# Patient Record
Sex: MALE | Race: WHITE | Employment: OTHER | URBAN - METROPOLITAN AREA
[De-identification: names, ages, dates, MRNs, and addresses within clinical notes are randomized per-mention and may not be internally consistent; named-entity substitution may affect disease eponyms.]

---

## 2017-01-10 ENCOUNTER — OFFICE VISIT (OUTPATIENT)
Dept: FAMILY MEDICINE CLINIC | Age: 52
End: 2017-01-10

## 2017-01-10 VITALS
DIASTOLIC BLOOD PRESSURE: 60 MMHG | WEIGHT: 203 LBS | HEIGHT: 66 IN | SYSTOLIC BLOOD PRESSURE: 104 MMHG | TEMPERATURE: 97.7 F | HEART RATE: 64 BPM | RESPIRATION RATE: 16 BRPM | BODY MASS INDEX: 32.62 KG/M2

## 2017-01-10 DIAGNOSIS — F41.9 ANXIETY: ICD-10-CM

## 2017-01-10 DIAGNOSIS — M15.9 PRIMARY OSTEOARTHRITIS INVOLVING MULTIPLE JOINTS: ICD-10-CM

## 2017-01-10 DIAGNOSIS — M54.2 CHRONIC NECK PAIN: ICD-10-CM

## 2017-01-10 DIAGNOSIS — I10 ESSENTIAL HYPERTENSION: Chronic | ICD-10-CM

## 2017-01-10 DIAGNOSIS — K21.9 GASTROESOPHAGEAL REFLUX DISEASE, ESOPHAGITIS PRESENCE NOT SPECIFIED: ICD-10-CM

## 2017-01-10 DIAGNOSIS — F10.11 HISTORY OF ALCOHOL ABUSE: ICD-10-CM

## 2017-01-10 DIAGNOSIS — G89.29 CHRONIC NECK PAIN: ICD-10-CM

## 2017-01-10 DIAGNOSIS — K70.31 ALCOHOLIC CIRRHOSIS OF LIVER WITH ASCITES (HCC): Primary | ICD-10-CM

## 2017-01-10 DIAGNOSIS — R79.89 INCREASED AMMONIA LEVEL: ICD-10-CM

## 2017-01-10 PROCEDURE — 99214 OFFICE O/P EST MOD 30 MIN: CPT | Performed by: NURSE PRACTITIONER

## 2017-01-10 RX ORDER — DIAZEPAM 5 MG/1
5 TABLET ORAL EVERY 8 HOURS PRN
Qty: 90 TABLET | Refills: 0 | Status: SHIPPED | OUTPATIENT
Start: 2017-01-10 | End: 2017-02-08 | Stop reason: SDUPTHER

## 2017-01-10 RX ORDER — HYDROCODONE BITARTRATE AND ACETAMINOPHEN 5; 325 MG/1; MG/1
1 TABLET ORAL EVERY 6 HOURS PRN
Qty: 30 TABLET | Refills: 0 | Status: SHIPPED | OUTPATIENT
Start: 2017-01-10 | End: 2017-02-09

## 2017-01-10 RX ORDER — TRAMADOL HYDROCHLORIDE 50 MG/1
50 TABLET ORAL EVERY 8 HOURS PRN
Qty: 90 TABLET | Refills: 0 | Status: SHIPPED | OUTPATIENT
Start: 2017-01-10 | End: 2017-02-08 | Stop reason: SDUPTHER

## 2017-01-10 RX ORDER — DIAZEPAM 5 MG/1
5 TABLET ORAL EVERY 8 HOURS PRN
COMMUNITY
Start: 2016-11-08 | End: 2017-02-01 | Stop reason: SDUPTHER

## 2017-01-10 ASSESSMENT — PATIENT HEALTH QUESTIONNAIRE - PHQ9
1. LITTLE INTEREST OR PLEASURE IN DOING THINGS: 0
SUM OF ALL RESPONSES TO PHQ QUESTIONS 1-9: 0
2. FEELING DOWN, DEPRESSED OR HOPELESS: 0
SUM OF ALL RESPONSES TO PHQ9 QUESTIONS 1 & 2: 0

## 2017-01-11 ASSESSMENT — ENCOUNTER SYMPTOMS
EYES NEGATIVE: 1
RESPIRATORY NEGATIVE: 1
ALLERGIC/IMMUNOLOGIC NEGATIVE: 1
ABDOMINAL DISTENTION: 1

## 2017-02-08 RX ORDER — TRAMADOL HYDROCHLORIDE 50 MG/1
50 TABLET ORAL EVERY 8 HOURS PRN
Qty: 90 TABLET | Refills: 0 | Status: SHIPPED | OUTPATIENT
Start: 2017-02-08 | End: 2017-04-10 | Stop reason: SDUPTHER

## 2017-02-08 RX ORDER — DIAZEPAM 5 MG/1
5 TABLET ORAL EVERY 8 HOURS PRN
Qty: 90 TABLET | Refills: 0 | Status: SHIPPED | OUTPATIENT
Start: 2017-02-08 | End: 2017-04-10 | Stop reason: SDUPTHER

## 2017-02-08 RX ORDER — HYDROCODONE BITARTRATE AND ACETAMINOPHEN 5; 325 MG/1; MG/1
1 TABLET ORAL EVERY 6 HOURS PRN
Qty: 30 TABLET | Refills: 0 | OUTPATIENT
Start: 2017-02-08 | End: 2017-03-10

## 2017-02-08 RX ORDER — TRAMADOL HYDROCHLORIDE 50 MG/1
50 TABLET ORAL EVERY 8 HOURS PRN
Qty: 90 TABLET | Refills: 0 | OUTPATIENT
Start: 2017-02-08

## 2017-02-08 RX ORDER — DIAZEPAM 5 MG/1
5 TABLET ORAL EVERY 8 HOURS PRN
Qty: 90 TABLET | Refills: 0 | OUTPATIENT
Start: 2017-02-08

## 2017-02-20 ENCOUNTER — TELEPHONE (OUTPATIENT)
Dept: FAMILY MEDICINE CLINIC | Age: 52
End: 2017-02-20

## 2017-04-10 ENCOUNTER — OFFICE VISIT (OUTPATIENT)
Dept: BEHAVIORAL/MENTAL HEALTH | Age: 52
End: 2017-04-10

## 2017-04-10 DIAGNOSIS — F43.21 GRIEF: Primary | ICD-10-CM

## 2017-04-10 PROCEDURE — 90832 PSYTX W PT 30 MINUTES: CPT | Performed by: PSYCHOLOGIST

## 2017-04-10 ASSESSMENT — PATIENT HEALTH QUESTIONNAIRE - PHQ9
SUM OF ALL RESPONSES TO PHQ QUESTIONS 1-9: 7
9. THOUGHTS THAT YOU WOULD BE BETTER OFF DEAD, OR OF HURTING YOURSELF: 0
1. LITTLE INTEREST OR PLEASURE IN DOING THINGS: 1
2. FEELING DOWN, DEPRESSED OR HOPELESS: 1
8. MOVING OR SPEAKING SO SLOWLY THAT OTHER PEOPLE COULD HAVE NOTICED. OR THE OPPOSITE, BEING SO FIGETY OR RESTLESS THAT YOU HAVE BEEN MOVING AROUND A LOT MORE THAN USUAL: 0
7. TROUBLE CONCENTRATING ON THINGS, SUCH AS READING THE NEWSPAPER OR WATCHING TELEVISION: 0
5. POOR APPETITE OR OVEREATING: 0
SUM OF ALL RESPONSES TO PHQ9 QUESTIONS 1 & 2: 2
6. FEELING BAD ABOUT YOURSELF - OR THAT YOU ARE A FAILURE OR HAVE LET YOURSELF OR YOUR FAMILY DOWN: 0
4. FEELING TIRED OR HAVING LITTLE ENERGY: 3
10. IF YOU CHECKED OFF ANY PROBLEMS, HOW DIFFICULT HAVE THESE PROBLEMS MADE IT FOR YOU TO DO YOUR WORK, TAKE CARE OF THINGS AT HOME, OR GET ALONG WITH OTHER PEOPLE: 1
3. TROUBLE FALLING OR STAYING ASLEEP: 2

## 2017-04-11 RX ORDER — TRAMADOL HYDROCHLORIDE 50 MG/1
50 TABLET ORAL EVERY 8 HOURS PRN
Qty: 90 TABLET | Refills: 0 | Status: SHIPPED | OUTPATIENT
Start: 2017-04-11 | End: 2017-04-13 | Stop reason: SDUPTHER

## 2017-04-11 RX ORDER — DIAZEPAM 5 MG/1
5 TABLET ORAL EVERY 8 HOURS PRN
Qty: 90 TABLET | Refills: 0 | Status: SHIPPED | OUTPATIENT
Start: 2017-04-11 | End: 2017-04-13 | Stop reason: SDUPTHER

## 2017-04-13 ENCOUNTER — OFFICE VISIT (OUTPATIENT)
Dept: FAMILY MEDICINE CLINIC | Age: 52
End: 2017-04-13

## 2017-04-13 DIAGNOSIS — G89.29 OTHER CHRONIC PAIN: ICD-10-CM

## 2017-04-13 DIAGNOSIS — R18.8 CIRRHOSIS OF LIVER WITH ASCITES, UNSPECIFIED HEPATIC CIRRHOSIS TYPE (HCC): Primary | ICD-10-CM

## 2017-04-13 DIAGNOSIS — K74.60 CIRRHOSIS OF LIVER WITH ASCITES, UNSPECIFIED HEPATIC CIRRHOSIS TYPE (HCC): Primary | ICD-10-CM

## 2017-04-13 DIAGNOSIS — F41.9 ANXIETY: ICD-10-CM

## 2017-04-13 PROCEDURE — 3017F COLORECTAL CA SCREEN DOC REV: CPT | Performed by: NURSE PRACTITIONER

## 2017-04-13 PROCEDURE — 99213 OFFICE O/P EST LOW 20 MIN: CPT | Performed by: NURSE PRACTITIONER

## 2017-04-13 PROCEDURE — 4004F PT TOBACCO SCREEN RCVD TLK: CPT | Performed by: NURSE PRACTITIONER

## 2017-04-13 PROCEDURE — G8427 DOCREV CUR MEDS BY ELIG CLIN: HCPCS | Performed by: NURSE PRACTITIONER

## 2017-04-13 PROCEDURE — G8417 CALC BMI ABV UP PARAM F/U: HCPCS | Performed by: NURSE PRACTITIONER

## 2017-04-13 RX ORDER — TRAMADOL HYDROCHLORIDE 50 MG/1
50 TABLET ORAL EVERY 8 HOURS PRN
Qty: 90 TABLET | Refills: 0 | Status: SHIPPED | OUTPATIENT
Start: 2017-04-13 | End: 2017-05-11 | Stop reason: SDUPTHER

## 2017-04-13 RX ORDER — DIAZEPAM 5 MG/1
5 TABLET ORAL EVERY 8 HOURS PRN
Qty: 90 TABLET | Refills: 0 | Status: SHIPPED | OUTPATIENT
Start: 2017-04-13 | End: 2017-05-11 | Stop reason: SDUPTHER

## 2017-04-13 RX ORDER — HYDROCODONE BITARTRATE AND ACETAMINOPHEN 5; 325 MG/1; MG/1
1 TABLET ORAL EVERY 4 HOURS PRN
Refills: 0 | COMMUNITY
Start: 2017-04-11 | End: 2017-04-13 | Stop reason: SDUPTHER

## 2017-04-13 RX ORDER — AMOXICILLIN 500 MG/1
1 CAPSULE ORAL EVERY 6 HOURS
Refills: 0 | Status: ON HOLD | COMMUNITY
Start: 2017-04-11 | End: 2017-06-07 | Stop reason: HOSPADM

## 2017-04-15 RX ORDER — HYDROCODONE BITARTRATE AND ACETAMINOPHEN 5; 325 MG/1; MG/1
1 TABLET ORAL EVERY 4 HOURS PRN
Qty: 30 TABLET | Refills: 0 | Status: SHIPPED | OUTPATIENT
Start: 2017-04-15 | End: 2017-05-23 | Stop reason: SDUPTHER

## 2017-04-20 VITALS
TEMPERATURE: 97.7 F | SYSTOLIC BLOOD PRESSURE: 120 MMHG | BODY MASS INDEX: 31.06 KG/M2 | RESPIRATION RATE: 20 BRPM | HEART RATE: 72 BPM | DIASTOLIC BLOOD PRESSURE: 70 MMHG | WEIGHT: 191 LBS

## 2017-04-20 ASSESSMENT — ENCOUNTER SYMPTOMS
RESPIRATORY NEGATIVE: 1
EYES NEGATIVE: 1
BACK PAIN: 1
GASTROINTESTINAL NEGATIVE: 1

## 2017-05-11 RX ORDER — DIAZEPAM 5 MG/1
5 TABLET ORAL EVERY 8 HOURS PRN
Qty: 90 TABLET | Refills: 0 | Status: ON HOLD | OUTPATIENT
Start: 2017-05-11 | End: 2017-06-07 | Stop reason: HOSPADM

## 2017-05-11 RX ORDER — TRAMADOL HYDROCHLORIDE 50 MG/1
50 TABLET ORAL EVERY 8 HOURS PRN
Qty: 90 TABLET | Refills: 0 | Status: ON HOLD | OUTPATIENT
Start: 2017-05-11 | End: 2017-06-07 | Stop reason: HOSPADM

## 2017-05-23 RX ORDER — HYDROCODONE BITARTRATE AND ACETAMINOPHEN 5; 325 MG/1; MG/1
1 TABLET ORAL EVERY 4 HOURS PRN
Qty: 30 TABLET | Refills: 0 | Status: ON HOLD | OUTPATIENT
Start: 2017-05-23 | End: 2017-06-07 | Stop reason: HOSPADM

## 2017-05-27 ENCOUNTER — NURSE TRIAGE (OUTPATIENT)
Dept: ADMINISTRATIVE | Age: 52
End: 2017-05-27

## 2017-05-30 RX ORDER — FUROSEMIDE 20 MG/1
20 TABLET ORAL DAILY
Qty: 30 TABLET | Refills: 11 | Status: SHIPPED | OUTPATIENT
Start: 2017-05-30 | End: 2017-06-16 | Stop reason: ALTCHOICE

## 2017-05-30 RX ORDER — CITALOPRAM 20 MG/1
20 TABLET ORAL DAILY
Qty: 30 TABLET | Refills: 10 | Status: SHIPPED | OUTPATIENT
Start: 2017-05-30 | End: 2018-06-05 | Stop reason: SDUPTHER

## 2017-06-06 PROBLEM — E87.20 LACTIC ACIDOSIS: Status: ACTIVE | Noted: 2017-06-06

## 2017-06-06 PROBLEM — Z72.0 TOBACCO ABUSE: Chronic | Status: ACTIVE | Noted: 2017-06-06

## 2017-06-06 PROBLEM — R10.84 GENERALIZED ABDOMINAL PAIN: Status: ACTIVE | Noted: 2017-06-06

## 2017-06-08 ENCOUNTER — TELEPHONE (OUTPATIENT)
Dept: FAMILY MEDICINE CLINIC | Age: 52
End: 2017-06-08

## 2017-06-08 RX ORDER — HYDROCODONE BITARTRATE AND ACETAMINOPHEN 5; 325 MG/1; MG/1
1 TABLET ORAL EVERY 4 HOURS PRN
Qty: 30 TABLET | Refills: 0 | Status: SHIPPED | OUTPATIENT
Start: 2017-06-08 | End: 2017-06-08

## 2017-06-14 ENCOUNTER — TELEPHONE (OUTPATIENT)
Dept: FAMILY MEDICINE CLINIC | Age: 52
End: 2017-06-14

## 2017-06-16 ENCOUNTER — OFFICE VISIT (OUTPATIENT)
Dept: FAMILY MEDICINE CLINIC | Age: 52
End: 2017-06-16

## 2017-06-16 VITALS
WEIGHT: 190.4 LBS | HEART RATE: 64 BPM | TEMPERATURE: 97.7 F | BODY MASS INDEX: 31.68 KG/M2 | RESPIRATION RATE: 20 BRPM | SYSTOLIC BLOOD PRESSURE: 92 MMHG | DIASTOLIC BLOOD PRESSURE: 58 MMHG

## 2017-06-16 DIAGNOSIS — R10.84 GENERALIZED ABDOMINAL PAIN: ICD-10-CM

## 2017-06-16 DIAGNOSIS — K76.82 HEPATIC ENCEPHALOPATHY: ICD-10-CM

## 2017-06-16 DIAGNOSIS — K70.31 ALCOHOLIC CIRRHOSIS OF LIVER WITH ASCITES (HCC): Primary | ICD-10-CM

## 2017-06-16 PROCEDURE — 99495 TRANSJ CARE MGMT MOD F2F 14D: CPT | Performed by: FAMILY MEDICINE

## 2017-06-16 RX ORDER — OMEPRAZOLE 20 MG/1
1 CAPSULE, DELAYED RELEASE ORAL DAILY
Refills: 1 | COMMUNITY
Start: 2017-05-31 | End: 2017-07-18

## 2017-06-16 RX ORDER — MELOXICAM 15 MG/1
15 TABLET ORAL DAILY
Qty: 30 TABLET | Refills: 3 | Status: SHIPPED | OUTPATIENT
Start: 2017-06-16 | End: 2017-07-18

## 2017-06-16 RX ORDER — FUROSEMIDE 20 MG/1
20 TABLET ORAL DAILY
COMMUNITY
End: 2017-07-18

## 2017-07-18 ENCOUNTER — OFFICE VISIT (OUTPATIENT)
Dept: FAMILY MEDICINE CLINIC | Age: 52
End: 2017-07-18
Payer: COMMERCIAL

## 2017-07-18 VITALS
HEART RATE: 76 BPM | TEMPERATURE: 98.2 F | RESPIRATION RATE: 12 BRPM | DIASTOLIC BLOOD PRESSURE: 68 MMHG | BODY MASS INDEX: 30.76 KG/M2 | WEIGHT: 196 LBS | SYSTOLIC BLOOD PRESSURE: 98 MMHG | HEIGHT: 67 IN

## 2017-07-18 DIAGNOSIS — Z98.1 HX OF FUSION OF CERVICAL SPINE: ICD-10-CM

## 2017-07-18 DIAGNOSIS — K70.31 ALCOHOLIC CIRRHOSIS OF LIVER WITH ASCITES (HCC): ICD-10-CM

## 2017-07-18 DIAGNOSIS — K59.00 CONSTIPATION, UNSPECIFIED CONSTIPATION TYPE: ICD-10-CM

## 2017-07-18 DIAGNOSIS — M54.2 CHRONIC NECK PAIN: Primary | ICD-10-CM

## 2017-07-18 DIAGNOSIS — R10.84 GENERALIZED ABDOMINAL PAIN: ICD-10-CM

## 2017-07-18 DIAGNOSIS — G89.29 CHRONIC NECK PAIN: Primary | ICD-10-CM

## 2017-07-18 PROCEDURE — 99213 OFFICE O/P EST LOW 20 MIN: CPT | Performed by: NURSE PRACTITIONER

## 2017-07-18 PROCEDURE — 3017F COLORECTAL CA SCREEN DOC REV: CPT | Performed by: NURSE PRACTITIONER

## 2017-07-18 PROCEDURE — 4004F PT TOBACCO SCREEN RCVD TLK: CPT | Performed by: NURSE PRACTITIONER

## 2017-07-18 PROCEDURE — G8417 CALC BMI ABV UP PARAM F/U: HCPCS | Performed by: NURSE PRACTITIONER

## 2017-07-18 PROCEDURE — G8427 DOCREV CUR MEDS BY ELIG CLIN: HCPCS | Performed by: NURSE PRACTITIONER

## 2017-07-18 RX ORDER — NAPROXEN 500 MG/1
500 TABLET ORAL 2 TIMES DAILY WITH MEALS
Qty: 60 TABLET | Refills: 3 | Status: SHIPPED | OUTPATIENT
Start: 2017-07-18 | End: 2017-09-18 | Stop reason: ALTCHOICE

## 2017-07-18 RX ORDER — CYCLOBENZAPRINE HCL 10 MG
5-10 TABLET ORAL 3 TIMES DAILY PRN
Qty: 60 TABLET | Refills: 1 | Status: SHIPPED | OUTPATIENT
Start: 2017-07-18 | End: 2017-09-07 | Stop reason: SDUPTHER

## 2017-07-28 ASSESSMENT — ENCOUNTER SYMPTOMS
RESPIRATORY NEGATIVE: 1
NAUSEA: 0
ABDOMINAL PAIN: 1
ABDOMINAL DISTENTION: 1
EYES NEGATIVE: 1
VOMITING: 0

## 2017-08-23 ENCOUNTER — OFFICE VISIT (OUTPATIENT)
Dept: PHYSICAL MEDICINE AND REHAB | Age: 52
End: 2017-08-23
Payer: COMMERCIAL

## 2017-08-23 ENCOUNTER — HOSPITAL ENCOUNTER (OUTPATIENT)
Age: 52
Discharge: HOME | End: 2017-08-23
Payer: COMMERCIAL

## 2017-08-23 ENCOUNTER — HOSPITAL ENCOUNTER (OUTPATIENT)
Dept: GENERAL RADIOLOGY | Age: 52
Discharge: HOME | End: 2017-08-23
Payer: COMMERCIAL

## 2017-08-23 VITALS
HEIGHT: 67 IN | WEIGHT: 203 LBS | SYSTOLIC BLOOD PRESSURE: 126 MMHG | BODY MASS INDEX: 31.86 KG/M2 | DIASTOLIC BLOOD PRESSURE: 88 MMHG | HEART RATE: 89 BPM

## 2017-08-23 DIAGNOSIS — M54.2 CERVICALGIA: Primary | ICD-10-CM

## 2017-08-23 DIAGNOSIS — M54.2 CERVICALGIA: ICD-10-CM

## 2017-08-23 DIAGNOSIS — M54.50 CHRONIC BILATERAL LOW BACK PAIN WITHOUT SCIATICA: ICD-10-CM

## 2017-08-23 DIAGNOSIS — G89.29 CHRONIC BILATERAL LOW BACK PAIN WITHOUT SCIATICA: ICD-10-CM

## 2017-08-23 DIAGNOSIS — G89.4 CHRONIC PAIN SYNDROME: ICD-10-CM

## 2017-08-23 PROCEDURE — G8417 CALC BMI ABV UP PARAM F/U: HCPCS | Performed by: PAIN MEDICINE

## 2017-08-23 PROCEDURE — 3017F COLORECTAL CA SCREEN DOC REV: CPT | Performed by: PAIN MEDICINE

## 2017-08-23 PROCEDURE — G8427 DOCREV CUR MEDS BY ELIG CLIN: HCPCS | Performed by: PAIN MEDICINE

## 2017-08-23 PROCEDURE — 4004F PT TOBACCO SCREEN RCVD TLK: CPT | Performed by: PAIN MEDICINE

## 2017-08-23 PROCEDURE — 72100 X-RAY EXAM L-S SPINE 2/3 VWS: CPT

## 2017-08-23 PROCEDURE — 72040 X-RAY EXAM NECK SPINE 2-3 VW: CPT

## 2017-08-23 PROCEDURE — 99244 OFF/OP CNSLTJ NEW/EST MOD 40: CPT | Performed by: PAIN MEDICINE

## 2017-08-23 RX ORDER — OMEPRAZOLE 20 MG/1
20 CAPSULE, DELAYED RELEASE ORAL DAILY
COMMUNITY
End: 2018-09-19 | Stop reason: SDUPTHER

## 2017-08-23 ASSESSMENT — ENCOUNTER SYMPTOMS
CONSTIPATION: 0
SORE THROAT: 0
VOMITING: 0
COLOR CHANGE: 0
CHEST TIGHTNESS: 0
STRIDOR: 0
EYE DISCHARGE: 0
RECTAL PAIN: 0
COUGH: 0
BACK PAIN: 1
NAUSEA: 0
RHINORRHEA: 0
SINUS PRESSURE: 0
DIARRHEA: 0
CHOKING: 0
EYE PAIN: 0
WHEEZING: 0
EYE ITCHING: 0
BLOOD IN STOOL: 0
PHOTOPHOBIA: 0
EYE REDNESS: 0
APNEA: 0
SHORTNESS OF BREATH: 0
BOWEL INCONTINENCE: 0

## 2017-08-24 ENCOUNTER — TELEPHONE (OUTPATIENT)
Dept: PHYSICAL MEDICINE AND REHAB | Age: 52
End: 2017-08-24

## 2017-09-01 DIAGNOSIS — R10.9 ABDOMINAL CRAMPING: ICD-10-CM

## 2017-09-05 RX ORDER — DICYCLOMINE HYDROCHLORIDE 10 MG/1
CAPSULE ORAL
Qty: 120 CAPSULE | Refills: 0 | Status: SHIPPED | OUTPATIENT
Start: 2017-09-05 | End: 2017-09-06 | Stop reason: DRUGHIGH

## 2017-09-06 ENCOUNTER — OFFICE VISIT (OUTPATIENT)
Dept: PHYSICAL MEDICINE AND REHAB | Age: 52
End: 2017-09-06
Payer: COMMERCIAL

## 2017-09-06 VITALS
SYSTOLIC BLOOD PRESSURE: 120 MMHG | HEIGHT: 66 IN | DIASTOLIC BLOOD PRESSURE: 80 MMHG | WEIGHT: 203 LBS | HEART RATE: 88 BPM | BODY MASS INDEX: 32.62 KG/M2

## 2017-09-06 DIAGNOSIS — M47.816 SPONDYLOSIS OF LUMBAR REGION WITHOUT MYELOPATHY OR RADICULOPATHY: ICD-10-CM

## 2017-09-06 DIAGNOSIS — M46.1 SI (SACROILIAC) JOINT INFLAMMATION (HCC): ICD-10-CM

## 2017-09-06 DIAGNOSIS — M47.812 SPONDYLOSIS OF CERVICAL REGION WITHOUT MYELOPATHY OR RADICULOPATHY: Primary | ICD-10-CM

## 2017-09-06 PROCEDURE — 99214 OFFICE O/P EST MOD 30 MIN: CPT | Performed by: NURSE PRACTITIONER

## 2017-09-06 PROCEDURE — G8427 DOCREV CUR MEDS BY ELIG CLIN: HCPCS | Performed by: NURSE PRACTITIONER

## 2017-09-06 PROCEDURE — G8417 CALC BMI ABV UP PARAM F/U: HCPCS | Performed by: NURSE PRACTITIONER

## 2017-09-06 PROCEDURE — 4004F PT TOBACCO SCREEN RCVD TLK: CPT | Performed by: NURSE PRACTITIONER

## 2017-09-06 PROCEDURE — 3017F COLORECTAL CA SCREEN DOC REV: CPT | Performed by: NURSE PRACTITIONER

## 2017-09-06 ASSESSMENT — ENCOUNTER SYMPTOMS
EYES NEGATIVE: 1
GASTROINTESTINAL NEGATIVE: 1
NAUSEA: 0
SINUS PRESSURE: 0
WHEEZING: 0
EYE PAIN: 0
COLOR CHANGE: 0
PHOTOPHOBIA: 0
CHEST TIGHTNESS: 0
SORE THROAT: 0
DIARRHEA: 0
RESPIRATORY NEGATIVE: 1
COUGH: 0
ABDOMINAL PAIN: 0
ALLERGIC/IMMUNOLOGIC NEGATIVE: 1
RHINORRHEA: 0
CONSTIPATION: 0
BACK PAIN: 1
SHORTNESS OF BREATH: 0
VOMITING: 0

## 2017-09-08 RX ORDER — CYCLOBENZAPRINE HCL 10 MG
5-10 TABLET ORAL 3 TIMES DAILY PRN
Qty: 60 TABLET | Refills: 1 | Status: SHIPPED | OUTPATIENT
Start: 2017-09-08 | End: 2017-10-23 | Stop reason: SDUPTHER

## 2017-09-18 ENCOUNTER — HOSPITAL ENCOUNTER (EMERGENCY)
Age: 52
Discharge: HOME | End: 2017-09-19
Attending: EMERGENCY MEDICINE
Payer: COMMERCIAL

## 2017-09-18 ENCOUNTER — NURSE TRIAGE (OUTPATIENT)
Dept: ADMINISTRATIVE | Age: 52
End: 2017-09-18

## 2017-09-18 ENCOUNTER — APPOINTMENT (OUTPATIENT)
Dept: CT IMAGING | Age: 52
End: 2017-09-18
Payer: COMMERCIAL

## 2017-09-18 DIAGNOSIS — K76.9 LIVER LESION: ICD-10-CM

## 2017-09-18 DIAGNOSIS — K70.31 ALCOHOLIC CIRRHOSIS OF LIVER WITH ASCITES (HCC): Primary | ICD-10-CM

## 2017-09-18 LAB
ALBUMIN SERPL-MCNC: 3.4 G/DL (ref 3.5–5.1)
ALP BLD-CCNC: 86 U/L (ref 38–126)
ALT SERPL-CCNC: 21 U/L (ref 11–66)
AMMONIA: 89 UMOL/L (ref 11–60)
ANION GAP SERPL CALCULATED.3IONS-SCNC: 12 MEQ/L (ref 8–16)
ANISOCYTOSIS: ABNORMAL
APTT: 33.9 SECONDS (ref 22–38)
AST SERPL-CCNC: 27 U/L (ref 5–40)
BASOPHILS # BLD: 0.6 %
BASOPHILS ABSOLUTE: 0.1 THOU/MM3 (ref 0–0.1)
BILIRUB SERPL-MCNC: 0.9 MG/DL (ref 0.3–1.2)
BILIRUBIN DIRECT: < 0.2 MG/DL (ref 0–0.3)
BUN BLDV-MCNC: 17 MG/DL (ref 7–22)
CALCIUM SERPL-MCNC: 9.3 MG/DL (ref 8.5–10.5)
CHLORIDE BLD-SCNC: 103 MEQ/L (ref 98–111)
CO2: 22 MEQ/L (ref 23–33)
CREAT SERPL-MCNC: 0.7 MG/DL (ref 0.4–1.2)
EOSINOPHIL # BLD: 4.5 %
EOSINOPHILS ABSOLUTE: 0.4 THOU/MM3 (ref 0–0.4)
ETHYL ALCOHOL, SERUM: < 0.01 %
GFR SERPL CREATININE-BSD FRML MDRD: > 90 ML/MIN/1.73M2
GLUCOSE BLD-MCNC: 96 MG/DL (ref 70–108)
HCT VFR BLD CALC: 36.7 % (ref 42–52)
HEMOGLOBIN: 12.8 GM/DL (ref 14–18)
INR BLD: 1.15 (ref 0.85–1.13)
LIPASE: 23.5 U/L (ref 5.6–51.3)
LYMPHOCYTES # BLD: 32.6 %
LYMPHOCYTES ABSOLUTE: 2.8 THOU/MM3 (ref 1–4.8)
MAGNESIUM: 2 MG/DL (ref 1.6–2.4)
MCH RBC QN AUTO: 33.5 PG (ref 27–31)
MCHC RBC AUTO-ENTMCNC: 34.8 GM/DL (ref 33–37)
MCV RBC AUTO: 96.4 FL (ref 80–94)
MONOCYTES # BLD: 11.2 %
MONOCYTES ABSOLUTE: 1 THOU/MM3 (ref 0.4–1.3)
NUCLEATED RED BLOOD CELLS: 0 /100 WBC
OSMOLALITY CALCULATION: 275.2 MOSMOL/KG (ref 275–300)
PDW BLD-RTO: 15.1 % (ref 11.5–14.5)
PLATELET # BLD: 163 THOU/MM3 (ref 130–400)
PMV BLD AUTO: 8.4 MCM (ref 7.4–10.4)
POTASSIUM SERPL-SCNC: 4 MEQ/L (ref 3.5–5.2)
PRO-BNP: < 5 PG/ML (ref 0–900)
RBC # BLD: 3.81 MILL/MM3 (ref 4.7–6.1)
RBC # BLD: NORMAL 10*6/UL
SEG NEUTROPHILS: 51.1 %
SEGMENTED NEUTROPHILS ABSOLUTE COUNT: 4.3 THOU/MM3 (ref 1.8–7.7)
SODIUM BLD-SCNC: 137 MEQ/L (ref 135–145)
TOTAL PROTEIN: 7.6 G/DL (ref 6.1–8)
TROPONIN T: < 0.01 NG/ML
TSH SERPL DL<=0.05 MIU/L-ACNC: 2.12 UIU/ML (ref 0.4–4.2)
WBC # BLD: 8.5 THOU/MM3 (ref 4.8–10.8)

## 2017-09-18 PROCEDURE — 85730 THROMBOPLASTIN TIME PARTIAL: CPT

## 2017-09-18 PROCEDURE — 83735 ASSAY OF MAGNESIUM: CPT

## 2017-09-18 PROCEDURE — 6370000000 HC RX 637 (ALT 250 FOR IP): Performed by: EMERGENCY MEDICINE

## 2017-09-18 PROCEDURE — 80053 COMPREHEN METABOLIC PANEL: CPT

## 2017-09-18 PROCEDURE — 74177 CT ABD & PELVIS W/CONTRAST: CPT

## 2017-09-18 PROCEDURE — 83690 ASSAY OF LIPASE: CPT

## 2017-09-18 PROCEDURE — 85610 PROTHROMBIN TIME: CPT

## 2017-09-18 PROCEDURE — 2580000003 HC RX 258: Performed by: EMERGENCY MEDICINE

## 2017-09-18 PROCEDURE — 96361 HYDRATE IV INFUSION ADD-ON: CPT

## 2017-09-18 PROCEDURE — 93005 ELECTROCARDIOGRAM TRACING: CPT

## 2017-09-18 PROCEDURE — 83880 ASSAY OF NATRIURETIC PEPTIDE: CPT

## 2017-09-18 PROCEDURE — 85025 COMPLETE CBC W/AUTO DIFF WBC: CPT

## 2017-09-18 PROCEDURE — G0480 DRUG TEST DEF 1-7 CLASSES: HCPCS

## 2017-09-18 PROCEDURE — 99284 EMERGENCY DEPT VISIT MOD MDM: CPT

## 2017-09-18 PROCEDURE — 82140 ASSAY OF AMMONIA: CPT

## 2017-09-18 PROCEDURE — 82248 BILIRUBIN DIRECT: CPT

## 2017-09-18 PROCEDURE — 84484 ASSAY OF TROPONIN QUANT: CPT

## 2017-09-18 PROCEDURE — 84443 ASSAY THYROID STIM HORMONE: CPT

## 2017-09-18 PROCEDURE — 36415 COLL VENOUS BLD VENIPUNCTURE: CPT

## 2017-09-18 RX ORDER — 0.9 % SODIUM CHLORIDE 0.9 %
1000 INTRAVENOUS SOLUTION INTRAVENOUS ONCE
Status: COMPLETED | OUTPATIENT
Start: 2017-09-18 | End: 2017-09-19

## 2017-09-18 RX ORDER — OXYCODONE HYDROCHLORIDE AND ACETAMINOPHEN 5; 325 MG/1; MG/1
1 TABLET ORAL ONCE
Status: COMPLETED | OUTPATIENT
Start: 2017-09-19 | End: 2017-09-18

## 2017-09-18 RX ADMIN — OXYCODONE HYDROCHLORIDE AND ACETAMINOPHEN 1 TABLET: 5; 325 TABLET ORAL at 23:51

## 2017-09-18 RX ADMIN — SODIUM CHLORIDE 1000 ML: 9 INJECTION, SOLUTION INTRAVENOUS at 23:00

## 2017-09-18 ASSESSMENT — PAIN DESCRIPTION - DESCRIPTORS: DESCRIPTORS: CRAMPING;ACHING;PRESSURE

## 2017-09-18 ASSESSMENT — PAIN DESCRIPTION - PAIN TYPE: TYPE: ACUTE PAIN

## 2017-09-18 ASSESSMENT — PAIN DESCRIPTION - LOCATION: LOCATION: ABDOMEN

## 2017-09-18 ASSESSMENT — PAIN DESCRIPTION - ORIENTATION: ORIENTATION: RIGHT;LEFT;ANTERIOR

## 2017-09-18 ASSESSMENT — PAIN SCALES - GENERAL: PAINLEVEL_OUTOF10: 10

## 2017-09-19 VITALS
TEMPERATURE: 98.4 F | WEIGHT: 217 LBS | HEART RATE: 94 BPM | HEIGHT: 65 IN | OXYGEN SATURATION: 99 % | BODY MASS INDEX: 36.15 KG/M2 | RESPIRATION RATE: 14 BRPM | DIASTOLIC BLOOD PRESSURE: 76 MMHG | SYSTOLIC BLOOD PRESSURE: 115 MMHG

## 2017-09-19 LAB
AMPHETAMINE+METHAMPHETAMINE URINE SCREEN: NEGATIVE
BARBITURATE QUANTITATIVE URINE: NEGATIVE
BENZODIAZEPINE QUANTITATIVE URINE: NEGATIVE
BILIRUBIN URINE: NEGATIVE
BLOOD, URINE: NEGATIVE
CANNABINOID QUANTITATIVE URINE: NEGATIVE
CHARACTER, URINE: CLEAR
COCAINE METABOLITE QUANTITATIVE URINE: NEGATIVE
COLOR: YELLOW
EKG ATRIAL RATE: 107 BPM
EKG P AXIS: 25 DEGREES
EKG P-R INTERVAL: 128 MS
EKG Q-T INTERVAL: 342 MS
EKG QRS DURATION: 94 MS
EKG QTC CALCULATION (BAZETT): 456 MS
EKG R AXIS: 48 DEGREES
EKG VENTRICULAR RATE: 107 BPM
GLUCOSE URINE: NEGATIVE MG/DL
KETONES, URINE: NEGATIVE
LEUKOCYTE ESTERASE, URINE: NEGATIVE
NITRITE, URINE: NEGATIVE
OPIATES, URINE: NEGATIVE
OXYCODONE: NEGATIVE
PH UA: 5.5
PHENCYCLIDINE QUANTITATIVE URINE: NEGATIVE
PROTEIN UA: NEGATIVE
SPECIFIC GRAVITY, URINE: 1.02 (ref 1–1.03)
UROBILINOGEN, URINE: 1 EU/DL

## 2017-09-19 PROCEDURE — 96361 HYDRATE IV INFUSION ADD-ON: CPT

## 2017-09-19 PROCEDURE — 6360000002 HC RX W HCPCS: Performed by: EMERGENCY MEDICINE

## 2017-09-19 PROCEDURE — 96365 THER/PROPH/DIAG IV INF INIT: CPT

## 2017-09-19 PROCEDURE — 80307 DRUG TEST PRSMV CHEM ANLYZR: CPT

## 2017-09-19 PROCEDURE — 2580000003 HC RX 258: Performed by: EMERGENCY MEDICINE

## 2017-09-19 PROCEDURE — 6370000000 HC RX 637 (ALT 250 FOR IP): Performed by: EMERGENCY MEDICINE

## 2017-09-19 PROCEDURE — 6360000004 HC RX CONTRAST MEDICATION: Performed by: EMERGENCY MEDICINE

## 2017-09-19 PROCEDURE — 81003 URINALYSIS AUTO W/O SCOPE: CPT

## 2017-09-19 RX ORDER — OXYCODONE HYDROCHLORIDE AND ACETAMINOPHEN 5; 325 MG/1; MG/1
1 TABLET ORAL EVERY 4 HOURS PRN
Qty: 15 TABLET | Refills: 0 | Status: SHIPPED | OUTPATIENT
Start: 2017-09-19 | End: 2017-09-25 | Stop reason: ALTCHOICE

## 2017-09-19 RX ORDER — HYDROMORPHONE HYDROCHLORIDE 2 MG/1
1 TABLET ORAL ONCE
Status: COMPLETED | OUTPATIENT
Start: 2017-09-19 | End: 2017-09-19

## 2017-09-19 RX ORDER — METRONIDAZOLE 500 MG/1
500 TABLET ORAL 3 TIMES DAILY
Qty: 30 TABLET | Refills: 0 | Status: SHIPPED | OUTPATIENT
Start: 2017-09-19 | End: 2017-09-25

## 2017-09-19 RX ORDER — ONDANSETRON 4 MG/1
4 TABLET, FILM COATED ORAL EVERY 8 HOURS PRN
Qty: 20 TABLET | Refills: 0 | Status: SHIPPED | OUTPATIENT
Start: 2017-09-19 | End: 2019-01-14 | Stop reason: ALTCHOICE

## 2017-09-19 RX ORDER — CIPROFLOXACIN 500 MG/1
500 TABLET, FILM COATED ORAL 2 TIMES DAILY
Qty: 20 TABLET | Refills: 0 | Status: SHIPPED | OUTPATIENT
Start: 2017-09-19 | End: 2017-09-25

## 2017-09-19 RX ORDER — LACTULOSE 10 G/15ML
30 SOLUTION ORAL ONCE
Status: COMPLETED | OUTPATIENT
Start: 2017-09-19 | End: 2017-09-19

## 2017-09-19 RX ORDER — LACTULOSE 10 G/15ML
30 SOLUTION ORAL 3 TIMES DAILY
Qty: 675 ML | Refills: 0 | Status: SHIPPED | OUTPATIENT
Start: 2017-09-19 | End: 2017-09-24

## 2017-09-19 RX ADMIN — PIPERACILLIN SODIUM,TAZOBACTAM SODIUM 3.38 G: 3; .375 INJECTION, POWDER, FOR SOLUTION INTRAVENOUS at 02:23

## 2017-09-19 RX ADMIN — LACTULOSE 30 G: 20 SOLUTION ORAL at 03:02

## 2017-09-19 RX ADMIN — HYDROMORPHONE HYDROCHLORIDE 1 MG: 2 TABLET ORAL at 03:02

## 2017-09-19 RX ADMIN — IOPAMIDOL 80 ML: 755 INJECTION, SOLUTION INTRAVENOUS at 00:21

## 2017-09-19 ASSESSMENT — ENCOUNTER SYMPTOMS
RHINORRHEA: 0
ABDOMINAL PAIN: 1
DIARRHEA: 0
EYE REDNESS: 0
CONSTIPATION: 0
WHEEZING: 0
SORE THROAT: 0
ABDOMINAL DISTENTION: 0
NAUSEA: 0
COUGH: 0
EYE DISCHARGE: 0
VOMITING: 0
VOICE CHANGE: 0
SHORTNESS OF BREATH: 0
TROUBLE SWALLOWING: 0
EYE ITCHING: 0
BLOOD IN STOOL: 0
CHEST TIGHTNESS: 0
PHOTOPHOBIA: 0
EYE PAIN: 0
BACK PAIN: 0
CHOKING: 0
SINUS PRESSURE: 0

## 2017-09-19 ASSESSMENT — PAIN SCALES - GENERAL: PAINLEVEL_OUTOF10: 5

## 2017-09-21 ENCOUNTER — APPOINTMENT (OUTPATIENT)
Dept: CT IMAGING | Age: 52
End: 2017-09-21
Payer: COMMERCIAL

## 2017-09-21 ENCOUNTER — HOSPITAL ENCOUNTER (EMERGENCY)
Age: 52
Discharge: HOME | End: 2017-09-21
Payer: COMMERCIAL

## 2017-09-21 VITALS
RESPIRATION RATE: 17 BRPM | HEIGHT: 65 IN | HEART RATE: 87 BPM | OXYGEN SATURATION: 99 % | BODY MASS INDEX: 37.49 KG/M2 | SYSTOLIC BLOOD PRESSURE: 117 MMHG | DIASTOLIC BLOOD PRESSURE: 81 MMHG | WEIGHT: 225 LBS | TEMPERATURE: 98.4 F

## 2017-09-21 DIAGNOSIS — K70.31 ALCOHOLIC CIRRHOSIS OF LIVER WITH ASCITES (HCC): Primary | ICD-10-CM

## 2017-09-21 LAB
ALBUMIN SERPL-MCNC: 3.1 G/DL (ref 3.5–5.1)
ALP BLD-CCNC: 78 U/L (ref 38–126)
ALT SERPL-CCNC: 22 U/L (ref 11–66)
AMMONIA: 36 UMOL/L (ref 11–60)
ANION GAP SERPL CALCULATED.3IONS-SCNC: 12 MEQ/L (ref 8–16)
ANISOCYTOSIS: ABNORMAL
AST SERPL-CCNC: 26 U/L (ref 5–40)
BACTERIA: ABNORMAL
BASOPHILS # BLD: 0.6 %
BASOPHILS ABSOLUTE: 0.1 THOU/MM3 (ref 0–0.1)
BILIRUB SERPL-MCNC: 0.8 MG/DL (ref 0.3–1.2)
BILIRUBIN DIRECT: 0.3 MG/DL (ref 0–0.3)
BILIRUBIN URINE: NEGATIVE
BLOOD, URINE: NEGATIVE
BUN BLDV-MCNC: 10 MG/DL (ref 7–22)
CALCIUM SERPL-MCNC: 8.9 MG/DL (ref 8.5–10.5)
CASTS: ABNORMAL /LPF
CASTS: ABNORMAL /LPF
CHARACTER, URINE: ABNORMAL
CHLORIDE BLD-SCNC: 105 MEQ/L (ref 98–111)
CO2: 20 MEQ/L (ref 23–33)
COLOR: YELLOW
CREAT SERPL-MCNC: 0.7 MG/DL (ref 0.4–1.2)
CRYSTALS: ABNORMAL
EOSINOPHIL # BLD: 2.5 %
EOSINOPHILS ABSOLUTE: 0.3 THOU/MM3 (ref 0–0.4)
EPITHELIAL CELLS, UA: ABNORMAL /HPF
ETHYL ALCOHOL, SERUM: < 0.01 %
GFR SERPL CREATININE-BSD FRML MDRD: > 90 ML/MIN/1.73M2
GLUCOSE BLD-MCNC: 87 MG/DL (ref 70–108)
GLUCOSE, URINE: NEGATIVE MG/DL
HCT VFR BLD CALC: 32.1 % (ref 42–52)
HEMOGLOBIN: 11.3 GM/DL (ref 14–18)
INR BLD: 1.25 (ref 0.85–1.13)
KETONES, URINE: NEGATIVE
LACTIC ACID: 1.1 MMOL/L (ref 0.5–2.2)
LEUKOCYTE ESTERASE, URINE: NEGATIVE
LIPASE: 11.9 U/L (ref 5.6–51.3)
LYMPHOCYTES # BLD: 21.9 %
LYMPHOCYTES ABSOLUTE: 2.2 THOU/MM3 (ref 1–4.8)
MCH RBC QN AUTO: 33.4 PG (ref 27–31)
MCHC RBC AUTO-ENTMCNC: 35.2 GM/DL (ref 33–37)
MCV RBC AUTO: 95 FL (ref 80–94)
MISCELLANEOUS LAB TEST RESULT: ABNORMAL
MONOCYTES # BLD: 11.4 %
MONOCYTES ABSOLUTE: 1.2 THOU/MM3 (ref 0.4–1.3)
NITRITE, URINE: NEGATIVE
NUCLEATED RED BLOOD CELLS: 0 /100 WBC
OSMOLALITY CALCULATION: 272.2 MOSMOL/KG (ref 275–300)
PDW BLD-RTO: 15.4 % (ref 11.5–14.5)
PH UA: 7
PLATELET # BLD: 161 THOU/MM3 (ref 130–400)
PMV BLD AUTO: 7.7 MCM (ref 7.4–10.4)
POTASSIUM SERPL-SCNC: 4.1 MEQ/L (ref 3.5–5.2)
PROTEIN UA: NEGATIVE MG/DL
RBC # BLD: 3.38 MILL/MM3 (ref 4.7–6.1)
RBC # BLD: NORMAL 10*6/UL
RBC URINE: ABNORMAL /HPF
RENAL EPITHELIAL, UA: ABNORMAL
SEG NEUTROPHILS: 63.6 %
SEGMENTED NEUTROPHILS ABSOLUTE COUNT: 6.4 THOU/MM3 (ref 1.8–7.7)
SODIUM BLD-SCNC: 137 MEQ/L (ref 135–145)
SPECIFIC GRAVITY UA: < 1.005 (ref 1–1.03)
TOTAL PROTEIN: 7.2 G/DL (ref 6.1–8)
UROBILINOGEN, URINE: 1 EU/DL
WBC # BLD: 10.1 THOU/MM3 (ref 4.8–10.8)
WBC UA: ABNORMAL /HPF
YEAST: ABNORMAL

## 2017-09-21 PROCEDURE — 85610 PROTHROMBIN TIME: CPT

## 2017-09-21 PROCEDURE — 80053 COMPREHEN METABOLIC PANEL: CPT

## 2017-09-21 PROCEDURE — 2580000003 HC RX 258: Performed by: PHYSICIAN ASSISTANT

## 2017-09-21 PROCEDURE — 96374 THER/PROPH/DIAG INJ IV PUSH: CPT

## 2017-09-21 PROCEDURE — 85025 COMPLETE CBC W/AUTO DIFF WBC: CPT

## 2017-09-21 PROCEDURE — 6360000002 HC RX W HCPCS: Performed by: PHYSICIAN ASSISTANT

## 2017-09-21 PROCEDURE — 6360000004 HC RX CONTRAST MEDICATION: Performed by: PHYSICIAN ASSISTANT

## 2017-09-21 PROCEDURE — 74177 CT ABD & PELVIS W/CONTRAST: CPT

## 2017-09-21 PROCEDURE — 36415 COLL VENOUS BLD VENIPUNCTURE: CPT

## 2017-09-21 PROCEDURE — G0480 DRUG TEST DEF 1-7 CLASSES: HCPCS

## 2017-09-21 PROCEDURE — 82248 BILIRUBIN DIRECT: CPT

## 2017-09-21 PROCEDURE — 81001 URINALYSIS AUTO W/SCOPE: CPT

## 2017-09-21 PROCEDURE — 83690 ASSAY OF LIPASE: CPT

## 2017-09-21 PROCEDURE — 82140 ASSAY OF AMMONIA: CPT

## 2017-09-21 PROCEDURE — 83605 ASSAY OF LACTIC ACID: CPT

## 2017-09-21 PROCEDURE — 99284 EMERGENCY DEPT VISIT MOD MDM: CPT

## 2017-09-21 RX ORDER — SODIUM CHLORIDE 9 MG/ML
INJECTION, SOLUTION INTRAVENOUS CONTINUOUS
Status: DISCONTINUED | OUTPATIENT
Start: 2017-09-21 | End: 2017-09-21 | Stop reason: HOSPADM

## 2017-09-21 RX ORDER — FENTANYL CITRATE 50 UG/ML
50 INJECTION, SOLUTION INTRAMUSCULAR; INTRAVENOUS
Status: DISCONTINUED | OUTPATIENT
Start: 2017-09-21 | End: 2017-09-21 | Stop reason: HOSPADM

## 2017-09-21 RX ADMIN — FENTANYL CITRATE 50 MCG: 50 INJECTION INTRAMUSCULAR; INTRAVENOUS at 18:37

## 2017-09-21 RX ADMIN — SODIUM CHLORIDE: 9 INJECTION, SOLUTION INTRAVENOUS at 18:37

## 2017-09-21 RX ADMIN — IOPAMIDOL 80 ML: 755 INJECTION, SOLUTION INTRAVENOUS at 20:20

## 2017-09-21 ASSESSMENT — PAIN DESCRIPTION - DESCRIPTORS: DESCRIPTORS: ACHING

## 2017-09-21 ASSESSMENT — PAIN SCALES - GENERAL
PAINLEVEL_OUTOF10: 9
PAINLEVEL_OUTOF10: 8
PAINLEVEL_OUTOF10: 10

## 2017-09-21 ASSESSMENT — PAIN DESCRIPTION - LOCATION
LOCATION: ABDOMEN

## 2017-09-21 ASSESSMENT — ENCOUNTER SYMPTOMS
BACK PAIN: 0
EYE REDNESS: 0
RHINORRHEA: 0
VOMITING: 0
EYE DISCHARGE: 0
NAUSEA: 0
ABDOMINAL DISTENTION: 1
SORE THROAT: 0
SHORTNESS OF BREATH: 0
ABDOMINAL PAIN: 1
DIARRHEA: 0
COUGH: 1
WHEEZING: 0

## 2017-09-21 ASSESSMENT — PAIN DESCRIPTION - ORIENTATION
ORIENTATION: LEFT;RIGHT;LOWER;UPPER
ORIENTATION: OTHER (COMMENT)

## 2017-09-21 ASSESSMENT — PAIN DESCRIPTION - FREQUENCY
FREQUENCY: CONTINUOUS

## 2017-09-21 ASSESSMENT — PAIN DESCRIPTION - PAIN TYPE
TYPE: ACUTE PAIN

## 2017-09-25 ENCOUNTER — OFFICE VISIT (OUTPATIENT)
Dept: FAMILY MEDICINE CLINIC | Age: 52
End: 2017-09-25
Payer: COMMERCIAL

## 2017-09-25 VITALS
SYSTOLIC BLOOD PRESSURE: 112 MMHG | HEART RATE: 88 BPM | HEIGHT: 66 IN | TEMPERATURE: 97.5 F | WEIGHT: 219 LBS | BODY MASS INDEX: 35.2 KG/M2 | RESPIRATION RATE: 16 BRPM | DIASTOLIC BLOOD PRESSURE: 64 MMHG

## 2017-09-25 DIAGNOSIS — R63.5 WEIGHT GAIN: ICD-10-CM

## 2017-09-25 DIAGNOSIS — G47.00 INSOMNIA, UNSPECIFIED TYPE: ICD-10-CM

## 2017-09-25 DIAGNOSIS — R14.0 ABDOMINAL DISTENTION: Primary | ICD-10-CM

## 2017-09-25 DIAGNOSIS — M54.2 CHRONIC NECK PAIN: ICD-10-CM

## 2017-09-25 DIAGNOSIS — G89.29 CHRONIC NECK PAIN: ICD-10-CM

## 2017-09-25 DIAGNOSIS — K70.31 ALCOHOLIC CIRRHOSIS OF LIVER WITH ASCITES (HCC): ICD-10-CM

## 2017-09-25 DIAGNOSIS — F10.11 HISTORY OF ALCOHOL ABUSE: ICD-10-CM

## 2017-09-25 DIAGNOSIS — R41.0 CONFUSION: ICD-10-CM

## 2017-09-25 PROCEDURE — 4004F PT TOBACCO SCREEN RCVD TLK: CPT | Performed by: NURSE PRACTITIONER

## 2017-09-25 PROCEDURE — 3017F COLORECTAL CA SCREEN DOC REV: CPT | Performed by: NURSE PRACTITIONER

## 2017-09-25 PROCEDURE — 99213 OFFICE O/P EST LOW 20 MIN: CPT | Performed by: NURSE PRACTITIONER

## 2017-09-25 PROCEDURE — G8427 DOCREV CUR MEDS BY ELIG CLIN: HCPCS | Performed by: NURSE PRACTITIONER

## 2017-09-25 PROCEDURE — G8417 CALC BMI ABV UP PARAM F/U: HCPCS | Performed by: NURSE PRACTITIONER

## 2017-09-25 RX ORDER — ALPRAZOLAM 0.25 MG/1
0.25 TABLET ORAL NIGHTLY PRN
Qty: 60 TABLET | Refills: 0 | Status: SHIPPED | OUTPATIENT
Start: 2017-09-25 | End: 2017-11-15 | Stop reason: SDUPTHER

## 2017-09-25 RX ORDER — LACTULOSE 10 G/15ML
30 SOLUTION ORAL 2 TIMES DAILY
COMMUNITY
End: 2018-09-19 | Stop reason: SDUPTHER

## 2017-09-25 NOTE — PROGRESS NOTES
NPO after midnight  [employer] and drivers license  Wear comfortable clean clothing  Do not bring jewelry or valuables  Shower night before and morning of surgery with a liquid antibacterial soap  Bring list of medications with dosage and how often taken  Follow all instructions given by your physician   needed at discharge    [facility] office notified of recent trip to ED and atb treaments; also notified of pt's upcoming plan for paracentesis procedure(9/27/17)

## 2017-09-25 NOTE — MR AVS SNAPSHOT
ondansetron (ZOFRAN) 4 MG tablet Take 1 tablet by mouth every 8 hours as needed for Nausea or Vomiting    cyclobenzaprine (FLEXERIL) 10 MG tablet Take 0.5-1 tablets by mouth 3 times daily as needed for Muscle spasms    omeprazole (PRILOSEC) 20 MG delayed release capsule Take 20 mg by mouth daily    dicyclomine (BENTYL) 10 MG capsule Take 1 capsule by mouth 3 times daily (before meals)    citalopram (CELEXA) 20 MG tablet Take 1 tablet by mouth daily    spironolactone (ALDACTONE) 100 MG tablet Take 100 mg by mouth daily      Allergies           No Known Allergies         Additional Information        Basic Information     Date Of Birth Sex Race Ethnicity Preferred Language Preferred Written Language    1965 Male White Non-/Non  English English      Problem List as of 9/25/2017  Date Reviewed: 9/6/2017                Lactic acidosis    Generalized abdominal pain    Tobacco abuse (Chronic)    Alcoholic cirrhosis of liver with ascites (HCC)    Generalized abdominal pain    Noncompliance with medications    Hypotension (arterial)    Metabolic encephalopathy    Hyperkalemia    Increased ammonia level    Confusion    Change in mental status    Ascites    Liver cirrhosis (HCC)    ARF (acute renal failure) (HCC)    Cystic kidney disease    Leukocytosis    Hypotension    Alcohol abuse    Hyponatremia    Hypokalemia    Perianal ulcer ([address]    Diverticulosis    HTN (hypertension) (Chronic)      Your Goals as of 9/25/2017 at 2:24 PM              Today    9/21/17 9/21/17       Blood Pressure    Blood Pressure < 140/90   112/64  117/81  112/71      Preventive Care        Date Due    HIV screening is recommended for all people regardless of risk factors  aged 15-65 years at least once (lifetime) who have never been HIV tested.  6/3/1979    Tetanus Combination Vaccine (1 - Tdap) 6/3/1983    Pneumococcal Vaccine - Pneumovax for adults aged 19-64 years with: chronic heart disease, chronic lung disease, diabetes mellitus, alcoholism, chronic liver disease, or cigarette smoking. (1 of 1 - PPSV23) 6/3/1983    Yearly Flu Vaccine (1) 9/1/2017    Cholesterol Screening 10/27/2020    Colonoscopy 3/25/2025            MyChart Signup           [employer] allows you to send messages to your doctor, view your test results, renew your prescriptions, schedule appointments, view visit notes, and more. How Do I Sign Up? 1. In your Internet browser, go to [email or web address]  2. Click on the Sign Up Now link in the Sign In box. You will see the New Member Sign Up page. 3. Enter your [employer] Access Code exactly as it appears below. You will not need to use this code after youve completed the sign-up process. If you do not sign up before the expiration date, you must request a new code. [employer] Access Code: [email or web address]  Expires: 10/22/2017 11:08 AM    4. Enter your Social Security Number ([id number]) and Date of Birth (mm/dd/yyyy) as indicated and click Submit. You will be taken to the next sign-up page. 5. Create a [employer] ID. This will be your [employer] login ID and cannot be changed, so think of one that is secure and easy to remember. 6. Create a [employer] password. You can change your password at any time. 7. Enter your Password Reset Question and Answer. This can be used at a later time if you forget your password. 8. Enter your e-mail address. You will receive e-mail notification when new information is available in [address] 9. Click Sign Up. You can now view your medical record. Additional Information  If you have questions, please contact the physician practice where you receive care. Remember, [employer] is NOT to be used for urgent needs. For medical emergencies, dial 911. For questions regarding your [employer] account call [phone]. If you have a clinical question, please call your doctor's office.

## 2017-09-27 ENCOUNTER — HOSPITAL ENCOUNTER (OUTPATIENT)
Dept: ULTRASOUND IMAGING | Age: 52
Discharge: HOME | End: 2017-09-27
Payer: COMMERCIAL

## 2017-09-27 DIAGNOSIS — K74.60 CIRRHOSIS OF LIVER WITH ASCITES, UNSPECIFIED HEPATIC CIRRHOSIS TYPE (HCC): ICD-10-CM

## 2017-09-27 DIAGNOSIS — R18.8 CIRRHOSIS OF LIVER WITH ASCITES, UNSPECIFIED HEPATIC CIRRHOSIS TYPE (HCC): ICD-10-CM

## 2017-09-27 LAB
ALBUMIN FLUID: 1.1 GM/DL
CHARACTER, BODY FLUID: ABNORMAL
COLOR: YELLOW
LYMPHOCYTES, BODY FLUID: 73 % (ref 25–100)
MONOCYTE, FLUID: 23 % (ref 25–100)
PROTEIN FLUID: 2.2 GM/DL
RBC FLUID: 1197 /CUMM (ref 0–100)
SEGMENTED NEUTROPHILS, BODY FLUID: 4 % (ref 0–25)
SPECIMEN: ABNORMAL
WBC FLUID: 304 /MM3 (ref 0–500)

## 2017-09-27 PROCEDURE — 84157 ASSAY OF PROTEIN OTHER: CPT

## 2017-09-27 PROCEDURE — 89051 BODY FLUID CELL COUNT: CPT

## 2017-09-27 PROCEDURE — 88112 CYTOPATH CELL ENHANCE TECH: CPT

## 2017-09-27 PROCEDURE — 87075 CULTR BACTERIA EXCEPT BLOOD: CPT

## 2017-09-27 PROCEDURE — 88305 TISSUE EXAM BY PATHOLOGIST: CPT

## 2017-09-27 PROCEDURE — 87070 CULTURE OTHR SPECIMN AEROBIC: CPT

## 2017-09-27 PROCEDURE — 49083 ABD PARACENTESIS W/IMAGING: CPT

## 2017-09-27 PROCEDURE — 82042 OTHER SOURCE ALBUMIN QUAN EA: CPT

## 2017-09-27 PROCEDURE — 87205 SMEAR GRAM STAIN: CPT

## 2017-10-02 ENCOUNTER — ANESTHESIA (OUTPATIENT)
Dept: OPERATING ROOM | Age: 52
End: 2017-10-02
Payer: COMMERCIAL

## 2017-10-02 ENCOUNTER — ANESTHESIA EVENT (OUTPATIENT)
Dept: OPERATING ROOM | Age: 52
End: 2017-10-02
Payer: COMMERCIAL

## 2017-10-02 ENCOUNTER — HOSPITAL ENCOUNTER (OUTPATIENT)
Age: 52
Setting detail: OUTPATIENT SURGERY
Discharge: HOME | End: 2017-10-02
Attending: PAIN MEDICINE | Admitting: PAIN MEDICINE
Payer: COMMERCIAL

## 2017-10-02 ENCOUNTER — APPOINTMENT (OUTPATIENT)
Dept: GENERAL RADIOLOGY | Age: 52
End: 2017-10-02
Attending: PAIN MEDICINE
Payer: COMMERCIAL

## 2017-10-02 VITALS
RESPIRATION RATE: 14 BRPM | OXYGEN SATURATION: 100 % | SYSTOLIC BLOOD PRESSURE: 129 MMHG | DIASTOLIC BLOOD PRESSURE: 66 MMHG

## 2017-10-02 VITALS
HEART RATE: 88 BPM | SYSTOLIC BLOOD PRESSURE: 119 MMHG | OXYGEN SATURATION: 97 % | RESPIRATION RATE: 16 BRPM | TEMPERATURE: 100 F | DIASTOLIC BLOOD PRESSURE: 79 MMHG

## 2017-10-02 LAB
ANAEROBIC CULTURE: NORMAL
BODY FLUID CULTURE, STERILE: NORMAL
GRAM STAIN RESULT: NORMAL

## 2017-10-02 PROCEDURE — 64492 INJ PARAVERT F JNT C/T 3 LEV: CPT | Performed by: PAIN MEDICINE

## 2017-10-02 PROCEDURE — 3600000059 HC PAIN LEVEL 5 ADDL 15 MIN: Performed by: PAIN MEDICINE

## 2017-10-02 PROCEDURE — 3600000058 HC PAIN LEVEL 5 BASE: Performed by: PAIN MEDICINE

## 2017-10-02 PROCEDURE — 64491 INJ PARAVERT F JNT C/T 2 LEV: CPT | Performed by: PAIN MEDICINE

## 2017-10-02 PROCEDURE — 3209999900 FLUORO FOR SURGICAL PROCEDURES

## 2017-10-02 PROCEDURE — 7100000011 HC PHASE II RECOVERY - ADDTL 15 MIN: Performed by: PAIN MEDICINE

## 2017-10-02 PROCEDURE — 3700000001 HC ADD 15 MINUTES (ANESTHESIA): Performed by: PAIN MEDICINE

## 2017-10-02 PROCEDURE — 2580000003 HC RX 258: Performed by: NURSE ANESTHETIST, CERTIFIED REGISTERED

## 2017-10-02 PROCEDURE — 64490 INJ PARAVERT F JNT C/T 1 LEV: CPT | Performed by: PAIN MEDICINE

## 2017-10-02 PROCEDURE — 3700000000 HC ANESTHESIA ATTENDED CARE: Performed by: PAIN MEDICINE

## 2017-10-02 PROCEDURE — 6360000002 HC RX W HCPCS: Performed by: PAIN MEDICINE

## 2017-10-02 PROCEDURE — 7100000010 HC PHASE II RECOVERY - FIRST 15 MIN: Performed by: PAIN MEDICINE

## 2017-10-02 PROCEDURE — 6360000002 HC RX W HCPCS: Performed by: NURSE ANESTHETIST, CERTIFIED REGISTERED

## 2017-10-02 PROCEDURE — 2500000003 HC RX 250 WO HCPCS: Performed by: PAIN MEDICINE

## 2017-10-02 RX ORDER — SODIUM CHLORIDE 9 MG/ML
INJECTION, SOLUTION INTRAVENOUS CONTINUOUS PRN
Status: DISCONTINUED | OUTPATIENT
Start: 2017-10-02 | End: 2017-10-02 | Stop reason: SDUPTHER

## 2017-10-02 RX ORDER — DEXAMETHASONE SODIUM PHOSPHATE 4 MG/ML
INJECTION, SOLUTION INTRA-ARTICULAR; INTRALESIONAL; INTRAMUSCULAR; INTRAVENOUS; SOFT TISSUE PRN
Status: DISCONTINUED | OUTPATIENT
Start: 2017-10-02 | End: 2017-10-02 | Stop reason: HOSPADM

## 2017-10-02 RX ORDER — BUPIVACAINE HYDROCHLORIDE 2.5 MG/ML
INJECTION, SOLUTION EPIDURAL; INFILTRATION; INTRACAUDAL PRN
Status: DISCONTINUED | OUTPATIENT
Start: 2017-10-02 | End: 2017-10-02 | Stop reason: HOSPADM

## 2017-10-02 RX ORDER — PROPOFOL 10 MG/ML
INJECTION, EMULSION INTRAVENOUS PRN
Status: DISCONTINUED | OUTPATIENT
Start: 2017-10-02 | End: 2017-10-02 | Stop reason: SDUPTHER

## 2017-10-02 RX ORDER — LIDOCAINE HYDROCHLORIDE 10 MG/ML
INJECTION, SOLUTION INFILTRATION; PERINEURAL PRN
Status: DISCONTINUED | OUTPATIENT
Start: 2017-10-02 | End: 2017-10-02 | Stop reason: HOSPADM

## 2017-10-02 RX ADMIN — PROPOFOL 50 MG: 10 INJECTION, EMULSION INTRAVENOUS at 15:01

## 2017-10-02 RX ADMIN — PROPOFOL 30 MG: 10 INJECTION, EMULSION INTRAVENOUS at 15:08

## 2017-10-02 RX ADMIN — PROPOFOL 20 MG: 10 INJECTION, EMULSION INTRAVENOUS at 15:05

## 2017-10-02 RX ADMIN — SODIUM CHLORIDE: 9 INJECTION, SOLUTION INTRAVENOUS at 14:59

## 2017-10-02 RX ADMIN — PROPOFOL 50 MG: 10 INJECTION, EMULSION INTRAVENOUS at 15:03

## 2017-10-02 RX ADMIN — PROPOFOL 30 MG: 10 INJECTION, EMULSION INTRAVENOUS at 15:10

## 2017-10-02 RX ADMIN — PROPOFOL 30 MG: 10 INJECTION, EMULSION INTRAVENOUS at 15:06

## 2017-10-02 ASSESSMENT — PULMONARY FUNCTION TESTS
PIF_VALUE: 0

## 2017-10-02 NOTE — IP AVS SNAPSHOT
Patient Information     Patient Name ZAK Estrella 1965         This is your updated medication list to keep with you all times      TAKE these medications     ALPRAZolam 0.25 MG tablet   Commonly known as:  XANAX   Take 1 tablet by mouth nightly as needed for Sleep or Anxiety       citalopram 20 MG tablet   Commonly known as:  CELEXA   Take 1 tablet by mouth daily       cyclobenzaprine 10 MG tablet   Commonly known as:  FLEXERIL   Take 0.5-1 tablets by mouth 3 times daily as needed for Muscle spasms       dicyclomine 10 MG capsule   Commonly known as:  BENTYL   Take 1 capsule by mouth 3 times daily (before meals)       lactulose 10 GM/15ML solution   Commonly known as:  CHRONULAC       omeprazole 20 MG delayed release capsule   Commonly known as:  PRILOSEC       ondansetron 4 MG tablet   Commonly known as:  ZOFRAN   Take 1 tablet by mouth every 8 hours as needed for Nausea or Vomiting       spironolactone 100 MG tablet   Commonly known as:  ALDACTONE

## 2017-10-02 NOTE — IP AVS SNAPSHOT
Patient Information     Patient Name ZAK Layne 1965      SEDATION/ANALGESIA INFORMATION/HOME GOING ADVICE     SEDATION / ANALGESIA INFORMATION / [address] have received the sedation/analgesia medication during your visit    Sedation/analgesia is used during short medical procedures under controlled supervision. The medication will produce a strong relaxation. You will be able to hear, speak and follow instructions, but your memory and alertness will be decreased. You will be able to swallow and breathe on your own. During sedation/analgesia your blood pressure, heart and breathing will be watched closely. After the procedure, you may not remember what was said or done. You may have the following effects from the medication. \" Drowsiness, dizziness, sleepiness or confusion. \" Difficulty remembering or delayed reaction times. \" Loss of fine muscle control or difficulty with your balance especially while walking. \" Difficulty focusing or blurred vision. You may not be aware of slight changes in your behavior and/or your reaction time because of the medication used during the procedure. Therefore you should follow these instructions. \" Have someone responsible help you with your care. \" Do not drive for 24 hours. \" Do not operate equipment for 24 hours (lawnmowers, power tools, kitchen accessories, stove). \" Do not drink any alcoholic beverages for a minimum of 24 hours. \" Do not make important personal, legal or business decisions for 24 hours. \" You may experience dizziness or lightheadedness. Move slowly and carefully, do not make sudden position changes. \" Drink extra amounts of fluids today. \" Increase your diet as tolerated (unless you have received specific instructions from your doctor). \" If you feel nauseated, continue with liquids until the nausea is gone. \" Notify your physician if you have not urinated within 8 hours after the procedure.

## 2017-10-02 NOTE — ANESTHESIA PRE PROCEDURE
Department of Anesthesiology  Preprocedure Note       Name:  Arash Peña   Age:  47 y.o.  :  1965                                          MRN:  [id number]         Date:  10/2/2017      Surgeon: Pooja Melchor):  Jose Alejandro Huddleston MD    Procedure: Procedure(s):  C-FACET MBB C4-5, C5-6, C6-7 BILATERAL    Medications prior to admission:   Prior to Admission medications    Medication Sig Start Date End Date Taking? Authorizing Provider   lactulose (CHRONULAC) 10 GM/15ML solution Take 30 mLs by mouth 2 times daily   Yes Historical Provider, MD   ALPRAZolam Lossie Favian) 0.25 MG tablet Take 1 tablet by mouth nightly as needed for Sleep or Anxiety 17 Yes Christine Marrero NP   cyclobenzaprine (FLEXERIL) 10 MG tablet Take 0.5-1 tablets by mouth 3 times daily as needed for Muscle spasms 17 Yes Christine Marrero NP   dicyclomine (BENTYL) 10 MG capsule Take 1 capsule by mouth 3 times daily (before meals) 17  Yes Brooke Rodney, CNP   citalopram (CELEXA) 20 MG tablet Take 1 tablet by mouth daily 17  Yes Christine Marrero NP   spironolactone (ALDACTONE) 100 MG tablet Take 100 mg by mouth daily   Yes Historical Provider, MD   ondansetron (ZOFRAN) 4 MG tablet Take 1 tablet by mouth every 8 hours as needed for Nausea or Vomiting 17   Dannie Schaumann,    omeprazole (PRILOSEC) 20 MG delayed release capsule Take 20 mg by mouth daily    Historical Provider, MD       Current medications:    No current facility-administered medications for this encounter.         Allergies:  No Known Allergies    Problem List:    Patient Active Problem List   Diagnosis Code    Diverticulosis K57.90    HTN (hypertension) I10    Ascites R18.8    Liver cirrhosis (HCC) K74.60    ARF (acute renal failure) (HCC) N17.9    Cystic kidney disease Q61.9    Leukocytosis D72.829    Hypotension I95.9    Alcohol abuse F10.10    Hyponatremia E87.1    Hypokalemia E87.6    Perianal ulcer ([address] L98.499    Hyperkalemia E87.5    Increased ammonia level R79.89    Confusion R41.0    Change in mental status R41.82    Acute renal failure (HCC) D28.0    Metabolic encephalopathy Z77.97    Altered mental status R41.82    Hepatic encephalopathy (HCC) K72.90    Generalized abdominal pain R10.84    Noncompliance with medications Z91.14    Ascites due to alcoholic cirrhosis (HCC) N03.21    Hypotension (arterial) H79.7    Alcoholic cirrhosis of liver with ascites (HCC) K70.31    Lactic acidosis E87.2    Generalized abdominal pain R10.84    Tobacco abuse Z72.0       Past Medical History:        Diagnosis Date    Anxiety     Arthritis     Chronic kidney disease     [facility]     Diverticulitis     Diverticulosis     GERD (gastroesophageal reflux disease)     Hypertension     Other disorders of kidney and ureter in diseases classified elsewhere     Psychiatric problem        Past Surgical History:        Procedure Laterality Date    ABDOMEN SURGERY      CARDIAC CATHETERIZATION  2007?  CERVICAL DISC SURGERY      x 2 ---broken neck 7-2003    COLON SURGERY  2004    partial, due to diverticulitis    COLONOSCOPY      DILATATION, ESOPHAGUS      ENDOSCOPY, COLON, DIAGNOSTIC      FRACTURE SURGERY      Broke neck in 2003    TONSILLECTOMY      TYMPANOSTOMY TUBE PLACEMENT         Social History:    Social History   Substance Use Topics    Smoking status: Current Every Day Smoker     Packs/day: 0.50     Years: 30.00     Types: Cigarettes    Smokeless tobacco: Never Used      Comment: printed to avs    Alcohol use No      Comment: Quit 2 years ago                                Ready to quit: Not Answered  Counseling given: Not Answered      Vital Signs (Current): There were no vitals filed for this visit.                                            BP Readings from Last 3 Encounters:   09/25/17 112/64   09/21/17 117/81   09/19/17 115/76       NPO Status: Time of last liquid consumption: 0600 (sip fo water with medication) Time of last solid consumption: 2100                        Date of last liquid consumption: 10/02/17                        Date of last solid food consumption: 10/01/17    BMI:   Wt Readings from Last 3 Encounters:   09/25/17 219 lb (99.3 kg)   09/21/17 225 lb (102.1 kg)   09/18/17 217 lb (98.4 kg)     There is no height or weight on file to calculate BMI.    CBC:   Lab Results   Component Value Date    WBC 10.1 09/21/2017    RBC 3.38 09/21/2017    RBC 3.02 11/10/2015    HGB 11.3 09/21/2017    HCT 32.1 09/21/2017    MCV 95.0 09/21/2017    RDW 15.4 09/21/2017     09/21/2017       CMP:   Lab Results   Component Value Date     09/21/2017    K 4.1 09/21/2017     09/21/2017    CO2 20 09/21/2017    BUN 10 09/21/2017    CREATININE 0.7 09/21/2017    LABGLOM >90 09/21/2017    GLUCOSE 87 09/21/2017    GLUCOSE 106 10/27/2015    PROT 7.2 09/21/2017    CALCIUM 8.9 09/21/2017    BILITOT 0.8 09/21/2017    ALKPHOS 78 09/21/2017    AST 26 09/21/2017    ALT 22 09/21/2017       POC Tests: No results for input(s): POCGLU, POCNA, POCK, POCCL, POCBUN, POCHEMO, POCHCT in the last 72 hours.     Coags:   Lab Results   Component Value Date    INR 1.25 09/21/2017    APTT 33.9 09/18/2017       HCG (If Applicable): No results found for: PREGTESTUR, PREGSERUM, HCG, HCGQUANT     ABGs: No results found for: PHART, PO2ART, FHP3OFC, KMA5EKN, BEART, Z3XWOAAR     Type & Screen (If Applicable):  Lab Results   Component Value Date    [address] 11/29/2014       Anesthesia Evaluation  Patient summary reviewed and Nursing notes reviewed no history of anesthetic complications:   Airway: Mallampati: II  TM distance: >3 FB   Neck ROM: full  Mouth opening: > = 3 FB Dental:          Pulmonary:normal exam  breath sounds clear to auscultation      ROS comment: Tobacco abuse   Cardiovascular:  Exercise tolerance: good (>4 METS),   (+) hypertension:,                Neuro/Psych:   (+) psychiatric history:   GI/Hepatic/Renal:

## 2017-10-02 NOTE — OP NOTE
Pre-Procedure Note    Patient Name: Josh Reyna   YOB: 1965  Medical Record Number: [id number]  Date: 9/6/2017    Indication:  Neck pain  Consent: On file. Vital Signs: There were no vitals filed for this visit. Past Medical History:   has a past medical history of Anxiety; Arthritis; Chronic kidney disease; Cirrhosis ([address] Diverticulitis; Diverticulosis; GERD (gastroesophageal reflux disease); Hypertension; Other disorders of kidney and ureter in diseases classified elsewhere; and Psychiatric problem. Past Surgical History:   has a past surgical history that includes Cervical disc surgery; Colon surgery (2004); Tympanostomy tube placement; Tonsillectomy; Abdomen surgery; Colonoscopy; Endoscopy, colon, diagnostic; fracture surgery; Dilatation, esophagus; and Cardiac catheterization (2007?). Pre-Sedation Documentation and Exam:   Vital signs have been reviewed (see flow sheet for vitals). Sedation/ Anesthesia Plan:   MAC      Patient is an appropriate candidate for plan of sedation: yes      Preoperative Diagnosis: C-spondylosis    Post-Op Dx: as above     Procedure Performed : Diagnostic / Confirmatory Median Branch Blocks at the levels of C4-5,C5-6 and C6-7 bilateral under fluoroscopic guidance #1. Indication for the Procedure:  Patient had history of chronic neck pain that is not responding well to the conservative treatment. Patient's pain is mostly axial in nature. Pain is interfering with the activities of daily living. Examination revealed facet tenderness and facet loading is positive. Hence we decided to do confirmatory median branch blocks for possible radiofrequency abalation of median branches for long term pain releif. The procedure and risks  were discussed with the patient and an informed consent was obtained. Procedure:  Bilateral  Patient is placed in prone position and skin over the back was prepped and draped in sterile manner.  Then using fluoroscopy the waist of the facet joint complex of the vertebra was observed and the view  was optimized. The skin and deep tissues over the area were infiltrated with 6 ml of 1% lidocaine. Then a #22-gauge 3-1/2   inch spinal needle was introduced through the skin wheal under fluoroscopy guidance such that the tip of the needle lies at the junction of the transverse process with the superior processes of the facet joint. Then after negative aspiration a total of 2 ml of 0.25% Marcaine and Dexamethasone 6 mg was injected through the needle. This was done at the levels of C4-5, C5-6 and C6-7 bilateral      Patient's vital signs and neurological status remained stable through  the procedure and the post procedural  period. The patient was instructed to keep track of pain for next 24 hours every hour and bring it to the next visit. Patient tolerated the procedure well and was discharged home in stable condition.     Electronically signed by Jillian Vásquez MD on 10/2/2017 at 3:17 PM

## 2017-10-02 NOTE — IP AVS SNAPSHOT
After Visit Summary  (Discharge Instructions)    Medication List for Home    Based on the information you provided to us as well as any changes during this visit, the following is your updated medication list.  Compare this with your prescription bottles at home. If you have any questions or concerns, contact your primary care physician's office.              Daily Medication List (This medication list can be shared with any healthcare provider who is helping you manage your medications)      These are medications you told us you were taking at home, CONTINUE taking them after you leave the hospital        Last Dose    Next Dose Due AM NOON PM NIGHT    ALPRAZolam 0.25 MG tablet   Commonly known as:  XANAX   Take 1 tablet by mouth nightly as needed for Sleep or Anxiety                                         citalopram 20 MG tablet   Commonly known as:  CELEXA   Take 1 tablet by mouth daily                                         cyclobenzaprine 10 MG tablet   Commonly known as:  FLEXERIL   Take 0.5-1 tablets by mouth 3 times daily as needed for Muscle spasms                                         dicyclomine 10 MG capsule   Commonly known as:  BENTYL   Take 1 capsule by mouth 3 times daily (before meals)                                         lactulose 10 GM/15ML solution   Commonly known as:  CHRONULAC   Take 30 mLs by mouth 2 times daily                                         omeprazole 20 MG delayed release capsule   Commonly known as:  PRILOSEC   Take 20 mg by mouth daily                                         ondansetron 4 MG tablet   Commonly known as:  ZOFRAN   Take 1 tablet by mouth every 8 hours as needed for Nausea or Vomiting                                         spironolactone 100 MG tablet   Commonly known as:  ALDACTONE   Take 100 mg by mouth daily                                                 Allergies as of 10/2/2017     No Known Allergies      Immunizations as of 10/2/2017 Below is a list of your follow-up and future appointments. This may not be a complete list as you may have made appointments directly with providers that we are not aware of or your providers may have made some for you. Please call your providers to confirm appointments. It is important to keep your appointments. Please bring your current insurance card, photo ID, co-pay, and all medication bottles to your appointment. If self-pay, payment is expected at the time of service. Follow-up Information     Schedule an appointment as soon as possible for a visit with Petra Rivera MD.    Specialty:  Pain Medicine    Why:  F/u 3-4 weeks with Michaela Sarabia    Contact information:    [address]  [phone]        Future Appointments     12/21/2017 1:45 PM     Appointment with Dwaine Sprague NP at [place] [address] ([phone])   Please arrive 15 minutes prior to appointment, bring photo ID and insurance card. Please arrive 15 minutes prior to appointment, bring photo ID and insurance card. [facility]         Preventive Care        Date Due    HIV screening is recommended for all people regardless of risk factors  aged 15-65 years at least once (lifetime) who have never been HIV tested. 6/3/1979    Tetanus Combination Vaccine (1 - Tdap) 6/3/1983    Pneumococcal Vaccine - Pneumovax for adults aged 19-64 years with: chronic heart disease, chronic lung disease, diabetes mellitus, alcoholism, chronic liver disease, or cigarette smoking. (1 of 1 - PPSV23) 6/3/1983    Yearly Flu Vaccine (1) 9/1/2017    Cholesterol Screening 10/27/2020    Colonoscopy 3/25/2025                 Care Plan Once You Return Home    This section includes instructions you will need to follow once you leave the hospital.  Your care team will discuss these with you, so you and those caring for you know how to best care for your health needs at home. information. I understand I should dispose of my armband safely at home to protect my health information. A complete copy of the After Visit Summary has been given to me, the patient and/or responsible adult.            Patient Signature/Responsible Adult:____________________    Clinician Signature:_____________________    Date:_____________________    Time:_____________________

## 2017-10-05 ASSESSMENT — ENCOUNTER SYMPTOMS
EYES NEGATIVE: 1
ABDOMINAL PAIN: 1
ABDOMINAL DISTENTION: 1
RESPIRATORY NEGATIVE: 1

## 2017-10-05 NOTE — PROGRESS NOTES
Broke neck in 2003    NERVE BLOCK Bilateral 10/02/2017    Cervical Facet MBB at C4-5, C5-6, C6-7     [place] INJ,PARAVERTEBRAL L/S,1 LEVEL Bilateral 10/2/2017    C-FACET MBB C4-5, C5-6, C6-7 BILATERAL performed by Anuj Aragon MD at [address]       Family History   Problem Relation Age of Onset    Hypertension Mother     Heart Disease Mother     Alzheimer's Disease Mother     Heart Failure Mother     Hypertension Father     Heart Disease Father     High Blood Pressure Paternal Uncle     Heart Disease Paternal Uncle      Social History   Substance Use Topics    Smoking status: Current Every Day Smoker     Packs/day: 0.50     Years: 30.00     Types: Cigarettes    Smokeless tobacco: Never Used      Comment: printed to avs    Alcohol use No      Comment: Quit 2 years ago      Current Outpatient Prescriptions   Medication Sig Dispense Refill    lactulose (CHRONULAC) 10 GM/15ML solution Take 30 mLs by mouth 2 times daily      ALPRAZolam (XANAX) 0.25 MG tablet Take 1 tablet by mouth nightly as needed for Sleep or Anxiety 60 tablet 0    ondansetron (ZOFRAN) 4 MG tablet Take 1 tablet by mouth every 8 hours as needed for Nausea or Vomiting 20 tablet 0    cyclobenzaprine (FLEXERIL) 10 MG tablet Take 0.5-1 tablets by mouth 3 times daily as needed for Muscle spasms 60 tablet 1    omeprazole (PRILOSEC) 20 MG delayed release capsule Take 20 mg by mouth daily      dicyclomine (BENTYL) 10 MG capsule Take 1 capsule by mouth 3 times daily (before meals) 90 capsule 0    citalopram (CELEXA) 20 MG tablet Take 1 tablet by mouth daily 30 tablet 10    spironolactone (ALDACTONE) 100 MG tablet Take 100 mg by mouth daily       No current facility-administered medications for this visit.       No Known Allergies  Health Maintenance   Topic Date Due    HIV screen  06/03/1979    DTaP/Tdap/Td vaccine (1 - Tdap) 06/03/1983    Pneumococcal med risk (1 of 1 - PPSV23) 06/03/1983    Flu vaccine (1) 09/01/2017    Lipid screen  10/27/2020    Colon cancer screen colonoscopy  03/25/2025    Hepatitis C screen  Completed         Objective:     Physical Exam   Constitutional: He is oriented to person, place, and time. He appears well-developed and well-nourished. HENT:   Head: Normocephalic. Mouth/Throat: Oropharynx is clear and moist.   Eyes: Conjunctivae are normal.   Neck: Neck supple. Muscular tenderness present. Decreased range of motion present. Cardiovascular: Normal rate, regular rhythm, normal heart sounds and intact distal pulses. Pulmonary/Chest: Effort normal and breath sounds normal.   Abdominal: Soft. Bowel sounds are normal. He exhibits distension, fluid wave, ascites and pulsatile midline mass. He exhibits no pulsatile liver and no abdominal bruit. There is tenderness. Neurological: He is alert and oriented to person, place, and time. Skin: Skin is warm and dry. Psychiatric: He has a normal mood and affect. His speech is normal and behavior is normal. Judgment and thought content normal. Cognition and memory are normal.   Nursing note and vitals reviewed. /64  Pulse 88  Temp 97.5 °F (36.4 °C) (Oral)   Resp 16  Ht 5' 6.25\" (1.683 m)  Wt 219 lb (99.3 kg)  BMI 35.08 kg/m2      Impression/Plan:  1. Abdominal distention    2. Alcoholic cirrhosis of liver with ascites ([facility] [address]    3. History of alcohol abuse    4. Confusion    5. Weight gain    6. Chronic neck pain    7. Insomnia, unspecified type      Requested Prescriptions     Signed Prescriptions Disp Refills    ALPRAZolam (XANAX) 0.25 MG tablet 60 tablet 0     Sig: Take 1 tablet by mouth nightly as needed for Sleep or Anxiety     No orders of the defined types were placed in this encounter.     Component      Latest Ref Rng & Units 9/27/2017             WBC      4.8 - 10.8 thou/mm3    RBC      4.70 - 6.10 mill/mm3    Hemoglobin Quant      14.0 - 18.0 gm/dl    Hematocrit      42.0 - Urine      NONE SEEN  NONE SEEN    Yeast, Urine      NONE SEEN  NONE SEEN    Miscellaneous Lab Test Result        NONE SEEN    Sodium      135 - 145 meq/L      Potassium      3.5 - 5.2 meq/L      Chloride      98 - 111 meq/L      CO2      23 - 33 meq/L      Glucose      70 - 108 mg/dL      BUN      7 - 22 mg/dL      Creatinine      0.4 - 1.2 mg/dL      Calcium      8.5 - 10.5 mg/dL      Albumin      3.5 - 5.1 g/dL      Bilirubin      0.3 - 1.2 mg/dL      Bilirubin, Direct      0.0 - 0.3 mg/dL      Alk Phos      38 - 126 U/L      AST      5 - 40 U/L      ALT      11 - 66 U/L      Total Protein      6.1 - 8.0 g/dL      Gram Stain Result            Body Fluid Culture, Sterile            Anaerobic Culture            Lipase      5.6 - 51.3 U/L      INR      0.85 - 1.13   1.25 (H)   Lactic Acid      0.5 - 2.2 mmol/L   1.1   Ammonia      11 - 60 umol/L   36   ETHYL ALCOHOL, SERUM      0.00 %      Anion Gap      8.0 - 16.0 meq/L      Osmolality Calc      275.0 - 300 mOsmol/kg      Est, Glom Filt Rate      ml/min/1.73m2        Component      Latest Ref Rng & Units 9/21/2017           6:36 PM   WBC      4.8 - 10.8 thou/mm3 10.1   RBC      4.70 - 6.10 mill/mm3 3.38 (L)   Hemoglobin Quant      14.0 - 18.0 gm/dl 11.3 (L)   Hematocrit      42.0 - 52.0 % 32.1 (L)   MCV      80.0 - 94.0 fL 95.0 (H)   MCH      27.0 - 31.0 pg 33.4 (H)   MCHC      33.0 - 37.0 gm/dl 35.2   RDW      11.5 - 14.5 % 15.4 (H)   Platelet Count      219 - 400 thou/mm3 161   MPV      7.4 - 10.4 mcm 7.7   RBC Morphology       NORMAL   Seg Neutrophils      % 63.6   Lymphocytes      % 21.9   Monocytes      % 11.4   Eosinophils      % 2.5   Basophils      % 0.6   Nucleated Red Blood Cells      /100 wbc 0   Anisocytosis       1+   Segs Absolute      1.8 - 7.7 thou/mm3 6.4   Lymphocytes #      1.0 - 4.8 thou/mm3 2.2   Monocytes #      0.4 - 1.3 thou/mm3 1.2   Eosinophils #      0.0 - 0.4 thou/mm3 0.3   Basophils #      0.0 - 0.1 thou/mm3 0.1   Glucose, Urine

## 2017-10-13 ENCOUNTER — HOSPITAL ENCOUNTER (OUTPATIENT)
Dept: ULTRASOUND IMAGING | Age: 52
Discharge: HOME | End: 2017-10-13
Payer: COMMERCIAL

## 2017-10-13 DIAGNOSIS — K74.60 CIRRHOSIS OF LIVER WITH ASCITES, UNSPECIFIED HEPATIC CIRRHOSIS TYPE (HCC): ICD-10-CM

## 2017-10-13 DIAGNOSIS — R18.8 CIRRHOSIS OF LIVER WITH ASCITES, UNSPECIFIED HEPATIC CIRRHOSIS TYPE (HCC): ICD-10-CM

## 2017-10-13 PROCEDURE — 76705 ECHO EXAM OF ABDOMEN: CPT

## 2017-10-23 RX ORDER — CYCLOBENZAPRINE HCL 10 MG
TABLET ORAL
Qty: 60 TABLET | Refills: 1 | Status: SHIPPED | OUTPATIENT
Start: 2017-10-23 | End: 2018-01-19 | Stop reason: SDUPTHER

## 2017-11-16 RX ORDER — ALPRAZOLAM 0.25 MG/1
TABLET ORAL
Qty: 60 TABLET | Refills: 0 | Status: SHIPPED | OUTPATIENT
Start: 2017-11-16 | End: 2018-01-19 | Stop reason: SDUPTHER

## 2018-01-15 ENCOUNTER — TELEPHONE (OUTPATIENT)
Dept: FAMILY MEDICINE CLINIC | Age: 53
End: 2018-01-15

## 2018-01-19 ENCOUNTER — OFFICE VISIT (OUTPATIENT)
Dept: FAMILY MEDICINE CLINIC | Age: 53
End: 2018-01-19
Payer: COMMERCIAL

## 2018-01-19 VITALS
DIASTOLIC BLOOD PRESSURE: 88 MMHG | WEIGHT: 211 LBS | HEIGHT: 66 IN | HEART RATE: 72 BPM | SYSTOLIC BLOOD PRESSURE: 122 MMHG | BODY MASS INDEX: 33.91 KG/M2 | TEMPERATURE: 97.7 F | RESPIRATION RATE: 16 BRPM

## 2018-01-19 DIAGNOSIS — F41.9 ANXIETY: ICD-10-CM

## 2018-01-19 DIAGNOSIS — Z72.0 TOBACCO ABUSE: Chronic | ICD-10-CM

## 2018-01-19 DIAGNOSIS — M47.812 SPONDYLOSIS OF CERVICAL REGION WITHOUT MYELOPATHY OR RADICULOPATHY: ICD-10-CM

## 2018-01-19 DIAGNOSIS — R10.84 GENERALIZED ABDOMINAL PAIN: ICD-10-CM

## 2018-01-19 DIAGNOSIS — I10 ESSENTIAL HYPERTENSION: Primary | Chronic | ICD-10-CM

## 2018-01-19 DIAGNOSIS — G47.9 SLEEP DISTURBANCE: ICD-10-CM

## 2018-01-19 DIAGNOSIS — K70.31 ALCOHOLIC CIRRHOSIS OF LIVER WITH ASCITES (HCC): ICD-10-CM

## 2018-01-19 PROCEDURE — G8427 DOCREV CUR MEDS BY ELIG CLIN: HCPCS | Performed by: NURSE PRACTITIONER

## 2018-01-19 PROCEDURE — 3017F COLORECTAL CA SCREEN DOC REV: CPT | Performed by: NURSE PRACTITIONER

## 2018-01-19 PROCEDURE — G8484 FLU IMMUNIZE NO ADMIN: HCPCS | Performed by: NURSE PRACTITIONER

## 2018-01-19 PROCEDURE — G8417 CALC BMI ABV UP PARAM F/U: HCPCS | Performed by: NURSE PRACTITIONER

## 2018-01-19 PROCEDURE — 4004F PT TOBACCO SCREEN RCVD TLK: CPT | Performed by: NURSE PRACTITIONER

## 2018-01-19 PROCEDURE — 99213 OFFICE O/P EST LOW 20 MIN: CPT | Performed by: NURSE PRACTITIONER

## 2018-01-19 RX ORDER — CYCLOBENZAPRINE HCL 10 MG
TABLET ORAL
Qty: 60 TABLET | Refills: 1 | Status: SHIPPED | OUTPATIENT
Start: 2018-01-19 | End: 2018-03-22 | Stop reason: SDUPTHER

## 2018-01-19 RX ORDER — ALPRAZOLAM 0.25 MG/1
TABLET ORAL
Qty: 60 TABLET | Refills: 0 | Status: SHIPPED | OUTPATIENT
Start: 2018-01-19 | End: 2018-03-09 | Stop reason: SDUPTHER

## 2018-01-30 ASSESSMENT — ENCOUNTER SYMPTOMS
ALLERGIC/IMMUNOLOGIC NEGATIVE: 1
CONSTIPATION: 0
NAUSEA: 0
DIARRHEA: 0
ABDOMINAL PAIN: 1
RESPIRATORY NEGATIVE: 1
ABDOMINAL DISTENTION: 1
BLOOD IN STOOL: 0
EYES NEGATIVE: 1
VOMITING: 0

## 2018-01-30 NOTE — PROGRESS NOTES
at C4-5, C5-6, C6-7     [place] INJ,PARAVERTEBRAL L/S,1 LEVEL Bilateral 10/2/2017    C-FACET MBB C4-5, C5-6, C6-7 BILATERAL performed by Littie Dance, MD at [place] [address].       Family History   Problem Relation Age of Onset    Hypertension Mother     Heart Disease Mother     Alzheimer's Disease Mother     Heart Failure Mother     Hypertension Father     Heart Disease Father     High Blood Pressure Paternal Uncle     Heart Disease Paternal Uncle      Social History   Substance Use Topics    Smoking status: Current Every Day Smoker     Packs/day: 0.50     Years: 30.00     Types: Cigarettes    Smokeless tobacco: Never Used      Comment: printed to avs    Alcohol use No      Comment: Quit 2 years ago      Current Outpatient Prescriptions   Medication Sig Dispense Refill    cyclobenzaprine (FLEXERIL) 10 MG tablet take 1/2 to 1 tablet three times a day if needed for muscle spasm 60 tablet 1    ALPRAZolam (XANAX) 0.25 MG tablet take 1 tablet by mouth every evening if needed for anxiety and sleep. 60 tablet 0    lactulose (CHRONULAC) 10 GM/15ML solution Take 30 mLs by mouth 2 times daily      ondansetron (ZOFRAN) 4 MG tablet Take 1 tablet by mouth every 8 hours as needed for Nausea or Vomiting 20 tablet 0    omeprazole (PRILOSEC) 20 MG delayed release capsule Take 20 mg by mouth daily      dicyclomine (BENTYL) 10 MG capsule Take 1 capsule by mouth 3 times daily (before meals) (Patient taking differently: Take 10 mg by mouth 3 times daily as needed ) 90 capsule 0    citalopram (CELEXA) 20 MG tablet Take 1 tablet by mouth daily 30 tablet 10     No current facility-administered medications for this visit.       No Known Allergies  Health Maintenance   Topic Date Due    HIV screen  06/03/1979    DTaP/Tdap/Td vaccine (1 - Tdap) 06/03/1983    Pneumococcal med risk (1 of 1 - PPSV23) 06/03/1983    Flu vaccine (1) 09/01/2017    Lipid screen 10/27/2020    Colon cancer screen colonoscopy  03/25/2025    Hepatitis C screen  Completed         Objective:     Physical Exam   Constitutional: He is oriented to person, place, and time. He appears well-developed and well-nourished. HENT:   Head: Normocephalic. Mouth/Throat: Oropharynx is clear and moist.   Eyes: Conjunctivae are normal. No scleral icterus. Neck: Neck supple. Muscular tenderness present. Decreased range of motion present. Cardiovascular: Normal rate, regular rhythm, normal heart sounds and intact distal pulses. Pulmonary/Chest: Effort normal and breath sounds normal.   Abdominal: Soft. Bowel sounds are normal. He exhibits distension. He exhibits no mass. There is generalized tenderness. There is no rebound and no guarding. Neurological: He is alert and oriented to person, place, and time. Skin: Skin is warm and dry. Psychiatric: His behavior is normal. Judgment normal. His mood appears anxious. Thought content is not paranoid. Cognition and memory are normal. He expresses no homicidal and no suicidal ideation. Nursing note and vitals reviewed.     /88 (Site: Right Arm, Position: Sitting)   Pulse 72   Temp 97.7 °F (36.5 °C) (Oral)   Resp 16   Ht 5' 6.25\" (1.683 m)   Wt 211 lb (95.7 kg)   BMI 33.80 kg/m²     Component      Latest Ref Rng & Units 9/21/2017   WBC      4.8 - 10.8 thou/mm3 10.1   RBC      4.70 - 6.10 mill/mm3 3.38 (L)   Hemoglobin Quant      14.0 - 18.0 gm/dl 11.3 (L)   Hematocrit      42.0 - 52.0 % 32.1 (L)   MCV      80.0 - 94.0 fL 95.0 (H)   MCH      27.0 - 31.0 pg 33.4 (H)   MCHC      33.0 - 37.0 gm/dl 35.2   RDW      11.5 - 14.5 % 15.4 (H)   Platelet Count      433 - 400 thou/mm3 161   MPV      7.4 - 10.4 mcm 7.7   RBC Morphology       NORMAL   Seg Neutrophils      % 63.6   Lymphocytes      % 21.9   Monocytes      % 11.4   Eosinophils      % 2.5   Basophils      % 0.6   Nucleated Red Blood Cells      /100 wbc 0   Anisocytosis       1+   Segs

## 2018-03-19 RX ORDER — ALPRAZOLAM 0.25 MG/1
TABLET ORAL
Qty: 60 TABLET | Refills: 0 | Status: SHIPPED | OUTPATIENT
Start: 2018-03-19 | End: 2018-11-15 | Stop reason: SDUPTHER

## 2018-03-22 RX ORDER — CYCLOBENZAPRINE HCL 10 MG
TABLET ORAL
Qty: 60 TABLET | Refills: 1 | Status: SHIPPED | OUTPATIENT
Start: 2018-03-22 | End: 2018-06-12 | Stop reason: SDUPTHER

## 2018-04-30 RX ORDER — SPIRONOLACTONE 100 MG/1
TABLET, FILM COATED ORAL
Qty: 90 TABLET | Refills: 2 | Status: SHIPPED | OUTPATIENT
Start: 2018-04-30 | End: 2019-01-25 | Stop reason: SDUPTHER

## 2018-05-16 RX ORDER — ALPRAZOLAM 0.25 MG/1
TABLET ORAL
Qty: 60 TABLET | Refills: 0 | OUTPATIENT
Start: 2018-05-16 | End: 2019-05-16

## 2018-06-04 ENCOUNTER — HOSPITAL ENCOUNTER (EMERGENCY)
Age: 53
Discharge: HOME | End: 2018-06-05
Attending: FAMILY MEDICINE
Payer: COMMERCIAL

## 2018-06-04 ENCOUNTER — APPOINTMENT (OUTPATIENT)
Dept: CT IMAGING | Age: 53
End: 2018-06-04
Payer: COMMERCIAL

## 2018-06-04 DIAGNOSIS — R82.5 POSITIVE URINE DRUG SCREEN: ICD-10-CM

## 2018-06-04 DIAGNOSIS — R20.0 NUMBNESS AND TINGLING: Primary | ICD-10-CM

## 2018-06-04 DIAGNOSIS — K74.69 OTHER CIRRHOSIS OF LIVER (HCC): ICD-10-CM

## 2018-06-04 DIAGNOSIS — R20.2 NUMBNESS AND TINGLING: Primary | ICD-10-CM

## 2018-06-04 LAB
ACETAMINOPHEN LEVEL: < 5 UG/ML (ref 0–20)
ALBUMIN SERPL-MCNC: 4 G/DL (ref 3.5–5.1)
ALP BLD-CCNC: 90 U/L (ref 38–126)
ALT SERPL-CCNC: 21 U/L (ref 11–66)
AMMONIA: 50 UMOL/L (ref 11–60)
AMPHETAMINE+METHAMPHETAMINE URINE SCREEN: NEGATIVE
ANION GAP SERPL CALCULATED.3IONS-SCNC: 12 MEQ/L (ref 8–16)
ANISOCYTOSIS: ABNORMAL
AST SERPL-CCNC: 21 U/L (ref 5–40)
BARBITURATE QUANTITATIVE URINE: NEGATIVE
BASOPHILS # BLD: 0.4 %
BASOPHILS ABSOLUTE: 0 THOU/MM3 (ref 0–0.1)
BENZODIAZEPINE QUANTITATIVE URINE: NEGATIVE
BILIRUB SERPL-MCNC: 0.6 MG/DL (ref 0.3–1.2)
BILIRUBIN DIRECT: < 0.2 MG/DL (ref 0–0.3)
BILIRUBIN URINE: NEGATIVE
BLOOD, URINE: NEGATIVE
BUN BLDV-MCNC: 16 MG/DL (ref 7–22)
CALCIUM SERPL-MCNC: 9.5 MG/DL (ref 8.5–10.5)
CANNABINOID QUANTITATIVE URINE: POSITIVE
CHARACTER, URINE: CLEAR
CHLORIDE BLD-SCNC: 103 MEQ/L (ref 98–111)
CO2: 21 MEQ/L (ref 23–33)
COCAINE METABOLITE QUANTITATIVE URINE: POSITIVE
COLOR: YELLOW
CREAT SERPL-MCNC: 0.8 MG/DL (ref 0.4–1.2)
EKG ATRIAL RATE: 71 BPM
EKG P AXIS: 47 DEGREES
EKG P-R INTERVAL: 118 MS
EKG Q-T INTERVAL: 388 MS
EKG QRS DURATION: 86 MS
EKG QTC CALCULATION (BAZETT): 421 MS
EKG R AXIS: 54 DEGREES
EKG T AXIS: 49 DEGREES
EKG VENTRICULAR RATE: 71 BPM
EOSINOPHIL # BLD: 4 %
EOSINOPHILS ABSOLUTE: 0.4 THOU/MM3 (ref 0–0.4)
ETHYL ALCOHOL, SERUM: < 0.01 %
GFR SERPL CREATININE-BSD FRML MDRD: > 90 ML/MIN/1.73M2
GLUCOSE BLD-MCNC: 92 MG/DL (ref 70–108)
GLUCOSE URINE: NEGATIVE MG/DL
HCT VFR BLD CALC: 38.3 % (ref 42–52)
HEMOGLOBIN: 13 GM/DL (ref 14–18)
KETONES, URINE: NEGATIVE
LEUKOCYTE ESTERASE, URINE: NEGATIVE
LIPASE: 49.5 U/L (ref 5.6–51.3)
LYMPHOCYTES # BLD: 29.8 %
LYMPHOCYTES ABSOLUTE: 2.7 THOU/MM3 (ref 1–4.8)
MAGNESIUM: 2.1 MG/DL (ref 1.6–2.4)
MCH RBC QN AUTO: 31.5 PG (ref 27–31)
MCHC RBC AUTO-ENTMCNC: 34 GM/DL (ref 33–37)
MCV RBC AUTO: 92.7 FL (ref 80–94)
MONOCYTES # BLD: 12.5 %
MONOCYTES ABSOLUTE: 1.1 THOU/MM3 (ref 0.4–1.3)
NITRITE, URINE: NEGATIVE
NUCLEATED RED BLOOD CELLS: 0 /100 WBC
OPIATES, URINE: NEGATIVE
OSMOLALITY CALCULATION: 272.8 MOSMOL/KG (ref 275–300)
OXYCODONE: NEGATIVE
PDW BLD-RTO: 15.1 % (ref 11.5–14.5)
PH UA: 5.5
PHENCYCLIDINE QUANTITATIVE URINE: NEGATIVE
PLATELET # BLD: 170 THOU/MM3 (ref 130–400)
PMV BLD AUTO: 8.4 FL (ref 7.4–10.4)
POTASSIUM SERPL-SCNC: 4.6 MEQ/L (ref 3.5–5.2)
PROTEIN UA: NEGATIVE
RBC # BLD: 4.13 MILL/MM3 (ref 4.7–6.1)
SALICYLATE, SERUM: < 0.3 MG/DL (ref 2–10)
SEG NEUTROPHILS: 53.3 %
SEGMENTED NEUTROPHILS ABSOLUTE COUNT: 4.9 THOU/MM3 (ref 1.8–7.7)
SODIUM BLD-SCNC: 136 MEQ/L (ref 135–145)
SPECIFIC GRAVITY, URINE: 1.02 (ref 1–1.03)
TOTAL PROTEIN: 7.8 G/DL (ref 6.1–8)
TROPONIN T: < 0.01 NG/ML
UROBILINOGEN, URINE: 1 EU/DL
WBC # BLD: 9.1 THOU/MM3 (ref 4.8–10.8)

## 2018-06-04 PROCEDURE — 93005 ELECTROCARDIOGRAM TRACING: CPT | Performed by: FAMILY MEDICINE

## 2018-06-04 PROCEDURE — 80053 COMPREHEN METABOLIC PANEL: CPT

## 2018-06-04 PROCEDURE — G0480 DRUG TEST DEF 1-7 CLASSES: HCPCS

## 2018-06-04 PROCEDURE — 83690 ASSAY OF LIPASE: CPT

## 2018-06-04 PROCEDURE — 70450 CT HEAD/BRAIN W/O DYE: CPT

## 2018-06-04 PROCEDURE — 99284 EMERGENCY DEPT VISIT MOD MDM: CPT

## 2018-06-04 PROCEDURE — 84484 ASSAY OF TROPONIN QUANT: CPT

## 2018-06-04 PROCEDURE — 80307 DRUG TEST PRSMV CHEM ANLYZR: CPT

## 2018-06-04 PROCEDURE — 82248 BILIRUBIN DIRECT: CPT

## 2018-06-04 PROCEDURE — 85025 COMPLETE CBC W/AUTO DIFF WBC: CPT

## 2018-06-04 PROCEDURE — 81003 URINALYSIS AUTO W/O SCOPE: CPT

## 2018-06-04 PROCEDURE — 74177 CT ABD & PELVIS W/CONTRAST: CPT

## 2018-06-04 PROCEDURE — 36415 COLL VENOUS BLD VENIPUNCTURE: CPT

## 2018-06-04 PROCEDURE — 83735 ASSAY OF MAGNESIUM: CPT

## 2018-06-04 PROCEDURE — 72125 CT NECK SPINE W/O DYE: CPT

## 2018-06-04 PROCEDURE — 82140 ASSAY OF AMMONIA: CPT

## 2018-06-04 PROCEDURE — 6360000004 HC RX CONTRAST MEDICATION: Performed by: FAMILY MEDICINE

## 2018-06-04 RX ADMIN — IOPAMIDOL 80 ML: 755 INJECTION, SOLUTION INTRAVENOUS at 22:57

## 2018-06-04 ASSESSMENT — ENCOUNTER SYMPTOMS
SHORTNESS OF BREATH: 0
ABDOMINAL DISTENTION: 1
COUGH: 0
ABDOMINAL PAIN: 1
SORE THROAT: 0
EYE REDNESS: 0
VOMITING: 0
DIARRHEA: 0
EYE DISCHARGE: 0
RHINORRHEA: 0
WHEEZING: 0
BACK PAIN: 0
NAUSEA: 0

## 2018-06-04 ASSESSMENT — PAIN DESCRIPTION - LOCATION: LOCATION: ABDOMEN

## 2018-06-04 ASSESSMENT — PAIN DESCRIPTION - PAIN TYPE: TYPE: ACUTE PAIN

## 2018-06-04 ASSESSMENT — PAIN DESCRIPTION - FREQUENCY: FREQUENCY: CONTINUOUS

## 2018-06-04 ASSESSMENT — PAIN SCALES - GENERAL: PAINLEVEL_OUTOF10: 8

## 2018-06-04 ASSESSMENT — PAIN DESCRIPTION - ORIENTATION: ORIENTATION: RIGHT;UPPER

## 2018-06-05 VITALS
HEART RATE: 80 BPM | SYSTOLIC BLOOD PRESSURE: 120 MMHG | DIASTOLIC BLOOD PRESSURE: 79 MMHG | OXYGEN SATURATION: 98 % | TEMPERATURE: 98.6 F | RESPIRATION RATE: 18 BRPM

## 2018-06-05 LAB — TROPONIN T: < 0.01 NG/ML

## 2018-06-05 PROCEDURE — 84484 ASSAY OF TROPONIN QUANT: CPT

## 2018-06-05 PROCEDURE — 36415 COLL VENOUS BLD VENIPUNCTURE: CPT

## 2018-06-05 PROCEDURE — 93010 ELECTROCARDIOGRAM REPORT: CPT | Performed by: INTERNAL MEDICINE

## 2018-06-05 RX ORDER — GABAPENTIN 100 MG/1
300 CAPSULE ORAL 2 TIMES DAILY
Qty: 20 CAPSULE | Refills: 0 | Status: SHIPPED | OUTPATIENT
Start: 2018-06-05 | End: 2019-01-14 | Stop reason: SDUPTHER

## 2018-06-06 RX ORDER — CITALOPRAM 20 MG/1
TABLET ORAL
Qty: 90 TABLET | Refills: 2 | Status: SHIPPED | OUTPATIENT
Start: 2018-06-06 | End: 2019-01-14 | Stop reason: SDUPTHER

## 2018-06-12 RX ORDER — ALPRAZOLAM 0.25 MG/1
TABLET ORAL
Qty: 60 TABLET | Refills: 0 | Status: CANCELLED | OUTPATIENT
Start: 2018-06-12 | End: 2019-06-12

## 2018-06-12 RX ORDER — CYCLOBENZAPRINE HCL 10 MG
TABLET ORAL
Qty: 60 TABLET | Refills: 1 | Status: SHIPPED | OUTPATIENT
Start: 2018-06-12 | End: 2018-08-12 | Stop reason: SDUPTHER

## 2018-08-13 RX ORDER — CYCLOBENZAPRINE HCL 10 MG
TABLET ORAL
Qty: 60 TABLET | Refills: 1 | Status: SHIPPED | OUTPATIENT
Start: 2018-08-13 | End: 2018-09-15 | Stop reason: SDUPTHER

## 2018-09-17 RX ORDER — CYCLOBENZAPRINE HCL 10 MG
TABLET ORAL
Qty: 60 TABLET | Refills: 1 | Status: SHIPPED | OUTPATIENT
Start: 2018-09-17 | End: 2018-10-22 | Stop reason: SDUPTHER

## 2018-09-19 ENCOUNTER — HOSPITAL ENCOUNTER (EMERGENCY)
Age: 53
Discharge: HOME | End: 2018-09-19
Payer: COMMERCIAL

## 2018-09-19 ENCOUNTER — APPOINTMENT (OUTPATIENT)
Dept: CT IMAGING | Age: 53
End: 2018-09-19
Payer: COMMERCIAL

## 2018-09-19 VITALS
RESPIRATION RATE: 16 BRPM | WEIGHT: 220 LBS | HEIGHT: 65 IN | SYSTOLIC BLOOD PRESSURE: 119 MMHG | BODY MASS INDEX: 36.65 KG/M2 | TEMPERATURE: 98.3 F | OXYGEN SATURATION: 98 % | HEART RATE: 73 BPM | DIASTOLIC BLOOD PRESSURE: 67 MMHG

## 2018-09-19 DIAGNOSIS — K70.31 ALCOHOLIC CIRRHOSIS OF LIVER WITH ASCITES (HCC): ICD-10-CM

## 2018-09-19 DIAGNOSIS — R10.11 ABDOMINAL PAIN, RIGHT UPPER QUADRANT: Primary | ICD-10-CM

## 2018-09-19 DIAGNOSIS — D18.03 LIVER HEMANGIOMA: ICD-10-CM

## 2018-09-19 DIAGNOSIS — N28.1 KIDNEY CYST, ACQUIRED: ICD-10-CM

## 2018-09-19 LAB
ALBUMIN SERPL-MCNC: 3.7 G/DL (ref 3.5–5.1)
ALP BLD-CCNC: 78 U/L (ref 38–126)
ALT SERPL-CCNC: 20 U/L (ref 11–66)
AMMONIA: 62 UMOL/L (ref 11–60)
AMPHETAMINE+METHAMPHETAMINE URINE SCREEN: NEGATIVE
ANION GAP SERPL CALCULATED.3IONS-SCNC: 12 MEQ/L (ref 8–16)
APTT: 35.1 SECONDS (ref 22–38)
AST SERPL-CCNC: 25 U/L (ref 5–40)
BACTERIA: ABNORMAL /HPF
BARBITURATE QUANTITATIVE URINE: NEGATIVE
BASOPHILS # BLD: 0.5 %
BASOPHILS ABSOLUTE: 0 THOU/MM3 (ref 0–0.1)
BENZODIAZEPINE QUANTITATIVE URINE: NEGATIVE
BILIRUB SERPL-MCNC: 1 MG/DL (ref 0.3–1.2)
BILIRUBIN DIRECT: 0.3 MG/DL (ref 0–0.3)
BILIRUBIN URINE: ABNORMAL
BLOOD, URINE: NEGATIVE
BUN BLDV-MCNC: 14 MG/DL (ref 7–22)
CALCIUM SERPL-MCNC: 9.5 MG/DL (ref 8.5–10.5)
CANNABINOID QUANTITATIVE URINE: NEGATIVE
CASTS 2: ABNORMAL /LPF
CASTS UA: ABNORMAL /LPF
CHARACTER, URINE: ABNORMAL
CHLORIDE BLD-SCNC: 104 MEQ/L (ref 98–111)
CO2: 21 MEQ/L (ref 23–33)
COCAINE METABOLITE QUANTITATIVE URINE: NEGATIVE
COLOR: ABNORMAL
CREAT SERPL-MCNC: 0.8 MG/DL (ref 0.4–1.2)
CRYSTALS, UA: ABNORMAL
EKG ATRIAL RATE: 70 BPM
EKG P AXIS: 25 DEGREES
EKG P-R INTERVAL: 120 MS
EKG Q-T INTERVAL: 384 MS
EKG QRS DURATION: 82 MS
EKG QTC CALCULATION (BAZETT): 414 MS
EKG R AXIS: 41 DEGREES
EKG T AXIS: 15 DEGREES
EKG VENTRICULAR RATE: 70 BPM
EOSINOPHIL # BLD: 1.6 %
EOSINOPHILS ABSOLUTE: 0.1 THOU/MM3 (ref 0–0.4)
EPITHELIAL CELLS, UA: ABNORMAL /HPF
ERYTHROCYTE [DISTWIDTH] IN BLOOD BY AUTOMATED COUNT: 13.7 % (ref 11.5–14.5)
ERYTHROCYTE [DISTWIDTH] IN BLOOD BY AUTOMATED COUNT: 47.4 FL (ref 35–45)
ETHYL ALCOHOL, SERUM: < 0.01 %
GFR SERPL CREATININE-BSD FRML MDRD: > 90 ML/MIN/1.73M2
GLUCOSE BLD-MCNC: 94 MG/DL (ref 70–108)
GLUCOSE URINE: NEGATIVE MG/DL
HCT VFR BLD CALC: 39.5 % (ref 42–52)
HEMOGLOBIN: 13.7 GM/DL (ref 14–18)
ICTOTEST: NEGATIVE
IMMATURE GRANS (ABS): 0.04 THOU/MM3 (ref 0–0.07)
IMMATURE GRANULOCYTES: 0.5 %
INR BLD: 1.17 (ref 0.85–1.13)
KETONES, URINE: ABNORMAL
LACTIC ACID: 2 MMOL/L (ref 0.5–2.2)
LEUKOCYTE ESTERASE, URINE: NEGATIVE
LIPASE: 23.8 U/L (ref 5.6–51.3)
LYMPHOCYTES # BLD: 23.6 %
LYMPHOCYTES ABSOLUTE: 2 THOU/MM3 (ref 1–4.8)
MCH RBC QN AUTO: 32.8 PG (ref 26–33)
MCHC RBC AUTO-ENTMCNC: 34.7 GM/DL (ref 32.2–35.5)
MCV RBC AUTO: 94.5 FL (ref 80–94)
MISCELLANEOUS 2: ABNORMAL
MONOCYTES # BLD: 11.2 %
MONOCYTES ABSOLUTE: 1 THOU/MM3 (ref 0.4–1.3)
NITRITE, URINE: NEGATIVE
NUCLEATED RED BLOOD CELLS: 0 /100 WBC
OPIATES, URINE: NEGATIVE
OSMOLALITY CALCULATION: 274 MOSMOL/KG (ref 275–300)
OXYCODONE: NEGATIVE
PH UA: 5
PHENCYCLIDINE QUANTITATIVE URINE: NEGATIVE
PLATELET # BLD: 188 THOU/MM3 (ref 130–400)
PMV BLD AUTO: 10 FL (ref 9.4–12.4)
POTASSIUM SERPL-SCNC: 4.7 MEQ/L (ref 3.5–5.2)
PROTEIN UA: NEGATIVE
RBC # BLD: 4.18 MILL/MM3 (ref 4.7–6.1)
RBC URINE: ABNORMAL /HPF
RENAL EPITHELIAL, UA: ABNORMAL
SEG NEUTROPHILS: 62.6 %
SEGMENTED NEUTROPHILS ABSOLUTE COUNT: 5.4 THOU/MM3 (ref 1.8–7.7)
SODIUM BLD-SCNC: 137 MEQ/L (ref 135–145)
SPECIFIC GRAVITY, URINE: 1.02 (ref 1–1.03)
TOTAL PROTEIN: 8.2 G/DL (ref 6.1–8)
TROPONIN T: < 0.01 NG/ML
UROBILINOGEN, URINE: 1 EU/DL
WBC # BLD: 8.6 THOU/MM3 (ref 4.8–10.8)
WBC UA: ABNORMAL /HPF
YEAST: ABNORMAL

## 2018-09-19 PROCEDURE — 82140 ASSAY OF AMMONIA: CPT

## 2018-09-19 PROCEDURE — 80053 COMPREHEN METABOLIC PANEL: CPT

## 2018-09-19 PROCEDURE — 74177 CT ABD & PELVIS W/CONTRAST: CPT

## 2018-09-19 PROCEDURE — 2580000003 HC RX 258: Performed by: STUDENT IN AN ORGANIZED HEALTH CARE EDUCATION/TRAINING PROGRAM

## 2018-09-19 PROCEDURE — 80307 DRUG TEST PRSMV CHEM ANLYZR: CPT

## 2018-09-19 PROCEDURE — 81001 URINALYSIS AUTO W/SCOPE: CPT

## 2018-09-19 PROCEDURE — 82248 BILIRUBIN DIRECT: CPT

## 2018-09-19 PROCEDURE — 85025 COMPLETE CBC W/AUTO DIFF WBC: CPT

## 2018-09-19 PROCEDURE — 83605 ASSAY OF LACTIC ACID: CPT

## 2018-09-19 PROCEDURE — 6360000004 HC RX CONTRAST MEDICATION: Performed by: STUDENT IN AN ORGANIZED HEALTH CARE EDUCATION/TRAINING PROGRAM

## 2018-09-19 PROCEDURE — 93005 ELECTROCARDIOGRAM TRACING: CPT | Performed by: EMERGENCY MEDICINE

## 2018-09-19 PROCEDURE — 85730 THROMBOPLASTIN TIME PARTIAL: CPT

## 2018-09-19 PROCEDURE — 99284 EMERGENCY DEPT VISIT MOD MDM: CPT

## 2018-09-19 PROCEDURE — 84484 ASSAY OF TROPONIN QUANT: CPT

## 2018-09-19 PROCEDURE — 85610 PROTHROMBIN TIME: CPT

## 2018-09-19 PROCEDURE — 6370000000 HC RX 637 (ALT 250 FOR IP): Performed by: STUDENT IN AN ORGANIZED HEALTH CARE EDUCATION/TRAINING PROGRAM

## 2018-09-19 PROCEDURE — 83690 ASSAY OF LIPASE: CPT

## 2018-09-19 PROCEDURE — 36415 COLL VENOUS BLD VENIPUNCTURE: CPT

## 2018-09-19 PROCEDURE — G0480 DRUG TEST DEF 1-7 CLASSES: HCPCS

## 2018-09-19 RX ORDER — PANTOPRAZOLE SODIUM 40 MG/1
40 TABLET, DELAYED RELEASE ORAL ONCE
Status: COMPLETED | OUTPATIENT
Start: 2018-09-19 | End: 2018-09-19

## 2018-09-19 RX ORDER — 0.9 % SODIUM CHLORIDE 0.9 %
1000 INTRAVENOUS SOLUTION INTRAVENOUS ONCE
Status: COMPLETED | OUTPATIENT
Start: 2018-09-19 | End: 2018-09-19

## 2018-09-19 RX ORDER — TRAMADOL HYDROCHLORIDE 50 MG/1
50 TABLET ORAL ONCE
Status: COMPLETED | OUTPATIENT
Start: 2018-09-19 | End: 2018-09-19

## 2018-09-19 RX ORDER — LACTULOSE 10 G/15ML
30 SOLUTION ORAL 2 TIMES DAILY
Qty: 1 BOTTLE | Refills: 11 | Status: SHIPPED | OUTPATIENT
Start: 2018-09-19 | End: 2019-06-25

## 2018-09-19 RX ORDER — SUCRALFATE 1 G/1
1 TABLET ORAL ONCE
Status: COMPLETED | OUTPATIENT
Start: 2018-09-19 | End: 2018-09-19

## 2018-09-19 RX ORDER — SUCRALFATE 1 G/1
1 TABLET ORAL 4 TIMES DAILY
Qty: 120 TABLET | Refills: 3 | Status: SHIPPED | OUTPATIENT
Start: 2018-09-19 | End: 2019-01-14 | Stop reason: ALTCHOICE

## 2018-09-19 RX ORDER — OMEPRAZOLE 20 MG/1
20 CAPSULE, DELAYED RELEASE ORAL DAILY
Qty: 30 CAPSULE | Refills: 11 | Status: SHIPPED | OUTPATIENT
Start: 2018-09-19 | End: 2019-01-14

## 2018-09-19 RX ADMIN — SUCRALFATE 1 G: 1 TABLET ORAL at 18:00

## 2018-09-19 RX ADMIN — LIDOCAINE HYDROCHLORIDE: 20 SOLUTION ORAL; TOPICAL at 17:59

## 2018-09-19 RX ADMIN — IOPAMIDOL 85 ML: 755 INJECTION, SOLUTION INTRAVENOUS at 14:49

## 2018-09-19 RX ADMIN — PANTOPRAZOLE SODIUM 40 MG: 40 TABLET, DELAYED RELEASE ORAL at 18:00

## 2018-09-19 RX ADMIN — SODIUM CHLORIDE 1000 ML: 9 INJECTION, SOLUTION INTRAVENOUS at 13:20

## 2018-09-19 RX ADMIN — TRAMADOL HYDROCHLORIDE 50 MG: 50 TABLET, FILM COATED ORAL at 15:29

## 2018-09-19 ASSESSMENT — ENCOUNTER SYMPTOMS
RHINORRHEA: 0
EYE REDNESS: 0
COUGH: 0
VOMITING: 0
EYE DISCHARGE: 0
SORE THROAT: 0
WHEEZING: 0
NAUSEA: 0
ABDOMINAL PAIN: 1
DIARRHEA: 0
SHORTNESS OF BREATH: 0
BACK PAIN: 0

## 2018-09-19 ASSESSMENT — PAIN DESCRIPTION - ORIENTATION: ORIENTATION: RIGHT;LOWER

## 2018-09-19 ASSESSMENT — PAIN DESCRIPTION - PAIN TYPE: TYPE: ACUTE PAIN

## 2018-09-19 ASSESSMENT — PAIN DESCRIPTION - DESCRIPTORS: DESCRIPTORS: BURNING

## 2018-09-19 ASSESSMENT — PAIN DESCRIPTION - LOCATION: LOCATION: ABDOMEN

## 2018-09-19 ASSESSMENT — PAIN SCALES - GENERAL: PAINLEVEL_OUTOF10: 7

## 2018-09-19 NOTE — ED NOTES
Pt resting in bed with side rails up 2x2 and call light in reach. Vital signs stable. IV site checked. Orthostatic vitals are negative. See vitals note.          Erica Russo RN  09/19/18 [id number]

## 2018-09-19 NOTE — ED PROVIDER NOTES
[facility]  eMERGENCY dEPARTMENT eNCOUnter          CHIEF COMPLAINT       Chief Complaint   Patient presents with    Abdominal Pain       Nurses Notes reviewed and I agree except as noted in the HPI. HISTORY OF PRESENT ILLNESS    Tiffany Campoverde is a 46 y.o. male who presents to the Emergency Department for the evaluation of abdominal pain that began 3 days ago. The patient describes his pain as \"acid reflux burning\" type pain. He has a medical history of cirrhosis of the liver. He was a previous alcoholic and quit 5 years ago and used cocaine which he has been clean from for 4 months. The patient reports he has been off balance and dizzy when he stands up initially. On 9/16/18 the patient states he had dysuria. The patient reports a weight gain the last 2 weeks. The patient denies fever or chills, nausea or emesis, numbness or tingling, bowel movement problems, urinary problems, shortness of breath, or chest pain. Patient denies further complaints at time of initial encounter. Location/Symptom: Abdominal pain  Timing/Onset: 3 days ago  Context/Setting: Has a history of liver cirrhosis. Quality: Burning  Duration: Constant  Modifying Factors: n/a  Severity: 7/10    REVIEW OF SYSTEMS     Review of Systems   Constitutional: Positive for unexpected weight change (decrease). Negative for appetite change, chills, fatigue and fever. HENT: Negative for congestion, ear pain, rhinorrhea and sore throat. Eyes: Negative for discharge, redness and visual disturbance. Respiratory: Negative for cough, shortness of breath and wheezing. Cardiovascular: Negative for chest pain, palpitations and leg swelling. Gastrointestinal: Positive for abdominal pain. Negative for diarrhea, nausea and vomiting. Genitourinary: Negative for decreased urine volume, difficulty urinating, dysuria and hematuria. Musculoskeletal: Negative for arthralgias, back pain, joint swelling and neck pain.    Skin: Negative for pallor and rash. Allergic/Immunologic: Negative for environmental allergies. Neurological: Positive for dizziness. Negative for syncope, weakness, light-headedness, numbness and headaches. Hematological: Negative for adenopathy. Psychiatric/Behavioral: Negative for confusion and suicidal ideas. The patient is not nervous/anxious. PAST MEDICAL HISTORY    has a past medical history of Anxiety; Arthritis; Chronic kidney disease; Cirrhosis ([address] Diverticulitis; Diverticulosis; GERD (gastroesophageal reflux disease); Hypertension; Other disorders of kidney and ureter in diseases classified elsewhere; and Psychiatric problem. SURGICAL HISTORY      has a past surgical history that includes Cervical disc surgery; Colon surgery (2004); Tympanostomy tube placement; Tonsillectomy; Abdomen surgery; Colonoscopy; Endoscopy, colon, diagnostic; fracture surgery; Dilatation, esophagus; Cardiac catheterization (2007?); Nerve Block (Bilateral, 10/02/2017); and pr inj,paravertebral l/s,1 level (Bilateral, 10/2/2017). CURRENT MEDICATIONS       Discharge Medication List as of 9/19/2018  5:36 PM      CONTINUE these medications which have NOT CHANGED    Details   lactulose (CHRONULAC) 10 GM/15ML solution Take 30 mLs by mouth 2 times daily, Disp-1 Bottle, R-11Normal      omeprazole (PRILOSEC) 20 MG delayed release capsule Take 1 capsule by mouth daily, Disp-30 capsule, R-11Normal      cyclobenzaprine (FLEXERIL) 10 MG tablet take 1/2 to 1 tablet by mouth three times a day if needed for muscle spasm, Disp-60 tablet, R-1Normal      citalopram (CELEXA) 20 MG tablet take 1 tablet by mouth once daily, Disp-90 tablet, R-2Normal      gabapentin (NEURONTIN) 100 MG capsule Take 3 capsules by mouth 2 times daily for 10 days. ., Disp-20 capsule, R-0Print      spironolactone (ALDACTONE) 100 MG tablet take 1 tablet by mouth once daily every morning, Disp-90 tablet, R-2Normal      ALPRAZolam (XANAX) 0.25 MG tablet take 1 tablet by mouth every evening if needed for anxiety or sleep, Disp-60 tablet, R-0Normal      ondansetron (ZOFRAN) 4 MG tablet Take 1 tablet by mouth every 8 hours as needed for Nausea or Vomiting, Disp-20 tablet, R-0Print      dicyclomine (BENTYL) 10 MG capsule Take 1 capsule by mouth 3 times daily (before meals), Disp-90 capsule, R-0Print             ALLERGIES     has No Known Allergies. FAMILY HISTORY     indicated that his mother is . He indicated that his father is . He indicated that his paternal uncle is . family history includes Alzheimer's Disease in his mother; Heart Disease in his father, mother, and paternal uncle; Heart Failure in his mother; High Blood Pressure in his paternal uncle; Hypertension in his father and mother. SOCIAL HISTORY      reports that he has been smoking Cigarettes. He has a 15.00 pack-year smoking history. He has never used smokeless tobacco. He reports that he does not drink alcohol or use drugs. PHYSICAL EXAM     INITIAL VITALS:  height is 5' 5\" (1.651 m) and weight is 220 lb (99.8 kg). His oral temperature is 98.3 °F (36.8 °C). His blood pressure is 119/67 and his pulse is 73. His respiration is 16 and oxygen saturation is 98%. Physical Exam   Constitutional: He is oriented to person, place, and time. He appears well-developed and well-nourished. Non-toxic appearance. HENT:   Head: Normocephalic and atraumatic. Right Ear: Tympanic membrane and external ear normal.   Left Ear: Tympanic membrane and external ear normal.   Nose: Nose normal.   Mouth/Throat: Oropharynx is clear and moist and mucous membranes are normal. No oropharyngeal exudate, posterior oropharyngeal edema or posterior oropharyngeal erythema. Eyes: Conjunctivae and EOM are normal.   Neck: Normal range of motion. Neck supple. No JVD present. Cardiovascular: Normal rate, regular rhythm, normal heart sounds, intact distal pulses and normal pulses.   Exam reveals no PM           LABS:   Labs Reviewed   CBC WITH AUTO DIFFERENTIAL - Abnormal; Notable for the following:        Result Value    RBC 4.18 (*)     Hemoglobin 13.7 (*)     Hematocrit 39.5 (*)     MCV 94.5 (*)     RDW-SD 47.4 (*)     All other components within normal limits   PROTIME-INR - Abnormal; Notable for the following:     INR 1.17 (*)     All other components within normal limits   AMMONIA - Abnormal; Notable for the following:     Ammonia 62 (*)     All other components within normal limits   HEPATIC FUNCTION PANEL - Abnormal; Notable for the following: Total Protein 8.2 (*)     All other components within normal limits   BASIC METABOLIC PANEL - Abnormal; Notable for the following:     CO2 21 (*)     All other components within normal limits   URINE WITH REFLEXED MICRO - Abnormal; Notable for the following:     Bilirubin Urine SMALL (*)     Ketones, Urine TRACE (*)     Color, UA DK YELLOW (*)     Character, Urine CLOUDY (*)     All other components within normal limits   OSMOLALITY - Abnormal; Notable for the following:     Osmolality Calc 274.0 (*)     All other components within normal limits   LIPASE   TROPONIN   LACTIC ACID, PLASMA   ETHANOL   URINE DRUG SCREEN   BILE ACIDS, TOTAL   ANION GAP   GLOMERULAR FILTRATION RATE, ESTIMATED   APTT       EMERGENCY DEPARTMENT COURSE:   Vitals:    Vitals:    09/19/18 1246 09/19/18 1250 09/19/18 1323 09/19/18 1529   BP:  121/78  119/67   Pulse:  75 91 73   Resp:  16     Temp:  98.3 °F (36.8 °C)     TempSrc:  Oral     SpO2:  100% 100% 98%   Weight: 220 lb (99.8 kg)      Height: 5' 5\" (1.651 m)        Patient was seen history physical exam was performed. See disposition below    MDM:  Labs and imaging performed. Imaging delayed due to infiltration of IV. Discussed hemangioma of liver and renal cysts as well as slightly elevated ammonia with patient. No sign of ITP or TTP- platelets are within normal limits, hepatic function is normal, no new anemia.  Patient's CBC

## 2018-09-20 PROCEDURE — 93010 ELECTROCARDIOGRAM REPORT: CPT | Performed by: INTERNAL MEDICINE

## 2018-10-22 RX ORDER — CYCLOBENZAPRINE HCL 10 MG
TABLET ORAL
Qty: 60 TABLET | Refills: 1 | Status: SHIPPED | OUTPATIENT
Start: 2018-10-22 | End: 2018-12-10 | Stop reason: SDUPTHER

## 2018-11-15 ENCOUNTER — OFFICE VISIT (OUTPATIENT)
Dept: FAMILY MEDICINE CLINIC | Age: 53
End: 2018-11-15
Payer: COMMERCIAL

## 2018-11-15 VITALS
HEIGHT: 66 IN | RESPIRATION RATE: 16 BRPM | DIASTOLIC BLOOD PRESSURE: 64 MMHG | WEIGHT: 240.8 LBS | BODY MASS INDEX: 38.7 KG/M2 | HEART RATE: 88 BPM | SYSTOLIC BLOOD PRESSURE: 128 MMHG | TEMPERATURE: 98 F

## 2018-11-15 DIAGNOSIS — R49.0 HOARSENESS OF VOICE: Primary | ICD-10-CM

## 2018-11-15 DIAGNOSIS — R13.10 DYSPHAGIA, UNSPECIFIED TYPE: ICD-10-CM

## 2018-11-15 DIAGNOSIS — F41.9 ANXIETY: ICD-10-CM

## 2018-11-15 DIAGNOSIS — Z87.19 HISTORY OF ESOPHAGEAL STRICTURE: ICD-10-CM

## 2018-11-15 DIAGNOSIS — I85.10 ESOPHAGEAL VARICES IN ALCOHOLIC CIRRHOSIS (HCC): ICD-10-CM

## 2018-11-15 DIAGNOSIS — Z72.0 TOBACCO ABUSE: Chronic | ICD-10-CM

## 2018-11-15 DIAGNOSIS — K70.30 ESOPHAGEAL VARICES IN ALCOHOLIC CIRRHOSIS (HCC): ICD-10-CM

## 2018-11-15 DIAGNOSIS — K70.30 ALCOHOLIC CIRRHOSIS, UNSPECIFIED WHETHER ASCITES PRESENT (HCC): ICD-10-CM

## 2018-11-15 PROCEDURE — 4004F PT TOBACCO SCREEN RCVD TLK: CPT | Performed by: NURSE PRACTITIONER

## 2018-11-15 PROCEDURE — G8484 FLU IMMUNIZE NO ADMIN: HCPCS | Performed by: NURSE PRACTITIONER

## 2018-11-15 PROCEDURE — 3017F COLORECTAL CA SCREEN DOC REV: CPT | Performed by: NURSE PRACTITIONER

## 2018-11-15 PROCEDURE — G8427 DOCREV CUR MEDS BY ELIG CLIN: HCPCS | Performed by: NURSE PRACTITIONER

## 2018-11-15 PROCEDURE — G8417 CALC BMI ABV UP PARAM F/U: HCPCS | Performed by: NURSE PRACTITIONER

## 2018-11-15 PROCEDURE — 99213 OFFICE O/P EST LOW 20 MIN: CPT | Performed by: NURSE PRACTITIONER

## 2018-11-15 RX ORDER — GUAIFENESIN 600 MG/1
1200 TABLET, EXTENDED RELEASE ORAL 2 TIMES DAILY
Qty: 20 TABLET | Refills: 0 | Status: SHIPPED | OUTPATIENT
Start: 2018-11-15 | End: 2018-11-25

## 2018-11-15 RX ORDER — AMOXICILLIN 500 MG/1
500 CAPSULE ORAL 3 TIMES DAILY
Qty: 30 CAPSULE | Refills: 0 | Status: SHIPPED | OUTPATIENT
Start: 2018-11-15 | End: 2019-01-14 | Stop reason: SDUPTHER

## 2018-11-15 RX ORDER — ALPRAZOLAM 0.25 MG/1
TABLET ORAL
Qty: 60 TABLET | Refills: 0 | Status: SHIPPED | OUTPATIENT
Start: 2018-11-15 | End: 2019-01-02 | Stop reason: SDUPTHER

## 2018-11-15 RX ORDER — PREDNISONE 10 MG/1
10 TABLET ORAL 2 TIMES DAILY
Qty: 10 TABLET | Refills: 0 | Status: SHIPPED | OUTPATIENT
Start: 2018-11-15 | End: 2019-01-14 | Stop reason: SDUPTHER

## 2018-11-15 ASSESSMENT — PATIENT HEALTH QUESTIONNAIRE - PHQ9
SUM OF ALL RESPONSES TO PHQ9 QUESTIONS 1 & 2: 0
SUM OF ALL RESPONSES TO PHQ QUESTIONS 1-9: 0
1. LITTLE INTEREST OR PLEASURE IN DOING THINGS: 0
2. FEELING DOWN, DEPRESSED OR HOPELESS: 0
SUM OF ALL RESPONSES TO PHQ QUESTIONS 1-9: 0

## 2018-11-15 NOTE — PROGRESS NOTES
[address] [address]  Dept: [phone]  Dept Fax: [phone]  Loc: [phone]  PROGRESS NOTE      VisitDate: 11/15/2018    Allison Cummings is a 46 y.o. male who presents today for:  Chief Complaint   Patient presents with    Pharyngitis     here today for sore throat, hoarse, hard to swallow, cough- has had esophagus stretched x 2         Subjective:  Patient presents complaining of sore throat and persistent hoarseness for the past week. Complains  of excessive phlegm causing gagging and vomiting. She also complains of some intermittent dysphagia. History of anxiety, alcoholic cirrhosis, GERD, esophageal varices, esophageal stricture with dilatation ×2. Last dilatation in December 2017. Review of Systems  Past Medical History:   Diagnosis Date    Anxiety     Arthritis     Chronic kidney disease     Cirrhosis ([facility] [address]     Diverticulitis     Diverticulosis     GERD (gastroesophageal reflux disease)     Hypertension     Other disorders of kidney and ureter in diseases classified elsewhere     Psychiatric problem       Past Surgical History:   Procedure Laterality Date    ABDOMEN SURGERY      CARDIAC CATHETERIZATION  2007?     CERVICAL DISC SURGERY      x 2 ---broken neck 7-2003    COLON SURGERY  2004    partial, due to diverticulitis    COLONOSCOPY      DILATATION, ESOPHAGUS      ENDOSCOPY, COLON, DIAGNOSTIC      FRACTURE SURGERY      Broke neck in 2003    NERVE BLOCK Bilateral 10/02/2017    Cervical Facet MBB at C4-5, C5-6, C6-7     [place] INJ,PARAVERTEBRAL L/S,1 LEVEL Bilateral 10/2/2017    C-FACET MBB C4-5, C5-6, C6-7 BILATERAL performed by Philip L[facility] 1998      TYMPANOSTOMY TUBE PLACEMENT       Family History   Problem Relation Age of Onset    Hypertension Mother     Heart Disease Mother     Alzheimer's Disease Mother     Heart Failure med risk (1 of 1 - PPSV23) 06/03/1983    Shingles Vaccine (1 of 2 - 2 Dose Series) 06/03/2014    Flu vaccine (1) 09/01/2018    Potassium monitoring  09/19/2019    Creatinine monitoring  09/19/2019    Lipid screen  10/27/2020    Colon cancer screen colonoscopy  03/25/2025    Hepatitis C screen  Completed         Objective:     Physical Exam  /64 (Site: Right Upper Arm, Position: Sitting)   Pulse 88   Temp 98 °F (36.7 °C) (Oral)   Resp 16   Ht 5' 6.25\" (1.683 m)   Wt 240 lb 12.8 oz (109.2 kg)   BMI 38.57 kg/m²       Impression/Plan:  1. Hoarseness of voice    2. [facility]olic cirrhosis, unspecified whether asc[facility]    3. Tobacco abuse    4. Dysphagia, unspecified type    5. History of esophageal stricture    6. Esophageal varices in alcoholic cirrhosis ([facility]    7. Anxiety      Requested Prescriptions     Signed Prescriptions Disp Refills    ALPRAZolam (XANAX) 0.25 MG tablet 60 tablet 0     Sig: take 1 tablet by mouth every evening if needed for anxiety or sleep.  amoxicillin (AMOXIL) 500 MG capsule 30 capsule 0     Sig: Take 1 capsule by mouth 3 times daily for 10 days    predniSONE (DELTASONE) 10 MG tablet 10 tablet 0     Sig: Take 1 tablet by mouth 2 times daily for 5 days    guaiFENesin (MUCINEX) 600 MG extended release tablet 20 tablet 0     Sig: Take 2 tablets by mouth 2 times daily for 10 days     No orders of the defined types were placed in this encounter. Patient giveneducational materials - see patient instructions. Discussed use, benefit, and side effects of prescribed medications. All patient questions answered. Pt voiced understanding. Reviewed health maintenance. Patient agreedwith treatment plan. Follow up as directed. Continue medications as prescribed.  Educational information on above diagnosis  provided per AVS.  oarrs report reviewed    Electronically signed by CHRISTIAN Alicia CNP on 11/15/2018 at 2:35 PM

## 2018-12-10 RX ORDER — CYCLOBENZAPRINE HCL 10 MG
TABLET ORAL
Qty: 60 TABLET | Refills: 1 | Status: SHIPPED | OUTPATIENT
Start: 2018-12-10 | End: 2019-01-14 | Stop reason: SDUPTHER

## 2019-01-02 DIAGNOSIS — K70.30 ALCOHOLIC CIRRHOSIS, UNSPECIFIED WHETHER ASCITES PRESENT (HCC): ICD-10-CM

## 2019-01-02 DIAGNOSIS — F41.9 ANXIETY: ICD-10-CM

## 2019-01-03 RX ORDER — ALPRAZOLAM 0.25 MG/1
TABLET ORAL
Qty: 30 TABLET | Refills: 0 | Status: SHIPPED | OUTPATIENT
Start: 2019-01-03 | End: 2019-01-14 | Stop reason: SDUPTHER

## 2019-01-14 ENCOUNTER — OFFICE VISIT (OUTPATIENT)
Dept: FAMILY MEDICINE CLINIC | Age: 54
End: 2019-01-14
Payer: COMMERCIAL

## 2019-01-14 VITALS
HEIGHT: 66 IN | BODY MASS INDEX: 39.37 KG/M2 | WEIGHT: 245 LBS | TEMPERATURE: 97.8 F | HEART RATE: 112 BPM | RESPIRATION RATE: 24 BRPM | SYSTOLIC BLOOD PRESSURE: 106 MMHG | DIASTOLIC BLOOD PRESSURE: 76 MMHG

## 2019-01-14 DIAGNOSIS — K76.82 HEPATIC ENCEPHALOPATHY: Primary | ICD-10-CM

## 2019-01-14 DIAGNOSIS — J02.9 ACUTE PHARYNGITIS, UNSPECIFIED ETIOLOGY: ICD-10-CM

## 2019-01-14 DIAGNOSIS — K70.30 ALCOHOLIC CIRRHOSIS, UNSPECIFIED WHETHER ASCITES PRESENT (HCC): ICD-10-CM

## 2019-01-14 DIAGNOSIS — K21.00 GASTROESOPHAGEAL REFLUX DISEASE WITH ESOPHAGITIS: ICD-10-CM

## 2019-01-14 DIAGNOSIS — K70.31 ASCITES DUE TO ALCOHOLIC CIRRHOSIS (HCC): ICD-10-CM

## 2019-01-14 DIAGNOSIS — F41.9 ANXIETY: ICD-10-CM

## 2019-01-14 DIAGNOSIS — R49.0 HOARSENESS: ICD-10-CM

## 2019-01-14 PROCEDURE — 99214 OFFICE O/P EST MOD 30 MIN: CPT | Performed by: NURSE PRACTITIONER

## 2019-01-14 PROCEDURE — G8417 CALC BMI ABV UP PARAM F/U: HCPCS | Performed by: NURSE PRACTITIONER

## 2019-01-14 PROCEDURE — G8427 DOCREV CUR MEDS BY ELIG CLIN: HCPCS | Performed by: NURSE PRACTITIONER

## 2019-01-14 PROCEDURE — G8484 FLU IMMUNIZE NO ADMIN: HCPCS | Performed by: NURSE PRACTITIONER

## 2019-01-14 PROCEDURE — 3017F COLORECTAL CA SCREEN DOC REV: CPT | Performed by: NURSE PRACTITIONER

## 2019-01-14 PROCEDURE — 4004F PT TOBACCO SCREEN RCVD TLK: CPT | Performed by: NURSE PRACTITIONER

## 2019-01-14 RX ORDER — ALPRAZOLAM 0.5 MG/1
TABLET ORAL
Qty: 60 TABLET | Refills: 0 | Status: SHIPPED | OUTPATIENT
Start: 2019-01-14 | End: 2019-02-15 | Stop reason: SDUPTHER

## 2019-01-14 RX ORDER — PREDNISONE 10 MG/1
10 TABLET ORAL 2 TIMES DAILY
Qty: 10 TABLET | Refills: 0 | Status: SHIPPED | OUTPATIENT
Start: 2019-01-14 | End: 2019-01-19

## 2019-01-14 RX ORDER — DEXLANSOPRAZOLE 60 MG/1
60 CAPSULE, DELAYED RELEASE ORAL DAILY
Qty: 30 CAPSULE | Refills: 5 | Status: SHIPPED | OUTPATIENT
Start: 2019-01-14 | End: 2019-02-05

## 2019-01-14 RX ORDER — CYCLOBENZAPRINE HCL 10 MG
TABLET ORAL
Qty: 60 TABLET | Refills: 1 | Status: SHIPPED | OUTPATIENT
Start: 2019-01-14 | End: 2019-02-16 | Stop reason: SDUPTHER

## 2019-01-14 RX ORDER — ALPRAZOLAM 0.25 MG/1
TABLET ORAL
Qty: 30 TABLET | Refills: 0 | Status: SHIPPED | OUTPATIENT
Start: 2019-01-14 | End: 2019-01-14 | Stop reason: SDUPTHER

## 2019-01-14 RX ORDER — AMOXICILLIN 500 MG/1
500 CAPSULE ORAL 3 TIMES DAILY
Qty: 30 CAPSULE | Refills: 0 | Status: SHIPPED | OUTPATIENT
Start: 2019-01-14 | End: 2019-01-24

## 2019-01-14 RX ORDER — GABAPENTIN 100 MG/1
300 CAPSULE ORAL 2 TIMES DAILY
Qty: 150 CAPSULE | Refills: 5 | Status: SHIPPED | OUTPATIENT
Start: 2019-01-14 | End: 2019-06-12 | Stop reason: SDUPTHER

## 2019-01-14 RX ORDER — CITALOPRAM 20 MG/1
TABLET ORAL
Qty: 90 TABLET | Refills: 2 | Status: SHIPPED | OUTPATIENT
Start: 2019-01-14 | End: 2019-06-19

## 2019-01-15 ENCOUNTER — TELEPHONE (OUTPATIENT)
Dept: FAMILY MEDICINE CLINIC | Age: 54
End: 2019-01-15

## 2019-01-21 ASSESSMENT — ENCOUNTER SYMPTOMS
VOICE CHANGE: 1
EYES NEGATIVE: 1
RESPIRATORY NEGATIVE: 1
VOMITING: 0
NAUSEA: 0
ABDOMINAL DISTENTION: 1
ABDOMINAL PAIN: 1
ALLERGIC/IMMUNOLOGIC NEGATIVE: 1
SORE THROAT: 1

## 2019-01-22 ENCOUNTER — TELEPHONE (OUTPATIENT)
Dept: FAMILY MEDICINE CLINIC | Age: 54
End: 2019-01-22

## 2019-01-22 DIAGNOSIS — I85.10 ESOPHAGEAL VARICES IN ALCOHOLIC CIRRHOSIS (HCC): ICD-10-CM

## 2019-01-22 DIAGNOSIS — K70.30 ESOPHAGEAL VARICES IN ALCOHOLIC CIRRHOSIS (HCC): ICD-10-CM

## 2019-01-22 DIAGNOSIS — K70.31 ALCOHOLIC CIRRHOSIS OF LIVER WITH ASCITES (HCC): Primary | ICD-10-CM

## 2019-01-23 RX ORDER — OMEPRAZOLE 40 MG/1
40 CAPSULE, DELAYED RELEASE ORAL
Qty: 30 CAPSULE | Refills: 5 | Status: SHIPPED | OUTPATIENT
Start: 2019-01-23 | End: 2019-04-04

## 2019-01-23 RX ORDER — FAMOTIDINE 40 MG/1
40 TABLET, FILM COATED ORAL EVERY EVENING
Qty: 30 TABLET | Refills: 3 | Status: SHIPPED | OUTPATIENT
Start: 2019-01-23 | End: 2019-06-14 | Stop reason: SDUPTHER

## 2019-01-25 DIAGNOSIS — I85.10 ESOPHAGEAL VARICES IN ALCOHOLIC CIRRHOSIS (HCC): Primary | ICD-10-CM

## 2019-01-25 DIAGNOSIS — K70.30 ESOPHAGEAL VARICES IN ALCOHOLIC CIRRHOSIS (HCC): Primary | ICD-10-CM

## 2019-01-25 DIAGNOSIS — K70.31 ALCOHOLIC CIRRHOSIS OF LIVER WITH ASCITES (HCC): ICD-10-CM

## 2019-01-25 RX ORDER — SPIRONOLACTONE 100 MG/1
TABLET, FILM COATED ORAL
Qty: 90 TABLET | Refills: 2 | Status: SHIPPED | OUTPATIENT
Start: 2019-01-25 | End: 2019-12-26

## 2019-02-14 ENCOUNTER — HOSPITAL ENCOUNTER (OUTPATIENT)
Dept: ULTRASOUND IMAGING | Age: 54
Discharge: HOME | End: 2019-02-14
Payer: COMMERCIAL

## 2019-02-14 DIAGNOSIS — K70.30 ALCOHOLIC CIRRHOSIS OF LIVER WITHOUT ASCITES (HCC): ICD-10-CM

## 2019-02-14 PROCEDURE — 76705 ECHO EXAM OF ABDOMEN: CPT

## 2019-02-15 DIAGNOSIS — F41.9 ANXIETY: ICD-10-CM

## 2019-02-15 DIAGNOSIS — K70.30 ALCOHOLIC CIRRHOSIS, UNSPECIFIED WHETHER ASCITES PRESENT (HCC): ICD-10-CM

## 2019-02-18 RX ORDER — CYCLOBENZAPRINE HCL 10 MG
TABLET ORAL
Qty: 60 TABLET | Refills: 1 | Status: SHIPPED | OUTPATIENT
Start: 2019-02-18 | End: 2019-03-27 | Stop reason: SDUPTHER

## 2019-02-18 RX ORDER — ALPRAZOLAM 0.5 MG/1
TABLET ORAL
Qty: 60 TABLET | Refills: 0 | Status: SHIPPED | OUTPATIENT
Start: 2019-02-18 | End: 2019-03-13 | Stop reason: SDUPTHER

## 2019-02-22 ENCOUNTER — HOSPITAL ENCOUNTER (OUTPATIENT)
Dept: MRI IMAGING | Age: 54
Discharge: HOME | End: 2019-02-22
Payer: COMMERCIAL

## 2019-02-22 DIAGNOSIS — R93.2 ABNORMAL LIVER SCAN: ICD-10-CM

## 2019-02-22 PROCEDURE — A9581 GADOXETATE DISODIUM INJ: HCPCS | Performed by: NURSE PRACTITIONER

## 2019-02-22 PROCEDURE — 74183 MRI ABD W/O CNTR FLWD CNTR: CPT

## 2019-02-22 PROCEDURE — 6360000004 HC RX CONTRAST MEDICATION: Performed by: NURSE PRACTITIONER

## 2019-02-22 RX ADMIN — GADOXETATE DISODIUM 10 ML: 181.43 INJECTION, SOLUTION INTRAVENOUS at 13:04

## 2019-03-13 DIAGNOSIS — F41.9 ANXIETY: ICD-10-CM

## 2019-03-13 DIAGNOSIS — K70.30 ALCOHOLIC CIRRHOSIS, UNSPECIFIED WHETHER ASCITES PRESENT (HCC): ICD-10-CM

## 2019-03-14 RX ORDER — ALPRAZOLAM 0.5 MG/1
TABLET ORAL
Qty: 60 TABLET | Refills: 0 | OUTPATIENT
Start: 2019-03-14 | End: 2019-04-13

## 2019-03-15 RX ORDER — ALPRAZOLAM 0.5 MG/1
TABLET ORAL
Qty: 60 TABLET | Refills: 0 | Status: SHIPPED | OUTPATIENT
Start: 2019-03-15 | End: 2019-04-15 | Stop reason: SDUPTHER

## 2019-03-26 ENCOUNTER — HOSPITAL ENCOUNTER (OUTPATIENT)
Dept: ULTRASOUND IMAGING | Age: 54
Discharge: HOME | End: 2019-03-26
Payer: COMMERCIAL

## 2019-03-26 DIAGNOSIS — K70.31 ALCOHOLIC CIRRHOSIS OF LIVER WITH ASCITES (HCC): ICD-10-CM

## 2019-03-26 PROCEDURE — 76705 ECHO EXAM OF ABDOMEN: CPT

## 2019-03-27 ENCOUNTER — TELEPHONE (OUTPATIENT)
Dept: UROLOGY | Age: 54
End: 2019-03-27

## 2019-03-27 RX ORDER — CYCLOBENZAPRINE HCL 10 MG
TABLET ORAL
Qty: 60 TABLET | Refills: 1 | Status: SHIPPED | OUTPATIENT
Start: 2019-03-27 | End: 2019-04-15 | Stop reason: SDUPTHER

## 2019-04-04 ENCOUNTER — OFFICE VISIT (OUTPATIENT)
Dept: UROLOGY | Age: 54
End: 2019-04-04
Payer: COMMERCIAL

## 2019-04-04 VITALS
SYSTOLIC BLOOD PRESSURE: 118 MMHG | BODY MASS INDEX: 40.5 KG/M2 | HEIGHT: 66 IN | WEIGHT: 252 LBS | DIASTOLIC BLOOD PRESSURE: 76 MMHG

## 2019-04-04 DIAGNOSIS — Z12.5 SCREENING FOR PROSTATE CANCER: ICD-10-CM

## 2019-04-04 DIAGNOSIS — N28.1 RENAL CYST: Primary | ICD-10-CM

## 2019-04-04 LAB
BILIRUBIN URINE: NEGATIVE
BLOOD URINE, POC: NEGATIVE
CHARACTER, URINE: CLEAR
COLOR, URINE: YELLOW
GLUCOSE URINE: NEGATIVE MG/DL
KETONES, URINE: NEGATIVE
LEUKOCYTE CLUMPS, URINE: NEGATIVE
NITRITE, URINE: NEGATIVE
PH, URINE: 5.5 (ref 5–9)
PROTEIN, URINE: NEGATIVE MG/DL
SPECIFIC GRAVITY, URINE: >= 1.03 (ref 1–1.03)
UROBILINOGEN, URINE: 1 EU/DL (ref 0–1)

## 2019-04-04 PROCEDURE — 81003 URINALYSIS AUTO W/O SCOPE: CPT | Performed by: UROLOGY

## 2019-04-04 PROCEDURE — G8428 CUR MEDS NOT DOCUMENT: HCPCS | Performed by: UROLOGY

## 2019-04-04 PROCEDURE — 4004F PT TOBACCO SCREEN RCVD TLK: CPT | Performed by: UROLOGY

## 2019-04-04 PROCEDURE — G8417 CALC BMI ABV UP PARAM F/U: HCPCS | Performed by: UROLOGY

## 2019-04-04 PROCEDURE — 3017F COLORECTAL CA SCREEN DOC REV: CPT | Performed by: UROLOGY

## 2019-04-04 PROCEDURE — 99202 OFFICE O/P NEW SF 15 MIN: CPT | Performed by: UROLOGY

## 2019-04-04 ASSESSMENT — ENCOUNTER SYMPTOMS
SHORTNESS OF BREATH: 1
EYE PAIN: 0
COLOR CHANGE: 0
CHEST TIGHTNESS: 0
BACK PAIN: 1
NAUSEA: 0
DIARRHEA: 0
EYE ITCHING: 0

## 2019-04-04 NOTE — PROGRESS NOTES
[address] Melchor Alvarez  Dept: [phone]  Loc: [phone]  Visit Date: 4/4/2019    Subjective:      Patient ID: Chacha Carballo 46 y.o. male 1965    Chief Complaint   Patient presents with    Advice Only     New patient referred by CHRISTIAN Pollard CNP for kidney cysts. HPI 58-year-old white male referred for a hemorrhagic cyst in the left kidney. This was found on an MRI of the abdomen in February, 2019 comparisons with an MRI from 2017 revealed no change. There are also 2 simple cyst in the left kidney which are also unchanged from 2 years ago. The patient denies any recent falls. Is not on any anticoagulation therapy. Patient denies any urologic problems. He has never had a screening serum PSA that he knows of.     Past Medical History:   Diagnosis Date    Anxiety     Arthritis     Chronic kidney disease     Cirrhosis ([facility]     Diverticulitis     Diverticulosis     GERD (gastroesophageal reflux disease)     Hypertension     Other disorders of kidney and ureter in diseases classified elsewhere     Psychiatric problem        Social History     Socioeconomic History    Marital status:      Spouse name: Not on file    Number of children: Not on file    Years of education: Not on file    Highest education level: Not on file   Occupational History    Not on file   Social Needs    Financial resource strain: Not on file    Food insecurity:     Worry: Not on file     Inability: Not on file    Transportation needs:     Medical: Not on file     Non-medical: Not on file   Tobacco Use    Smoking status: Current Every Day Smoker     Packs/day: 0.50     Years: 25.00     Pack years: 12.50     Types: Cigarettes    Smokeless tobacco: Never Used    Tobacco comment: printed to avs   Substance and Sexual Activity    Alcohol use: No     Comment: Quit 2 years ago    Drug use: No    Sexual activity: Not Currently   Lifestyle    Physical activity:     Days per week: Not on file     Minutes per session: Not on file    Stress: Not on file   Relationships    Social connections:     Talks on phone: Not on file     Gets together: Not on file     Attends [employer] service: Not on file     Active member of club or organization: Not on file     Attends meetings of clubs or organizations: Not on file     Relationship status: Not on file    Intimate partner violence:     Fear of current or ex partner: Not on file     Emotionally abused: Not on file     Physically abused: Not on file     Forced sexual activity: Not on file   Other Topics Concern    Not on file   Social History Narrative    Not on file       Family History   Problem Relation Age of Onset    Hypertension Mother     Heart Disease Mother     Alzheimer's Disease Mother     Heart Failure Mother     Hypertension Father     Heart Disease Father     Cancer Father     High Blood Pressure Paternal Uncle     Heart Disease Paternal Uncle     Colon Cancer Neg Hx     Colon Polyps Neg Hx        Past Surgical History:   Procedure Laterality Date    [address]  2007?     CERVICAL DISC SURGERY      x 2 ---broken neck 7-2003    COLON SURGERY  2004    partial, due to diverticulitis    COLONOSCOPY      DILATATION, ESOPHAGUS      ENDOSCOPY, COLON, DIAGNOSTIC      FRACTURE SURGERY      Broke neck in 2003    NERVE BLOCK Bilateral 10/02/2017    Cervical Facet MBB at C4-5, C5-6, C6-7     [place] INJ,PARAVERTEBRAL L/S,1 LEVEL Bilateral 10/2/2017    C-FACET MBB C4-5, C5-6, C6-7 BILATERAL performed by Hernando Smith MD at [address] [place] [address] ENDOSCOPY  2019       No Known Allergies      Current Outpatient Medications:     cyclobenzaprine (FLEXERIL) 10 MG tablet, take 0.5 to 1 tablet by mouth three times a day if needed for muscle spasm, Disp: 60 tablet, Rfl: 1    spironolactone (ALDACTONE) 100 MG tablet, take 1 tablet by mouth every morning, Disp: 90 tablet, Rfl: 2    famotidine (PEPCID) 40 MG tablet, Take 1 tablet by mouth every evening, Disp: 30 tablet, Rfl: 3    gabapentin (NEURONTIN) 100 MG capsule, Take 3 capsules by mouth 2 times daily. ., Disp: 150 capsule, Rfl: 5    citalopram (CELEXA) 20 MG tablet, take 1 tablet by mouth once daily, Disp: 90 tablet, Rfl: 2    lactulose (CHRONULAC) 10 GM/15ML solution, Take 30 mLs by mouth 2 times daily, Disp: 1 Bottle, Rfl: 11    ALPRAZolam (XANAX) 0.5 MG tablet, take 1 tablet by mouth twice a day if needed for anxiety or sleep, Disp: 60 tablet, Rfl: 0    Review of Systems   Constitutional: Negative for chills and fever. Eyes: Negative for pain and itching. Respiratory: Positive for shortness of breath. Negative for chest tightness. Cardiovascular: Positive for leg swelling. Negative for chest pain. Gastrointestinal: Negative for diarrhea and nausea. Endocrine: Positive for cold intolerance. Negative for heat intolerance. Genitourinary: Negative for difficulty urinating and hematuria. Musculoskeletal: Positive for back pain (low back pain) and joint swelling. Skin: Negative for color change and rash. Allergic/Immunologic: Negative for environmental allergies and food allergies. Neurological: Negative for dizziness and headaches. Hematological: Bruises/bleeds easily. /76   Ht 5' 6\" (1.676 m)   Wt 252 lb (114.3 kg)   BMI 40.67 kg/m²     Objective:   Physical Exam   Constitutional: He is oriented to person, place, and time. He appears well-developed and well-nourished. Obese white male   HENT:   Head: Normocephalic and atraumatic. Eyes: Pupils are equal, round, and reactive to light. EOM are normal.   Abdominal: Soft. Bowel sounds are normal.   Midline surgical scar well-healed.    Genitourinary: Rectum normal, prostate normal, testes normal and penis normal. Uncircumcised. No phimosis. Neurological: He is alert and oriented to person, place, and time. Skin: Skin is warm and dry. Psychiatric: He has a normal mood and affect. Vitals reviewed. Assessment:       Diagnosis Orders   1. Renal cyst  POCT Urinalysis No Micro (Auto)       Mr. Collins Leon presents today in consultation for Renal cyst [N28.1]. I told the patient that the hemorrhagic cyst in the left kidney is unchanged and of no concern at this time. Plan:        Serum will be obtained in June, 2019 with other blood work for a screening PSA. If this is normal he will be seen as needed.

## 2019-04-15 ENCOUNTER — OFFICE VISIT (OUTPATIENT)
Dept: FAMILY MEDICINE CLINIC | Age: 54
End: 2019-04-15
Payer: COMMERCIAL

## 2019-04-15 VITALS
BODY MASS INDEX: 38.49 KG/M2 | HEART RATE: 72 BPM | RESPIRATION RATE: 20 BRPM | HEIGHT: 68 IN | WEIGHT: 254 LBS | SYSTOLIC BLOOD PRESSURE: 96 MMHG | TEMPERATURE: 97.8 F | DIASTOLIC BLOOD PRESSURE: 74 MMHG

## 2019-04-15 DIAGNOSIS — G89.29 CHRONIC NECK PAIN: ICD-10-CM

## 2019-04-15 DIAGNOSIS — R49.0 HOARSENESS OF VOICE: Primary | ICD-10-CM

## 2019-04-15 DIAGNOSIS — R13.10 DYSPHAGIA, UNSPECIFIED TYPE: ICD-10-CM

## 2019-04-15 DIAGNOSIS — K70.30 ESOPHAGEAL VARICES IN ALCOHOLIC CIRRHOSIS (HCC): ICD-10-CM

## 2019-04-15 DIAGNOSIS — F41.9 ANXIETY: ICD-10-CM

## 2019-04-15 DIAGNOSIS — K21.00 GASTROESOPHAGEAL REFLUX DISEASE WITH ESOPHAGITIS: ICD-10-CM

## 2019-04-15 DIAGNOSIS — G47.00 INSOMNIA, UNSPECIFIED TYPE: ICD-10-CM

## 2019-04-15 DIAGNOSIS — K70.30 ALCOHOLIC CIRRHOSIS, UNSPECIFIED WHETHER ASCITES PRESENT (HCC): ICD-10-CM

## 2019-04-15 DIAGNOSIS — I85.10 ESOPHAGEAL VARICES IN ALCOHOLIC CIRRHOSIS (HCC): ICD-10-CM

## 2019-04-15 DIAGNOSIS — Z72.0 TOBACCO ABUSE: ICD-10-CM

## 2019-04-15 DIAGNOSIS — K70.31 ALCOHOLIC CIRRHOSIS OF LIVER WITH ASCITES (HCC): ICD-10-CM

## 2019-04-15 DIAGNOSIS — M54.2 CHRONIC NECK PAIN: ICD-10-CM

## 2019-04-15 PROCEDURE — 3017F COLORECTAL CA SCREEN DOC REV: CPT | Performed by: NURSE PRACTITIONER

## 2019-04-15 PROCEDURE — 4004F PT TOBACCO SCREEN RCVD TLK: CPT | Performed by: NURSE PRACTITIONER

## 2019-04-15 PROCEDURE — 90670 PCV13 VACCINE IM: CPT | Performed by: NURSE PRACTITIONER

## 2019-04-15 PROCEDURE — G8427 DOCREV CUR MEDS BY ELIG CLIN: HCPCS | Performed by: NURSE PRACTITIONER

## 2019-04-15 PROCEDURE — G8417 CALC BMI ABV UP PARAM F/U: HCPCS | Performed by: NURSE PRACTITIONER

## 2019-04-15 PROCEDURE — 90471 IMMUNIZATION ADMIN: CPT | Performed by: NURSE PRACTITIONER

## 2019-04-15 PROCEDURE — 99213 OFFICE O/P EST LOW 20 MIN: CPT | Performed by: NURSE PRACTITIONER

## 2019-04-15 RX ORDER — ALPRAZOLAM 0.5 MG/1
TABLET ORAL
Qty: 120 TABLET | Refills: 0 | Status: SHIPPED | OUTPATIENT
Start: 2019-04-15 | End: 2019-05-08 | Stop reason: SDUPTHER

## 2019-04-15 RX ORDER — ALPRAZOLAM 0.5 MG/1
TABLET ORAL
Qty: 60 TABLET | Refills: 0 | OUTPATIENT
Start: 2019-04-15

## 2019-04-15 RX ORDER — CYCLOBENZAPRINE HCL 10 MG
TABLET ORAL
Qty: 60 TABLET | Refills: 1 | Status: SHIPPED | OUTPATIENT
Start: 2019-04-15 | End: 2019-05-26 | Stop reason: SDUPTHER

## 2019-04-15 ASSESSMENT — PATIENT HEALTH QUESTIONNAIRE - PHQ9
SUM OF ALL RESPONSES TO PHQ QUESTIONS 1-9: 0
2. FEELING DOWN, DEPRESSED OR HOPELESS: 0
SUM OF ALL RESPONSES TO PHQ9 QUESTIONS 1 & 2: 0
1. LITTLE INTEREST OR PLEASURE IN DOING THINGS: 0
SUM OF ALL RESPONSES TO PHQ QUESTIONS 1-9: 0

## 2019-05-08 DIAGNOSIS — K70.30 ALCOHOLIC CIRRHOSIS, UNSPECIFIED WHETHER ASCITES PRESENT (HCC): ICD-10-CM

## 2019-05-08 DIAGNOSIS — F41.9 ANXIETY: ICD-10-CM

## 2019-05-14 RX ORDER — ALPRAZOLAM 0.5 MG/1
TABLET ORAL
Qty: 120 TABLET | Refills: 0 | Status: SHIPPED | OUTPATIENT
Start: 2019-05-14 | End: 2019-06-10 | Stop reason: SDUPTHER

## 2019-05-28 RX ORDER — CYCLOBENZAPRINE HCL 10 MG
TABLET ORAL
Qty: 60 TABLET | Refills: 1 | Status: SHIPPED | OUTPATIENT
Start: 2019-05-28 | End: 2019-06-19 | Stop reason: SDUPTHER

## 2019-05-30 ASSESSMENT — ENCOUNTER SYMPTOMS
RESPIRATORY NEGATIVE: 1
TROUBLE SWALLOWING: 1
ABDOMINAL DISTENTION: 1
EYES NEGATIVE: 1

## 2019-05-30 NOTE — PROGRESS NOTES
[address] [address]  Dept: [phone]  Dept Fax: [phone]  Loc: [phone]  PROGRESS NOTE      VisitDate: 4/15/2019    Angie Stern is a 46 y.o. male who presents today for:     Chief Complaint   Patient presents with    Medication Refill     Xanax         Subjective:  Patient presents for routine 3 month follow-up/refills Xanax. History anxiety, chronic kidney disease, arthritis, cirrhosis, GERD, hypertension chronic insomnia chronic neck pain. History of esophageal varices, hoarseness and dysphagia. Reports mood stable. States doing well. Denies any headaches, confusion, dizziness, syncope, chest pain, shortness of breath, abdominal pain or edema. .        Review of Systems   Constitutional: Negative. HENT: Positive for trouble swallowing. Eyes: Negative. Respiratory: Negative. Cardiovascular: Negative. Gastrointestinal: Positive for abdominal distention. Endocrine: Negative. Genitourinary: Negative. Musculoskeletal: Positive for neck stiffness. Skin: Negative. Neurological: Negative. Hematological: Negative. Psychiatric/Behavioral: The patient is nervous/anxious. Past Medical History:   Diagnosis Date    Anxiety     Arthritis     Chronic kidney disease     Cirrhosis ([address]     Diverticulitis     Diverticulosis     GERD (gastroesophageal reflux disease)     Hypertension     Other disorders of kidney and ureter in diseases classified elsewhere     Psychiatric problem       Past Surgical History:   Procedure Laterality Date    ABDOMEN SURGERY      CARDIAC CATHETERIZATION  2007?     CERVICAL DISC SURGERY      x 2 ---broken neck 7-2003    COLON SURGERY  2004    partial, due to diverticulitis    COLONOSCOPY      DILATATION, ESOPHAGUS      ENDOSCOPY, COLON, DIAGNOSTIC      FRACTURE SURGERY      Broke neck in 2003    NERVE BLOCK Bilateral 10/02/2017    Cervical Facet MBB at C4-5, C5-6, C6-7     [place] INJ,PARAVERTEBRAL L/S,1 LEVEL Bilateral 10/2/2017    C-FACET MBB C4-5, C5-6, C6-7 BILATERAL performed by Mitch Ray MD at [facility] ENDOSCOPY  2019     Family History   Problem Relation Age of Onset    Hypertension Mother     Heart Disease Mother     Alzheimer's Disease Mother     Heart Failure Mother     Hypertension Father     Heart Disease Father     Cancer Father     High Blood Pressure Paternal Uncle     Heart Disease Paternal Uncle     Colon Cancer Neg Hx     Colon Polyps Neg Hx      Social History     Tobacco Use    Smoking status: Current Every Day Smoker     Packs/day: 0.50     Years: 25.00     Pack years: 12.50     Types: Cigarettes    Smokeless tobacco: Never Used    Tobacco comment: printed to avs   Substance Use Topics    Alcohol use: No     Comment: Quit 2 years ago      Current Outpatient Medications   Medication Sig Dispense Refill    spironolactone (ALDACTONE) 100 MG tablet take 1 tablet by mouth every morning 90 tablet 2    famotidine (PEPCID) 40 MG tablet Take 1 tablet by mouth every evening 30 tablet 3    gabapentin (NEURONTIN) 100 MG capsule Take 3 capsules by mouth 2 times daily. . 150 capsule 5    citalopram (CELEXA) 20 MG tablet take 1 tablet by mouth once daily 90 tablet 2    lactulose (CHRONULAC) 10 GM/15ML solution Take 30 mLs by mouth 2 times daily 1 Bottle 11    cyclobenzaprine (FLEXERIL) 10 MG tablet take 1/2 to 1 tablet by mouth three times a day if needed for muscle spasm 60 tablet 1    ALPRAZolam (XANAX) 0.5 MG tablet take 1 to 2 tablets by mouth twice a day if needed for anxiety or sleep 120 tablet 0     No current facility-administered medications for this visit.       No Known Allergies  Health Maintenance   Topic Date Due    Hepatitis A vaccine (1 of 2 - Risk 2-dose series) 06/03/1965    HIV screen  06/03/1979    Hepatitis B Vaccine (1 of 3 - Risk 3-dose series) 06/03/1983    DTaP/Tdap/Td vaccine (1 - Tdap) 06/03/1983    Shingles Vaccine (1 of 2) 06/03/2014    Pneumococcal 0-64 years Vaccine (1 of 1 - PPSV23) 06/10/2019    Flu vaccine (Season Ended) 09/01/2019    Potassium monitoring  09/19/2019    Creatinine monitoring  09/19/2019    Lipid screen  10/27/2020    Colon cancer screen colonoscopy  03/01/2029    Hepatitis C screen  Completed         Objective:     Physical Exam   Constitutional: He is oriented to person, place, and time. He appears well-developed and well-nourished. HENT:   Head: Normocephalic. Right Ear: External ear normal.   Left Ear: External ear normal.   Nose: Nose normal.   Mouth/Throat: Oropharynx is clear and moist.   Eyes: Pupils are equal, round, and reactive to light. Conjunctivae and EOM are normal.   Neck: Neck supple. No JVD present. Spinous process tenderness and muscular tenderness present. Decreased range of motion present. No thyromegaly present. Cardiovascular: Normal rate, regular rhythm, normal heart sounds and intact distal pulses. Pulmonary/Chest: Effort normal and breath sounds normal.   Abdominal: Soft. There is generalized tenderness. Neurological: He is alert and oriented to person, place, and time. Skin: Skin is warm. Psychiatric: He has a normal mood and affect. His behavior is normal. Judgment and thought content normal.   Nursing note and vitals reviewed. BP 96/74   Pulse 72   Temp 97.8 °F (36.6 °C) (Oral)   Resp 20   Ht 5' 7.5\" (1.715 m)   Wt 254 lb (115.2 kg)   BMI 39.19 kg/m²       Impression/Plan:  1. Hoarseness of voice    2. Alcoholic cirrhosis, unspecified whether ascites present ([address]    3. Anxiety    4. Alcoholic cirrhosis of liver with ascites ([address]    5. Gastroesophageal reflux disease with esophagitis    6. Esophageal varices in alcoholic cirrhosis ([address]    7. Tobacco abuse    8. Dysphagia, unspecified type    9.  Insomnia, unspecified type    10. Chronic neck pain      Requested Prescriptions      No prescriptions requested or ordered in this encounter     Orders Placed This Encounter   Procedures    Pneumococcal conjugate vaccine 13-valent       Patient giveneducational materials - see patient instructions. Discussed use, benefit, and side effects of prescribed medications. All patient questions answered. Pt voiced understanding. Reviewed health maintenance. Patient agreedwith treatment plan. Follow up as directed. Continue medications as prescribed.  Educational information on above diagnosis  provided per AVS.      Electronically signed by CHRISTIAN Martinez CNP on 5/30/2019 at 12:55 PM

## 2019-06-10 DIAGNOSIS — F41.9 ANXIETY: ICD-10-CM

## 2019-06-10 DIAGNOSIS — K70.30 ALCOHOLIC CIRRHOSIS, UNSPECIFIED WHETHER ASCITES PRESENT (HCC): ICD-10-CM

## 2019-06-10 RX ORDER — ALPRAZOLAM 0.5 MG/1
TABLET ORAL
Qty: 120 TABLET | Refills: 0 | Status: SHIPPED | OUTPATIENT
Start: 2019-06-10 | End: 2019-07-08 | Stop reason: SDUPTHER

## 2019-06-10 NOTE — TELEPHONE ENCOUNTER
Rx request from [facility] for Xanax 0.5mg 1-2 tablets BID PRN  Last written:05/14/2019 120 tablets with 0 refills  Last seen:04/15/2019, No future appointments  Rx verified,ordered,and set to escribe

## 2019-06-12 RX ORDER — GABAPENTIN 100 MG/1
CAPSULE ORAL
Qty: 150 CAPSULE | Refills: 5 | Status: SHIPPED | OUTPATIENT
Start: 2019-06-12 | End: 2019-11-01 | Stop reason: SDUPTHER

## 2019-06-12 NOTE — TELEPHONE ENCOUNTER
Rx request from [facility] for Gabapentin 100mg 3 capsules BID  Last written:11/14/2019 150 capsules with 5 refills  Last seen:04/15/2019, No future appointments  Rx verified,ordered,and set to victor mbe

## 2019-06-14 RX ORDER — FAMOTIDINE 40 MG/1
TABLET, FILM COATED ORAL
Qty: 30 TABLET | Refills: 5 | Status: SHIPPED | OUTPATIENT
Start: 2019-06-14 | End: 2019-12-26 | Stop reason: SDUPTHER

## 2019-06-18 ENCOUNTER — TELEPHONE (OUTPATIENT)
Dept: UROLOGY | Age: 54
End: 2019-06-18

## 2019-06-18 LAB — PSA, ULTRASENSITIVE: 0.12 NG/ML (ref 0–4)

## 2019-06-18 NOTE — TELEPHONE ENCOUNTER
Patient notified of PSA results per Dr. Diane Obrien. He voiced understanding and was appreciative of the call.

## 2019-06-19 ENCOUNTER — OFFICE VISIT (OUTPATIENT)
Dept: FAMILY MEDICINE CLINIC | Age: 54
End: 2019-06-19
Payer: COMMERCIAL

## 2019-06-19 VITALS
HEIGHT: 68 IN | WEIGHT: 251.8 LBS | BODY MASS INDEX: 38.16 KG/M2 | SYSTOLIC BLOOD PRESSURE: 108 MMHG | RESPIRATION RATE: 18 BRPM | HEART RATE: 86 BPM | TEMPERATURE: 98.2 F | DIASTOLIC BLOOD PRESSURE: 56 MMHG

## 2019-06-19 DIAGNOSIS — R51.9 INCREASED FREQUENCY OF HEADACHES: ICD-10-CM

## 2019-06-19 DIAGNOSIS — K76.82 HEPATIC ENCEPHALOPATHY: Primary | ICD-10-CM

## 2019-06-19 DIAGNOSIS — R42 DIZZINESS: ICD-10-CM

## 2019-06-19 DIAGNOSIS — R41.0 CONFUSION: ICD-10-CM

## 2019-06-19 DIAGNOSIS — K70.30 ALCOHOLIC CIRRHOSIS, UNSPECIFIED WHETHER ASCITES PRESENT (HCC): ICD-10-CM

## 2019-06-19 DIAGNOSIS — R41.3 MEMORY CHANGES: ICD-10-CM

## 2019-06-19 DIAGNOSIS — Z72.0 TOBACCO ABUSE: ICD-10-CM

## 2019-06-19 DIAGNOSIS — G47.19 DAYTIME HYPERSOMNOLENCE: ICD-10-CM

## 2019-06-19 PROCEDURE — 3017F COLORECTAL CA SCREEN DOC REV: CPT | Performed by: NURSE PRACTITIONER

## 2019-06-19 PROCEDURE — G8427 DOCREV CUR MEDS BY ELIG CLIN: HCPCS | Performed by: NURSE PRACTITIONER

## 2019-06-19 PROCEDURE — 99214 OFFICE O/P EST MOD 30 MIN: CPT | Performed by: NURSE PRACTITIONER

## 2019-06-19 PROCEDURE — 4004F PT TOBACCO SCREEN RCVD TLK: CPT | Performed by: NURSE PRACTITIONER

## 2019-06-19 PROCEDURE — G8417 CALC BMI ABV UP PARAM F/U: HCPCS | Performed by: NURSE PRACTITIONER

## 2019-06-19 RX ORDER — DULOXETIN HYDROCHLORIDE 60 MG/1
60 CAPSULE, DELAYED RELEASE ORAL DAILY
Qty: 30 CAPSULE | Refills: 3 | Status: SHIPPED | OUTPATIENT
Start: 2019-06-19 | End: 2019-12-20

## 2019-06-19 RX ORDER — CYCLOBENZAPRINE HCL 10 MG
TABLET ORAL
Qty: 60 TABLET | Refills: 1 | Status: SHIPPED | OUTPATIENT
Start: 2019-06-19 | End: 2019-08-21 | Stop reason: SDUPTHER

## 2019-06-28 DIAGNOSIS — K70.30 ALCOHOLIC CIRRHOSIS, UNSPECIFIED WHETHER ASCITES PRESENT (HCC): ICD-10-CM

## 2019-06-28 DIAGNOSIS — F41.9 ANXIETY: ICD-10-CM

## 2019-06-28 RX ORDER — ALPRAZOLAM 0.5 MG/1
TABLET ORAL
Qty: 120 TABLET | Refills: 0 | OUTPATIENT
Start: 2019-06-28

## 2019-06-28 ASSESSMENT — ENCOUNTER SYMPTOMS
NAUSEA: 0
ABDOMINAL PAIN: 1
CHEST TIGHTNESS: 0
APNEA: 0
SHORTNESS OF BREATH: 0
ABDOMINAL DISTENTION: 1
COUGH: 0
BACK PAIN: 0
PHOTOPHOBIA: 0
WHEEZING: 0
EYE DISCHARGE: 0
EYE PAIN: 0
RHINORRHEA: 0

## 2019-06-28 NOTE — TELEPHONE ENCOUNTER
Rx request from [facility] for Xanax 0.5mg 1-2 tablets BID PRN  Last written:06/10/2019 120 tablets with 0 refills  Last seen:06/19/2019, No future appointments  Rx refused- request too soon.

## 2019-06-28 NOTE — PROGRESS NOTES
[address] [address]  Dept: [phone]  Dept Fax: [phone]  Loc: [phone]  PROGRESS NOTE      VisitDate: 6/19/2019    Stevan Dunlap is a 53 y.o. male who presents today for:     Chief Complaint   Patient presents with    Memory Loss     getting worse over last couple weeks. not able to remember things to write down. Subjective:  Patient presents with complaint of cognitive decline, memory loss, episodes of confusion. Reports symptoms have gradually worsened over the past 2 to 3 months. History of anxiety, arthritis, chronic kidney disease, alcoholic cirrhosis, GERD, hypertension, chronic neck pain. Continues to abstain from alcohol. Reports compliance with current medications. Patient's also complains of excessive fatigue, dizziness and daytime hypersomnolence. Reports that he is awake for approximately 30 minutes to an hour and then takes a nap then cycle continues. Patient also has complained of increased severity and frequency of headaches. Reports she has a headache approximately 3 to 4 days a week. Denies chest pain, shortness of breath, syncope, fever, illness or edema. Review of Systems   Constitutional: Positive for fatigue. Negative for activity change, appetite change, chills and fever. HENT: Negative for ear discharge, ear pain, hearing loss, rhinorrhea and tinnitus. Eyes: Negative for photophobia, pain, discharge and visual disturbance. Respiratory: Negative for apnea, cough, chest tightness, shortness of breath and wheezing. Cardiovascular: Negative for chest pain, palpitations and leg swelling. Gastrointestinal: Positive for abdominal distention and abdominal pain. Negative for nausea. Genitourinary: Negative for decreased urine volume, difficulty urinating, discharge, dysuria, flank pain, frequency, hematuria, scrotal swelling, testicular pain and urgency. Musculoskeletal: Positive for neck pain and neck stiffness. Negative for arthralgias, back pain, joint swelling and myalgias. Skin: Negative. Neurological: Positive for dizziness and headaches. Negative for tremors, seizures, syncope, light-headedness and numbness. Hematological: Negative for adenopathy. Psychiatric/Behavioral: Positive for confusion and decreased concentration. Negative for hallucinations. The patient is nervous/anxious. All other systems reviewed and are negative. Past Medical History:   Diagnosis Date    Anxiety     Arthritis     Chronic kidney disease     Cirrhosis ([facility]     Diverticulitis     Diverticulosis     GERD (gastroesophageal reflux disease)     Hypertension     Other disorders of kidney and ureter in diseases classified elsewhere     Psychiatric problem       Past Surgical History:   Procedure Laterality Date    ABDOMEN SURGERY      CARDIAC CATHETERIZATION  2007?     CERVICAL DISC SURGERY      x 2 ---broken neck 7-2003    COLON SURGERY  2004    partial, due to diverticulitis    COLONOSCOPY      DILATATION, ESOPHAGUS      ENDOSCOPY, COLON, DIAGNOSTIC      FRACTURE SURGERY      Broke neck in 2003    NERVE BLOCK Bilateral 10/02/2017    Cervical Facet MBB at C4-5, C5-6, C6-7     [place] INJ,PARAVERTEBRAL L/S,1 LEVEL Bilateral 10/2/2017    C-FACET MBB C4-5, C5-6, C6-7 BILATERAL performed by Edgardo Abdullahi MD at [address] [place] [address] ENDOSCOPY  2019     Family History   Problem Relation Age of Onset    Hypertension Mother     Heart Disease Mother     Alzheimer's Disease Mother     Heart Failure Mother     Hypertension Father     Heart Disease Father     Cancer Father     High Blood Pressure Paternal Uncle     Heart Disease Paternal Uncle     Colon Cancer Neg Hx     Colon Polyps Neg Hx      Social History     Tobacco Use    Smoking status: Current Every Day Oropharynx is clear and moist.   Eyes: Pupils are equal, round, and reactive to light. Conjunctivae and EOM are normal. No scleral icterus. Neck: Normal range of motion. Neck supple. No JVD present. Carotid bruit is not present. No thyromegaly present. Cardiovascular: Normal rate, regular rhythm, normal heart sounds and intact distal pulses. Pulmonary/Chest: Effort normal and breath sounds normal.   Abdominal: Bowel sounds are normal. He exhibits distension and ascites. He exhibits no pulsatile liver and no mass. There is tenderness in the right upper quadrant and epigastric area. There is no rebound and no guarding. Musculoskeletal: Normal range of motion. Lymphadenopathy:     He has no cervical adenopathy. Neurological: He is alert and oriented to person, place, and time. No cranial nerve deficit. Skin: Skin is warm. Psychiatric: Judgment normal. His mood appears anxious. His speech is rapid and/or pressured. He is hyperactive. Thought content is not paranoid. Cognition and memory are normal. He expresses no homicidal and no suicidal ideation. Tearful   Nursing note and vitals reviewed. BP (!) 108/56 (Site: Right Upper Arm, Position: Sitting)   Pulse 86   Temp 98.2 °F (36.8 °C) (Oral)   Resp 18   Ht 5' 7.5\" (1.715 m)   Wt 251 lb 12.8 oz (114.2 kg)   BMI 38.86 kg/m²                       Impression/Plan:  1. Hepatic encephalopathy ([address]    2. Alcoholic cirrhosis, unspecified whether ascites present ([address]    3. Tobacco abuse    4. Confusion    5. Memory changes    6. Daytime hypersomnolence    7. Increased frequency of headaches    8.  Dizziness      Requested Prescriptions     Signed Prescriptions Disp Refills    cyclobenzaprine (FLEXERIL) 10 MG tablet 60 tablet 1     Sig: take 1/2 to 1 tablet by mouth three times a day if needed for muscle spasm    DULoxetine (CYMBALTA) 60 MG extended release capsule 30 capsule 3     Sig: Take 1 capsule by mouth daily     Orders Placed This Encounter

## 2019-07-05 ENCOUNTER — HOSPITAL ENCOUNTER (OUTPATIENT)
Dept: CT IMAGING | Age: 54
Discharge: HOME | End: 2019-07-05
Payer: COMMERCIAL

## 2019-07-05 ENCOUNTER — HOSPITAL ENCOUNTER (OUTPATIENT)
Dept: INTERVENTIONAL RADIOLOGY/VASCULAR | Age: 54
Discharge: HOME | End: 2019-07-05
Payer: COMMERCIAL

## 2019-07-05 DIAGNOSIS — K76.82 HEPATIC ENCEPHALOPATHY: ICD-10-CM

## 2019-07-05 DIAGNOSIS — R42 DIZZINESS: ICD-10-CM

## 2019-07-05 DIAGNOSIS — R41.3 MEMORY CHANGES: ICD-10-CM

## 2019-07-05 DIAGNOSIS — R41.0 CONFUSION: ICD-10-CM

## 2019-07-05 DIAGNOSIS — G47.19 DAYTIME HYPERSOMNOLENCE: ICD-10-CM

## 2019-07-05 DIAGNOSIS — K70.30 ALCOHOLIC CIRRHOSIS, UNSPECIFIED WHETHER ASCITES PRESENT (HCC): ICD-10-CM

## 2019-07-05 DIAGNOSIS — R51.9 INCREASED FREQUENCY OF HEADACHES: ICD-10-CM

## 2019-07-05 PROCEDURE — 93880 EXTRACRANIAL BILAT STUDY: CPT

## 2019-07-05 PROCEDURE — 70470 CT HEAD/BRAIN W/O & W/DYE: CPT

## 2019-07-05 PROCEDURE — 6360000004 HC RX CONTRAST MEDICATION: Performed by: NURSE PRACTITIONER

## 2019-07-05 RX ADMIN — IOPAMIDOL 65 ML: 755 INJECTION, SOLUTION INTRAVENOUS at 13:52

## 2019-07-08 ENCOUNTER — TELEPHONE (OUTPATIENT)
Dept: FAMILY MEDICINE CLINIC | Age: 54
End: 2019-07-08

## 2019-07-08 DIAGNOSIS — F41.9 ANXIETY: ICD-10-CM

## 2019-07-08 DIAGNOSIS — K70.30 ALCOHOLIC CIRRHOSIS, UNSPECIFIED WHETHER ASCITES PRESENT (HCC): ICD-10-CM

## 2019-07-08 RX ORDER — ALPRAZOLAM 0.5 MG/1
TABLET ORAL
Qty: 120 TABLET | Refills: 0 | Status: SHIPPED | OUTPATIENT
Start: 2019-07-08 | End: 2019-08-07 | Stop reason: SDUPTHER

## 2019-07-24 DIAGNOSIS — F41.9 ANXIETY: ICD-10-CM

## 2019-07-24 DIAGNOSIS — K70.30 ALCOHOLIC CIRRHOSIS, UNSPECIFIED WHETHER ASCITES PRESENT (HCC): ICD-10-CM

## 2019-07-26 RX ORDER — ALPRAZOLAM 0.5 MG/1
TABLET ORAL
Qty: 120 TABLET | Refills: 0 | OUTPATIENT
Start: 2019-07-26

## 2019-08-07 DIAGNOSIS — K70.30 ALCOHOLIC CIRRHOSIS, UNSPECIFIED WHETHER ASCITES PRESENT (HCC): ICD-10-CM

## 2019-08-07 DIAGNOSIS — F41.9 ANXIETY: ICD-10-CM

## 2019-08-08 DIAGNOSIS — K70.30 ALCOHOLIC CIRRHOSIS, UNSPECIFIED WHETHER ASCITES PRESENT (HCC): ICD-10-CM

## 2019-08-08 DIAGNOSIS — F41.9 ANXIETY: ICD-10-CM

## 2019-08-08 RX ORDER — ALPRAZOLAM 0.5 MG/1
TABLET ORAL
Qty: 120 TABLET | Refills: 0 | Status: SHIPPED | OUTPATIENT
Start: 2019-08-08 | End: 2019-09-08

## 2019-08-08 RX ORDER — ALPRAZOLAM 0.5 MG/1
TABLET ORAL
Qty: 120 TABLET | Refills: 0 | Status: SHIPPED | OUTPATIENT
Start: 2019-08-08 | End: 2019-08-08 | Stop reason: SDUPTHER

## 2019-08-08 NOTE — TELEPHONE ENCOUNTER
Camille Merino called requesting a refill on the following medications:  Requested Prescriptions     Pending Prescriptions Disp Refills    ALPRAZolam (XANAX) 0.5 MG tablet 120 tablet 0     [facility]   . pv      Date of last visit: 06/19/19  Date of next visit (if applicable): Visit date not found

## 2019-08-22 RX ORDER — CYCLOBENZAPRINE HCL 10 MG
TABLET ORAL
Qty: 60 TABLET | Refills: 1 | Status: SHIPPED | OUTPATIENT
Start: 2019-08-22 | End: 2019-10-07 | Stop reason: SDUPTHER

## 2019-08-30 DIAGNOSIS — F41.9 ANXIETY: ICD-10-CM

## 2019-08-30 DIAGNOSIS — K70.30 ALCOHOLIC CIRRHOSIS, UNSPECIFIED WHETHER ASCITES PRESENT (HCC): ICD-10-CM

## 2019-08-30 RX ORDER — ALPRAZOLAM 0.5 MG/1
TABLET ORAL
Qty: 120 TABLET | Refills: 0 | OUTPATIENT
Start: 2019-08-30

## 2019-08-30 NOTE — TELEPHONE ENCOUNTER
Last xanax refill was for #120 on 8/8/19 with the directions of 1-2 bid prn which should last through 9/7/19. This request was refused. Jackie at [address] is aware.

## 2019-09-06 DIAGNOSIS — F41.9 ANXIETY: ICD-10-CM

## 2019-09-06 DIAGNOSIS — K70.30 ALCOHOLIC CIRRHOSIS, UNSPECIFIED WHETHER ASCITES PRESENT (HCC): ICD-10-CM

## 2019-09-09 RX ORDER — ALPRAZOLAM 0.5 MG/1
TABLET ORAL
Qty: 120 TABLET | Refills: 0 | Status: SHIPPED | OUTPATIENT
Start: 2019-09-09 | End: 2019-10-08 | Stop reason: SDUPTHER

## 2019-10-08 ENCOUNTER — OFFICE VISIT (OUTPATIENT)
Dept: FAMILY MEDICINE CLINIC | Age: 54
End: 2019-10-08
Payer: COMMERCIAL

## 2019-10-08 VITALS
DIASTOLIC BLOOD PRESSURE: 72 MMHG | RESPIRATION RATE: 16 BRPM | TEMPERATURE: 98 F | SYSTOLIC BLOOD PRESSURE: 102 MMHG | WEIGHT: 240.6 LBS | BODY MASS INDEX: 38.83 KG/M2 | HEART RATE: 97 BPM

## 2019-10-08 DIAGNOSIS — K70.30 ALCOHOLIC CIRRHOSIS, UNSPECIFIED WHETHER ASCITES PRESENT (HCC): ICD-10-CM

## 2019-10-08 DIAGNOSIS — K70.31 ASCITES DUE TO ALCOHOLIC CIRRHOSIS (HCC): ICD-10-CM

## 2019-10-08 DIAGNOSIS — K21.00 GASTROESOPHAGEAL REFLUX DISEASE WITH ESOPHAGITIS: ICD-10-CM

## 2019-10-08 DIAGNOSIS — Z72.0 TOBACCO ABUSE: Primary | ICD-10-CM

## 2019-10-08 DIAGNOSIS — F41.9 ANXIETY: ICD-10-CM

## 2019-10-08 DIAGNOSIS — G47.00 INSOMNIA, UNSPECIFIED TYPE: ICD-10-CM

## 2019-10-08 PROCEDURE — 3017F COLORECTAL CA SCREEN DOC REV: CPT | Performed by: NURSE PRACTITIONER

## 2019-10-08 PROCEDURE — G8417 CALC BMI ABV UP PARAM F/U: HCPCS | Performed by: NURSE PRACTITIONER

## 2019-10-08 PROCEDURE — 4004F PT TOBACCO SCREEN RCVD TLK: CPT | Performed by: NURSE PRACTITIONER

## 2019-10-08 PROCEDURE — G8482 FLU IMMUNIZE ORDER/ADMIN: HCPCS | Performed by: NURSE PRACTITIONER

## 2019-10-08 PROCEDURE — G8598 ASA/ANTIPLAT THER USED: HCPCS | Performed by: NURSE PRACTITIONER

## 2019-10-08 PROCEDURE — 99214 OFFICE O/P EST MOD 30 MIN: CPT | Performed by: NURSE PRACTITIONER

## 2019-10-08 PROCEDURE — G8427 DOCREV CUR MEDS BY ELIG CLIN: HCPCS | Performed by: NURSE PRACTITIONER

## 2019-10-08 RX ORDER — CYCLOBENZAPRINE HCL 10 MG
TABLET ORAL
Qty: 60 TABLET | Refills: 1 | Status: SHIPPED | OUTPATIENT
Start: 2019-10-08 | End: 2019-11-24 | Stop reason: SDUPTHER

## 2019-10-08 RX ORDER — ALPRAZOLAM 0.5 MG/1
TABLET ORAL
Qty: 120 TABLET | Refills: 0 | Status: SHIPPED | OUTPATIENT
Start: 2019-10-08 | End: 2019-10-21

## 2019-10-10 ASSESSMENT — PAIN - FUNCTIONAL ASSESSMENT: PAIN_FUNCTIONAL_ASSESSMENT: ACTIVITIES ARE NOT PREVENTED

## 2019-10-10 ASSESSMENT — PAIN DESCRIPTION - FREQUENCY: FREQUENCY: INTERMITTENT

## 2019-10-10 ASSESSMENT — PAIN DESCRIPTION - DIRECTION: RADIATING_TOWARDS: MID CHEST

## 2019-10-10 ASSESSMENT — PAIN DESCRIPTION - ONSET: ONSET: SUDDEN

## 2019-10-10 ASSESSMENT — PAIN DESCRIPTION - PAIN TYPE: TYPE: ACUTE PAIN

## 2019-10-10 ASSESSMENT — PAIN SCALES - GENERAL: PAINLEVEL_OUTOF10: 5

## 2019-10-10 ASSESSMENT — PAIN DESCRIPTION - ORIENTATION: ORIENTATION: MID

## 2019-10-10 ASSESSMENT — PAIN DESCRIPTION - LOCATION: LOCATION: CHEST

## 2019-10-10 ASSESSMENT — PAIN DESCRIPTION - PROGRESSION: CLINICAL_PROGRESSION: NOT CHANGED

## 2019-10-10 ASSESSMENT — PAIN DESCRIPTION - DESCRIPTORS: DESCRIPTORS: ACHING;SHARP

## 2019-10-11 ENCOUNTER — APPOINTMENT (OUTPATIENT)
Dept: GENERAL RADIOLOGY | Age: 54
DRG: 287 | End: 2019-10-11
Payer: COMMERCIAL

## 2019-10-11 ENCOUNTER — HOSPITAL ENCOUNTER (INPATIENT)
Age: 54
LOS: 1 days | Discharge: HOME | DRG: 287 | End: 2019-10-14
Attending: INTERNAL MEDICINE | Admitting: INTERNAL MEDICINE
Payer: COMMERCIAL

## 2019-10-11 ENCOUNTER — APPOINTMENT (OUTPATIENT)
Dept: CT IMAGING | Age: 54
DRG: 287 | End: 2019-10-11
Payer: COMMERCIAL

## 2019-10-11 DIAGNOSIS — R20.2 ARM PARESTHESIA, LEFT: ICD-10-CM

## 2019-10-11 DIAGNOSIS — R42 DIZZINESS: Primary | ICD-10-CM

## 2019-10-11 DIAGNOSIS — R07.9 CHEST PAIN, UNSPECIFIED TYPE: ICD-10-CM

## 2019-10-11 PROBLEM — F11.10 OPIATE ABUSE, EPISODIC (HCC): Status: ACTIVE | Noted: 2019-10-11

## 2019-10-11 PROBLEM — R79.1 ELEVATED INR: Status: ACTIVE | Noted: 2019-10-11

## 2019-10-11 PROBLEM — F12.10 MARIJUANA ABUSE: Status: ACTIVE | Noted: 2019-10-11

## 2019-10-11 LAB
ALBUMIN SERPL-MCNC: 3.3 G/DL (ref 3.5–5.1)
ALP BLD-CCNC: 69 U/L (ref 38–126)
ALT SERPL-CCNC: 14 U/L (ref 11–66)
AMMONIA: 52 UMOL/L (ref 11–60)
AMPHETAMINE+METHAMPHETAMINE URINE SCREEN: NEGATIVE
ANION GAP SERPL CALCULATED.3IONS-SCNC: 13 MEQ/L (ref 8–16)
AST SERPL-CCNC: 19 U/L (ref 5–40)
BARBITURATE QUANTITATIVE URINE: NEGATIVE
BASOPHILS # BLD: 0.8 %
BASOPHILS ABSOLUTE: 0.1 THOU/MM3 (ref 0–0.1)
BENZODIAZEPINE QUANTITATIVE URINE: POSITIVE
BILIRUB SERPL-MCNC: 1 MG/DL (ref 0.3–1.2)
BUN BLDV-MCNC: 11 MG/DL (ref 7–22)
CALCIUM SERPL-MCNC: 9.3 MG/DL (ref 8.5–10.5)
CANNABINOID QUANTITATIVE URINE: POSITIVE
CHLORIDE BLD-SCNC: 105 MEQ/L (ref 98–111)
CO2: 19 MEQ/L (ref 23–33)
COCAINE METABOLITE QUANTITATIVE URINE: NEGATIVE
CREAT SERPL-MCNC: 0.7 MG/DL (ref 0.4–1.2)
EKG ATRIAL RATE: 92 BPM
EKG P AXIS: 49 DEGREES
EKG P-R INTERVAL: 140 MS
EKG Q-T INTERVAL: 362 MS
EKG QRS DURATION: 88 MS
EKG QTC CALCULATION (BAZETT): 447 MS
EKG R AXIS: 51 DEGREES
EKG T AXIS: 33 DEGREES
EKG VENTRICULAR RATE: 92 BPM
EOSINOPHIL # BLD: 5.6 %
EOSINOPHILS ABSOLUTE: 0.4 THOU/MM3 (ref 0–0.4)
ERYTHROCYTE [DISTWIDTH] IN BLOOD BY AUTOMATED COUNT: 13.9 % (ref 11.5–14.5)
ERYTHROCYTE [DISTWIDTH] IN BLOOD BY AUTOMATED COUNT: 49.6 FL (ref 35–45)
ETHYL ALCOHOL, SERUM: < 0.01 %
GFR SERPL CREATININE-BSD FRML MDRD: > 90 ML/MIN/1.73M2
GLUCOSE BLD-MCNC: 81 MG/DL (ref 70–108)
HCT VFR BLD CALC: 39.8 % (ref 42–52)
HEMOGLOBIN: 13 GM/DL (ref 14–18)
IMMATURE GRANS (ABS): 0.02 THOU/MM3 (ref 0–0.07)
IMMATURE GRANULOCYTES: 0.3 %
INR BLD: 1.24 (ref 0.85–1.13)
LYMPHOCYTES # BLD: 29.5 %
LYMPHOCYTES ABSOLUTE: 1.9 THOU/MM3 (ref 1–4.8)
MCH RBC QN AUTO: 31.7 PG (ref 26–33)
MCHC RBC AUTO-ENTMCNC: 32.7 GM/DL (ref 32.2–35.5)
MCV RBC AUTO: 97.1 FL (ref 80–94)
MONOCYTES # BLD: 7.8 %
MONOCYTES ABSOLUTE: 0.5 THOU/MM3 (ref 0.4–1.3)
NUCLEATED RED BLOOD CELLS: 0 /100 WBC
OPIATES, URINE: POSITIVE
OSMOLALITY CALCULATION: 272.2 MOSMOL/KG (ref 275–300)
OXYCODONE: POSITIVE
PHENCYCLIDINE QUANTITATIVE URINE: NEGATIVE
PLATELET # BLD: 169 THOU/MM3 (ref 130–400)
PMV BLD AUTO: 10.4 FL (ref 9.4–12.4)
POTASSIUM SERPL-SCNC: 4.3 MEQ/L (ref 3.5–5.2)
RBC # BLD: 4.1 MILL/MM3 (ref 4.7–6.1)
SEG NEUTROPHILS: 56 %
SEGMENTED NEUTROPHILS ABSOLUTE COUNT: 3.6 THOU/MM3 (ref 1.8–7.7)
SODIUM BLD-SCNC: 137 MEQ/L (ref 135–145)
TOTAL PROTEIN: 7.7 G/DL (ref 6.1–8)
TROPONIN T: < 0.01 NG/ML
WBC # BLD: 6.5 THOU/MM3 (ref 4.8–10.8)

## 2019-10-11 PROCEDURE — 99243 OFF/OP CNSLTJ NEW/EST LOW 30: CPT | Performed by: INTERNAL MEDICINE

## 2019-10-11 PROCEDURE — 6370000000 HC RX 637 (ALT 250 FOR IP): Performed by: INTERNAL MEDICINE

## 2019-10-11 PROCEDURE — G0480 DRUG TEST DEF 1-7 CLASSES: HCPCS

## 2019-10-11 PROCEDURE — 82140 ASSAY OF AMMONIA: CPT

## 2019-10-11 PROCEDURE — 80053 COMPREHEN METABOLIC PANEL: CPT

## 2019-10-11 PROCEDURE — 99223 1ST HOSP IP/OBS HIGH 75: CPT | Performed by: INTERNAL MEDICINE

## 2019-10-11 PROCEDURE — 85025 COMPLETE CBC W/AUTO DIFF WBC: CPT

## 2019-10-11 PROCEDURE — 99285 EMERGENCY DEPT VISIT HI MDM: CPT

## 2019-10-11 PROCEDURE — 6370000000 HC RX 637 (ALT 250 FOR IP): Performed by: PHYSICIAN ASSISTANT

## 2019-10-11 PROCEDURE — 71046 X-RAY EXAM CHEST 2 VIEWS: CPT

## 2019-10-11 PROCEDURE — 84484 ASSAY OF TROPONIN QUANT: CPT

## 2019-10-11 PROCEDURE — G0378 HOSPITAL OBSERVATION PER HR: HCPCS

## 2019-10-11 PROCEDURE — 2580000003 HC RX 258: Performed by: INTERNAL MEDICINE

## 2019-10-11 PROCEDURE — 36415 COLL VENOUS BLD VENIPUNCTURE: CPT

## 2019-10-11 PROCEDURE — 70450 CT HEAD/BRAIN W/O DYE: CPT

## 2019-10-11 PROCEDURE — 80307 DRUG TEST PRSMV CHEM ANLYZR: CPT

## 2019-10-11 PROCEDURE — 85610 PROTHROMBIN TIME: CPT

## 2019-10-11 PROCEDURE — 2709999900 HC NON-CHARGEABLE SUPPLY

## 2019-10-11 PROCEDURE — 93005 ELECTROCARDIOGRAM TRACING: CPT | Performed by: PHYSICIAN ASSISTANT

## 2019-10-11 RX ORDER — ALPRAZOLAM 0.5 MG/1
0.5 TABLET ORAL 2 TIMES DAILY PRN
Status: DISCONTINUED | OUTPATIENT
Start: 2019-10-11 | End: 2019-10-13

## 2019-10-11 RX ORDER — SODIUM CHLORIDE 0.9 % (FLUSH) 0.9 %
10 SYRINGE (ML) INJECTION PRN
Status: DISCONTINUED | OUTPATIENT
Start: 2019-10-11 | End: 2019-10-14

## 2019-10-11 RX ORDER — POLYETHYLENE GLYCOL 3350 17 G/17G
17 POWDER, FOR SOLUTION ORAL DAILY
Status: DISCONTINUED | OUTPATIENT
Start: 2019-10-11 | End: 2019-10-14 | Stop reason: HOSPADM

## 2019-10-11 RX ORDER — GABAPENTIN 300 MG/1
300 CAPSULE ORAL 2 TIMES DAILY
Status: DISCONTINUED | OUTPATIENT
Start: 2019-10-11 | End: 2019-10-14 | Stop reason: HOSPADM

## 2019-10-11 RX ORDER — SODIUM CHLORIDE 0.9 % (FLUSH) 0.9 %
10 SYRINGE (ML) INJECTION EVERY 12 HOURS SCHEDULED
Status: DISCONTINUED | OUTPATIENT
Start: 2019-10-11 | End: 2019-10-14

## 2019-10-11 RX ORDER — ONDANSETRON 2 MG/ML
4 INJECTION INTRAMUSCULAR; INTRAVENOUS EVERY 6 HOURS PRN
Status: DISCONTINUED | OUTPATIENT
Start: 2019-10-11 | End: 2019-10-14 | Stop reason: HOSPADM

## 2019-10-11 RX ORDER — ASPIRIN 81 MG/1
81 TABLET, CHEWABLE ORAL DAILY
Status: DISCONTINUED | OUTPATIENT
Start: 2019-10-12 | End: 2019-10-14 | Stop reason: HOSPADM

## 2019-10-11 RX ORDER — FAMOTIDINE 20 MG/1
40 TABLET, FILM COATED ORAL NIGHTLY
Status: DISCONTINUED | OUTPATIENT
Start: 2019-10-11 | End: 2019-10-14 | Stop reason: HOSPADM

## 2019-10-11 RX ORDER — PHYTONADIONE 5 MG/1
5 TABLET ORAL ONCE
Status: COMPLETED | OUTPATIENT
Start: 2019-10-11 | End: 2019-10-12

## 2019-10-11 RX ORDER — ACETAMINOPHEN 325 MG/1
650 TABLET ORAL EVERY 4 HOURS PRN
Status: DISCONTINUED | OUTPATIENT
Start: 2019-10-11 | End: 2019-10-12

## 2019-10-11 RX ORDER — TRAMADOL HYDROCHLORIDE 50 MG/1
50 TABLET ORAL ONCE
Status: COMPLETED | OUTPATIENT
Start: 2019-10-11 | End: 2019-10-11

## 2019-10-11 RX ORDER — SPIRONOLACTONE 25 MG/1
100 TABLET ORAL DAILY
Status: DISCONTINUED | OUTPATIENT
Start: 2019-10-11 | End: 2019-10-14 | Stop reason: HOSPADM

## 2019-10-11 RX ORDER — POTASSIUM CHLORIDE 7.45 MG/ML
10 INJECTION INTRAVENOUS PRN
Status: DISCONTINUED | OUTPATIENT
Start: 2019-10-11 | End: 2019-10-14 | Stop reason: HOSPADM

## 2019-10-11 RX ORDER — POTASSIUM CHLORIDE 20 MEQ/1
40 TABLET, EXTENDED RELEASE ORAL PRN
Status: DISCONTINUED | OUTPATIENT
Start: 2019-10-11 | End: 2019-10-14 | Stop reason: HOSPADM

## 2019-10-11 RX ADMIN — GABAPENTIN 300 MG: 300 CAPSULE ORAL at 22:27

## 2019-10-11 RX ADMIN — Medication 10 ML: at 22:28

## 2019-10-11 RX ADMIN — ALPRAZOLAM 0.5 MG: 0.5 TABLET ORAL at 22:39

## 2019-10-11 RX ADMIN — TRAMADOL HYDROCHLORIDE 50 MG: 50 TABLET, FILM COATED ORAL at 14:13

## 2019-10-11 RX ADMIN — FAMOTIDINE 40 MG: 20 TABLET ORAL at 22:54

## 2019-10-11 RX ADMIN — SPIRONOLACTONE 100 MG: 25 TABLET ORAL at 22:28

## 2019-10-11 ASSESSMENT — PAIN DESCRIPTION - ORIENTATION
ORIENTATION: MID
ORIENTATION: LEFT;UPPER
ORIENTATION: MID

## 2019-10-11 ASSESSMENT — ENCOUNTER SYMPTOMS
ABDOMINAL PAIN: 1
VOMITING: 0
COLOR CHANGE: 1
EYE PAIN: 0
SHORTNESS OF BREATH: 1
RHINORRHEA: 0
CONSTIPATION: 1
PHOTOPHOBIA: 0
COUGH: 0
SINUS PRESSURE: 0
DIARRHEA: 0
SORE THROAT: 0
NAUSEA: 0

## 2019-10-11 ASSESSMENT — PAIN DESCRIPTION - DESCRIPTORS
DESCRIPTORS: SHARP
DESCRIPTORS: NUMBNESS;TINGLING
DESCRIPTORS: SHARP

## 2019-10-11 ASSESSMENT — PAIN DESCRIPTION - DIRECTION
RADIATING_TOWARDS: MID CHEST
RADIATING_TOWARDS: MID CHEST

## 2019-10-11 ASSESSMENT — PAIN DESCRIPTION - PAIN TYPE
TYPE: ACUTE PAIN

## 2019-10-11 ASSESSMENT — PAIN SCALES - GENERAL
PAINLEVEL_OUTOF10: 8
PAINLEVEL_OUTOF10: 0
PAINLEVEL_OUTOF10: 5
PAINLEVEL_OUTOF10: 8
PAINLEVEL_OUTOF10: 5

## 2019-10-11 ASSESSMENT — PAIN DESCRIPTION - ONSET
ONSET: SUDDEN
ONSET: SUDDEN

## 2019-10-11 ASSESSMENT — PAIN DESCRIPTION - LOCATION
LOCATION: CHEST
LOCATION: ARM
LOCATION: CHEST

## 2019-10-11 ASSESSMENT — PAIN DESCRIPTION - FREQUENCY
FREQUENCY: CONTINUOUS
FREQUENCY: CONTINUOUS
FREQUENCY: INTERMITTENT

## 2019-10-11 ASSESSMENT — PAIN - FUNCTIONAL ASSESSMENT
PAIN_FUNCTIONAL_ASSESSMENT: ACTIVITIES ARE NOT PREVENTED
PAIN_FUNCTIONAL_ASSESSMENT: ACTIVITIES ARE NOT PREVENTED

## 2019-10-11 ASSESSMENT — PAIN DESCRIPTION - PROGRESSION
CLINICAL_PROGRESSION: GRADUALLY WORSENING
CLINICAL_PROGRESSION: GRADUALLY WORSENING

## 2019-10-12 LAB
ALBUMIN SERPL-MCNC: 3.1 G/DL (ref 3.5–5.1)
ALP BLD-CCNC: 63 U/L (ref 38–126)
ALT SERPL-CCNC: 12 U/L (ref 11–66)
ANION GAP SERPL CALCULATED.3IONS-SCNC: 10 MEQ/L (ref 8–16)
AST SERPL-CCNC: 17 U/L (ref 5–40)
AVERAGE GLUCOSE: 90 MG/DL (ref 70–126)
BILIRUB SERPL-MCNC: 0.8 MG/DL (ref 0.3–1.2)
BILIRUBIN DIRECT: 0.3 MG/DL (ref 0–0.3)
BUN BLDV-MCNC: 12 MG/DL (ref 7–22)
CALCIUM SERPL-MCNC: 9 MG/DL (ref 8.5–10.5)
CHLORIDE BLD-SCNC: 109 MEQ/L (ref 98–111)
CHOLESTEROL, TOTAL: 101 MG/DL (ref 100–199)
CO2: 20 MEQ/L (ref 23–33)
CREAT SERPL-MCNC: 0.7 MG/DL (ref 0.4–1.2)
EKG ATRIAL RATE: 65 BPM
EKG P AXIS: 33 DEGREES
EKG P-R INTERVAL: 126 MS
EKG Q-T INTERVAL: 404 MS
EKG QRS DURATION: 92 MS
EKG QTC CALCULATION (BAZETT): 420 MS
EKG R AXIS: 49 DEGREES
EKG T AXIS: 45 DEGREES
EKG VENTRICULAR RATE: 65 BPM
ERYTHROCYTE [DISTWIDTH] IN BLOOD BY AUTOMATED COUNT: 14 % (ref 11.5–14.5)
ERYTHROCYTE [DISTWIDTH] IN BLOOD BY AUTOMATED COUNT: 50.5 FL (ref 35–45)
GFR SERPL CREATININE-BSD FRML MDRD: > 90 ML/MIN/1.73M2
GLUCOSE BLD-MCNC: 93 MG/DL (ref 70–108)
HBA1C MFR BLD: 5 % (ref 4.4–6.4)
HCT VFR BLD CALC: 37.5 % (ref 42–52)
HDLC SERPL-MCNC: 31 MG/DL
HEMOGLOBIN: 12.3 GM/DL (ref 14–18)
LDL CHOLESTEROL CALCULATED: 53 MG/DL
LV EF: 53 %
LVEF MODALITY: NORMAL
MAGNESIUM: 2.1 MG/DL (ref 1.6–2.4)
MCH RBC QN AUTO: 32 PG (ref 26–33)
MCHC RBC AUTO-ENTMCNC: 32.8 GM/DL (ref 32.2–35.5)
MCV RBC AUTO: 97.7 FL (ref 80–94)
PLATELET # BLD: 159 THOU/MM3 (ref 130–400)
PMV BLD AUTO: 10 FL (ref 9.4–12.4)
POTASSIUM REFLEX MAGNESIUM: 4.3 MEQ/L (ref 3.5–5.2)
RBC # BLD: 3.84 MILL/MM3 (ref 4.7–6.1)
SODIUM BLD-SCNC: 139 MEQ/L (ref 135–145)
TOTAL PROTEIN: 7.2 G/DL (ref 6.1–8)
TRIGL SERPL-MCNC: 86 MG/DL (ref 0–199)
WBC # BLD: 5.9 THOU/MM3 (ref 4.8–10.8)

## 2019-10-12 PROCEDURE — 83036 HEMOGLOBIN GLYCOSYLATED A1C: CPT

## 2019-10-12 PROCEDURE — 6360000002 HC RX W HCPCS

## 2019-10-12 PROCEDURE — 99232 SBSQ HOSP IP/OBS MODERATE 35: CPT | Performed by: PHYSICIAN ASSISTANT

## 2019-10-12 PROCEDURE — 80061 LIPID PANEL: CPT

## 2019-10-12 PROCEDURE — 6370000000 HC RX 637 (ALT 250 FOR IP): Performed by: INTERNAL MEDICINE

## 2019-10-12 PROCEDURE — 93306 TTE W/DOPPLER COMPLETE: CPT

## 2019-10-12 PROCEDURE — 80076 HEPATIC FUNCTION PANEL: CPT

## 2019-10-12 PROCEDURE — 80048 BASIC METABOLIC PNL TOTAL CA: CPT

## 2019-10-12 PROCEDURE — A9500 TC99M SESTAMIBI: HCPCS | Performed by: INTERNAL MEDICINE

## 2019-10-12 PROCEDURE — 2709999900 HC NON-CHARGEABLE SUPPLY

## 2019-10-12 PROCEDURE — 78452 HT MUSCLE IMAGE SPECT MULT: CPT

## 2019-10-12 PROCEDURE — 93017 CV STRESS TEST TRACING ONLY: CPT | Performed by: INTERNAL MEDICINE

## 2019-10-12 PROCEDURE — 94760 N-INVAS EAR/PLS OXIMETRY 1: CPT

## 2019-10-12 PROCEDURE — 83735 ASSAY OF MAGNESIUM: CPT

## 2019-10-12 PROCEDURE — 6370000000 HC RX 637 (ALT 250 FOR IP): Performed by: PHYSICIAN ASSISTANT

## 2019-10-12 PROCEDURE — 2580000003 HC RX 258: Performed by: INTERNAL MEDICINE

## 2019-10-12 PROCEDURE — 3430000000 HC RX DIAGNOSTIC RADIOPHARMACEUTICAL: Performed by: INTERNAL MEDICINE

## 2019-10-12 PROCEDURE — G0378 HOSPITAL OBSERVATION PER HR: HCPCS

## 2019-10-12 PROCEDURE — 99225 PR SBSQ OBSERVATION CARE/DAY 25 MINUTES: CPT | Performed by: INTERNAL MEDICINE

## 2019-10-12 PROCEDURE — 90686 IIV4 VACC NO PRSV 0.5 ML IM: CPT

## 2019-10-12 PROCEDURE — 85027 COMPLETE CBC AUTOMATED: CPT

## 2019-10-12 PROCEDURE — 93005 ELECTROCARDIOGRAM TRACING: CPT | Performed by: INTERNAL MEDICINE

## 2019-10-12 PROCEDURE — 36415 COLL VENOUS BLD VENIPUNCTURE: CPT

## 2019-10-12 PROCEDURE — G0008 ADMIN INFLUENZA VIRUS VAC: HCPCS

## 2019-10-12 RX ORDER — FUROSEMIDE 20 MG/1
20 TABLET ORAL DAILY
Status: DISCONTINUED | OUTPATIENT
Start: 2019-10-12 | End: 2019-10-14 | Stop reason: HOSPADM

## 2019-10-12 RX ORDER — ACETAMINOPHEN 325 MG/1
650 TABLET ORAL EVERY 4 HOURS PRN
Status: DISCONTINUED | OUTPATIENT
Start: 2019-10-12 | End: 2019-10-14 | Stop reason: HOSPADM

## 2019-10-12 RX ORDER — LANOLIN ALCOHOL/MO/W.PET/CERES
6 CREAM (GRAM) TOPICAL NIGHTLY PRN
Status: DISCONTINUED | OUTPATIENT
Start: 2019-10-12 | End: 2019-10-14 | Stop reason: HOSPADM

## 2019-10-12 RX ADMIN — SPIRONOLACTONE 100 MG: 25 TABLET ORAL at 08:53

## 2019-10-12 RX ADMIN — Medication 9 MILLICURIE: at 09:23

## 2019-10-12 RX ADMIN — FAMOTIDINE 40 MG: 20 TABLET ORAL at 19:59

## 2019-10-12 RX ADMIN — POLYETHYLENE GLYCOL 3350 17 G: 17 POWDER, FOR SOLUTION ORAL at 08:53

## 2019-10-12 RX ADMIN — Medication 29 MILLICURIE: at 10:20

## 2019-10-12 RX ADMIN — ALPRAZOLAM 0.5 MG: 0.5 TABLET ORAL at 15:49

## 2019-10-12 RX ADMIN — Medication 6 MG: at 19:53

## 2019-10-12 RX ADMIN — Medication 10 ML: at 08:54

## 2019-10-12 RX ADMIN — FUROSEMIDE 20 MG: 20 TABLET ORAL at 08:53

## 2019-10-12 RX ADMIN — ASPIRIN 81 MG 81 MG: 81 TABLET ORAL at 08:53

## 2019-10-12 RX ADMIN — Medication 10 ML: at 19:53

## 2019-10-12 RX ADMIN — GABAPENTIN 300 MG: 300 CAPSULE ORAL at 19:53

## 2019-10-12 RX ADMIN — ACETAMINOPHEN 650 MG: 325 TABLET ORAL at 05:40

## 2019-10-12 RX ADMIN — ALPRAZOLAM 0.5 MG: 0.5 TABLET ORAL at 08:52

## 2019-10-12 RX ADMIN — PHYTONADIONE 5 MG: 5 TABLET ORAL at 03:40

## 2019-10-12 RX ADMIN — GABAPENTIN 300 MG: 300 CAPSULE ORAL at 08:53

## 2019-10-12 RX ADMIN — ACETAMINOPHEN 650 MG: 325 TABLET ORAL at 11:45

## 2019-10-12 RX ADMIN — INFLUENZA A VIRUS A/BRISBANE/02/2018 IVR-190 (H1N1) ANTIGEN (PROPIOLACTONE INACTIVATED), INFLUENZA A VIRUS A/KANSAS/14/2017 X-327 (H3N2) ANTIGEN (PROPIOLACTONE INACTIVATED), INFLUENZA B VIRUS B/MARYLAND/15/2016 ANTIGEN (PROPIOLACTONE INACTIVATED), INFLUENZA B VIRUS B/PHUKET/3073/2013 BVR-1B ANTIGEN (PROPIOLACTONE INACTIVATED) 0.5 ML: 15; 15; 15; 15 INJECTION, SUSPENSION INTRAMUSCULAR at 20:00

## 2019-10-12 ASSESSMENT — PAIN DESCRIPTION - FREQUENCY
FREQUENCY: INTERMITTENT
FREQUENCY: INTERMITTENT

## 2019-10-12 ASSESSMENT — PAIN DESCRIPTION - ORIENTATION
ORIENTATION: ANTERIOR
ORIENTATION: ANTERIOR

## 2019-10-12 ASSESSMENT — PAIN SCALES - GENERAL
PAINLEVEL_OUTOF10: 6
PAINLEVEL_OUTOF10: 2
PAINLEVEL_OUTOF10: 0

## 2019-10-12 ASSESSMENT — PAIN DESCRIPTION - ONSET
ONSET: ON-GOING
ONSET: ON-GOING

## 2019-10-12 ASSESSMENT — PAIN DESCRIPTION - LOCATION
LOCATION: HEAD
LOCATION: HEAD

## 2019-10-12 ASSESSMENT — PAIN DESCRIPTION - PAIN TYPE
TYPE: ACUTE PAIN
TYPE: ACUTE PAIN

## 2019-10-12 ASSESSMENT — PAIN - FUNCTIONAL ASSESSMENT
PAIN_FUNCTIONAL_ASSESSMENT: PREVENTS OR INTERFERES SOME ACTIVE ACTIVITIES AND ADLS
PAIN_FUNCTIONAL_ASSESSMENT: PREVENTS OR INTERFERES SOME ACTIVE ACTIVITIES AND ADLS

## 2019-10-12 ASSESSMENT — PAIN DESCRIPTION - DESCRIPTORS
DESCRIPTORS: HEADACHE
DESCRIPTORS: HEADACHE

## 2019-10-12 ASSESSMENT — PAIN DESCRIPTION - DIRECTION
RADIATING_TOWARDS: HEAD
RADIATING_TOWARDS: HEADACHE

## 2019-10-12 ASSESSMENT — PAIN DESCRIPTION - PROGRESSION
CLINICAL_PROGRESSION: NOT CHANGED
CLINICAL_PROGRESSION: NOT CHANGED

## 2019-10-13 PROCEDURE — 6370000000 HC RX 637 (ALT 250 FOR IP): Performed by: INTERNAL MEDICINE

## 2019-10-13 PROCEDURE — 2580000003 HC RX 258: Performed by: INTERNAL MEDICINE

## 2019-10-13 PROCEDURE — 94760 N-INVAS EAR/PLS OXIMETRY 1: CPT

## 2019-10-13 PROCEDURE — 1200000003 HC TELEMETRY R&B

## 2019-10-13 PROCEDURE — 6370000000 HC RX 637 (ALT 250 FOR IP): Performed by: PHYSICIAN ASSISTANT

## 2019-10-13 PROCEDURE — 99232 SBSQ HOSP IP/OBS MODERATE 35: CPT | Performed by: PHYSICIAN ASSISTANT

## 2019-10-13 PROCEDURE — 99232 SBSQ HOSP IP/OBS MODERATE 35: CPT | Performed by: INTERNAL MEDICINE

## 2019-10-13 RX ORDER — NITROGLYCERIN 0.4 MG/1
0.4 TABLET SUBLINGUAL EVERY 5 MIN PRN
Status: DISCONTINUED | OUTPATIENT
Start: 2019-10-13 | End: 2019-10-14 | Stop reason: HOSPADM

## 2019-10-13 RX ORDER — ALPRAZOLAM 0.5 MG/1
1 TABLET ORAL 2 TIMES DAILY PRN
Status: DISCONTINUED | OUTPATIENT
Start: 2019-10-13 | End: 2019-10-14 | Stop reason: HOSPADM

## 2019-10-13 RX ADMIN — ALPRAZOLAM 1 MG: 0.5 TABLET ORAL at 20:41

## 2019-10-13 RX ADMIN — ASPIRIN 81 MG 81 MG: 81 TABLET ORAL at 09:50

## 2019-10-13 RX ADMIN — ACETAMINOPHEN 650 MG: 325 TABLET ORAL at 00:39

## 2019-10-13 RX ADMIN — Medication 10 ML: at 09:48

## 2019-10-13 RX ADMIN — Medication 10 ML: at 20:42

## 2019-10-13 RX ADMIN — GABAPENTIN 300 MG: 300 CAPSULE ORAL at 09:48

## 2019-10-13 RX ADMIN — FUROSEMIDE 20 MG: 20 TABLET ORAL at 09:48

## 2019-10-13 RX ADMIN — ACETAMINOPHEN 650 MG: 325 TABLET ORAL at 20:33

## 2019-10-13 RX ADMIN — ALPRAZOLAM 1 MG: 0.5 TABLET ORAL at 09:49

## 2019-10-13 RX ADMIN — Medication 6 MG: at 20:33

## 2019-10-13 RX ADMIN — GABAPENTIN 300 MG: 300 CAPSULE ORAL at 20:33

## 2019-10-13 RX ADMIN — SPIRONOLACTONE 100 MG: 25 TABLET ORAL at 09:48

## 2019-10-13 RX ADMIN — FAMOTIDINE 40 MG: 20 TABLET ORAL at 20:41

## 2019-10-13 ASSESSMENT — PAIN DESCRIPTION - LOCATION
LOCATION: HEAD
LOCATION: CHEST
LOCATION: HEAD

## 2019-10-13 ASSESSMENT — PAIN SCALES - GENERAL
PAINLEVEL_OUTOF10: 4
PAINLEVEL_OUTOF10: 4
PAINLEVEL_OUTOF10: 0
PAINLEVEL_OUTOF10: 4
PAINLEVEL_OUTOF10: 5
PAINLEVEL_OUTOF10: 8
PAINLEVEL_OUTOF10: 4

## 2019-10-13 ASSESSMENT — PAIN DESCRIPTION - DESCRIPTORS
DESCRIPTORS: HEADACHE
DESCRIPTORS: ACHING
DESCRIPTORS: HEADACHE

## 2019-10-13 ASSESSMENT — PAIN DESCRIPTION - PROGRESSION

## 2019-10-13 ASSESSMENT — PAIN DESCRIPTION - DIRECTION
RADIATING_TOWARDS: HEAD

## 2019-10-13 ASSESSMENT — PAIN DESCRIPTION - PAIN TYPE
TYPE: ACUTE PAIN

## 2019-10-13 ASSESSMENT — PAIN DESCRIPTION - FREQUENCY
FREQUENCY: CONTINUOUS

## 2019-10-13 ASSESSMENT — PAIN DESCRIPTION - ORIENTATION
ORIENTATION: ANTERIOR
ORIENTATION: MID
ORIENTATION: MID
ORIENTATION: ANTERIOR
ORIENTATION: MID
ORIENTATION: ANTERIOR
ORIENTATION: ANTERIOR

## 2019-10-13 ASSESSMENT — PAIN DESCRIPTION - ONSET
ONSET: ON-GOING

## 2019-10-13 ASSESSMENT — PAIN - FUNCTIONAL ASSESSMENT
PAIN_FUNCTIONAL_ASSESSMENT: ACTIVITIES ARE NOT PREVENTED

## 2019-10-14 ENCOUNTER — TELEPHONE (OUTPATIENT)
Dept: FAMILY MEDICINE CLINIC | Age: 54
End: 2019-10-14

## 2019-10-14 ENCOUNTER — APPOINTMENT (OUTPATIENT)
Dept: CARDIAC CATH/INVASIVE PROCEDURES | Age: 54
DRG: 287 | End: 2019-10-14
Payer: COMMERCIAL

## 2019-10-14 VITALS
HEIGHT: 67 IN | RESPIRATION RATE: 16 BRPM | OXYGEN SATURATION: 94 % | BODY MASS INDEX: 37.98 KG/M2 | TEMPERATURE: 97.7 F | WEIGHT: 242 LBS | DIASTOLIC BLOOD PRESSURE: 78 MMHG | SYSTOLIC BLOOD PRESSURE: 112 MMHG | HEART RATE: 75 BPM

## 2019-10-14 LAB
ABO: NORMAL
ANION GAP SERPL CALCULATED.3IONS-SCNC: 14 MEQ/L (ref 8–16)
ANTIBODY SCREEN: NORMAL
APTT: 34.9 SECONDS (ref 22–38)
BUN BLDV-MCNC: 11 MG/DL (ref 7–22)
CALCIUM SERPL-MCNC: 9 MG/DL (ref 8.5–10.5)
CHLORIDE BLD-SCNC: 104 MEQ/L (ref 98–111)
CO2: 20 MEQ/L (ref 23–33)
CREAT SERPL-MCNC: 0.8 MG/DL (ref 0.4–1.2)
ERYTHROCYTE [DISTWIDTH] IN BLOOD BY AUTOMATED COUNT: 13.9 % (ref 11.5–14.5)
ERYTHROCYTE [DISTWIDTH] IN BLOOD BY AUTOMATED COUNT: 49.1 FL (ref 35–45)
GFR SERPL CREATININE-BSD FRML MDRD: > 90 ML/MIN/1.73M2
GLUCOSE BLD-MCNC: 114 MG/DL (ref 70–108)
HCT VFR BLD CALC: 38 % (ref 42–52)
HEMOGLOBIN: 12.5 GM/DL (ref 14–18)
INR BLD: 1.13 (ref 0.85–1.13)
MCH RBC QN AUTO: 31.9 PG (ref 26–33)
MCHC RBC AUTO-ENTMCNC: 32.9 GM/DL (ref 32.2–35.5)
MCV RBC AUTO: 96.9 FL (ref 80–94)
PLATELET # BLD: 164 THOU/MM3 (ref 130–400)
PMV BLD AUTO: 11 FL (ref 9.4–12.4)
POTASSIUM REFLEX MAGNESIUM: 4 MEQ/L (ref 3.5–5.2)
RBC # BLD: 3.92 MILL/MM3 (ref 4.7–6.1)
REASON FOR REJECTION: NORMAL
REJECTED TEST: NORMAL
RH FACTOR: NORMAL
SODIUM BLD-SCNC: 138 MEQ/L (ref 135–145)
WBC # BLD: 5.6 THOU/MM3 (ref 4.8–10.8)

## 2019-10-14 PROCEDURE — 93005 ELECTROCARDIOGRAM TRACING: CPT | Performed by: PHYSICIAN ASSISTANT

## 2019-10-14 PROCEDURE — 6370000000 HC RX 637 (ALT 250 FOR IP): Performed by: INTERNAL MEDICINE

## 2019-10-14 PROCEDURE — 93458 L HRT ARTERY/VENTRICLE ANGIO: CPT

## 2019-10-14 PROCEDURE — 86850 RBC ANTIBODY SCREEN: CPT

## 2019-10-14 PROCEDURE — 6360000002 HC RX W HCPCS

## 2019-10-14 PROCEDURE — B2111ZZ FLUOROSCOPY OF MULTIPLE CORONARY ARTERIES USING LOW OSMOLAR CONTRAST: ICD-10-PCS | Performed by: INTERNAL MEDICINE

## 2019-10-14 PROCEDURE — 80048 BASIC METABOLIC PNL TOTAL CA: CPT

## 2019-10-14 PROCEDURE — C1769 GUIDE WIRE: HCPCS

## 2019-10-14 PROCEDURE — 85730 THROMBOPLASTIN TIME PARTIAL: CPT

## 2019-10-14 PROCEDURE — 86901 BLOOD TYPING SEROLOGIC RH(D): CPT

## 2019-10-14 PROCEDURE — 2580000003 HC RX 258: Performed by: PHYSICIAN ASSISTANT

## 2019-10-14 PROCEDURE — 85027 COMPLETE CBC AUTOMATED: CPT

## 2019-10-14 PROCEDURE — 6360000004 HC RX CONTRAST MEDICATION: Performed by: INTERNAL MEDICINE

## 2019-10-14 PROCEDURE — 86900 BLOOD TYPING SEROLOGIC ABO: CPT

## 2019-10-14 PROCEDURE — 4A023N7 MEASUREMENT OF CARDIAC SAMPLING AND PRESSURE, LEFT HEART, PERCUTANEOUS APPROACH: ICD-10-PCS | Performed by: INTERNAL MEDICINE

## 2019-10-14 PROCEDURE — 85610 PROTHROMBIN TIME: CPT

## 2019-10-14 PROCEDURE — 2500000003 HC RX 250 WO HCPCS

## 2019-10-14 PROCEDURE — 99239 HOSP IP/OBS DSCHRG MGMT >30: CPT | Performed by: INTERNAL MEDICINE

## 2019-10-14 PROCEDURE — 93458 L HRT ARTERY/VENTRICLE ANGIO: CPT | Performed by: INTERNAL MEDICINE

## 2019-10-14 PROCEDURE — C1894 INTRO/SHEATH, NON-LASER: HCPCS

## 2019-10-14 PROCEDURE — 36415 COLL VENOUS BLD VENIPUNCTURE: CPT

## 2019-10-14 PROCEDURE — 2709999900 HC NON-CHARGEABLE SUPPLY

## 2019-10-14 PROCEDURE — B2151ZZ FLUOROSCOPY OF LEFT HEART USING LOW OSMOLAR CONTRAST: ICD-10-PCS | Performed by: INTERNAL MEDICINE

## 2019-10-14 RX ORDER — ATORVASTATIN CALCIUM 40 MG/1
40 TABLET, FILM COATED ORAL NIGHTLY
Qty: 30 TABLET | Refills: 0 | Status: SHIPPED | OUTPATIENT
Start: 2019-10-14 | End: 2019-12-26 | Stop reason: SDUPTHER

## 2019-10-14 RX ORDER — SODIUM CHLORIDE 9 MG/ML
INJECTION, SOLUTION INTRAVENOUS CONTINUOUS
Status: DISCONTINUED | OUTPATIENT
Start: 2019-10-14 | End: 2019-10-14 | Stop reason: HOSPADM

## 2019-10-14 RX ORDER — NITROGLYCERIN 0.4 MG/1
TABLET SUBLINGUAL
Qty: 25 TABLET | Refills: 0 | Status: SHIPPED | OUTPATIENT
Start: 2019-10-14 | End: 2021-06-28 | Stop reason: SDUPTHER

## 2019-10-14 RX ORDER — ATORVASTATIN CALCIUM 40 MG/1
40 TABLET, FILM COATED ORAL NIGHTLY
Status: DISCONTINUED | OUTPATIENT
Start: 2019-10-14 | End: 2019-10-14 | Stop reason: HOSPADM

## 2019-10-14 RX ORDER — SODIUM CHLORIDE 0.9 % (FLUSH) 0.9 %
10 SYRINGE (ML) INJECTION PRN
Status: DISCONTINUED | OUTPATIENT
Start: 2019-10-14 | End: 2019-10-14 | Stop reason: HOSPADM

## 2019-10-14 RX ORDER — SODIUM CHLORIDE 0.9 % (FLUSH) 0.9 %
10 SYRINGE (ML) INJECTION EVERY 12 HOURS SCHEDULED
Status: DISCONTINUED | OUTPATIENT
Start: 2019-10-14 | End: 2019-10-14 | Stop reason: HOSPADM

## 2019-10-14 RX ORDER — SODIUM CHLORIDE 0.9 % (FLUSH) 0.9 %
10 SYRINGE (ML) INJECTION PRN
Status: DISCONTINUED | OUTPATIENT
Start: 2019-10-14 | End: 2019-10-14

## 2019-10-14 RX ORDER — SODIUM CHLORIDE 0.9 % (FLUSH) 0.9 %
10 SYRINGE (ML) INJECTION EVERY 12 HOURS SCHEDULED
Status: DISCONTINUED | OUTPATIENT
Start: 2019-10-14 | End: 2019-10-14

## 2019-10-14 RX ORDER — ASPIRIN 81 MG/1
81 TABLET, CHEWABLE ORAL DAILY
Qty: 30 TABLET | Refills: 0 | Status: SHIPPED | OUTPATIENT
Start: 2019-10-15 | End: 2022-06-21

## 2019-10-14 RX ADMIN — ALPRAZOLAM 1 MG: 0.5 TABLET ORAL at 10:10

## 2019-10-14 RX ADMIN — SODIUM CHLORIDE: 9 INJECTION, SOLUTION INTRAVENOUS at 04:05

## 2019-10-14 RX ADMIN — IOPAMIDOL 50 ML: 755 INJECTION, SOLUTION INTRAVENOUS at 09:21

## 2019-10-14 RX ADMIN — FUROSEMIDE 20 MG: 20 TABLET ORAL at 10:03

## 2019-10-14 RX ADMIN — GABAPENTIN 300 MG: 300 CAPSULE ORAL at 10:03

## 2019-10-14 RX ADMIN — SPIRONOLACTONE 100 MG: 25 TABLET ORAL at 10:03

## 2019-10-14 RX ADMIN — ASPIRIN 81 MG 81 MG: 81 TABLET ORAL at 08:07

## 2019-10-14 RX ADMIN — ACETAMINOPHEN 650 MG: 325 TABLET ORAL at 04:14

## 2019-10-14 ASSESSMENT — PAIN DESCRIPTION - ORIENTATION
ORIENTATION: ANTERIOR
ORIENTATION: ANTERIOR

## 2019-10-14 ASSESSMENT — PAIN DESCRIPTION - ONSET
ONSET: ON-GOING
ONSET: ON-GOING

## 2019-10-14 ASSESSMENT — PAIN DESCRIPTION - PROGRESSION
CLINICAL_PROGRESSION: NOT CHANGED
CLINICAL_PROGRESSION: NOT CHANGED

## 2019-10-14 ASSESSMENT — PAIN SCALES - GENERAL
PAINLEVEL_OUTOF10: 0
PAINLEVEL_OUTOF10: 5
PAINLEVEL_OUTOF10: 4

## 2019-10-14 ASSESSMENT — PAIN DESCRIPTION - PAIN TYPE
TYPE: ACUTE PAIN
TYPE: ACUTE PAIN

## 2019-10-14 ASSESSMENT — PAIN DESCRIPTION - DESCRIPTORS
DESCRIPTORS: HEADACHE
DESCRIPTORS: HEADACHE

## 2019-10-14 ASSESSMENT — PAIN DESCRIPTION - LOCATION
LOCATION: HEAD
LOCATION: HEAD

## 2019-10-14 ASSESSMENT — PAIN DESCRIPTION - FREQUENCY
FREQUENCY: CONTINUOUS
FREQUENCY: CONTINUOUS

## 2019-10-15 ENCOUNTER — TELEPHONE (OUTPATIENT)
Dept: FAMILY MEDICINE CLINIC | Age: 54
End: 2019-10-15

## 2019-10-15 LAB
EKG ATRIAL RATE: 62 BPM
EKG P AXIS: 11 DEGREES
EKG P-R INTERVAL: 122 MS
EKG Q-T INTERVAL: 420 MS
EKG QRS DURATION: 94 MS
EKG QTC CALCULATION (BAZETT): 426 MS
EKG R AXIS: 47 DEGREES
EKG T AXIS: 42 DEGREES
EKG VENTRICULAR RATE: 62 BPM

## 2019-10-21 ENCOUNTER — OFFICE VISIT (OUTPATIENT)
Dept: FAMILY MEDICINE CLINIC | Age: 54
End: 2019-10-21
Payer: COMMERCIAL

## 2019-10-21 VITALS
HEIGHT: 66 IN | DIASTOLIC BLOOD PRESSURE: 78 MMHG | OXYGEN SATURATION: 98 % | SYSTOLIC BLOOD PRESSURE: 118 MMHG | BODY MASS INDEX: 39.92 KG/M2 | RESPIRATION RATE: 20 BRPM | WEIGHT: 248.4 LBS | HEART RATE: 92 BPM | TEMPERATURE: 97.7 F

## 2019-10-21 DIAGNOSIS — R13.19 ESOPHAGEAL DYSPHAGIA: ICD-10-CM

## 2019-10-21 DIAGNOSIS — R26.81 UNSTEADY GAIT: ICD-10-CM

## 2019-10-21 DIAGNOSIS — K70.30 ESOPHAGEAL VARICES IN ALCOHOLIC CIRRHOSIS (HCC): ICD-10-CM

## 2019-10-21 DIAGNOSIS — F41.9 ANXIETY: ICD-10-CM

## 2019-10-21 DIAGNOSIS — K70.30 ALCOHOLIC CIRRHOSIS, UNSPECIFIED WHETHER ASCITES PRESENT (HCC): Primary | ICD-10-CM

## 2019-10-21 DIAGNOSIS — K21.00 GASTROESOPHAGEAL REFLUX DISEASE WITH ESOPHAGITIS: ICD-10-CM

## 2019-10-21 DIAGNOSIS — R06.02 SOB (SHORTNESS OF BREATH): ICD-10-CM

## 2019-10-21 DIAGNOSIS — R53.83 FATIGUE, UNSPECIFIED TYPE: ICD-10-CM

## 2019-10-21 DIAGNOSIS — I85.10 ESOPHAGEAL VARICES IN ALCOHOLIC CIRRHOSIS (HCC): ICD-10-CM

## 2019-10-21 DIAGNOSIS — I25.119 CORONARY ARTERY DISEASE INVOLVING NATIVE HEART WITH ANGINA PECTORIS, UNSPECIFIED VESSEL OR LESION TYPE (HCC): ICD-10-CM

## 2019-10-21 DIAGNOSIS — K70.31 ASCITES DUE TO ALCOHOLIC CIRRHOSIS (HCC): ICD-10-CM

## 2019-10-21 DIAGNOSIS — R49.0 HOARSENESS: ICD-10-CM

## 2019-10-21 DIAGNOSIS — K76.82 HEPATIC ENCEPHALOPATHY: ICD-10-CM

## 2019-10-21 DIAGNOSIS — Z72.0 TOBACCO ABUSE: ICD-10-CM

## 2019-10-21 PROCEDURE — 99495 TRANSJ CARE MGMT MOD F2F 14D: CPT | Performed by: NURSE PRACTITIONER

## 2019-10-21 RX ORDER — CLONAZEPAM 1 MG/1
1 TABLET ORAL 3 TIMES DAILY PRN
Qty: 90 TABLET | Refills: 0 | Status: SHIPPED | OUTPATIENT
Start: 2019-10-21 | End: 2019-11-24 | Stop reason: SDUPTHER

## 2019-10-22 ASSESSMENT — ENCOUNTER SYMPTOMS
CONSTIPATION: 0
EYES NEGATIVE: 1
ABDOMINAL PAIN: 0
DIARRHEA: 0
RESPIRATORY NEGATIVE: 1
GASTROINTESTINAL NEGATIVE: 1
VOMITING: 0

## 2019-10-25 ASSESSMENT — ENCOUNTER SYMPTOMS
ABDOMINAL PAIN: 1
VOICE CHANGE: 1
CHEST TIGHTNESS: 0
ABDOMINAL DISTENTION: 1
SHORTNESS OF BREATH: 1
TROUBLE SWALLOWING: 1
ALLERGIC/IMMUNOLOGIC NEGATIVE: 1
EYES NEGATIVE: 1

## 2019-11-01 RX ORDER — GABAPENTIN 100 MG/1
CAPSULE ORAL
Qty: 150 CAPSULE | Refills: 5 | Status: SHIPPED | OUTPATIENT
Start: 2019-11-01 | End: 2020-04-17

## 2019-11-24 DIAGNOSIS — F41.9 ANXIETY: ICD-10-CM

## 2019-11-25 RX ORDER — CYCLOBENZAPRINE HCL 10 MG
TABLET ORAL
Qty: 60 TABLET | Refills: 0 | Status: SHIPPED | OUTPATIENT
Start: 2019-11-25 | End: 2019-12-26 | Stop reason: SDUPTHER

## 2019-11-25 RX ORDER — CLONAZEPAM 1 MG/1
TABLET ORAL
Qty: 90 TABLET | Refills: 0 | Status: SHIPPED | OUTPATIENT
Start: 2019-11-25 | End: 2019-12-26 | Stop reason: SDUPTHER

## 2019-12-11 ENCOUNTER — TELEPHONE (OUTPATIENT)
Dept: FAMILY MEDICINE CLINIC | Age: 54
End: 2019-12-11

## 2019-12-20 RX ORDER — DULOXETIN HYDROCHLORIDE 60 MG/1
CAPSULE, DELAYED RELEASE ORAL
Qty: 30 CAPSULE | Refills: 3 | Status: SHIPPED | OUTPATIENT
Start: 2019-12-20 | End: 2020-05-26

## 2019-12-26 ENCOUNTER — OFFICE VISIT (OUTPATIENT)
Dept: FAMILY MEDICINE CLINIC | Age: 54
End: 2019-12-26
Payer: COMMERCIAL

## 2019-12-26 VITALS
HEART RATE: 104 BPM | SYSTOLIC BLOOD PRESSURE: 108 MMHG | HEIGHT: 66 IN | RESPIRATION RATE: 20 BRPM | BODY MASS INDEX: 36.64 KG/M2 | WEIGHT: 228 LBS | DIASTOLIC BLOOD PRESSURE: 70 MMHG | TEMPERATURE: 97.3 F

## 2019-12-26 DIAGNOSIS — K70.30 ESOPHAGEAL VARICES IN ALCOHOLIC CIRRHOSIS (HCC): ICD-10-CM

## 2019-12-26 DIAGNOSIS — G47.19 DAYTIME HYPERSOMNOLENCE: ICD-10-CM

## 2019-12-26 DIAGNOSIS — G47.00 INSOMNIA, UNSPECIFIED TYPE: ICD-10-CM

## 2019-12-26 DIAGNOSIS — K70.31 ASCITES DUE TO ALCOHOLIC CIRRHOSIS (HCC): ICD-10-CM

## 2019-12-26 DIAGNOSIS — I85.10 ESOPHAGEAL VARICES IN ALCOHOLIC CIRRHOSIS (HCC): ICD-10-CM

## 2019-12-26 DIAGNOSIS — Z72.0 TOBACCO ABUSE: ICD-10-CM

## 2019-12-26 DIAGNOSIS — K21.00 GASTROESOPHAGEAL REFLUX DISEASE WITH ESOPHAGITIS: ICD-10-CM

## 2019-12-26 DIAGNOSIS — E78.2 MIXED HYPERLIPIDEMIA: ICD-10-CM

## 2019-12-26 DIAGNOSIS — F41.9 ANXIETY: ICD-10-CM

## 2019-12-26 DIAGNOSIS — K70.30 ALCOHOLIC CIRRHOSIS, UNSPECIFIED WHETHER ASCITES PRESENT (HCC): Primary | ICD-10-CM

## 2019-12-26 PROCEDURE — G8417 CALC BMI ABV UP PARAM F/U: HCPCS | Performed by: NURSE PRACTITIONER

## 2019-12-26 PROCEDURE — 4004F PT TOBACCO SCREEN RCVD TLK: CPT | Performed by: NURSE PRACTITIONER

## 2019-12-26 PROCEDURE — G8598 ASA/ANTIPLAT THER USED: HCPCS | Performed by: NURSE PRACTITIONER

## 2019-12-26 PROCEDURE — 3017F COLORECTAL CA SCREEN DOC REV: CPT | Performed by: NURSE PRACTITIONER

## 2019-12-26 PROCEDURE — G8427 DOCREV CUR MEDS BY ELIG CLIN: HCPCS | Performed by: NURSE PRACTITIONER

## 2019-12-26 PROCEDURE — G8482 FLU IMMUNIZE ORDER/ADMIN: HCPCS | Performed by: NURSE PRACTITIONER

## 2019-12-26 PROCEDURE — 99214 OFFICE O/P EST MOD 30 MIN: CPT | Performed by: NURSE PRACTITIONER

## 2019-12-26 RX ORDER — SPIRONOLACTONE 100 MG/1
TABLET, FILM COATED ORAL
Qty: 90 TABLET | Refills: 2 | Status: SHIPPED | OUTPATIENT
Start: 2019-12-26 | End: 2020-10-08

## 2019-12-26 RX ORDER — ATORVASTATIN CALCIUM 40 MG/1
40 TABLET, FILM COATED ORAL NIGHTLY
Qty: 30 TABLET | Refills: 5 | Status: SHIPPED | OUTPATIENT
Start: 2019-12-26 | End: 2020-09-11

## 2019-12-26 RX ORDER — CYCLOBENZAPRINE HCL 10 MG
TABLET ORAL
Qty: 60 TABLET | Refills: 3 | Status: SHIPPED | OUTPATIENT
Start: 2019-12-26 | End: 2020-04-17 | Stop reason: SDUPTHER

## 2019-12-26 RX ORDER — LACTULOSE 10 G/15ML
30 SOLUTION ORAL 3 TIMES DAILY
Refills: 0 | COMMUNITY
Start: 2019-10-15 | End: 2021-01-21 | Stop reason: SDUPTHER

## 2019-12-26 RX ORDER — FAMOTIDINE 40 MG/1
TABLET, FILM COATED ORAL
Qty: 30 TABLET | Refills: 5 | Status: ON HOLD | OUTPATIENT
Start: 2019-12-26 | End: 2021-08-12

## 2019-12-26 RX ORDER — CLONAZEPAM 1 MG/1
TABLET ORAL
Qty: 90 TABLET | Refills: 0 | Status: SHIPPED | OUTPATIENT
Start: 2019-12-26 | End: 2020-01-24

## 2019-12-30 ASSESSMENT — ENCOUNTER SYMPTOMS
BLOOD IN STOOL: 0
CONSTIPATION: 0
DIARRHEA: 0
VOMITING: 0
NAUSEA: 0
ABDOMINAL PAIN: 1

## 2020-01-15 ENCOUNTER — TELEPHONE (OUTPATIENT)
Dept: FAMILY MEDICINE CLINIC | Age: 55
End: 2020-01-15

## 2020-01-15 RX ORDER — RANITIDINE 150 MG/1
150 TABLET ORAL NIGHTLY
Qty: 90 TABLET | Refills: 1 | Status: SHIPPED | OUTPATIENT
Start: 2020-01-15 | End: 2020-05-18

## 2020-01-15 NOTE — TELEPHONE ENCOUNTER
Pt called in stating that when he went to  his med refills he was told pepcid was on back order, he would like an alternative sent to [facility] 12/26/19  YAEL

## 2020-01-24 RX ORDER — CLONAZEPAM 1 MG/1
TABLET ORAL
Qty: 90 TABLET | Refills: 0 | Status: SHIPPED | OUTPATIENT
Start: 2020-01-24 | End: 2020-01-27

## 2020-01-27 RX ORDER — ALPRAZOLAM 0.5 MG/1
0.5 TABLET ORAL 3 TIMES DAILY PRN
Qty: 90 TABLET | Refills: 0 | Status: SHIPPED | OUTPATIENT
Start: 2020-01-27 | End: 2020-02-25 | Stop reason: SDUPTHER

## 2020-02-25 RX ORDER — ALPRAZOLAM 0.5 MG/1
0.5 TABLET ORAL 3 TIMES DAILY PRN
Qty: 90 TABLET | Refills: 0 | Status: SHIPPED | OUTPATIENT
Start: 2020-02-25 | End: 2020-03-24 | Stop reason: SDUPTHER

## 2020-03-24 RX ORDER — ALPRAZOLAM 0.5 MG/1
0.5 TABLET ORAL 3 TIMES DAILY PRN
Qty: 90 TABLET | Refills: 0 | Status: CANCELLED | OUTPATIENT
Start: 2020-03-24 | End: 2020-04-23

## 2020-03-24 RX ORDER — ALPRAZOLAM 0.5 MG/1
0.5 TABLET ORAL 3 TIMES DAILY PRN
Qty: 90 TABLET | Refills: 0 | Status: SHIPPED | OUTPATIENT
Start: 2020-03-24 | End: 2020-04-07 | Stop reason: SDUPTHER

## 2020-03-24 NOTE — TELEPHONE ENCOUNTER
Send refill to Dr. Mimi Espinoza for review. I cannot send controlled substances while at home.   Or save until Friday

## 2020-03-24 NOTE — TELEPHONE ENCOUNTER
Last visit- 12/26/2019  Next visit- 4/14/2020    Requested Prescriptions     Pending Prescriptions Disp Refills    ALPRAZolam (XANAX) 0.5 MG tablet 90 tablet 0     Sig: Take 1 tablet by mouth 3 times daily as needed for Sleep or Anxiety for up to 30 days.

## 2020-04-07 ENCOUNTER — TELEPHONE (OUTPATIENT)
Dept: ADMINISTRATIVE | Age: 55
End: 2020-04-07

## 2020-04-07 RX ORDER — ALPRAZOLAM 0.5 MG/1
0.5 TABLET ORAL 3 TIMES DAILY PRN
Qty: 90 TABLET | Refills: 0 | Status: SHIPPED | OUTPATIENT
Start: 2020-04-07 | End: 2020-05-18 | Stop reason: SDUPTHER

## 2020-04-07 NOTE — TELEPHONE ENCOUNTER
Pt called and R/S his appt to 4/30. He is unable to do VV or evisit. Kemar Adorno he is doing fine. Is asking for refill on his Alprazolam 0.5, know refill is not for 2 weeks.

## 2020-04-17 RX ORDER — ALPRAZOLAM 0.5 MG/1
0.5 TABLET ORAL 3 TIMES DAILY PRN
Qty: 90 TABLET | Refills: 0 | OUTPATIENT
Start: 2020-04-17 | End: 2020-05-17

## 2020-04-17 RX ORDER — CYCLOBENZAPRINE HCL 10 MG
TABLET ORAL
Qty: 60 TABLET | Refills: 3 | Status: SHIPPED | OUTPATIENT
Start: 2020-04-17 | End: 2020-08-03

## 2020-04-17 RX ORDER — GABAPENTIN 100 MG/1
CAPSULE ORAL
Qty: 180 CAPSULE | Refills: 3 | Status: SHIPPED | OUTPATIENT
Start: 2020-04-17 | End: 2020-05-04

## 2020-05-04 RX ORDER — GABAPENTIN 100 MG/1
CAPSULE ORAL
Qty: 150 CAPSULE | Refills: 1 | Status: SHIPPED | OUTPATIENT
Start: 2020-05-04 | End: 2020-05-18 | Stop reason: SDUPTHER

## 2020-05-06 ENCOUNTER — TELEPHONE (OUTPATIENT)
Dept: FAMILY MEDICINE CLINIC | Age: 55
End: 2020-05-06

## 2020-05-14 ENCOUNTER — NURSE TRIAGE (OUTPATIENT)
Dept: OTHER | Facility: CLINIC | Age: 55
End: 2020-05-14

## 2020-05-18 ENCOUNTER — OFFICE VISIT (OUTPATIENT)
Dept: FAMILY MEDICINE CLINIC | Age: 55
End: 2020-05-18
Payer: COMMERCIAL

## 2020-05-18 ENCOUNTER — TELEPHONE (OUTPATIENT)
Dept: FAMILY MEDICINE CLINIC | Age: 55
End: 2020-05-18

## 2020-05-18 VITALS
DIASTOLIC BLOOD PRESSURE: 66 MMHG | RESPIRATION RATE: 16 BRPM | TEMPERATURE: 97.9 F | BODY MASS INDEX: 36.23 KG/M2 | SYSTOLIC BLOOD PRESSURE: 118 MMHG | HEART RATE: 84 BPM | WEIGHT: 226.2 LBS

## 2020-05-18 LAB
BILIRUBIN, POC: NORMAL
BLOOD URINE, POC: NORMAL
CLARITY, POC: CLEAR
COLOR, POC: YELLOW
GLUCOSE URINE, POC: NORMAL
KETONES, POC: NORMAL
LEUKOCYTE EST, POC: NORMAL
NITRITE, POC: NORMAL
PH, POC: 7
PROTEIN, POC: NORMAL
SPECIFIC GRAVITY, POC: 1.02
UROBILINOGEN, POC: 4

## 2020-05-18 PROCEDURE — 3017F COLORECTAL CA SCREEN DOC REV: CPT | Performed by: NURSE PRACTITIONER

## 2020-05-18 PROCEDURE — G8417 CALC BMI ABV UP PARAM F/U: HCPCS | Performed by: NURSE PRACTITIONER

## 2020-05-18 PROCEDURE — 99214 OFFICE O/P EST MOD 30 MIN: CPT | Performed by: NURSE PRACTITIONER

## 2020-05-18 PROCEDURE — G8427 DOCREV CUR MEDS BY ELIG CLIN: HCPCS | Performed by: NURSE PRACTITIONER

## 2020-05-18 PROCEDURE — 81003 URINALYSIS AUTO W/O SCOPE: CPT | Performed by: NURSE PRACTITIONER

## 2020-05-18 PROCEDURE — 4004F PT TOBACCO SCREEN RCVD TLK: CPT | Performed by: NURSE PRACTITIONER

## 2020-05-18 RX ORDER — GABAPENTIN 100 MG/1
300 CAPSULE ORAL 3 TIMES DAILY
Qty: 270 CAPSULE | Refills: 1 | Status: SHIPPED | OUTPATIENT
Start: 2020-05-18 | End: 2020-11-11

## 2020-05-18 RX ORDER — ALPRAZOLAM 1 MG/1
1 TABLET ORAL 3 TIMES DAILY PRN
Qty: 90 TABLET | Refills: 0 | Status: SHIPPED | OUTPATIENT
Start: 2020-05-18 | End: 2020-06-16

## 2020-05-18 ASSESSMENT — PATIENT HEALTH QUESTIONNAIRE - PHQ9
1. LITTLE INTEREST OR PLEASURE IN DOING THINGS: 0
SUM OF ALL RESPONSES TO PHQ9 QUESTIONS 1 & 2: 0
SUM OF ALL RESPONSES TO PHQ QUESTIONS 1-9: 0
SUM OF ALL RESPONSES TO PHQ QUESTIONS 1-9: 0
2. FEELING DOWN, DEPRESSED OR HOPELESS: 0

## 2020-05-19 ASSESSMENT — ENCOUNTER SYMPTOMS
RESPIRATORY NEGATIVE: 1
EYES NEGATIVE: 1
BACK PAIN: 1
GASTROINTESTINAL NEGATIVE: 1

## 2020-05-19 NOTE — PROGRESS NOTES
tablet Take 1 tablet by mouth daily 30 tablet 0    nitroGLYCERIN (NITROSTAT) 0.4 MG SL tablet up to max of 3 total doses. If no relief after 1 dose, call 911. 25 tablet 0    famotidine (PEPCID) 40 MG tablet take 1 tablet every evening 30 tablet 5     No current facility-administered medications for this visit. No Known Allergies  Health Maintenance   Topic Date Due    Hepatitis A vaccine (1 of 2 - Risk 2-dose series) 06/03/1965    HIV screen  06/03/1979    Hepatitis B vaccine (1 of 3 - Risk 3-dose series) 06/03/1983    DTaP/Tdap/Td vaccine (1 - Tdap) 06/03/1983    Shingles Vaccine (1 of 2) 06/03/2014    Pneumococcal 0-64 years Vaccine (1 of 1 - PPSV23) 06/10/2019    Lipid screen  10/12/2020    Potassium monitoring  10/14/2020    Creatinine monitoring  10/14/2020    Diabetes screen  10/12/2022    Colon cancer screen colonoscopy  03/01/2029    Flu vaccine  Completed    Hepatitis C screen  Completed    Hib vaccine  Aged Out    Meningococcal (ACWY) vaccine  Aged Out         Objective:     Physical Exam  Vitals signs and nursing note reviewed. Constitutional:       Appearance: Normal appearance. He is normal weight. HENT:      Nose: Nose normal.      Mouth/Throat:      Pharynx: Oropharynx is clear. Eyes:      Conjunctiva/sclera: Conjunctivae normal.   Cardiovascular:      Pulses: Normal pulses. Heart sounds: Normal heart sounds. Pulmonary:      Effort: Pulmonary effort is normal.      Breath sounds: Normal breath sounds. Abdominal:      General: Bowel sounds are normal. There is no distension. Palpations: Abdomen is soft. Tenderness: There is no abdominal tenderness. Musculoskeletal: Normal range of motion. Lumbar back: He exhibits tenderness, pain and spasm. He exhibits normal range of motion. Back:    Skin:     General: Skin is warm. Neurological:      General: No focal deficit present.       Mental Status: He is alert and oriented to person, place, and

## 2020-05-26 RX ORDER — DULOXETIN HYDROCHLORIDE 60 MG/1
CAPSULE, DELAYED RELEASE ORAL
Qty: 30 CAPSULE | Refills: 3 | Status: SHIPPED | OUTPATIENT
Start: 2020-05-26 | End: 2020-06-15

## 2020-06-15 RX ORDER — DULOXETIN HYDROCHLORIDE 60 MG/1
CAPSULE, DELAYED RELEASE ORAL
Qty: 90 CAPSULE | Refills: 3 | Status: SHIPPED | OUTPATIENT
Start: 2020-06-15 | End: 2021-08-23

## 2020-06-16 RX ORDER — ALPRAZOLAM 1 MG/1
TABLET ORAL
Qty: 90 TABLET | Refills: 0 | Status: CANCELLED | OUTPATIENT
Start: 2020-06-16 | End: 2020-07-16

## 2020-06-16 RX ORDER — ALPRAZOLAM 1 MG/1
TABLET ORAL
Qty: 90 TABLET | Refills: 0 | Status: SHIPPED | OUTPATIENT
Start: 2020-06-16 | End: 2020-07-06 | Stop reason: SDUPTHER

## 2020-07-06 RX ORDER — ALPRAZOLAM 1 MG/1
TABLET ORAL
Qty: 90 TABLET | Refills: 0 | Status: SHIPPED | OUTPATIENT
Start: 2020-07-06 | End: 2020-08-03 | Stop reason: SDUPTHER

## 2020-07-06 NOTE — TELEPHONE ENCOUNTER
Paras Never called requesting a refill on the following medications:  Requested Prescriptions     Pending Prescriptions Disp Refills    ALPRAZolam (XANAX) 1 MG tablet 90 tablet 0     Pharmacy verified: to [address] [place] [address]  .       Date of last visit: 5/18/20  Date of next visit (if applicable): Visit date not found

## 2020-07-24 LAB
ABSOLUTE BASO #: 0.1 X10E9/L (ref 0–0.9)
ABSOLUTE EOS #: 0.4 X10E9/L (ref 0–0.4)
ABSOLUTE LYMPH #: 1.6 X10E9/L (ref 1–3.5)
ABSOLUTE MONO #: 0.7 X10E9/L (ref 0–0.9)
ABSOLUTE NEUT #: 3.9 X10E9/L (ref 1.5–6.6)
ALBUMIN SERPL-MCNC: 3.7 G/DL (ref 3.2–5.3)
ALK PHOSPHATASE: 77 U/L (ref 39–130)
ALT SERPL-CCNC: 17 U/L (ref 0–40)
AMMONIA: 97 UMOL/L (ref 11–35)
ANION GAP SERPL CALCULATED.3IONS-SCNC: 9 MMOL/L (ref 5–15)
AST SERPL-CCNC: 20 U/L (ref 0–41)
BASOPHILS RELATIVE PERCENT: 1.1 %
BILIRUB SERPL-MCNC: 0.9 MG/DL (ref 0.3–1.2)
BUN BLDV-MCNC: 10 MG/DL (ref 5–23)
CALCIUM SERPL-MCNC: 9.3 MG/DL (ref 8.5–10.5)
CHLORIDE BLD-SCNC: 107 MMOL/L (ref 98–109)
CO2: 22 MMOL/L (ref 22–32)
CREAT SERPL-MCNC: 0.91 MG/DL (ref 0.6–1.3)
EGFR AFRICAN AMERICAN: >60 ML/MIN/1.73SQ.M
EGFR IF NONAFRICAN AMERICAN: >60 ML/MIN/1.73SQ.M
EOSINOPHILS RELATIVE PERCENT: 5.7 %
GLUCOSE: 105 MG/DL (ref 65–99)
HCT VFR BLD CALC: 39.7 % (ref 39–49)
HEMOGLOBIN: 13.9 G/DL (ref 13.1–17.3)
LYMPHOCYTE %: 24.6 %
MCH RBC QN AUTO: 33.5 PG (ref 27–35)
MCHC RBC AUTO-ENTMCNC: 34.9 G/DL (ref 32–36)
MCV RBC AUTO: 96 FL (ref 81–101)
MONOCYTES # BLD: 10.2 %
NEUTROPHILS RELATIVE PERCENT: 58.4 %
PDW BLD-RTO: 14.8 % (ref 11.4–14.3)
PLATELETS: 169 X10E9/L (ref 150–450)
PMV BLD AUTO: 9.4 FL (ref 7–12)
POTASSIUM SERPL-SCNC: 4.2 MMOL/L (ref 3.5–5)
RBC: 4.14 X10E12/L (ref 4.25–5.65)
SODIUM BLD-SCNC: 138 MMOL/L (ref 134–146)
TOTAL PROTEIN: 7.9 G/DL (ref 6–8)
WBC: 6.7 X10E9/L (ref 4.8–10.8)

## 2020-07-27 ENCOUNTER — OFFICE VISIT (OUTPATIENT)
Dept: FAMILY MEDICINE CLINIC | Age: 55
End: 2020-07-27
Payer: COMMERCIAL

## 2020-07-27 VITALS
RESPIRATION RATE: 20 BRPM | WEIGHT: 234 LBS | OXYGEN SATURATION: 98 % | DIASTOLIC BLOOD PRESSURE: 66 MMHG | HEART RATE: 84 BPM | SYSTOLIC BLOOD PRESSURE: 120 MMHG | BODY MASS INDEX: 37.48 KG/M2 | TEMPERATURE: 98.2 F

## 2020-07-27 PROCEDURE — 90715 TDAP VACCINE 7 YRS/> IM: CPT | Performed by: NURSE PRACTITIONER

## 2020-07-27 PROCEDURE — 99214 OFFICE O/P EST MOD 30 MIN: CPT | Performed by: NURSE PRACTITIONER

## 2020-07-27 PROCEDURE — G8427 DOCREV CUR MEDS BY ELIG CLIN: HCPCS | Performed by: NURSE PRACTITIONER

## 2020-07-27 PROCEDURE — 3017F COLORECTAL CA SCREEN DOC REV: CPT | Performed by: NURSE PRACTITIONER

## 2020-07-27 PROCEDURE — 90732 PPSV23 VACC 2 YRS+ SUBQ/IM: CPT | Performed by: NURSE PRACTITIONER

## 2020-07-27 PROCEDURE — 90471 IMMUNIZATION ADMIN: CPT | Performed by: NURSE PRACTITIONER

## 2020-07-27 PROCEDURE — G8417 CALC BMI ABV UP PARAM F/U: HCPCS | Performed by: NURSE PRACTITIONER

## 2020-07-27 PROCEDURE — 4004F PT TOBACCO SCREEN RCVD TLK: CPT | Performed by: NURSE PRACTITIONER

## 2020-07-27 PROCEDURE — 90472 IMMUNIZATION ADMIN EACH ADD: CPT | Performed by: NURSE PRACTITIONER

## 2020-07-27 ASSESSMENT — ENCOUNTER SYMPTOMS
VOMITING: 0
CONSTIPATION: 0
COUGH: 0
SHORTNESS OF BREATH: 0
EYE REDNESS: 0
ABDOMINAL PAIN: 0
TROUBLE SWALLOWING: 0
EYE PAIN: 0
SORE THROAT: 0
ALLERGIC/IMMUNOLOGIC NEGATIVE: 1
DIARRHEA: 0
RHINORRHEA: 0
BACK PAIN: 0
EYE DISCHARGE: 0
NAUSEA: 0
WHEEZING: 0

## 2020-07-27 NOTE — PROGRESS NOTES
Immunizations Administered     Name Date Dose Route    Pneumococcal Polysaccharide ([id number]) 7/27/2020 0.5 mL Intramuscular    Site: Deltoid- Left    Lot: [id number]    NDC: 7431-6672-09    Tdap (Boostrix, Adacel) 7/27/2020 0.5 mL Intramuscular    Site: Deltoid- Right    Lot: [id number]    NDC: 05893-141-90

## 2020-07-27 NOTE — PATIENT INSTRUCTIONS
Patient Education        Stopping Smoking: Care Instructions  Your Care Instructions     Cigarette smokers crave the nicotine in cigarettes. Giving it up is much harder than simply changing a habit. Your body has to stop craving the nicotine. It is hard to quit, but you can do it. There are many tools that people use to quit smoking. You may find that combining tools works best for you. There are several steps to quitting. First you get ready to quit. Then you get support to help you. After that, you learn new skills and behaviors to become a nonsmoker. For many people, a necessary step is getting and using medicine. Your doctor will help you set up the plan that best meets your needs. You may want to attend a smoking cessation program to help you quit smoking. When you choose a program, look for one that has proven success. Ask your doctor for ideas. You will greatly increase your chances of success if you take medicine as well as get counseling or join a cessation program.  Some of the changes you feel when you first quit tobacco are uncomfortable. Your body will miss the nicotine at first, and you may feel short-tempered and grumpy. You may have trouble sleeping or concentrating. Medicine can help you deal with these symptoms. You may struggle with changing your smoking habits and rituals. The last step is the tricky one: Be prepared for the smoking urge to continue for a time. This is a lot to deal with, but keep at it. You will feel better. Follow-up care is a key part of your treatment and safety. Be sure to make and go to all appointments, and call your doctor if you are having problems. It's also a good idea to know your test results and keep a list of the medicines you take. How can you care for yourself at home? · Ask your family, friends, and coworkers for support. You have a better chance of quitting if you have help and support.   · Join a support group, such as Nicotine Anonymous, for people who are trying to quit smoking. · Consider signing up for a smoking cessation program, such as the American Lung Association's Freedom from Smoking program.  · Get text messaging support. Go to the website at www.smokefree. gov to sign up for the [facility] program.  · Set a quit date. Pick your date carefully so that it is not right in the middle of a big deadline or stressful time. Once you quit, do not even take a puff. Get rid of all ashtrays and lighters after your last cigarette. Clean your house and your clothes so that they do not smell of smoke. · Learn how to be a nonsmoker. Think about ways you can avoid those things that make you reach for a cigarette. ? Avoid situations that put you at greatest risk for smoking. For some people, it is hard to have a drink with friends without smoking. For others, they might skip a coffee break with coworkers who smoke. ? Change your daily routine. Take a different route to work or eat a meal in a different place. · Cut down on stress. Calm yourself or release tension by doing an activity you enjoy, such as reading a book, taking a hot bath, or gardening. · Talk to your doctor or pharmacist about nicotine replacement therapy, which replaces the nicotine in your body. You still get nicotine but you do not use tobacco. Nicotine replacement products help you slowly reduce the amount of nicotine you need. These products come in several forms, many of them available over-the-counter:  ? Nicotine patches  ? Nicotine gum and lozenges  ? Nicotine inhaler  · Ask your doctor about bupropion (Wellbutrin) or varenicline (Chantix), which are prescription medicines. They do not contain nicotine. They help you by reducing withdrawal symptoms, such as stress and anxiety. · Some people find hypnosis, acupuncture, and massage helpful for ending the smoking habit. · Eat a healthy diet and get regular exercise.  Having healthy habits will help your body move past its craving for nicotine. · Be prepared to keep trying. Most people are not successful the first few times they try to quit. Do not get mad at yourself if you smoke again. Make a list of things you learned and think about when you want to try again, such as next week, next month, or next year. Where can you learn more? Go to https://[employer]od[employer]xingp[employer]elvaewshavonne.Chinac[employer]. org and sign in to your [employer] account. Enter E837 in the [employer] box to learn more about \"Stopping Smoking: Care Instructions. \"     If you do not have an account, please click on the \"Sign Up Now\" link. Current as of: March 12, 2020               Content Version: 12.5  © 2006-2020 Healthwise, Incorporated. Care instructions adapted under license by [facility] ([facility]). If you have questions about a medical condition or this instruction, always ask your healthcare professional. [address] any warranty or liability for your use of this information.

## 2020-07-27 NOTE — PROGRESS NOTES
[address] [address]  Dept: [phone]  Dept Fax: [phone]  Loc: [phone]       QUINTIN Kerns is a 63 y.o.male here due to falling twice at home today due to his dizziness. Patient is currently under treatment by gastrointestinal specialty for hepatic encephalopathy with lactulose. States he has not been taking the right dose and has not taken any today because he is out of the lactulose. States his mentation swings back and forth to very lucid to sometimes confused. He does has a 17-year-old son at home that assist him. Patient states he is afraid to go outdoors due to [place] and his falling right now, but he has a probation office appointment this Friday and would like a slip to miss at least to that one until he gets his ammonia level down and feels safer.       Patient Active Problem List   Diagnosis    Diverticulosis    HTN (hypertension)    Ascites    Liver cirrhosis (HCC)    Cystic kidney disease    Leukocytosis    Hypotension    Alcohol abuse    Hyponatremia    Hypokalemia    Perianal ulcer (HCC)    Hyperkalemia    Increased ammonia level    Confusion    Change in mental status    Acute renal failure (HCC)    Metabolic encephalopathy    Altered mental status    [facility]atic encephalopathy ([facility] [address]    Noncompliance with medications    Ascites due to alcoholic cirrhosis (HCC)    Hypotension (arterial)    Alcoholic cirrhosis of liver with ascites (HCC)    Lactic acidosis    Generalized abdominal pain    Tobacco abuse    Spondylosis of cervical region without myelopathy or radiculopathy    Chest pain    Elevated INR    Opiate abuse, episodic (HCC)    Marijuana abuse    Dyspnea on exertion       Current Outpatient Medications   Medication Sig Dispense Refill    ALPRAZolam (XANAX) 1 MG tablet take 1 tablet by mouth three times a day if needed for sleep and anxiety 90 tablet 0    DULoxetine (CYMBALTA) 60 MG extended release capsule take 1 capsule by mouth once daily 90 capsule 3    gabapentin (NEURONTIN) 100 MG capsule Take 3 capsules by mouth 3 times daily. 270 capsule 1    cyclobenzaprine (FLEXERIL) 10 MG tablet 1/2 to 1 tablet by mouth three times a day if needed 60 tablet 3    spironolactone (ALDACTONE) 100 MG tablet take 1 tablet by mouth every morning 90 tablet 2    CONSTULOSE 10 GM/15ML solution Take 30 mLs by mouth 3 times daily   0    atorvastatin (LIPITOR) 40 MG tablet Take 1 tablet by mouth nightly 30 tablet 5    famotidine (PEPCID) 40 MG tablet take 1 tablet every evening 30 tablet 5    nitroGLYCERIN (NITROSTAT) 0.4 MG SL tablet up to max of 3 total doses. If no relief after 1 dose, call 911. 25 tablet 0    aspirin 81 MG chewable tablet Take 1 tablet by mouth daily (Patient not taking: Reported on 7/27/2020) 30 tablet 0     No current facility-administered medications for this visit. Review of Systems   Constitutional: Negative for activity change, fatigue and fever. HENT: Negative for congestion, ear pain, rhinorrhea, sore throat and trouble swallowing. Eyes: Negative for pain, discharge and redness. Respiratory: Negative for cough, shortness of breath and wheezing. Cardiovascular: Negative. Gastrointestinal: Negative for abdominal pain, constipation, diarrhea, nausea and vomiting. Endocrine: Negative. Genitourinary: Negative for dysuria, frequency and urgency. Musculoskeletal: Negative for arthralgias, back pain and myalgias. Skin: Negative for rash. Allergic/Immunologic: Negative. Neurological: Positive for dizziness. Negative for tremors, weakness and headaches. Hematological: Negative. Psychiatric/Behavioral: Negative for dysphoric mood and sleep disturbance. The patient is not nervous/anxious.             OBJECTIVE     /66   Pulse 84   Temp 98.2 °F (36.8 °C) (Temporal)   Resp 20   Wt 234 lb (106.1 kg) SpO2 98%   BMI 37.48 kg/m²     Wt Readings from Last 3 Encounters:   07/27/20 234 lb (106.1 kg)   05/18/20 226 lb 3.2 oz (102.6 kg)   12/26/19 228 lb (103.4 kg)     Body mass index is 37.48 kg/m². Physical Exam  Constitutional:       General: He is not in acute distress. Appearance: He is well-developed. He is not diaphoretic. HENT:      Right Ear: External ear normal.      Left Ear: External ear normal.      Nose: Nose normal.   Eyes:      General:         Right eye: No discharge. Left eye: No discharge. Conjunctiva/sclera: Conjunctivae normal.      Pupils: Pupils are equal, round, and reactive to light. Neck:      Musculoskeletal: Normal range of motion. Vascular: No JVD. Cardiovascular:      Rate and Rhythm: Normal rate and regular rhythm. Pulmonary:      Effort: Pulmonary effort is normal. No respiratory distress. Abdominal:      General: Abdomen is flat. Bowel sounds are normal. There is distension. Palpations: Abdomen is soft. Tenderness: There is no abdominal tenderness. Musculoskeletal: Normal range of motion. General: No tenderness or deformity. Skin:     General: Skin is warm and dry. Capillary Refill: Capillary refill takes less than 2 seconds. Coloration: Skin is not pale. Findings: No erythema or rash. Neurological:      Mental Status: He is alert and oriented to person, place, and time. Coordination: Coordination normal.   Psychiatric:         Behavior: Behavior normal.         Thought Content: Thought content normal.         Judgment: Judgment normal.         Lab Results   Component Value Date    LABA1C 5.0 10/12/2019       Lab Results   Component Value Date    CHOL 101 10/12/2019    TRIG 86 10/12/2019    HDL 31 10/12/2019    LDLCALC 53 10/12/2019       The ASCVD Risk score (Nati Jiménez., et al., 2013) failed to calculate for the following reasons:     The valid total cholesterol range is 130 to 320 mg/dL    Lab Results Component Value Date     07/23/2020    K 4.2 07/23/2020     07/23/2020    CO2 22 07/23/2020    BUN 10 07/23/2020    CREATININE 0.91 07/23/2020    GLUCOSE 105 (H) 07/23/2020    CALCIUM 9.3 07/23/2020    PROT 7.9 07/23/2020    LABALBU 3.7 07/23/2020    BILITOT 0.9 07/23/2020    ALKPHOS 77 07/23/2020    AST 20 07/23/2020    ALT 17 07/23/2020    LABGLOM >90 10/14/2019       No results found for: LABMICR, [id number]    Lab Results   Component Value Date    TSH 2.120 09/18/2017       Lab Results   Component Value Date    WBC 6.7 07/23/2020    HGB 13.9 07/23/2020    HCT 39.7 07/23/2020    MCV 96 07/23/2020     07/23/2020       No results found for: PSA, PSADIA    Immunization History   Administered Date(s) Administered    Influenza, Quadv, IM, PF (6 mo and older Fluzone, Flulaval, Fluarix, and 3 yrs and older Afluria) 10/12/2019    Pneumococcal Conjugate 13-valent ([id number]) 04/15/2019       Health Maintenance   Topic Date Due    Hepatitis A vaccine (1 of 2 - Risk 2-dose series) 06/03/1965    HIV screen  06/03/1979    Hepatitis B vaccine (1 of 3 - Risk 3-dose series) 06/03/1983    DTaP/Tdap/Td vaccine (1 - Tdap) 06/03/1983    Shingles Vaccine (1 of 2) 06/03/2014    Pneumococcal 0-64 years Vaccine (1 of 1 - PPSV23) 06/10/2019    Flu vaccine (1) 09/01/2020    Lipid screen  10/12/2020    Potassium monitoring  07/23/2021    Creatinine monitoring  07/23/2021    Diabetes screen  10/12/2022    Colon cancer screen colonoscopy  03/01/2029    Hepatitis C screen  Completed    Hib vaccine  Aged Out    Meningococcal (ACWY) vaccine  Aged Out       Future Appointments   Date Time Provider Kaelyn Rojas   8/20/2020  1:30 PM Celestino Number, APRN - CNP [facility]       ASSESSMENT       Diagnosis Orders   1. Alcoholic cirrhosis, unspecified whether ascites present ([facility] [address]     2. Need for tetanus booster  Tdap (age 6y and older) IM ([address])   3.  Need for 23-polyvalent pneumococcal polysaccharide vaccine  PNEUMOVAX 23 subcutaneous/IM (Pneumococcal polysaccharide vaccine 23-valent >= 3yo)       PLAN      1. Confirmed present correct dosing of lactulose 30 mil 3 times a day. 2.  Tetanus and pneumonia vaccines updated. 3.  Patient given a letter to excuse him from his  appointment this Friday. He is to stay in his house.     Electronically signed by CHRISTIAN Tolentino CNP on 7/27/2020 at 1:31 PM

## 2020-07-27 NOTE — LETTER
[address]  [address]  [facility] [address]  Phone: [phone]  Fax: [phone]    CHRISTIAN Stephenson CNP        July 27, 2020     Patient: Christianne Nguyen   YOB: 1965   Date of Visit: 7/27/2020       To Whom it May Concern:    Cairssa Parsons was seen in my clinic on 7/27/2020. He should stay at home in his apartment until August 6 due to significant medical problems he is being treated for. If you have any questions or concerns, please don't hesitate to call.     Sincerely,     Electronically signed by CHRISTIAN Stephenson CNP on 7/27/2020 at 1:21 PM

## 2020-08-03 ENCOUNTER — TELEPHONE (OUTPATIENT)
Dept: FAMILY MEDICINE CLINIC | Age: 55
End: 2020-08-03

## 2020-08-03 RX ORDER — ALPRAZOLAM 1 MG/1
TABLET ORAL
Qty: 90 TABLET | Refills: 0 | Status: SHIPPED | OUTPATIENT
Start: 2020-08-03 | End: 2020-09-08 | Stop reason: SDUPTHER

## 2020-08-03 RX ORDER — CYCLOBENZAPRINE HCL 10 MG
TABLET ORAL
Qty: 60 TABLET | Refills: 3 | Status: SHIPPED | OUTPATIENT
Start: 2020-08-03 | End: 2020-11-05 | Stop reason: SDUPTHER

## 2020-08-03 NOTE — LETTER
[address]  [address]  [facility] [address]  Phone: [phone]  Fax: [phone]    CHRISTIAN Burns CNP        August 3, 2020     Patient: Susan Shetty   YOB: 1965       To Whom it May Concern: It is my medical opinion that Nori Cummins should stay at home in his apartment until August 24, 2020 due to significant medical problems he is being treated for. If you have any questions or concerns, please don't hesitate to call.     Sincerely,         CHRISTIAN Burns CNP

## 2020-08-03 NOTE — TELEPHONE ENCOUNTER
VM left for pt letting him know his new letter is at the  for , notified if he needs it faxed somewhere to call back

## 2020-08-31 ENCOUNTER — HOSPITAL ENCOUNTER (OUTPATIENT)
Dept: ULTRASOUND IMAGING | Age: 55
Discharge: HOME | End: 2020-08-31
Payer: COMMERCIAL

## 2020-08-31 PROCEDURE — 76705 ECHO EXAM OF ABDOMEN: CPT

## 2020-09-08 RX ORDER — ALPRAZOLAM 1 MG/1
TABLET ORAL
Qty: 90 TABLET | Refills: 0 | Status: SHIPPED | OUTPATIENT
Start: 2020-09-08 | End: 2020-10-06 | Stop reason: SDUPTHER

## 2020-09-11 RX ORDER — ATORVASTATIN CALCIUM 40 MG/1
40 TABLET, FILM COATED ORAL NIGHTLY
Qty: 30 TABLET | Refills: 5 | Status: SHIPPED | OUTPATIENT
Start: 2020-09-11 | End: 2021-05-17

## 2020-09-21 ENCOUNTER — HOSPITAL ENCOUNTER (OUTPATIENT)
Age: 55
Setting detail: SPECIMEN
Discharge: HOME | End: 2020-09-21
Payer: COMMERCIAL

## 2020-09-21 PROCEDURE — U0003 INFECTIOUS AGENT DETECTION BY NUCLEIC ACID (DNA OR RNA); SEVERE ACUTE RESPIRATORY SYNDROME CORONAVIRUS 2 (SARS-COV-2) (CORONAVIRUS DISEASE [COVID-19]), AMPLIFIED PROBE TECHNIQUE, MAKING USE OF HIGH THROUGHPUT TECHNOLOGIES AS DESCRIBED BY CMS-2020-01-R: HCPCS

## 2020-09-21 RX ORDER — ALPRAZOLAM 1 MG/1
TABLET ORAL
Qty: 90 TABLET | OUTPATIENT
Start: 2020-09-21

## 2020-09-22 LAB — SARS-COV-2: NOT DETECTED

## 2020-09-28 ENCOUNTER — ANESTHESIA EVENT (OUTPATIENT)
Dept: ENDOSCOPY | Age: 55
End: 2020-09-28
Payer: COMMERCIAL

## 2020-09-28 ENCOUNTER — ANESTHESIA (OUTPATIENT)
Dept: ENDOSCOPY | Age: 55
End: 2020-09-28
Payer: COMMERCIAL

## 2020-09-28 ENCOUNTER — HOSPITAL ENCOUNTER (OUTPATIENT)
Age: 55
Setting detail: OUTPATIENT SURGERY
Discharge: HOME | End: 2020-09-28
Attending: INTERNAL MEDICINE | Admitting: INTERNAL MEDICINE
Payer: COMMERCIAL

## 2020-09-28 VITALS
DIASTOLIC BLOOD PRESSURE: 54 MMHG | OXYGEN SATURATION: 100 % | RESPIRATION RATE: 12 BRPM | SYSTOLIC BLOOD PRESSURE: 94 MMHG

## 2020-09-28 VITALS
BODY MASS INDEX: 40.48 KG/M2 | RESPIRATION RATE: 16 BRPM | HEIGHT: 66 IN | DIASTOLIC BLOOD PRESSURE: 61 MMHG | SYSTOLIC BLOOD PRESSURE: 108 MMHG | TEMPERATURE: 97.2 F | OXYGEN SATURATION: 100 % | WEIGHT: 251.9 LBS | HEART RATE: 67 BPM

## 2020-09-28 PROCEDURE — 3700000000 HC ANESTHESIA ATTENDED CARE: Performed by: INTERNAL MEDICINE

## 2020-09-28 PROCEDURE — 7100000010 HC PHASE II RECOVERY - FIRST 15 MIN: Performed by: INTERNAL MEDICINE

## 2020-09-28 PROCEDURE — 3609012300 HC EGD BAND LIGATION ESOPHGEAL/GASTRIC VARICES: Performed by: INTERNAL MEDICINE

## 2020-09-28 PROCEDURE — 2720000010 HC SURG SUPPLY STERILE: Performed by: INTERNAL MEDICINE

## 2020-09-28 PROCEDURE — 3700000001 HC ADD 15 MINUTES (ANESTHESIA): Performed by: INTERNAL MEDICINE

## 2020-09-28 PROCEDURE — 2500000003 HC RX 250 WO HCPCS: Performed by: NURSE ANESTHETIST, CERTIFIED REGISTERED

## 2020-09-28 PROCEDURE — 6360000002 HC RX W HCPCS: Performed by: NURSE ANESTHETIST, CERTIFIED REGISTERED

## 2020-09-28 PROCEDURE — 2709999900 HC NON-CHARGEABLE SUPPLY: Performed by: INTERNAL MEDICINE

## 2020-09-28 PROCEDURE — 2580000003 HC RX 258: Performed by: INTERNAL MEDICINE

## 2020-09-28 PROCEDURE — 7100000011 HC PHASE II RECOVERY - ADDTL 15 MIN: Performed by: INTERNAL MEDICINE

## 2020-09-28 RX ORDER — SODIUM CHLORIDE 450 MG/100ML
INJECTION, SOLUTION INTRAVENOUS CONTINUOUS
Status: DISCONTINUED | OUTPATIENT
Start: 2020-09-28 | End: 2020-09-28 | Stop reason: HOSPADM

## 2020-09-28 RX ORDER — LIDOCAINE HYDROCHLORIDE 20 MG/ML
INJECTION, SOLUTION EPIDURAL; INFILTRATION; INTRACAUDAL; PERINEURAL PRN
Status: DISCONTINUED | OUTPATIENT
Start: 2020-09-28 | End: 2020-09-28 | Stop reason: SDUPTHER

## 2020-09-28 RX ORDER — SODIUM CHLORIDE 0.9 % (FLUSH) 0.9 %
10 SYRINGE (ML) INJECTION PRN
Status: DISCONTINUED | OUTPATIENT
Start: 2020-09-28 | End: 2020-09-28 | Stop reason: HOSPADM

## 2020-09-28 RX ORDER — PROPOFOL 10 MG/ML
INJECTION, EMULSION INTRAVENOUS PRN
Status: DISCONTINUED | OUTPATIENT
Start: 2020-09-28 | End: 2020-09-28 | Stop reason: SDUPTHER

## 2020-09-28 RX ORDER — SODIUM CHLORIDE 0.9 % (FLUSH) 0.9 %
10 SYRINGE (ML) INJECTION EVERY 12 HOURS SCHEDULED
Status: DISCONTINUED | OUTPATIENT
Start: 2020-09-28 | End: 2020-09-28 | Stop reason: HOSPADM

## 2020-09-28 RX ADMIN — SODIUM CHLORIDE: 4.5 INJECTION, SOLUTION INTRAVENOUS at 10:40

## 2020-09-28 RX ADMIN — PROPOFOL 40 MG: 10 INJECTION, EMULSION INTRAVENOUS at 12:22

## 2020-09-28 RX ADMIN — PROPOFOL 20 MG: 10 INJECTION, EMULSION INTRAVENOUS at 12:28

## 2020-09-28 RX ADMIN — PROPOFOL 40 MG: 10 INJECTION, EMULSION INTRAVENOUS at 12:20

## 2020-09-28 RX ADMIN — PROPOFOL 40 MG: 10 INJECTION, EMULSION INTRAVENOUS at 12:18

## 2020-09-28 RX ADMIN — LIDOCAINE HYDROCHLORIDE 60 MG: 20 INJECTION, SOLUTION EPIDURAL; INFILTRATION; INTRACAUDAL; PERINEURAL at 12:18

## 2020-09-28 RX ADMIN — PROPOFOL 40 MG: 10 INJECTION, EMULSION INTRAVENOUS at 12:26

## 2020-09-28 RX ADMIN — PROPOFOL 40 MG: 10 INJECTION, EMULSION INTRAVENOUS at 12:24

## 2020-09-28 RX ADMIN — SODIUM CHLORIDE: 4.5 INJECTION, SOLUTION INTRAVENOUS at 12:03

## 2020-09-28 ASSESSMENT — PAIN SCALES - GENERAL
PAINLEVEL_OUTOF10: 0
PAINLEVEL_OUTOF10: 0

## 2020-09-28 ASSESSMENT — ENCOUNTER SYMPTOMS
DYSPNEA ACTIVITY LEVEL: AFTER AMBULATING 1 FLIGHT OF STAIRS
SHORTNESS OF BREATH: 1

## 2020-09-28 ASSESSMENT — PAIN - FUNCTIONAL ASSESSMENT: PAIN_FUNCTIONAL_ASSESSMENT: 0-10

## 2020-09-28 ASSESSMENT — LIFESTYLE VARIABLES: SMOKING_STATUS: 1

## 2020-09-28 NOTE — PRE SEDATION
Sedation/Analgesia History & Physical    Patient: Harless Schaumann : 1965  Med Rec#: [id number] Acc#: [id number]   Provider Performing Procedure: Floresita Townsend  Primary Care Physician: CHRISTIAN Salazar CNP    PRE-PROCEDURE   Full CODE [x]Yes  DNR-CCA/DNR-CC []Yes   Brief History/Pre-Procedure Diagnosis:     Alcoholic cirrhosis of liver without ascites ([facility] [address]  Ammonia     Ferritin     CBC     Protime-INR     Comprehensive Metabolic Panel   2. Esophageal varices in alcoholic cirrhosis (HCC)  Ammonia     Ferritin     CBC     Protime-INR     Comprehensive Metabolic Panel   3. Coagulopathy ([facility] [address]  Protime-INR   4. Increased ammonia level  Ammonia   5. Abdominal cramping  Gastrointestinal Panel, Molecular   6. Diarrhea, unspecified type  Gastrointestinal Panel, Molecular               MEDICAL HISTORY    []Additional information:       has a past medical history of Anxiety, Arthritis, Chronic kidney disease, Cirrhosis ([facility] [address] Diverticulitis, Diverticulosis, GERD (gastroesophageal reflux disease), Hypertension, Other disorders of kidney and ureter in diseases classified elsewhere, and Psychiatric problem.     SOCIAL HISTORY  Social History     Tobacco History     Smoking Status  Current Every Day Smoker Smoking Frequency  0.5 packs/day for 25 years (12.5 pk yrs) Smoking Tobacco Type  Cigarettes    Smokeless Tobacco Use  Never Used    Tobacco Comment  printed to avs          Alcohol History     Alcohol Use Status  No Comment  Quit 2 years ago          Drug Use     Drug Use Status  No          Sexual Activity     Sexually Active  Not Currently                FAMILY HISTORY     Family History   Problem Relation Age of Onset    Hypertension Mother     Heart Disease Mother     Alzheimer's Disease Mother     Heart Failure Mother     Hypertension Father     Heart Disease Father     Cancer Father     High Blood Pressure Paternal Uncle     Heart Disease Paternal Uncle     Colon Cancer Neg Hx     Colon Polyps Neg Hx SURGICAL HISTORY   has a past surgical history that includes Cervical disc surgery; Colon surgery (2004); Tympanostomy tube placement; Tonsillectomy; Abdomen surgery; Colonoscopy; Endoscopy, colon, diagnostic; fracture surgery; Dilatation, esophagus; Cardiac catheterization (2007?); Nerve Block (Bilateral, 10/02/2017); pr inj,paravertebral l/s,1 level (Bilateral, 10/2/2017); Upper gastrointestinal endoscopy (2019); and back surgery. Additional information:       ALLERGIES   Allergies as of 08/20/2020    (No Known Allergies)     Additional information:       MEDICATIONS   Coumadin Use Last 7 Days [x]No []Yes  Antiplatelet drug therapy use last 7 days  [x]No []Yes  Other anticoagulant use last 7 days  [x]No []Yes    Current Facility-Administered Medications:     sodium chloride flush 0.9 % injection 10 mL, 10 mL, Intravenous, 2 times per day, Beni Saavedra MD    sodium chloride flush 0.9 % injection 10 mL, 10 mL, Intravenous, PRN, Beni Saavedra MD    0.45 % sodium chloride infusion, , Intravenous, Continuous, Beni Saavedra MD, Last Rate: 75 mL/hr at 09/28/20 1040  Prior to Admission medications    Medication Sig Start Date End Date Taking? Authorizing Provider   atorvastatin (LIPITOR) 40 MG tablet take 1 tablet by mouth nightly 9/11/20  Yes CHRISTIAN Sarabia CNP   ALPRAZolam Gabriel Ruy) 1 MG tablet take 1 tablet by mouth three times a day if needed for sleep and anxiety 9/8/20 10/9/20 Yes CHRISTIAN Sarabia CNP   rifaximin (XIFAXAN) 550 MG tablet Take 1 tablet by mouth 2 times daily 8/20/20  Yes CHRISTIAN Mukherjee CNP   cyclobenzaprine (FLEXERIL) 10 MG tablet take 1/2 to 1 tablet by mouth three times a day if needed 8/3/20  Yes CHRISTIAN Sarabia CNP   DULoxetine (CYMBALTA) 60 MG extended release capsule take 1 capsule by mouth once daily 6/15/20  Yes CHRISTIAN Sarabia CNP   gabapentin (NEURONTIN) 100 MG capsule Take 3 capsules by mouth 3 times daily.  5/18/20 5/18/21 Yes CHRISTIAN Sarabia CNP   CONSTULOSE 10 GM/15ML solution Take 30 mLs by mouth 3 times daily  10/15/19  Yes Historical Provider, MD   aspirin 81 MG chewable tablet Take 1 tablet by mouth daily 10/15/19  Yes [place] Osler, MD   spironolactone (ALDACTONE) 100 MG tablet take 1 tablet by mouth every morning 12/26/19   CHRISTIAN Call - CNP   famotidine (PEPCID) 40 MG tablet take 1 tablet every evening 12/26/19   CHRISTIAN Call CNP   nitroGLYCERIN (NITROSTAT) 0.4 MG SL tablet up to max of 3 total doses. If no relief after 1 dose, call 911. 10/14/19   [place] Osler, MD     Additional information:       PHYSICAL:   Heart:  [x]Regular rate and rhythm  []Other:    Lungs:  [x]Clear    []Other:    Abdomen: [x]Soft    []Other:    Mental Status: [x]Alert & Oriented  []Other:        PLANNED PROCEDURE   [x]EGD  []Colonoscopy []Flex Sigmoid     Consent: I have discussed with the patient and/or the patient representative the indication, alternatives, and the possible risks and/or complications of the planned procedure and the anesthesia methods. The patient and/or patient representative appear to understand and agree to proceed. SEDATION  Please see anesthesia note. The medication Planned :  Planned agent:[x]Midazolam []Meperidine [x]Sublimaze []Morphine  []Diazepam  [x]Propofol     Airway Assessment:   See anesthesia no please     Monitoring and Safety: The patient will be placed on a cardiac monitor and vital signs, pulse oximetry and level of consciousness will be continuously evaluated throughout the procedure. The patient will be closely monitored until recovery from the medications is complete and the patient has returned to baseline status. Respiratory therapy will be on standby during the procedure. [x]Pre-procedure diagnostic studies complete and results available. Comment:    [x]Previous sedation/anesthesia experiences assessed.    Comment:    [x]The patient is an appropriate candidate to undergo the planned procedure sedation and anesthesia. (Refer to nursing sedation/analgesia documentation record)  [x]Formulation and discussion of sedation/procedure plan, risks, and expectations with patient and/or responsible adult completed. [x]Patient examined immediately prior to the procedure.  (Refer to nursing sedation/analgesia documentation record)    Dari Barrett MD   Electronically signed 9/28/2020 at 12:05 PM

## 2020-09-28 NOTE — ANESTHESIA PRE PROCEDURE
Department of Anesthesiology  Preprocedure Note       Name:  Marti Case   Age:  63 y.o.  :  1965                                          MRN:  [id number]         Date:  2020      Surgeon: Juan Pablo Batres):  Alyse Judge MD    Procedure: Procedure(s):  EGD BAND LIGATION    Medications prior to admission:   Prior to Admission medications    Medication Sig Start Date End Date Taking? Authorizing Provider   atorvastatin (LIPITOR) 40 MG tablet take 1 tablet by mouth nightly 20  Yes CHRISTIAN oByd CNP   ALPRAZolam Jenae Heike) 1 MG tablet take 1 tablet by mouth three times a day if needed for sleep and anxiety 9/8/20 10/9/20 Yes CHRISTIAN Boyd CNP   rifaximin (XIFAXAN) 550 MG tablet Take 1 tablet by mouth 2 times daily 20  Yes CHRISTIAN Asher CNP   cyclobenzaprine (FLEXERIL) 10 MG tablet take 1/2 to 1 tablet by mouth three times a day if needed 8/3/20  Yes CHRISTIAN Boyd CNP   DULoxetine (CYMBALTA) 60 MG extended release capsule take 1 capsule by mouth once daily 6/15/20  Yes CHRISTIAN Boyd CNP   gabapentin (NEURONTIN) 100 MG capsule Take 3 capsules by mouth 3 times daily. 20 Yes CHRISTIAN Boyd CNP   CONSTULOSE 10 GM/15ML solution Take 30 mLs by mouth 3 times daily  10/15/19  Yes Historical Provider, MD   aspirin 81 MG chewable tablet Take 1 tablet by mouth daily 10/15/19  Yes Ricardo Lake MD   spironolactone (ALDACTONE) 100 MG tablet take 1 tablet by mouth every morning 19   CHRISTIAN Boyd CNP   famotidine (PEPCID) 40 MG tablet take 1 tablet every evening 19   CHRISTIAN Boyd CNP   nitroGLYCERIN (NITROSTAT) 0.4 MG SL tablet up to max of 3 total doses.  If no relief after 1 dose, call 911. 10/14/19   Ricardo Lake MD       Current medications:    Current Facility-Administered Medications   Medication Dose Route Frequency Provider Last Rate Last Dose    sodium chloride flush 0.9 % injection 10 mL  10 mL Intravenous 2 partial, due to diverticulitis    COLONOSCOPY      DILATATION, ESOPHAGUS      ENDOSCOPY, COLON, DIAGNOSTIC      FRACTURE SURGERY      Broke neck in 2003    NERVE BLOCK Bilateral 10/02/2017    Cervical Facet MBB at C4-5, C5-6, C6-7     [place] INJ,PARAVERTEBRAL L/S,1 LEVEL Bilateral 10/2/2017    C-FACET MBB C4-5, C5-6, C6-7 BILATERAL performed by Teddy Bustamante MD at [facility] ENDOSCOPY  2019       Social History:    Social History     Tobacco Use    Smoking status: Current Every Day Smoker     Packs/day: 0.50     Years: 25.00     Pack years: 12.50     Types: Cigarettes    Smokeless tobacco: Never Used    Tobacco comment: printed to avs   Substance Use Topics    Alcohol use: No     Comment: Quit 2 years ago                                Ready to quit: Not Answered  Counseling given: Not Answered  Comment: printed to avs      Vital Signs (Current):   Vitals:    09/28/20 1026   BP: 117/67   Pulse: 83   Resp: 16   Temp: 36.1 °C (97 °F)   TempSrc: Temporal   SpO2: 95%   Weight: 251 lb 14.4 oz (114.3 kg)   Height: 5' 6\" (1.676 m)                                              BP Readings from Last 3 Encounters:   09/28/20 117/67   09/17/20 124/75   08/20/20 114/73       NPO Status: Time of last liquid consumption: 1700                        Time of last solid consumption: 1700                        Date of last liquid consumption: 09/27/20                        Date of last solid food consumption: 09/27/20    BMI:   Wt Readings from Last 3 Encounters:   09/28/20 251 lb 14.4 oz (114.3 kg)   09/17/20 241 lb (109.3 kg)   08/20/20 244 lb (110.7 kg)     Body mass index is 40.66 kg/m².     CBC:   Lab Results   Component Value Date    WBC 6.7 07/23/2020    WBC 5.6 10/14/2019    RBC 4.14 07/23/2020    HGB 13.9 07/23/2020    HCT 39.7 07/23/2020    MCV 96 07/23/2020    RDW 14.8 07/23/2020     07/23/2020     and risks discussed with patient. Plan discussed with attending.                   CHRISTIAN Steiner - XUAN   9/28/2020

## 2020-09-28 NOTE — BRIEF OP NOTE
Brief Postoperative Note      Patient: Christianne Nguyen  YOB: 1965  MRN: [id number]    Date of Procedure: 9/28/2020    Pre-Op Diagnosis: ALCOHOLIC CIRRHOSIS OF LIVER WITHOUT ASCITES, ESOPHAGEAL VARICES IN ALCOHOLIC CIRRHOSS, GASTROESOPHAGEAL REFLUX DISEASE, INTERMITTENT DYSPHAGIA    Post-Op Diagnosis: Esophageal varices, banding with 4 bands, gastritis        Procedure(s):  EGD BAND LIGATION    Surgeon(s):  Beni Saavedra MD    Assistant:  * No surgical staff found *    Anesthesia: Monitor Anesthesia Care    Estimated Blood Loss (mL): none    Complications: None    Specimens:   * No specimens in log *    Implants:  * No implants in log *      Drains: * No LDAs found *    Findings:  Esophageal varices, banding with 4 bands, gastritis     Electronically signed by Beni Saavedra MD on 9/28/2020 at 12:41 PM

## 2020-09-28 NOTE — PROGRESS NOTES
Sara Both here for EGD. Scope  used. Pictures taken. Banding x4. Procedure completed and tolerated well.

## 2020-09-28 NOTE — PROGRESS NOTES
1240:  Yosvany to recovery post procedure. Rosalba Miramontes states his family needs to be called to pick him up.  1300:  Rosalba Miramontes is tolerating oral fluids. Family called. 1310:  Discharge instructions provided to Rosalba Miramontes, verbalized understanding. 1321:  Dr. Mac Job in to discuss results, findings, plan of care with Rosalba Miramontes. Discharged via wheelchair to private vehicle to be driven home by family member.

## 2020-09-28 NOTE — ANESTHESIA POSTPROCEDURE EVALUATION
Department of Anesthesiology  Postprocedure Note    Patient: Francisco Gama  MRN: [id number]  YOB: 1965  Date of evaluation: 9/28/2020  Time:  12:30 PM     Procedure Summary     Date:  09/28/20 Room / Location:  [address] [place] [address] 12 / [address] [facility]    Anesthesia Start:  4147 Anesthesia Stop:      Procedure:  EGD BAND LIGATION (Left Esophagus) Diagnosis:  (ALCOHOLIC CIRRHOSIS OF LIVER WITHOUT ASCITES, ESOPHAGEAL VARICES IN ALCOHOLIC CIRRHOSS, GASTROESOPHAGEAL REFLUX DISEASE, INTERMITTENT DYSPHAGIA)    Surgeon:  Marcela Delgadillo MD Responsible Provider:  Jay Henry MD    Anesthesia Type:  MAC ASA Status:  3          Anesthesia Type: MAC    Renato Phase I: Renato Score: 10    Renato Phase II:      Last vitals: Reviewed and per EMR flowsheets.        Anesthesia Post Evaluation    Patient location during evaluation: bedside  Patient participation: complete - patient participated  Level of consciousness: awake and alert  Pain score: 0  Airway patency: patent  Nausea & Vomiting: no vomiting and no nausea  Complications: no  Cardiovascular status: hemodynamically stable  Respiratory status: room air and spontaneous ventilation  Hydration status: stable

## 2020-09-29 NOTE — OP NOTE
[address] [place] [address]                                OPERATIVE REPORT    PATIENT NAME: Lisa Carbone                      :        1965  MED REC NO:   [id number]                           ROOM:  ACCOUNT NO:   [de-identified]                           ADMIT DATE: 2020  PROVIDER:     CHIDI Rebolledotheodore Almendarezble OF PROCEDURE:  2020    INDICATIONS:  The patient with cirrhosis, alcohol abuse. Plan today for  EGD to evaluate for esophageal varices and possibly band the esophageal  varices. ASA CLASSIFICATION:  III. SURGEON:  Ky Licona MD    Estimated blood loss is none    DESCRIPTION OF PROCEDURE:  The patient was brought to the GI lab. Consent was obtained. Risks involved with the procedure were explained  to the patient. Informed consent was obtained. The patient was  monitored during the procedure with pulse oximetry, blood pressure  monitoring, and oxygen by nasal cannula. Sedation by incremental doses  of IV propofol given by anesthesia service to achieve total IV  anesthesia. For ASA classification and medication given during the  procedure, please see Anesthesia note. PROCEDURE PERFORMED:  EGD with esophageal banding of the esophageal  varices. Standard video upper scope advanced under direct vision from the oral  cavity up to the duodenum. Esophagus featured a grade 2 to 3 esophageal  varices. Gastroesophageal junction was at 40 cm from the incisors. Scope was advanced to the stomach. Retroflex examination of the cardia  revealed mild gastritis. No significant portal hypertensive gastropathy  seen. No fundic varices seen. Antrum showed mild gastritis. Duodenum  appears normal.  Scope was drawn out.  with seven-band  location was put on the tip of the scope, advanced again. Four bands were  applied in distal esophagus to band the varices.   Scope was withdrawn  with no immediate complication. IMPRESSION:  1.  Grade 2 to 3 esophageal varices, four varices were banded. 2.  Gastritis with mild portal hypertensive gastropathy. 3.  Gastric reflux. PLAN:  1. Follow up with the biopsy results in GI for evaluation. 2.  Watch for GI bleed post esophageal varices banding.         Keli Fritz M.D.    D: 09/28/2020 12:52:53       T: 09/28/2020 13:11:24     [email or web address]  Job#: [id number]     Doc#: [id number]    CC:

## 2020-09-29 NOTE — TELEPHONE ENCOUNTER
Last office visit 7/27/20  Last refill 90/0 on 9/8/20  Too soon for refill   Hold until 10/6/20      [place]

## 2020-10-06 RX ORDER — ALPRAZOLAM 1 MG/1
TABLET ORAL
Qty: 90 TABLET | Refills: 0 | Status: SHIPPED | OUTPATIENT
Start: 2020-10-06 | End: 2020-11-05 | Stop reason: SDUPTHER

## 2020-10-08 RX ORDER — SPIRONOLACTONE 100 MG/1
TABLET, FILM COATED ORAL
Qty: 90 TABLET | Refills: 2 | Status: SHIPPED | OUTPATIENT
Start: 2020-10-08 | End: 2021-09-24

## 2020-10-14 ENCOUNTER — HOSPITAL ENCOUNTER (EMERGENCY)
Age: 55
Discharge: HOME | End: 2020-10-14
Payer: COMMERCIAL

## 2020-10-14 VITALS
OXYGEN SATURATION: 97 % | WEIGHT: 240 LBS | SYSTOLIC BLOOD PRESSURE: 140 MMHG | TEMPERATURE: 97.9 F | HEART RATE: 68 BPM | DIASTOLIC BLOOD PRESSURE: 75 MMHG | RESPIRATION RATE: 18 BRPM | HEIGHT: 66 IN | BODY MASS INDEX: 38.57 KG/M2

## 2020-10-14 LAB
ALBUMIN SERPL-MCNC: 3.6 G/DL (ref 3.5–5.1)
ALP BLD-CCNC: 70 U/L (ref 38–126)
ALT SERPL-CCNC: 28 U/L (ref 11–66)
AMMONIA: 84 UMOL/L (ref 11–60)
ANION GAP SERPL CALCULATED.3IONS-SCNC: 10 MEQ/L (ref 8–16)
AST SERPL-CCNC: 26 U/L (ref 5–40)
BASOPHILS # BLD: 0.9 %
BASOPHILS ABSOLUTE: 0.1 THOU/MM3 (ref 0–0.1)
BILIRUB SERPL-MCNC: 0.8 MG/DL (ref 0.3–1.2)
BILIRUBIN DIRECT: < 0.2 MG/DL (ref 0–0.3)
BUN BLDV-MCNC: 14 MG/DL (ref 7–22)
CALCIUM SERPL-MCNC: 9.2 MG/DL (ref 8.5–10.5)
CHLORIDE BLD-SCNC: 108 MEQ/L (ref 98–111)
CO2: 21 MEQ/L (ref 23–33)
CREAT SERPL-MCNC: 0.7 MG/DL (ref 0.4–1.2)
EKG ATRIAL RATE: 87 BPM
EKG P AXIS: 33 DEGREES
EKG P-R INTERVAL: 140 MS
EKG Q-T INTERVAL: 362 MS
EKG QRS DURATION: 84 MS
EKG QTC CALCULATION (BAZETT): 435 MS
EKG R AXIS: 50 DEGREES
EKG T AXIS: 28 DEGREES
EKG VENTRICULAR RATE: 87 BPM
EOSINOPHIL # BLD: 4.3 %
EOSINOPHILS ABSOLUTE: 0.3 THOU/MM3 (ref 0–0.4)
ERYTHROCYTE [DISTWIDTH] IN BLOOD BY AUTOMATED COUNT: 14.7 % (ref 11.5–14.5)
ERYTHROCYTE [DISTWIDTH] IN BLOOD BY AUTOMATED COUNT: 53.8 FL (ref 35–45)
GFR SERPL CREATININE-BSD FRML MDRD: > 90 ML/MIN/1.73M2
GLUCOSE BLD-MCNC: 103 MG/DL (ref 70–108)
HCT VFR BLD CALC: 41 % (ref 42–52)
HEMOGLOBIN: 13.6 GM/DL (ref 14–18)
IMMATURE GRANS (ABS): 0.02 THOU/MM3 (ref 0–0.07)
IMMATURE GRANULOCYTES: 0.3 %
LIPASE: 22.4 U/L (ref 5.6–51.3)
LYMPHOCYTES # BLD: 21.7 %
LYMPHOCYTES ABSOLUTE: 1.6 THOU/MM3 (ref 1–4.8)
MAGNESIUM: 1.9 MG/DL (ref 1.6–2.4)
MCH RBC QN AUTO: 32.9 PG (ref 26–33)
MCHC RBC AUTO-ENTMCNC: 33.2 GM/DL (ref 32.2–35.5)
MCV RBC AUTO: 99.3 FL (ref 80–94)
MONOCYTES # BLD: 9.9 %
MONOCYTES ABSOLUTE: 0.7 THOU/MM3 (ref 0.4–1.3)
NUCLEATED RED BLOOD CELLS: 0 /100 WBC
OSMOLALITY CALCULATION: 278.3 MOSMOL/KG (ref 275–300)
PHOSPHORUS: 2.9 MG/DL (ref 2.4–4.7)
PLATELET # BLD: 146 THOU/MM3 (ref 130–400)
PMV BLD AUTO: 10 FL (ref 9.4–12.4)
POTASSIUM SERPL-SCNC: 4.5 MEQ/L (ref 3.5–5.2)
RBC # BLD: 4.13 MILL/MM3 (ref 4.7–6.1)
SEG NEUTROPHILS: 62.9 %
SEGMENTED NEUTROPHILS ABSOLUTE COUNT: 4.7 THOU/MM3 (ref 1.8–7.7)
SODIUM BLD-SCNC: 139 MEQ/L (ref 135–145)
TOTAL PROTEIN: 7.4 G/DL (ref 6.1–8)
WBC # BLD: 7.5 THOU/MM3 (ref 4.8–10.8)

## 2020-10-14 PROCEDURE — 99284 EMERGENCY DEPT VISIT MOD MDM: CPT

## 2020-10-14 PROCEDURE — 82140 ASSAY OF AMMONIA: CPT

## 2020-10-14 PROCEDURE — 83690 ASSAY OF LIPASE: CPT

## 2020-10-14 PROCEDURE — 80053 COMPREHEN METABOLIC PANEL: CPT

## 2020-10-14 PROCEDURE — 85025 COMPLETE CBC W/AUTO DIFF WBC: CPT

## 2020-10-14 PROCEDURE — 93005 ELECTROCARDIOGRAM TRACING: CPT | Performed by: NURSE PRACTITIONER

## 2020-10-14 PROCEDURE — 83735 ASSAY OF MAGNESIUM: CPT

## 2020-10-14 PROCEDURE — 84100 ASSAY OF PHOSPHORUS: CPT

## 2020-10-14 PROCEDURE — 6370000000 HC RX 637 (ALT 250 FOR IP): Performed by: NURSE PRACTITIONER

## 2020-10-14 PROCEDURE — 36415 COLL VENOUS BLD VENIPUNCTURE: CPT

## 2020-10-14 PROCEDURE — 82248 BILIRUBIN DIRECT: CPT

## 2020-10-14 RX ORDER — 0.9 % SODIUM CHLORIDE 0.9 %
1000 INTRAVENOUS SOLUTION INTRAVENOUS ONCE
Status: DISCONTINUED | OUTPATIENT
Start: 2020-10-14 | End: 2020-10-14

## 2020-10-14 RX ORDER — ALPRAZOLAM 0.5 MG/1
1 TABLET ORAL ONCE
Status: COMPLETED | OUTPATIENT
Start: 2020-10-14 | End: 2020-10-14

## 2020-10-14 RX ADMIN — ALPRAZOLAM 1 MG: 0.5 TABLET ORAL at 12:17

## 2020-10-14 ASSESSMENT — ENCOUNTER SYMPTOMS
DIARRHEA: 0
ABDOMINAL PAIN: 0
NAUSEA: 0
VOMITING: 0
SHORTNESS OF BREATH: 0
CONSTIPATION: 0
BACK PAIN: 0

## 2020-10-14 NOTE — ED NOTES
Pt presents to ED following telephone call with PCP in which pt was advised of potassium level of 6.9 following lab draw yesterday and advised to come to ED. Pt states history of anxiety and states he is anxious as of now and requests something for anxiety, stating he did not take his Xanax prior to coming to ED. Pt states SOB over previous 4 months upon exertion. Pt denies pain at this time and states occasional cramping of hands. Pt vital signs stable at this time, pt does not appear to be in acute distress at this time.        [facility]  10/14/20 1128

## 2020-10-14 NOTE — ED PROVIDER NOTES
[place] [address]       Chief Complaint   Patient presents with    Abnormal Lab     6.9 Potassium        Nurses Notes reviewed and I agree except as noted in the HPI. HISTORY OF PRESENT ILLNESS    Owen Maier is a 63 y.o. male who presents to the ED for evaluation of elevated potassium level. Pt had labs drawn yesterday and was called today by PCP stating his potassium level was 6.9 and told him to come to the ER. The last time he had his labs drawn (before surgery on 9/28) his ammonia level was elevated, but no issues with potassium. Pt states he can tell when his ammonia level is high because he usually gets dizzy and lightheaded. He denies any of those sxs today. He notes he was started on Xifaxan 550mg 1 month ago, but that is the only new change in his medications. Pt denies any chest pain, SOB, nausea, vomiting, diarrhea, constipation, fever, chills, muscle cramping or weakness, numbness or tingling, vision changes. He does feel anxious and shaky because of the lab result and his PCP telling him to come here. Pt did not take any of his Xanax today. HPI was provided by the patient. REVIEW OF SYSTEMS     Review of Systems   Constitutional: Negative for activity change, chills, diaphoresis and fever. HENT: Negative. Eyes: Negative for visual disturbance. Respiratory: Negative for shortness of breath. Cardiovascular: Negative for chest pain and leg swelling. Gastrointestinal: Negative for abdominal pain, constipation, diarrhea, nausea and vomiting. Genitourinary: Negative for dysuria and hematuria. Musculoskeletal: Negative for arthralgias, back pain and myalgias. Skin: Negative. Neurological: Negative for dizziness, light-headedness and numbness. Psychiatric/Behavioral: The patient is nervous/anxious.          PAST MEDICAL HISTORY     Past Medical History:   Diagnosis Date    Anxiety     Arthritis     Chronic kidney disease     Cirrhosis ([facility] [address]     Diverticulitis     Diverticulosis     GERD (gastroesophageal reflux disease)     Hypertension     Other disorders of kidney and ureter in diseases classified elsewhere     Psychiatric problem        SURGICALHISTORY      has a past surgical history that includes Cervical disc surgery; Colon surgery (2004); Tympanostomy tube placement; Tonsillectomy; Abdomen surgery; Colonoscopy; Endoscopy, colon, diagnostic; fracture surgery; Dilatation, esophagus; Cardiac catheterization (2007?); Nerve Block (Bilateral, 10/02/2017); pr inj,paravertebral l/s,1 level (Bilateral, 10/2/2017); Upper gastrointestinal endoscopy (2019); back surgery; and Upper gastrointestinal endoscopy (Left, 9/28/2020). CURRENT MEDICATIONS       Discharge Medication List as of 10/14/2020  2:04 PM      CONTINUE these medications which have NOT CHANGED    Details   spironolactone (ALDACTONE) 100 MG tablet take 1 tablet by mouth every morning, Disp-90 tablet,R-2Normal      ALPRAZolam (XANAX) 1 MG tablet take 1 tablet by mouth three times a day if needed for sleep and anxiety, Disp-90 tablet,R-0Normal      atorvastatin (LIPITOR) 40 MG tablet take 1 tablet by mouth nightly, Disp-30 tablet,R-5Normal      rifaximin (XIFAXAN) 550 MG tablet Take 1 tablet by mouth 2 times daily, Disp-60 tablet,R-6Normal      cyclobenzaprine (FLEXERIL) 10 MG tablet take 1/2 to 1 tablet by mouth three times a day if needed, Disp-60 tablet,R-3Normal      DULoxetine (CYMBALTA) 60 MG extended release capsule take 1 capsule by mouth once daily, Disp-90 capsule, R-3Normal      gabapentin (NEURONTIN) 100 MG capsule Take 3 capsules by mouth 3 times daily. , Disp-270 capsule, R-1Normal      CONSTULOSE 10 GM/15ML solution Take 30 mLs by mouth 3 times daily , R-0,DAWHistorical Med      famotidine (PEPCID) 40 MG tablet take 1 tablet every evening, Disp-30 tablet, R-5Normal      aspirin 81 MG chewable tablet Take 1 tablet by mouth daily, Disp-30 tablet, R-0Normal nitroGLYCERIN (NITROSTAT) 0.4 MG SL tablet up to max of 3 total doses. If no relief after 1 dose, call 911., Disp-25 tablet, R-0Normal             ALLERGIES     has No Known Allergies. FAMILY HISTORY     He indicated that his mother is . He indicated that his father is . He indicated that his paternal uncle is . He indicated that the status of his neg hx is unknown.   family history includes Alzheimer's Disease in his mother; Cancer in his father; Heart Disease in his father, mother, and paternal uncle; Heart Failure in his mother; High Blood Pressure in his paternal uncle; Hypertension in his father and mother.     SOCIAL HISTORY       Social History     Socioeconomic History    Marital status:      Spouse name: Not on file    Number of children: 2    Years of education: Not on file    Highest education level: Not on file   Occupational History    Not on file   Social Needs    Financial resource strain: Not on file    Food insecurity     Worry: Not on file     Inability: Not on file   [employer] needs     Medical: Not on file     Non-medical: Not on file   Tobacco Use    Smoking status: Current Every Day Smoker     Packs/day: 0.50     Years: 25.00     Pack years: 12.50     Types: Cigarettes    Smokeless tobacco: Never Used    Tobacco comment: printed to avs   Substance and Sexual Activity    Alcohol use: No     Comment: Quit 2 years ago    Drug use: No    Sexual activity: Not Currently   Lifestyle    Physical activity     Days per week: Not on file     Minutes per session: Not on file    Stress: Not on file   Relationships    Social connections     Talks on phone: Not on file     Gets together: Not on file     Attends [employer] service: Not on file     Active member of club or organization: Not on file     Attends meetings of clubs or organizations: Not on file     Relationship status: Not on file    Intimate partner violence     Fear of current or ex partner: Not on file     Emotionally abused: Not on file     Physically abused: Not on file     Forced sexual activity: Not on file   Other Topics Concern    Not on file   Social History Narrative    Not on file       PHYSICAL EXAM     INITIAL VITALS:  height is 5' 6\" (1.676 m) and weight is 240 lb (108.9 kg). His oral temperature is 97.9 °F (36.6 °C). His blood pressure is 140/75 (abnormal) and his pulse is 68. His respiration is 18 and oxygen saturation is 97%. Physical Exam  Vitals signs and nursing note reviewed. Constitutional:       General: He is not in acute distress. Appearance: Normal appearance. He is not ill-appearing, toxic-appearing or diaphoretic. HENT:      Head: Normocephalic and atraumatic. Mouth/Throat:      Mouth: Mucous membranes are dry. Eyes:      Extraocular Movements: Extraocular movements intact. Conjunctiva/sclera: Conjunctivae normal.      Pupils: Pupils are equal, round, and reactive to light. Neck:      Musculoskeletal: Normal range of motion and neck supple. Cardiovascular:      Rate and Rhythm: Normal rate and regular rhythm. Pulses: Normal pulses. Heart sounds: Normal heart sounds. No murmur. Pulmonary:      Effort: Pulmonary effort is normal. No respiratory distress. Breath sounds: Normal breath sounds. Abdominal:      Palpations: Abdomen is soft. Musculoskeletal: Normal range of motion. General: No swelling or tenderness. Skin:     General: Skin is warm and dry. Neurological:      General: No focal deficit present. Mental Status: He is alert and oriented to person, place, and time. Psychiatric:         Mood and Affect: Mood is anxious.          DIFFERENTIAL DIAGNOSIS:   Hyperkalemia, electrolyte abnormality, dehydration, SBP  DIAGNOSTIC RESULTS     EKG: All EKG's are interpreted by the Emergency Department Physician who eithersigns or Co-signs this chart in the absence of a cardiologist.    EKG read and interpreted by myself with comparison to October 14, 2019 gives impression of normal sinus rhythm with heart rate of 87; interval 140; QRS 84;QTc 435; axis P-33, R-50, T-28. RADIOLOGY: non-plainfilm images(s) such as CT, Ultrasound and MRI are read by the radiologist.  Plain radiographic images are visualized and preliminarily interpreted by the emergency physician unless otherwise stated below. No orders to display         LABS:   Labs Reviewed   CBC WITH AUTO DIFFERENTIAL - Abnormal; Notable for the following components:       Result Value    RBC 4.13 (*)     Hemoglobin 13.6 (*)     Hematocrit 41.0 (*)     MCV 99.3 (*)     RDW-CV 14.7 (*)     RDW-SD 53.8 (*)     All other components within normal limits   BASIC METABOLIC PANEL - Abnormal; Notable for the following components:    CO2 21 (*)     All other components within normal limits   AMMONIA - Abnormal; Notable for the following components:    Ammonia 84 (*)     All other components within normal limits   HEPATIC FUNCTION PANEL   LIPASE   MAGNESIUM   PHOSPHORUS   ANION GAP   GLOMERULAR FILTRATION RATE, ESTIMATED   OSMOLALITY   URINALYSIS WITH MICROSCOPIC       EMERGENCY DEPARTMENT COURSE:   Vitals:    Vitals:    10/14/20 1110 10/14/20 1341   BP: (!) 148/76 (!) 140/75   Pulse: 97 68   Resp: 18 18   Temp: 97.9 °F (36.6 °C)    TempSrc: Oral    SpO2: 97% 97%   Weight: 240 lb (108.9 kg)    Height: 5' 6\" (1.676 m)      MDM  Patient was seen and evaluated in the emergency department, patient appeared to be in no acute distress, vital signs reviewed, no significant findings are noted. Physical exam was completed, no significant normalities noted. Labs were obtained, no hypokalemia noted. He has continued hyperammonemia, discussed the case with his gastroenterologist Dr. Trevon Norwood. Who agrees patient to be discharged. Discussed my findings and plan of care the patient is amenable with discharge. He has a follow-up plan with Dr. Trevon Norwood tomorrow.   He is advised to keep this appointment. Medications   ALPRAZolam (XANAX) tablet 1 mg (1 mg Oral Given 10/14/20 4000)       Patient was seenindependently by myself. The patient's final impression and disposition and plan was determined by myself. CRITICAL CARE:   None    CONSULTS:  None    PROCEDURES:  None    FINAL IMPRESSION     1. Abnormal laboratory test          DISPOSITION/PLAN   Patient discharged in stable condition    PATIENT REFERREDTO:  Lenore Rodriguez MD  [address] [place] [address]. [address]  [phone]    Go in 1 day  For follow up and evaluation      DISCHARGE MEDICATIONS:  Discharge Medication List as of 10/14/2020  2:04 PM          (Please note that portions of this note were completed with a voice recognition program.  Efforts were made to edit the dictations but occasionally words are mis-transcribed.)    Provider:  I personally performed the services described in the documentation,reviewed and edited the documentation which was dictated to the scribe in my presence, and it accurately records my words and actions.     Milton Carbone CNP 10/14/20 3:07 PM    Joellen Carbone, APRN - OLIVERIO      Fio, APRCANDACE - CNP  10/14/20 150

## 2020-10-15 ENCOUNTER — TELEPHONE (OUTPATIENT)
Dept: FAMILY MEDICINE CLINIC | Age: 55
End: 2020-10-15

## 2020-10-15 NOTE — LETTER
[address]  [address] [facility] , [address]  Phone: [phone]  Fax: [phone]    October 15, 2020    [address] [facility] [address]      Dear Annetta Mcdermott,    This letter is regarding your Emergency Department (ED) visit at [address] [place] [address] on 10/14/20. Renato Liz wanted to make sure that you understand your discharge instructions and that you were able to fill any prescriptions that may have been ordered for you. Please contact the office at the above phone number if the ED advised to you follow up with Renato Liz, or if you have any further questions or needs. Also did you know -   *Visiting the ED for a non-emergency could result in higher co-pays than you would normally be subject to paying? *You can call your doctor even after hours so they can direct you to the most appropriate care. [facility]) practices can often offer you an appointment on the same day that you call. *We have some [facility] offices that offer Walk-in appointments; check our website for availability in your community, [email or web address].      *Evisits are now available for patients for $36 through [employer] for certain conditions:  * Sinus, cold and or cough       * Diarrhea            * Headache  * Heartburn                                * Poison Shanna          * Back pain     * Urinary problems                         If you do not have [facility] and are interested, please contact the office and a staff member may assist you or go to [email or web address].     Sincerely,   CHRISTIAN Guerrier CNP and your [facility]

## 2020-11-02 ENCOUNTER — APPOINTMENT (OUTPATIENT)
Dept: CT IMAGING | Age: 55
End: 2020-11-02
Payer: COMMERCIAL

## 2020-11-02 ENCOUNTER — APPOINTMENT (OUTPATIENT)
Dept: GENERAL RADIOLOGY | Age: 55
End: 2020-11-02
Payer: COMMERCIAL

## 2020-11-02 ENCOUNTER — HOSPITAL ENCOUNTER (EMERGENCY)
Age: 55
Discharge: HOME | End: 2020-11-02
Attending: EMERGENCY MEDICINE
Payer: COMMERCIAL

## 2020-11-02 ENCOUNTER — APPOINTMENT (OUTPATIENT)
Dept: INTERVENTIONAL RADIOLOGY/VASCULAR | Age: 55
End: 2020-11-02
Payer: COMMERCIAL

## 2020-11-02 VITALS
HEART RATE: 67 BPM | TEMPERATURE: 98.1 F | WEIGHT: 250 LBS | BODY MASS INDEX: 40.18 KG/M2 | SYSTOLIC BLOOD PRESSURE: 118 MMHG | HEIGHT: 66 IN | RESPIRATION RATE: 14 BRPM | OXYGEN SATURATION: 100 % | DIASTOLIC BLOOD PRESSURE: 61 MMHG

## 2020-11-02 LAB
ALBUMIN SERPL-MCNC: 3.3 G/DL (ref 3.5–5.1)
ALP BLD-CCNC: 66 U/L (ref 38–126)
ALT SERPL-CCNC: 28 U/L (ref 11–66)
ANION GAP SERPL CALCULATED.3IONS-SCNC: 10 MEQ/L (ref 8–16)
AST SERPL-CCNC: 26 U/L (ref 5–40)
BASOPHILS # BLD: 1.3 %
BASOPHILS ABSOLUTE: 0.1 THOU/MM3 (ref 0–0.1)
BILIRUB SERPL-MCNC: 1.3 MG/DL (ref 0.3–1.2)
BUN BLDV-MCNC: 12 MG/DL (ref 7–22)
CALCIUM SERPL-MCNC: 8.9 MG/DL (ref 8.5–10.5)
CHLORIDE BLD-SCNC: 110 MEQ/L (ref 98–111)
CO2: 23 MEQ/L (ref 23–33)
CREAT SERPL-MCNC: 0.7 MG/DL (ref 0.4–1.2)
EOSINOPHIL # BLD: 6.6 %
EOSINOPHILS ABSOLUTE: 0.4 THOU/MM3 (ref 0–0.4)
ERYTHROCYTE [DISTWIDTH] IN BLOOD BY AUTOMATED COUNT: 14.7 % (ref 11.5–14.5)
ERYTHROCYTE [DISTWIDTH] IN BLOOD BY AUTOMATED COUNT: 54.2 FL (ref 35–45)
GFR SERPL CREATININE-BSD FRML MDRD: > 90 ML/MIN/1.73M2
GLUCOSE BLD-MCNC: 75 MG/DL (ref 70–108)
HCT VFR BLD CALC: 40.2 % (ref 42–52)
HEMOGLOBIN: 13.1 GM/DL (ref 14–18)
IMMATURE GRANS (ABS): 0.02 THOU/MM3 (ref 0–0.07)
IMMATURE GRANULOCYTES: 0.3 %
LYMPHOCYTES # BLD: 25.2 %
LYMPHOCYTES ABSOLUTE: 1.7 THOU/MM3 (ref 1–4.8)
MCH RBC QN AUTO: 32.8 PG (ref 26–33)
MCHC RBC AUTO-ENTMCNC: 32.6 GM/DL (ref 32.2–35.5)
MCV RBC AUTO: 100.5 FL (ref 80–94)
MONOCYTES # BLD: 7.2 %
MONOCYTES ABSOLUTE: 0.5 THOU/MM3 (ref 0.4–1.3)
NUCLEATED RED BLOOD CELLS: 0 /100 WBC
OSMOLALITY CALCULATION: 283.4 MOSMOL/KG (ref 275–300)
PLATELET # BLD: 142 THOU/MM3 (ref 130–400)
PMV BLD AUTO: 10.1 FL (ref 9.4–12.4)
POTASSIUM REFLEX MAGNESIUM: 4.2 MEQ/L (ref 3.5–5.2)
RBC # BLD: 4 MILL/MM3 (ref 4.7–6.1)
SEG NEUTROPHILS: 59.4 %
SEGMENTED NEUTROPHILS ABSOLUTE COUNT: 4 THOU/MM3 (ref 1.8–7.7)
SODIUM BLD-SCNC: 143 MEQ/L (ref 135–145)
TOTAL PROTEIN: 7 G/DL (ref 6.1–8)
WBC # BLD: 6.8 THOU/MM3 (ref 4.8–10.8)

## 2020-11-02 PROCEDURE — 96374 THER/PROPH/DIAG INJ IV PUSH: CPT

## 2020-11-02 PROCEDURE — 6360000002 HC RX W HCPCS: Performed by: NURSE PRACTITIONER

## 2020-11-02 PROCEDURE — 93971 EXTREMITY STUDY: CPT

## 2020-11-02 PROCEDURE — 99285 EMERGENCY DEPT VISIT HI MDM: CPT

## 2020-11-02 PROCEDURE — 96375 TX/PRO/DX INJ NEW DRUG ADDON: CPT

## 2020-11-02 PROCEDURE — 6360000004 HC RX CONTRAST MEDICATION: Performed by: NURSE PRACTITIONER

## 2020-11-02 PROCEDURE — 80053 COMPREHEN METABOLIC PANEL: CPT

## 2020-11-02 PROCEDURE — 75635 CT ANGIO ABDOMINAL ARTERIES: CPT

## 2020-11-02 PROCEDURE — 36415 COLL VENOUS BLD VENIPUNCTURE: CPT

## 2020-11-02 PROCEDURE — 85025 COMPLETE CBC W/AUTO DIFF WBC: CPT

## 2020-11-02 PROCEDURE — 73552 X-RAY EXAM OF FEMUR 2/>: CPT

## 2020-11-02 PROCEDURE — 72170 X-RAY EXAM OF PELVIS: CPT

## 2020-11-02 RX ORDER — LIDOCAINE 50 MG/G
1 PATCH TOPICAL DAILY
Qty: 10 PATCH | Refills: 0 | Status: SHIPPED | OUTPATIENT
Start: 2020-11-02 | End: 2020-11-05

## 2020-11-02 RX ORDER — KETOROLAC TROMETHAMINE 30 MG/ML
15 INJECTION, SOLUTION INTRAMUSCULAR; INTRAVENOUS ONCE
Status: DISCONTINUED | OUTPATIENT
Start: 2020-11-02 | End: 2020-11-02 | Stop reason: HOSPADM

## 2020-11-02 RX ORDER — KETOROLAC TROMETHAMINE 30 MG/ML
15 INJECTION, SOLUTION INTRAMUSCULAR; INTRAVENOUS ONCE
Status: DISCONTINUED | OUTPATIENT
Start: 2020-11-02 | End: 2020-11-02

## 2020-11-02 RX ORDER — IBUPROFEN 400 MG/1
400 TABLET ORAL EVERY 6 HOURS PRN
Qty: 120 TABLET | Refills: 0 | Status: ON HOLD | OUTPATIENT
Start: 2020-11-02 | End: 2021-08-12

## 2020-11-02 RX ORDER — MORPHINE SULFATE 4 MG/ML
4 INJECTION, SOLUTION INTRAMUSCULAR; INTRAVENOUS ONCE
Status: COMPLETED | OUTPATIENT
Start: 2020-11-02 | End: 2020-11-02

## 2020-11-02 RX ADMIN — IOPAMIDOL 125 ML: 755 INJECTION, SOLUTION INTRAVENOUS at 16:41

## 2020-11-02 RX ADMIN — KETOROLAC TROMETHAMINE 15 MG: 30 INJECTION, SOLUTION INTRAMUSCULAR; INTRAVENOUS at 11:25

## 2020-11-02 RX ADMIN — MORPHINE SULFATE 4 MG: 4 INJECTION, SOLUTION INTRAMUSCULAR; INTRAVENOUS at 13:16

## 2020-11-02 ASSESSMENT — PAIN DESCRIPTION - ORIENTATION: ORIENTATION: RIGHT;UPPER

## 2020-11-02 ASSESSMENT — ENCOUNTER SYMPTOMS
RHINORRHEA: 0
ABDOMINAL DISTENTION: 0
VOMITING: 0
SHORTNESS OF BREATH: 0
EYE DISCHARGE: 0
COUGH: 0
WHEEZING: 0
NAUSEA: 0
ABDOMINAL PAIN: 0
SORE THROAT: 0
DIARRHEA: 0
EYE PAIN: 0

## 2020-11-02 ASSESSMENT — PAIN SCALES - GENERAL
PAINLEVEL_OUTOF10: 7
PAINLEVEL_OUTOF10: 10

## 2020-11-02 ASSESSMENT — PAIN DESCRIPTION - LOCATION: LOCATION: LEG

## 2020-11-02 ASSESSMENT — PAIN DESCRIPTION - PAIN TYPE: TYPE: ACUTE PAIN

## 2020-11-02 NOTE — ED NOTES
Patient resting in bed. Respirations easy and unlabored. No distress noted. Call light within reach. Updated on POC. Will monitor.  Pain 7/10     Megan Mckeon RN  11/02/20 3693

## 2020-11-02 NOTE — ED PROVIDER NOTES
[place] ENCOUNTER          Pt Name: Shayy Haddad  MRN: [id number]  [place] 1965  Date of evaluation: 11/2/2020  Treating Resident Physician: Chasity Zamudio MD  Supervising Physician: My Galindo MD    [address] [place] [address]       Chief Complaint   Patient presents with    Leg Pain     History obtained from the patient. HISTORY OF PRESENT ILLNESS    HPI  Shayy Haddad is a 63 y.o. male who presents to the emergency department for evaluation of right leg pain. Patient states that for the last 3 days he has been having right leg pain. He describes the pain right hip and right mid femur. Patient states that the pain is worse with movement and improved with rest.  Patient states that the pain is achy on a scale of 1-10 is currently 6 out of 10. Patient denies similar pain in the past.  Patient states it is causing him difficulty ambulating but he can still ambulate. Patient denies any chest pain, abdomen pain, falls or injury. The patient has no other acute complaints at this time. REVIEW OF SYSTEMS   Review of Systems   Constitutional: Negative for fatigue and fever. HENT: Negative for congestion, ear pain, rhinorrhea and sore throat. Eyes: Negative for pain and discharge. Respiratory: Negative for cough, shortness of breath and wheezing. Cardiovascular: Negative for chest pain, palpitations and leg swelling. Gastrointestinal: Negative for abdominal distention, abdominal pain, diarrhea, nausea and vomiting. Genitourinary: Negative for difficulty urinating, flank pain and frequency. Musculoskeletal: Negative for arthralgias. Right hip and right mid femur pain   Neurological: Negative for dizziness, tremors, syncope, weakness and numbness.          PAST MEDICAL AND SURGICAL HISTORY     Past Medical History:   Diagnosis Date    Anxiety     Arthritis     Chronic kidney disease     Cirrhosis ([facility] [address]     Diverticulitis  Diverticulosis     GERD (gastroesophageal reflux disease)     Hypertension     Other disorders of kidney and ureter in diseases classified elsewhere     Psychiatric problem      Past Surgical History:   Procedure Laterality Date    ABDOMEN SURGERY      BACK SURGERY      cervical plate    CARDIAC CATHETERIZATION  2007?     CERVICAL DISC SURGERY      x 2 ---broken neck 7-2003    COLON SURGERY  2004    partial, due to diverticulitis    COLONOSCOPY      DILATATION, ESOPHAGUS      ENDOSCOPY, COLON, DIAGNOSTIC      FRACTURE SURGERY      Broke neck in 2003    NERVE BLOCK Bilateral 10/02/2017    Cervical Facet MBB at C4-5, C5-6, C6-7     [place] INJ,PARAVERTEBRAL L/S,1 LEVEL Bilateral 10/2/2017    C-FACET MBB C4-5, C5-6, C6-7 BILATERAL performed by Román Denney MD at [facility] ENDOSCOPY  2019    UPPER GASTROINTESTINAL ENDOSCOPY Left 9/28/2020    EGD BAND LIGATION performed by Pravin Gross MD at [facility] Endoscopy         MEDICATIONS     Current Facility-Administered Medications:     ketorolac (TORADOL) injection 15 mg, 15 mg, Intramuscular, Once, Eddie Osorio MD    Current Outpatient Medications:     ibuprofen (IBU) 400 MG tablet, Take 1 tablet by mouth every 6 hours as needed for Pain, Disp: 120 tablet, Rfl: 0    lidocaine (LIDODERM) 5 %, Place 1 patch onto the skin daily for 10 days 12 hours on, 12 hours off., Disp: 10 patch, Rfl: 0    spironolactone (ALDACTONE) 100 MG tablet, take 1 tablet by mouth every morning, Disp: 90 tablet, Rfl: 2    atorvastatin (LIPITOR) 40 MG tablet, take 1 tablet by mouth nightly, Disp: 30 tablet, Rfl: 5    rifaximin (XIFAXAN) 550 MG tablet, Take 1 tablet by mouth 2 times daily, Disp: 60 tablet, Rfl: 6    cyclobenzaprine (FLEXERIL) 10 MG tablet, take 1/2 to 1 tablet by mouth three times a day if needed, Disp: 60 tablet, Rfl: 3    DULoxetine (CYMBALTA) 60 MG extended release capsule, take 1 capsule by mouth once daily, Disp: 90 capsule, Rfl: 3    gabapentin (NEURONTIN) 100 MG capsule, Take 3 capsules by mouth 3 times daily. , Disp: 270 capsule, Rfl: 1    CONSTULOSE 10 GM/15ML solution, Take 30 mLs by mouth 3 times daily , Disp: , Rfl: 0    famotidine (PEPCID) 40 MG tablet, take 1 tablet every evening, Disp: 30 tablet, Rfl: 5    aspirin 81 MG chewable tablet, Take 1 tablet by mouth daily, Disp: 30 tablet, Rfl: 0    nitroGLYCERIN (NITROSTAT) 0.4 MG SL tablet, up to max of 3 total doses. If no relief after 1 dose, call 911., Disp: 25 tablet, Rfl: 0      SOCIAL HISTORY     Social History     Social History Narrative    Not on file     Social History     Tobacco Use    Smoking status: Current Every Day Smoker     Packs/day: 0.50     Years: 25.00     Pack years: 12.50     Types: Cigarettes    Smokeless tobacco: Never Used    Tobacco comment: printed to avs   Substance Use Topics    Alcohol use: No     Comment: Quit 2 years ago    Drug use: No         ALLERGIES   No Known Allergies      FAMILY HISTORY     Family History   Problem Relation Age of Onset    Hypertension Mother     Heart Disease Mother     Alzheimer's Disease Mother     Heart Failure Mother     Hypertension Father     Heart Disease Father     Cancer Father     High Blood Pressure Paternal Uncle     Heart Disease Paternal Uncle     Colon Cancer Neg Hx     Colon Polyps Neg Hx          PREVIOUS RECORDS   Previous records reviewed today        PHYSICAL EXAM     ED Triage Vitals [11/02/20 1043]   BP Temp Temp Source Pulse Resp SpO2 Height Weight   106/74 98.1 °F (36.7 °C) Oral 69 16 100 % 5' 6\" (1.676 m) 250 lb (113.4 kg)     Initial vital signs and nursing assessment reviewed and normal. Pulsoximetry is normal per my interpretation.     Additional Vital Signs:  Vitals:    11/02/20 1747   BP: 118/61   Pulse: 67   Resp: 14   Temp:    SpO2: 100%       Physical Exam  Constitutional:       Appearance: Normal appearance. HENT:      Head: Normocephalic. Right Ear: External ear normal.      Left Ear: External ear normal.      Nose: Nose normal.      Mouth/Throat:      Mouth: Mucous membranes are moist.      Pharynx: Oropharynx is clear. Eyes:      Conjunctiva/sclera: Conjunctivae normal.      Pupils: Pupils are equal, round, and reactive to light. Neck:      Musculoskeletal: Normal range of motion and neck supple. Cardiovascular:      Rate and Rhythm: Normal rate and regular rhythm. Pulses: Normal pulses. Heart sounds: Normal heart sounds. Pulmonary:      Effort: Pulmonary effort is normal.      Breath sounds: Normal breath sounds. Abdominal:      General: Bowel sounds are normal.      Palpations: Abdomen is soft. Musculoskeletal: Normal range of motion. Comments: Right hip pain with flexion. Full range of motion to right leg. Patient noted with mild edema to right lower extremity compared to the left. Skin:     General: Skin is warm and dry. Capillary Refill: Capillary refill takes less than 2 seconds. Neurological:      General: No focal deficit present. Mental Status: He is alert. MEDICAL DECISION MAKING   Initial Assessment:right hip pain. Plan: Initially presented with right hip pain and right mid femur pain. Patient also had slightly edematous right lower extremity. Patient had work-up including ultrasound of the legs to rule out DVT and aortic imaging. At this time there is no acute abnormality would suggest either a DVT or vascular injury. Patient received Toradol with some relief and later morphine. . Based on patient's history exam findings and diagnostic studies it appears that this is a musculoskeletal pain. Patient will be instructed to follow-up with PCP  or OIO. Patient states the pain has improved and is able to ambulate without difficulty. Patient will be discharged at this time with precautions.         ED RESULTS   Laboratory in voice recognition technology. **      Final report electronically signed by Dr. Jb Laurent on 11/2/2020 12:20 PM          ED Medications administered this visit:   Medications   ketorolac (TORADOL) injection 15 mg (15 mg Intramuscular Not Given 11/2/20 5435)   morphine injection 4 mg (4 mg Intravenous Given 11/2/20 1316)   iopamidol (ISOVUE-370) 76 % injection 125 mL (125 mLs Intravenous Given 11/2/20 1641)         ED COURSE      Strict return precautions and follow up instructions were discussed with the patient prior to discharge, with which the patient agrees. MEDICATION CHANGES     Discharge Medication List as of 11/2/2020  6:14 PM      START taking these medications    Details   ibuprofen (IBU) 400 MG tablet Take 1 tablet by mouth every 6 hours as needed for Pain, Disp-120 tablet,R-0Print               FINAL DISPOSITION     Final diagnoses:   Hip pain, right     Condition: stable  Dispo: Discharge to home      This transcription was electronically signed. Parts of this transcriptions may have been dictated by use of voice recognition software and electronically transcribed, and parts may have been transcribed with the assistance of an ED scribe. The transcription may contain errors not detected in proofreading. Please refer to my supervising physician's documentation if my documentation differs.     Electronically Signed: Marquis Costa, 11/02/20, 7:06 PM         Marquis Costa MD  Resident  11/02/20 4398

## 2020-11-02 NOTE — ED TRIAGE NOTES
Presents to ED with c/o right upper leg pain that radiates down his leg since Thursday. Denies injury.

## 2020-11-02 NOTE — ED NOTES
Patient resting in bed. Respirations easy and unlabored. No distress noted. Call light within reach.   Medicated per order     Cindy Rodriguez RN  11/02/20 8021

## 2020-11-02 NOTE — ED NOTES
Bed: 008A  Expected date:   Expected time:   [facility]  Comments:     Ira Damon RN  11/02/20 1030

## 2020-11-02 NOTE — ED NOTES
Patient resting in bed. Respirations easy and unlabored. No distress noted. Call light within reach. Updated on POC. Will monitor.       Jason Cesar RN  11/02/20 3614

## 2020-11-02 NOTE — ED NOTES
Patient resting in bed. Respirations easy and unlabored. No distress noted. Call light within reach. Updated on POC. Will monitor.       Donald Mary RN  11/02/20 9459

## 2020-11-02 NOTE — ED PROVIDER NOTES
[facility]  EMERGENCY MEDICINE ATTENDING ATTESTATION      Evaluation of Nati Rivas. Case discussed and care plan developed with resident physician. I agree with the resident physician documentation and plan as documented by her, except if my documentation differs. Patient seen, interviewed and examined by me. I reviewed the medical, surgical, family and social history, medications and allergies. I have reviewed the nursing documentation. I have reviewed the patient's vital signs and are normal per my interpretation. Pulsoxymetry is normal per my interpretation. Brief H&P   Patient c/o right leg pain for the last 4 days, worse today, to the point that he was unable to get up from bed without crawling. States his leg has been minimally swelling for the last few days as well. Denies shortness of breath, previous history of thrombosis, although he does have history of cirrhosis and esophageal varices that are pending banding. Physical exam is notable for well appearing, there is no skin hyperalgesia, no changes in skin color, there is a very subtle difference in size on the right leg and left leg with no pitting edema, but sock marks are little more pronounced on the right ankle than they are on the left. Medical Decision Making   MDM: Ankle pain, undetermined etiology, rule out doctors, rule out DVT. Plan: Pain control, imaging, observation    Please see the resident physician completed note for final disposition except as documented on this attestation. I have reviewed and interpreted all available lab, radiology and ekg results available at the moment. Diagnosis, treatment and disposition plans were discussed and agreed upon by patient. This transcription was electronically signed. It was dictated by use of voice recognition software and electronically transcribed. The transcription may contain errors not detected in proofreading.      I performed direct supervision and was present for the critical portion following procedures: None  Critical care time on this case: None    Electronically signed by Emir Mckenzie MD on 11/2/20 at 1:50 PM DAMIR Mckenzie MD  11/02/20 1940

## 2020-11-02 NOTE — ED NOTES
Patient resting in bed. Respirations easy and unlabored. No distress noted. Call light [facility] reach. [facility]       Johnnie Palencia RN  11/02/20 3904

## 2020-11-05 ENCOUNTER — OFFICE VISIT (OUTPATIENT)
Dept: FAMILY MEDICINE CLINIC | Age: 55
End: 2020-11-05
Payer: COMMERCIAL

## 2020-11-05 ENCOUNTER — TELEPHONE (OUTPATIENT)
Dept: FAMILY MEDICINE CLINIC | Age: 55
End: 2020-11-05

## 2020-11-05 VITALS
RESPIRATION RATE: 16 BRPM | BODY MASS INDEX: 39.87 KG/M2 | HEIGHT: 67 IN | HEART RATE: 84 BPM | DIASTOLIC BLOOD PRESSURE: 82 MMHG | SYSTOLIC BLOOD PRESSURE: 124 MMHG | WEIGHT: 254 LBS

## 2020-11-05 PROCEDURE — 99214 OFFICE O/P EST MOD 30 MIN: CPT | Performed by: NURSE PRACTITIONER

## 2020-11-05 PROCEDURE — 96372 THER/PROPH/DIAG INJ SC/IM: CPT | Performed by: NURSE PRACTITIONER

## 2020-11-05 PROCEDURE — G8417 CALC BMI ABV UP PARAM F/U: HCPCS | Performed by: NURSE PRACTITIONER

## 2020-11-05 PROCEDURE — 3017F COLORECTAL CA SCREEN DOC REV: CPT | Performed by: NURSE PRACTITIONER

## 2020-11-05 PROCEDURE — G8427 DOCREV CUR MEDS BY ELIG CLIN: HCPCS | Performed by: NURSE PRACTITIONER

## 2020-11-05 PROCEDURE — 4004F PT TOBACCO SCREEN RCVD TLK: CPT | Performed by: NURSE PRACTITIONER

## 2020-11-05 PROCEDURE — G8484 FLU IMMUNIZE NO ADMIN: HCPCS | Performed by: NURSE PRACTITIONER

## 2020-11-05 RX ORDER — LIDOCAINE 40 MG/G
CREAM TOPICAL
Qty: 1 TUBE | Refills: 2 | Status: ON HOLD | OUTPATIENT
Start: 2020-11-05 | End: 2021-08-12

## 2020-11-05 RX ORDER — METHYLPREDNISOLONE ACETATE 80 MG/ML
160 INJECTION, SUSPENSION INTRA-ARTICULAR; INTRALESIONAL; INTRAMUSCULAR; SOFT TISSUE ONCE
Status: COMPLETED | OUTPATIENT
Start: 2020-11-05 | End: 2020-11-05

## 2020-11-05 RX ORDER — CYCLOBENZAPRINE HCL 10 MG
TABLET ORAL
Qty: 60 TABLET | Refills: 3 | Status: SHIPPED | OUTPATIENT
Start: 2020-11-05 | End: 2021-03-01

## 2020-11-05 RX ORDER — PREDNISONE 10 MG/1
TABLET ORAL
Qty: 30 TABLET | Refills: 0 | Status: SHIPPED | OUTPATIENT
Start: 2020-11-05 | End: 2020-11-15

## 2020-11-05 RX ORDER — HYDROCODONE BITARTRATE AND ACETAMINOPHEN 5; 325 MG/1; MG/1
1 TABLET ORAL EVERY 4 HOURS PRN
Qty: 18 TABLET | Refills: 0 | Status: SHIPPED | OUTPATIENT
Start: 2020-11-05 | End: 2020-11-08

## 2020-11-05 RX ORDER — ALPRAZOLAM 1 MG/1
TABLET ORAL
Qty: 90 TABLET | Refills: 0 | Status: SHIPPED | OUTPATIENT
Start: 2020-11-05 | End: 2020-12-03

## 2020-11-05 RX ADMIN — METHYLPREDNISOLONE ACETATE 160 MG: 80 INJECTION, SUSPENSION INTRA-ARTICULAR; INTRALESIONAL; INTRAMUSCULAR; SOFT TISSUE at 12:25

## 2020-11-05 ASSESSMENT — ENCOUNTER SYMPTOMS: ABDOMINAL PAIN: 1

## 2020-11-05 NOTE — TELEPHONE ENCOUNTER
Saw Amandeep Al today for right leg pain (see note). Abby Abdullahi is requesting that #30 of Norco 5/325 be sent in to pharmacy for him.      Lauar  [facility]

## 2020-11-05 NOTE — PATIENT INSTRUCTIONS
Patient Education        Meralgia Paresthetica: Care Instructions  Your Care Instructions  Meralgia paresthetica (say \"muh-RAL-juh jgc-rup-XWJC-ick-uh\") is pain and numbness in the outer part of your thigh. The pain might get worse after you walk or stand for a long time. This pain and numbness occur when a nerve in your thigh is pinched (compressed). Sometimes the problem is caused by wearing tight clothing or being overweight. Most of the time the problem goes away on its own in a few months. Lowering any pressure on the thigh area may help. Wear loose clothes, and lose weight if you need to. Follow-up care is a key part of your treatment and safety. Be sure to make and go to all appointments, and call your doctor if you are having problems. It's also a good idea to know your test results and keep a list of the medicines you take. How can you care for yourself at home? · Most times the problem gets better on its own. Try wearing loose clothing to see if this helps. · Lose weight if you need to. Talk with your doctor if you need help. When should you call for help? Watch closely for changes in your health, and be sure to contact your doctor if:    · You have new symptoms, such as pain that gets worse or new numbness in your thigh.     · You do not get better as expected. Where can you learn more? Go to https://[email or web address]. org and sign in to your [employer] account. Enter V114 in the [facility] box to learn more about \"Meralgia Paresthetica: Care Instructions. \"     If you do not have an account, please click on the \"Sign Up Now\" link. Current as of: November 20, 2019               Content Version: 12.6  © 7542-9438 [employer]. Care instructions adapted under license by [address]  If you have questions about a medical condition or this instruction, always ask your healthcare professional. [address]  any warranty or liability for your use of this information.

## 2020-11-05 NOTE — PROGRESS NOTES
[address] [place] [facility] [address]  Dept: [phone]  Dept Fax: [phone]  Loc: [phone]  PROGRESS NOTE      VisitDate: 11/5/2020    Naveed Fine is a 63 y.o. male who presents today for:     Chief Complaint   Patient presents with    Extremity Weakness     Woke up Monday morning and could not walk, cannot put weight on the right leg. Went to [facility] and they did CT, xray and u/s and found nothing.  Injections     Flu shot         Subjective:  Patient presents for ED 11/2/20 follow-up/right thigh pain and weakness. Patient reports she had awakened with right thigh pain leg weakness on Monday morning. Reports this resulted in a fall when he tried to stand. Denies any back pain reports that pain radiates from thigh down into lower leg when attempting to walk. Reports CT, Doppler and x-rays of pelvis were negative. Patient requesting pain medication as well as refill of Xanax and Flexeril. History of anxiety, arthritis, psoriasis, obesity, hypertension, GERD, chronic kidney disease, alcoholism. Review of Systems   Constitutional: Negative for fatigue and fever. Gastrointestinal: Positive for abdominal pain. Musculoskeletal: Positive for arthralgias and gait problem. Psychiatric/Behavioral: The patient is nervous/anxious. All other systems reviewed and are negative. Past Medical History:   Diagnosis Date    Anxiety     Arthritis     Chronic kidney disease     Cirrhosis ([facility] [address]     Diverticulitis     Diverticulosis     GERD (gastroesophageal reflux disease)     Hypertension     Other disorders of kidney and ureter in diseases classified elsewhere     Psychiatric problem       Past Surgical History:   Procedure Laterality Date    ABDOMEN SURGERY      BACK SURGERY      cervical plate    CARDIAC CATHETERIZATION  2007?     CERVICAL DISC SURGERY      x 2 ---broken neck 7-2003    COLON SURGERY  2004 partial, due to diverticulitis    COLONOSCOPY      DILATATION, ESOPHAGUS      ENDOSCOPY, COLON, DIAGNOSTIC      FRACTURE SURGERY      Broke neck in 2003    NERVE BLOCK Bilateral 10/02/2017    Cervical Facet MBB at C4-5, C5-6, C6-7     [place] INJ,PARAVERTEBRAL L/S,1 LEVEL Bilateral 10/2/2017    C-FACET MBB C4-5, C5-6, C6-7 BILATERAL performed by Bhumika Haider MD at [address] [facility] ENDOSCOPY  2019    UPPER GASTROINTESTINAL ENDOSCOPY Left 9/28/2020    EGD BAND LIGATION performed by Ayan Ortiz MD at [facility] End[facility]     Family History   Problem Relation Age of Onset    Hypertension Mother     Heart Disease Mother     Alzheimer's Disease Mother     Heart Failure Mother     Hypertension Father     Heart Disease Father     Cancer Father     High Blood Pressure Paternal Uncle     Heart Disease Paternal Uncle     Colon Cancer Neg Hx     Colon Polyps Neg Hx      Social History     Tobacco Use    Smoking status: Current Every Day Smoker     Packs/day: 0.50     Years: 25.00     Pack years: 12.50     Types: Cigarettes    Smokeless tobacco: Never Used    Tobacco comment: printed to avs   Substance Use Topics    Alcohol use: No     Comment: Quit 2 years ago      Current Outpatient Medications   Medication Sig Dispense Refill    ibuprofen (IBU) 400 MG tablet Take 1 tablet by mouth every 6 hours as needed for Pain 120 tablet 0    spironolactone (ALDACTONE) 100 MG tablet take 1 tablet by mouth every morning 90 tablet 2    atorvastatin (LIPITOR) 40 MG tablet take 1 tablet by mouth nightly 30 tablet 5    rifaximin (XIFAXAN) 550 MG tablet Take 1 tablet by mouth 2 times daily 60 tablet 6    cyclobenzaprine (FLEXERIL) 10 MG tablet take 1/2 to 1 tablet by mouth three times a day if needed 60 tablet 3    DULoxetine (CYMBALTA) 60 MG extended release capsule take 1 capsule by mouth once daily 90 capsule 3    gabapentin (NEURONTIN) 100 MG capsule Take 3 capsules by mouth 3 times daily. 270 capsule 1    CONSTULOSE 10 GM/15ML solution Take 30 mLs by mouth 3 times daily   0    famotidine (PEPCID) 40 MG tablet take 1 tablet every evening 30 tablet 5    aspirin 81 MG chewable tablet Take 1 tablet by mouth daily 30 tablet 0    nitroGLYCERIN (NITROSTAT) 0.4 MG SL tablet up to max of 3 total doses. If no relief after 1 dose, call 911. 25 tablet 0     No current facility-administered medications for this visit. No Known Allergies  Health Maintenance   Topic Date Due    Hepatitis A vaccine (1 of 2 - Risk 2-dose series) 06[birth year]03/1965    HIV screen  06/03/1979    Hepatitis B vaccine (1 of 3 - Risk 3-dose series) 06/03/1983    Shingles Vaccine (1 of 2) 06/03/2014    Flu vaccine (1) 09/01/2020    Lipid screen  10/12/2020    Potassium monitoring  11/02/2021    Creatinine monitoring  11/02/2021    Colon cancer screen colonoscopy  03/01/2029    DTaP/Tdap/Td vaccine (2 - Td) 07/27/2030    Pneumococcal 0-64 years Vaccine  Completed    Hepatitis C screen  Completed    Hib vaccine  Aged Out    Meningococcal (ACWY) vaccine  Aged Out         Objective:     Physical Exam  Vitals signs and nursing note reviewed. Constitutional:       Appearance: Normal appearance. He is obese. HENT:      Head: Normocephalic. Nose: Nose normal.      Mouth/Throat:      Pharynx: Oropharynx is clear. Eyes:      Conjunctiva/sclera: Conjunctivae normal.   Cardiovascular:      Rate and Rhythm: Normal rate and regular rhythm. Pulses: Normal pulses. Heart sounds: Normal heart sounds. Pulmonary:      Effort: Pulmonary effort is normal.      Breath sounds: Normal breath sounds. Abdominal:      General: Bowel sounds are normal. There is no distension. Palpations: Abdomen is soft. Tenderness: There is no abdominal tenderness. Musculoskeletal: Normal range of motion.       Right upper leg: He exhibits tenderness, bony tenderness and swelling. He exhibits no deformity. Legs:    Skin:     General: Skin is warm. Neurological:      General: No focal deficit present. Mental Status: He is alert and oriented to person, place, and time. Comments: Equal strength lower extremities. Pedal pulses palpable   Psychiatric:         Mood and Affect: Mood is anxious. Speech: Speech normal.         Behavior: Behavior is cooperative. Thought Content: Thought content normal.         Cognition and Memory: Cognition normal.         Judgment: Judgment normal.       /82   Pulse 84   Resp 16   Ht 5' 7\" (1.702 m)   Wt 254 lb (115.2 kg)   BMI 39.78 kg/m²   Normal pelvis and right femur x-ray. Negative Doppler. Negative CTA of abdomen. Reviewed with patient    Impression/Plan:  1. Alcoholic cirrhosis, unspecified whether ascites present ([address]    2. Anxiety       Diagnosis Orders   1. Meralgia paresthetica of right side  [facility]   2. Alcoholic cirrhosis, unspecified whether ascites present (HCC)  ALPRAZolam (XANAX) 1 MG tablet   3. Anxiety  ALPRAZolam (XANAX) 1 MG tablet   4. Acute pain of right thigh  [facility] Physical [facility]       Requested Prescriptions     Pending Prescriptions Disp Refills    ALPRAZolam (XANAX) 1 MG tablet 90 tablet 0     Sig: take 1 tablet by mouth three times a day if needed for sleep and anxiety    cyclobenzaprine (FLEXERIL) 10 MG tablet 60 tablet 3     Sig: take 1/2 to 1 tablet by mouth three times a day if needed     No orders of the defined types were placed in this encounter.     Outpatient Encounter Medications as of 11/5/2020   Medication Sig Dispense Refill    ALPRAZolam (XANAX) 1 MG tablet take 1 tablet by mouth three times a day if needed for sleep and anxiety 90 tablet 0    cyclobenzaprine (FLEXERIL) 10 MG tablet take 1/2 to 1 tablet by mouth three times a day if needed 60 tablet 3    predniSONE (DELTASONE) 10 MG tablet 4 po qd for 3 days, then 3 answered. Pt voiced understanding. Reviewed health maintenance. Patient agreedwith treatment plan. Follow up as directed. OARRS report reviewed. Physical therapy ordered. Xanax and Flexeril refilled. Depo-Medrol 160 IM given in office. EMI Nelson. Meralgia paraesthetica information provided.   25 minutes spent with patient  Electronically signed by CHRISTIAN Slade CNP on 11/5/2020 at 12:12 PM

## 2020-11-12 RX ORDER — GABAPENTIN 100 MG/1
CAPSULE ORAL
Qty: 180 CAPSULE | Refills: 2 | Status: SHIPPED | OUTPATIENT
Start: 2020-11-12 | End: 2021-02-04 | Stop reason: SDUPTHER

## 2020-11-24 RX ORDER — HYDROCODONE BITARTRATE AND ACETAMINOPHEN 5; 325 MG/1; MG/1
1 TABLET ORAL EVERY 4 HOURS PRN
Qty: 18 TABLET | Refills: 0 | OUTPATIENT
Start: 2020-11-24 | End: 2020-11-27

## 2020-11-24 NOTE — TELEPHONE ENCOUNTER
I have not seen the patient for his ongoing pain. I see that Ingrid Reeder ordered physical therapy which does not seem to have been engaged in . He is also on Xanax and does not have a medication contract. He may need to be referred to pain management as I don't see an appropriate diagnosis to continue narcotic therapy.

## 2020-12-02 NOTE — TELEPHONE ENCOUNTER
Med pending in 11/24/2020 encounter. Will be able to send to pharmacy on 12/03/2020. Please refer to 11/24/2020 refill encounter.

## 2020-12-03 RX ORDER — ALPRAZOLAM 1 MG/1
TABLET ORAL
Qty: 90 TABLET | Refills: 0 | Status: SHIPPED | OUTPATIENT
Start: 2020-12-03 | End: 2020-12-31 | Stop reason: SDUPTHER

## 2020-12-03 NOTE — TELEPHONE ENCOUNTER
Patient called back following up on refill request for ALPRAZolam Adry Lacy 1 MG tablet.      [address]

## 2020-12-23 NOTE — TELEPHONE ENCOUNTER
Vicky Nunez called requesting a refill on the following medications:  Requested Prescriptions     Pending Prescriptions Disp Refills    ALPRAZolam (XANAX) 1 MG tablet 90 tablet 0     Sig: take 1 tablet by mouth three times a day if needed for anxiety and sleep     Pharmacy verified:  .pv      Date of last visit: 11/5/2020  Date of next visit (if applicable): Visit date not found

## 2020-12-28 RX ORDER — ALPRAZOLAM 1 MG/1
TABLET ORAL
Qty: 90 TABLET | OUTPATIENT
Start: 2020-12-28

## 2020-12-31 RX ORDER — ALPRAZOLAM 1 MG/1
TABLET ORAL
Qty: 90 TABLET | Refills: 0 | Status: SHIPPED | OUTPATIENT
Start: 2020-12-31 | End: 2021-02-08

## 2021-01-14 ENCOUNTER — HOSPITAL ENCOUNTER (EMERGENCY)
Age: 56
Discharge: HOME | End: 2021-01-14
Attending: EMERGENCY MEDICINE
Payer: COMMERCIAL

## 2021-01-14 ENCOUNTER — APPOINTMENT (OUTPATIENT)
Dept: CT IMAGING | Age: 56
End: 2021-01-14
Payer: COMMERCIAL

## 2021-01-14 ENCOUNTER — APPOINTMENT (OUTPATIENT)
Dept: GENERAL RADIOLOGY | Age: 56
End: 2021-01-14
Payer: COMMERCIAL

## 2021-01-14 VITALS
DIASTOLIC BLOOD PRESSURE: 77 MMHG | HEIGHT: 67 IN | HEART RATE: 84 BPM | SYSTOLIC BLOOD PRESSURE: 117 MMHG | WEIGHT: 252 LBS | RESPIRATION RATE: 18 BRPM | OXYGEN SATURATION: 99 % | TEMPERATURE: 97.9 F | BODY MASS INDEX: 39.55 KG/M2

## 2021-01-14 DIAGNOSIS — J40 BRONCHITIS: Primary | ICD-10-CM

## 2021-01-14 LAB
ALBUMIN SERPL-MCNC: 3.4 G/DL (ref 3.5–5.1)
ALP BLD-CCNC: 75 U/L (ref 38–126)
ALT SERPL-CCNC: 22 U/L (ref 11–66)
AMMONIA: 44 UMOL/L (ref 11–60)
AMPHETAMINE+METHAMPHETAMINE URINE SCREEN: NEGATIVE
ANION GAP SERPL CALCULATED.3IONS-SCNC: 10 MEQ/L (ref 8–16)
AST SERPL-CCNC: 25 U/L (ref 5–40)
BACTERIA: ABNORMAL /HPF
BARBITURATE QUANTITATIVE URINE: NEGATIVE
BASOPHILS # BLD: 0.6 %
BASOPHILS ABSOLUTE: 0 THOU/MM3 (ref 0–0.1)
BENZODIAZEPINE QUANTITATIVE URINE: POSITIVE
BILIRUB SERPL-MCNC: 0.7 MG/DL (ref 0.3–1.2)
BILIRUBIN DIRECT: < 0.2 MG/DL (ref 0–0.3)
BILIRUBIN URINE: NEGATIVE
BLOOD, URINE: NEGATIVE
BUN BLDV-MCNC: 9 MG/DL (ref 7–22)
CALCIUM SERPL-MCNC: 8.8 MG/DL (ref 8.5–10.5)
CANNABINOID QUANTITATIVE URINE: POSITIVE
CASTS 2: ABNORMAL /LPF
CASTS UA: ABNORMAL /LPF
CHARACTER, URINE: ABNORMAL
CHLORIDE BLD-SCNC: 109 MEQ/L (ref 98–111)
CO2: 20 MEQ/L (ref 23–33)
COCAINE METABOLITE QUANTITATIVE URINE: NEGATIVE
COLOR: YELLOW
CREAT SERPL-MCNC: 0.8 MG/DL (ref 0.4–1.2)
CRYSTALS, UA: ABNORMAL
EKG ATRIAL RATE: 91 BPM
EKG P AXIS: 60 DEGREES
EKG P-R INTERVAL: 142 MS
EKG Q-T INTERVAL: 368 MS
EKG QRS DURATION: 88 MS
EKG QTC CALCULATION (BAZETT): 452 MS
EKG R AXIS: 48 DEGREES
EKG T AXIS: 35 DEGREES
EKG VENTRICULAR RATE: 91 BPM
EOSINOPHIL # BLD: 4.9 %
EOSINOPHILS ABSOLUTE: 0.3 THOU/MM3 (ref 0–0.4)
EPITHELIAL CELLS, UA: ABNORMAL /HPF
ERYTHROCYTE [DISTWIDTH] IN BLOOD BY AUTOMATED COUNT: 14.6 % (ref 11.5–14.5)
ERYTHROCYTE [DISTWIDTH] IN BLOOD BY AUTOMATED COUNT: 53.2 FL (ref 35–45)
FLU A ANTIGEN: NEGATIVE
FLU B ANTIGEN: NEGATIVE
GFR SERPL CREATININE-BSD FRML MDRD: > 90 ML/MIN/1.73M2
GLUCOSE BLD-MCNC: 85 MG/DL (ref 70–108)
GLUCOSE URINE: NEGATIVE MG/DL
HCT VFR BLD CALC: 39.4 % (ref 42–52)
HEMOGLOBIN: 12.9 GM/DL (ref 14–18)
IMMATURE GRANS (ABS): 0.01 THOU/MM3 (ref 0–0.07)
IMMATURE GRANULOCYTES: 0.2 %
KETONES, URINE: NEGATIVE
LEUKOCYTE ESTERASE, URINE: ABNORMAL
LIPASE: 65.5 U/L (ref 5.6–51.3)
LYMPHOCYTES # BLD: 29.6 %
LYMPHOCYTES ABSOLUTE: 1.9 THOU/MM3 (ref 1–4.8)
MAGNESIUM: 1.8 MG/DL (ref 1.6–2.4)
MCH RBC QN AUTO: 32.5 PG (ref 26–33)
MCHC RBC AUTO-ENTMCNC: 32.7 GM/DL (ref 32.2–35.5)
MCV RBC AUTO: 99.2 FL (ref 80–94)
MISCELLANEOUS 2: ABNORMAL
MONOCYTES # BLD: 10.9 %
MONOCYTES ABSOLUTE: 0.7 THOU/MM3 (ref 0.4–1.3)
NITRITE, URINE: NEGATIVE
NUCLEATED RED BLOOD CELLS: 0 /100 WBC
OPIATES, URINE: NEGATIVE
OSMOLALITY CALCULATION: 275.5 MOSMOL/KG (ref 275–300)
OXYCODONE: NEGATIVE
PH UA: 5.5 (ref 5–9)
PHENCYCLIDINE QUANTITATIVE URINE: NEGATIVE
PLATELET # BLD: 136 THOU/MM3 (ref 130–400)
PMV BLD AUTO: 10.6 FL (ref 9.4–12.4)
POTASSIUM REFLEX MAGNESIUM: 3.8 MEQ/L (ref 3.5–5.2)
PRO-BNP: 51.5 PG/ML (ref 0–900)
PROTEIN UA: NEGATIVE
RBC # BLD: 3.97 MILL/MM3 (ref 4.7–6.1)
RBC URINE: ABNORMAL /HPF
RENAL EPITHELIAL, UA: ABNORMAL
SEG NEUTROPHILS: 53.8 %
SEGMENTED NEUTROPHILS ABSOLUTE COUNT: 3.5 THOU/MM3 (ref 1.8–7.7)
SODIUM BLD-SCNC: 139 MEQ/L (ref 135–145)
SPECIFIC GRAVITY, URINE: 1.02 (ref 1–1.03)
TOTAL PROTEIN: 7 G/DL (ref 6.1–8)
UROBILINOGEN, URINE: 1 EU/DL (ref 0–1)
WBC # BLD: 6.5 THOU/MM3 (ref 4.8–10.8)
WBC UA: ABNORMAL /HPF
YEAST: ABNORMAL

## 2021-01-14 PROCEDURE — 74176 CT ABD & PELVIS W/O CONTRAST: CPT

## 2021-01-14 PROCEDURE — 93005 ELECTROCARDIOGRAM TRACING: CPT | Performed by: EMERGENCY MEDICINE

## 2021-01-14 PROCEDURE — 83690 ASSAY OF LIPASE: CPT

## 2021-01-14 PROCEDURE — 80307 DRUG TEST PRSMV CHEM ANLYZR: CPT

## 2021-01-14 PROCEDURE — 82140 ASSAY OF AMMONIA: CPT

## 2021-01-14 PROCEDURE — 81001 URINALYSIS AUTO W/SCOPE: CPT

## 2021-01-14 PROCEDURE — 83880 ASSAY OF NATRIURETIC PEPTIDE: CPT

## 2021-01-14 PROCEDURE — 71045 X-RAY EXAM CHEST 1 VIEW: CPT

## 2021-01-14 PROCEDURE — 36415 COLL VENOUS BLD VENIPUNCTURE: CPT

## 2021-01-14 PROCEDURE — U0003 INFECTIOUS AGENT DETECTION BY NUCLEIC ACID (DNA OR RNA); SEVERE ACUTE RESPIRATORY SYNDROME CORONAVIRUS 2 (SARS-COV-2) (CORONAVIRUS DISEASE [COVID-19]), AMPLIFIED PROBE TECHNIQUE, MAKING USE OF HIGH THROUGHPUT TECHNOLOGIES AS DESCRIBED BY CMS-2020-01-R: HCPCS

## 2021-01-14 PROCEDURE — 87804 INFLUENZA ASSAY W/OPTIC: CPT

## 2021-01-14 PROCEDURE — 85025 COMPLETE CBC W/AUTO DIFF WBC: CPT

## 2021-01-14 PROCEDURE — 83735 ASSAY OF MAGNESIUM: CPT

## 2021-01-14 PROCEDURE — 80076 HEPATIC FUNCTION PANEL: CPT

## 2021-01-14 PROCEDURE — 99284 EMERGENCY DEPT VISIT MOD MDM: CPT

## 2021-01-14 PROCEDURE — 80048 BASIC METABOLIC PNL TOTAL CA: CPT

## 2021-01-14 PROCEDURE — 6370000000 HC RX 637 (ALT 250 FOR IP): Performed by: EMERGENCY MEDICINE

## 2021-01-14 PROCEDURE — 87086 URINE CULTURE/COLONY COUNT: CPT

## 2021-01-14 RX ORDER — LEVOFLOXACIN 500 MG/1
500 TABLET, FILM COATED ORAL ONCE
Status: COMPLETED | OUTPATIENT
Start: 2021-01-14 | End: 2021-01-14

## 2021-01-14 RX ORDER — LEVOFLOXACIN 500 MG/1
500 TABLET, FILM COATED ORAL DAILY
Qty: 10 TABLET | Refills: 0 | Status: SHIPPED | OUTPATIENT
Start: 2021-01-14 | End: 2021-01-24

## 2021-01-14 RX ORDER — ALBUTEROL SULFATE 90 UG/1
2 AEROSOL, METERED RESPIRATORY (INHALATION) 4 TIMES DAILY PRN
Qty: 1 INHALER | Refills: 0 | Status: ON HOLD | OUTPATIENT
Start: 2021-01-14 | End: 2021-08-12

## 2021-01-14 RX ADMIN — LEVOFLOXACIN 500 MG: 500 TABLET, FILM COATED ORAL at 02:55

## 2021-01-14 ASSESSMENT — PAIN DESCRIPTION - PAIN TYPE: TYPE: CHRONIC PAIN

## 2021-01-14 ASSESSMENT — ENCOUNTER SYMPTOMS
COUGH: 0
RHINORRHEA: 0
SINUS PRESSURE: 0
PHOTOPHOBIA: 0
EYE PAIN: 0
ABDOMINAL DISTENTION: 1
BLOOD IN STOOL: 0
BACK PAIN: 0
CHOKING: 0
CONSTIPATION: 0
TROUBLE SWALLOWING: 0
NAUSEA: 0
EYE ITCHING: 0
VOMITING: 0
DIARRHEA: 0
WHEEZING: 0
SHORTNESS OF BREATH: 1
SORE THROAT: 0
ABDOMINAL PAIN: 0
VOICE CHANGE: 0
EYE REDNESS: 0
EYE DISCHARGE: 0
CHEST TIGHTNESS: 0

## 2021-01-14 ASSESSMENT — PAIN DESCRIPTION - LOCATION: LOCATION: NECK

## 2021-01-14 NOTE — ED NOTES
Pt swabbed for COVID and stood at bedside with RN assistance. Pt gait unsteady. Pt sentences do not make sense.       Shoaib Guzman RN  01/14/21 4862

## 2021-01-14 NOTE — ED NOTES
Pt comes to ED via EMS from home with c/o SOB. Pt states he gets SOB upon walking and exertion. Pt states it happens right away. PT states for the las t two weeks he has notified that is he forgetful or cant get words out the way he want. PT states he has a hx of cirrhosis. Pt has reddened areas on his arms. PT right side of abdomen is larger then the left. RR reg. Upon arrival. Pt is 99& room air. Dr. Shaheen Barroso at bedside.       Mikey Lancaster RN  01/14/21 8900

## 2021-01-14 NOTE — ED PROVIDER NOTES
[facility]  eMERGENCY dEPARTMENT eNCOUnter          CHIEF COMPLAINT       Chief Complaint   Patient presents with    Shortness of Breath       Nurses Notes reviewed and I agree except as noted in the HPI. HISTORY OF PRESENT ILLNESS    Lloyd Galindo is a 63 y.o. male who presents abdominal pain shortness of breath. Apparently the patient has alcoholic liver cirrhosis. He states that he sees Dr. Shamar Harrington for this, he states that he has been getting more abdominal distention, and short of breath. Patient states that he called his gastroenterologist who instructed him to come in for evaluation and treatment. Patient denies any overt chest pain. He also states to me that he is not sleeping. However the patient states that he takes many naps during the course of the day. I did have a discussion with him that if he is going to nap throughout the course of the day then he probably will not be sleeping well at night. He is instructed to reduce the number of naps he is taking and he will find it easier to sleep through the night. Patient is not having any chest pain. He is not having any unilateral leg swelling, he has had no dizziness headaches blurred vision double vision change in taste and smell fevers chills or sweats. Currently the patient is resting comfortably on the cot no apparent distress. REVIEW OF SYSTEMS     Review of Systems   Constitutional: Negative for activity change, appetite change, diaphoresis, fatigue and unexpected weight change. HENT: Negative for congestion, ear discharge, ear pain, hearing loss, rhinorrhea, sinus pressure, sore throat, trouble swallowing and voice change. Eyes: Negative for photophobia, pain, discharge, redness and itching. Respiratory: Positive for shortness of breath. Negative for cough, choking, chest tightness and wheezing. Cardiovascular: Negative for chest pain, palpitations and leg swelling.    Gastrointestinal: Positive for abdominal distention. Negative for abdominal pain, blood in stool, constipation, diarrhea, nausea and vomiting. Endocrine: Negative for polydipsia, polyphagia and polyuria. Genitourinary: Negative for decreased urine volume, difficulty urinating, dysuria, enuresis, frequency, hematuria and urgency. Musculoskeletal: Negative for arthralgias, back pain, gait problem, myalgias, neck pain and neck stiffness. Skin: Negative for pallor and rash. Allergic/Immunologic: Negative for immunocompromised state. Neurological: Negative for dizziness, tremors, seizures, syncope, facial asymmetry, weakness, light-headedness, numbness and headaches. Hematological: Negative for adenopathy. Does not bruise/bleed easily. Psychiatric/Behavioral: Negative for agitation, confusion, decreased concentration, hallucinations, self-injury and suicidal ideas. The patient is not nervous/anxious. PAST MEDICAL HISTORY    has a past medical history of Anxiety, Arthritis, Chronic kidney disease, Cirrhosis ([facility] [address] Diverticulitis, Diverticulosis, GERD (gastroesophageal reflux disease), Hypertension, Other disorders of kidney and ureter in diseases classified elsewhere, and Psychiatric problem. SURGICAL HISTORY      has a past surgical history that includes Cervical disc surgery; Colon surgery (2004); Tympanostomy tube placement; Tonsillectomy; Abdomen surgery; Colonoscopy; Endoscopy, colon, diagnostic; fracture surgery; Dilatation, esophagus; Cardiac catheterization (2007?); Nerve Block (Bilateral, 10/02/2017); pr inj,paravertebral l/s,1 level (Bilateral, 10/2/2017); Upper gastrointestinal endoscopy (2019); back surgery; and Upper gastrointestinal endoscopy (Left, 9/28/2020).     CURRENT MEDICATIONS       Previous Medications    ALPRAZOLAM (XANAX) 1 MG TABLET    take 1 tablet by mouth three times a day if needed for anxiety and sleep    ASPIRIN 81 MG CHEWABLE TABLET    Take 1 tablet by mouth daily    ATORVASTATIN (LIPITOR) 40 MG TABLET take 1 tablet by mouth nightly    CONSTULOSE 10 GM/15ML SOLUTION    Take 30 mLs by mouth 3 times daily     CYCLOBENZAPRINE (FLEXERIL) 10 MG TABLET    take 1/2 to 1 tablet by mouth three times a day if needed    DULOXETINE (CYMBALTA) 60 MG EXTENDED RELEASE CAPSULE    take 1 capsule by mouth once daily    FAMOTIDINE (PEPCID) 40 MG TABLET    take 1 tablet every evening    GABAPENTIN (NEURONTIN) 100 MG CAPSULE    take 3 capsules by mouth twice a day    IBUPROFEN (IBU) 400 MG TABLET    Take 1 tablet by mouth every 6 hours as needed for Pain    LIDOCAINE (LMX) 4 % CREAM    Apply topically3 times daily to thigh as needed for pain. NITROGLYCERIN (NITROSTAT) 0.4 MG SL TABLET    up to max of 3 total doses. If no relief after 1 dose, call 911. RIFAXIMIN (XIFAXAN) 550 MG TABLET    Take 1 tablet by mouth 2 times daily    SPIRONOLACTONE (ALDACTONE) 100 MG TABLET    take 1 tablet by mouth every morning       ALLERGIES     has No Known Allergies. FAMILY HISTORY     He indicated that his mother is . He indicated that his father is . He indicated that his paternal uncle is . He indicated that the status of his neg hx is unknown.   family history includes Alzheimer's Disease in his mother; Cancer in his father; Heart Disease in his father, mother, and paternal uncle; Heart Failure in his mother; High Blood Pressure in his paternal uncle; Hypertension in his father and mother. SOCIAL HISTORY      reports that he has been smoking cigarettes. He has a 12.50 pack-year smoking history. He has never used smokeless tobacco. He reports that he does not drink alcohol or use drugs. PHYSICAL EXAM     INITIAL VITALS:  height is 5' 7\" (1.702 m) and weight is 252 lb (114.3 kg). His oral temperature is 97.9 °F (36.6 °C). His blood pressure is 117/77 and his pulse is 84. His respiration is 18 and oxygen saturation is 99%. Physical Exam  Vitals signs and nursing note reviewed.    Constitutional: General: He is not in acute distress. Appearance: He is well-developed. He is not diaphoretic. HENT:      Head: Normocephalic and atraumatic. Right Ear: External ear normal.      Left Ear: External ear normal.      Nose: Nose normal.   Eyes:      General: Lids are normal. No scleral icterus. Right eye: No discharge. Left eye: No discharge. Conjunctiva/sclera: Conjunctivae normal.      Right eye: No exudate. Left eye: No exudate. Pupils: Pupils are equal, round, and reactive to light. Neck:      Musculoskeletal: Normal range of motion and neck supple. Normal range of motion. Thyroid: No thyromegaly. Vascular: No JVD. Trachea: No tracheal deviation. Cardiovascular:      Rate and Rhythm: Normal rate and regular rhythm. Pulses: Normal pulses. Heart sounds: Normal heart sounds, S1 normal and S2 normal. No murmur. No friction rub. No gallop. Pulmonary:      Effort: Pulmonary effort is normal. No respiratory distress. Breath sounds: No stridor. Examination of the right-lower field reveals decreased breath sounds. Examination of the left-lower field reveals decreased breath sounds. Decreased breath sounds present. No wheezing, rhonchi or rales. Chest:      Chest wall: No tenderness. Abdominal:      General: Abdomen is protuberant. Bowel sounds are normal. There is distension. Palpations: Abdomen is soft. There is no shifting dullness, fluid wave, hepatomegaly, splenomegaly, mass or pulsatile mass. Tenderness: There is no abdominal tenderness. There is no guarding or rebound. Negative signs include Mccrary's sign, Rovsing's sign, McBurney's sign, psoas sign and obturator sign. Musculoskeletal: Normal range of motion. General: No tenderness. Right shoulder: He exhibits no tenderness, no bony tenderness, no crepitus and normal strength. Lymphadenopathy:      Cervical: No cervical adenopathy.    Skin:     General: Skin is warm and dry. Findings: No bruising, ecchymosis, lesion or rash. Neurological:      Mental Status: He is alert and oriented to person, place, and time. Cranial Nerves: No cranial nerve deficit. Sensory: No sensory deficit. Coordination: Coordination normal.      Deep Tendon Reflexes: Reflexes are normal and symmetric. Psychiatric:         Speech: Speech normal.         Behavior: Behavior normal.         Thought Content: Thought content normal.         Judgment: Judgment normal.           DIFFERENTIAL DIAGNOSIS:   Gastritis gastroenteritis, ascites, pneumonia bronchitis    DIAGNOSTIC RESULTS     EKG: All EKG's are interpreted by the Emergency Department Physician who either signs or Co-signs this chart in the absence of a cardiologist.  EKG revealed what I think is a sinus rhythm with PACs, ventricular rate of 91, appears to be normal axis, QRS duration of 88 QT interval 368 QTC of 452. RADIOLOGY: non-plain film images(s) such as CT, Ultrasound and MRI are read by the radiologist.  CT ABDOMEN PELVIS WO CONTRAST Additional Contrast? None   Final Result   Small cirrhotic ascites. Trace right pleural effusion. Nonemergent/incidental findings in the report. This document has been electronically signed by: Jean Pierre Quarles MD on    01/14/2021 02:14 AM      All CT scans at this facility use dose modulation, iterative    reconstruction, and/or weight-based   dosing when appropriate to reduce radiation dose to as low as reasonably    achievable. XR CHEST PORTABLE   Final Result   Right mid to lower lung airspace disease. Right pleural effusion.       This document has been electronically signed by: Jean Pierre Quarles MD on    01/14/2021 01:39 AM               LABS:   Labs Reviewed   BASIC METABOLIC PANEL W/ REFLEX TO MG FOR LOW K - Abnormal; Notable for the following components:       Result Value    CO2 20 (*)     All other components within normal limits   CBC WITH AUTO DIFFERENTIAL - Abnormal; Notable for the following components:    RBC 3.97 (*)     Hemoglobin 12.9 (*)     Hematocrit 39.4 (*)     MCV 99.2 (*)     RDW-CV 14.6 (*)     RDW-SD 53.2 (*)     All other components within normal limits   LIPASE - Abnormal; Notable for the following components:    Lipase 65.5 (*)     All other components within normal limits   HEPATIC FUNCTION PANEL - Abnormal; Notable for the following components:    Alb 3.4 (*)     All other components within normal limits   URINE WITH REFLEXED MICRO - Abnormal; Notable for the following components:    Leukocyte Esterase, Urine SMALL (*)     All other components within normal limits   RAPID INFLUENZA A/B ANTIGENS   CULTURE, REFLEXED, URINE   URINE DRUG SCREEN   BRAIN NATRIURETIC PEPTIDE   MAGNESIUM   AMMONIA   ANION GAP   GLOMERULAR FILTRATION RATE, ESTIMATED   OSMOLALITY   COVID-19   COVID-19       EMERGENCY DEPARTMENT COURSE:   Vitals:    Vitals:    01/14/21 0038 01/14/21 0040 01/14/21 0201   BP:  127/79 117/77   Pulse: 87  84   Resp: 17  18   Temp: 97.9 °F (36.6 °C)     TempSrc: Oral     SpO2: 99%  99%   Weight: 252 lb (114.3 kg)     Height: 5' 7\" (1.702 m)       Patient was assessed at bedside appropriate labs and imaging were ordered. Patient's lung sounds were clear, he has no dyspnea on exertion he has no hypoxemia on exertion. I reviewed all labs and imaging. The patient is afebrile. He is normotensive. His respiratory status is normal.  CT of his abdomen showed very minimal ascites. Liver function tests are good ammonia level is good. Chest x-ray shows possible beginnings of a bronchitis or pneumonia. At this point I feel the patient be safely discharged home. He will be given a first dose of Levaquin here. He will be written for a prescription for Levaquin and albuterol for at home. He has an upcoming appointment with his gastroenterologist he is instructed to keep this. He also is instructed to follow-up with his primary care physician.   He is instructed to return to the nearest emergency room immediately for any new or worsening complaints. Patient understood and agreed with the plan. Patient is subsequently discharged home in good condition. Patient has what appears to be bronchitis. Patient has been given prescription for Levaquin and albuterol inhaler he is instructed to take it as prescribed. Patient has been tested for Covid he is instructed to check his chart in 3 days, results will be pending then. Patient is instructed to keep his scheduled appointment with gastroenterology for his chronic cirrhosis. Patient is instructed to follow-up with his primary care physician and do so within the next 1 to 2 days. Patient is instructed to return to the nearest emergency room immediately for any new or worsening complaints. CRITICAL CARE:   None    CONSULTS:  None    PROCEDURES:  None    FINAL IMPRESSION      1. Bronchitis          DISPOSITION/PLAN   Discharge    PATIENT REFERRED TO:  Malorie Avila, CHRISTIAN - CNP  [address] [facility]  [phone]    Call in 1 day      CHRISTIAN Carvalho - [address] [facility].   [place] [address]  [phone]    Call in 1 day        DISCHARGE MEDICATIONS:  New Prescriptions    ALBUTEROL SULFATE HFA (VENTOLIN HFA) 108 (90 BASE) MCG/ACT INHALER    Inhale 2 puffs into the lungs 4 times daily as needed for Wheezing    LEVOFLOXACIN (LEVAQUIN) 500 MG TABLET    Take 1 tablet by mouth daily for 10 days       (Please note that portions of this note were completed with a voice recognition program.  Efforts were made to edit the dictations but occasionally words are mis-transcribed.)    Sissy Willson, [address] [facility], DO  01/14/21 0244

## 2021-01-14 NOTE — ED NOTES
Pt resting in bed with RR reg. Pt VS reassessed. Pt updated on pOC. Pt verbalized understanding.  Will continue to monitor      Lita Trejo RN  01/14/21 1635

## 2021-01-14 NOTE — ED NOTES
Bed: 019A  Expected date: 1/14/21  Expected time: 12:17 AM  Means of arrival: [facility]  Comments:     Nato Herrera RN  01/14/21 0028

## 2021-01-15 ENCOUNTER — TELEPHONE (OUTPATIENT)
Dept: FAMILY MEDICINE CLINIC | Age: 56
End: 2021-01-15

## 2021-01-15 ENCOUNTER — CARE COORDINATION (OUTPATIENT)
Dept: CARE COORDINATION | Age: 56
End: 2021-01-15

## 2021-01-15 LAB
ORGANISM: ABNORMAL
URINE CULTURE REFLEX: ABNORMAL

## 2021-01-15 NOTE — CARE COORDINATION
Attempted to reach Germaine Eller for ED f/u [facility] outreach. No answer. Message left to return call.

## 2021-01-16 ENCOUNTER — CARE COORDINATION (OUTPATIENT)
Dept: CARE COORDINATION | Age: 56
End: 2021-01-16

## 2021-01-16 LAB — SARS-COV-2: NOT DETECTED

## 2021-01-16 NOTE — CARE COORDINATION
Patient contacted regarding recent discharge and COVID-19 risk. Discussed COVID-19 related testing which was available at this time. Test results were negative. Patient informed of results, if available? yes     Care Transition Nurse/ Ambulatory Care Manager contacted the patient by telephone to perform post discharge assessment. Verified name and  with patient as identifiers. Patient has following risk factors of: cirrhosis. CTN/ACM reviewed discharge instructions, medical action plan and red flags related to discharge diagnosis. Reviewed and educated them on any new and changed medications related to discharge diagnosis. Advised obtaining a 90-day supply of all daily and as-needed medications. Education provided regarding infection prevention, and signs and symptoms of COVID-19 and when to seek medical attention with patient who verbalized understanding. Discussed exposure protocols and quarantine from [address] you at higher risk for severe illness  and given an opportunity for questions and concerns. The patient agrees to contact the COVID-19 hotline [phone] or PCP office for questions related to their healthcare. CTN/ACM provided contact information for future reference. From CDC: Are you at higher risk for severe illness?  Wash your hands often.  Avoid close contact (6 feet, which is about two arm lengths) with people who are sick.  Put distance between yourself and other people if COVID-19 is spreading in your community.  Clean and disinfect frequently touched surfaces.  Avoid all cruise travel and non-essential air travel.  Call your healthcare professional if you have concerns about COVID-19 and your underlying condition or if you are sick. For more information on steps you can take to protect yourself, see CDC's How to Protect Yourself    no further outreach per protocol. based on severity of symptoms and risk factors. Spoke with pt today.   Informed him of negative COVID test results. Reviewed COVID precautions. Encouraged to continue to follow COVID precautions. Denies questions.

## 2021-01-16 NOTE — PROGRESS NOTES
Pharmacy Note  ED Culture Follow-up    Juan Conley is a 63 y.o. male. Allergies: Patient has no known allergies. Labs:  Lab Results   Component Value Date    BUN 9 01/14/2021    CREATININE 0.8 01/14/2021    WBC 6.5 01/14/2021     Estimated Creatinine Clearance: 125 mL/min (based on SCr of 0.8 mg/dL). Current antimicrobials:   none    ASSESSMENT:  Micro results:   Urine culture: positive for cornybacterium     PLAN:  Need for intervention: No  Discussed with: Ludmila Ulrich PA-C  Chosen treatment:    No treatment indicated    Patient response:   No need to contact patient    Called/sent in prescription to: Not applicable    Please call with any questions.  Licha Hermosillo, Evelio 4:17 PM 1/16/2021

## 2021-01-17 ENCOUNTER — HOSPITAL ENCOUNTER (EMERGENCY)
Age: 56
Discharge: HOME | End: 2021-01-17
Attending: EMERGENCY MEDICINE
Payer: COMMERCIAL

## 2021-01-17 VITALS
WEIGHT: 251 LBS | OXYGEN SATURATION: 99 % | TEMPERATURE: 97.9 F | HEIGHT: 66 IN | BODY MASS INDEX: 40.34 KG/M2 | DIASTOLIC BLOOD PRESSURE: 56 MMHG | RESPIRATION RATE: 18 BRPM | SYSTOLIC BLOOD PRESSURE: 125 MMHG | HEART RATE: 98 BPM

## 2021-01-17 DIAGNOSIS — K52.9 GASTROENTERITIS: ICD-10-CM

## 2021-01-17 DIAGNOSIS — R19.7 DIARRHEA, UNSPECIFIED TYPE: Primary | ICD-10-CM

## 2021-01-17 DIAGNOSIS — K70.30 ALCOHOLIC CIRRHOSIS OF LIVER WITHOUT ASCITES (HCC): ICD-10-CM

## 2021-01-17 LAB
ADENOVIRUS F 40 41 PCR: NOT DETECTED
ALBUMIN SERPL-MCNC: 3.4 G/DL (ref 3.5–5.1)
ALP BLD-CCNC: 69 U/L (ref 38–126)
ALT SERPL-CCNC: 26 U/L (ref 11–66)
ANION GAP SERPL CALCULATED.3IONS-SCNC: 12 MEQ/L (ref 8–16)
AST SERPL-CCNC: 34 U/L (ref 5–40)
ASTROVIRUS PCR: NOT DETECTED
BASOPHILS # BLD: 0.9 %
BASOPHILS ABSOLUTE: 0.1 THOU/MM3 (ref 0–0.1)
BILIRUB SERPL-MCNC: 1.2 MG/DL (ref 0.3–1.2)
BILIRUBIN DIRECT: 0.3 MG/DL (ref 0–0.3)
BUN BLDV-MCNC: 6 MG/DL (ref 7–22)
CALCIUM SERPL-MCNC: 9.3 MG/DL (ref 8.5–10.5)
CAMPYLOBACTER PCR: NOT DETECTED
CHLORIDE BLD-SCNC: 109 MEQ/L (ref 98–111)
CLOSTRIDIUM DIFFICILE, PCR: NOT DETECTED
CO2: 19 MEQ/L (ref 23–33)
CREAT SERPL-MCNC: 0.8 MG/DL (ref 0.4–1.2)
CRYPTOSPORIDIUM PCR: NOT DETECTED
CYCLOSPORA CAYETANENSIS PCR: NOT DETECTED
E COLI 0157 PCR: NORMAL
E COLI ENTEROAGGREGATIVE PCR: NOT DETECTED
E COLI ENTEROPATHOGENIC PCR: NOT DETECTED
E COLI ENTEROTOXIGENIC PCR: NOT DETECTED
E COLI SHIGA LIKE TOXIN PCR: NOT DETECTED
E COLI SHIGELLA/ENTEROINVASIVE PCR: NOT DETECTED
E HISTOLYTICA GI FILM ARRAY: NOT DETECTED
EOSINOPHIL # BLD: 4.9 %
EOSINOPHILS ABSOLUTE: 0.4 THOU/MM3 (ref 0–0.4)
ERYTHROCYTE [DISTWIDTH] IN BLOOD BY AUTOMATED COUNT: 14.6 % (ref 11.5–14.5)
ERYTHROCYTE [DISTWIDTH] IN BLOOD BY AUTOMATED COUNT: 49.7 FL (ref 35–45)
FECAL LEUKOCYTES: NORMAL
GFR SERPL CREATININE-BSD FRML MDRD: > 90 ML/MIN/1.73M2
GIARDIA LAMBLIA PCR: NOT DETECTED
GLUCOSE BLD-MCNC: 100 MG/DL (ref 70–108)
HCT VFR BLD CALC: 40.7 % (ref 42–52)
HEMOCCULT STL QL: NEGATIVE
HEMOGLOBIN: 14.5 GM/DL (ref 14–18)
IMMATURE GRANS (ABS): 0.03 THOU/MM3 (ref 0–0.07)
IMMATURE GRANULOCYTES: 0.4 %
LIPASE: 12.7 U/L (ref 5.6–51.3)
LYMPHOCYTES # BLD: 25.9 %
LYMPHOCYTES ABSOLUTE: 1.9 THOU/MM3 (ref 1–4.8)
MCH RBC QN AUTO: 33.4 PG (ref 26–33)
MCHC RBC AUTO-ENTMCNC: 35.6 GM/DL (ref 32.2–35.5)
MCV RBC AUTO: 93.8 FL (ref 80–94)
MONOCYTES # BLD: 12.7 %
MONOCYTES ABSOLUTE: 1 THOU/MM3 (ref 0.4–1.3)
NOROVIRUS GI GII PCR: NOT DETECTED
NUCLEATED RED BLOOD CELLS: 0 /100 WBC
OSMOLALITY CALCULATION: 277.1 MOSMOL/KG (ref 275–300)
PLATELET # BLD: 136 THOU/MM3 (ref 130–400)
PLATELET ESTIMATE: ADEQUATE
PLESIOMONAS SHIGELLOIDES PCR: NOT DETECTED
PMV BLD AUTO: 10.8 FL (ref 9.4–12.4)
POTASSIUM SERPL-SCNC: 4.1 MEQ/L (ref 3.5–5.2)
RBC # BLD: 4.34 MILL/MM3 (ref 4.7–6.1)
ROTAVIRUS A PCR: NOT DETECTED
SALMONELLA PCR: NOT DETECTED
SAPOVIRUS PCR: NOT DETECTED
SCAN OF BLOOD SMEAR: NORMAL
SEG NEUTROPHILS: 55.2 %
SEGMENTED NEUTROPHILS ABSOLUTE COUNT: 4.1 THOU/MM3 (ref 1.8–7.7)
SODIUM BLD-SCNC: 140 MEQ/L (ref 135–145)
TOTAL PROTEIN: 7.4 G/DL (ref 6.1–8)
VIBRIO CHOLERAE PCR: NOT DETECTED
VIBRIO PCR: NOT DETECTED
WBC # BLD: 7.5 THOU/MM3 (ref 4.8–10.8)
YERSINIA ENTEROCOLITICA PCR: NOT DETECTED

## 2021-01-17 PROCEDURE — 36415 COLL VENOUS BLD VENIPUNCTURE: CPT

## 2021-01-17 PROCEDURE — 89055 LEUKOCYTE ASSESSMENT FECAL: CPT

## 2021-01-17 PROCEDURE — 82248 BILIRUBIN DIRECT: CPT

## 2021-01-17 PROCEDURE — 2580000003 HC RX 258: Performed by: EMERGENCY MEDICINE

## 2021-01-17 PROCEDURE — 80053 COMPREHEN METABOLIC PANEL: CPT

## 2021-01-17 PROCEDURE — 82272 OCCULT BLD FECES 1-3 TESTS: CPT

## 2021-01-17 PROCEDURE — 99284 EMERGENCY DEPT VISIT MOD MDM: CPT

## 2021-01-17 PROCEDURE — 0097U HC GI PTHGN MULT REV TRANS & AMP PRB TECH 22 TRGT: CPT

## 2021-01-17 PROCEDURE — 85025 COMPLETE CBC W/AUTO DIFF WBC: CPT

## 2021-01-17 PROCEDURE — 83690 ASSAY OF LIPASE: CPT

## 2021-01-17 RX ORDER — SODIUM CHLORIDE 9 MG/ML
INJECTION, SOLUTION INTRAVENOUS CONTINUOUS
Status: DISCONTINUED | OUTPATIENT
Start: 2021-01-17 | End: 2021-01-17 | Stop reason: HOSPADM

## 2021-01-17 RX ADMIN — SODIUM CHLORIDE: 9 INJECTION, SOLUTION INTRAVENOUS at 16:10

## 2021-01-17 ASSESSMENT — ENCOUNTER SYMPTOMS
TROUBLE SWALLOWING: 0
SORE THROAT: 0
RHINORRHEA: 0
VOMITING: 0
DIARRHEA: 1
COUGH: 0
ABDOMINAL PAIN: 1
BACK PAIN: 0
SINUS PRESSURE: 0
WHEEZING: 0
SHORTNESS OF BREATH: 0
CHEST TIGHTNESS: 0
NAUSEA: 0
CONSTIPATION: 0
VOICE CHANGE: 0

## 2021-01-17 NOTE — ED NOTES
Pt requesting to talk to someone about his emotions. Pt is tearful.      Estela Yanez, RN  01/17/21 8310

## 2021-01-17 NOTE — ED NOTES
Bed: 021A  Expected date: 1/17/21  Expected time:   Means of arrival:   Comments:  [address] [facility]  01/17/21 3778

## 2021-01-17 NOTE — ED TRIAGE NOTES
Patient to ER due to having diahhrea since this morning. Patient was able to eat and drink water at home. When asked if patient has any nausea, oatient states \"he has problems with anxiety and occasionally takes medication. \" Patient denies vomiting today and yesterday. Patient states he has cirrhosis and acid reflux. Patient is tearful and states he hates coming in for probably nothing. Patient states he would stay home but he didn't want to do any more laundry.

## 2021-01-18 NOTE — ED PROVIDER NOTES
[address] [facility]        CHIEF COMPLAINT    Chief Complaint   Patient presents with    Diarrhea       Nurses Notes reviewed and I agree except as noted in the HPI. HPI    Chico Velazquez is a 63 y.o. male who presents for evaluation of diarrhea since this morning, she has 8 times of watery green-brown stool and it is so fast that she has been putting on his clothes. The patient denies any abdominal pain, she had a history of alcoholic liver cirrhosis for the past 10 years and diverticulitis. Patient does not have any paracentesis for the past 6 years. Patient did not have any fever, no chills, no nausea or vomiting. REVIEW OF SYSTEMS    Review of Systems   Constitutional: Negative for appetite change, chills, diaphoresis, fatigue and fever. HENT: Negative for congestion, ear discharge, ear pain, postnasal drip, rhinorrhea, sinus pressure, sore throat, trouble swallowing and voice change. Respiratory: Negative for cough, chest tightness, shortness of breath and wheezing. Cardiovascular: Negative for chest pain, palpitations and leg swelling. Gastrointestinal: Positive for abdominal pain and diarrhea. Negative for constipation, nausea and vomiting. Musculoskeletal: Negative for arthralgias, back pain, joint swelling, myalgias, neck pain and neck stiffness. Skin: Negative for rash. Neurological: Negative for dizziness, syncope, weakness, light-headedness, numbness and headaches. PAST MEDICAL HISTORY     has a past medical history of Anxiety, Arthritis, Chronic kidney disease, Cirrhosis ([address] Diverticulitis, Diverticulosis, GERD (gastroesophageal reflux disease), Hypertension, Other disorders of kidney and ureter in diseases classified elsewhere, and Psychiatric problem. SURGICAL HISTORY   has a past surgical history that includes Cervical disc surgery; Colon surgery (2004);  Tympanostomy tube placement; Tonsillectomy; Abdomen surgery; Colonoscopy; Endoscopy, colon, diagnostic; fracture surgery; Dilatation, esophagus; Cardiac catheterization (2007?); Nerve Block (Bilateral, 10/02/2017); pr inj,paravertebral l/s,1 level (Bilateral, 10/2/2017); Upper gastrointestinal endoscopy (2019); back surgery; and Upper gastrointestinal endoscopy (Left, 2020). CURRENT MEDICATIONS    Previous Medications    ALBUTEROL SULFATE HFA (VENTOLIN HFA) 108 (90 BASE) MCG/ACT INHALER    Inhale 2 puffs into the lungs 4 times daily as needed for Wheezing    ALPRAZOLAM (XANAX) 1 MG TABLET    take 1 tablet by mouth three times a day if needed for anxiety and sleep    ASPIRIN 81 MG CHEWABLE TABLET    Take 1 tablet by mouth daily    ATORVASTATIN (LIPITOR) 40 MG TABLET    take 1 tablet by mouth nightly    CONSTULOSE 10 GM/15ML SOLUTION    Take 30 mLs by mouth 3 times daily     CYCLOBENZAPRINE (FLEXERIL) 10 MG TABLET    take 1/2 to 1 tablet by mouth three times a day if needed    DULOXETINE (CYMBALTA) 60 MG EXTENDED RELEASE CAPSULE    take 1 capsule by mouth once daily    FAMOTIDINE (PEPCID) 40 MG TABLET    take 1 tablet every evening    GABAPENTIN (NEURONTIN) 100 MG CAPSULE    take 3 capsules by mouth twice a day    IBUPROFEN (IBU) 400 MG TABLET    Take 1 tablet by mouth every 6 hours as needed for Pain    LEVOFLOXACIN (LEVAQUIN) 500 MG TABLET    Take 1 tablet by mouth daily for 10 days    LIDOCAINE (LMX) 4 % CREAM    Apply topically3 times daily to thigh as needed for pain. NITROGLYCERIN (NITROSTAT) 0.4 MG SL TABLET    up to max of 3 total doses. If no relief after 1 dose, call 911. RIFAXIMIN (XIFAXAN) 550 MG TABLET    Take 1 tablet by mouth 2 times daily    SPIRONOLACTONE (ALDACTONE) 100 MG TABLET    take 1 tablet by mouth every morning       ALLERGIES    has No Known Allergies. FAMILY HISTORY    He indicated that his mother is . He indicated that his father is . He indicated that his paternal uncle is . He indicated that the status of his neg hx is unknown.   family history includes Alzheimer's Disease in his mother; Cancer in his father; Heart Disease in his father, mother, and paternal uncle; Heart Failure in his mother; High Blood Pressure in his paternal uncle; Hypertension in his father and mother. SOCIAL HISTORY     reports that he has been smoking cigarettes. He has a 12.50 pack-year smoking history. He has never used smokeless tobacco. He reports that he does not drink alcohol or use drugs. PHYSICAL EXAM      INITIAL VITALS: BP (!) 125/56   Pulse 98   Temp 97.9 °F (36.6 °C) (Oral)   Resp 18   Ht 5' 6\" (1.676 m)   Wt 251 lb (113.9 kg)   SpO2 99%   BMI 40.51 kg/m² Estimated body mass index is 40.51 kg/m² as calculated from the following:    Height as of this encounter: 5' 6\" (1.676 m). Weight as of this encounter: 251 lb (113.9 kg). Physical Exam  Vitals signs reviewed. Constitutional:       Appearance: He is well-developed. HENT:      Head: Normocephalic and atraumatic. Right Ear: External ear normal.      Left Ear: External ear normal.      Nose: Nose normal.   Eyes:      General: No scleral icterus. Conjunctiva/sclera: Conjunctivae normal.      Pupils: Pupils are equal, round, and reactive to light. Neck:      Musculoskeletal: Normal range of motion and neck supple. Thyroid: No thyromegaly. Vascular: No JVD. Cardiovascular:      Rate and Rhythm: Normal rate and regular rhythm. Heart sounds: No murmur. No friction rub. Pulmonary:      Effort: Pulmonary effort is normal.      Breath sounds: Normal breath sounds. No wheezing or rales. Chest:      Chest wall: No tenderness. Abdominal:      General: Bowel sounds are increased. Palpations: Abdomen is soft. There is no mass. Tenderness:  There is generalized abdominal tenderness (Diminished depressible mild tenderness, no tenderness in the McBurney's point no tenderness on the right lower quadrant negative Mccrary sign). Lymphadenopathy:      Cervical: No cervical adenopathy. Skin:     Findings: No rash. Neurological:      Mental Status: He is alert and oriented to person, place, and time. Psychiatric:         Behavior: Behavior is cooperative. MEDICAL DECISION MAKING    DIFFERENTIAL DIAGNOSIS:  Diarrhea likely viral type, will rule out infectious diarrhea, history of cirrhosis without any ascites      DIAGNOSTIC RESULTS      RADIOLOGY:    No orders to display       LABS:   Labs Reviewed   CBC WITH AUTO DIFFERENTIAL - Abnormal; Notable for the following components:       Result Value    RBC 4.34 (*)     Hematocrit 40.7 (*)     MCH 33.4 (*)     MCHC 35.6 (*)     RDW-CV 14.6 (*)     RDW-SD 49.7 (*)     All other components within normal limits   BASIC METABOLIC PANEL - Abnormal; Notable for the following components:    CO2 19 (*)     BUN 6 (*)     All other components within normal limits   HEPATIC FUNCTION PANEL - Abnormal; Notable for the following components:    Alb 3.4 (*)     All other components within normal limits   FECAL LEUKOCYTES    Narrative:     Source: feces       Site:           Current Antibiotics: not stated   GASTROINTESTINAL PANEL, MOLECULAR   LIPASE   BLOOD OCCULT STOOL SCREEN #1   ANION GAP   GLOMERULAR FILTRATION RATE, ESTIMATED   OSMOLALITY   SCAN OF BLOOD SMEAR     All other unresulted laboratory test above are normal:    Vitals:    Vitals:    01/17/21 1532 01/17/21 1646 01/17/21 1739 01/17/21 1851   BP: 121/88 101/69 108/86 (!) 125/56   Pulse:    98   Resp:  16 18 18   Temp:       TempSrc:       SpO2: 96% 99% 99% 99%   Weight:       Height:           EMERGENCY DEPARTMENT COURSE:    Medications   0.9 % sodium chloride infusion ( Intravenous New Bag 1/17/21 1610)       The pt was seen and evaluated by me. Within the department, I observed the pt's vitalsigns to be within acceptable range. Laboratory studies were performed, results were reviewed with the patient.  Within the department, the pt was treated with aggressive IV fluid hydration. Patient had diarrhea here which is greenish-brown without any blood. I observed the pt's condition to be hemodynamically stable during the duration of their stay. I explained my proposed course of treatment to the pt, and they were amenable to my decision. They were discharged home, and they will return to the ED if their symptoms become more severein nature, or otherwise change. Controlled Substances Monitoring:        CRITICAL CARE:   None. CONSULTS:  None    PROCEDURES:  None. FINAL IMPRESSION       1. Diarrhea, unspecified type    2. Gastroenteritis    3. Hx Alcoholic cirrhosis of liver without ascites ([facility] [address]          DISPOSITION/PLAN  PATIENT REFERRED TO:  Yanni Townsend, APRN - CNP  [address]  [phone]    Schedule an appointment as soon as possible for a visit in 3 days      DISCHARGE MEDICATIONS:  New Prescriptions    No medications on file         (Please note that portions of this note were completed with a voice recognition program and electronically transcribed. Efforts were [facility] edit the dictations but occasionally words are mis-transcribed . The transcription may contain errors not detected in proofreading.   This transcription was electronically signed.)     01/17/21 7:12 PM      Bonnie Butler MD      Emergency room physician           Bonnie Butler MD  01/17/21 8698

## 2021-02-04 ENCOUNTER — OFFICE VISIT (OUTPATIENT)
Dept: FAMILY MEDICINE CLINIC | Age: 56
End: 2021-02-04
Payer: COMMERCIAL

## 2021-02-04 VITALS
SYSTOLIC BLOOD PRESSURE: 134 MMHG | OXYGEN SATURATION: 99 % | DIASTOLIC BLOOD PRESSURE: 76 MMHG | BODY MASS INDEX: 41.57 KG/M2 | RESPIRATION RATE: 16 BRPM | HEART RATE: 100 BPM | TEMPERATURE: 97.1 F | WEIGHT: 242.2 LBS

## 2021-02-04 DIAGNOSIS — R94.31 ABNORMAL EKG: ICD-10-CM

## 2021-02-04 DIAGNOSIS — R06.02 SOB (SHORTNESS OF BREATH): ICD-10-CM

## 2021-02-04 DIAGNOSIS — R41.0 CONFUSION: ICD-10-CM

## 2021-02-04 DIAGNOSIS — K70.30 ALCOHOLIC CIRRHOSIS, UNSPECIFIED WHETHER ASCITES PRESENT (HCC): Primary | ICD-10-CM

## 2021-02-04 DIAGNOSIS — R42 DIZZINESS: ICD-10-CM

## 2021-02-04 DIAGNOSIS — Z12.5 SCREENING FOR PROSTATE CANCER: ICD-10-CM

## 2021-02-04 DIAGNOSIS — I10 ESSENTIAL HYPERTENSION: ICD-10-CM

## 2021-02-04 DIAGNOSIS — M54.16 LUMBAR RADICULOPATHY: ICD-10-CM

## 2021-02-04 DIAGNOSIS — G57.11 MERALGIA PARESTHETICA OF RIGHT SIDE: ICD-10-CM

## 2021-02-04 DIAGNOSIS — M79.604 CHRONIC PAIN OF RIGHT LOWER EXTREMITY: ICD-10-CM

## 2021-02-04 DIAGNOSIS — F41.9 ANXIETY: ICD-10-CM

## 2021-02-04 DIAGNOSIS — R41.3 MEMORY CHANGES: ICD-10-CM

## 2021-02-04 DIAGNOSIS — E78.2 MIXED HYPERLIPIDEMIA: ICD-10-CM

## 2021-02-04 DIAGNOSIS — G89.29 CHRONIC PAIN OF RIGHT LOWER EXTREMITY: ICD-10-CM

## 2021-02-04 DIAGNOSIS — R41.3 MEMORY LOSS: ICD-10-CM

## 2021-02-04 PROCEDURE — 99214 OFFICE O/P EST MOD 30 MIN: CPT | Performed by: NURSE PRACTITIONER

## 2021-02-04 PROCEDURE — G8484 FLU IMMUNIZE NO ADMIN: HCPCS | Performed by: NURSE PRACTITIONER

## 2021-02-04 PROCEDURE — 3017F COLORECTAL CA SCREEN DOC REV: CPT | Performed by: NURSE PRACTITIONER

## 2021-02-04 PROCEDURE — G8417 CALC BMI ABV UP PARAM F/U: HCPCS | Performed by: NURSE PRACTITIONER

## 2021-02-04 PROCEDURE — 4004F PT TOBACCO SCREEN RCVD TLK: CPT | Performed by: NURSE PRACTITIONER

## 2021-02-04 PROCEDURE — G8427 DOCREV CUR MEDS BY ELIG CLIN: HCPCS | Performed by: NURSE PRACTITIONER

## 2021-02-04 RX ORDER — GABAPENTIN 300 MG/1
300 CAPSULE ORAL 3 TIMES DAILY
Qty: 90 CAPSULE | Refills: 2 | Status: SHIPPED | OUTPATIENT
Start: 2021-02-04 | End: 2021-05-04 | Stop reason: SDUPTHER

## 2021-02-04 ASSESSMENT — PATIENT HEALTH QUESTIONNAIRE - PHQ9
2. FEELING DOWN, DEPRESSED OR HOPELESS: 0
SUM OF ALL RESPONSES TO PHQ QUESTIONS 1-9: 0

## 2021-02-04 NOTE — PROGRESS NOTES
[address] [place] [place] [facility] [address]  Dept: [phone]  Dept Fax: [phone]  Loc: [phone]  PROGRESS NOTE      VisitDate: 2/4/2021    Hollie Farrell is a 63 y.o. male who presents today for:     Chief Complaint   Patient presents with    Referral - General     Neurology-dizzy everytime he stands up, forgetful,  family notices some stuttering, balance issues, mom had hx of parkinson's    Leg Pain     right side, no known injury, hip into thigh-using tylenol without relief         Subjective:  Patient presents for routine 3-month/medication refills. Patient requesting consult with neurology due to constant dizziness, forgetfulness, memory changes, speech difficulties and unsteady gait. Patient also complains of continued right lower extremity pain, thigh pain. Reports pain is intense at times. History anxiety, arthritis, chronic kidney disease, cirrhosis, GERD, hypertension, obesity. History of cervical surgical repair due to fracture 2003. Patient also complains of worsening shortness of breath with any activity. Denies chest pain, syncope, falls, fever. Mood stable. Review of Systems   Constitutional: Negative. HENT: Negative. Eyes: Negative. Respiratory: Negative. Cardiovascular: Negative. Gastrointestinal: Negative. Endocrine: Negative. Genitourinary: Negative. Musculoskeletal: Positive for back pain and gait problem. Skin: Negative. Allergic/Immunologic: Negative. Neurological: Positive for dizziness and speech difficulty. Negative for syncope. Hematological: Negative. Psychiatric/Behavioral: Positive for confusion, decreased concentration and sleep disturbance. Negative for hallucinations and suicidal ideas. The patient is nervous/anxious. All other systems reviewed and are negative.     Past Medical History:   Diagnosis Date    Anxiety     Arthritis  Chronic kidney disease     Cirrhosis ([facility] [address]     Diverticulitis     Diverticulosis     GERD (gastroesophageal reflux disease)     Hypertension     Other disorders of kidney and ureter in diseases classified elsewhere     Psychiatric problem       Past Surgical History:   Procedure Laterality Date    ABDOMEN SURGERY      BACK SURGERY      cervical plate    CARDIAC CATHETERIZATION  2007?  CERVICAL DISC SURGERY      x 2 ---broken neck 7-2003    COLON SURGERY  2004    partial, due to diverticulitis    COLONOSCOPY      DILATATION, ESOPHAGUS      ENDOSCOPY, COLON, DIAGNOSTIC      FRACTURE SURGERY      Broke neck in 2003    NERVE BLOCK Bilateral 10/02/2017    Cervical Facet MBB at C4-5, C5-6, C6-7     MO INJ,PARAVERTEBRAL L/S,1 LEVEL Bilateral 10/2/2017    C-FACET MBB C4-5, C5-6, C6-7 BILATERAL performed by Frantz Robin MD at [address] GASTROINTESTINAL ENDOSCOPY  2019    UPPER GASTROINTESTINAL ENDOSCOPY Left 9/28/2020    EGD BAND LIGATION performed by Kelsey Nagel MD at [facility]     Family History   Problem Relation Age of Onset    Hypertension Mother     Heart Disease Mother     Alzheimer's Disease Mother     Heart Failure Mother     Hypertension Father     Heart Disease Father     Cancer Father     High Blood Pressure Paternal Uncle     Heart Disease Paternal Uncle     Colon Cancer Neg Hx     Colon Polyps Neg Hx      Social History     Tobacco Use    Smoking status: Current Every Day Smoker     Packs/day: 0.50     Years: 25.00     Pack years: 12.50     Types: Cigarettes    Smokeless tobacco: Never Used    Tobacco comment: printed to avs   Substance Use Topics    Alcohol use: No     Comment: Quit 2 years ago      Current Outpatient Medications   Medication Sig Dispense Refill    gabapentin (NEURONTIN) 300 MG capsule Take 1 capsule by mouth 3 times daily for 30 days.  90 capsule 2  CONSTULOSE 10 GM/15ML solution Take 30 mLs by mouth 3 times daily 2700 mL 5    albuterol sulfate HFA (VENTOLIN HFA) 108 (90 Base) MCG/ACT inhaler Inhale 2 puffs into the lungs 4 times daily as needed for Wheezing 1 Inhaler 0    cyclobenzaprine (FLEXERIL) 10 MG tablet take 1/2 to 1 tablet by mouth three times a day if needed 60 tablet 3    lidocaine (LMX) 4 % cream Apply topically3 times daily to thigh as needed for pain. 1 Tube 2    ibuprofen (IBU) 400 MG tablet Take 1 tablet by mouth every 6 hours as needed for Pain 120 tablet 0    spironolactone (ALDACTONE) 100 MG tablet take 1 tablet by mouth every morning 90 tablet 2    atorvastatin (LIPITOR) 40 MG tablet take 1 tablet by mouth nightly 30 tablet 5    rifaximin (XIFAXAN) 550 MG tablet Take 1 tablet by mouth 2 times daily 60 tablet 6    DULoxetine (CYMBALTA) 60 MG extended release capsule take 1 capsule by mouth once daily 90 capsule 3    famotidine (PEPCID) 40 MG tablet take 1 tablet every evening 30 tablet 5    aspirin 81 MG chewable tablet Take 1 tablet by mouth daily 30 tablet 0    nitroGLYCERIN (NITROSTAT) 0.4 MG SL tablet up to max of 3 total doses. If no relief after 1 dose, call 911. 25 tablet 0     No current facility-administered medications for this visit.       No Known Allergies  Health Maintenance   Topic Date Due    Hepatitis A vaccine (1 of 2 - Risk 2-dose series) [birth year]/1965    HIV screen  06/03/1979    Hepatitis B vaccine (1 of 3 - Risk 3-dose series) 06/03/1983    Shingles Vaccine (1 of 2) 06/03/2014    Flu vaccine (1) 09/01/2020    Lipid screen  10/12/2020    Potassium monitoring  01/17/2022    Creatinine monitoring  01/17/2022    Colon cancer screen colonoscopy  03/01/2029    DTaP/Tdap/Td vaccine (2 - Td) 07/27/2030    Pneumococcal 0-64 years Vaccine  Completed    Hepatitis C screen  Completed    Hib vaccine  Aged Out    Meningococcal (ACWY) vaccine  Aged Out         Objective:     Physical Exam Referral Reason:   Specialty Services Required     Referred to Provider:   Darrius Moyer MD     Requested Specialty:   Neurology     Number of Visits Requested:   1    Echocardiogram complete     Standing Status:   Future     Standing Expiration Date:   4/5/2021     Order Specific Question:   Reason for exam:     Answer:   sob    Full PFT Study With Bronchodilator     Standing Status:   Future     Standing Expiration Date:   2/4/2022       Patient giveneducational materials - see patient instructions. Discussed use, benefit, and side effects of prescribed medications. All patient questions answered. Pt voiced understanding. Reviewed health maintenance. Patient agreedwith treatment plan. Follow up as directed. Increase Neurontin to 300 mg 3 times daily. X-ray and MRI ordered of lumbar spine. Consult with neurology. PFT and echo ordered. Labs ordered. Follow-up after testing. Continue medications as prescribed.  Educational information on above diagnosis  provided per AVS.    Electronically signed by CHRISTIAN Saldivar CNP on 2/4/2021 at 2:14 PM

## 2021-02-04 NOTE — PATIENT INSTRUCTIONS
Patient Education        Sciatica: Exercises  Introduction  Here are some examples of typical rehabilitation exercises for your condition. Start each exercise slowly. Ease off the exercise if you start to have pain. Your doctor or physical therapist will tell you when you can start these exercises and which ones will work best for you. When you are not being active, find a comfortable position for rest. Some people are comfortable on the floor or a medium-firm bed with a small pillow under their head and another under their knees. Some people prefer to lie on their side with a pillow between their knees. Don't stay in one position for too long. Take short walks (10 to 20 minutes) every 2 to 3 hours. Avoid slopes, hills, and stairs until you feel better. Walk only distances you can manage without pain, especially leg pain. How to do the exercises  Back stretches   1. Get down on your hands and knees on the floor. 2. Relax your head and allow it to droop. Round your back up toward the ceiling until you feel a nice stretch in your upper, middle, and lower back. Hold this stretch for as long as it feels comfortable, or about 15 to 30 seconds. 3. Return to the starting position with a flat back while you are on your hands and knees. 4. Let your back sway by pressing your stomach toward the floor. Lift your buttocks toward the ceiling. 5. Hold this position for 15 to 30 seconds. 6. Repeat 2 to 4 times. Follow-up care is a key part of your treatment and safety. Be sure to make and go to all appointments, and call your doctor if you are having problems. It's also a good idea to know your test results and keep a list of the medicines you take. Where can you learn more? Go to https://cristiano.[employer]. org and sign in to your [employer] account. Enter X385 in the [employer] box to learn more about \"Sciatica: Exercises. \" If you do not have an account, please click on the \"Sign Up Now\" link. Current as of: March 2, 2020               Content Version: 12.6  © 2006-2020 [employer]. Care instructions adapted under license by [facility] ([facility]). If you have questions about a medical condition or this instruction, always ask your healthcare professional. [address] any warranty or liability for your use of this information. Patient Education        Sciatica: Care Instructions  Your Care Instructions     Sciatica (say \"brf-PN-np-kuh\") is an irritation of one of the sciatic nerves, which come from the spinal cord in the lower back. The sciatic nerves and their branches extend down through the buttock to the foot. Sciatica can develop when an injured disc in the back irritates or presses against a spinal nerve root. Its main symptom is pain, numbness, or weakness that is often worse in the leg or foot than in the back. Sciatica often will improve and go away with time. Early treatment usually includes medicines and exercises to relieve pain. Follow-up care is a key part of your treatment and safety. Be sure to make and go to all appointments, and call your doctor if you are having problems. It's also a good idea to know your test results and keep a list of the medicines you take. How can you care for yourself at home? · Take pain medicines exactly as directed. ? If the doctor gave you a prescription medicine for pain, take it as prescribed. ? If you are not taking a prescription pain medicine, ask your doctor if you can take an over-the-counter medicine. · Use heat or ice to relieve pain. ? To apply heat, put a warm water bottle, heating pad set on low, or warm cloth on your back. Do not go to sleep with a heating pad on your skin. ? To use ice, put ice or a cold pack on the area for 10 to 20 minutes at a time. Put a thin cloth between the ice and your skin. · Avoid sitting if possible, unless it feels better than standing. · Alternate lying down with short walks. Increase your walking distance as you are able to without making your symptoms worse. · Do not do anything that makes your symptoms worse. When should you call for help? Call 911 anytime you think you may need emergency care. For example, call if:    · You are unable to move a leg at all. Call your doctor now or seek immediate medical care if:    · You have new or worse symptoms in your legs or buttocks. Symptoms may include:  ? Numbness or tingling. ? Weakness. ? Pain.     · You lose bladder or bowel control. Watch closely for changes in your health, and be sure to contact your doctor if:    · You are not getting better as expected. Where can you learn more? Go to https://[employer]eb.[employer]. org and sign in to your [employer] account. Enter [phone] in the [facility] box to learn more about \"Sciatica: Care Instructions. \"     If you do not have an account, please click on the \"Sign Up Now\" link. Current as of: March 2, 2020               Content Version: 12.6  © 8436-0374 [employer]. Care instructions adapted under license by [facility] ([facility]). If you have questions about a medical condition or this instruction, always ask your healthcare professional. [address] any warranty or liability for your use of this information.

## 2021-02-07 DIAGNOSIS — F41.9 ANXIETY: ICD-10-CM

## 2021-02-07 DIAGNOSIS — K70.30 ALCOHOLIC CIRRHOSIS, UNSPECIFIED WHETHER ASCITES PRESENT (HCC): ICD-10-CM

## 2021-02-08 RX ORDER — GABAPENTIN 100 MG/1
CAPSULE ORAL
Qty: 180 CAPSULE | Refills: 2 | OUTPATIENT
Start: 2021-02-08 | End: 2021-03-10

## 2021-02-08 ASSESSMENT — ENCOUNTER SYMPTOMS
EYES NEGATIVE: 1
BACK PAIN: 1
GASTROINTESTINAL NEGATIVE: 1
ALLERGIC/IMMUNOLOGIC NEGATIVE: 1
RESPIRATORY NEGATIVE: 1

## 2021-02-09 ENCOUNTER — HOSPITAL ENCOUNTER (OUTPATIENT)
Age: 56
Discharge: HOME | End: 2021-02-09
Payer: COMMERCIAL

## 2021-02-09 ENCOUNTER — HOSPITAL ENCOUNTER (OUTPATIENT)
Dept: GENERAL RADIOLOGY | Age: 56
Discharge: HOME | End: 2021-02-09
Payer: COMMERCIAL

## 2021-02-09 DIAGNOSIS — G89.29 CHRONIC PAIN OF RIGHT LOWER EXTREMITY: ICD-10-CM

## 2021-02-09 DIAGNOSIS — M79.604 CHRONIC PAIN OF RIGHT LOWER EXTREMITY: ICD-10-CM

## 2021-02-09 DIAGNOSIS — M54.16 LUMBAR RADICULOPATHY: ICD-10-CM

## 2021-02-09 PROCEDURE — 72100 X-RAY EXAM L-S SPINE 2/3 VWS: CPT

## 2021-02-09 RX ORDER — ALPRAZOLAM 1 MG/1
TABLET ORAL
Qty: 90 TABLET | Refills: 2 | Status: SHIPPED | OUTPATIENT
Start: 2021-02-09 | End: 2021-05-04 | Stop reason: SDUPTHER

## 2021-02-10 ENCOUNTER — TELEPHONE (OUTPATIENT)
Dept: FAMILY MEDICINE CLINIC | Age: 56
End: 2021-02-10

## 2021-02-10 DIAGNOSIS — M54.41 CHRONIC RIGHT-SIDED LOW BACK PAIN WITH RIGHT-SIDED SCIATICA: Primary | ICD-10-CM

## 2021-02-10 DIAGNOSIS — G89.29 CHRONIC RIGHT-SIDED LOW BACK PAIN WITH RIGHT-SIDED SCIATICA: Primary | ICD-10-CM

## 2021-02-10 DIAGNOSIS — M54.16 LUMBAR RADICULOPATHY: Primary | ICD-10-CM

## 2021-02-10 RX ORDER — TRAMADOL HYDROCHLORIDE 50 MG/1
50 TABLET ORAL EVERY 4 HOURS PRN
Qty: 42 TABLET | Refills: 0 | Status: SHIPPED | OUTPATIENT
Start: 2021-02-10 | End: 2021-08-30 | Stop reason: SDUPTHER

## 2021-02-10 NOTE — TELEPHONE ENCOUNTER
Patient informed. He is not able to do PT due to transportation issues. He says his right leg hurts so bad that he is unable to stand on it. Pain is 10/10. He did increase his Gabapentin to tid. He has tried Ibuprofen, which only helps a couple hours. Please advise.

## 2021-02-23 ENCOUNTER — HOSPITAL ENCOUNTER (OUTPATIENT)
Dept: NON INVASIVE DIAGNOSTICS | Age: 56
Discharge: HOME | End: 2021-02-23
Payer: COMMERCIAL

## 2021-02-23 ENCOUNTER — TELEPHONE (OUTPATIENT)
Dept: FAMILY MEDICINE CLINIC | Age: 56
End: 2021-02-23

## 2021-02-23 DIAGNOSIS — R06.02 SOB (SHORTNESS OF BREATH): ICD-10-CM

## 2021-02-23 DIAGNOSIS — K70.30 ALCOHOLIC CIRRHOSIS, UNSPECIFIED WHETHER ASCITES PRESENT (HCC): ICD-10-CM

## 2021-02-23 LAB
ABSOLUTE BASO #: 0.1 X10E9/L (ref 0–0.2)
ABSOLUTE EOS #: 0.3 X10E9/L (ref 0–0.4)
ABSOLUTE LYMPH #: 1.5 X10E9/L (ref 1–3.5)
ABSOLUTE MONO #: 0.7 X10E9/L (ref 0–0.9)
ABSOLUTE NEUT #: 3.4 X10E9/L (ref 1.5–6.6)
ALBUMIN SERPL-MCNC: 3.5 G/DL (ref 3.2–5.3)
ALK PHOSPHATASE: 63 U/L (ref 39–130)
ALT SERPL-CCNC: 17 U/L (ref 0–40)
ANION GAP SERPL CALCULATED.3IONS-SCNC: 8 MMOL/L (ref 5–15)
AST SERPL-CCNC: 22 U/L (ref 0–41)
BASOPHILS RELATIVE PERCENT: 1.2 %
BILIRUB SERPL-MCNC: 1.2 MG/DL (ref 0.3–1.2)
BUN BLDV-MCNC: 8 MG/DL (ref 5–23)
CALCIUM SERPL-MCNC: 8.4 MG/DL (ref 8.5–10.5)
CHLORIDE BLD-SCNC: 108 MMOL/L (ref 98–109)
CHOLESTEROL/HDL RATIO: 2.3 (ref 1–5)
CHOLESTEROL: 85 MG/DL (ref 150–200)
CO2: 24 MMOL/L (ref 22–32)
CREAT SERPL-MCNC: 0.67 MG/DL (ref 0.6–1.3)
EGFR AFRICAN AMERICAN: >60 ML/MIN/1.73SQ.M
EGFR IF NONAFRICAN AMERICAN: >60 ML/MIN/1.73SQ.M
EOSINOPHILS RELATIVE PERCENT: 5.4 %
GLUCOSE: 86 MG/DL (ref 65–99)
HCT VFR BLD CALC: 39 % (ref 39–49)
HDLC SERPL-MCNC: 37 MG/DL
HEMOGLOBIN: 13.3 G/DL (ref 13–17)
LDL CHOLESTEROL CALCULATED: 39 MG/DL
LDL/HDL RATIO: 1.1
LV EF: 55 %
LVEF MODALITY: NORMAL
LYMPHOCYTE %: 25.2 %
MCH RBC QN AUTO: 32.7 PG (ref 27–34)
MCHC RBC AUTO-ENTMCNC: 34.2 G/DL (ref 32–36)
MCV RBC AUTO: 96 FL (ref 80–100)
MONOCYTES # BLD: 12.1 %
NEUTROPHILS RELATIVE PERCENT: 56.1 %
PDW BLD-RTO: 14.8 % (ref 11.5–15)
PLATELETS: 148 X10E9/L (ref 150–450)
PMV BLD AUTO: 9.9 FL (ref 7–12)
POTASSIUM SERPL-SCNC: 3.8 MMOL/L (ref 3.5–5)
PSA, ULTRASENSITIVE: 0.26 NG/ML (ref 0–4)
RBC: 4.07 X10E12/L (ref 4.1–5.7)
SODIUM BLD-SCNC: 140 MMOL/L (ref 134–146)
TOTAL PROTEIN: 6.9 G/DL (ref 6–8)
TRIGL SERPL-MCNC: 46 MG/DL (ref 27–150)
VLDLC SERPL CALC-MCNC: 9 MG/DL (ref 0–30)
WBC: 6.1 X10E9/L (ref 4–11)

## 2021-02-23 PROCEDURE — 78452 HT MUSCLE IMAGE SPECT MULT: CPT

## 2021-02-23 PROCEDURE — 3430000000 HC RX DIAGNOSTIC RADIOPHARMACEUTICAL: Performed by: NURSE PRACTITIONER

## 2021-02-23 PROCEDURE — 93306 TTE W/DOPPLER COMPLETE: CPT

## 2021-02-23 PROCEDURE — 6360000002 HC RX W HCPCS

## 2021-02-23 PROCEDURE — 93017 CV STRESS TEST TRACING ONLY: CPT | Performed by: INTERNAL MEDICINE

## 2021-02-23 PROCEDURE — A9500 TC99M SESTAMIBI: HCPCS | Performed by: NURSE PRACTITIONER

## 2021-02-23 RX ADMIN — Medication 34.8 MILLICURIE: at 12:30

## 2021-02-23 RX ADMIN — Medication 10.1 MILLICURIE: at 11:25

## 2021-02-24 ENCOUNTER — TELEPHONE (OUTPATIENT)
Dept: FAMILY MEDICINE CLINIC | Age: 56
End: 2021-02-24

## 2021-02-24 DIAGNOSIS — R93.1 ABNORMAL ECHOCARDIOGRAM: ICD-10-CM

## 2021-02-24 DIAGNOSIS — R06.02 SOB (SHORTNESS OF BREATH): Primary | ICD-10-CM

## 2021-02-24 NOTE — TELEPHONE ENCOUNTER
----- Message from RosenbergCHRISTIAN Bay CNP sent at 2/24/2021  8:08 AM EST -----  Echo indicates tricuspid aortic valve which is somewhat thickened and calcified. Ejection fraction within normal limits overall size and function are within normal limits. Overall echo within normal limits.   Recommend a consult with cardiology if patient's symptoms of shortness of breath persist

## 2021-02-24 NOTE — TELEPHONE ENCOUNTER
----- Message from CHRISTIAN Rose CNP sent at 2/24/2021  8:08 AM EST -----  Cardiac stress negative for ischemia/okay

## 2021-02-25 ENCOUNTER — HOSPITAL ENCOUNTER (OUTPATIENT)
Dept: PULMONOLOGY | Age: 56
End: 2021-02-25
Payer: COMMERCIAL

## 2021-02-25 ENCOUNTER — TELEPHONE (OUTPATIENT)
Dept: FAMILY MEDICINE CLINIC | Age: 56
End: 2021-02-25

## 2021-02-25 ENCOUNTER — HOSPITAL ENCOUNTER (OUTPATIENT)
Dept: MRI IMAGING | Age: 56
Discharge: HOME | End: 2021-02-25
Payer: COMMERCIAL

## 2021-02-25 ENCOUNTER — HOSPITAL ENCOUNTER (OUTPATIENT)
Age: 56
Setting detail: SPECIMEN
Discharge: HOME | End: 2021-02-25
Payer: COMMERCIAL

## 2021-02-25 DIAGNOSIS — G89.29 CHRONIC PAIN OF RIGHT LOWER EXTREMITY: ICD-10-CM

## 2021-02-25 DIAGNOSIS — M54.16 LUMBAR RADICULOPATHY: Primary | ICD-10-CM

## 2021-02-25 DIAGNOSIS — M54.16 LUMBAR RADICULOPATHY: ICD-10-CM

## 2021-02-25 DIAGNOSIS — M48.061 SPINAL STENOSIS OF LUMBAR REGION, UNSPECIFIED WHETHER NEUROGENIC CLAUDICATION PRESENT: ICD-10-CM

## 2021-02-25 DIAGNOSIS — M79.604 CHRONIC PAIN OF RIGHT LOWER EXTREMITY: ICD-10-CM

## 2021-02-25 DIAGNOSIS — Z01.812 ENCOUNTER FOR PREPROCEDURE SCREENING LABORATORY TESTING FOR COVID-19: Primary | ICD-10-CM

## 2021-02-25 DIAGNOSIS — Z20.822 ENCOUNTER FOR PREPROCEDURE SCREENING LABORATORY TESTING FOR COVID-19: Primary | ICD-10-CM

## 2021-02-25 PROCEDURE — 72148 MRI LUMBAR SPINE W/O DYE: CPT

## 2021-02-25 PROCEDURE — U0003 INFECTIOUS AGENT DETECTION BY NUCLEIC ACID (DNA OR RNA); SEVERE ACUTE RESPIRATORY SYNDROME CORONAVIRUS 2 (SARS-COV-2) (CORONAVIRUS DISEASE [COVID-19]), AMPLIFIED PROBE TECHNIQUE, MAKING USE OF HIGH THROUGHPUT TECHNOLOGIES AS DESCRIBED BY CMS-2020-01-R: HCPCS

## 2021-02-25 NOTE — TELEPHONE ENCOUNTER
----- Message from CHRISTIAN Rose CNP sent at 2/25/2021  2:12 PM EST -----  Small disc bulges throughout the lumbar spine without significant canal narrowing. Mild to moderate foraminal narrowing throughout the lumbar spine. Severe foraminal narrowing L5-S1.   this may be causing his right leg pain.   Consult with spine specialist

## 2021-02-27 LAB — SARS-COV-2: NOT DETECTED

## 2021-03-01 RX ORDER — CYCLOBENZAPRINE HCL 10 MG
TABLET ORAL
Qty: 60 TABLET | Refills: 3 | OUTPATIENT
Start: 2021-03-01

## 2021-03-01 RX ORDER — CYCLOBENZAPRINE HCL 10 MG
TABLET ORAL
Qty: 60 TABLET | Refills: 3 | Status: SHIPPED | OUTPATIENT
Start: 2021-03-01 | End: 2021-05-04 | Stop reason: SDUPTHER

## 2021-03-04 ENCOUNTER — HOSPITAL ENCOUNTER (OUTPATIENT)
Dept: PULMONOLOGY | Age: 56
Discharge: HOME | End: 2021-03-04
Payer: COMMERCIAL

## 2021-03-04 DIAGNOSIS — R06.02 SOB (SHORTNESS OF BREATH): ICD-10-CM

## 2021-03-04 PROCEDURE — 94060 EVALUATION OF WHEEZING: CPT

## 2021-03-04 PROCEDURE — 94726 PLETHYSMOGRAPHY LUNG VOLUMES: CPT

## 2021-03-04 PROCEDURE — 94729 DIFFUSING CAPACITY: CPT

## 2021-03-11 ENCOUNTER — OFFICE VISIT (OUTPATIENT)
Dept: CARDIOLOGY CLINIC | Age: 56
End: 2021-03-11
Payer: COMMERCIAL

## 2021-03-11 VITALS
HEART RATE: 98 BPM | WEIGHT: 238.2 LBS | BODY MASS INDEX: 38.28 KG/M2 | SYSTOLIC BLOOD PRESSURE: 119 MMHG | HEIGHT: 66 IN | DIASTOLIC BLOOD PRESSURE: 83 MMHG

## 2021-03-11 DIAGNOSIS — R06.02 SOB (SHORTNESS OF BREATH) ON EXERTION: ICD-10-CM

## 2021-03-11 DIAGNOSIS — I10 ESSENTIAL HYPERTENSION: Primary | Chronic | ICD-10-CM

## 2021-03-11 DIAGNOSIS — R07.89 CHEST PAIN, ATYPICAL: ICD-10-CM

## 2021-03-11 PROCEDURE — G8417 CALC BMI ABV UP PARAM F/U: HCPCS | Performed by: INTERNAL MEDICINE

## 2021-03-11 PROCEDURE — G8484 FLU IMMUNIZE NO ADMIN: HCPCS | Performed by: INTERNAL MEDICINE

## 2021-03-11 PROCEDURE — 3017F COLORECTAL CA SCREEN DOC REV: CPT | Performed by: INTERNAL MEDICINE

## 2021-03-11 PROCEDURE — 4004F PT TOBACCO SCREEN RCVD TLK: CPT | Performed by: INTERNAL MEDICINE

## 2021-03-11 PROCEDURE — G8427 DOCREV CUR MEDS BY ELIG CLIN: HCPCS | Performed by: INTERNAL MEDICINE

## 2021-03-11 PROCEDURE — 99203 OFFICE O/P NEW LOW 30 MIN: CPT | Performed by: INTERNAL MEDICINE

## 2021-03-11 PROCEDURE — 93000 ELECTROCARDIOGRAM COMPLETE: CPT | Performed by: INTERNAL MEDICINE

## 2021-03-11 NOTE — PROGRESS NOTES
Chief Complaint   Patient presents with    New Patient     abnormal echo    Shortness of Breath   NP. Abnormal echo  Chest pain for many years    Prick type    Last sec to 1 min  Occasional  Not exertion induced    Sob on exertion    Denied palpitation or dizziness    No leg edema    On aldactone for CLD cirrhosis  ekg today    Smokes 1/2 ppd for 28 yrs    FHx  Mother had heart problem      Patient Active Problem List   Diagnosis    Diverticulosis    HTN (hypertension)    Ascites    Liver cirrhosis (HCC)    Cystic kidney disease    Leukocytosis    Hypotension    Alcohol abuse    Hyponatremia    Hypokalemia    Perianal ulcer (HCC)    Hyperkalemia    Increased ammonia level    Confusion    Change in mental status    Acute renal failure (HCC)    Metabolic encephalopathy    Altered mental status    Hepatic encephalopathy (HCC)    Noncompliance with medications    Ascites due to alcoholic cirrhosis (HCC)    Hypotension (arterial)    Alcoholic cirrhosis of liver with ascites (HCC)    Lactic acidosis    Generalized abdominal pain    Tobacco abuse    Spondylosis of cervical region without myelopathy or radiculopathy    Chest pain    Elevated INR    Opiate abuse, episodic (HCC)    Marijuana abuse    Dyspnea on exertion    SOB (shortness of breath) on exertion    Chest pain, atypical- for yrs        Past Surgical History:   Procedure Laterality Date    ABDOMEN SURGERY      BACK SURGERY      cervical plate    CARDIAC CATHETERIZATION  2007?     CERVICAL DISC SURGERY      x 2 ---broken neck 7-2003    COLON SURGERY  2004    partial, due to diverticulitis    COLONOSCOPY      DILATATION, ESOPHAGUS      ENDOSCOPY, COLON, DIAGNOSTIC      FRACTURE SURGERY      Broke neck in 2003    NERVE BLOCK Bilateral 10/02/2017    Cervical Facet MBB at C4-5, C5-6, C6-7     [place] INJ,PARAVERTEBRAL L/S,1 LEVEL Bilateral 10/2/2017    C-FACET MBB C4-5, C5-6, C6-7 BILATERAL performed by Ashish Gomez MD at [facility]      TYMPANOSTOMY TUBE PLACEMENT      UPPER GASTROINTESTINAL ENDOSCOPY  2019    UPPER GASTROINTESTINAL ENDOSCOPY Left 9/28/2020    EGD BAND LIGATION performed by Gregorio Bautista MD at [address] [facility]       No Known Allergies     Family History   Problem Relation Age of Onset    Hypertension Mother     Heart Disease Mother     Alzheimer's Disease Mother     Heart Failure Mother     Hypertension Father     Heart Disease Father     Cancer Father     High Blood Pressure Paternal Uncle     Heart Disease Paternal Uncle     Colon Cancer Neg Hx     Colon Polyps Neg Hx         Social History     Socioeconomic History    Marital status:      Spouse name: Not on file    Number of children: 2    Years of education: Not on file    Highest education level: Not on file   Occupational History    Not on file   Social Needs    Financial resource strain: Not on file    Food insecurity     Worry: Not on file     Inability: Not on file   [employer] needs     Medical: Not on file     Non-medical: Not on file   Tobacco Use    Smoking status: Current Every Day Smoker     Packs/day: 0.50     Years: 25.00     Pack years: 12.50     Types: Cigarettes    Smokeless tobacco: Never Used    Tobacco comment: printed to avs   Substance and Sexual Activity    Alcohol use: No     Comment: Quit 2 years ago    Drug use: No    Sexual activity: Not Currently   Lifestyle    Physical activity     Days per week: Not on file     Minutes per session: Not on file    Stress: Not on file   Relationships    Social connections     Talks on phone: Not on file     Gets together: Not on file     Attends Caodaism service: Not on file     Active member of club or organization: Not on file     Attends meetings of clubs or organizations: Not on file     Relationship status: Not on file    Intimate partner violence     Fear of current or ex partner: Not on file     Emotionally abused: Not on file     Physically abused: Not on file     Forced sexual activity: Not on file   Other Topics Concern    Not on file   Social History Narrative    Not on file       Current Outpatient Medications   Medication Sig Dispense Refill    cyclobenzaprine (FLEXERIL) 10 MG tablet take 1/2 to 1 tablet three times a day if needed 60 tablet 3    ALPRAZolam (XANAX) 1 MG tablet take 1 tablet by mouth three times a day if needed for anxiety or sleep 90 tablet 2    gabapentin (NEURONTIN) 300 MG capsule Take 1 capsule by mouth 3 times daily for 30 days. 90 capsule 2    albuterol sulfate HFA (VENTOLIN HFA) 108 (90 Base) MCG/ACT inhaler Inhale 2 puffs into the lungs 4 times daily as needed for Wheezing 1 Inhaler 0    lidocaine (LMX) 4 % cream Apply topically3 times daily to thigh as needed for pain. 1 Tube 2    ibuprofen (IBU) 400 MG tablet Take 1 tablet by mouth every 6 hours as needed for Pain 120 tablet 0    spironolactone (ALDACTONE) 100 MG tablet take 1 tablet by mouth every morning 90 tablet 2    atorvastatin (LIPITOR) 40 MG tablet take 1 tablet by mouth nightly 30 tablet 5    rifaximin (XIFAXAN) 550 MG tablet Take 1 tablet by mouth 2 times daily 60 tablet 6    DULoxetine (CYMBALTA) 60 MG extended release capsule take 1 capsule by mouth once daily 90 capsule 3    famotidine (PEPCID) 40 MG tablet take 1 tablet every evening 30 tablet 5    aspirin 81 MG chewable tablet Take 1 tablet by mouth daily 30 tablet 0    nitroGLYCERIN (NITROSTAT) 0.4 MG SL tablet up to max of 3 total doses. If no relief after 1 dose, call 911. 25 tablet 0     No current facility-administered medications for this visit. Review of Systems -     General ROS: negative  Psychological ROS: negative  Hematological and Lymphatic ROS: No history of blood clots or bleeding disorder.    Respiratory ROS: no cough,  or wheezing, the rest see HPI  Cardiovascular ROS: See HPI  Gastrointestinal ROS: negative  Genito-Urinary ROS: no dysuria, trouble voiding, or hematuria  Musculoskeletal ROS: negative  Neurological ROS: no TIA or stroke symptoms  Dermatological ROS: negative      Blood pressure 119/83, pulse 98, height 5' 6\" (1.676 m), weight 238 lb 3.2 oz (108 kg). Physical Examination:    General appearance - alert, well appearing, and in no distress  HEENT- Pink conjunctiva  , Non-icteri sclera,PERRLA  Mental status - alert, oriented to person, place, and time  Neck - supple, no significant adenopathy, no JVD, or carotid bruits  Chest - clear to auscultation, no wheezes, rales or rhonchi, symmetric air entry  Heart - normal rate, regular rhythm, normal S1, S2, no murmurs, rubs, clicks or gallops  Abdomen - soft, nontender, nondistended, no masses or organomegaly  KENYON- no CVA or flank tenderness, no suprapubic tenderness  Neurological - alert, oriented, normal speech, no focal findings or movement disorder noted  Musculoskeletal/limbs - no joint tenderness, deformity or swelling   - peripheral pulses normal, no pedal edema, no clubbing or cyanosis  Skin - normal coloration and turgor, no rashes, no suspicious skin lesions noted  Psych- appropriate mood and affect    Lab  No results for input(s): CKTOTAL, CKMB, CKMBINDEX, TROPONINI in the last 72 hours.   CBC:   Lab Results   Component Value Date    WBC 6.1 02/22/2021    WBC 7.5 01/17/2021    RBC 4.07 02/22/2021    HGB 13.3 02/22/2021    HCT 39.0 02/22/2021    MCV 96 02/22/2021    MCH 32.7 02/22/2021    MCHC 34.2 02/22/2021    RDW 14.8 02/22/2021     02/22/2021     01/17/2021    MPV 9.9 02/22/2021     BMP:    Lab Results   Component Value Date     02/22/2021    K 3.8 02/22/2021    K 3.8 01/14/2021     02/22/2021    CO2 24 02/22/2021    BUN 8 02/22/2021    LABALBU 3.5 02/22/2021    LABALBU 4.6 11/04/2011    CREATININE 0.67 02/22/2021    CALCIUM 8.4 02/22/2021    LABGLOM >90 01/17/2021    GLUCOSE 86 02/22/2021     Hepatic Function Panel:    Lab Results   Component Value ----------------------------------------------------------------      ekg 3/11/21    Sinus  Rhythm   Low voltage in precordial leads. ABNORMAL     Assessment   Diagnosis Orders   1. Essential hypertension     2. Chest pain, atypical- for yrs      3. SOB (shortness of breath) on exertion  EKG 12 lead         Plan     Continue the current treatment and with constant vigilance to changes in symptoms and also any potential side effects. Return for care or seek medical attention immediately if symptoms got worse and/or develop new symptoms. Hypertension, on medical treatment. Seems to be under good control. Patient is compliant with medical treatment.      Chest pain atypical chronic noncardiac  Cath neg 10/2019 and nuc stress neg feb 2021    Echo no siginificant abn    D/w the pat the plan of care    F/u echo in one year    RTC in 1 year      Jordin Brandon

## 2021-04-05 ENCOUNTER — TELEPHONE (OUTPATIENT)
Dept: FAMILY MEDICINE CLINIC | Age: 56
End: 2021-04-05

## 2021-04-05 NOTE — TELEPHONE ENCOUNTER
Patient:Yosvany Domingo  : 1965  Referring Provider: Geo Cooper  Referral Type:  Eval and Treat     Procedures: AMB REFERRAL TO PAIN CLINIC [[id number]]  Date Service Ordered 2/10/2021        Arlyn Duarte did not show for their referral to pain management. Please advise on further tx with Tramadol/Cyclobenzaprine.

## 2021-05-04 ENCOUNTER — VIRTUAL VISIT (OUTPATIENT)
Dept: FAMILY MEDICINE CLINIC | Age: 56
End: 2021-05-04
Payer: COMMERCIAL

## 2021-05-04 DIAGNOSIS — F41.9 ANXIETY: ICD-10-CM

## 2021-05-04 DIAGNOSIS — K70.30 ALCOHOLIC CIRRHOSIS, UNSPECIFIED WHETHER ASCITES PRESENT (HCC): ICD-10-CM

## 2021-05-04 PROCEDURE — 99213 OFFICE O/P EST LOW 20 MIN: CPT | Performed by: NURSE PRACTITIONER

## 2021-05-04 PROCEDURE — G8427 DOCREV CUR MEDS BY ELIG CLIN: HCPCS | Performed by: NURSE PRACTITIONER

## 2021-05-04 PROCEDURE — 3017F COLORECTAL CA SCREEN DOC REV: CPT | Performed by: NURSE PRACTITIONER

## 2021-05-04 RX ORDER — GABAPENTIN 300 MG/1
300 CAPSULE ORAL 3 TIMES DAILY
Qty: 90 CAPSULE | Refills: 2 | Status: SHIPPED | OUTPATIENT
Start: 2021-05-04 | End: 2021-07-30 | Stop reason: SDUPTHER

## 2021-05-04 RX ORDER — CYCLOBENZAPRINE HCL 10 MG
TABLET ORAL
Qty: 60 TABLET | Refills: 3 | Status: SHIPPED | OUTPATIENT
Start: 2021-05-04 | End: 2021-09-07

## 2021-05-04 RX ORDER — ALPRAZOLAM 1 MG/1
TABLET ORAL
Qty: 90 TABLET | Refills: 2 | Status: SHIPPED | OUTPATIENT
Start: 2021-05-04 | End: 2021-07-30 | Stop reason: SDUPTHER

## 2021-05-04 ASSESSMENT — ENCOUNTER SYMPTOMS
ABDOMINAL DISTENTION: 0
ABDOMINAL PAIN: 0
BACK PAIN: 1

## 2021-05-04 NOTE — PROGRESS NOTES
[address] [place] [facility] [address]  Dept: [phone]  Dept Fax: [phone]  Loc: [phone]  PROGRESS NOTE      VisitDate: 5/4/2021    Nathen Bernal is a 63 y.o. male who presents today for:     Chief Complaint   Patient presents with    Medication Refill         Subjective:  Presents for virtual visit for medication refills. Reports he is doing well. Reports that he is begin daily exercise regimen. Reports weight loss approximately 5 pounds. Today's weight 230. Denies any abdominal pain. Reports his ammonia levels are around 40. History of anxiety, chronic kidney disease, arthritis, cirrhosis, GERD, hypertension. Denies any fever, illness, chest pain, shortness of breath or edema. Patient abstaining from alcohol. Review of Systems   Gastrointestinal: Negative for abdominal distention and abdominal pain. Musculoskeletal: Positive for arthralgias, back pain and neck pain. Psychiatric/Behavioral: The patient is nervous/anxious. All other systems reviewed and are negative. Past Medical History:   Diagnosis Date    Anxiety     Arthritis     Chronic kidney disease     Cirrhosis ([address]     Diverticulitis     Diverticulosis     GERD (gastroesophageal reflux disease)     Hypertension     Other disorders of kidney and ureter in diseases classified elsewhere     Psychiatric problem       Past Surgical History:   Procedure Laterality Date    ABDOMEN SURGERY      BACK SURGERY      cervical plate    CARDIAC CATHETERIZATION  2007?     CERVICAL DISC SURGERY      x 2 ---broken neck 7-2003    COLON SURGERY  2004    partial, due to diverticulitis    COLONOSCOPY      DILATATION, ESOPHAGUS      ENDOSCOPY, COLON, DIAGNOSTIC      FRACTURE SURGERY      Broke neck in 2003    NERVE BLOCK Bilateral 10/02/2017    Cervical Facet MBB at C4-5, C5-6, C6-7     [place] INJ,PARAVERTEBRAL L/S,1 LEVEL Bilateral 10/2/2017 C-FACET MBB C4-5, C5-6, C6-7 BILATERAL performed by Steffi Collazo MD at [facility] END[facility]  2019    UPPER GASTROINTESTINAL ENDOSCOPY Left 9/28/2020    EGD BAND LIGATION performed by Augusto Wright MD at [facility]     Family History   Problem Relation Age of Onset    Hypertension Mother     Heart Disease Mother     Alzheimer's Disease Mother     Heart Failure Mother     Hypertension Father     Heart Disease Father     Cancer Father     High Blood Pressure Paternal Uncle     Heart Disease Paternal Uncle     Colon Cancer Neg Hx     Colon Polyps Neg Hx      Social History     Tobacco Use    Smoking status: Current Every Day Smoker     Packs/day: 0.50     Years: 25.00     Pack years: 12.50     Types: Cigarettes    Smokeless tobacco: Never Used    Tobacco comment: printed to avs   Substance Use Topics    Alcohol use: No     Comment: Quit 2 years ago      Current Outpatient Medications   Medication Sig Dispense Refill    cyclobenzaprine (FLEXERIL) 10 MG tablet take 1/2 to 1 tablet three times a day if needed 60 tablet 3    gabapentin (NEURONTIN) 300 MG capsule Take 1 capsule by mouth 3 times daily for 30 days. 90 capsule 2    ALPRAZolam (XANAX) 1 MG tablet take 1 tablet by mouth three times a day if needed for anxiety or sleep 90 tablet 2    lactulose (CHRONULAC) 10 GM/15ML solution Take 20 g by mouth 2 times daily       albuterol sulfate HFA (VENTOLIN HFA) 108 (90 Base) MCG/ACT inhaler Inhale 2 puffs into the lungs 4 times daily as needed for Wheezing 1 Inhaler 0    lidocaine (LMX) 4 % cream Apply topically3 times daily to thigh as needed for pain.  1 Tube 2    ibuprofen (IBU) 400 MG tablet Take 1 tablet by mouth every 6 hours as needed for Pain 120 tablet 0    spironolactone (ALDACTONE) 100 MG tablet take 1 tablet by mouth every morning 90 tablet 2    atorvastatin (LIPITOR) 40 MG tablet take 1 tablet by mouth nightly 30 tablet 5    rifaximin (XIFAXAN) 550 MG tablet Take 1 tablet by mouth 2 times daily 60 tablet 6    DULoxetine (CYMBALTA) 60 MG extended release capsule take 1 capsule by mouth once daily 90 capsule 3    famotidine (PEPCID) 40 MG tablet take 1 tablet every evening 30 tablet 5    aspirin 81 MG chewable tablet Take 1 tablet by mouth daily 30 tablet 0    nitroGLYCERIN (NITROSTAT) 0.4 MG SL tablet up to max of 3 total doses. If no relief after 1 dose, call 911. 25 tablet 0     No current facility-administered medications for this visit. No Known Allergies  Health Maintenance   Topic Date Due    Hepatitis A vaccine (1 of 2 - Risk 2-dose series) Never done    HIV screen  Never done    COVID-19 Vaccine (1) Never done    Hepatitis B vaccine (1 of 3 - Risk 3-dose series) Never done    Shingles Vaccine (1 of 2) Never done    Flu vaccine (Season Ended) 09/01/2021    Lipid screen  02/22/2022    Potassium monitoring  02/22/2022    Creatinine monitoring  02/22/2022    Colon cancer screen colonoscopy  03/01/2029    DTaP/Tdap/Td vaccine (2 - Td) 07/27/2030    Pneumococcal 0-64 years Vaccine  Completed    Hepatitis C screen  Completed    Hib vaccine  Aged Out    Meningococcal (ACWY) vaccine  Aged Out         Objective:     Physical Exam  Constitutional:       Appearance: Normal appearance. Neurological:      General: No focal deficit present. Mental Status: He is alert and oriented to person, place, and time. Psychiatric:         Mood and Affect: Mood normal.         Thought Content: Thought content normal.         Judgment: Judgment normal.       There were no vitals taken for this visit. Impression/Plan:  1. Alcoholic cirrhosis, unspecified whether ascites present ([facility] [address]    2.  Anxiety      Requested Prescriptions     Signed Prescriptions Disp Refills    cyclobenzaprine (FLEXERIL) 10 MG tablet 60 tablet 3     Sig: take 1/2 to 1 tablet three times a day if needed   

## 2021-05-17 RX ORDER — ATORVASTATIN CALCIUM 40 MG/1
40 TABLET, FILM COATED ORAL NIGHTLY
Qty: 30 TABLET | Refills: 5 | Status: SHIPPED | OUTPATIENT
Start: 2021-05-17 | End: 2021-11-16

## 2021-06-25 ENCOUNTER — CARE COORDINATION (OUTPATIENT)
Dept: CARE COORDINATION | Age: 56
End: 2021-06-25

## 2021-06-28 ENCOUNTER — CARE COORDINATION (OUTPATIENT)
Dept: CARE COORDINATION | Age: 56
End: 2021-06-28

## 2021-06-28 RX ORDER — NITROGLYCERIN 0.4 MG/1
TABLET SUBLINGUAL
Qty: 25 TABLET | Refills: 0 | Status: SHIPPED | OUTPATIENT
Start: 2021-06-28

## 2021-07-01 NOTE — CARE COORDINATION
Ambulatory Care Coordination Note  7/1/2021  CM Risk Score: 4  Charlson 10 Year Mortality Risk Score: 79%     ACC: Naun Ranch, RN    Summary Note:     2 messages left for neuro office to schedule appt with [facility] contact info. Left message for patient to return phone call to ambulatory care manager at [phone]. Call back from neuro office Hyun Bruce. Cannot schedule due to 2 no shows. Note routed to PCP for new referral.     Plan -   Neurology appt  Can use find a ride or [facility] if needed but would benefit from son or friend accompanying to appts  Medication adherence  2 liter FR  Low salt diet  Report abdominal bloating or swelling  Early symptom recognition and reporting to prevent ED and admissions  [facility] will follow up next             Care Coordination Interventions    Program Enrollment: Complex Care  Referral from Primary Care Provider: No  Suggested Interventions and Community Resources  Meals on Wheels: Declined         Goals Addressed    None         Prior to Admission medications    Medication Sig Start Date End Date Taking? Authorizing Provider   nitroGLYCERIN (NITROSTAT) 0.4 MG SL tablet up to max of 3 total doses. If no relief after 1 dose, call 911. 6/28/21   CHRISTIAN Birmingham - CNP   rifaximin (XIFAXAN) 550 MG tablet Take 1 tablet by mouth 2 times daily 6/2/21   Jessie Lynn APRN - CNP   lactulose [facility]) 10 GM/15ML solution Take 30 mLs by mouth 2 times daily 6/2/21 7/2/21  CHRISTIAN Brown - CNP   atorvastatin (LIPITOR) 40 MG tablet take 1 tablet by mouth nightly 5/17/21   CHRISTIAN Birmingham - CNP   cyclobenzaprine (FLEXERIL) 10 MG tablet take 1/2 to 1 tablet three times a day if needed 5/4/21   CHRISTIAN Birmingham CNP   gabapentin (NEURONTIN) 300 MG capsule Take 1 capsule by mouth 3 times daily for 30 days.  5/4/21 6/3/21  CHRISTIAN Birmingham CNP   ALPRAZolam [place]) 1 MG tablet take 1 tablet by mouth three times a day if needed for anxiety or sleep 5/4/21 8/1/21 [facility] - CNP   albuterol sulfate HFA (VENTOLIN HFA) 108 (90 Base) MCG/ACT inhaler Inhale 2 puffs into the lungs 4 times daily as needed for Wheezing 1/14/21   Melissa Garciar,    lidocaine (LMX) 4 % cream Apply topically3 times daily to thigh as needed for pain.  11/5/20 [facility], APRN - CNP   ibuprofen (IBU) 400 MG tablet Take 1 tablet by mouth every 6 hours as needed for Pain 11/2/20   Valentín Sykes MD   spironolactone (ALDACTONE) 100 MG tablet take 1 tablet by mouth every morning 10/8/20   Bernestine Duty, APRN - CNP   DULoxetine (CYMBALTA) 60 MG extended release capsule take 1 capsule by mouth once daily 6/15/20   Bernestine Duty, APRN - CNP   famotidine (PEPCID) 40 MG tablet take 1 tablet every evening 12/26/19 Bernestine Duty, APRN - CNP   aspirin 81 MG chewable tablet Take 1 tablet by mouth daily 10/15/19   Jose J Francisco MD       Future Appointments   Date Time Provider [facility]   9/2/2021 12:45 PM CHRISTIAN Allen - CNP [facility]   3/10/2022  3:00 PM Eliel Fernandez [facility]

## 2021-07-02 NOTE — TELEPHONE ENCOUNTER
Iván Blunt was notified that we are holding on any other neurology referral for now. He will call us if he encounters any further issues.

## 2021-07-09 ENCOUNTER — TELEPHONE (OUTPATIENT)
Dept: FAMILY MEDICINE CLINIC | Age: 56
End: 2021-07-09

## 2021-07-09 ENCOUNTER — CARE COORDINATION (OUTPATIENT)
Dept: CARE COORDINATION | Age: 56
End: 2021-07-09

## 2021-07-09 NOTE — TELEPHONE ENCOUNTER
----- Message from Renaldo Bashir sent at 7/9/2021  8:09 AM EDT -----  Subject: Message to Provider    QUESTIONS  Information for Provider? Patient's Doctor and  need a   letter stating that he's on disability and including what all is wrong   with him.   ---------------------------------------------------------------------------  --------------  CALL BACK INFO  What is the best way for the office to contact you? OK to leave message on   voicemail  Preferred Call Back Phone Number? [phone]  ---------------------------------------------------------------------------  --------------  SCRIPT ANSWERS  Relationship to Patient?  Self

## 2021-07-09 NOTE — CARE COORDINATION
Attempted to reach patient for continued Care Coordination follow up and education. Patient was unavailable at the time of my call, and a generic voicemail message was left asking patient to return my call at [phone].

## 2021-07-09 NOTE — TELEPHONE ENCOUNTER
----- Message from [facility] sent at 7/9/2021 12:23 PM EDT -----  Subject: Message to Provider    QUESTIONS  Information for Provider? Patient, called on 07/09 asking to speak with   Laura and that the office was already closed. He called in regards to   the letter for him, stating from such and such date he has been under   Rk's care and that he has issues going up steps and not able to walk   very far due to the liver medical issue. Patient stated more than welcome   to call him and leave him a message in case he does not .   ---------------------------------------------------------------------------  --------------  [address]  What is the best way for the office to contact you? OK to leave message on   voicemail  Preferred Call Back Phone Number? [phone]  ---------------------------------------------------------------------------  --------------  SCRIPT ANSWERS  Relationship to Patient?  Self

## 2021-07-09 NOTE — LETTER
[address]  [address]  [facility] [address]  Phone: [phone]  Fax: [phone]    CHRISTIAN Garcia CNP        July 12, 2021     Patient: Nicole Carrion   YOB: 1965   Date of Visit: 7/9/2021       To Whom It May Concern: It is my medical opinion that Hanna Skiff is currently on disability for cirrhosis and is unable to climb stairs due to his condition. Patient has been under my care since 2011. If you have any questions or concerns, please don't hesitate to call.     Sincerely,        CHRISTIAN Garcia CNP

## 2021-07-09 NOTE — TELEPHONE ENCOUNTER
----- Message from Houston Benson sent at 7/9/2021  8:09 AM EDT -----  Subject: Message to Provider    QUESTIONS  Information for Provider? Patient's Doctor and  need a   letter stating that he's on disability and including what all is wrong   with him.   ---------------------------------------------------------------------------  --------------  CALL BACK INFO  What is the best way for the office to contact you? OK to leave message on   voicemail  Preferred Call Back Phone Number? [phone]  ---------------------------------------------------------------------------  --------------  SCRIPT ANSWERS  Relationship to Patient?  Self

## 2021-07-20 ENCOUNTER — CARE COORDINATION (OUTPATIENT)
Dept: CARE COORDINATION | Age: 56
End: 2021-07-20

## 2021-07-20 NOTE — CARE COORDINATION
Ambulatory Care Coordination Note  7/20/2021  CM Risk Score: 4  Charlson 10 Year Mortality Risk Score: 79%     ACC: Liane Angel, RAEGAN    Summary Note:     Has a cane. Advised to use at all times. Encouraged to report med refills needed before he runs out. Thinks he has enough until his next appt. Denies any abdominal bloating or leg swelling. Reviewed s/s to report. Plan -   8/9 PCP appt  Can use find a ride or [employer] if needed but would benefit from son or friend accompanying to appts  Medication adherence  2 liter FR  Low salt diet  Report abdominal bloating or swelling, altered mental status  Early symptom recognition and reporting to prevent ED and admissions      Care Coordination Interventions    Program Enrollment: Complex Care  Referral from Primary Care Provider: No  Suggested Interventions and Community Resources  Meals on Wheels: Declined         Goals Addressed    None         Prior to Admission medications    Medication Sig Start Date End Date Taking? Authorizing Provider   nitroGLYCERIN (NITROSTAT) 0.4 MG SL tablet up to max of 3 total doses. If no relief after 1 dose, call 911. 6/28/21   CHRISTIAN Bernard CNP   rifaximin (XIFAXAN) 550 MG tablet Take 1 tablet by mouth 2 times daily 6/2/21   CHRISTIAN Cook CNP   atorvastatin (LIPITOR) 40 MG tablet take 1 tablet by mouth nightly 5/17/21   CHRISTIAN Bernard CNP   cyclobenzaprine (FLEXERIL) 10 MG tablet take 1/2 to 1 tablet three times a day if needed 5/4/21   CHRISTIAN Bernard CNP   gabapentin (NEURONTIN) 300 MG capsule Take 1 capsule by mouth 3 times daily for 30 days.  5/4/21 6/3/21  CHRISTIAN Bernard CNP   ALPRAZolam Evaline Beams) 1 MG tablet take 1 tablet by mouth three times a day if needed for anxiety or sleep 5/4/21 8/1/21  CHRISTIAN Bernard CNP   albuterol sulfate HFA (VENTOLIN HFA) 108 (90 Base) MCG/ACT inhaler Inhale 2 puffs into the lungs 4 times daily as needed for Wheezing 1/14/21   Valerie Roberto, DO   lidocaine (LMX) 4 % cream Apply topically3 times daily to thigh as needed for pain.  11/5/20   CHRISTIAN uQan CNP   ibuprofen (IBU) 400 MG tablet Take 1 tablet by mouth every 6 hours as needed for Pain 11/2/20   Cielo Link MD   spironolactone (ALDACTONE) 100 MG tablet take 1 tablet by mouth every morning 10/8/20   CHRISTIAN Quan CNP   DULoxetine (CYMBALTA) 60 MG extended release capsule take 1 capsule by mouth once daily 6/15/20   CHRISTIAN Quan CNP   famotidine (PEPCID) 40 MG tablet take 1 tablet every evening 12/26/19   CHRISTIAN Quan CNP   aspirin 81 MG chewable tablet Take 1 tablet by mouth daily 10/15/19   Arturo Peña MD       Future Appointments   Date Time Provider [facility]   8/9/2021  1:20 PM [facility]RIST[facility] [place]   9/2/2021 12:45 PM CHRISTIAN Chou CNP [facility]   3/10/2022  3:00 PM [facility]ach [facility] [place]

## 2021-07-29 DIAGNOSIS — K70.30 ALCOHOLIC CIRRHOSIS, UNSPECIFIED WHETHER ASCITES PRESENT (HCC): ICD-10-CM

## 2021-07-29 DIAGNOSIS — F41.9 ANXIETY: ICD-10-CM

## 2021-07-30 RX ORDER — GABAPENTIN 300 MG/1
300 CAPSULE ORAL 3 TIMES DAILY
Qty: 90 CAPSULE | Refills: 2 | Status: SHIPPED | OUTPATIENT
Start: 2021-07-30 | End: 2021-11-02 | Stop reason: SDUPTHER

## 2021-07-30 RX ORDER — ALPRAZOLAM 1 MG/1
TABLET ORAL
Qty: 90 TABLET | Refills: 2 | Status: SHIPPED | OUTPATIENT
Start: 2021-07-30 | End: 2021-10-25

## 2021-08-02 ENCOUNTER — TELEPHONE (OUTPATIENT)
Dept: FAMILY MEDICINE CLINIC | Age: 56
End: 2021-08-02

## 2021-08-02 NOTE — TELEPHONE ENCOUNTER
BRENTI   Pt calls c/o having urges to use. He is currently in rehab and doing classes. He says he was told to keep himself busy and do things such as take walks on nice days to help with those urges. Pt encouraged to find the schedule of meetings in our area and attend extra meetings if he needs to, also encouraged pt to call the we [facility] number if needed. He is waiting on the arrival of his children and a family friend to his home. He talks to them often and the friend has many years of sobriety. He reports 2 falls recently, no LOC-denies hitting head. Encouraged pt to keep his upcoming appt here at our office and call if he needs to be seen sooner. Pt agreeable to be seen in ED if he has any suicidal or homicidal ideations, denies any at this time.

## 2021-08-03 ENCOUNTER — CARE COORDINATION (OUTPATIENT)
Dept: CARE COORDINATION | Age: 56
End: 2021-08-03

## 2021-08-03 NOTE — CARE COORDINATION
Ambulatory Care Coordination Note  8/3/2021  CM Risk Score: 4  Charlson 10 Year Mortality Risk Score: 79%     ACC: Johnny Ramirez RN    Summary Note:     Has chronic pain from liver,legs, lumbar pain, and neck injury. Trying clean himself. Takes a while because he has to take a lot of breaks. Lives alone. A friend is keeping his puppy due to landlord restrictions. Est with pain management. Taking gabapentin. Discussed recovery from alcohol abuse. He denies urges. States he knows if he will drink he will die. Wants to be around for kids and grand kids. Sees them weekly. Gets lonely. Goes to meetings for substance and alcohol abuse. Plan -   8/9 PCP appt - neuro referral, discuss increasing gabapentin, son taking him  Can use find a ride or [employer] if needed but would benefit from son or friend accompanying to appts  Medication adherence  2 liter FR  Low salt diet  Report abdominal bloating or swelling, altered mental status  Early symptom recognition and reporting to prevent ED and admissions        Care Coordination Interventions    Program Enrollment: Complex Care  Referral from Primary Care Provider: No  Suggested Interventions and Community Resources  Meals on Wheels: Declined         Goals Addressed    None         Prior to Admission medications    Medication Sig Start Date End Date Taking? Authorizing Provider   gabapentin (NEURONTIN) 300 MG capsule Take 1 capsule by mouth 3 times daily for 30 days. 7/30/21 8/29/21  CHRISTIAN Myers CNP   ALPRAZolam Nayely Collier) 1 MG tablet take 1 tablet by mouth three times a day if needed for anxiety or sleep 7/30/21 10/26/21  CHRISTIAN Myers CNP   nitroGLYCERIN (NITROSTAT) 0.4 MG SL tablet up to max of 3 total doses.  If no relief after 1 dose, call 911. 6/28/21   CHRISTIAN Myers CNP   rifaximin (XIFAXAN) 550 MG tablet Take 1 tablet by mouth 2 times daily 6/2/21   CHRISTIAN Veras CNP   atorvastatin (LIPITOR) 40 MG tablet take 1 tablet by mouth nightly 5/17/21   [facility], APRN - CNP   cyclobenzaprine (FLEXERIL) 10 MG tablet take 1/2 to 1 tablet three times a day if needed 5/4/21   [facility], APRN - CNP   albuterol sulfate HFA (VENTOLIN HFA) 108 (90 Base) MCG/ACT inhaler Inhale 2 puffs into the lungs 4 times daily as needed for Wheezing 1/14/21   Whitney Conley DO   lidocaine (LMX) 4 % cream Apply topically3 times daily to thigh as needed for pain.  11/5/20   [facility], APRN - CNP   ibuprofen (IBU) 400 MG tablet Take 1 tablet by mouth every 6 hours as needed for Pain 11/2/20   Franci Starks MD   spironolactone (ALDACTONE) 100 MG tablet take 1 tablet by mouth every morning 10/8/20   [facility], APRN - CNP   DULoxetine (CYMBALTA) 60 MG extended release capsule take 1 capsule by mouth once daily 6/15/20   [facility], APRN - CNP   famotidine (PEPCID) 40 MG tablet take 1 tablet every evening 12/26/19   [facility], APRN - CNP   aspirin 81 MG chewable tablet Take 1 tablet by mouth daily 10/15/19   Sondra Arellano MD       Future Appointments   Date Time Provider Kaelyn Rojas   8/9/2021  1:20 PM [facility], APRN - CNP [facility] [place]   9/2/2021 12:45 PM Tyler Cuello, APRN - CNP [facility]   3/10/2022  3:00 PM Shannen Logan MD N [facility] [place]

## 2021-08-05 ENCOUNTER — HOSPITAL ENCOUNTER (EMERGENCY)
Age: 56
Discharge: HOME | End: 2021-08-05
Payer: COMMERCIAL

## 2021-08-05 VITALS
WEIGHT: 240 LBS | TEMPERATURE: 98.2 F | SYSTOLIC BLOOD PRESSURE: 145 MMHG | RESPIRATION RATE: 18 BRPM | BODY MASS INDEX: 37.67 KG/M2 | DIASTOLIC BLOOD PRESSURE: 83 MMHG | OXYGEN SATURATION: 98 % | HEIGHT: 67 IN | HEART RATE: 92 BPM

## 2021-08-05 DIAGNOSIS — T14.8XXA BRUISING: Primary | ICD-10-CM

## 2021-08-05 LAB
ALBUMIN SERPL-MCNC: 3.7 G/DL (ref 3.5–5.1)
ALP BLD-CCNC: 84 U/L (ref 38–126)
ALT SERPL-CCNC: 22 U/L (ref 11–66)
ANION GAP SERPL CALCULATED.3IONS-SCNC: 12 MEQ/L (ref 8–16)
APTT: 36.3 SECONDS (ref 22–38)
AST SERPL-CCNC: 21 U/L (ref 5–40)
BASOPHILS # BLD: 0.7 %
BASOPHILS ABSOLUTE: 0.1 THOU/MM3 (ref 0–0.1)
BILIRUB SERPL-MCNC: 1.1 MG/DL (ref 0.3–1.2)
BILIRUBIN DIRECT: 0.3 MG/DL (ref 0–0.3)
BUN BLDV-MCNC: 9 MG/DL (ref 7–22)
CALCIUM SERPL-MCNC: 9.2 MG/DL (ref 8.5–10.5)
CHLORIDE BLD-SCNC: 106 MEQ/L (ref 98–111)
CO2: 21 MEQ/L (ref 23–33)
CREAT SERPL-MCNC: 0.8 MG/DL (ref 0.4–1.2)
EOSINOPHIL # BLD: 3.3 %
EOSINOPHILS ABSOLUTE: 0.2 THOU/MM3 (ref 0–0.4)
ERYTHROCYTE [DISTWIDTH] IN BLOOD BY AUTOMATED COUNT: 14.6 % (ref 11.5–14.5)
ERYTHROCYTE [DISTWIDTH] IN BLOOD BY AUTOMATED COUNT: 52 FL (ref 35–45)
GFR SERPL CREATININE-BSD FRML MDRD: > 90 ML/MIN/1.73M2
GLUCOSE BLD-MCNC: 85 MG/DL (ref 70–108)
HCT VFR BLD CALC: 40.9 % (ref 42–52)
HEMOGLOBIN: 13.7 GM/DL (ref 14–18)
IMMATURE GRANS (ABS): 0.04 THOU/MM3 (ref 0–0.07)
IMMATURE GRANULOCYTES: 0.6 %
INR BLD: 1.14 (ref 0.85–1.13)
LYMPHOCYTES # BLD: 32.1 %
LYMPHOCYTES ABSOLUTE: 2.3 THOU/MM3 (ref 1–4.8)
MCH RBC QN AUTO: 32.6 PG (ref 26–33)
MCHC RBC AUTO-ENTMCNC: 33.5 GM/DL (ref 32.2–35.5)
MCV RBC AUTO: 97.4 FL (ref 80–94)
MONOCYTES # BLD: 11.1 %
MONOCYTES ABSOLUTE: 0.8 THOU/MM3 (ref 0.4–1.3)
NUCLEATED RED BLOOD CELLS: 0 /100 WBC
OSMOLALITY CALCULATION: 275.5 MOSMOL/KG (ref 275–300)
PLATELET # BLD: 132 THOU/MM3 (ref 130–400)
PMV BLD AUTO: 10.6 FL (ref 9.4–12.4)
POTASSIUM SERPL-SCNC: 3.7 MEQ/L (ref 3.5–5.2)
RBC # BLD: 4.2 MILL/MM3 (ref 4.7–6.1)
SEG NEUTROPHILS: 52.2 %
SEGMENTED NEUTROPHILS ABSOLUTE COUNT: 3.8 THOU/MM3 (ref 1.8–7.7)
SODIUM BLD-SCNC: 139 MEQ/L (ref 135–145)
TOTAL PROTEIN: 7.6 G/DL (ref 6.1–8)
WBC # BLD: 7.2 THOU/MM3 (ref 4.8–10.8)

## 2021-08-05 PROCEDURE — 85025 COMPLETE CBC W/AUTO DIFF WBC: CPT

## 2021-08-05 PROCEDURE — 80053 COMPREHEN METABOLIC PANEL: CPT

## 2021-08-05 PROCEDURE — 85610 PROTHROMBIN TIME: CPT

## 2021-08-05 PROCEDURE — 85730 THROMBOPLASTIN TIME PARTIAL: CPT

## 2021-08-05 PROCEDURE — 99282 EMERGENCY DEPT VISIT SF MDM: CPT

## 2021-08-05 PROCEDURE — 36415 COLL VENOUS BLD VENIPUNCTURE: CPT

## 2021-08-05 PROCEDURE — 82248 BILIRUBIN DIRECT: CPT

## 2021-08-05 ASSESSMENT — PAIN DESCRIPTION - PAIN TYPE: TYPE: ACUTE PAIN

## 2021-08-05 ASSESSMENT — ENCOUNTER SYMPTOMS
SORE THROAT: 0
SINUS PAIN: 0
ABDOMINAL PAIN: 0
SHORTNESS OF BREATH: 0
EYE PAIN: 0
BLOOD IN STOOL: 0
SINUS PRESSURE: 0
VOMITING: 0
NAUSEA: 0
COUGH: 0
ABDOMINAL DISTENTION: 0
CONSTIPATION: 0
DIARRHEA: 0

## 2021-08-05 ASSESSMENT — PAIN SCALES - GENERAL: PAINLEVEL_OUTOF10: 10

## 2021-08-05 ASSESSMENT — PAIN DESCRIPTION - ORIENTATION: ORIENTATION: RIGHT;LEFT

## 2021-08-05 ASSESSMENT — PAIN DESCRIPTION - LOCATION: LOCATION: ARM

## 2021-08-05 NOTE — ED PROVIDER NOTES
[facility] 22 COMPLAINT       Chief Complaint   Patient presents with    Rash       Nurses Notes reviewed and I agree except as notedin the HPI. HISTORY OF PRESENT ILLNESS    Ale Burch is a 61 y.o. male who present complains of bilateral arm bruises it appeared a few days ago. The patient states that he denies any injury. He has a history of cirrhosis and he was seen by Mago Wynn NP today and was sent in for further evaluation. He is denies any chest pain or shortness of breath. He denies any abdominal pain. Location/Symptom: Bilateral arm bruises  Timing/Onset: few days  Context/Setting: home  Quality: none  Duration: constant  Modifying Factors: none  Severity: none    REVIEW OF SYSTEMS     Review of Systems   Constitutional: Negative for chills and fever. HENT: Negative for congestion, ear pain, sinus pressure, sinus pain and sore throat. Eyes: Negative for pain. Respiratory: Negative for cough and shortness of breath. Cardiovascular: Negative for chest pain and leg swelling. Gastrointestinal: Negative for abdominal distention, abdominal pain, blood in stool, constipation, diarrhea, nausea and vomiting. Genitourinary: Negative for difficulty urinating, frequency and hematuria. Musculoskeletal: Negative for arthralgias. Bilateral arm bruises    Skin: Negative for rash. Neurological: Negative for dizziness and headaches. All other systems reviewed and are negative. PAST MEDICAL HISTORY    has a past medical history of Anxiety, Arthritis, Chronic kidney disease, Cirrhosis ([address] Diverticulitis, Diverticulosis, GERD (gastroesophageal reflux disease), Hypertension, Other disorders of kidney and ureter in diseases classified elsewhere, and Psychiatric problem. SURGICAL HISTORY      has a past surgical history that includes Cervical disc surgery; Colon surgery (2004);  Tympanostomy tube placement; Tonsillectomy; Abdomen surgery; Colonoscopy; Endoscopy, colon, diagnostic; fracture surgery; Dilatation, esophagus; Cardiac catheterization (2007?); Nerve Block (Bilateral, 10/02/2017); pr inj,paravertebral l/s,1 level (Bilateral, 10/2/2017); Upper gastrointestinal endoscopy (2019); back surgery; and Upper gastrointestinal endoscopy (Left, 2020). CURRENT MEDICATIONS       Previous Medications    ALBUTEROL SULFATE HFA (VENTOLIN HFA) 108 (90 BASE) MCG/ACT INHALER    Inhale 2 puffs into the lungs 4 times daily as needed for Wheezing    ALPRAZOLAM (XANAX) 1 MG TABLET    take 1 tablet by mouth three times a day if needed for anxiety or sleep    ASPIRIN 81 MG CHEWABLE TABLET    Take 1 tablet by mouth daily    ATORVASTATIN (LIPITOR) 40 MG TABLET    take 1 tablet by mouth nightly    CYCLOBENZAPRINE (FLEXERIL) 10 MG TABLET    take 1/2 to 1 tablet three times a day if needed    DULOXETINE (CYMBALTA) 60 MG EXTENDED RELEASE CAPSULE    take 1 capsule by mouth once daily    FAMOTIDINE (PEPCID) 40 MG TABLET    take 1 tablet every evening    GABAPENTIN (NEURONTIN) 300 MG CAPSULE    Take 1 capsule by mouth 3 times daily for 30 days. IBUPROFEN (IBU) 400 MG TABLET    Take 1 tablet by mouth every 6 hours as needed for Pain    LIDOCAINE (LMX) 4 % CREAM    Apply topically3 times daily to thigh as needed for pain. NITROGLYCERIN (NITROSTAT) 0.4 MG SL TABLET    up to max of 3 total doses. If no relief after 1 dose, call 911. RIFAXIMIN (XIFAXAN) 550 MG TABLET    Take 1 tablet by mouth 2 times daily    SPIRONOLACTONE (ALDACTONE) 100 MG TABLET    take 1 tablet by mouth every morning       ALLERGIES     has No Known Allergies. HISTORY     He indicated that his mother is . He indicated that his father is . He indicated that his paternal uncle is .  He indicated that the status of his neg hx is unknown.   family history includes Alzheimer's Disease in his mother; Cancer in his father; Heart Disease in his father, mother, and paternal uncle; Heart Failure in his mother; High Blood Pressure in his paternal uncle; Hypertension in his father and mother. SOCIALHISTORY      reports that he has been smoking cigarettes. He has a 12.50 pack-year smoking history. He has never used smokeless tobacco. He reports that he does not drink alcohol and does not use drugs. PHYSICAL EXAM     INITIAL VITALS:  height is 5' 7\" (1.702 m) and weight is 240 lb (108.9 kg). His oral temperature is 98.2 °F (36.8 °C). His blood pressure is 145/83 (abnormal) and his pulse is 92. His respiration is 18 and oxygen saturation is 98%. Physical Exam  Vitals and nursing note reviewed. Constitutional:       Comments: Well Developed Well Nourished Appearing     HENT:      Head: Normocephalic and atraumatic. Eyes:      Pupils: Pupils are equal, round, and reactive to light. Cardiovascular:      Rate and Rhythm: Normal rate and regular rhythm. Heart sounds: Normal heart sounds. Pulmonary:      Effort: Pulmonary effort is normal. No respiratory distress. Breath sounds: Normal breath sounds. No wheezing. Abdominal:      General: Bowel sounds are normal. There is no distension. Palpations: Abdomen is soft. Musculoskeletal:      Cervical back: Normal range of motion and neck supple. Comments: Bilateral arm bruising see photograph below. DIFFERENTIAL DIAGNOSIS:   Bilateral arm bruising.   Rule out elevated INR    DIAGNOSTIC RESULTS     EKG: All EKG's are interpreted by the Emergency Department Physician who either signs or Co-signs this chart in the absence of a cardiologist.      RADIOLOGY: non-plain film images(s) such as CT, Ultrasound and MRI are read by the radiologist.  None      LABS:   Labs Reviewed   PROTIME-INR - Abnormal; Notable for the following components:       Result Value    INR 1.14 (*)     All other components within normal limits   CBC WITH AUTO DIFFERENTIAL - Abnormal; Notable for the following components:    RBC 4.20 (*)     Hemoglobin 13.7 (*)     Hematocrit 40.9 (*)     MCV 97.4 (*)     RDW-CV 14.6 (*)     RDW-SD 52.0 (*)     All other components within normal limits   BASIC METABOLIC PANEL - Abnormal; Notable for the following components:    CO2 21 (*)     All other components within normal limits   APTT   HEPATIC FUNCTION PANEL   ANION GAP   GLOMERULAR FILTRATION RATE, ESTIMATED   OSMOLALITY       EMERGENCY DEPARTMENT COURSE:   :    Vitals:    08/05/21 1416   BP: (!) 145/83   Pulse: 92   Resp: 18   Temp: 98.2 °F (36.8 °C)   TempSrc: Oral   SpO2: 98%   Weight: 240 lb (108.9 kg)   Height: 5' 7\" (1.702 m)     Patient was seen history physical exam was performed. See disposition below    CRITICAL CARE:  None    CONSULTS:  None    PROCEDURES:  None    FINAL IMPRESSION      1.  Bruising          DISPOSITION/PLAN   Discharge      PATIENT REFERRED TO:  CHRISTIAN Shi - CNP  [address] [place] [address] Road [id number] [phone]    In 2 days        DISCHARGE MEDICATIONS:  New Prescriptions    No medications on file       (Please note that portions of this note were completed with a voice recognitionprogram.  Efforts were made to edit the dictations but occasionally words are mis-transcribed.)    Bette France, [address] [place] [address] MAGI Angulo  08/05/21 6665

## 2021-08-05 NOTE — ED TRIAGE NOTES
Pt to ED through triage with c/c rashes on both arms bilaterally. Pt states rash developed a few days ago. Some purple discoloration noted on both arms. No swelling or drainage noted.  Pt states PCP told him to come in d/t concerns with rash being from cirrhosis

## 2021-08-06 ENCOUNTER — CARE COORDINATION (OUTPATIENT)
Dept: CARE COORDINATION | Age: 56
End: 2021-08-06

## 2021-08-06 NOTE — CARE COORDINATION
Ambulatory Care Coordination  ED Follow up Call    Reason for ED visit:  Arms discolored    Status:     not changed    Went to ED with arms discoloration. He concerned that liver function caused spots and discoloration. Has some fogginess. Admits he missed a couple doses of lactulose. Agrees to take lactulose BID as ordered. Advised to report new or worsening symptoms immediately. PCP appt 8/9. Son is taking him. Advised on fall precautions and use walker at all times. Did you call your PCP prior to going to the ED? No      Did you receive a discharge instructions from the Emergency Room? Yes  Review of Instructions:     Understands what to report/when to return?:  Yes   Understands discharge instructions?:  Yes   Following discharge instructions?:  Yes   If not why? Are there any new complaints of pain? No  New Pain Meds? No    Constipation prophylaxis needed? N/A    If you have a wound is the dressing clean, dry, and intact? N/A  Understands wound care regimen? N/A    Are there any other complaints/concerns that you wish to tell your provider? FU appts/Provider:    Future Appointments   Date Time Provider Kaelyn Rojas   8/9/2021  1:20 PM CHRISTIAN Farnsworth CNP [facility] [place]   9/2/2021 12:45 PM CHRISTIAN Harkins CNP [facility]   3/10/2022  3:00 PM MD CANDACE Gonzalez SR[facility] [place]           New Medications?:   No      Medication Reconciliation by phone - Yes  Understands Medications? Yes  Taking Medications? Yes  Can you swallow your pills? Yes    Any further needs in the home i.e. Equipment?   No    Link to services in community?:  N/A   Which services:

## 2021-08-09 ENCOUNTER — OFFICE VISIT (OUTPATIENT)
Dept: FAMILY MEDICINE CLINIC | Age: 56
End: 2021-08-09
Payer: COMMERCIAL

## 2021-08-09 ENCOUNTER — CARE COORDINATION (OUTPATIENT)
Dept: CARE COORDINATION | Age: 56
End: 2021-08-09

## 2021-08-09 ENCOUNTER — TELEPHONE (OUTPATIENT)
Dept: FAMILY MEDICINE CLINIC | Age: 56
End: 2021-08-09

## 2021-08-09 VITALS
OXYGEN SATURATION: 98 % | BODY MASS INDEX: 38 KG/M2 | TEMPERATURE: 97.5 F | HEART RATE: 89 BPM | SYSTOLIC BLOOD PRESSURE: 122 MMHG | DIASTOLIC BLOOD PRESSURE: 62 MMHG | WEIGHT: 242.6 LBS | RESPIRATION RATE: 20 BRPM
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
## 2021-08-09 DIAGNOSIS — T14.8XXA BRUISING: ICD-10-CM

## 2021-08-09 DIAGNOSIS — M48.061 SPINAL STENOSIS OF LUMBAR REGION, UNSPECIFIED WHETHER NEUROGENIC CLAUDICATION PRESENT: ICD-10-CM

## 2021-08-09 DIAGNOSIS — K70.30 ALCOHOLIC CIRRHOSIS, UNSPECIFIED WHETHER ASCITES PRESENT (HCC): Primary | ICD-10-CM

## 2021-08-09 DIAGNOSIS — R07.89 LEFT-SIDED CHEST WALL PAIN: ICD-10-CM

## 2021-08-09 DIAGNOSIS — R26.81 UNSTEADY GAIT: ICD-10-CM
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
## 2021-08-09 DIAGNOSIS — S20.212A CONTUSION OF RIB ON LEFT SIDE, INITIAL ENCOUNTER: ICD-10-CM

## 2021-08-09 DIAGNOSIS — R41.82 ALTERED MENTAL STATUS, UNSPECIFIED ALTERED MENTAL STATUS TYPE: ICD-10-CM

## 2021-08-09 PROCEDURE — 3017F COLORECTAL CA SCREEN DOC REV: CPT | Performed by: NURSE PRACTITIONER

## 2021-08-09 PROCEDURE — G8427 DOCREV CUR MEDS BY ELIG CLIN: HCPCS | Performed by: NURSE PRACTITIONER

## 2021-08-09 PROCEDURE — 99214 OFFICE O/P EST MOD 30 MIN: CPT | Performed by: NURSE PRACTITIONER

## 2021-08-09 PROCEDURE — 4004F PT TOBACCO SCREEN RCVD TLK: CPT | Performed by: NURSE PRACTITIONER

## 2021-08-09 PROCEDURE — G8417 CALC BMI ABV UP PARAM F/U: HCPCS | Performed by: NURSE PRACTITIONER

## 2021-08-09 RX ORDER — LIDOCAINE 50 MG/G
1 PATCH TOPICAL DAILY
Qty: 30 PATCH | Refills: 0 | Status: ON HOLD | OUTPATIENT
Start: 2021-08-09 | End: 2021-08-12

## 2021-08-09 RX ORDER — HYDROCODONE BITARTRATE AND ACETAMINOPHEN 5; 325 MG/1; MG/1
1 TABLET ORAL EVERY 8 HOURS PRN
Qty: 15 TABLET | Refills: 0 | Status: SHIPPED | OUTPATIENT
Start: 2021-08-09 | End: 2021-08-14

## 2021-08-09 SDOH — ECONOMIC STABILITY: FOOD INSECURITY: WITHIN THE PAST 12 MONTHS, YOU WORRIED THAT YOUR FOOD WOULD RUN OUT BEFORE YOU GOT MONEY TO BUY MORE.: NEVER TRUE

## 2021-08-09 SDOH — ECONOMIC STABILITY: FOOD INSECURITY: WITHIN THE PAST 12 MONTHS, THE FOOD YOU BOUGHT JUST DIDN'T LAST AND YOU DIDN'T HAVE MONEY TO GET MORE.: NEVER TRUE

## 2021-08-09 ASSESSMENT — SOCIAL DETERMINANTS OF HEALTH (SDOH): HOW HARD IS IT FOR YOU TO PAY FOR THE VERY BASICS LIKE FOOD, HOUSING, MEDICAL CARE, AND HEATING?: NOT HARD AT ALL

## 2021-08-09 NOTE — TELEPHONE ENCOUNTER
Pt in to see Jennifer Maier today. [place] the city bus and is requesting medication for pain.

## 2021-08-09 NOTE — CARE COORDINATION
Call from Liliam Olivera. Son got called into work today and cannot take him to PCP appt. Scheduled ride with [address]  at 1 pm. Updated Liliam Olivera. Liliam Olivera reports he fell yesterday getting off [employer] bus. Has some bruises but denies injury. He fell also last week. Advised to use walker at all times. States he prefers cane but will start using it more. Again explained walker is best for balance and preventing falls. Declines PT. Explained [facility] can get him transportation if needed.

## 2021-08-11 ENCOUNTER — CARE COORDINATION (OUTPATIENT)
Dept: CARE COORDINATION | Age: 56
End: 2021-08-11

## 2021-08-11 NOTE — CARE COORDINATION
Ambulatory Care Coordination Note  8/11/2021  CM Risk Score: 4  Charlson 10 Year Mortality Risk Score: 79%     ACC: Komal Saldivar, RAEGAN    Summary Note:     Declines smoking cessation. Smokes about 2 packs weekly. Doesn't think he can manage quitting right now as he is recovering alcoholic. Denies alcohol cravings. PCP appt this week. Got confused despite talking to him that morning. He went GI office first. PCP ordered ammonia level and xr ribs. Eli Wilson on bus this weekend. Denies abd bloating, pain, leg swelling. Plan -   Ammonia level and XR today  Can use find a ride or [employer] if needed but would benefit from son or friend accompanying to appts  Medication adherence  2 liter FR  Low salt diet  Report abdominal bloating or swelling, altered mental status  Early symptom recognition and reporting to prevent ED and admissions               Care Coordination Interventions    Program Enrollment: Complex Care  Referral from Primary Care Provider: No  Suggested Interventions and Community Resources  Meals on Wheels: Declined  Smoking Cessation: Declined  Transportation Support: Completed  Other Services or Interventions: est with [facility] and Dr. Leonardo Antunez cardiology         Goals Addressed                 This Visit's Progress     Conditions and Symptoms   On track     I will schedule office visits, as directed by my provider. I will keep my appointment or reschedule if I have to cancel. I will notify my provider of any barriers to my plan of care. I will follow my Zone Management tool to seek urgent or emergent care. I will notify my provider of any symptoms that indicate a worsening of my condition.     Barriers: impairment:  physical: liver cirrhosis and cognitive, lack of support, and overwhelmed by complexity of regimen  Plan for overcoming my barriers: care coordination, follow up  Confidence: 9/10  Anticipated Goal Completion Date: 11/6/21              Prior to Admission medications Medication Sig Start Date End Date Taking? Authorizing Provider   lidocaine (LIDODERM) 5 % Place 1 patch onto the skin daily 12 hours on, 12 hours off. 8/9/21 9/8/21  CHRISTIAN Vizcarra CNP   HYDROcodone-acetaminophen (NORCO) 5-325 MG per tablet Take 1 tablet by mouth every 8 hours as needed for Pain for up to 5 days. Intended supply: 3 days. Take lowest dose possible to manage pain 8/9/21 8/14/21  CHRISTIAN Gutierrez CNP   gabapentin (NEURONTIN) 300 MG capsule Take 1 capsule by mouth 3 times daily for 30 days. 7/30/21 8/29/21  CHRISTIAN Vizcarra CNP   ALPRAZolam Yudy Vikki) 1 MG tablet take 1 tablet by mouth three times a day if needed for anxiety or sleep 7/30/21 10/26/21  CHRISTIAN Vizcarra CNP   nitroGLYCERIN (NITROSTAT) 0.4 MG SL tablet up to max of 3 total doses. If no relief after 1 dose, call 911. 6/28/21   CHRISTIAN Vizcarra CNP   rifaximin (XIFAXAN) 550 MG tablet Take 1 tablet by mouth 2 times daily 6/2/21   CHRISTIAN Campoverde CNP   atorvastatin (LIPITOR) 40 MG tablet take 1 tablet by mouth nightly 5/17/21   CHRISTIAN Vizcarra CNP   cyclobenzaprine (FLEXERIL) 10 MG tablet take 1/2 to 1 tablet three times a day if needed 5/4/21   CHRISTIAN Vizcarra CNP   albuterol sulfate HFA (VENTOLIN HFA) 108 (90 Base) MCG/ACT inhaler Inhale 2 puffs into the lungs 4 times daily as needed for Wheezing 1/14/21   Raheel Valles,    lidocaine (LMX) 4 % cream Apply topically3 times daily to thigh as needed for pain.  11/5/20   CHRISTIAN Vizcarra CNP   ibuprofen (IBU) 400 MG tablet Take 1 tablet by mouth every 6 hours as needed for Pain 11/2/20   Radha Mandel MD   spironolactone (ALDACTONE) 100 MG tablet take 1 tablet by mouth every morning 10/8/20   CHRISTIAN Vizcarra CNP   DULoxetine (CYMBALTA) 60 MG extended release capsule take 1 capsule by mouth once daily 6/15/20   CHRISTIAN Vizcarra CNP   famotidine (PEPCID) 40 MG tablet take 1 tablet every evening 12/26/19   CHRISTIAN Vizcarra CNP

## 2021-08-12 ENCOUNTER — APPOINTMENT (OUTPATIENT)
Dept: GENERAL RADIOLOGY | Age: 56
DRG: 092 | End: 2021-08-12
Payer: COMMERCIAL

## 2021-08-12 ENCOUNTER — APPOINTMENT (OUTPATIENT)
Dept: MRI IMAGING | Age: 56
DRG: 092 | End: 2021-08-12
Payer: COMMERCIAL

## 2021-08-12 ENCOUNTER — APPOINTMENT (OUTPATIENT)
Dept: CT IMAGING | Age: 56
DRG: 092 | End: 2021-08-12
Payer: COMMERCIAL

## 2021-08-12 ENCOUNTER — HOSPITAL ENCOUNTER (INPATIENT)
Age: 56
LOS: 1 days | Discharge: HOME | DRG: 092 | End: 2021-08-13
Attending: EMERGENCY MEDICINE | Admitting: STUDENT IN AN ORGANIZED HEALTH CARE EDUCATION/TRAINING PROGRAM
Payer: COMMERCIAL

## 2021-08-12 DIAGNOSIS — R47.89 ALTERATION IN SPEECH: Primary | ICD-10-CM

## 2021-08-12 DIAGNOSIS — R06.02 SHORTNESS OF BREATH: ICD-10-CM

## 2021-08-12 DIAGNOSIS — R90.89 ABNORMAL BRAIN CT: ICD-10-CM

## 2021-08-12 PROBLEM — I63.9 STROKE OF UNKNOWN CAUSE (HCC): Status: ACTIVE | Noted: 2021-08-12

## 2021-08-12 LAB
ALBUMIN SERPL-MCNC: 3.9 G/DL (ref 3.5–5.1)
ALP BLD-CCNC: 73 U/L (ref 38–126)
ALT SERPL-CCNC: 20 U/L (ref 11–66)
ANION GAP SERPL CALCULATED.3IONS-SCNC: 9 MEQ/L (ref 8–16)
APTT: 37.6 SECONDS (ref 22–38)
AST SERPL-CCNC: 22 U/L (ref 5–40)
BASOPHILS # BLD: 0.6 %
BASOPHILS ABSOLUTE: 0 THOU/MM3 (ref 0–0.1)
BILIRUB SERPL-MCNC: 0.8 MG/DL (ref 0.3–1.2)
BUN BLDV-MCNC: 8 MG/DL (ref 7–22)
CALCIUM SERPL-MCNC: 9.1 MG/DL (ref 8.5–10.5)
CHLORIDE BLD-SCNC: 107 MEQ/L (ref 98–111)
CO2: 22 MEQ/L (ref 23–33)
CREAT SERPL-MCNC: 0.8 MG/DL (ref 0.4–1.2)
EKG ATRIAL RATE: 97 BPM
EKG P AXIS: 63 DEGREES
EKG P-R INTERVAL: 132 MS
EKG Q-T INTERVAL: 336 MS
EKG QRS DURATION: 84 MS
EKG QTC CALCULATION (BAZETT): 426 MS
EKG R AXIS: 72 DEGREES
EKG T AXIS: 24 DEGREES
EKG VENTRICULAR RATE: 97 BPM
EOSINOPHIL # BLD: 4.1 %
EOSINOPHILS ABSOLUTE: 0.3 THOU/MM3 (ref 0–0.4)
ERYTHROCYTE [DISTWIDTH] IN BLOOD BY AUTOMATED COUNT: 14.2 % (ref 11.5–14.5)
ERYTHROCYTE [DISTWIDTH] IN BLOOD BY AUTOMATED COUNT: 52.9 FL (ref 35–45)
GFR SERPL CREATININE-BSD FRML MDRD: > 90 ML/MIN/1.73M2
GLUCOSE BLD-MCNC: 108 MG/DL (ref 70–108)
GLUCOSE BLD-MCNC: 124 MG/DL (ref 70–108)
HCT VFR BLD CALC: 42.6 % (ref 42–52)
HEMOGLOBIN: 13.6 GM/DL (ref 14–18)
IMMATURE GRANS (ABS): 0.02 THOU/MM3 (ref 0–0.07)
IMMATURE GRANULOCYTES: 0.3 %
INR BLD: 1.22 (ref 0.85–1.13)
LYMPHOCYTES # BLD: 27.8 %
LYMPHOCYTES ABSOLUTE: 1.8 THOU/MM3 (ref 1–4.8)
MCH RBC QN AUTO: 32.4 PG (ref 26–33)
MCHC RBC AUTO-ENTMCNC: 31.9 GM/DL (ref 32.2–35.5)
MCV RBC AUTO: 101.4 FL (ref 80–94)
MONOCYTES # BLD: 8.3 %
MONOCYTES ABSOLUTE: 0.5 THOU/MM3 (ref 0.4–1.3)
NUCLEATED RED BLOOD CELLS: 0 /100 WBC
PLATELET # BLD: 149 THOU/MM3 (ref 130–400)
PMV BLD AUTO: 10.3 FL (ref 9.4–12.4)
POC CREATININE WHOLE BLOOD: 1 MG/DL (ref 0.5–1.2)
POTASSIUM REFLEX MAGNESIUM: 4 MEQ/L (ref 3.5–5.2)
PROCALCITONIN: 0.06 NG/ML (ref 0.01–0.09)
RBC # BLD: 4.2 MILL/MM3 (ref 4.7–6.1)
SEG NEUTROPHILS: 58.9 %
SEGMENTED NEUTROPHILS ABSOLUTE COUNT: 3.9 THOU/MM3 (ref 1.8–7.7)
SODIUM BLD-SCNC: 138 MEQ/L (ref 135–145)
TOTAL PROTEIN: 7.4 G/DL (ref 6.1–8)
TROPONIN T: < 0.01 NG/ML
WBC # BLD: 6.6 THOU/MM3 (ref 4.8–10.8)

## 2021-08-12 PROCEDURE — 84484 ASSAY OF TROPONIN QUANT: CPT

## 2021-08-12 PROCEDURE — 82948 REAGENT STRIP/BLOOD GLUCOSE: CPT

## 2021-08-12 PROCEDURE — 70450 CT HEAD/BRAIN W/O DYE: CPT

## 2021-08-12 PROCEDURE — 6370000000 HC RX 637 (ALT 250 FOR IP): Performed by: STUDENT IN AN ORGANIZED HEALTH CARE EDUCATION/TRAINING PROGRAM

## 2021-08-12 PROCEDURE — 70496 CT ANGIOGRAPHY HEAD: CPT

## 2021-08-12 PROCEDURE — 99284 EMERGENCY DEPT VISIT MOD MDM: CPT

## 2021-08-12 PROCEDURE — 2580000003 HC RX 258: Performed by: STUDENT IN AN ORGANIZED HEALTH CARE EDUCATION/TRAINING PROGRAM

## 2021-08-12 PROCEDURE — 85610 PROTHROMBIN TIME: CPT

## 2021-08-12 PROCEDURE — 2060000000 HC ICU INTERMEDIATE R&B

## 2021-08-12 PROCEDURE — 71045 X-RAY EXAM CHEST 1 VIEW: CPT

## 2021-08-12 PROCEDURE — 82565 ASSAY OF CREATININE: CPT

## 2021-08-12 PROCEDURE — 6360000004 HC RX CONTRAST MEDICATION: Performed by: EMERGENCY MEDICINE

## 2021-08-12 PROCEDURE — 99223 1ST HOSP IP/OBS HIGH 75: CPT | Performed by: STUDENT IN AN ORGANIZED HEALTH CARE EDUCATION/TRAINING PROGRAM

## 2021-08-12 PROCEDURE — 85730 THROMBOPLASTIN TIME PARTIAL: CPT

## 2021-08-12 PROCEDURE — 84145 PROCALCITONIN (PCT): CPT

## 2021-08-12 PROCEDURE — 70551 MRI BRAIN STEM W/O DYE: CPT

## 2021-08-12 PROCEDURE — 93005 ELECTROCARDIOGRAM TRACING: CPT | Performed by: EMERGENCY MEDICINE

## 2021-08-12 PROCEDURE — 36415 COLL VENOUS BLD VENIPUNCTURE: CPT

## 2021-08-12 PROCEDURE — 85025 COMPLETE CBC W/AUTO DIFF WBC: CPT

## 2021-08-12 PROCEDURE — 80053 COMPREHEN METABOLIC PANEL: CPT

## 2021-08-12 PROCEDURE — 70498 CT ANGIOGRAPHY NECK: CPT

## 2021-08-12 RX ORDER — SODIUM CHLORIDE 0.9 % (FLUSH) 0.9 %
5-40 SYRINGE (ML) INJECTION PRN
Status: DISCONTINUED | OUTPATIENT
Start: 2021-08-12 | End: 2021-08-13 | Stop reason: HOSPADM

## 2021-08-12 RX ORDER — GABAPENTIN 300 MG/1
300 CAPSULE ORAL 3 TIMES DAILY
Status: DISCONTINUED | OUTPATIENT
Start: 2021-08-12 | End: 2021-08-13 | Stop reason: HOSPADM

## 2021-08-12 RX ORDER — SODIUM CHLORIDE 0.9 % (FLUSH) 0.9 %
5-40 SYRINGE (ML) INJECTION EVERY 12 HOURS SCHEDULED
Status: DISCONTINUED | OUTPATIENT
Start: 2021-08-12 | End: 2021-08-13 | Stop reason: HOSPADM

## 2021-08-12 RX ORDER — POLYETHYLENE GLYCOL 3350 17 G/17G
17 POWDER, FOR SOLUTION ORAL DAILY PRN
Status: DISCONTINUED | OUTPATIENT
Start: 2021-08-12 | End: 2021-08-13 | Stop reason: HOSPADM

## 2021-08-12 RX ORDER — ONDANSETRON 4 MG/1
4 TABLET, ORALLY DISINTEGRATING ORAL EVERY 8 HOURS PRN
Status: DISCONTINUED | OUTPATIENT
Start: 2021-08-12 | End: 2021-08-13 | Stop reason: HOSPADM

## 2021-08-12 RX ORDER — ATORVASTATIN CALCIUM 40 MG/1
40 TABLET, FILM COATED ORAL NIGHTLY
Status: DISCONTINUED | OUTPATIENT
Start: 2021-08-12 | End: 2021-08-13 | Stop reason: HOSPADM

## 2021-08-12 RX ORDER — SODIUM CHLORIDE 9 MG/ML
25 INJECTION, SOLUTION INTRAVENOUS PRN
Status: DISCONTINUED | OUTPATIENT
Start: 2021-08-12 | End: 2021-08-13 | Stop reason: HOSPADM

## 2021-08-12 RX ORDER — ASPIRIN 300 MG/1
300 SUPPOSITORY RECTAL DAILY
Status: DISCONTINUED | OUTPATIENT
Start: 2021-08-12 | End: 2021-08-13 | Stop reason: HOSPADM

## 2021-08-12 RX ORDER — ONDANSETRON 2 MG/ML
4 INJECTION INTRAMUSCULAR; INTRAVENOUS EVERY 6 HOURS PRN
Status: DISCONTINUED | OUTPATIENT
Start: 2021-08-12 | End: 2021-08-13 | Stop reason: HOSPADM

## 2021-08-12 RX ORDER — SPIRONOLACTONE 25 MG/1
100 TABLET ORAL DAILY
Status: DISCONTINUED | OUTPATIENT
Start: 2021-08-12 | End: 2021-08-13 | Stop reason: HOSPADM

## 2021-08-12 RX ORDER — DULOXETIN HYDROCHLORIDE 60 MG/1
60 CAPSULE, DELAYED RELEASE ORAL DAILY
Status: DISCONTINUED | OUTPATIENT
Start: 2021-08-12 | End: 2021-08-13 | Stop reason: HOSPADM

## 2021-08-12 RX ORDER — CYCLOBENZAPRINE HCL 10 MG
10 TABLET ORAL 3 TIMES DAILY PRN
Status: DISCONTINUED | OUTPATIENT
Start: 2021-08-12 | End: 2021-08-13 | Stop reason: HOSPADM

## 2021-08-12 RX ORDER — ASPIRIN 81 MG/1
81 TABLET ORAL DAILY
Status: DISCONTINUED | OUTPATIENT
Start: 2021-08-12 | End: 2021-08-13 | Stop reason: HOSPADM

## 2021-08-12 RX ORDER — ALPRAZOLAM 0.5 MG/1
0.5 TABLET ORAL DAILY PRN
Status: DISCONTINUED | OUTPATIENT
Start: 2021-08-12 | End: 2021-08-13 | Stop reason: HOSPADM

## 2021-08-12 RX ADMIN — ASPIRIN 81 MG: 81 TABLET, COATED ORAL at 21:04

## 2021-08-12 RX ADMIN — SPIRONOLACTONE 100 MG: 25 TABLET ORAL at 21:05

## 2021-08-12 RX ADMIN — GABAPENTIN 300 MG: 300 CAPSULE ORAL at 20:20

## 2021-08-12 RX ADMIN — IOPAMIDOL 80 ML: 755 INJECTION, SOLUTION INTRAVENOUS at 14:11

## 2021-08-12 RX ADMIN — SODIUM CHLORIDE, PRESERVATIVE FREE 10 ML: 5 INJECTION INTRAVENOUS at 20:20

## 2021-08-12 RX ADMIN — ATORVASTATIN CALCIUM 40 MG: 40 TABLET, FILM COATED ORAL at 20:19

## 2021-08-12 RX ADMIN — CYCLOBENZAPRINE 10 MG: 10 TABLET, FILM COATED ORAL at 20:18

## 2021-08-12 RX ADMIN — DULOXETINE HYDROCHLORIDE 60 MG: 60 CAPSULE, DELAYED RELEASE ORAL at 20:18

## 2021-08-12 ASSESSMENT — PAIN DESCRIPTION - LOCATION
LOCATION: NECK
LOCATION: HEAD

## 2021-08-12 ASSESSMENT — PAIN DESCRIPTION - PAIN TYPE
TYPE: CHRONIC PAIN
TYPE: CHRONIC PAIN

## 2021-08-12 ASSESSMENT — ENCOUNTER SYMPTOMS
COUGH: 0
BACK PAIN: 0
SHORTNESS OF BREATH: 0
ABDOMINAL PAIN: 0
VOMITING: 0

## 2021-08-12 ASSESSMENT — PAIN SCALES - GENERAL
PAINLEVEL_OUTOF10: 9
PAINLEVEL_OUTOF10: 0
PAINLEVEL_OUTOF10: 10

## 2021-08-12 NOTE — PROGRESS NOTES
Stroke Folder given. What is Stroke/CVA  Treatments for Stroke  Personal Risk Factors for Stroke discussed  Education--Call 911  Treatment for stroke includes:  Risk factor modifications  Following the medication regime prescribed by physician    Educated on BE-FAST-Balance-Eyes-Face-Arm-Speech-Time    Patient/family has been educated on their personal risk factors of: Hypertension,     They have been given hand outs on the following medications: aspirin, lovenox, atorvastatin    All patient/family questions were answered and teach back method was utilized.

## 2021-08-12 NOTE — PROGRESS NOTES
Patient admitted to [facility] Room 05 from ED. Vital signs obtained, assessment completed. Oriented to room. Policies and procedures for 4a explained All questions answered with no further questions at this time. Fall prevention and safety discussed with patient. 2 person skin check completed.

## 2021-08-12 NOTE — ED TRIAGE NOTES
Pt to ED from home with complaints of slurred speech according to himself as well as panic attack. Pt states he has anxiety attacks weekly. Pt states that he began to feels short of breath and have slurred speech so he decided to come. Patient glucose 120 on arrival. Patient states generalized weakness. Patient states history of cirrhosis and has been sober for the past 15 years.

## 2021-08-12 NOTE — CONSULTS
Neurology Consult Note  Stroke Alert     Name:  Rosemary Velásquez   MRN:  [id number]   :  1965   Date:  21   Service requesting consult: ED  Physician requesting consult: Dr. Lo Fiore  Reason for consult: Stroke      Chief Complaint: Stuttering of speech     HPI: Rosemary Velásquez who is a 61 y.o. male who presents to [facility] ED from home for stuttering of speech that started 1 hour ago. He was talking with his family and became anxious and short of breath, does have a significant history of anxiety in which he states he takes xanax for. Per patient he has these episodes about once a month and has come into the hospital for them in the past and was sent home. He does have chronic balance problems in which he used a cane for but did state that he felt his gait was off from his normal.  He denies any numbness or weakness in the face or extremities. He denies any recent illness or stressors. Time of symptoms onset/last time seen at baseline: ~1300  Time of stroke alert:   1350  Time of Neurology arrival:  1352  Vascular risk factors:  No known risk factors  Old deficits from prior stroke:  No history of strokes per patient  BP in ED:  119/64  Initial NIHSS: 0  Modified [place] Score upon admission: 1  IV tPA administerd: Not indicated due to AVM   Time of Initial Imaging Read:  1404  Initial blood glucose: 120         No data recorded     Past Medical History:   Diagnosis Date    Anxiety     Arthritis     Chronic kidney disease     Cir[facility]osis ([facility]     Diverticulitis     Diverticulosis     GERD (gastroesophageal reflux disease)     Hypertension     Other disorders of kidney and ureter in diseases classified elsewhere     Psychiatric problem     Stroke of unknown cause ([facility] 2021        Past Surgical History:   Procedure Laterality Date    ABDOMEN SURGERY      BACK SURGERY      cervical plate    CARDIAC CATHETERIZATION  ?     CERVICAL DISC SURGERY      x 2 ---broken neck     COLON SURGERY  2004    partial, due to diverticulitis    COLONOSCOPY      DILATATION, ESOPHAGUS      ENDOSCOPY, COLON, DIAGNOSTIC      FRACTURE SURGERY      Broke neck in 2003    NERVE BLOCK Bilateral 10/02/2017    Cervical Facet MBB at C4-5, C5-6, C6-7     MO INJ,PARAVERTEBRAL L/S,1 LEVEL Bilateral 10/2/2017    C-FACET MBB C4-5, C5-6, C6-7 BILATERAL performed by Agnieszka Palmer MD at [address] [place] [address] ENDOSCOPY  2019    UPPER GASTROINTESTINAL ENDOSCOPY Left 9/28/2020    EGD BAND LIGATION performed by Mayda Hayes MD at [facility]        Social History     Socioeconomic History    Marital status:      Spouse name: Not on file    Number of children: 2    Years of education: Not on file    Highest education level: Not on file   Occupational History    Not on file   Tobacco Use    Smoking status: Current Every Day Smoker     Packs/day: 0.50     Years: 25.00     Pack years: 12.50     Types: Cigarettes    Smokeless tobacco: Never Used    Tobacco comment: printed to avs   Vaping Use    Vaping Use: Never used   Substance and Sexual Activity    Alcohol use: Not Currently     Comment: Quit 15 years ago    Drug use: No    Sexual activity: Not Currently   Other Topics Concern    Not on file   Social History Narrative    Not on file     Social Determinants of Health     Financial Resource Strain: Low Risk     Difficulty of Paying Living Expenses: Not hard at all   Food Insecurity: No Food Insecurity    Worried About Running Out of Food in the Last Year: Never true    Kaleb of Food in the Last Year: Never true   Transportation Needs:     Lack of Transportation (Medical):      Lack of Transportation (Non-Medical):    Physical Activity:     Days of Exercise per Week:     Minutes of Exercise per Session:    Stress:     Feeling of Stress :    Social Connections:     Frequency of Communication with Friends and Family:     Frequency of Social Gatherings with Friends and Family:     Attends [employer] Services:     Active Member of Clubs or Organizations:     Attends Club or Organization Meetings:     Marital Status:    Intimate Partner Violence:     Fear of Current or Ex-Partner:     Emotionally Abused:     Physically Abused:     Sexually Abused:         Family History   Problem Relation Age of Onset    Hypertension Mother     Heart Disease Mother     Alzheimer's Disease Mother     Heart Failure Mother     Hypertension Father     Heart Disease Father     Cancer Father     High Blood Pressure Paternal Uncle     Heart Disease Paternal Uncle     Colon Cancer Neg Hx     Colon Polyps Neg Hx         No Known Allergies     No current facility-administered medications on file prior to encounter. Current Outpatient Medications on File Prior to Encounter   Medication Sig Dispense Refill    HYDROcodone-acetaminophen (NORCO) 5-325 MG per tablet Take 1 tablet by mouth every 8 hours as needed for Pain for up to 5 days. Intended supply: 3 days. Take lowest dose possible to manage pain 15 tablet 0    gabapentin (NEURONTIN) 300 MG capsule Take 1 capsule by mouth 3 times daily for 30 days. 90 capsule 2    ALPRAZolam (XANAX) 1 MG tablet take 1 tablet by mouth three times a day if needed for anxiety or sleep 90 tablet 2    nitroGLYCERIN (NITROSTAT) 0.4 MG SL tablet up to max of 3 total doses.  If no relief after 1 dose, call 911. 25 tablet 0    rifaximin (XIFAXAN) 550 MG tablet Take 1 tablet by mouth 2 times daily 60 tablet 6    atorvastatin (LIPITOR) 40 MG tablet take 1 tablet by mouth nightly 30 tablet 5    cyclobenzaprine (FLEXERIL) 10 MG tablet take 1/2 to 1 tablet three times a day if needed 60 tablet 3    albuterol sulfate HFA (VENTOLIN HFA) 108 (90 Base) MCG/ACT inhaler Inhale 2 puffs into the lungs 4 times daily as needed for Wheezing 1 Inhaler 0    spironolactone (ALDACTONE) 100 MG tablet take 1 tablet by mouth every morning 90 tablet 2    DULoxetine (CYMBALTA) 60 MG extended release capsule take 1 capsule by mouth once daily 90 capsule 3    aspirin 81 MG chewable tablet Take 1 tablet by mouth daily 30 tablet 0        WBC (thou/mm3)   Date Value   08/12/2021 6.6     RBC   Date Value   08/12/2021 4.20 mill/mm3 (L)   02/22/2021 4.07 X10E12/L (L)     Hematocrit (%)   Date Value   08/12/2021 42.6     Hemoglobin (gm/dl)   Date Value   08/12/2021 13.6 (L)     Platelets (thou/mm3)   Date Value   08/12/2021 149     No components found for: SODIUM  No components found for: POTASSIUM  No components found for: CHLORIDE  Glucose (mg/dL)   Date Value   08/12/2021 108   02/22/2021 86     Calcium (mg/dL)   Date Value   08/12/2021 9.1     CO2 (meq/L)   Date Value   08/12/2021 22 (L)     BUN (mg/dL)   Date Value   08/12/2021 8     CREATININE (mg/dL)   Date Value   08/12/2021 0.8   . INR (no units)   Date Value   08/12/2021 1.22 (H)       Review of System:     CONSTITUTIONAL:  negative for  fevers, chills and fatigue  HEENT:  negative for  nasal congestion and sore throat  RESPIRATORY:  Positive for shortness of breath. Negative chest pain  CARDIOVASCULAR:  negative for  chest pain, palpitations  GASTROINTESTINAL:  negative for nausea, vomiting, change in bowel habits and abdominal pain  HEMATOLOGIC/LYMPHATIC:  negative for swelling/edema  MUSCULOSKELETAL:  negative for  pain and joint swelling  BEHAVIOR/PSYCH:  Positive for for anxiety  SKIN: negative for skin color change, rash  NEURO: Positive for speech difficulties. Negative for headache, vision change, facial/extremeity weakness or numbness. Imaging:     CT HEAD  Impression       1. No intracranial hemorrhage.    2. There is no significant change in the appearance of the brain compared to the prior study.               The finding of no hemorrhage was telephoned to Lizeth Steele, the patient's nurse at 2:02 PM on 8/12/2021.           **This report has been created using voice recognition software. It may contain minor errors which are inherent in voice recognition technology. **       Final report electronically signed by Dr. Charlotte Love on 8/12/2021 2:04 PM           CTA HEAD AND NECK  Impression       1. 2.2 cm tangle of vessels in the inferior medial left frontal lobe. There is an overlying draining left cortical vein. This could represent a small developmental venous anomaly versus an arterial venous malformation. 2. No intracranial stenoses or occlusions. 3. Very mild atherosclerosis of the carotid bulbs without significant stenosis.                 **This report has been created using voice recognition software. It may contain minor errors which are inherent in voice recognition technology. **       Final report electronically signed by Dr. Charlotte Love on 8/12/2021 2:41 PM         Objective:   VITALS:   Patient Vitals for the past 12 hrs:   BP Temp Temp src Pulse Resp SpO2 Height Weight   08/12/21 1621 (!) 149/96 97.7 °F (36.5 °C) Oral 83 16 100 % -- --   08/12/21 1541 -- -- -- -- -- -- 5' 7\" (1.702 m) --   08/12/21 1415 119/64 98.1 °F (36.7 °C) Oral 96 19 99 % -- --   08/12/21 1403 (!) 98/56 -- -- 95 19 98 % -- --   08/12/21 1354 -- -- -- -- -- -- -- 240 lb (108.9 kg)     Weight:    Wt Readings from Last 1 Encounters:   08/12/21 240 lb (108.9 kg)     NIH Stroke Scale  1a Level of consciousness 0=alert; keenly responsive   1b LOC questions 0=Performs both tasks correctly   1c LOC commands 0=Performs both tasks correctly   2 Best Gaze 0=normal   3 Visual 0=No visual loss   4 Facial Palsy 0=Normal symmetric movement   5a Motor left arm 0=No drift, limb holds 90 (or 45) degrees for full 10 seconds   5b Motor right arm 0=No drift, limb holds 90 (or 45) degrees for full 10 seconds   6a Motor left leg 0=No drift, limb holds 90 (or 45) degrees for full 10 seconds   6b Motor right leg 0=No drift, limb holds 90 (or 45) degrees for full 10 seconds   7 Limb Ataxia 0=Absent 8 Sensory 0=Normal; no sensory loss   9 Best Language 0=No aphasia, normal   10 Dysarthria 0=Normal   11 Extinction and Inattention 0=No abnormality   TOTAL  0   Time NIHSS performed: 5:54 PM      Cognition:   Level of Alertness: awake, alert   Fund of Knowledge: appears intact  Attention/Concentration: normal  Cranial Nerves: cranial nerves II-XII are grossly intact  Language: Normal, anxious in nature. No dysarthria noted on exam  Commands: Patient able to follow commands       Motor Exam:  BUE: 5/5, no drift  BLE: 5/5, no drift   Muscle Bulk: Normal   Muscle Tone:  Normal    DTR: bilateral brachioradialis 2+, bilateral patellar 2+      Sensory: Sensory intact with light touch throughout   Coordination: FNF intact  Gait: Patient ambulated independently with cane       General: appears anxious  Head: NC/AT  Eyes: sclarea w/o injection bilaterally,   Neck: Full ROM w/o limititation. Lungs: symmetrical chest rise, no assesscory muscle use, clear to ascultation  Heart: RRR, skin pink and warm  Extremetites: no deformities    Skin: No rashes  and no lesions  Psych: anxious     Impression and Plan:  Chandni Rod is an 61 y.o. male who presents to [address] [place] [address] for stuttering of speech that started an hour ago. Per patient he was sitting down talking with his family and became very anxious and short of breath, noticing it was hard to get his words out. Per patient this happens about once a month and he has come to the hospital for these symptoms, but is typically sent home. He does have a significant past medical history of anxiety, in which he takes xanex for. CT of head is negative for bleed. CTA showed a 2.2 cm tangle of vessels in the inferior medial left frontal lobe, suggestive of AVM. No stenosis or occlusions noted. On assessment the patient is very anxious. His NIH score for me was 0. Examination without any significant neurological deficits.  Patient educated about the incidental AVM finding and informed that Dr. Ginette Aleman with complete a DSA in the morning for a better picture of the AVM, Will get MRI tonight and patient will need to be NPO after midnight. DDX: SAH, ischemic stroke, anxiety attack        1.) Resolved expressive aphasia  Utilize stroke order set:  MRI brain without contrast, ordered   Hospitalist admit to telemetry, no tPA received  NIHSS q shift  Frequent neuro checks  HgbA1C and Fasting Lipid Panel in am  Permissive HTN for the first 24 hours  2D Echo with bubble study, ordered  Continue ASA and start plavix 75mg  Continue Statin therapy, consider increasing statin pending LDL results (Goal 45-70). PT/OT/SLP Evaluation   Maintain telemetry, monitor for atrial-fib  Maintain oxygenation saturation >94%  Dysphagia screen prior to oral intake  CT head is needed if severe headache or altered mental status  Provide stroke education for individualized risk factors     2.) Incidental finding of 2.2 cm tangle of vessels in the inferior medial left frontal lobe on CTA, suggestive of AVM  MRI without contrast ordered  Plan to complete DSA in cath lab tomorrow with Dr. Ginette Aleman around 10 a.m. Patient educated on DSA, will get consent tomorrow.    NPO after midnight  Coagulation labs prior to procedure   CT head is needed if severe headache or altered mental status      Plan to be finalized by attending    CHRISTIAN Alvarado CNP;   Vascular and Interventional Neurology, Neurocritical Care  [address] [place] [address]  [phone]    By:  CHRISTIAN Alvarado CNP;  8/12/2021, 5:54 PM

## 2021-08-12 NOTE — H&P
Hospitalist - History & Physical      Patient: Aracely Weir    Unit/Bed:4A-05/005-A  YOB: 1965  MRN: [id number]   Acct: [de-identified]   PCP: CHRISTIAN Lechuga CNP    Date of Service: Pt seen/examined on 08/12/21  and Admitted to [Inpatient] with expected LOS [greater than] two midnights due to medical therapy. Chief Complaint:  Speech difficulties    Assessment and Plan:-  1. Stroke versus TIA  a. Stroke work-up initiated in the ED with CT head and CT angiogram of the head and neck which did show no acute stroke but concerning for possible AVM versus venous anomaly  b. We will proceed with stroke work-up with MRI of his head without contrast  c. We will go ahead and give aspirin daily and allow for permissive hypertension  d. We will hold on Lipitor administration given the setting of cirrhosis. If MRI confirms stroke, will likely start the patient on Lipitor as his cirrhosis appears compensated. e. PT/OT/speech therapy  f. Neurosurgery following. We will plan for diagnostic/possible interventional angiography tomorrow morning. 2. Cirrhosis secondary to alcohol abuse, compensated  a. Patient states he has not had alcohol in greater than 15 years  b. No history of esophageal varices per the patient  c. We will continue his home medications including lactulose and spironolactone. Holding rifaximin. 3. Anxiety  a. Patient is on as needed Xanax at home. He states he uses approximately every other day.  b. We will continue as needed inpatient as patient is very anxious appearing and this may be contributing to his symptoms. Will need to be cautious given history of cirrhosis. Disposition: Likely home in 48 to 72 hours. Patient may benefit from home health/outpatient physical therapy but may decline services.     History Of Present Illness:    Patient is a 48-year-old male with past medical history of alcoholic cirrhosis who presents to the hospital after acute onset of speech difficulties earlier today. Patient states that he was on the phone around 11:30 AM when he began to get frustrated/irritated at that time he started experiencing stuttering and some slurred speech. This lasted only a few minutes until he hung up the phone. He then called his , and experienced the same symptoms with speech difficulties. His son arrived to the house shortly thereafter, the patient asked him to bring him to the hospital to be evaluated. He states that he tends to have speech difficulties and stuttering when he gets \"worked up\" or frustrated. This has been going on for several years. He does not know why and has no official diagnosis. The patient states that he has some tremors in his hands and some difficulty holding objects in his hands, but had no objective muscle weaknesses, facial droop, confusion, headaches, or vision changes. The patient does ambulate with a cane at his baseline and is very unsteady on his feet. He has had multiple falls recently has declined outpatient physical therapy before. Upon arrival, stroke alert was initiated and the patient underwent a CT of his head as well as CT angiogram of his head and neck. These were negative for acute stroke, but the angiogram did show a 2.2 cm tangle of vessels in the inferior medial aspect of the left frontal lobe concerning for a possible venous anomaly versus AVM. Neurosurgery/stroke team was alerted from the ER. Patient was deemed not a TPA candidate based on resolution of symptoms with NIH stroke scale of 0.       Past Medical History:        Diagnosis Date    Anxiety     Arthritis     Chronic kidney disease     Cirrhosis ([address]     Diverticulitis     Diverticulosis     GERD (gastroesophageal reflux disease)     Hypertension     Other disorders of kidney and ureter in diseases classified elsewhere     Psychiatric problem     Stroke of unknown cause ([address] 8/12/2021       Past Surgical History: Procedure Laterality Date    ABDOMEN SURGERY      BACK SURGERY      cervical plate    CARDIAC CATHETERIZATION  2007?  CERVICAL DISC SURGERY      x 2 ---broken neck 7-2003    COLON SURGERY  2004    partial, due to diverticulitis    COLONOSCOPY      DILATATION, ESOPHAGUS      ENDOSCOPY, COLON, DIAGNOSTIC      FRACTURE SURGERY      Broke neck in 2003    NERVE BLOCK Bilateral 10/02/2017    Cervical Facet MBB at C4-5, C5-6, C6-7     OR INJ,PARAVERTEBRAL L/S,1 LEVEL Bilateral 10/2/2017    C-FACET MBB C4-5, C5-6, C6-7 BILATERAL performed by Benjamine Castleman, MD at [address] [place] [address] ENDOSCOPY  2019    UPPER GASTROINTESTINAL ENDOSCOPY Left 9/28/2020    EGD BAND LIGATION performed by Misty Gutierrez MD at [facility]       Home Medications:   No current facility-administered medications on file prior to encounter. Current Outpatient Medications on File Prior to Encounter   Medication Sig Dispense Refill    lidocaine (LIDODERM) 5 % Place 1 patch onto the skin daily 12 hours on, 12 hours off. 30 patch 0    HYDROcodone-acetaminophen (NORCO) 5-325 MG per tablet Take 1 tablet by mouth every 8 hours as needed for Pain for up to 5 days. Intended supply: 3 days. Take lowest dose possible to manage pain 15 tablet 0    gabapentin (NEURONTIN) 300 MG capsule Take 1 capsule by mouth 3 times daily for 30 days. 90 capsule 2    ALPRAZolam (XANAX) 1 MG tablet take 1 tablet by mouth three times a day if needed for anxiety or sleep 90 tablet 2    nitroGLYCERIN (NITROSTAT) 0.4 MG SL tablet up to max of 3 total doses.  If no relief after 1 dose, call 911. 25 tablet 0    rifaximin (XIFAXAN) 550 MG tablet Take 1 tablet by mouth 2 times daily 60 tablet 6    atorvastatin (LIPITOR) 40 MG tablet take 1 tablet by mouth nightly 30 tablet 5    cyclobenzaprine (FLEXERIL) 10 MG tablet take 1/2 to 1 tablet three times a day if needed 60 Trachea midline. Respiratory:  Normal respiratory effort. Clear to auscultation, bilaterally without Rales/Wheezes/Rhonchi. Cardiovascular: Regular rate and rhythm with normal S1/S2 without murmurs, rubs or gallops. Abdomen: Soft, non-tender, non-distended with normal bowel sounds. Obese abdomen  Musculoskeletal:  No clubbing, cyanosis or edema bilaterally. Skin: Skin color, texture, turgor normal.  No rashes or lesions. Few spider angiomata noted. No palmar erythema. Neurologic:  Neurovascularly intact without any focal sensory/motor deficits. Renal nerves II through XII are intact. Patient moves all extremities spontaneously and to commands. He does have a resting tremor. There is 2 beats of asterixis noted on the left hand. Psychiatric: Alert and oriented, thought content appropriate, normal insight. Appears very anxious, and at times tearful. Capillary Refill: Brisk,< 3 seconds   Peripheral Pulses: +2 palpable, equal bilaterally     Labs:   Recent Labs     08/12/21  1403   WBC 6.6   HGB 13.6*   HCT 42.6        Recent Labs     08/12/21  1403      K 4.0      CO2 22*   BUN 8   CREATININE 0.8   CALCIUM 9.1     Recent Labs     08/12/21  1403   AST 22   ALT 20   BILITOT 0.8   ALKPHOS 73     Recent Labs     08/12/21  1403   INR 1.22*     No results for input(s): Brook Fuctamara in the last 72 hours. Urinalysis:    Lab Results   Component Value Date    NITRU NEGATIVE 01/14/2021    WBCUA 10-15 01/14/2021    BACTERIA NONE SEEN 01/14/2021    RBCUA 0-2 01/14/2021    BLOODU NEGATIVE 01/14/2021    SPECGRAV 1.020 05/18/2020    SPECGRAV <1.005 09/21/2017    GLUCOSEU NEGATIVE 01/14/2021       Radiology:   XR CHEST PORTABLE   Final Result   1. Poor inflation of the lungs. Normal heart size. Prior anterior cervical fusion.    2. There is chronic pleural thickening as well as right lateral costophrenic sulcus and fibrotic stranding right mid and lower lung fields which has been present since at least 6/5/2017.   3. Suspicion of mild superimposed atelectasis/pneumonia right mid and lower lung fields. Cannot exclude a tiny superimposed effusion right side. **This report has been created using voice recognition software. It may contain minor errors which are inherent in voice recognition technology. **      Final report electronically signed by Dr. Joana Torres on 8/12/2021 3:07 PM      CTA NECK W WO CONTRAST (CODE STROKE)   Final Result       1. 2.2 cm tangle of vessels in the inferior medial left frontal lobe. There is an overlying draining left cortical vein. This could represent a small developmental venous anomaly versus an arterial venous malformation. 2. No intracranial stenoses or occlusions. 3. Very mild atherosclerosis of the carotid bulbs without significant stenosis. **This report has been created using voice recognition software. It may contain minor errors which are inherent in voice recognition technology. **      Final report electronically signed by Dr. Aracely Epstein on 8/12/2021 2:41 PM      CTA HEAD W WO CONTRAST (CODE STROKE)   Final Result       1. 2.2 cm tangle of vessels in the inferior medial left frontal lobe. There is an overlying draining left cortical vein. This could represent a small developmental venous anomaly versus an arterial venous malformation. 2. No intracranial stenoses or occlusions. 3. Very mild atherosclerosis of the carotid bulbs without significant stenosis. **This report has been created using voice recognition software. It may contain minor errors which are inherent in voice recognition technology. **      Final report electronically signed by Dr. Aracely Epstein on 8/12/2021 2:41 PM      CT HEAD WO CONTRAST   Final Result       1. No intracranial hemorrhage. 2. There is no significant change in the appearance of the brain compared to the prior study.             The finding of no hemorrhage was telephoned to Daniela Bahena, the patient's nurse at 2:02 PM on 8/12/2021. **This report has been created using voice recognition software. It may contain minor errors which are inherent in voice recognition technology. **      Final report electronically signed by Dr. Sanaz Turpin on 8/12/2021 2:04 PM      MRI BRAIN WO CONTRAST    (Results Pending)     CTA HEAD W WO CONTRAST (CODE STROKE)    Result Date: 8/12/2021  PROCEDURE: CTA HEAD W WO CONTRAST, CTA NECK W WO CONTRAST CLINICAL INFORMATION: CEREBROVASCULAR ACCIDENT, stroke, stroke. Weakness. Slurred speech. COMPARISON: Head CT 8/12/2021. TECHNIQUE: 1 mm axial images were obtained through the head and neck after the fast bolus administration of contrast. A noncontrast localizer was obtained. 3-D reconstructions were performed on a dedicated 3-D workstation. These include multiplanar MPR images and multiplanar MIP images. Centerline reconstructions were obtained of the carotid systems. Isovue intravenous contrast was given. All CT scans at this facility use dose modulation, iterative reconstruction, and/or weight-based dosing when appropriate to reduce radiation dose to as low as reasonably achievable. FINDINGS: CTA NECK: Aortic arch and branches: Aortic arch is normal. There is no stenosis at the origin innominate artery, left common carotid artery or either subclavian artery. Right common carotid artery/ICA: The right common carotid artery is normal. There is very mild atheromatosis of the right carotid bulb. There is no stenosis. There is no stenosis of the right internal carotid artery. Left common carotid artery/ICA: The left common carotid artery is normal. There is mild smooth calcified atherosclerosis of the left carotid bulb. There is no stenosis. There is no stenosis of the left internal carotid artery. Vertebral arteries:  The vertebral arteries are codominant and normal. CTA HEAD: There is a tangle of vessels along the inferior aspect of the left frontal lobe measuring 2.2 x 1.3 x 1.0 cm. There is an associated large left frontal draining cortical vein. A definite dominant feeding artery is not identified. This involves the inferior medial left frontal lobe parenchyma along the floor of the anterior cranial fossa. Internal carotid arteries: There is mild calcified atherosclerosis of the cavernous segments of the internal carotid arteries bilaterally. There is no stenosis. Ophthalmic artery origins are normal. Middle cerebral arteries: Normal. The proximal branches are also normal. Anterior cerebral arteries: Normal. There is mild hypoplasia of the right A1 segment. The proximal branches are normal. There is a normal small anterior communicating artery. Vertebral arteries: The vertebral arteries are normal. Basilar artery: Normal. Superior cerebellar arteries: Normal. Posterior cerebral arteries: Normal. The proximal branches are also normal. There is a small normal left posterior communicating artery. No aneurysms, stenoses or occlusions are noted. The superior sagittal sinus, vein of Yfn, internal cerebral veins, straight sinus, transverse sinuses and sigmoid sinuses are patent. Axial source data: There are no gross abnormalities in the brain parenchyma. There is fullness of the adenoids. There is no cervical adenopathy. The patient has had a prior cervical fusion. There are no suspicious findings in the lung apices. 1. 2.2 cm tangle of vessels in the inferior medial left frontal lobe. There is an overlying draining left cortical vein. This could represent a small developmental venous anomaly versus an arterial venous malformation. 2. No intracranial stenoses or occlusions. 3. Very mild atherosclerosis of the carotid bulbs without significant stenosis. **This report has been created using voice recognition software. It may contain minor errors which are inherent in voice recognition technology. ** Final report electronically signed by Dr. Sanaz Turpin on 8/12/2021 2:41 PM    CT HEAD WO CONTRAST    Result Date: 8/12/2021  PROCEDURE: CT HEAD WO CONTRAST CLINICAL INFORMATION: CEREBROVASCULAR ACCIDENT, stroke, Acute Stroke. Code stroke. Weakness. COMPARISON: Head CT 10/11/2019. TECHNIQUE: Noncontrast 5 mm axial images were obtained through the brain. Sagittal and coronal reconstructions were obtained. All CT scans at this facility use dose modulation, iterative reconstruction, and/or weight-based dosing when appropriate to reduce radiation dose to as low as reasonably achievable. FINDINGS: There is no hemorrhage. There are no intra-or extra-axial collections. There is no hydrocephalus, midline shift or mass effect. There is a mild amount of abnormal low attenuation in the white matter the brain suggesting chronic small vessel ischemic changes. This appears stable. The brain volume is within acceptable limits. The patient has had a prior mastoidectomy on the right. There is some mild mucosal thickening in the mastoidectomy bowl. The left mastoid air cells are normally aerated. There is some mild mucosal thickening in the ethmoid air cells. There is no suspicious calvarial abnormality. 1. No intracranial hemorrhage. 2. There is no significant change in the appearance of the brain compared to the prior study. The finding of no hemorrhage was telephoned to Lucien Lomax, the patient's nurse at 2:02 PM on 8/12/2021. **This report has been created using voice recognition software. It may contain minor errors which are inherent in voice recognition technology. ** Final report electronically signed by Dr. Jake Kinsey on 8/12/2021 2:04 PM    CTA NECK W [address] (CODE STROKE)    Result Date: 8/12/2021  PROCEDURE: CTA HEAD W WO CONTRAST, CTA NECK W WO CONTRAST CLINICAL INFORMATION: CEREBROVASCULAR ACCIDENT, stroke, stroke. Weakness. Slurred speech. COMPARISON: Head CT 8/12/2021.  TECHNIQUE: 1 mm axial images were obtained through the head and neck after the fast bolus administration of contrast. A noncontrast localizer was obtained. 3-D reconstructions were performed on a dedicated 3-D workstation. These include multiplanar MPR images and multiplanar MIP images. Centerline reconstructions were obtained of the carotid systems. Isovue intravenous contrast was given. All CT scans at this facility use dose modulation, iterative reconstruction, and/or weight-based dosing when appropriate to reduce radiation dose to as low as reasonably achievable. FINDINGS: CTA NECK: Aortic arch and branches: Aortic arch is normal. There is no stenosis at the origin innominate artery, left common carotid artery or either subclavian artery. Right common carotid artery/ICA: The right common carotid artery is normal. There is very mild atheromatosis of the right carotid bulb. There is no stenosis. There is no stenosis of the right internal carotid artery. Left common carotid artery/ICA: The left common carotid artery is normal. There is mild smooth calcified atherosclerosis of the left carotid bulb. There is no stenosis. There is no stenosis of the left internal carotid artery. Vertebral arteries: The vertebral arteries are codominant and normal. CTA HEAD: There is a tangle of vessels along the inferior aspect of the left frontal lobe measuring 2.2 x 1.3 x 1.0 cm. There is an associated large left frontal draining cortical vein. A definite dominant feeding artery is not identified. This involves the inferior medial left frontal lobe parenchyma along the floor of the anterior cranial fossa. Internal carotid arteries: There is mild calcified atherosclerosis of the cavernous segments of the internal carotid arteries bilaterally. There is no stenosis. Ophthalmic artery origins are normal. Middle cerebral arteries: Normal. The proximal branches are also normal. Anterior cerebral arteries: Normal. There is mild hypoplasia of the right A1 segment. The proximal branches are normal. There is a normal small anterior communicating artery. Vertebral arteries: The vertebral arteries are normal. Basilar artery: Normal. Superior cerebellar arteries: Normal. Posterior cerebral arteries: Normal. The proximal branches are also normal. There is a small normal left posterior communicating artery. No aneurysms, stenoses or occlusions are noted. The superior sagittal sinus, vein of Yfn, internal cerebral veins, straight sinus, transverse sinuses and sigmoid sinuses are patent. Axial source data: There are no gross abnormalities in the brain parenchyma. There is fullness of the adenoids. There is no cervical adenopathy. The patient has had a prior cervical fusion. There are no suspicious findings in the lung apices. 1. 2.2 cm tangle of vessels in the inferior medial left frontal lobe. There is an overlying draining left cortical vein. This could represent a small developmental venous anomaly versus an arterial venous malformation. 2. No intracranial stenoses or occlusions. 3. Very mild atherosclerosis of the carotid bulbs without significant stenosis. **This report has been created using voice recognition software. It may contain minor errors which are inherent in voice recognition technology. ** Final report electronically signed by Dr. Donna Crain on 8/12/2021 2:41 PM    XR CHEST PORTABLE    Result Date: 8/12/2021  PROCEDURE: XR CHEST PORTABLE CLINICAL INFORMATION: stroke COMPARISON: 1/14/2021 TECHNIQUE: A single mobile view of the chest was obtained. 1. Poor inflation of the lungs. Normal heart size. Prior anterior cervical fusion. 2. There is chronic pleural thickening as well as right lateral costophrenic sulcus and fibrotic stranding right mid and lower lung fields which has been present since at least 6/5/2017. 3. Suspicion of mild superimposed atelectasis/pneumonia right mid and lower lung fields. Cannot exclude a tiny superimposed effusion right side. **This report has been created using voice recognition software.   It may contain minor errors which are inherent in voice recognition technology. ** Final report electronically signed by Dr. Marcos Azevedo on 8/12/2021 3:07 PM      Electronically signed by Carlos Motta DO on 8/12/2021 at 4:43 PM

## 2021-08-12 NOTE — ED PROVIDER NOTES
[address] [facility]"" [address] [address] EMERGENCY DEPT    EMERGENCY MEDICINE     Pt Name: Keli Lr  MRN: [id number]  [place] 1965  Date of evaluation: 8/12/2021  Provider: Radha Oliva MD  CHIEF COMPLAINT       Chief Complaint   Patient presents with    Aphasia    Panic Attack       HISTORY OF PRESENT ILLNESS    Keli Lr is a pleasant 61 y.o. male who presents to the emergency department from home stuttering speech onset 1 hour ago. He reports he was talking to a family member and he suddenly started stuttering. He then became very anxious and short of breath and came here. He reports he chronically has balance problems that he uses a cane for, but denies any new or different balance symptoms. He denies any weakness/numbness to face or extremities. He denies any chest pain or fever. He denies any known stressor that led to symptoms. He reports he gets these stuttering episodes about once a month. Triage notes and Nursing notes were reviewed by myself. Any discrepancies are addressed above. PAST MEDICAL HISTORY     Past Medical History:   Diagnosis Date    Anxiety     Arthritis     Chronic kidney disease     Cirrhosis ([address]     Diverticulitis     Diverticulosis     GERD (gastroesophageal reflux disease)     Hypertension     Other disorders of kidney and ureter in diseases classified elsewhere     Psychiatric problem        SURGICAL HISTORY       Past Surgical History:   Procedure Laterality Date    ABDOMEN SURGERY      BACK SURGERY      cervical plate    CARDIAC CATHETERIZATION  2007?     CERVICAL DISC SURGERY      x 2 ---broken neck 7-2003    COLON SURGERY  2004    partial, due to diverticulitis    COLONOSCOPY      DILATATION, ESOPHAGUS      ENDOSCOPY, COLON, DIAGNOSTIC      FRACTURE SURGERY      Broke neck in 2003    NERVE BLOCK Bilateral 10/02/2017    Cervical Facet MBB at C4-5, C5-6, C6-7     GA INJ,PARAVERTEBRAL L/S,1 LEVEL Bilateral 10/2/2017    C-FACET MBB C4-5, C5-6, C6-7 BILATERAL performed by Valery Rich MD at [facility] ENDOSCOPY  2019    UPPER GASTROINTESTINAL ENDOSCOPY Left 9/28/2020    EGD BAND LIGATION performed by Latisha Lincoln MD at [facility]       Previous Medications    ALBUTEROL SULFATE HFA (VENTOLIN HFA) 108 (90 BASE) MCG/ACT INHALER    Inhale 2 puffs into the lungs 4 times daily as needed for Wheezing    ALPRAZOLAM (XANAX) 1 MG TABLET    take 1 tablet by mouth three times a day if needed for anxiety or sleep    ASPIRIN 81 MG CHEWABLE TABLET    Take 1 tablet by mouth daily    ATORVASTATIN (LIPITOR) 40 MG TABLET    take 1 tablet by mouth nightly    CYCLOBENZAPRINE (FLEXERIL) 10 MG TABLET    take 1/2 to 1 tablet three times a day if needed    DULOXETINE (CYMBALTA) 60 MG EXTENDED RELEASE CAPSULE    take 1 capsule by mouth once daily    FAMOTIDINE (PEPCID) 40 MG TABLET    take 1 tablet every evening    GABAPENTIN (NEURONTIN) 300 MG CAPSULE    Take 1 capsule by mouth 3 times daily for 30 days. HYDROCODONE-ACETAMINOPHEN (NORCO) 5-325 MG PER TABLET    Take 1 tablet by mouth every 8 hours as needed for Pain for up to 5 days. Intended supply: 3 days. Take lowest dose possible to manage pain    IBUPROFEN (IBU) 400 MG TABLET    Take 1 tablet by mouth every 6 hours as needed for Pain    LIDOCAINE (LIDODERM) 5 %    Place 1 patch onto the skin daily 12 hours on, 12 hours off. LIDOCAINE (LMX) 4 % CREAM    Apply topically3 times daily to thigh as needed for pain. NITROGLYCERIN (NITROSTAT) 0.4 MG SL TABLET    up to max of 3 total doses. If no relief after 1 dose, call 911. RIFAXIMIN (XIFAXAN) 550 MG TABLET    Take 1 tablet by mouth 2 times daily    SPIRONOLACTONE (ALDACTONE) 100 MG TABLET    take 1 tablet by mouth every morning       ALLERGIES     Patient has no known allergies.     FAMILY HISTORY       Family History   Problem Relation Age of Onset    Hypertension Mother     Heart Disease Mother     Alzheimer's Disease Mother     Heart Failure Mother     Hypertension Father     Heart Disease Father     Cancer Father     High Blood Pressure Paternal Uncle     Heart Disease Paternal Uncle     Colon Cancer Neg Hx     Colon Polyps Neg Hx         SOCIAL HISTORY       Social History     Socioeconomic History    Marital status:      Spouse name: Not on file    Number of children: 2    Years of education: Not on file    Highest education level: Not on file   Occupational History    Not on file   Tobacco Use    Smoking status: Current Every Day Smoker     Packs/day: 0.50     Years: 25.00     Pack years: 12.50     Types: Cigarettes    Smokeless tobacco: Never Used    Tobacco comment: printed to avs   Vaping Use    Vaping Use: Never used   Substance and Sexual Activity    Alcohol use: No     Comment: Quit 2 years ago    Drug use: No    Sexual activity: Not Currently   Other Topics Concern    Not on file   Social History Narrative    Not on file     Social Determinants of Health     Financial Resource Strain: Low Risk     Difficulty of Paying Living Expenses: Not hard at all   Food Insecurity: No Food Insecurity    Worried About Running Out of Food in the Last Year: Never true    Kaleb of Food in the Last Year: Never true   Transportation Needs:     Lack of Transportation (Medical):      Lack of Transportation (Non-Medical):    Physical Activity:     Days of Exercise per Week:     Minutes of Exercise per Session:    Stress:     Feeling of Stress :    Social Connections:     Frequency of Communication with Friends and Family:     Frequency of Social Gatherings with Friends and Family:     Attends Yazdanism Services:     Active Member of Clubs or Organizations:     Attends Club or Organization Meetings:     Marital Status:    Intimate Partner Violence:     Fear of Current or Ex-Partner:     Emotionally Abused:     Physically Abused:     Sexually Abused:        REVIEW OF SYSTEMS     Review of Systems   Constitutional: Negative for chills and fever. Respiratory: Negative for cough and shortness of breath. Cardiovascular: Negative for chest pain and leg swelling. Gastrointestinal: Negative for abdominal pain and vomiting. Musculoskeletal: Negative for back pain and neck pain. Skin: Negative for rash and wound. Neurological: Negative for weakness and numbness. All other systems reviewed and are negative. Except as noted above the remainder of the review of systems was reviewed and is. PHYSICAL EXAM    (up to 7 for level 4, 8 or more for level 5)     ED Triage Vitals   BP Temp Temp Source Pulse Resp SpO2 Height Weight   08/12/21 1403 08/12/21 1415 08/12/21 1415 08/12/21 1403 08/12/21 1403 08/12/21 1403 -- 08/12/21 1354   (!) 98/56 98.1 °F (36.7 °C) Oral 95 19 98 %  240 lb (108.9 kg)       Physical Exam  Vitals and nursing note reviewed. Constitutional:       General: He is awake. HENT:      Head: Normocephalic and atraumatic. Mouth/Throat:      Mouth: Mucous membranes are moist.   Eyes:      General: Lids are normal. No visual field deficit. Conjunctiva/sclera: Conjunctivae normal.   Neck:      Vascular: No JVD. Trachea: No tracheal deviation. Cardiovascular:      Rate and Rhythm: Normal rate and regular rhythm. Pulses: Normal pulses. Heart sounds: No murmur heard. No friction rub. No gallop. Pulmonary:      Effort: Pulmonary effort is normal.      Breath sounds: Normal breath sounds. No wheezing, rhonchi or rales. Abdominal:      Palpations: Abdomen is soft. Tenderness: There is no abdominal tenderness. Musculoskeletal:      Cervical back: Neck supple. Skin:     General: Skin is warm. Findings: No rash. Neurological:      General: No focal deficit present. Mental Status: He is alert and oriented to person, place, and time. GCS: GCS eye subscore is 4.  GCS verbal subscore is 5. GCS motor subscore is 6. Sensory: No sensory deficit. Motor: No weakness. Comments: cn2-12 intact, 5/5 strength in all extremities, normal sensation to LT, normal coordination with finger to nose and GAURAV of b/l LE, normal speech, clear and fluent   Psychiatric:         Behavior: Behavior is cooperative. Comments: Anxious, tearful         DIAGNOSTIC RESULTS     EKG:(none if blank)  All EKG's are [facility] by [facility] Phys[facility] who either signs or Co-signs this chart in the absence of a cardiologist.    [id number]: NSR at 97, no STEMI    RADIOLOGY: (none if blank)   Interpretation per the Radiologistbelow, if available at the time of this note:    XR CHEST PORTABLE   Final Result   1. Poor inflation of the lungs. Normal heart size. Prior anterior cervical fusion. 2. There is chronic pleural thickening as well as right lateral costophrenic sulcus and fibrotic stranding right mid and lower lung fields which has been present since at least 6/5/2017.   3. Suspicion of mild superimposed atelectasis/pneumonia right mid and lower lung fields. Cannot exclude a tiny superimposed effusion right side. **This report has been created using voice recognition software. It may contain minor errors which are inherent in voice recognition technology. **      Final report electronically signed by Dr. Karime Mullen on 8/12/2021 3:07 PM      CTA NECK W WO CONTRAST (CODE STROKE)   Final Result       1. 2.2 cm tangle of vessels in the inferior medial left frontal lobe. There is an overlying draining left cortical vein. This could represent a small developmental venous anomaly versus an arterial venous malformation. 2. No intracranial stenoses or occlusions. 3. Very mild atherosclerosis of the carotid bulbs without significant stenosis. **This report has been created using voice recognition software.  It may contain minor errors which are inherent in voice recognition technology. **      Final report electronically signed by Dr. Nhan Leach on 8/12/2021 2:41 PM      CTA HEAD W WO CONTRAST (CODE STROKE)   Final Result       1. 2.2 cm tangle of vessels in the inferior medial left frontal lobe. There is an overlying draining left cortical vein. This could represent a small developmental venous anomaly versus an arterial venous malformation. 2. No intracranial stenoses or occlusions. 3. Very mild atherosclerosis of the carotid bulbs without significant stenosis. **This report has been created using voice recognition software. It may contain minor errors which are inherent in voice recognition technology. **      Final report electronically signed by Dr. Nhan Leach on 8/12/2021 2:41 PM      CT HEAD WO CONTRAST   Final Result       1. No intracranial hemorrhage. 2. There is no significant change in the appearance of the brain compared to the prior study. The finding of no hemorrhage was telephoned to Penny Ramos, the patient's nurse at 2:02 PM on 8/12/2021. **This report has been created using voice recognition software. It may contain minor errors which are inherent in voice recognition technology. **      Final report electronically signed by Dr. Nhan Leach on 8/12/2021 2:04 PM          LABS:  Labs Reviewed   CBC WITH AUTO DIFFERENTIAL - Abnormal; Notable for the following components:       Result Value    RBC 4.20 (*)     Hemoglobin 13.6 (*)     .4 (*)     MCHC 31.9 (*)     RDW-SD 52.9 (*)     All other components within normal limits   COMPREHENSIVE METABOLIC PANEL W/ REFLEX TO MG FOR LOW K - Abnormal; Notable for the following components:    CO2 22 (*)     All other components within normal limits   PROTIME-INR - Abnormal; Notable for the following components:    INR 1.22 (*)     All other components within normal limits   POCT GLUCOSE - Abnormal; Notable for the following components:    POC Glucose 124 (*)     All other components within normal limits   TROPONIN   APTT   ANION GAP   GLOMERULAR FILTRATION RATE, ESTIMATED   PROCALCITONIN   POCT CREATININE - URINE       All other labs were within normal range or not returned as of this dictation. Please note, any cultures that may have been sent were not resulted at the time of this patient visit. EMERGENCY DEPARTMENT COURSE andMedical Decision Making:     MDM/   Pt presents to the ED with episode of stuttering speech, however on exam he does have clear and fluent speech. Initial NIHSS 0.  D/w Dr. Shy Sebastian who has also evaluated patient in ED and agrees patient is not an IV tpa candidate as NIHSS is 0. D.w dr Angela Pascal, he does recommend admit for further cva w/u. Recheck 300p: patient stable, NIHSS 0, speech remains clear and fluent. CXR findings noted: patient denies any cough, any fever, he has normal wbc and o2 sats, for these reasons, pneumonia felt, less likely, will hold off on abx and add procalcitonin level. Strict returnprecautions and follow up instructions were discussed with the patient with which the patient agrees    319p: notified Lon Gooden of admit. ED Medications administered this visit:    Medications   iopamidol (ISOVUE-370) 76 % injection 80 mL (80 mLs Intravenous Given 8/12/21 1411)         Procedures: (None if blank)       CLINICAL       1. Alteration in speech    2. Shortness of breath    3. Abnormal brain CT          DISPOSITION/PLAN   DISPOSITION    Admit. PATIENT REFERRED TO:  No follow-up provider specified.     DISCHARGE MEDICATIONS:  New Prescriptions    No medications on file              (Please note that portions of this note were completed with a voice recognition program.  Efforts were made to edit the dictations but occasionallywords are mis-transcribed.)      Chano Verdugo MD,FACEP (electronically signed)  Attending Physician, Emergency Department        Helen Lewis MD  08/12/21 7190

## 2021-08-12 NOTE — ED NOTES
EKG done at this time. IV started. ED doc at CT bedside. Neurologist at bedside.       Mikey Ellington RN  08/12/21 4541

## 2021-08-12 NOTE — ED NOTES
ED to inpatient nurses report    Chief Complaint   Patient presents with    Aphasia    Panic Attack      Present to ED from home  LOC: alert and orientated to name, place, date  Vital signs   Vitals:    08/12/21 1354 08/12/21 1403 08/12/21 1415 08/12/21 1541   BP:  (!) 98/56 119/64    Pulse:  95 96    Resp:  19 19    Temp:   98.1 °F (36.7 °C)    TempSrc:   Oral    SpO2:  98% 99%    Weight: 240 lb (108.9 kg)      Height:    5' 7\" (1.702 m)      Oxygen Baseline 99% on RA    Current needs required none Bipap/Cpap No  LDAs:   Peripheral IV 08/12/21 Right Antecubital (Active)   Site Assessment Clean;Dry; Intact 08/12/21 1400   Line Status Normal saline locked 08/12/21 1400   Dressing Status Clean;Dry; Intact 08/12/21 1400   Dressing Intervention New 08/12/21 1400     Mobility: Requires assistance * 1  Pending ED orders: MRI Brain WO Contrast  Present condition: stable      Electronically signed by Lula Marino RN on 8/12/2021 at [address] [facility], RN  08/12/21 5326

## 2021-08-13 ENCOUNTER — APPOINTMENT (OUTPATIENT)
Dept: INTERVENTIONAL RADIOLOGY/VASCULAR | Age: 56
DRG: 092 | End: 2021-08-13
Payer: COMMERCIAL

## 2021-08-13 ENCOUNTER — APPOINTMENT (OUTPATIENT)
Dept: CARDIAC CATH/INVASIVE PROCEDURES | Age: 56
DRG: 092 | End: 2021-08-13
Payer: COMMERCIAL

## 2021-08-13 VITALS
HEART RATE: 70 BPM | TEMPERATURE: 98 F | BODY MASS INDEX: 38.69 KG/M2 | RESPIRATION RATE: 16 BRPM | HEIGHT: 67 IN | SYSTOLIC BLOOD PRESSURE: 100 MMHG | WEIGHT: 246.47 LBS | OXYGEN SATURATION: 98 % | DIASTOLIC BLOOD PRESSURE: 68 MMHG

## 2021-08-13 LAB
AVERAGE GLUCOSE: 90 MG/DL (ref 70–126)
CHOLESTEROL, TOTAL: 85 MG/DL (ref 100–199)
ERYTHROCYTE [DISTWIDTH] IN BLOOD BY AUTOMATED COUNT: 14.3 % (ref 11.5–14.5)
ERYTHROCYTE [DISTWIDTH] IN BLOOD BY AUTOMATED COUNT: 51.6 FL (ref 35–45)
HBA1C MFR BLD: 5 % (ref 4.4–6.4)
HCT VFR BLD CALC: 37.4 % (ref 42–52)
HDLC SERPL-MCNC: 41 MG/DL
HEMOGLOBIN: 12.2 GM/DL (ref 14–18)
LDL CHOLESTEROL CALCULATED: 34 MG/DL
LV EF: 55 %
LVEF MODALITY: NORMAL
MCH RBC QN AUTO: 32.6 PG (ref 26–33)
MCHC RBC AUTO-ENTMCNC: 32.6 GM/DL (ref 32.2–35.5)
MCV RBC AUTO: 100 FL (ref 80–94)
PLATELET # BLD: 131 THOU/MM3 (ref 130–400)
PMV BLD AUTO: 10.9 FL (ref 9.4–12.4)
RBC # BLD: 3.74 MILL/MM3 (ref 4.7–6.1)
TRIGL SERPL-MCNC: 50 MG/DL (ref 0–199)
WBC # BLD: 5.1 THOU/MM3 (ref 4.8–10.8)

## 2021-08-13 PROCEDURE — 93306 TTE W/DOPPLER COMPLETE: CPT

## 2021-08-13 PROCEDURE — C1769 GUIDE WIRE: HCPCS

## 2021-08-13 PROCEDURE — 97166 OT EVAL MOD COMPLEX 45 MIN: CPT

## 2021-08-13 PROCEDURE — 36226 PLACE CATH VERTEBRAL ART: CPT | Performed by: PSYCHIATRY & NEUROLOGY

## 2021-08-13 PROCEDURE — 36224 PLACE CATH CAROTD ART: CPT | Performed by: PSYCHIATRY & NEUROLOGY

## 2021-08-13 PROCEDURE — 97535 SELF CARE MNGMENT TRAINING: CPT

## 2021-08-13 PROCEDURE — 80061 LIPID PANEL: CPT

## 2021-08-13 PROCEDURE — 83036 HEMOGLOBIN GLYCOSYLATED A1C: CPT

## 2021-08-13 PROCEDURE — 6370000000 HC RX 637 (ALT 250 FOR IP): Performed by: STUDENT IN AN ORGANIZED HEALTH CARE EDUCATION/TRAINING PROGRAM

## 2021-08-13 PROCEDURE — 6360000004 HC RX CONTRAST MEDICATION: Performed by: PSYCHIATRY & NEUROLOGY

## 2021-08-13 PROCEDURE — 36227 PLACE CATH XTRNL CAROTID: CPT | Performed by: PSYCHIATRY & NEUROLOGY

## 2021-08-13 PROCEDURE — C1760 CLOSURE DEV, VASC: HCPCS

## 2021-08-13 PROCEDURE — C1894 INTRO/SHEATH, NON-LASER: HCPCS

## 2021-08-13 PROCEDURE — B31C1ZZ FLUOROSCOPY OF BILATERAL EXTERNAL CAROTID ARTERIES USING LOW OSMOLAR CONTRAST: ICD-10-PCS | Performed by: PSYCHIATRY & NEUROLOGY

## 2021-08-13 PROCEDURE — 2500000003 HC RX 250 WO HCPCS

## 2021-08-13 PROCEDURE — 36226 PLACE CATH VERTEBRAL ART: CPT

## 2021-08-13 PROCEDURE — B41F1ZZ FLUOROSCOPY OF RIGHT LOWER EXTREMITY ARTERIES USING LOW OSMOLAR CONTRAST: ICD-10-PCS | Performed by: PSYCHIATRY & NEUROLOGY

## 2021-08-13 PROCEDURE — 36415 COLL VENOUS BLD VENIPUNCTURE: CPT

## 2021-08-13 PROCEDURE — 2580000003 HC RX 258: Performed by: STUDENT IN AN ORGANIZED HEALTH CARE EDUCATION/TRAINING PROGRAM

## 2021-08-13 PROCEDURE — B31G1ZZ FLUOROSCOPY OF BILATERAL VERTEBRAL ARTERIES USING LOW OSMOLAR CONTRAST: ICD-10-PCS | Performed by: PSYCHIATRY & NEUROLOGY

## 2021-08-13 PROCEDURE — 85027 COMPLETE CBC AUTOMATED: CPT

## 2021-08-13 PROCEDURE — 97530 THERAPEUTIC ACTIVITIES: CPT

## 2021-08-13 PROCEDURE — 99239 HOSP IP/OBS DSCHRG MGMT >30: CPT | Performed by: STUDENT IN AN ORGANIZED HEALTH CARE EDUCATION/TRAINING PROGRAM

## 2021-08-13 PROCEDURE — B3151ZZ FLUOROSCOPY OF BILATERAL COMMON CAROTID ARTERIES USING LOW OSMOLAR CONTRAST: ICD-10-PCS | Performed by: PSYCHIATRY & NEUROLOGY

## 2021-08-13 PROCEDURE — B3181ZZ FLUOROSCOPY OF BILATERAL INTERNAL CAROTID ARTERIES USING LOW OSMOLAR CONTRAST: ICD-10-PCS | Performed by: PSYCHIATRY & NEUROLOGY

## 2021-08-13 PROCEDURE — 6360000002 HC RX W HCPCS

## 2021-08-13 PROCEDURE — 97162 PT EVAL MOD COMPLEX 30 MIN: CPT

## 2021-08-13 PROCEDURE — 36227 PLACE CATH XTRNL CAROTID: CPT

## 2021-08-13 PROCEDURE — 2709999900 HC NON-CHARGEABLE SUPPLY

## 2021-08-13 PROCEDURE — 36224 PLACE CATH CAROTD ART: CPT

## 2021-08-13 RX ORDER — OXYCODONE HYDROCHLORIDE 5 MG/1
2.5 TABLET ORAL ONCE
Status: DISCONTINUED | OUTPATIENT
Start: 2021-08-13 | End: 2021-08-13 | Stop reason: HOSPADM

## 2021-08-13 RX ORDER — ACETAMINOPHEN 325 MG/1
650 TABLET ORAL EVERY 4 HOURS PRN
Status: DISCONTINUED | OUTPATIENT
Start: 2021-08-13 | End: 2021-08-13 | Stop reason: HOSPADM

## 2021-08-13 RX ORDER — CLOPIDOGREL BISULFATE 75 MG/1
75 TABLET ORAL DAILY
Status: DISCONTINUED | OUTPATIENT
Start: 2021-08-13 | End: 2021-08-13 | Stop reason: HOSPADM

## 2021-08-13 RX ORDER — OXYCODONE HYDROCHLORIDE 10 MG/1
5 TABLET ORAL EVERY 6 HOURS PRN
Qty: 5 TABLET | Refills: 0 | Status: SHIPPED | OUTPATIENT
Start: 2021-08-13 | End: 2021-08-15

## 2021-08-13 RX ORDER — OXYCODONE HYDROCHLORIDE 5 MG/1
5 TABLET ORAL ONCE
Status: COMPLETED | OUTPATIENT
Start: 2021-08-13 | End: 2021-08-13

## 2021-08-13 RX ORDER — CLOPIDOGREL BISULFATE 75 MG/1
75 TABLET ORAL DAILY
Qty: 30 TABLET | Refills: 3 | Status: SHIPPED | OUTPATIENT
Start: 2021-08-13 | End: 2021-09-02

## 2021-08-13 RX ADMIN — SPIRONOLACTONE 100 MG: 25 TABLET ORAL at 08:13

## 2021-08-13 RX ADMIN — DULOXETINE HYDROCHLORIDE 60 MG: 60 CAPSULE, DELAYED RELEASE ORAL at 08:13

## 2021-08-13 RX ADMIN — CYCLOBENZAPRINE 10 MG: 10 TABLET, FILM COATED ORAL at 05:45

## 2021-08-13 RX ADMIN — SODIUM CHLORIDE, PRESERVATIVE FREE 10 ML: 5 INJECTION INTRAVENOUS at 08:13

## 2021-08-13 RX ADMIN — GABAPENTIN 300 MG: 300 CAPSULE ORAL at 15:05

## 2021-08-13 RX ADMIN — ASPIRIN 81 MG: 81 TABLET, COATED ORAL at 08:12

## 2021-08-13 RX ADMIN — GABAPENTIN 300 MG: 300 CAPSULE ORAL at 08:13

## 2021-08-13 RX ADMIN — IOPAMIDOL 100 ML: 755 INJECTION, SOLUTION INTRAVENOUS at 14:31

## 2021-08-13 RX ADMIN — OXYCODONE 5 MG: 5 TABLET ORAL at 15:04

## 2021-08-13 RX ADMIN — CYCLOBENZAPRINE 10 MG: 10 TABLET, FILM COATED ORAL at 11:45

## 2021-08-13 RX ADMIN — ALPRAZOLAM 0.5 MG: 0.5 TABLET ORAL at 08:14

## 2021-08-13 RX ADMIN — CLOPIDOGREL BISULFATE 75 MG: 75 TABLET ORAL at 17:46

## 2021-08-13 ASSESSMENT — PAIN DESCRIPTION - LOCATION: LOCATION: HEAD;NECK

## 2021-08-13 ASSESSMENT — PAIN SCALES - GENERAL
PAINLEVEL_OUTOF10: 0
PAINLEVEL_OUTOF10: 8
PAINLEVEL_OUTOF10: 8

## 2021-08-13 NOTE — PROGRESS NOTES
[facility] THERAPY MISSED TREATMENT NOTE  [facility] 4A      Date: 2021  Patient Name: Boni Serrato        MRN: [id number]    : 1965  (61 y.o.)    REASON FOR MISSED TREATMENT:  Attempted to see patient at [phone] for completion of clinical swallow evaluation and to assess cognitive linguistic domains. At time of attempt, patient DELBERT for completion of interventional angiography. Will f/u on  to to complete assessments as appropriate.        Lida Kenny M.A., [address] [place] [address]

## 2021-08-13 NOTE — POST SEDATION
Sedation Post Procedure Note    Patient Name: Lisa Higgins   YOB: 1965  Room/Bed: [address] [place] [address]  Medical Record Number: [id number]  Date: 8/13/2021   Time: 11:29 AM         Physicians/Assistants: Avel Lin MD, MD    Procedure Performed:  Diagnostic 6 vessels cerebral angiogram    Post-Sedation Vital Signs:  Vitals:    08/13/21 1112   BP: 117/68   Pulse: 75   Resp: 16   Temp:    SpO2: 99%      Vital signs were reviewed and were stable after the procedure (see flow sheet for vitals)            Post-Sedation Exam: Lungs: clear to auscultation bilaterally           Complications: none    Electronically signed by Avel Lin MD on 8/13/2021 at 11:29 AM
,

## 2021-08-13 NOTE — PROGRESS NOTES
Luis Watters 59  INPATIENT OCCUPATIONAL THERAPY  [facility] 4A  EVALUATION    Time:   Time In:   Time Out: 0930  Timed Code Treatment Minutes: 28 Minutes  Minutes: 38          Date: 2021  Patient Name: Martir Pinzon,   Gender: male      MRN: [id number]  : 1965  (61 y.o.)  Referring Practitioner: Shaan Pierson DO  Diagnosis: shortness of breath  Additional Pertinent Hx: Pre HPI \" Patient is a 50-year-old male with past medical history of alcoholic cirrhosis who presents to the hospital after acute onset of speech difficulties earlier today. Patient states that he was on the phone around 11:30 AM when he began to get frustrated/irritated at that time he started experiencing stuttering and some slurred speech. This lasted only a few minutes until he hung up the phone. He then called his , and experienced the same symptoms with speech difficulties. His son arrived to the house shortly thereafter, the patient asked him to bring him to the hospital to be evaluated. He states that he tends to have speech difficulties and stuttering when he gets \"worked up\" or frustrated. This has been going on for several years. He does not know why and has no official diagnosis. The patient states that he has some tremors in his hands and some difficulty holding objects in his hands, but had no objective muscle weaknesses, facial droop, confusion, headaches, or vision changes. The patient does ambulate with a cane at his baseline and is very unsteady on his feet. He has had multiple falls recently has declined outpatient physical therapy before. Upon arrival, stroke alert was initiated and the patient underwent a CT of his head as well as CT angiogram of his head and neck. These were negative for acute stroke, but the angiogram did show a 2.2 cm tangle of vessels in the inferior medial aspect of the left frontal lobe concerning for a possible venous anomaly versus AVM. Neurosurgery/stroke team was alerted from the ER. Patient was deemed not a TPA candidate based on resolution of symptoms with NIH stroke scale of 0.\"    Restrictions/Precautions:  Restrictions/Precautions: Up as Tolerated, General Precautions, Fall Risk    Subjective  Chart Reviewed: Yes, Orders, Progress Notes, History and Physical  Patient assessed for rehabilitation services?: Yes  Response to previous treatment: Patient with no complaints from previous session  Family / Caregiver Present: Yes (son)    Subjective: RN approved of OT session. Pt sitting in bedside chair on arrival. Pt agreed to participate in OT session. Comments: Pt reported that he just found out his best friend passed away in his sleep last night. Pt was a little tearful towards the end of session, but was pleasant throughout the session    Pain:  Pain Assessment  Patient Currently in Pain: Denies    Vitals: Heart Rate: 101 bpm prior to session                                  97 bpm after functional mobility/ADL task               Oxygen: 96% on RA  After completing LB dressing task sitting in chair. Pt reported feel SOB                            97% on RA (however had complaint of being SOB)     Social/Functional History:  Lives With: Son  Type of Home: Apartment  Home Layout: Two level (lives on first floor (bedroom and bathroom))  Home Access: Stairs to enter with rails  Entrance Stairs - Number of Steps: 1 - stated has fallen before on step  Home Equipment:  (reports does not use often prior admission, but will start using more once discharge)   Bathroom Shower/Tub: Tub/Shower unit  Bathroom Toilet: Standard  Bathroom Accessibility: Accessible       ADL Assistance: Independent  Homemaking Assistance: Independent  Ambulation Assistance: Independent  Transfer Assistance: Independent    Active : No  Patient's  Info: still has license but does not drive - reports sons can drive him,. Oldest son is 24  Occupation:  On independence with cooking meals. Following session, patient left in safe position with all fall risk precautions in place.

## 2021-08-13 NOTE — PROGRESS NOTES
Pharmacy Medication History Note      List of current medications patient is taking is complete. Source of information: pt, outside fill hx. Changes made to medication list:  Medications removed (include reason, ex. therapy complete or physician discontinued):  N/A    Medications added/doses adjusted:  N/A    Other notes (ex. Recent course of antibiotics, Coumadin dosing):  Has active prescription for nitroglycerin but has never taken it. Denies use of other OTC or herbal medications. Allergies reviewed, NKDA.       Electronically signed by Balaji Gomes on 8/13/2021 at 2:34 PM

## 2021-08-13 NOTE — PROGRESS NOTES
Discharge teaching and instructions completed with patient using teachback method. AVS reviewed. Patient voiced understanding regarding prescriptions, follow up appointments, and care of self at home. Discharged home with all belongings.

## 2021-08-13 NOTE — PRE SEDATION
Sedation Pre-Procedure Note    Patient Name: Vance Aguirre   YOB: 1965  Room/Bed: [facility]005-A  Medical Record Number: [id number]  Date: 8/13/2021   Time: 9:56 AM       Indication: Cerebral AVM    Consent: I discussed with the patient about the procedure risks, benefits and alternatives. His son was present. Patient questions were all answered. Patient agrees to sign informed consent. Vital Signs:   Vitals:    08/13/21 0800   BP: 94/75   Pulse: 88   Resp: 14   Temp: 98.6 °F (37 °C)   SpO2: 99%       Past Medical History:   has a past medical history of Anxiety, Arthritis, Chronic kidney disease, Cirrhosis ([facility] [address] Diverticulitis, Diverticulosis, GERD (gastroesophageal reflux disease), Hypertension, Other disorders of kidney and ureter in diseases classified elsewhere, Psychiatric problem, and Stroke of unknown cause ([facility] [address] Past Surgical History:   has a past surgical history that includes Cervical disc surgery; Colon surgery (2004); Tympanostomy tube placement; Tonsillectomy; Abdomen surgery; Colonoscopy; Endoscopy, colon, diagnostic; fracture surgery; Dilatation, esophagus; Cardiac catheterization (2007?); Nerve Block (Bilateral, 10/02/2017); pr inj,paravertebral l/s,1 level (Bilateral, 10/2/2017); Upper gastrointestinal endoscopy (2019); back surgery; and Upper gastrointestinal endoscopy (Left, 9/28/2020).     Medications:   Scheduled Meds:    sodium chloride flush  5-40 mL Intravenous 2 times per day    [Held by provider] enoxaparin  40 mg Subcutaneous Daily    aspirin  81 mg Oral Daily    Or    aspirin  300 mg Rectal Daily    atorvastatin  40 mg Oral Nightly    DULoxetine  60 mg Oral Daily    gabapentin  300 mg Oral TID    spironolactone  100 mg Oral Daily     Continuous Infusions:    sodium chloride       PRN Meds: sodium chloride flush, sodium chloride, ondansetron **OR** ondansetron, polyethylene glycol, ALPRAZolam, cyclobenzaprine  Home Meds:   Prior to Admission medications Medication Sig Start Date End Date Taking? Authorizing Provider   LACTULOSE PO Take 30 mLs by mouth 2 times daily   Yes Historical Provider, MD   HYDROcodone-acetaminophen (NORCO) 5-325 MG per tablet Take 1 tablet by mouth every 8 hours as needed for Pain for up to 5 days. Intended supply: 3 days. Take lowest dose possible to manage pain 8/9/21 8/14/21 Yes Robert Mchugh APRN - CNP   gabapentin (NEURONTIN) 300 MG capsule Take 1 capsule by mouth 3 times daily for 30 days. 7/30/21 8/29/21 Yes Bedford Ip APRN - OLIVERIO   ALPRAZolam Lucien Rouleau) 1 MG tablet take 1 tablet by mouth three times a day if needed for anxiety or sleep 7/30/21 10/26/21 Yes Bedford Ip APRN - CNP   nitroGLYCERIN (NITROSTAT) 0.4 MG SL tablet up to max of 3 total doses. If no relief after 1 dose, call 911. 6/28/21  Yes Gonzalez Ip, APRN - CNP   rifaximin (XIFAXAN) 550 MG tablet Take 1 tablet by mouth 2 times daily 6/2/21  Yes CHRISTIAN Guallpa - CNP   atorvastatin (LIPITOR) 40 MG tablet take 1 tablet by mouth nightly 5/17/21  Yes Gonzalez Ip APRN - CNP   cyclobenzaprine (FLEXERIL) 10 MG tablet take 1/2 to 1 tablet three times a day if needed 5/4/21  Yes Bedford Ip, APRN - CNP   spironolactone (ALDACTONE) 100 MG tablet take 1 tablet by mouth every morning 10/8/20  Yes Gonzalez Ip, APRN - CNP   DULoxetine (CYMBALTA) 60 MG extended release capsule take 1 capsule by mouth once daily 6/15/20  Yes Bedford Ip, APRN - CNP   aspirin 81 MG chewable tablet Take 1 tablet by mouth daily 10/15/19  Yes Leticia Salamanca MD     Coumadin Use Last 7 Days:  no  Antiplatelet drug therapy use last 7 days: yes -   Other anticoagulant use last 7 days: no  Additional Medication Information: TIA      Pre-Sedation Documentation and Exam:   Vital Signs: Heart Rate-88, Respirations-16, Blood Pressure-94/75, Pulse Ox-99%.     Mallampati Airway Assessment:  Mallampati Class III - (soft palate & base of uvula are visible)    Prior History of Anesthesia Complications:   none    ASA Classification:  Class 3 - A patient with severe systemic disease that limits activity but is not incapacitating    Sedation/ Anesthesia Plan:   intravenous sedation    Medications Planned:   fentanyl intravenously    Patient is an appropriate candidate for plan of sedation: yes    Electronically signed by Breonna Guillory MD on 8/13/2021 at 9:56 AM

## 2021-08-13 NOTE — PLAN OF CARE
Problem: Falls - Risk of:  Goal: Will remain free from falls  Description: Will remain free from falls  Outcome: Ongoing  Note: Call light in reach, bed in lowest position, and bed alarm activated. Education given on use of call light before ambulation and when in need of assistance. Patient expressed understanding. Hourly visual checks performed and charted. Toileting offered to patient. No falls this shift, at any time. Arm band and falling star in place. Will continue to monitor. Problem: HEMODYNAMIC STATUS  Goal: Patient has stable vital signs and fluid balance  Outcome: Ongoing  Note:   Vitals:    08/12/21 2000   BP: 101/61   Pulse: 81   Resp: 16   Temp: 98.2 °F (36.8 °C)   SpO2: 99%     Patient's goal to maintain O2 stat above 94%. Problem: Pain:  Goal: Pain level will decrease  Description: Pain level will decrease  Outcome: Ongoing  Note: Patient able to use 0-10 pain scale. Patient rates pain at a 10 out of 10 in the head. PRN Flexeril given. Patient has a pain goal of \"no pain\" Agreeable to take PRN pain medications. Problem: Skin Integrity:  Goal: Will show no infection signs and symptoms  Description: Will show no infection signs and symptoms  Outcome: Ongoing  Note: No signs of skin breakdown. Skin warm, dry, and intact. Mucous membranes pink and moist.  Assistance with turns/ambulation provided PRN. Will continue to monitor. Problem: Discharge Planning:  Goal: Discharged to appropriate level of care  Description: Discharged to appropriate level of care  Outcome: Ongoing  Note: Discharge planning in process and discussed with patient/family. Social work consulted for any additional needs. Care manager aware of discharge needs. Patient from home and plans to return home upon discharge. Care plan reviewed with patient and family. Patient and family verbalize understanding of the plan of care and contribute to goal setting.

## 2021-08-13 NOTE — PROGRESS NOTES
[address] [id number]  INPATIENT PHYSICAL THERAPY  EVALUATION  [facility] 4A - 4A-05/005-A    Time In: 6805  Time Out: 1601  Timed Code Treatment Minutes: 16 Minutes  Minutes: 31          Date: 2021  Patient Name: Keli Lr,  Gender:  male        MRN: [id number]  : 1965  (61 y.o.)      Referring Practitioner: Rubén Mccrary DO  Diagnosis: Shortness of breath  Additional Pertinent Hx: Per HPI \" Patient is a 69-year-old male with past medical history of alcoholic cirrhosis who presents to the hospital after acute onset of speech difficulties earlier today. Patient states that he was on the phone around 11:30 AM when he began to get frustrated/irritated at that time he started experiencing stuttering and some slurred speech. This lasted only a few minutes until he hung up the phone. He then called his , and experienced the same symptoms with speech difficulties. His son arrived to the house shortly thereafter, the patient asked him to bring him to the hospital to be evaluated. He states that he tends to have speech difficulties and stuttering when he gets \"worked up\" or frustrated. This has been going on for several years. He does not know why and has no official diagnosis. The patient states that he has some tremors in his hands and some difficulty holding objects in his hands, but had no objective muscle weaknesses, facial droop, confusion, headaches, or vision changes. The patient does ambulate with a cane at his baseline and is very unsteady on his feet. He has had multiple falls recently has declined outpatient physical therapy before. Upon arrival, stroke alert was initiated and the patient underwent a CT of his head as well as CT angiogram of his head and neck. These were negative for acute stroke, but the angiogram did show a 2.2 cm tangle of vessels in the inferior medial aspect of the left frontal lobe concerning for a possible venous anomaly versus AVM. Neurosurgery/stroke team was alerted from the ER.   Patient was deemed not a TPA candidate based on resolution of symptoms with NIH stroke scale of 0.\"     Restrictions/Precautions:  Restrictions/Precautions: Up as Tolerated, General Precautions, Fall Risk    Subjective:  Chart Reviewed: Yes  Patient assessed for rehabilitation services?: Yes  Subjective: Pt resting in bed and agrees to therapy    General:  Overall Orientation Status: Within Normal Limits    Vision: Within Functional Limits    Hearing: Within functional limits         Pain: not reported      Vitals: Vitals not assessed per clinical judgement, see nursing flowsheet    Social/Functional History:    Lives With: Son  Type of Home: Apartment  Home Layout: Two level (lives on first floor (bedroom and bathroom))  Home Access: Stairs to enter without rails  Entrance Stairs - Number of Steps: 1 - stated has fallen before on step  Home Equipment: [address] [employer] [address] (reports does not use often prior admission, but will start using more once discharge)     Bathroom Shower/Tub: Tub/Shower unit  Bathroom Toilet: Standard  Bathroom Accessibility: Accessible       ADL Assistance: Independent  Homemaking Assistance: Independent  Ambulation Assistance: Independent  Transfer Assistance: Independent    Active : No  Occupation: On disability  Additional Comments: pt reports he has had a few falls at home, specifically walking up the stairs and ambulating around the house    OBJECTIVE:  Range of Motion:  Bilateral Lower Extremity: WFL    Strength:  Bilateral Lower Extremity: WFL    Balance:  Static Sitting Balance:  Modified Independent  Dynamic Sitting Balance: Modified Independent  Static Standing Balance: Stand By Assistance  Dynamic Standing Balance: Stand By Assistance    Bed Mobility:  Supine to Sit: Modified Independent  Sit to Supine: Modified Independent     Transfers:  Sit to Stand: Supervision  Stand to Sit:Supervision    Ambulation:  Stand By Assistance  Distance: 160 ft  Surface: Level Tile  Device:Cane  Gait Deviations:  Decreased Step Length Bilaterally, Decreased Gait Speed, Decreased Heel Strike Bilaterally and Mild Path Deviations    Stairs:  Supervision  Number of Steps: 3  Height: 6\" step with cane and 1 rail      Exercise:  Patient was guided in 1 set(s) 10 reps of exercise to both lower extremities. Ankle pumps, Glut sets, Quad sets, Heelslides, Hip abduction/adduction, Seated marches and Long arc quads. Exercises were completed for increased independence with functional mobility. Functional Outcome Measures: Not completed       ASSESSMENT:  Activity Tolerance:  Patient tolerance of  treatment: good. Treatment Initiated: Treatment and education initiated within context of evaluation. Evaluation time included review of current medical information, gathering information related to past medical, social and functional history, completion of standardized testing, formal and informal observation of tasks, assessment of data and development of plan of care and goals. Treatment time included skilled education and facilitation of tasks to increase safety and independence with functional mobility for improved independence and quality of life. Assessment: Body structures, Functions, Activity limitations: Decreased functional mobility , Decreased endurance, Decreased balance  Assessment: Pt is a 60 yo male that is moving with SBA to Supervision and has some impulsive tendencies that lead to concerns for fall risk. Pt was advised to slow down and stay focused on his mobility and especially for negotiating stairs. Pt would benefit from some outpt therapies to address higher level balance training.   Prognosis: Good         Discharge Recommendations:  Discharge Recommendations: Home with assist PRN, Outpatient PT    Patient Education:  PT Education: Goals, PT Role, Plan of Care, General Safety, Home Exercise Program, Gait Training, Functional Mobility

## 2021-08-13 NOTE — CARE COORDINATION
8/13/21, 7:33 AM EDT  DISCHARGE PLANNING EVALUATION:    Tam Celis       Admitted: 8/12/2021/ Esther 93 day: 1   Location: [address] [place] [address]-A Reason for admit: Shortness of breath [R06.02]  Abnormal brain CT [R90.89]  Stroke of unknown cause ([address] [I63.9]  Alteration in speech [R47.89]   PMH:  has a past medical history of Anxiety, Arthritis, Chronic kidney disease, Cirrhosis ([address] Diverticulitis, Diverticulosis, GERD (gastroesophageal reflux disease), Hypertension, Other disorders of kidney and ureter in diseases classified elsewhere, Psychiatric problem, and Stroke of unknown cause ([address] Procedure:   8/12 CTA head/neck:   2.2 cm tangle of vessels in the inferior medial left frontal lobe. There is an overlying draining left cortical vein. This could represent a small developmental venous anomaly versus an arterial venous malformation. 2. No intracranial stenoses or occlusions. 3. Very mild atherosclerosis of the carotid bulbs without significant stenosis. 8/12 CXR:   . Poor inflation of the lungs. Normal heart size. Prior anterior cervical fusion. 2. There is chronic pleural thickening as well as right lateral costophrenic sulcus and fibrotic stranding right mid and lower lung fields which has been present since at least 6/5/2017.   3. Suspicion of mild superimposed atelectasis/pneumonia right mid and lower lung fields. Cannot exclude a tiny superimposed effusion right side.         8/12 MRI brain:   . No evidence of an acute infarct. 2. Cluster of flow voids in the inferior medial left frontal lobe measuring 2.1 cm consistent with a vascular abnormality. The brain parenchyma surrounding this has normal signal.   3. Mild severity chronic small vessel ischemic changes. 8/13 Diagnostic 6 vessels cerebral angiogram  Barriers to Discharge:  Presented to ED from home for panic attack and slurred speech.  Reports frequent panic attacks but decided to come to ED for eval when he had difficulty speaking and weakness. Hospitalist and N/S following. PT/OT/SLP. Work-up in ED did not show accute CVA by concern for possible AVM versus venous anomaly. Hx of cirrhosis, has been sober for 15 years. PCP: CHRISTIAN Downey - CNP  Readmission Risk Score: 20%    Patient Goals/Plan/Treatment Preferences: Spoke with Parvin Corteseker and family, he plans to return home at discharge. He is independent and does not anticipate home needs. Does have trouble finding rides to doctor's apt at times if family is working. Encouraged him to call number on back of insurance card as some provide transport. Also placed contact information for find a ride in [place].   Transportation/Food Security/Housekeeping Addressed:  No issues identified.

## 2021-08-13 NOTE — DISCHARGE SUMMARY
Hospitalist Discharge Summary        Patient: Orlando Domingo  YOB: 1965  MRN: [id number]   Acct: [de-identified]    Primary Care Physician: CHRISTIAN Unger CNP    Admit date  8/12/2021    Discharge date:  8/13/2021 4:21 PM    Chief Complaint on presentation :-    Speech difficulties    Discharge Assessment and Plan:-   1. Cerebral AVM  2. Stroke/TIA ruled out  a. Patient presents to the hospital with complaints of speech stuttering with minor speech slurring.  b. Stroke work-up initiated. CT head normal.  CT angiogram of head and neck showed 2.2 cm tangle of vessels concerning for AVM versus venous anomaly. c. MRI completed showed no acute infarct, but did show mild severity chronic small vessel ischemic changes. d. Patient was started on Plavix in addition to home aspirin and Lipitor. These were continued at discharge  e. Echocardiogram with bubble study was completed but pending official read at time of discharge. This need be followed by his primary care physician on his follow-up appointment. f. 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 Underwent diagnostic angiogram with findings showing no high risk features. 3. Alcoholic cirrhosis not acutely decompensated  a. Continued on home lactulose, spironolactone, rifaximin. b. MELD-Na score of 10, indicative of less than 2% 90-day mortality. 4. Anxiety  a. Continued on home Xanax. This need to be monitored closely given cirrhosis history. May precipitate a panic encephalopathy. 5. Chronic pain  a. Continued on home Marana.  This will need to be monitored closely as it may precipitate hepatic encephalopathy. Initial H and P and Hospital course:-  Patient presents to the hospital from home after experiencing 2 separate episodes of speech difficulties consisting of stuttering as well as mild speech slurring.   Patient states that he has had intermittent episodes similar to this for several years, typically when he becomes frustrated or \"worked up\". The episodes prior to arrival occurred while he was on the phone and getting frustrated with the person that he was talking to. He then asked his son to bring him to the hospital.  Upon arrival, stroke work-up was initiated with a CT of his head which was normal and a CT angiogram of his head and neck which showed a 2.2 cm tangle of vessels concerning for AVM versus venous anomaly. The patient was evaluated by neurosurgery and deemed not a TPA candidate due to NIH stroke scale of 0. The patient was started on aspirin and Plavix continued on Lipitor. He underwent an MRI which showed no acute infarct but showed chronic small vessel ischemic changes. Echocardiogram was completed with official read pending at time of discharge. This will require follow-up on his hospital follow-up appointment this PCP. Patient underwent diagnostic CNS angiogram with neurosurgery showing AVM without high risk features. Patient will follow with neurosurgery every 6 months.     Physical Exam:-  Vitals:   Patient Vitals for the past 24 hrs:   BP Temp Temp src Pulse Resp SpO2 Weight   08/13/21 1415 100/68 -- -- 70 16 98 % --   08/13/21 1345 100/67 -- -- 87 16 98 % --   08/13/21 1315 95/75 -- -- 85 16 98 % --   08/13/21 1245 95/78 98 °F (36.7 °C) Oral 87 14 98 % --   08/13/21 1230 104/68 -- -- 68 16 98 % --   08/13/21 1200 103/61 -- -- 67 14 99 % --   08/13/21 1145 112/72 -- -- 88 16 99 % --   08/13/21 1130 101/65 -- -- 85 16 98 % --   08/13/21 1112 117/68 -- -- 75 16 99 % --   08/13/21 1107 114/66 -- -- 65 16 99 % --   08/13/21 1102 120/62 -- -- 76 16 100 % --   08/13/21 1100 118/60 -- -- 74 16 98 % --   08/13/21 1052 121/71 -- -- 65 16 99 % --   08/13/21 1047 117/60 -- -- 68 16 99 % --   08/13/21 1042 120/67 -- -- 72 16 98 % --   08/13/21 1038 129/75 -- -- 71 16 99 % --   08/13/21 1032 113/67 -- -- 67 18 98 % --   08/13/21 1027 114/77 -- -- 106 16 (!) 75 % --   08/13/21 1023 (!) 122/91 -- -- 88 18 97 % --   08/13/21 1018 116/77 -- -- 81 18 (!) 85 % --   08/13/21 1012 110/72 -- -- 69 18 98 % --   08/13/21 1007 110/73 -- -- 79 16 99 % --   08/13/21 1002 112/69 -- -- 82 16 97 % --   08/13/21 0959 108/68 -- -- 82 18 99 % --   08/13/21 0800 94/75 98.6 °F (37 °C) Oral -- 14 99 % --   08/13/21 0313 100/65 97.5 °F (36.4 °C) Oral 81 18 97 % 246 lb 7.6 oz (111.8 kg)   08/12/21 2311 104/66 97.9 °F (36.6 °C) Oral 78 18 95 % --   08/12/21 2000 101/61 98.2 °F (36.8 °C) Oral 81 16 99 % --     Weight:   Weight: 246 lb 7.6 oz (111.8 kg)   24 hour intake/output:     Intake/Output Summary (Last 24 hours) at 8/13/2021 1621  Last data filed at 8/13/2021 0813  Gross per 24 hour   Intake 510 ml   Output --   Net 510 ml       General appearance: No apparent distress, appears stated age and cooperative. HEENT: Normal cephalic, atraumatic without obvious deformity. Pupils equal, round, and reactive to light. Extra ocular muscles intact. Conjunctivae/corneas clear. Neck: Supple, with full range of motion. No jugular venous distention. Trachea midline. Respiratory:  Normal respiratory effort. Clear to auscultation, bilaterally without Rales/Wheezes/Rhonchi. Cardiovascular: Regular rate and rhythm with normal S1/S2 without murmurs, rubs or gallops. Abdomen: Soft, non-tender, non-distended with normal bowel sounds. Obese abdomen  Musculoskeletal:  No clubbing, cyanosis or edema bilaterally. Skin: Skin color, texture, turgor normal.  No rashes or lesions. Few spider angiomata noted. No palmar erythema. Neurologic:  Neurovascularly intact without any focal sensory/motor deficits. Cranial nerves II through XII are intact. Patient moves all extremities spontaneously and to commands. He does have a resting tremor. Psychiatric: Alert and oriented, thought content appropriate, normal insight. Appears very anxious.   Capillary Refill: Brisk,< 3 seconds   Peripheral Pulses: +2 palpable, equal bilaterally      Discharge Medications:-      Medication List      START taking these medications    clopidogrel 75 MG tablet  Commonly known as: PLAVIX  Take 1 tablet by mouth daily        CONTINUE taking these medications    ALPRAZolam 1 MG tablet  Commonly known as: XANAX  take 1 tablet by mouth three times a day if needed for anxiety or sleep     aspirin 81 MG chewable tablet  Take 1 tablet by mouth daily     atorvastatin 40 MG tablet  Commonly known as: LIPITOR  take 1 tablet by mouth nightly     cyclobenzaprine 10 MG tablet  Commonly known as: FLEXERIL  take 1/2 to 1 tablet three times a day if needed     DULoxetine 60 MG extended release capsule  Commonly known as: CYMBALTA  take 1 capsule by mouth once daily     gabapentin 300 MG capsule  Commonly known as: NEURONTIN  Take 1 capsule by mouth 3 times daily for 30 days. HYDROcodone-acetaminophen 5-325 MG per tablet  Commonly known as: Norco  Take 1 tablet by mouth every 8 hours as needed for Pain for up to 5 days. Intended supply: 3 days. Take lowest dose possible to manage pain     LACTULOSE PO     nitroGLYCERIN 0.4 MG SL tablet  Commonly known as: NITROSTAT  up to max of 3 total doses. If no relief after 1 dose, call 911.      rifaximin 550 MG tablet  Commonly known as: XIFAXAN  Take 1 tablet by mouth 2 times daily     spironolactone 100 MG tablet  Commonly known as: ALDACTONE  take 1 tablet by mouth every morning        STOP taking these medications    famotidine 40 MG tablet  Commonly known as: PEPCID     ibuprofen 400 MG tablet  Commonly known as: IBU     lidocaine 4 % cream  Commonly known as: LMX     lidocaine 5 %  Commonly known as: LIDODERM           Where to Get Your Medications      These medications were sent to [address] [place] [id number]    Phone: [phone]   · clopidogrel 75 MG tablet          Labs :-  Recent Results (from the past 72 hour(s))   POCT Glucose U/L   Troponin    Collection Time: 08/12/21  2:03 PM   Result Value Ref Range    Troponin T < 0.010 ng/ml   Protime-INR    Collection Time: 08/12/21  2:03 PM   Result Value Ref Range    INR 1.22 (H) 0.85 - 1.13   APTT    Collection Time: 08/12/21  2:03 PM   Result Value Ref Range    aPTT 37.6 22.0 - 38.0 seconds   Anion Gap    Collection Time: 08/12/21  2:03 PM   Result Value Ref Range    Anion Gap 9.0 8.0 - 16.0 meq/L   Glomerular Filtration Rate, Estimated    Collection Time: 08/12/21  2:03 PM   Result Value Ref Range    Est, Glom Filt Rate >90 ml/min/1.73m2   Procalcitonin    Collection Time: 08/12/21  2:03 PM   Result Value Ref Range    Procalcitonin 0.06 0.01 - 0.09 ng/mL   CBC    Collection Time: 08/13/21  4:18 AM   Result Value Ref Range    WBC 5.1 4.8 - 10.8 thou/mm3    RBC 3.74 (L) 4.70 - 6.10 mill/mm3    Hemoglobin 12.2 (L) 14.0 - 18.0 gm/dl    Hematocrit 37.4 (L) 42.0 - 52.0 %    .0 (H) 80.0 - 94.0 fL    MCH 32.6 26.0 - 33.0 pg    MCHC 32.6 32.2 - 35.5 gm/dl    RDW-CV 14.3 11.5 - 14.5 %    RDW-SD 51.6 (H) 35.0 - 45.0 fL    Platelets 957 921 - 980 thou/mm3    MPV 10.9 9.4 - 12.4 fL   Hemoglobin A1c    Collection Time: 08/13/21  4:18 AM   Result Value Ref Range    Hemoglobin A1C 5.0 4.4 - 6.4 %    AVERAGE GLUCOSE 90 70 - 126 mg/dL   Lipid panel - fasting    Collection Time: 08/13/21  4:18 AM   Result Value Ref Range    Cholesterol, Total 85 (L) 100 - 199 mg/dL    Triglycerides 50 0 - 199 mg/dL    HDL 41 mg/dL    LDL Calculated 34 mg/dL        Microbiology:    Blood culture #1:   Lab Results   Component Value Date    BC No growth-preliminary  No growth   08/11/2016    BC No growth-preliminary  No growth   08/11/2016       Blood culture #2:No results found for: BLOODCULT2    Organism:    Lab Results   Component Value Date    LABGRAM  09/27/2017     Rare segmented neutrophils observed. No organisms observed.   performed on cytospun specimen         MRSA culture only:No results found for: [facility]    Urine culture:   Lab Results   Component Value Date    LABURIN  05/29/2017     No growth-preliminaryGrowth of Contaminants. The mixture of organisms presentare not a common cause of urinary tract infections andprobably represent skin roselyn or distal urethral roselyn. Lab Results   Component Value Date    ORG gram positive bacilli 01/14/2021        Respiratory culture: No results found for: CULTRESP    Aerobic and Anaerobic :  No results found for: LABAERO  Lab Results   Component Value Date    LABANAE No growth-preliminary  No growth   09/27/2017       Urinalysis:      Lab Results   Component Value Date    NITRU NEGATIVE 01/14/2021    WBCUA 10-15 01/14/2021    BACTERIA NONE SEEN 01/14/2021    RBCUA 0-2 01/14/2021    BLOODU NEGATIVE 01/14/2021    SPECGRAV 1.020 05/18/2020    SPECGRAV <1.005 09/21/2017    GLUCOSEU NEGATIVE 01/14/2021       Radiology:-  CTA HEAD W WO CONTRAST (CODE STROKE)    Result Date: 8/12/2021  PROCEDURE: CTA HEAD W WO CONTRAST, CTA NECK W WO CONTRAST CLINICAL INFORMATION: CEREBROVASCULAR ACCIDENT, stroke, stroke. Weakness. Slurred speech. COMPARISON: Head CT 8/12/2021. TECHNIQUE: 1 mm axial images were obtained through the head and neck after the fast bolus administration of contrast. A noncontrast localizer was obtained. 3-D reconstructions were performed on a dedicated 3-D workstation. These include multiplanar MPR images and multiplanar MIP images. Centerline reconstructions were obtained of the carotid systems. Isovue intravenous contrast was given. All CT scans at this facility use dose modulation, iterative reconstruction, and/or weight-based dosing when appropriate to reduce radiation dose to as low as reasonably achievable. FINDINGS: CTA NECK: Aortic arch and branches: Aortic arch is normal. There is no stenosis at the origin innominate artery, left common carotid artery or either subclavian artery. Right common carotid artery/ICA:  The right common carotid artery is normal. There is very mild atheromatosis of the right carotid bulb. There is no stenosis. There is no stenosis of the right internal carotid artery. Left common carotid artery/ICA: The left common carotid artery is normal. There is mild smooth calcified atherosclerosis of the left carotid bulb. There is no stenosis. There is no stenosis of the left internal carotid artery. Vertebral arteries: The vertebral arteries are codominant and normal. CTA HEAD: There is a tangle of vessels along the inferior aspect of the left frontal lobe measuring 2.2 x 1.3 x 1.0 cm. There is an associated large left frontal draining cortical vein. A definite dominant feeding artery is not identified. This involves the inferior medial left frontal lobe parenchyma along the floor of the anterior cranial fossa. Internal carotid arteries: There is mild calcified atherosclerosis of the cavernous segments of the internal carotid arteries bilaterally. There is no stenosis. Ophthalmic artery origins are normal. Middle cerebral arteries: Normal. The proximal branches are also normal. Anterior cerebral arteries: Normal. There is mild hypoplasia of the right A1 segment. The proximal branches are normal. There is a normal small anterior communicating artery. Vertebral arteries: The vertebral arteries are normal. Basilar artery: Normal. Superior cerebellar arteries: Normal. Posterior cerebral arteries: Normal. The proximal branches are also normal. There is a small normal left posterior communicating artery. No aneurysms, stenoses or occlusions are noted. The superior sagittal sinus, vein of Yfn, internal cerebral veins, straight sinus, transverse sinuses and sigmoid sinuses are patent. Axial source data: There are no gross abnormalities in the brain parenchyma. There is fullness of the adenoids. There is no cervical adenopathy. The patient has had a prior cervical fusion. There are no suspicious findings in the lung apices.       1. 2.2 cm tangle of vessels in the inferior medial left frontal lobe. There is an overlying draining left cortical vein. This could represent a small developmental venous anomaly versus an arterial venous malformation. 2. No intracranial stenoses or occlusions. 3. Very mild atherosclerosis of the carotid bulbs without significant stenosis. **This report has been created using voice recognition software. It may contain minor errors which are inherent in voice recognition technology. ** Final report electronically signed by Dr. Mg Xie on 8/12/2021 2:41 PM    CT HEAD WO CONTRAST    Result Date: 8/12/2021  PROCEDURE: CT HEAD WO CONTRAST CLINICAL INFORMATION: CEREBROVASCULAR ACCIDENT, stroke, Acute Stroke. Code stroke. Weakness. COMPARISON: Head CT 10/11/2019. TECHNIQUE: Noncontrast 5 mm axial images were obtained through the brain. Sagittal and coronal reconstructions were obtained. All CT scans at this facility use dose modulation, iterative reconstruction, and/or weight-based dosing when appropriate to reduce radiation dose to as low as reasonably achievable. FINDINGS: There is no hemorrhage. There are no intra-or extra-axial collections. There is no hydrocephalus, midline shift or mass effect. There is a mild amount of abnormal low attenuation in the white matter the brain suggesting chronic small vessel ischemic changes. This appears stable. The brain volume is within acceptable limits. The patient has had a prior mastoidectomy on the right. There is some mild mucosal thickening in the mastoidectomy bowl. The left mastoid air cells are normally aerated. There is some mild mucosal thickening in the ethmoid air cells. There is no suspicious calvarial abnormality. 1. No intracranial hemorrhage. 2. There is no significant change in the appearance of the brain compared to the prior study. The finding of no hemorrhage was telephoned to Arnold Leventhal, the patient's nurse at 2:02 PM on 8/12/2021.  **This report has been created using voice recognition software. It may contain minor errors which are inherent in voice recognition technology. ** Final report electronically signed by Dr. Jono Viveros on 8/12/2021 2:04 PM    CTA NECK W [address] (CODE STROKE)    Result Date: 8/12/2021  PROCEDURE: CTA HEAD W WO CONTRAST, CTA NECK W WO CONTRAST CLINICAL INFORMATION: CEREBROVASCULAR ACCIDENT, stroke, stroke. Weakness. Slurred speech. COMPARISON: Head CT 8/12/2021. TECHNIQUE: 1 mm axial images were obtained through the head and neck after the fast bolus administration of contrast. A noncontrast localizer was obtained. 3-D reconstructions were performed on a dedicated 3-D workstation. These include multiplanar MPR images and multiplanar MIP images. Centerline reconstructions were obtained of the carotid systems. Isovue intravenous contrast was given. All CT scans at this facility use dose modulation, iterative reconstruction, and/or weight-based dosing when appropriate to reduce radiation dose to as low as reasonably achievable. FINDINGS: CTA NECK: Aortic arch and branches: Aortic arch is normal. There is no stenosis at the origin innominate artery, left common carotid artery or either subclavian artery. Right common carotid artery/ICA: The right common carotid artery is normal. There is very mild atheromatosis of the right carotid bulb. There is no stenosis. There is no stenosis of the right internal carotid artery. Left common carotid artery/ICA: The left common carotid artery is normal. There is mild smooth calcified atherosclerosis of the left carotid bulb. There is no stenosis. There is no stenosis of the left internal carotid artery. Vertebral arteries: The vertebral arteries are codominant and normal. CTA HEAD: There is a tangle of vessels along the inferior aspect of the left frontal lobe measuring 2.2 x 1.3 x 1.0 cm. There is an associated large left frontal draining cortical vein. A definite dominant feeding artery is not identified.  This involves the inferior chest was obtained. 1. Poor inflation of the lungs. Normal heart size. Prior anterior cervical fusion. 2. There is chronic pleural thickening as well as right lateral costophrenic sulcus and fibrotic stranding right mid and lower lung fields which has been present since at least 6/5/2017. 3. Suspicion of mild superimposed atelectasis/pneumonia right mid and lower lung fields. Cannot exclude a tiny superimposed effusion right side. **This report has been created using voice recognition software. It may contain minor errors which are inherent in voice recognition technology. ** Final report electronically signed by Dr. Valeria Burk on 8/12/2021 3:07 PM    MRI BRAIN WO CONTRAST    Result Date: 8/13/2021  PROCEDURE: MRI BRAIN WO CONTRAST CLINICAL INFORMATION New onset dysarthria, AVM on CTA. COMPARISON: Head CT and CTA head and neck 8/12/2021. TECHNIQUE: Multiplanar and multiple spin echo MRI images were obtained of the brain without contrast. FINDINGS: There is no restricted diffusion to suggest an acute infarct. The brain volume is normal. There is a mild amount of scattered abnormal high signal on the FLAIR and T2-weighted sequences, predominantly in the subcortical white matter. This is most likely  related to chronic small vessel ischemic changes. This also involves the white matter the osvaldo. There are flow voids in the inferior medial left frontal lobe correspond to the known vascular abnormality seen on CTA. This measures approximately 2.1 cm. There is no abnormal signal in the brain parenchyma surrounding this. There is no associated mass lesion. There is no hydrocephalus, midline shift or mass effect. There is some susceptibility artifact associated with the left frontal lobe flow voids. No other areas of susceptibility artifact are present. the major intracranial vascular flow voids are present.  The midline craniocervical junction structures are normal.  The pituitary gland and brainstem are normal. There is a small amount of fluid signal associated with the right mastoid air cells. 1. No evidence of an acute infarct. 2. Cluster of flow voids in the inferior medial left frontal lobe measuring 2.1 cm consistent with a vascular abnormality. The brain parenchyma surrounding this has normal signal. 3. Mild severity chronic small vessel ischemic changes. **This report has been created using voice recognition software. It may contain minor errors which are inherent in voice recognition technology. ** Final report electronically signed by Dr. Sunitha John on 8/13/2021 6:19 AM       Follow-up scheduled after discharge :-    in 5-7 days with CHRISTIAN Ordonez CNP      Consultations during this hospital stay:-  [] NONE [] Cardiology  [] Nephrology  [] Hemo onco   [] GI   [] ID  [] Endocrine  [] Pulm    [x] Neuro    [] Psych   [] Urology  [] ENT   [] G SURGERY   []Ortho    []CV surg    [] Palliative  [] Hospice [] Pain management   []    []TCU   [x] PT/OT  OTHERS:-    Disposition: home  Condition at Discharge: Stable    Time Spent:- 35 minutes    Electronically signed by Vianney Walsh DO on 8/13/21 at 4:21 PM EDT   Discharging Hospitalist

## 2021-08-15 ASSESSMENT — ENCOUNTER SYMPTOMS
SHORTNESS OF BREATH: 0
ABDOMINAL DISTENTION: 0
COUGH: 0
BACK PAIN: 0
WHEEZING: 0
CHEST TIGHTNESS: 0
ABDOMINAL PAIN: 0

## 2021-08-15 NOTE — PROGRESS NOTES
[address] [address]  Dept: [phone]  Dept Fax: [phone]  Loc: [phone]  PROGRESS NOTE      VisitDate: 8/9/2021    Ale Burch is a 61 y.o. male who presents today for:     Chief Complaint   Patient presents with   Dorita Stabs ED Follow-up     [facility] 8/5/21, bruising, sent by Mago Wynn CNP concerned about cirrhosis,     3 Month Follow-Up     Cirrosis, HTN, hypotension,     Fall     fall yesterday on city bus, bruising on back, unsure if he hit his head,          Subjective:  Patient presents for ED follow-up regarding excessive bruising 8/5/2021. Sent per GI. Patient also here for routine 3-month follow-up. Refills. Also reports that he fell yesterday exiting a city bus sustaining some bruising and left rib pain. History anxiety, arthritis, chronic kidney disease, cirrhosis, chronic back pain, GERD, hypertension, CVA. Patient ambulates with cane assist.  Patient denies any loss consciousness from fall. Ports that he did strike his head ground. Denies any headache, neck pain. Review of Systems   Constitutional: Negative for activity change, appetite change, chills, fatigue and fever. Eyes: Negative for visual disturbance. Respiratory: Negative for cough, chest tightness, shortness of breath and wheezing. Cardiovascular: Positive for chest pain. Negative for palpitations and leg swelling. Gastrointestinal: Negative for abdominal distention and abdominal pain. Genitourinary: Negative for dysuria. Musculoskeletal: Negative for arthralgias, back pain and neck pain. Skin: Negative. Negative for rash. Neurological: Negative for dizziness, light-headedness and headaches. Hematological: Negative for adenopathy. Psychiatric/Behavioral: Positive for confusion and sleep disturbance. Negative for decreased concentration and dysphoric mood. The patient is nervous/anxious.     All other systems reviewed and are negative. Past Medical History:   Diagnosis Date    Anxiety     Arthritis     Chronic kidney disease     Cirrhosis ([facility]     Diverticulitis     Diverticulosis     GERD (gastroesophageal reflux disease)     Hypertension     Other disorders of kidney and ureter in diseases classified elsewhere     Psychiatric problem     Stroke of unknown cause ([facility] 8/12/2021      Past Surgical History:   Procedure Laterality Date    ABDOMEN SURGERY      BACK SURGERY      cervical plate    CARDIAC CATHETERIZATION  2007?     CERVICAL DISC SURGERY      x 2 ---broken neck 7-2003    COLON SURGERY  2004    partial, due to diverticulitis    COLONOSCOPY      DILATATION, ESOPHAGUS      ENDOSCOPY, COLON, DIAGNOSTIC      FRACTURE SURGERY      Broke neck in 2003    NERVE BLOCK Bilateral 10/02/2017    Cervical Facet MBB at C4-5, C5-6, C6-7     UT INJ,PARAVERTEBRAL L/S,1 LEVEL Bilateral 10/2/2017    C-FACET MBB C4-5, C5-6, C6-7 BILATERAL performed by Chen Bess MD at [address]STRO[facility]AL ENDOSCOPY  2019    UPPER GASTROINTESTINAL ENDOSCOPY Left 9/28/2020    EGD BAND LIGATION performed by Janis Sanchez MD at [facility]     Family History   Problem Relation Age of Onset    Hypertension Mother     Heart Disease Mother     Alzheimer's Disease Mother     Heart Failure Mother     Hypertension Father     Heart Disease Father     Cancer Father     High Blood Pressure Paternal Uncle     Heart Disease Paternal Uncle     Colon Cancer Neg Hx     Colon Polyps Neg Hx      Social History     Tobacco Use    Smoking status: Current Every Day Smoker     Packs/day: 0.50     Years: 25.00     Pack years: 12.50     Types: Cigarettes    Smokeless tobacco: Never Used    Tobacco comment: printed to avs   Substance Use Topics    Alcohol use: Not Currently     Comment: Quit 15 years ago      Current Outpatient Medications Medication Sig Dispense Refill    gabapentin (NEURONTIN) 300 MG capsule Take 1 capsule by mouth 3 times daily for 30 days. 90 capsule 2    ALPRAZolam (XANAX) 1 MG tablet take 1 tablet by mouth three times a day if needed for anxiety or sleep 90 tablet 2    nitroGLYCERIN (NITROSTAT) 0.4 MG SL tablet up to max of 3 total doses. If no relief after 1 dose, call 911. 25 tablet 0    rifaximin (XIFAXAN) 550 MG tablet Take 1 tablet by mouth 2 times daily 60 tablet 6    atorvastatin (LIPITOR) 40 MG tablet take 1 tablet by mouth nightly 30 tablet 5    cyclobenzaprine (FLEXERIL) 10 MG tablet take 1/2 to 1 tablet three times a day if needed 60 tablet 3    spironolactone (ALDACTONE) 100 MG tablet take 1 tablet by mouth every morning 90 tablet 2    DULoxetine (CYMBALTA) 60 MG extended release capsule take 1 capsule by mouth once daily 90 capsule 3    aspirin 81 MG chewable tablet Take 1 tablet by mouth daily 30 tablet 0    clopidogrel (PLAVIX) 75 MG tablet Take 1 tablet by mouth daily 30 tablet 3    oxyCODONE HCl (OXY-IR) 10 MG immediate release tablet Take 0.5 tablets by mouth every 6 hours as needed for Pain for up to 2 days. Intended supply: 30 days 5 tablet 0    LACTULOSE PO Take 30 mLs by mouth 2 times daily       No current facility-administered medications for this visit.      No Known Allergies  Health Maintenance   Topic Date Due    Hepatitis A vaccine (1 of 2 - Risk 2-dose series) Never done    COVID-19 Vaccine (1) Never done    HIV screen  Never done    Hepatitis B vaccine (1 of 3 - Risk 3-dose series) Never done    Shingles Vaccine (1 of 2) Never done    Flu vaccine (1) 09/01/2021    Potassium monitoring  08/12/2022    Creatinine monitoring  08/12/2022    Lipid screen  08/13/2022    Diabetes screen  08/13/2024    Colon cancer screen colonoscopy  03/01/2029    Pneumococcal 0-64 years Vaccine (2 of 2 - PPSV23) 06/03/2029    DTaP/Tdap/Td vaccine (2 - Td or Tdap) 07/27/2030    Hepatitis C screen Completed    Hib vaccine  Aged Out    Meningococcal (ACWY) vaccine  Aged Out         Objective:     Physical Exam  Vitals and nursing note reviewed. Constitutional:       Appearance: Normal appearance. He is well-developed. He is not diaphoretic. HENT:      Head: Normocephalic and atraumatic. Not macrocephalic and not microcephalic. Right Ear: Hearing, tympanic membrane, ear canal and external ear normal. No drainage or tenderness. No middle ear effusion. No hemotympanum. Tympanic membrane is not injected, scarred, perforated or bulging. Left Ear: Hearing, tympanic membrane, ear canal and external ear normal. No drainage or tenderness. No middle ear effusion. No hemotympanum. Tympanic membrane is not injected, scarred, perforated or bulging. Nose: Nose normal. No nasal deformity, septal deviation, mucosal edema or rhinorrhea. Right Sinus: No maxillary sinus tenderness or frontal sinus tenderness. Left Sinus: No maxillary sinus tenderness or frontal sinus tenderness. Mouth/Throat:      Mouth: Mucous membranes are moist. No oral lesions. Dentition: Normal dentition. Does not have dentures. No dental caries or dental abscesses. Pharynx: No oropharyngeal exudate or posterior oropharyngeal erythema. Tonsils: No tonsillar abscesses. Eyes:      General: Lids are normal. No scleral icterus. Extraocular Movements: Extraocular movements intact. Right eye: Normal extraocular motion. Left eye: Normal extraocular motion. Conjunctiva/sclera: Conjunctivae normal.      Right eye: Right conjunctiva is not injected. Left eye: Left conjunctiva is not injected. Pupils: Pupils are equal, round, and reactive to light. Neck:      Thyroid: No thyroid mass or thyromegaly. Vascular: No carotid bruit or JVD. Trachea: Trachea normal.   Cardiovascular:      Rate and Rhythm: Normal rate and regular rhythm. Pulses: Normal pulses.       Heart sounds: Normal heart sounds, S1 normal and S2 normal. No murmur heard. No friction rub. No gallop. Pulmonary:      Effort: Pulmonary effort is normal. No respiratory distress. Breath sounds: Normal breath sounds. No wheezing, rhonchi or rales. Comments: Region of ecchymosis and tenderness noted to left lateral chest wall. No crepitation noted. Localized tenderness noted. Chest:      Chest wall: No tenderness. Abdominal:      General: Bowel sounds are normal.      Palpations: Abdomen is soft. There is no hepatomegaly, splenomegaly or mass. Tenderness: There is generalized abdominal tenderness. There is no guarding or rebound. Hernia: No hernia is present. There is no hernia in the ventral area or left inguinal area. Musculoskeletal:         General: No tenderness. Normal range of motion. Cervical back: Normal range of motion and neck supple. No edema or erythema. Normal range of motion. Lymphadenopathy:      Head:      Right side of head: No submental, submandibular, tonsillar, preauricular, posterior auricular or occipital adenopathy. Left side of head: No submental, submandibular, tonsillar, preauricular, posterior auricular or occipital adenopathy. Cervical: No cervical adenopathy. Right cervical: No superficial, deep or posterior cervical adenopathy. Left cervical: No superficial, deep or posterior cervical adenopathy. Upper Body:      Right upper body: No supraclavicular or pectoral adenopathy. Left upper body: No supraclavicular or pectoral adenopathy. Skin:     General: Skin is warm and dry. Coloration: Skin is not pale. Findings: No bruising, ecchymosis, laceration, lesion or rash. Nails: There is no clubbing. Neurological:      Mental Status: He is alert and oriented to person, place, and time. Cranial Nerves: No cranial nerve deficit. Motor: No abnormal muscle tone.       Coordination: Coordination normal.

## 2021-08-16 ENCOUNTER — CARE COORDINATION (OUTPATIENT)
Dept: CARE COORDINATION | Age: 56
End: 2021-08-16

## 2021-08-16 NOTE — CARE COORDINATION
[facility] Transitions Initial Follow Up Call    Call within 2 business days of discharge: Yes     Patient: Graciela Cunningham Patient : 1965 MRN: [id number]    Last Discharge [facility]       Complaint Diagnosis Description Type Department Provider    21 Aphasia; Panic Attack Alteration in speech . .. ED to Hosp-Admission (Discharged) (ADMITTED) STRZ 4A Georgena New, DO; Reema Blend,... RARS: Readmission Risk Score: 23       Spoke with: Casi Schafer     a. Patient presents to the hospital with complaints of speech stuttering with minor speech slurring.  b. Stroke work-up initiated. CT head normal.  CT angiogram of head and neck showed 2.2 cm tangle of vessels concerning for AVM versus venous anomaly. c. MRI completed showed no acute infarct, but did show mild severity chronic small vessel ischemic changes. d. Patient was started on Plavix in addition to home aspirin and Lipitor. These were continued at discharge  e. Echocardiogram with bubble study was completed but pending official read at time of discharge. This need be followed by his primary care physician on his follow-up appointment. f. 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 Underwent diagnostic angiogram with findings showing no high risk features.     - Reviewed med list  - scheduled PCP follow up   - reviewed risk factors       Discharge department/facility: [facility]    Non-face-to-face services provided:  Scheduled appointment with PCP-   Obtained and reviewed discharge summary and/or continuity of care documents    Follow Up  Future Appointments   Date Time Provider Kaelyn Rojas   2021 11:40 AM [facility]   2021 12:45 PM CHRISTIAN Allen CNP [facility]   3/10/2022  3:00 PM MD CAND[facility] - Maninder Brown RN

## 2021-08-19 ENCOUNTER — OFFICE VISIT (OUTPATIENT)
Dept: FAMILY MEDICINE CLINIC | Age: 56
End: 2021-08-19
Payer: COMMERCIAL

## 2021-08-19 VITALS
WEIGHT: 238 LBS | RESPIRATION RATE: 16 BRPM | HEART RATE: 84 BPM | SYSTOLIC BLOOD PRESSURE: 100 MMHG | HEIGHT: 67 IN | BODY MASS INDEX: 37.35 KG/M2 | DIASTOLIC BLOOD PRESSURE: 72 MMHG | TEMPERATURE: 98.2 F

## 2021-08-19 DIAGNOSIS — Q28.2 CEREBRAL AVM: Primary | ICD-10-CM

## 2021-08-19 DIAGNOSIS — K70.30 ALCOHOLIC CIRRHOSIS, UNSPECIFIED WHETHER ASCITES PRESENT (HCC): ICD-10-CM

## 2021-08-19 DIAGNOSIS — F41.9 ANXIETY: ICD-10-CM

## 2021-08-19 DIAGNOSIS — G45.9 TIA (TRANSIENT ISCHEMIC ATTACK): ICD-10-CM

## 2021-08-19 PROCEDURE — 99495 TRANSJ CARE MGMT MOD F2F 14D: CPT | Performed by: NURSE PRACTITIONER

## 2021-08-19 NOTE — PATIENT INSTRUCTIONS
Patient Education        Learning About Generalized Anxiety Disorder  What is generalized anxiety disorder? We all worry. It's a normal part of life. But when you have generalized anxiety disorder, you worry about lots of things and have a hard time stopping your worry. This worry or anxiety interferes with your relationships, work, and life. What causes it? The cause of generalized anxiety disorder is not known. Some studies show that it might be passed down through families. Several things can cause symptoms of anxiety. They include some health problems, some medicines, too much caffeine, and illegal drugs such as cocaine. What are the symptoms? Generalized anxiety disorder can make you feel worried and stressed about many things almost every day. You may have a hard time controlling your worry. Symptoms include:  · Feeling tired or cranky. You may have a hard time concentrating. · Having headaches or muscle aches. · Feeling shaky, sweating, or having hot flashes. · Feeling lightheaded, sick to your stomach, or out of breath. · Feeling like you can't relax. Or being startled easily. · Having problems falling or staying asleep. How is it diagnosed? Your doctor will ask about your health and how often you worry or feel anxious. People with generalized anxiety disorder have more worry and stress than normal. They feel worried and stressed about many things almost every day. And these feelings have lasted for at least 6 months. Your doctor also may ask about other symptoms, like whether you:  · Feel restless. · Feel tired. · Have a hard time thinking or feel that your mind goes blank. · Feel cranky. · Have tense muscles. · Have sleep problems. A physical exam and tests can help make sure that your symptoms aren't caused by a different condition, such as a thyroid problem. How is it treated? Counseling and medicine can both work to treat anxiety.  The two are often used along with lifestyle changes. Cognitive-behavioral therapy (CBT) is a type of counseling that's used to help treat anxiety. In CBT, you learn how to notice and replace thoughts that make you feel worried. It also can help you learn how to relax when you worry. Applied relaxation therapy may also be used. Your counselor might ask you to imagine a calming situation. This can help you relax. Medicines can help. These medicines are often also used for depression. Selective serotonin reuptake inhibitors (SSRIs) are often tried first. But there are other medicines that your doctor may use. You may need to try a few medicines to find one that works well. Many people feel better by getting regular exercise, eating healthy meals, and getting good sleep. Mindfulnessfocusing on things in the present momentalso can help reduce your anxiety. What can you expect when you have it? Having anxiety can be upsetting. Some people might feel less worried and stressed after a couple of months of treatment. But for other people, it might take longer to feel better. Reaching out to people for help is important. Try not to isolate yourself. Let your family and friends help you. Find someone you can trust and confide in. Talk to that person. When you know what anxiety isand how you can get help for ityou can start to learn new ways of thinking. This can help you cope and work through your anxiety. Follow-up care is a key part of your treatment and safety. Be sure to make and go to all appointments, and call your doctor if you are having problems. It's also a good idea to know your test results and keep a list of the medicines you take. Where can you learn more? Go to https://cristiano.Gigzon. org and sign in to your [facility] account. Enter G110 in the [employer] box to learn more about \"Learning About Generalized Anxiety Disorder. \"     If you do not have an account, please click on the \"Sign Up Now\" link.   Current as of: September 23, 2020               Content Version: 12.9  © 2006-2021 Healthwise, [employer]. Care instructions adapted under license by [facility] ([facility]). If you have questions about a medical condition or this instruction, always ask your healthcare professional. [address] any warranty or liability for your use of this information. Patient Education        Generalized Anxiety Disorder: Care Instructions  Overview     We all worry. It's a normal part of life. But when you have generalized anxiety disorder, you worry about lots of things. You have a hard time not worrying. This worry or anxiety interferes with your relationships, work or school, and other areas of your life. You may worry most days about things like money, health, work, or friends. That may make you feel tired, tense, or cranky. It can make it hard to think. It may get in the way of healthy sleep. Counseling and medicine can both work to treat anxiety. They are often used together with lifestyle changes, such as getting enough sleep. Treatment can include a type of counseling called cognitive-behavioral therapy, or CBT. It helps you notice and replace thoughts that make you worry. You also might have counseling along with those closest to you so that they can help. Follow-up care is a key part of your treatment and safety. Be sure to make and go to all appointments, and call your doctor if you are having problems. It's also a good idea to know your test results and keep a list of the medicines you take. How can you care for yourself at home? · Get at least 30 minutes of exercise on most days of the week. Walking is a good choice. You also may want to do other activities, such as running, swimming, cycling, or playing tennis or team sports. · Learn and do relaxation exercises, such as deep breathing. · Go to bed at the same time every night. Try for 8 to 10 hours of sleep a night.   · Avoid alcohol, marijuana, and illegal drugs. · Find a counselor who uses cognitive-behavioral therapy (CBT). · Don't isolate yourself. Let those closest to you help you. Find someone you can trust and confide in. Talk to that person. · Be safe with medicines. Take your medicines exactly as prescribed. Call your doctor if you think you are having a problem with your medicine. · Practice healthy thinking. How you think can affect how you feel and act. Ask yourself if your thoughts are helpful or unhelpful. If they are unhelpful, you can learn how to change them. · Recognize and accept your anxiety. When you feel anxious, say to yourself, \"This is not an emergency. I feel uncomfortable, but I am not in danger. I can keep going even if I feel anxious. \"  When should you call for help? Call 911  anytime you think you may need emergency care. For example, call if:    · You feel you can't stop from hurting yourself or someone else. Keep the numbers for these national suicide hotlines: 3-668-357-TALK (1-890.222.3877) and 7-227-BUBVHWP (4-823.470.1909). If you or someone you know talks about suicide or feeling hopeless, get help right away. Call your doctor or counselor now or seek immediate medical care if:    · You have new anxiety, or your anxiety gets worse.     · You have been feeling sad, depressed, or hopeless or have lost interest in things that you usually enjoy.     · You do not get better as expected. Where can you learn more? Go to https://MedRunner.Travel Notes. org and sign in to your [employer] account. Enter G123 in the [facility] box to learn more about \"Generalized Anxiety Disorder: Care Instructions. \"     If you do not have an account, please click on the \"Sign Up Now\" link. Current as of: November 3, 2020               Content Version: 12.9  © 3124-9590 Healthwise, [employer]. Care instructions adapted under license by [facility] ([facility]).  If you have questions about a medical condition or this instruction, always ask your healthcare professional. [place] [address] any warranty or liability for your use of this information.

## 2021-08-21 ASSESSMENT — ENCOUNTER SYMPTOMS
WHEEZING: 0
ABDOMINAL PAIN: 0
BACK PAIN: 0
CHEST TIGHTNESS: 0
COUGH: 0
SHORTNESS OF BREATH: 0
ABDOMINAL DISTENTION: 0

## 2021-08-21 NOTE — PROGRESS NOTES
Post-Discharge Transitional Care Management Services or Hospital Follow Up      Jero Lewis   YOB: 1965    Date of Office Visit:  8/19/2021  Date of Hospital Admission: 8/12/21  Date of Hospital Discharge: 8/13/21  Readmission Risk Score(high >=14%.  Medium >=10%):Readmission Risk Score: 19      Care management risk score Rising risk (score 2-5) and Complex Care (Scores >=6): 4     Non faceto face  following discharge, date last encounter closed (first attempt may have been earlier): 8/16/2021  2:06 PM 8/16/2021  2:06 PM    Call initiated 2 business days ofdischarge: Yes     Patient Active Problem List   Diagnosis    Diverticulosis    HTN (hypertension)    Ascites    Liver cirrhosis ([address]    Cystic kidney disease    Leukocytosis    Hypotension    Alcohol abuse    Hyponatremia    Hypokalemia    Perianal ulcer ([address]    Hyperkalemia    Increased ammonia level    Confusion    Change in mental status    Acute renal failure ([address]    Metabolic encephalopathy    Altered mental status    Hepatic encephalopathy ([address]    Noncompliance with medications    Ascites due to alcoholic cirrhosis ([address]    Hypotension (arterial)    Alcoholic cirrhosis of liver with ascites (HCC)    Lactic acidosis    Generalized abdominal pain    Tobacco abuse    Spondylosis of cervical region without myelopathy or radiculopathy    Chest pain    Elevated INR    Opiate abuse, episodic (HCC)    Marijuana abuse    Dyspnea on exertion    SOB (shortness of breath) on exertion    Chest pain, atypical- for yrs     Stroke of unknown cause ([address]       No Known Allergies    Medications listed as ordered at the time of discharge from hospital   [facility] Medication Instructions CRESENCIO:    Printed on:08/21/21 0901   Medication Information                      ALPRAZolam (XANAX) 1 MG tablet  take 1 tablet by mouth three times a day if needed for anxiety or sleep             aspirin 81 MG chewable tablet  Take 1 tablet by mouth daily             atorvastatin (LIPITOR) 40 MG tablet  take 1 tablet by mouth nightly             clopidogrel (PLAVIX) 75 MG tablet  Take 1 tablet by mouth daily             cyclobenzaprine (FLEXERIL) 10 MG tablet  take 1/2 to 1 tablet three times a day if needed             DULoxetine (CYMBALTA) 60 MG extended release capsule  take 1 capsule by mouth once daily             gabapentin (NEURONTIN) 300 MG capsule  Take 1 capsule by mouth 3 times daily for 30 days. LACTULOSE PO  Take 30 mLs by mouth 2 times daily             nitroGLYCERIN (NITROSTAT) 0.4 MG SL tablet  up to max of 3 total doses. If no relief after 1 dose, call 911. rifaximin (XIFAXAN) 550 MG tablet  Take 1 tablet by mouth 2 times daily             spironolactone (ALDACTONE) 100 MG tablet  take 1 tablet by mouth every morning                   Medications marked \"taking\" at this time  Outpatient Medications Marked as Taking for the 8/19/21 encounter (Office Visit) with CHRISTIAN Crawley CNP   Medication Sig Dispense Refill    clopidogrel (PLAVIX) 75 MG tablet Take 1 tablet by mouth daily 30 tablet 3    LACTULOSE PO Take 30 mLs by mouth 2 times daily      gabapentin (NEURONTIN) 300 MG capsule Take 1 capsule by mouth 3 times daily for 30 days. 90 capsule 2    ALPRAZolam (XANAX) 1 MG tablet take 1 tablet by mouth three times a day if needed for anxiety or sleep 90 tablet 2    nitroGLYCERIN (NITROSTAT) 0.4 MG SL tablet up to max of 3 total doses.  If no relief after 1 dose, call 911. 25 tablet 0    rifaximin (XIFAXAN) 550 MG tablet Take 1 tablet by mouth 2 times daily 60 tablet 6    atorvastatin (LIPITOR) 40 MG tablet take 1 tablet by mouth nightly 30 tablet 5    cyclobenzaprine (FLEXERIL) 10 MG tablet take 1/2 to 1 tablet three times a day if needed 60 tablet 3    spironolactone (ALDACTONE) 100 MG tablet take 1 tablet by mouth every morning 90 tablet 2    DULoxetine (CYMBALTA) 60 MG extended release capsule take 1 capsule by mouth once daily 90 capsule 3    aspirin 81 MG chewable tablet Take 1 tablet by mouth daily 30 tablet 0        Medications patient taking asof now reconciled against medications ordered at time of hospital discharge: Yes    Chief Complaint   Patient presents with   [address] from [address] [facility]     D/C from [address] [place] on 8-13-21, he was having some altered speech and SOB. They did an MRI of brain and CTA of head. They told him he had \"some nerves that are tangled up in his brain\". He states he has had no other speech issues. Patient presents for transitional care follow-up visit regarding altered mental status rule out CVA/TIA. Patient currently denies any confusion, headache, dizziness, recent falls since discharge, fever. Denies any chest pain, shortness of breath, abdominal pain or edema. Patient was found to have a AV malformation frontal lobe. No indication acute CVA. History of hypertension, ascites, alcoholic liver cirrhosis history of alcohol abuse, hypokalemia, hyponatremia metabolic encephalopathy, hepatic encephalopathy, elevated ammonia levels. Patient did go on to undergo thoracic angiography. Reports groin site healing well denies any drainage, bleeding or bruising. Reports that discomfort has resolved. Inpatient course: Discharge summary reviewed- see chart. Interval history/Current status: stable    Review of Systems   Constitutional: Negative for activity change, appetite change, chills, fatigue and fever. Eyes: Negative for visual disturbance. Respiratory: Negative for cough, chest tightness, shortness of breath and wheezing. Cardiovascular: Negative for chest pain, palpitations and leg swelling. Gastrointestinal: Negative for abdominal distention and abdominal pain. Genitourinary: Negative for dysuria. Musculoskeletal: Positive for arthralgias. Negative for back pain and neck pain. Skin: Negative. Negative for rash. Neurological: Negative for dizziness, light-headedness and headaches. Hematological: Negative for adenopathy. Psychiatric/Behavioral: Positive for confusion and decreased concentration. Negative for dysphoric mood. The patient is nervous/anxious. All other systems reviewed and are negative. Vitals:    08/19/21 1138   BP: 100/72   Pulse: 84   Resp: 16   Temp: 98.2 °F (36.8 °C)   TempSrc: Oral   Weight: 238 lb (108 kg)   Height: 5' 6.5\" (1.689 m)     Body mass index is 37.84 kg/m². Wt Readings from Last 3 Encounters:   08/19/21 238 lb (108 kg)   08/13/21 246 lb 7.6 oz (111.8 kg)   08/09/21 242 lb 9.6 oz (110 kg)     BP Readings from Last 3 Encounters:   08/19/21 100/72   08/13/21 100/68   08/09/21 122/62       Physical Exam  Vitals and nursing note reviewed. Constitutional:       Appearance: Normal appearance. He is well-developed. He is not diaphoretic. HENT:      Head: Normocephalic and atraumatic. Not macrocephalic and not microcephalic. Right Ear: Hearing, tympanic membrane, ear canal and external ear normal. No drainage or tenderness. No middle ear effusion. No hemotympanum. Tympanic membrane is not injected, scarred, perforated or bulging. Left Ear: Hearing, tympanic membrane, ear canal and external ear normal. No drainage or tenderness. No middle ear effusion. No hemotympanum. Tympanic membrane is not injected, scarred, perforated or bulging. Nose: No nasal deformity, septal deviation, mucosal edema or rhinorrhea. Right Sinus: No maxillary sinus tenderness or frontal sinus tenderness. Left Sinus: No maxillary sinus tenderness or frontal sinus tenderness. Mouth/Throat:      Mouth: No oral lesions. Dentition: Normal dentition. Does not have dentures. No dental caries or dental abscesses. Pharynx: No oropharyngeal exudate or posterior oropharyngeal erythema. Tonsils: No tonsillar abscesses.    Eyes:      General: Lids are normal. No scleral icterus. Extraocular Movements:      Right eye: Normal extraocular motion. Left eye: Normal extraocular motion. Conjunctiva/sclera: Conjunctivae normal.      Right eye: Right conjunctiva is not injected. Left eye: Left conjunctiva is not injected. Neck:      Thyroid: No thyroid mass or thyromegaly. Vascular: No carotid bruit or JVD. Trachea: Trachea normal.   Cardiovascular:      Rate and Rhythm: Normal rate and regular rhythm. Heart sounds: Normal heart sounds, S1 normal and S2 normal. No murmur heard. No friction rub. No gallop. Pulmonary:      Effort: Pulmonary effort is normal. No respiratory distress. Breath sounds: Normal breath sounds. No wheezing, rhonchi or rales. Chest:      Chest wall: No tenderness. Abdominal:      General: Bowel sounds are normal.      Palpations: Abdomen is soft. There is no hepatomegaly, splenomegaly or mass. Tenderness: There is no guarding or rebound. Hernia: No hernia is present. There is no hernia in the ventral area or left inguinal area. Musculoskeletal:         General: No tenderness. Normal range of motion. Cervical back: Normal range of motion and neck supple. No edema or erythema. Normal range of motion. Lymphadenopathy:      Head:      Right side of head: No submental, submandibular, tonsillar, preauricular, posterior auricular or occipital adenopathy. Left side of head: No submental, submandibular, tonsillar, preauricular, posterior auricular or occipital adenopathy. Cervical: No cervical adenopathy. Right cervical: No superficial, deep or posterior cervical adenopathy. Left cervical: No superficial, deep or posterior cervical adenopathy. Upper Body:      Right upper body: No supraclavicular or pectoral adenopathy. Left upper body: No supraclavicular or pectoral adenopathy. Skin:     General: Skin is warm and dry. Coloration: Skin is not pale.       Findings: No bruising, ecchymosis, laceration, lesion or rash. Nails: There is no clubbing. Neurological:      General: No focal deficit present. Mental Status: He is alert and oriented to person, place, and time. Cranial Nerves: No cranial nerve deficit. Motor: No abnormal muscle tone. Coordination: Coordination normal.      Deep Tendon Reflexes: Reflexes normal.   Psychiatric:         Mood and Affect: Mood normal.         Speech: Speech normal.         Behavior: Behavior normal.         Thought Content: Thought content normal.         Judgment: Judgment normal.             Assessment/Plan:   Diagnosis Orders   1. Cerebral AVM     2. TIA (transient ischemic attack)     3. Alcoholic cirrhosis, unspecified whether ascites present ([facility]     4. Anxiety          PROCEDURE: MRI BRAIN WO CONTRAST       CLINICAL INFORMATION New onset dysarthria, AVM on CTA.       COMPARISON: Head CT and CTA head and neck 8/12/2021.       TECHNIQUE: Multiplanar and multiple spin echo MRI images were obtained of the brain without contrast.       FINDINGS:               There is no restricted diffusion to suggest an acute infarct.  The brain volume is normal. There is a mild amount of scattered abnormal high signal on the FLAIR and T2-weighted sequences, predominantly in the subcortical white matter. This is most likely    related to chronic small vessel ischemic changes. This also involves the white matter the osvaldo.       There are flow voids in the inferior medial left frontal lobe correspond to the known vascular abnormality seen on CTA. This measures approximately 2.1 cm. There is no abnormal signal in the brain parenchyma surrounding this. There is no associated mass    lesion.       There is no hydrocephalus, midline shift or mass effect.       There is some susceptibility artifact associated with the left frontal lobe flow voids. No other areas of susceptibility artifact are present.  the major intracranial vascular flow voids are present.       The midline craniocervical junction structures are normal.  The pituitary gland and brainstem are normal.       There is a small amount of fluid signal associated with the right mastoid air cells.               Impression       1. No evidence of an acute infarct. 2. Cluster of flow voids in the inferior medial left frontal lobe measuring 2.1 cm consistent with a vascular abnormality. The brain parenchyma surrounding this has normal signal.   3. Mild severity chronic small vessel ischemic changes.           **This report has been created using voice recognition software. It may contain minor errors which are inherent in voice recognition technology. **       Final report electronically signed by Dr. Swapnil Garcia on 8/13/2021 6:19 AM       Order History    Open Order Details   PACS Images     Show images for MRI BRAIN WO CONTRAST   Results History Report    View Report   External Result Report    External Result Report   Existing Charges    Charge Line Charge Code Status Charge Trigger Charge Type   [id number] [facility] [[id number]] [address] Billing Imaging end exam Technical   [id number] MRI BRAIN [address] [facility] [facility] Billing (STR)  Imaging result study Professional   Order Report     Order Details  Implants     Implant   Screws In Neck - Implanted    As of 8/12/2016    Status: Implanted             PROCEDURE: CTA HEAD W WO CONTRAST, CTA NECK W WO CONTRAST       CLINICAL INFORMATION: CEREBROVASCULAR ACCIDENT, stroke, stroke. Weakness. Slurred speech.       COMPARISON: Head CT 8/12/2021.       TECHNIQUE: 1 mm axial images were obtained through the head and neck after the fast bolus administration of contrast. A noncontrast localizer was obtained. 3-D reconstructions were performed on a dedicated 3-D workstation. These include multiplanar MPR    images and multiplanar MIP images.  Centerline reconstructions were obtained of the carotid systems. Isovue intravenous contrast was given.       All CT scans at this facility use dose modulation, iterative reconstruction, and/or weight-based dosing when appropriate to reduce radiation dose to as low as reasonably achievable.       FINDINGS:           CTA NECK:       Aortic arch and branches: Aortic arch is normal. There is no stenosis at the origin innominate artery, left common carotid artery or either subclavian artery.       Right common carotid artery/ICA: The right common carotid artery is normal. There is very mild atheromatosis of the right carotid bulb. There is no stenosis. There is no stenosis of the right internal carotid artery.       Left common carotid artery/ICA: The left common carotid artery is normal. There is mild smooth calcified atherosclerosis of the left carotid bulb. There is no stenosis. There is no stenosis of the left internal carotid artery.       Vertebral arteries: The vertebral arteries are codominant and normal.               CTA HEAD: There is a tangle of vessels along the inferior aspect of the left frontal lobe measuring 2.2 x 1.3 x 1.0 cm. There is an associated large left frontal draining cortical vein. A definite dominant feeding artery is not identified. This involves    the inferior medial left frontal lobe parenchyma along the floor of the anterior cranial fossa.       Internal carotid arteries: There is mild calcified atherosclerosis of the cavernous segments of the internal carotid arteries bilaterally. There is no stenosis. Ophthalmic artery origins are normal.       Middle cerebral arteries: Normal. The proximal branches are also normal.       Anterior cerebral arteries: Normal. There is mild hypoplasia of the right A1 segment. The proximal branches are normal. There is a normal small anterior communicating artery.       Vertebral arteries:  The vertebral arteries are normal.       Basilar artery: Normal.       Superior cerebellar arteries: Normal.       Posterior cerebral arteries: Normal. The proximal branches are also normal.       There is a small normal left posterior communicating artery.       No aneurysms, stenoses or occlusions are noted.       The superior sagittal sinus, vein of Yfn, internal cerebral veins, straight sinus, transverse sinuses and sigmoid sinuses are patent.           Axial source data: There are no gross abnormalities in the brain parenchyma. There is fullness of the adenoids. There is no cervical adenopathy. The patient has had a prior cervical fusion. There are no suspicious findings in the lung apices.               Impression       1. 2.2 cm tangle of vessels in the inferior medial left frontal lobe. There is an overlying draining left cortical vein. This could represent a small developmental venous anomaly versus an arterial venous malformation. 2. No intracranial stenoses or occlusions. 3. Very mild atherosclerosis of the carotid bulbs without significant stenosis.                 **This report has been created using voice recognition software. It may contain minor errors which are inherent in voice recognition technology. **       Final report electronically signed by Dr. Unruly Last on 8/12/2021 2:41 PM       Order History    Open Order Details   PACS Images     Show images for CTA HEAD W WO CONTRAST (CODE STROKE)   Results History Report    View Report   External Result Report    External Result Report   Existing Charges  Charge Line Charge Code Status Charge Trigger Charge Type   [id number] [address] [[id number]] [facility] Billing Imaging end exam Technical   [id number] [facility] ([facility]  Imaging result study Professional   Order Report    Order Details  Implants     Implant   Screws In Neck - Implanted  As of 8/12/2016  Status: Implanted             Preoperative diagnosis: AVM       POSTOPERATIVE DIAGNOSIS: AVM   Anesthesia: Local anesthesia   Estimated blood loss: 10 mL   Complications: None   Hemostasis: 6 [place] Angio-Seal       Procedure performed: 6 vessel diagnostic cerebral angiogram           Catheterization of the following vessels:   1. Right common carotid artery   2. Right internal carotid artery   3. Right external carotid artery   4. Right vertebral artery   5. Left common carotid artery   6. Left internal carotid artery   7. Left external carotid are seen   8. Left vertebral artery       Angiography and interpretation of the following images:       1. Right common carotid artery-cervical station-oblique and lateral angiograms   2. Right internal carotid artery-cranial station-AP and lateral angiograms   3. Right external carotid artery-cranial Station-AP and lateral angiograms   4. Right vertebral artery-cranial station-AP and lateral angiograms   5. Left common carotid artery cervical station oblique and lateral angiograms   6. Left internal carotid artery-cranial station-AP and lateral angiograms   7. Left internal carotid artery-cranial station-oblique and lateral angiograms   8. Left external carotid artery-cranial station-PA and lateral angiograms   9. Left vertebral artery-cranial station-PA and lateral angiograms       INDICATION: Mr. Torie Villeda is 25-year-old right-handed man presented to the hospital as a stroke alert. There was an incidental finding on the CT angiogram of left frontal vascular malformation. A catheter cerebral angiogram is being performed for further    evaluation. The purpose, alternatives modalities, risks and benefits of the procedure were discussed with the patient and his son. Informed consent was obtained.       Description of Procedure: The patient was brought to the interventional suite and placed in a supine position on the angio table. The right groin region was prepped, draped, and cleaned in a sterile fashion.  A total of 5 cc of 1% lidocaine was    administered subcutaneously for local anesthesia. The right common femoral artery was accessed, and a 5 [place]ian introducer sheath was placed. The right common femoral artery was then evaluated via femoral angiogram via the introducer sheath for possible    use of closure device and showed an arterial puncture site above the bifurcation suitable for sealing with a device.       A 5 [place] Glide catheter was advanced under fluoroscopic and roadmap guidance into the right common carotid artery,   and images were obtained in biplane; anterior-oblique and lateral projections.       Interpretation: A normal bifurcation of the was noted, with no atherosclerosis .   A normal cervical internal carotid with no areas of dissection or narrowing was noted.  Normal flow to the proximal external carotid branches was noted.       Subsequently the right internal carotid artery was selectively catheterized under fluoroscopic guidance and images were obtained in anterior and lateral projections of the right intracranial circulation in AP, lateral and transorbital projections x 2.       Interpretation: Arterial phase demonstrated normal carotid terminus and bifurcation.  Good flow though the MCA branches and a trifurcation of the MCA was observed.  Normal TACOS flow was noted however the right A1 segment is hypoplastic. .  No aneurysm or    AVM  visualized.  No P-comm was noted.     Capillary phase showed normal blush in all the MCA and TACOS regions. Venous phase was unremarkable.       The catheter was then removed from the right internal carotid artery and advanced into the right external carotid artery.  Images were obtained in the anterior and lateral projections.       Interpretation: Normal ECA branches were identified.  No AV fistual/AVM noted.         The catheter was then positioned in the right vertebral artery where PA and lateral angiograms from the cranial station were obtained by hand injection.  This angiogram demonstrate dominant right vertebral artery with good filling into the distal    vertebral artery, vertebrobasilar junction and basilar arteries. The right anterior inferior cerebellar artery is large and supplies the posterior inferior cerebellar artery territory. There was no posterior-inferior cerebellar artery on the right side. Bilateral superior cerebellar arteries and bilateral posterior cerebral arteries were normal. There was flash filling into the left posterior communicating artery to the anterior circulation with early venous phase indicating the AVM.     The catheter was then positioned into the left common carotid artery under fluoroscopic and roadmap guidance. Images were obtained of the left cervical carotid artery in biplane projections.       Interpretation: A normal bifurcation of the was noted, with no atherosclerosis .   A normal cervical internal carotid with no areas of dissection or narrowing was noted.  Normal flow to the proximal external carotid branches was noted.       Then the left internal carotid artery was selectively catheterized, and images were obtained in the anterior, oblique, and lateral projections of the intracranial circulation on the left side.       Interpretation: There is an AVM located in the left frontal lobe anteriorly with a nidus measuring 27 mm x 29 mm. Arterial feeders seen to be branches from the left ophthalmic artery and the left anterior choroidal artery. Venous drainage is through    superficial cortical vein that drains into the superior sagittal sinus. There is no evidence of aneurysms within the AVM. There is no evidence of stenosis in the venous drainage of the AVM.     The catheter was then positioned in the left external carotid artery. PA and lateral angiograms from the cranial station were obtained. These angiograms demonstrate normal ECA branches with no contribution to the fistula or AVM.        The catheter was last positioned in the left vertebral artery where PA and lateral angiograms from the cranial station were obtained using hand injection. This angiogram demonstrate nondominant left vertebral artery with normal vertebral basilar junction    and basilar artery with its branches. There is no contribution to the AVM.                     Impression   Impression:           1- THERE IS AN AVM LOCATED IN THE LEFT FRONTAL LOBE ANTERIORLY WITH A NIDUS MEASURING 27 MM X 29 MM. ARTERIAL FEEDERS SEEN TO BE BRANCHES FROM THE LEFT OPHTHALMIC ARTERY AND THE LEFT ANTERIOR CHOROIDAL ARTERY. VENOUS DRAINAGE IS THROUGH SUPERFICIAL    CORTICAL VEIN THAT DRAINS INTO THE SUPERIOR SAGITTAL SINUS. THERE IS NO EVIDENCE OF ANEURYSMS WITHIN THE AVM. THERE IS NO EVIDENCE OF STENOSIS IN THE VENOUS DRAINAGE OF THE AVM.        Final report electronically signed by Dr Gentry Astudillo on 8/16/2021 9:36 AM       Order History    Open Order Details   PACS Images     Show images for IR NS THORACIC ARCH CATH PLACEMENT W OR WO ANGIOGRAPHY   Results History Report    View Report   External Result Report    External Result Report   Existing Charges  Charge Line Charge Code Status Charge Trigger Charge Type   [id number] Hc Misc Introducer/sheath [[id number]] [address] Billing Imaging end exam    [id number] Hc Misc Guidewire [[id number]] [address] [facility] Billing Imaging end exam    [id number] Hc Non-chargeable Supply [id number] [facility] Billing Imaging end exam    [id number] Hc Non-chargeable Supply [id number] [facility] Billing Imaging end exam    [id number] Hc Misc Introducer/sheath [[id number]] [address] Billing Imaging end exam    [id number] Hc Catheter Guiding [[id number]] [address] [facility] Billing Imaging end exam    [id number] [address] INTR/XTRCRANL ART [address] [facility]  Imaging result study Professional   Order Report    Order Details  Implants     Implant   Screws In Neck - Implanted  As of 8/12/2016  Status: Implanted            Medical Decision Making: moderate complexity    Consultation with neurology routinely every 6 months as scheduled. Continue medications as prescribed. Educational information on above diagnosis  provided per AVS. continue consultation with GI regarding his cirrhosis. Routine labs as previously ordered. Outpatient Encounter Medications as of 8/19/2021   Medication Sig Dispense Refill    clopidogrel (PLAVIX) 75 MG tablet Take 1 tablet by mouth daily 30 tablet 3    LACTULOSE PO Take 30 mLs by mouth 2 times daily      gabapentin (NEURONTIN) 300 MG capsule Take 1 capsule by mouth 3 times daily for 30 days. 90 capsule 2    ALPRAZolam (XANAX) 1 MG tablet take 1 tablet by mouth three times a day if needed for anxiety or sleep 90 tablet 2    nitroGLYCERIN (NITROSTAT) 0.4 MG SL tablet up to max of 3 total doses. If no relief after 1 dose, call 911. 25 tablet 0    rifaximin (XIFAXAN) 550 MG tablet Take 1 tablet by mouth 2 times daily 60 tablet 6    atorvastatin (LIPITOR) 40 MG tablet take 1 tablet by mouth nightly 30 tablet 5    cyclobenzaprine (FLEXERIL) 10 MG tablet take 1/2 to 1 tablet three times a day if needed 60 tablet 3    spironolactone (ALDACTONE) 100 MG tablet take 1 tablet by mouth every morning 90 tablet 2    DULoxetine (CYMBALTA) 60 MG extended release capsule take 1 capsule by mouth once daily 90 capsule 3    aspirin 81 MG chewable tablet Take 1 tablet by mouth daily 30 tablet 0     No facility-administered encounter medications on file as of 8/19/2021. No orders of the defined types were placed in this encounter.

## 2021-08-23 ENCOUNTER — CARE COORDINATION (OUTPATIENT)
Dept: CARE COORDINATION | Age: 56
End: 2021-08-23

## 2021-08-23 RX ORDER — DULOXETIN HYDROCHLORIDE 60 MG/1
CAPSULE, DELAYED RELEASE ORAL
Qty: 90 CAPSULE | Refills: 3 | Status: SHIPPED | OUTPATIENT
Start: 2021-08-23 | End: 2022-09-16

## 2021-08-23 NOTE — CARE COORDINATION
Attempted care management follow up. Left message to return phone call to ambulatory care manager at [phone].   Plan -   9/2 GI appt  Can use find a ride or [facility] if needed but would benefit from son or friend accompanying to appts  Medication adherence  2 liter FR  Low salt diet  Report abdominal bloating or swelling, altered mental status  Early symptom recognition and reporting to prevent ED and admissions

## 2021-08-30 ENCOUNTER — TELEPHONE (OUTPATIENT)
Dept: FAMILY MEDICINE CLINIC | Age: 56
End: 2021-08-30

## 2021-08-30 DIAGNOSIS — G89.29 CHRONIC RIGHT-SIDED LOW BACK PAIN WITH RIGHT-SIDED SCIATICA: ICD-10-CM

## 2021-08-30 DIAGNOSIS — M54.41 CHRONIC RIGHT-SIDED LOW BACK PAIN WITH RIGHT-SIDED SCIATICA: ICD-10-CM

## 2021-08-30 RX ORDER — TRAMADOL HYDROCHLORIDE 50 MG/1
50 TABLET ORAL EVERY 4 HOURS PRN
Qty: 42 TABLET | Refills: 0 | Status: SHIPPED | OUTPATIENT
Start: 2021-08-30 | End: 2021-09-06

## 2021-08-30 NOTE — LETTER
[facility]  [address]  [facility] [address]  Phone: [phone]  Fax: [phone]    CHRISTIAN Blanco CNP        August 31, 2021    [address] [place] [address]      To whom it may concern:    Barry Kerns has been under my care since 2011 and is currently being prescribed tramadol for chronic right-sided low back pain with right-sided sciatica. If you have any questions or concerns, please don't hesitate to call.     Sincerely,        CHRISTIAN Blanco CNP

## 2021-08-30 NOTE — TELEPHONE ENCOUNTER
----- Message from  [address] sent at 8/30/2021  4:44 PM EDT -----  Subject: Message to Provider    QUESTIONS  Information for Provider? Pt requests letter be sent to him about his   tramadol prescription. Pt is on probation and needs proof of prescription   for . Needs letter by Thursday   ---------------------------------------------------------------------------  --------------  [place] ECH[place]ARRIA  What is the best way for the office to contact you? OK to leave message on   voicemail  Preferred Call Back Phone Number? [phone]  ---------------------------------------------------------------------------  --------------  SCRIPT ANSWERS  Relationship to Patient?  Self

## 2021-08-30 NOTE — TELEPHONE ENCOUNTER
----- Message from Darlene Hernández sent at 8/30/2021  1:18 PM EDT -----  Subject: Refill Request    QUESTIONS  Name of Medication? traMADol (ULTRAM) 50 MG tablet  Patient-reported dosage and instructions? 50MG/1 tablet by mouth every 4   hours as needed for Pain for up to 7 days. Intended supply? 7 days. Take   lowest dose possible to manage pain  How many days do you have left? 0  Preferred Pharmacy? [id number] phone number (if available)? [phone]  Additional Information for Provider? patient wants someone to give him a   call when med is sent over to pharmacy   ---------------------------------------------------------------------------  --------------  [address]  What is the best way for the office to contact you? OK to leave message on   voicemail  Preferred Call Back Phone Number?  [phone]

## 2021-09-01 ENCOUNTER — TELEPHONE (OUTPATIENT)
Dept: NEUROLOGY | Age: 56
End: 2021-09-01

## 2021-09-01 ENCOUNTER — CARE COORDINATION (OUTPATIENT)
Dept: CARE COORDINATION | Age: 56
End: 2021-09-01

## 2021-09-01 NOTE — CARE COORDINATION
up to max of 3 total doses.  If no relief after 1 dose, call 911. 6/28/21   CHRISTIAN Crowder CNP   rifaximin (XIFAXAN) 550 MG tablet Take 1 tablet by mouth 2 times daily 6/2/21   CHRISTIAN Pendleton CNP   atorvastatin (LIPITOR) 40 MG tablet take 1 tablet by mouth nightly 5/17/21   CHRISTIAN Crowder CNP   cyclobenzaprine (FLEXERIL) 10 MG tablet take 1/2 to 1 tablet three times a day if needed 5/4/21   CHRISTIAN Crowder CNP   spironolactone (ALDACTONE) 100 MG tablet take 1 tablet by mouth every morning 10/8/20   CHRISTIAN Crowder CNP   aspirin 81 MG chewable tablet Take 1 tablet by mouth daily 10/15/19   Casi Ragland MD       Future Appointments   Date Time Provider [facility]   9/2/2021 12:45 PM CHRISTIAN Pendleton CNP [facility]   11/18/2021 11:40 AM CHRISTIAN Crowder CNPX [facility] JORGE AM [place] [address]   3/10/2022  3:00 PM [facility] [place] [address]

## 2021-09-07 RX ORDER — CYCLOBENZAPRINE HCL 10 MG
TABLET ORAL
Qty: 60 TABLET | Refills: 3 | Status: SHIPPED | OUTPATIENT
Start: 2021-09-07 | End: 2021-12-20

## 2021-09-09 ENCOUNTER — CARE COORDINATION (OUTPATIENT)
Dept: CARE COORDINATION | Age: 56
End: 2021-09-09

## 2021-09-09 NOTE — CARE COORDINATION
Ambulatory Care Coordination Note  9/9/2021  CM Risk Score: 4  Charlson 10 Year Mortality Risk Score: 98%     ACC: Anna Segovia RN    Summary Note:      Call from patient. Wanted to review upcoming appts. PCP appt 11/18. Recent appt with [employer]. Lab work done. Plan -      Can use find a ride or [facility] if needed but would benefit from son or friend accompanying to appts     Medication adherence     2 liter FR     Low salt diet     Report abdominal bloating or swelling, altered mental status     Early symptom recognition and reporting to prevent ED and admissions         Care Coordination Interventions    Program Enrollment: Complex Care  Referral from Primary Care Provider: No  Suggested Interventions and Community Resources  Meals on Wheels: Declined  Smoking Cessation: Declined  Transportation Support: Completed  Other Services or Interventions: est with Anitha ADRIAN and Dr. Rk Yuan cardiology         Goals Addressed    None         Prior to Admission medications    Medication Sig Start Date End Date Taking? Authorizing Provider   cyclobenzaprine (FLEXERIL) 10 MG tablet take 1/2 to 1 tablet by mouth three times a day if needed 9/7/21   Nghia Johnson APRN - CNP   DULoxetine (CYMBALTA) 60 MG extended release capsule take 1 capsule by mouth once daily 8/23/21   James Burton APRN - CNP   LACTULOSE PO Take 30 mLs by mouth 2 times daily    Historical Provider, MD   gabapentin (NEURONTIN) 300 MG capsule Take 1 capsule by mouth 3 times daily for 30 days. 7/30/21 8/29/21  Nghia Johnson APRN - CNP   ALPRAZolam Adry Schneider) 1 MG tablet take 1 tablet by mouth three times a day if needed for anxiety or sleep 7/30/21 10/26/21  Nghia Johnson APRN - CNP   nitroGLYCERIN (NITROSTAT) 0.4 MG SL tablet up to max of 3 total doses.  If no relief after 1 dose, call 911. 6/28/21   Nghia Johnson APRN - CNP   rifaximin Della Lott) 550 MG tablet Take 1 tablet by mouth 2 times daily 6/2/21   Curt Severs, APRN -

## 2021-09-10 DIAGNOSIS — G89.29 CHRONIC MIDLINE LOW BACK PAIN, UNSPECIFIED WHETHER SCIATICA PRESENT: Primary | ICD-10-CM

## 2021-09-10 DIAGNOSIS — M54.50 CHRONIC MIDLINE LOW BACK PAIN, UNSPECIFIED WHETHER SCIATICA PRESENT: Primary | ICD-10-CM

## 2021-09-10 RX ORDER — TRAMADOL HYDROCHLORIDE 50 MG/1
50 TABLET ORAL EVERY 6 HOURS PRN
Qty: 120 TABLET | Refills: 0 | Status: SHIPPED | OUTPATIENT
Start: 2021-09-10 | End: 2021-10-14

## 2021-09-16 NOTE — TELEPHONE ENCOUNTER
Called patient to schedule 6 month follow up per Neuro interventional providers. This has been the third attempt without any response from patient. Will not be trying to schedule unless patient leaves a message or calls us with interest to schedule.

## 2021-09-24 RX ORDER — SPIRONOLACTONE 100 MG/1
TABLET, FILM COATED ORAL
Qty: 90 TABLET | Refills: 2 | Status: SHIPPED | OUTPATIENT
Start: 2021-09-24 | End: 2022-07-18

## 2021-09-27 ENCOUNTER — CARE COORDINATION (OUTPATIENT)
Dept: CARE COORDINATION | Age: 56
End: 2021-09-27

## 2021-09-27 DIAGNOSIS — G89.29 CHRONIC MIDLINE LOW BACK PAIN, UNSPECIFIED WHETHER SCIATICA PRESENT: ICD-10-CM

## 2021-09-27 DIAGNOSIS — M54.50 CHRONIC MIDLINE LOW BACK PAIN, UNSPECIFIED WHETHER SCIATICA PRESENT: ICD-10-CM

## 2021-09-27 RX ORDER — TRAMADOL HYDROCHLORIDE 50 MG/1
TABLET ORAL
Qty: 120 TABLET | Refills: 0 | OUTPATIENT
Start: 2021-09-27 | End: 2021-10-27

## 2021-09-27 NOTE — CARE COORDINATION
Ambulatory Care Coordination Note  9/27/2021  CM Risk Score: 4  Charlson 10 Year Mortality Risk Score: 98%     ACC: Lynnie Sicard, RN    Summary Note:       Call from [employer] stating he needs refills of xanax and tramadol. States he has 2 weeks left of each but no refills. Tramadol was refilled 9/10 for 30 days and Xanax was refilled 7/30 with 2 refills. Left message to return phone call to ambulatory care manager at [phone]. Discussed with patient. Reports after 12/15/21 will not have insurance. PERS for [facility] is dissolving. Has insurance plan information. Declined SW referral.      Plan -      Can use find a ride or [facility] if needed but would benefit from son or friend accompanying to appts     Medication adherence     2 liter FR     Low salt diet     Report abdominal bloating or swelling, altered mental status     Early symptom recognition and reporting to prevent ED and admissions           Care Coordination Interventions    Program Enrollment: Complex Care  Referral from Primary Care Provider: No  Suggested Interventions and Community Resources  Meals on Wheels: Declined  Smoking Cessation: Declined  Transportation Support: Completed  Other Services or Interventions: est with Ella ADRIAN and Dr. Jesus Varghese cardiology         Goals Addressed                 This Visit's Progress     Conditions and Symptoms   On track     I will schedule office visits, as directed by my provider. I will keep my appointment or reschedule if I have to cancel. I will notify my provider of any barriers to my plan of care. I will follow my Zone Management tool to seek urgent or emergent care. I will notify my provider of any symptoms that indicate a worsening of my condition.     Barriers: impairment:  physical: liver cirrhosis and cognitive, lack of support, and overwhelmed by complexity of regimen  Plan for overcoming my barriers: care coordination, follow up  Confidence: 9/10  Anticipated Goal Completion Date: 11/6/21              Prior to Admission medications    Medication Sig Start Date End Date Taking? Authorizing Provider   spironolactone (ALDACTONE) 100 MG tablet take 1 tablet by mouth every morning 9/24/21   CHRISTIAN Redd CNP   traMADol (ULTRAM) 50 MG tablet Take 1 tablet by mouth every 6 hours as needed for Pain for up to 30 days. Intended supply: 7 days. Take lowest dose possible to manage pain 9/10/21 10/10/21  CHRISTIAN Redd CNP   cyclobenzaprine (FLEXERIL) 10 MG tablet take 1/2 to 1 tablet by mouth three times a day if needed 9/7/21   CHRISTIAN Redd CNP   DULoxetine (CYMBALTA) 60 MG extended release capsule take 1 capsule by mouth once daily 8/23/21   CHRISTIAN Kirkland CNP   LACTULOSE PO Take 30 mLs by mouth 2 times daily    Historical Provider, MD   gabapentin (NEURONTIN) 300 MG capsule Take 1 capsule by mouth 3 times daily for 30 days. 7/30/21 8/29/21  CHRISTIAN Redd CNP   ALPRAZolam Clifford Shah) 1 MG tablet take 1 tablet by mouth three times a day if needed for anxiety or sleep 7/30/21 10/26/21  CHRISTIAN Redd CNP   nitroGLYCERIN (NITROSTAT) 0.4 MG SL tablet up to max of 3 total doses.  If no relief after 1 dose, call 911. 6/28/21   CHRISTIAN Redd CNP   rifaximin Ole Mourning) 550 MG tablet Take 1 tablet by mouth 2 times daily 6/2/21   CHRISTIAN Singh CNP   atorvastatin (LIPITOR) 40 MG tablet take 1 tablet by mouth nightly 5/17/21   CHRISTIAN Redd CNP   aspirin 81 MG chewable tablet Take 1 tablet by mouth daily 10/15/19   Radha Daley MD       Future Appointments   Date Time Provider Kaelyn Rojas   11/18/2021 11:40 AM [facility]ST[facility] Redd [facility]   12/2/2021 12:45 PM CHRISTIAN Singh CNP [facility]   3/10/2022  3:00 PM Monique Grewal MD N [facility] [address] [facility]

## 2021-10-13 DIAGNOSIS — F41.9 ANXIETY: ICD-10-CM

## 2021-10-13 DIAGNOSIS — G89.29 CHRONIC MIDLINE LOW BACK PAIN, UNSPECIFIED WHETHER SCIATICA PRESENT: ICD-10-CM

## 2021-10-13 DIAGNOSIS — M54.50 CHRONIC MIDLINE LOW BACK PAIN, UNSPECIFIED WHETHER SCIATICA PRESENT: ICD-10-CM

## 2021-10-13 DIAGNOSIS — K70.30 ALCOHOLIC CIRRHOSIS, UNSPECIFIED WHETHER ASCITES PRESENT (HCC): ICD-10-CM

## 2021-10-13 RX ORDER — ALPRAZOLAM 1 MG/1
TABLET ORAL
Qty: 90 TABLET | OUTPATIENT
Start: 2021-10-13

## 2021-10-13 NOTE — TELEPHONE ENCOUNTER
LOV 8/19/21  Last tramadol refill #120/0, 9/10/21    Last xanax refill #90/2, 7/30/21-TOO SOON FOR REFILL, RX DENIED

## 2021-10-14 RX ORDER — TRAMADOL HYDROCHLORIDE 50 MG/1
TABLET ORAL
Qty: 120 TABLET | Refills: 0 | Status: SHIPPED | OUTPATIENT
Start: 2021-10-14 | End: 2021-11-12 | Stop reason: SDUPTHER

## 2021-10-19 ENCOUNTER — CARE COORDINATION (OUTPATIENT)
Dept: CARE COORDINATION | Age: 56
End: 2021-10-19

## 2021-10-19 ENCOUNTER — OFFICE VISIT (OUTPATIENT)
Dept: FAMILY MEDICINE CLINIC | Age: 56
End: 2021-10-19
Payer: COMMERCIAL

## 2021-10-19 VITALS
HEIGHT: 66 IN | SYSTOLIC BLOOD PRESSURE: 116 MMHG | RESPIRATION RATE: 20 BRPM | DIASTOLIC BLOOD PRESSURE: 76 MMHG | BODY MASS INDEX: 38.57 KG/M2 | OXYGEN SATURATION: 98 % | WEIGHT: 240 LBS | HEART RATE: 103 BPM | TEMPERATURE: 98.5 F

## 2021-10-19 DIAGNOSIS — J40 BRONCHITIS: ICD-10-CM

## 2021-10-19 DIAGNOSIS — M48.061 SPINAL STENOSIS OF LUMBAR REGION, UNSPECIFIED WHETHER NEUROGENIC CLAUDICATION PRESENT: ICD-10-CM

## 2021-10-19 DIAGNOSIS — Q28.2 CEREBRAL AVM: ICD-10-CM

## 2021-10-19 DIAGNOSIS — K70.30 ALCOHOLIC CIRRHOSIS, UNSPECIFIED WHETHER ASCITES PRESENT (HCC): Primary | ICD-10-CM

## 2021-10-19 DIAGNOSIS — G89.29 CHRONIC RIGHT-SIDED LOW BACK PAIN WITH RIGHT-SIDED SCIATICA: ICD-10-CM

## 2021-10-19 DIAGNOSIS — F41.9 ANXIETY: ICD-10-CM

## 2021-10-19 DIAGNOSIS — M54.41 CHRONIC RIGHT-SIDED LOW BACK PAIN WITH RIGHT-SIDED SCIATICA: ICD-10-CM

## 2021-10-19 DIAGNOSIS — Z20.822 SUSPECTED COVID-19 VIRUS INFECTION: ICD-10-CM

## 2021-10-19 PROCEDURE — 3017F COLORECTAL CA SCREEN DOC REV: CPT | Performed by: NURSE PRACTITIONER

## 2021-10-19 PROCEDURE — G8484 FLU IMMUNIZE NO ADMIN: HCPCS | Performed by: NURSE PRACTITIONER

## 2021-10-19 PROCEDURE — G8417 CALC BMI ABV UP PARAM F/U: HCPCS | Performed by: NURSE PRACTITIONER

## 2021-10-19 PROCEDURE — G8427 DOCREV CUR MEDS BY ELIG CLIN: HCPCS | Performed by: NURSE PRACTITIONER

## 2021-10-19 PROCEDURE — 4004F PT TOBACCO SCREEN RCVD TLK: CPT | Performed by: NURSE PRACTITIONER

## 2021-10-19 PROCEDURE — 99213 OFFICE O/P EST LOW 20 MIN: CPT | Performed by: NURSE PRACTITIONER

## 2021-10-19 RX ORDER — ALBUTEROL SULFATE 90 UG/1
2 AEROSOL, METERED RESPIRATORY (INHALATION) 4 TIMES DAILY PRN
Qty: 18 G | Refills: 0 | Status: SHIPPED | OUTPATIENT
Start: 2021-10-19 | End: 2022-10-19

## 2021-10-19 RX ORDER — AZITHROMYCIN 250 MG/1
TABLET, FILM COATED ORAL
Qty: 6 TABLET | Refills: 0 | Status: SHIPPED | OUTPATIENT
Start: 2021-10-19 | End: 2021-10-29

## 2021-10-19 RX ORDER — BENZONATATE 200 MG/1
200 CAPSULE ORAL 3 TIMES DAILY PRN
Qty: 30 CAPSULE | Refills: 0 | Status: SHIPPED | OUTPATIENT
Start: 2021-10-19 | End: 2021-10-26

## 2021-10-19 RX ORDER — PREDNISONE 10 MG/1
10 TABLET ORAL 2 TIMES DAILY
Qty: 10 TABLET | Refills: 0 | Status: SHIPPED | OUTPATIENT
Start: 2021-10-19 | End: 2021-10-24

## 2021-10-19 NOTE — PATIENT INSTRUCTIONS
Patient Education        Coronavirus (GQCRW-30): Care Instructions  Overview  The coronavirus disease (COVID-19) is caused by a virus. Symptoms may include a fever, a cough, and shortness of breath. It can spread through droplets from coughing and sneezing, breathing, and singing. The virus also can spread when people are in close contact with someone who is infected. Most people have mild symptoms and can take care of themselves at home. If their symptoms get worse, they may need care in a hospital. Treatment may include medicines to reduce symptoms, plus breathing support such as oxygen therapy or a ventilator. It's important to not spread the virus to others. If you have COVID-19, wear a mask anytime you are around other people. It can help stop the spread of the virus. You need to isolate yourself while you are sick. Leave your home only if you need to get medical care or testing. Follow-up care is a key part of your treatment and safety. Be sure to make and go to all appointments, and call your doctor if you are having problems. It's also a good idea to know your test results and keep a list of the medicines you take. How can you care for yourself at home? · Get extra rest. It can help you feel better. · Drink plenty of fluids. This helps replace fluids lost from fever. Fluids may also help ease a scratchy throat. · You can take acetaminophen (Tylenol) or ibuprofen (Advil, Motrin) to reduce a fever. It may also help with muscle and body aches. Read and follow all instructions on the label. · Use petroleum jelly on sore skin. This can help if the skin around your nose and lips becomes sore from rubbing a lot with tissues. If you use oxygen, use a water-based product instead of petroleum jelly. · Keep track of symptoms such as fever and shortness of breath. This can help you know if you need to call your doctor. It can also help you know when it's safe to be around other people.   · In some cases, your doctor might suggest that you get a pulse oximeter. How can you self-isolate when you have COVID-19? If you have COVID-19, there are things you can do to help avoid spreading the virus to others. · Limit contact with people in your home. If possible, stay in a separate bedroom and use a separate bathroom. · Wear a mask when you are around other people. · If you have to leave home, avoid crowds and try to stay at least 6 feet away from other people. · Avoid contact with pets and other animals. · Cover your mouth and nose with a tissue when you cough or sneeze. Then throw it in the trash right away. · Wash your hands often, especially after you cough or sneeze. Use soap and water, and scrub for at least 20 seconds. If soap and water aren't available, use an alcohol-based hand . · Don't share personal household items. These include bedding, towels, cups and glasses, and eating utensils. · [address] in the warmest water allowed for the fabric type, and dry it completely. It's okay to wash other people's laundry with yours. · Clean and disinfect your home. Use household  and disinfectant wipes or sprays. When can you end self-isolation for COVID-19? If you know or think that you have the virus, you will need to self-isolate. You can be around others after:  · It's been at least 10 days since your symptoms started and  · You haven't had a fever for 24 hours without taking medicines to lower the fever and  · Your symptoms are improving. If you tested positive but have no symptoms, you can end isolation after 10 days. But if you start to have symptoms, follow the steps above. Ask your doctor if you need to be tested before you end isolation. This is especially important if you have a weakened immune system. When should you call for help? Call 911 anytime you think you may need emergency care.  For example, call if you have life-threatening symptoms, such as:    · You have severe trouble breathing. (You can't talk at all.)     · You have constant chest pain or pressure.     · You are severely dizzy or lightheaded.     · You are confused or can't think clearly.     · You have pale, gray, or blue-colored skin or lips.     · You pass out (lose consciousness) or are very hard to wake up. Call your doctor now or seek immediate medical care if:    · You have moderate trouble breathing. (You can't speak a full sentence.)     · You are coughing up blood (more than about 1 teaspoon).     · You have signs of low blood pressure. These include feeling lightheaded; being too weak to stand; and having cold, pale, clammy skin. Watch closely for changes in your health, and be sure to contact your doctor if:    · Your symptoms get worse.     · You are not getting better as expected.     · You have new or worse symptoms of anxiety, depression, nightmares, or flashbacks. Call before you go to the doctor's office. Follow their instructions. And wear a mask. Current as of: July 1, 2021               Content Version: 13.0  © 2006-2021 Healthwise, Incorporated. Care instructions adapted under license by [facility] ([facility]). If you have questions about a medical condition or this instruction, always ask your healthcare professional. [place] [address] any warranty or liability for your use of this information. Patient Education        Learning About Being High-Risk for Serious Illness From COVID-19  Who is at high risk? COVID-19 causes a mild illness in many people who have it. But certain things may increase your risk for more serious illness. These include:  · Age. ? Babies born premature or who are less than 3year old may be at high risk. ? The risk also increases with age. Older adults are at highest risk. · Asthma, cystic fibrosis, chronic obstructive pulmonary disease (COPD), and other chronic lung diseases. · Vaping or smoking or having a history of smoking.   · Serious heart conditions, such as heart failure, coronary artery disease, or high blood pressure. · HIV. · A weakened immune system or taking medicines, such as steroids, that suppress the immune system. · Cancer or getting treatment for cancer. · Neurologic conditions or diseases that involve the nerves and brain, such as stroke, dementia, or cerebral palsy. · Being overweight (obesity). · Diabetes. · Chronic kidney disease. · Liver disease. · Substance use disorders. · Sickle cell disease. · Pregnancy. · Genetic, metabolic, or neurologic problems in children. This includes children who may have many health problems that affect many body systems. These problems may limit how well the child can do routine activities of daily life. · Down syndrome. This is not a complete list. If you have a chronic health problem, ask your doctor if you should take extra precautions. Should you get the COVID-19 vaccine if you have an underlying health problem? The simple answer is yes. The COVID-19 vaccine is safe and effective for almost everyone. The only people advised not to get it are those who have had a severe allergic reaction to the vaccine's ingredients. Experts recommend getting the vaccine. This is especially true if you have an underlying health problem like diabetes, chronic lung disease, or obesity. Getting infected with COVID-19 can be much worse if you have conditions like these. If you have a weakened immune system, you may be at higher risk for getting seriously ill with COVID-19. The vaccine may not work as well for you, but it should still be safe. Talk with your doctor if you have any questions about the vaccine. How can you protect yourself and others? The best way to protect yourself from getting sick is to:  · Get vaccinated. · Avoid sick people. · If you are not fully vaccinated:  ? Wear a mask if you have to go to public areas. ?  Avoid crowds and try to stay at least 6 feet away from other people. · Cover your mouth with a tissue when you cough or sneeze. · Wash your hands often, especially after you cough or sneeze. Use soap and water, and scrub for at least 20 seconds. If soap and water aren't available, use an alcohol-based hand . · Avoid touching your mouth, nose, and eyes. To help avoid spreading the virus to others:  · Get vaccinated. · Cover your mouth with a tissue when you cough or sneeze. · Wash your hands often, especially after you cough or sneeze. Use soap and water, and scrub for at least 20 seconds. If soap and water aren't available, use an alcohol-based hand . · If you have been exposed to the virus and are not fully vaccinated:  ? Stay home. Don't go to school, work, or public areas. And don't use public transportation, ride-shares, or taxis unless you have no choice. ? Wear a mask if you have to go to public areas, like the pharmacy. · If you're sick:  ? Leave your home only if you need to get medical care. But call the doctor's office first so they know you're coming. And wear a mask. ? Wear a mask whenever you're around other people. ? Limit contact with pets and people in your home. If possible, stay in a separate bedroom and use a separate bathroom. ? Clean and disinfect your home every day. Use household  and disinfectant wipes or sprays. Take special care to clean things that you touch with your hands. Should you get the COVID-19 vaccine if you are pregnant? Talk with your doctor if you have any questions about the vaccine. Other vaccines, like the flu vaccine, are safely given in pregnancy. The risk of problems from the COVID-19 vaccine should be far smaller than the risks to you and your baby from having the infection. Where can you learn more? Go to https://[email or web address].[email or web address] org and sign in to your Etherpad account.  Enter A131 in the [employer] box to learn more about \"Learning About Being High-Risk for Serious Illness From COVID-19. \"     If you do not have an account, please click on the \"Sign Up Now\" link. Current as of: July 1, 2021               Content Version: 13.0  © 7450-0139 Healthwise, Incorporated. Care instructions adapted under license by [facility] ([facility]). If you have questions about a medical condition or this instruction, always ask your healthcare professional. [place] [address] any warranty or liability for your use of this information.

## 2021-10-19 NOTE — PROGRESS NOTES
Prescreening performed prior to testing. The following symptoms may indicate COVID-19 infection:        One of the following criteria:   Temperature taken, patient temperature was 98.3 F. Fever greater 100.0 F -no  Cough -  no  New onset shortness of breath -no  New onset difficulty breathing -no        And/or   Two or more of the following criteria:  Muscle aches -no  Headache -no  Sore throat -no  New onset loss of smell/taste -no  New onset diarrhea -no    Patient's screening was negative. PFT will be performed.
Unknown

## 2021-10-19 NOTE — CARE COORDINATION
Ambulatory Care Coordination Note  10/19/2021  CM Risk Score: 4  Charlson 10 Year Mortality Risk Score: 98%     ACC: Shabana Cruz, RAEGAN    Summary Note:     Reports cough, body aches, loss of taste since Friday. No fever. He called PCP office and has an appt today. Advised on symptom management and reporting worsening of symptoms. Advised on deep breathing exercises, staying active, and hydrated. Advised on quarantine until covid test results are available then quarantine protocol if positive. Zone sheet reviewed. Plan -   PCP appt  covid test, albuterol, zithromax, benzonatate, prednisone  Symptom management  Report new or worsening symptoms immediately  COVID zone sheet      Care Coordination Interventions    Program Enrollment: Complex Care  Referral from Primary Care Provider: No  Suggested Interventions and Community Resources  Meals on Wheels: Declined  Smoking Cessation: Declined  Transportation Support: Completed  Other Services or Interventions: est with Malinda ADRIAN and Dr. Homer Dos Santos cardiology         Goals Addressed                 This Visit's Progress     Conditions and Symptoms   On track     I will schedule office visits, as directed by my provider. I will keep my appointment or reschedule if I have to cancel. I will notify my provider of any barriers to my plan of care. I will follow my Zone Management tool to seek urgent or emergent care. I will notify my provider of any symptoms that indicate a worsening of my condition. Barriers: impairment:  physical: liver cirrhosis and cognitive, lack of support, and overwhelmed by complexity of regimen  Plan for overcoming my barriers: care coordination, follow up  Confidence: 9/10  Anticipated Goal Completion Date: 11/6/21              Prior to Admission medications    Medication Sig Start Date End Date Taking?  Authorizing Provider   azithromycin (ZITHROMAX Z-CELESTINE) 250 MG tablet 2 pills orally for 1 day, then 1 pill orally for 4 days 10/19/21 10/29/21  [facility], APRN - CNP   benzonatate (TESSALON) 200 MG capsule Take 1 capsule by mouth 3 times daily as needed for Cough 10/19/21 10/26/21  [facility], APRN - CNP   predniSONE (DELTASONE) 10 MG tablet Take 1 tablet by mouth 2 times daily for 5 days 10/19/21 10/24/21  [facility], APRN - CNP   albuterol sulfate HFA (VENTOLIN HFA) 108 (90 Base) MCG/ACT inhaler Inhale 2 puffs into the lungs 4 times daily as needed for Wheezing 10/19/21   [facility], APRN - CNP   traMADol (ULTRAM) 50 MG tablet take 1 tablet by mouth every 6 hours if needed for pain for up to [address] [facility] [place] [address] 10/14/21 11/13/21  [facility], APRN - CNP   spironolactone (ALDACTONE) 100 MG tablet take 1 tablet by mouth every morning 9/24/21   [facility], APRN - CNP   cyclobenzaprine (FLEXERIL) 10 MG tablet take 1/2 to 1 tablet by mouth three times a day if needed 9/7/21   [facility], APRN - CNP   DULoxetine (CYMBALTA) 60 MG extended release capsule take 1 capsule by mouth once daily 8/23/21   James Burton, APRN - CNP   LACTULOSE PO Take 30 mLs by mouth 2 times daily    Historical Provider, MD   gabapentin (NEURONTIN) 300 MG capsule Take 1 capsule by mouth 3 times daily for 30 days. 7/30/21 10/19/21  Mary Wingos, APRN - CNP   ALPRAZolam Valaria Paling) 1 MG tablet take 1 tablet by mouth three times a day if needed for anxiety or sleep 7/30/21 10/26/21  Mary Cables, APRN - CNP   nitroGLYCERIN (NITROSTAT) 0.4 MG SL tablet up to max of 3 total doses.  If no relief after 1 dose, call 911. 6/28/21   Mary Wingos, APRN - CNP   rifaximin Lillie Guest) 550 MG tablet Take 1 tablet by mouth 2 times daily 6/2/21   Pankaj Tan, APRN - CNP   atorvastatin (LIPITOR) 40 MG tablet take 1 tablet by mouth nightly 5/17/21   Mary Pires APRN - CNP   aspirin 81 MG chewable tablet Take 1 tablet by mouth daily 10/15/19   Luigi Rod MD       Future Appointments   Date Time Provider Kaelyn Rojas

## 2021-10-20 ASSESSMENT — ENCOUNTER SYMPTOMS
SHORTNESS OF BREATH: 1
GASTROINTESTINAL NEGATIVE: 1
EYES NEGATIVE: 1
COUGH: 1

## 2021-10-20 NOTE — PROGRESS NOTES
Normal appearance. HENT:      Right Ear: Tympanic membrane normal.      Left Ear: Tympanic membrane normal.      Nose: Mucosal edema and rhinorrhea present. Mouth/Throat:      Pharynx: Oropharynx is clear. Eyes:      Conjunctiva/sclera: Conjunctivae normal.   Cardiovascular:      Rate and Rhythm: Normal rate and regular rhythm. Pulses: Normal pulses. Heart sounds: Normal heart sounds. Pulmonary:      Effort: Pulmonary effort is normal.      Breath sounds: Rhonchi present. Abdominal:      General: Bowel sounds are normal.      Palpations: Abdomen is soft. Musculoskeletal:         General: Normal range of motion. Cervical back: Normal range of motion. Skin:     General: Skin is warm. Capillary Refill: Capillary refill takes 2 to 3 seconds. Neurological:      General: No focal deficit present. Mental Status: He is alert and oriented to person, place, and time. Psychiatric:         Mood and Affect: Mood normal.         Thought Content: Thought content normal.         Judgment: Judgment normal.       /76   Pulse 103   Temp 98.5 °F (36.9 °C) (Oral)   Resp 20   Ht 5' 6\" (1.676 m)   Wt 240 lb (108.9 kg)   SpO2 98%   BMI 38.74 kg/m²       Impression/Plan:  1. Alcoholic cirrhosis, unspecified whether ascites present ([facility] [address]    2. Anxiety    3. Chronic right-sided low back pain with right-sided sciatica    4. Cerebral AVM    5. Spinal stenosis of lumbar region, unspecified whether neurogenic claudication present    6. Suspected COVID-19 virus infection    7.  Bronchitis      Requested Prescriptions     Signed Prescriptions Disp Refills    azithromycin (ZITHROMAX Z-CELESTINE) 250 MG tablet 6 tablet 0     Si pills orally for 1 day, then 1 pill orally for 4 days    benzonatate (TESSALON) 200 MG capsule 30 capsule 0     Sig: Take 1 capsule by mouth 3 times daily as needed for Cough    predniSONE (DELTASONE) 10 MG tablet 10 tablet 0     Sig: Take 1 tablet by mouth 2 times daily for 5 days    albuterol sulfate HFA (VENTOLIN HFA) 108 (90 Base) MCG/ACT inhaler 18 g 0     Sig: Inhale 2 puffs into the lungs 4 times daily as needed for Wheezing     Orders Placed This Encounter   Procedures    COVID-19     Standing Status:   Future     Number of Occurrences:   1     Standing Expiration Date:   10/19/2022     Scheduling Instructions:      1) Due to current limited availability of the COVID-19 test, tests will be prioritized based on responses to questions above. Testing may be delayed due to volume. 2) Print and instruct patient to adhere to CDC home isolation program. (Link Above)              3) Set up or refer patient for a monitoring program.              4) Have patient sign up for and leverage [employer] (if not previously done). Order Specific Question:   Is this test for diagnosis or screening? Answer:   Diagnosis of ill patient     Order Specific Question:   Symptomatic for COVID-19 as defined by CDC? Answer:   Yes     Order Specific Question:   Date of Symptom Onset     Answer:   10/18/2021     Order Specific Question:   Hospitalized for COVID-19? Answer:   No     Order Specific Question:   Admitted to ICU for COVID-19? Answer:   No     Order Specific Question:   Employed in healthcare setting? Answer:   No     Order Specific Question:   Resident in a congregate (group) care setting? Answer:   No     Order Specific Question:   Pregnant: Answer:   No     Order Specific Question:   Previously tested for COVID-19? Answer:   No       Patient giveneducational materials - see patient instructions. Discussed use, benefit, and side effects of prescribed medications. All patient questions answered. Pt voiced understanding. Reviewed health maintenance. Patient agreedwith treatment plan. Follow up as directed. Covid test performed in office today. Z-Demetris, Tessalon, prednisone and albuterol prescribed. Fluids encouraged.   Vitamin C zinc multivitamin encouraged. Frequent ambulation encouraged. ED if symptoms worsen.   Electronically signed by CHRISTIAN Scott CNP on 10/20/2021 at 9:56 AM

## 2021-10-21 LAB
SARS-COV-2: NOT DETECTED
SOURCE: NORMAL

## 2021-10-22 ENCOUNTER — TELEPHONE (OUTPATIENT)
Dept: FAMILY MEDICINE CLINIC | Age: 56
End: 2021-10-22

## 2021-10-25 DIAGNOSIS — F41.9 ANXIETY: ICD-10-CM

## 2021-10-25 DIAGNOSIS — K70.30 ALCOHOLIC CIRRHOSIS, UNSPECIFIED WHETHER ASCITES PRESENT (HCC): ICD-10-CM

## 2021-10-26 ENCOUNTER — TELEPHONE (OUTPATIENT)
Dept: FAMILY MEDICINE CLINIC | Age: 56
End: 2021-10-26

## 2021-10-26 RX ORDER — ALPRAZOLAM 1 MG/1
TABLET ORAL
Qty: 90 TABLET | Refills: 2 | Status: SHIPPED | OUTPATIENT
Start: 2021-10-26 | End: 2022-01-17

## 2021-10-26 NOTE — TELEPHONE ENCOUNTER
----- Message from [employer] sent at 10/26/2021  9:32 AM EDT -----  Subject: Refill Request    QUESTIONS  Name of Medication? ALPRAZolam (XANAX) 1 MG tablet  Patient-reported dosage and instructions? n/a  How many days do you have left? 0  Preferred Pharmacy? [id number] phone number (if available)? [phone]  ---------------------------------------------------------------------------  --------------  CALL BACK INFO  What is the best way for the office to contact you? OK to leave message on   voicemail  Preferred Call Back Phone Number?  [phone]

## 2021-10-29 ENCOUNTER — CARE COORDINATION (OUTPATIENT)
Dept: CARE COORDINATION | Age: 56
End: 2021-10-29

## 2021-10-29 NOTE — CARE COORDINATION
Attempted care management follow up. Left message to return phone call to ambulatory care manager at [phone].

## 2021-11-02 RX ORDER — GABAPENTIN 300 MG/1
300 CAPSULE ORAL 3 TIMES DAILY
Qty: 90 CAPSULE | Refills: 2 | Status: SHIPPED | OUTPATIENT
Start: 2021-11-02 | End: 2022-01-25 | Stop reason: SDUPTHER

## 2021-11-05 ENCOUNTER — CARE COORDINATION (OUTPATIENT)
Dept: CARE COORDINATION | Age: 56
End: 2021-11-05

## 2021-11-05 NOTE — CARE COORDINATION
Ambulatory Care Coordination Note  11/5/2021  CM Risk Score: 4  Charlson 10 Year Mortality Risk Score: 98%     ACC: Keesha Donald RN    Summary Note:     Has to get new health insurance. Has an appt 11/15. Has a cold - sore throat, runny nose, cough. Denies fever or SOB. Taking OTC cold medicine with relief. Denies contact with anyone who is sick. MAP referral for rifaximin. Has to pay 40% with new insurance, starting jan 1st. Right now free and other rx are $25.     Plan -   MAP referral for rifaximin  Patient appt with insurance   Report new or worsening symptoms immediately  11/18 PCP  12/2 GI  Report any confusion or AMS or abdominal bloating        Care Coordination Interventions    Program Enrollment: Complex Care  Referral from Primary Care Provider: No  Suggested Interventions and [employer] on Wheels: Declined  Medication Assistance Program: Completed  Smoking Cessation: Declined  Transportation Support: Completed  Other Services or Interventions: est with Andres ADRIAN and Dr. Shona Lopez cardiology         Goals Addressed                 This Visit's Progress     Conditions and Symptoms   On track     I will schedule office visits, as directed by my provider. I will keep my appointment or reschedule if I have to cancel. I will notify my provider of any barriers to my plan of care. I will follow my Zone Management tool to seek urgent or emergent care. I will notify my provider of any symptoms that indicate a worsening of my condition. Barriers: impairment:  physical: liver cirrhosis and cognitive, lack of support, and overwhelmed by complexity of regimen  Plan for overcoming my barriers: care coordination, follow up  Confidence: 9/10  Anticipated Goal Completion Date: 11/6/21              Prior to Admission medications    Medication Sig Start Date End Date Taking?  Authorizing Provider   ALPRAZolam Jasmin Perales) 1 MG tablet take 1 tablet by mouth three times a day if needed for anxiety or sleep 10/26/21 1/23/22  CHRISTIAN Faulkner CNP   gabapentin (NEURONTIN) 300 MG capsule Take 1 capsule by mouth 3 times daily for 30 days. 11/2/21 12/2/21  CHRISTIAN Faulkner CNP   albuterol sulfate HFA (VENTOLIN HFA) 108 (90 Base) MCG/ACT inhaler Inhale 2 puffs into the lungs 4 times daily as needed for Wheezing 10/19/21   CHRISTIAN Faulkner CNP   traMADol (ULTRAM) 50 MG tablet take 1 tablet by mouth every 6 hours if needed for pain for up to [address] [facility] 10/14/21 11/13/21  CHRISTIAN Faulkner CNP   spironolactone (ALDACTONE) 100 MG tablet take 1 tablet by mouth every morning 9/24/21   CHRISTIAN Faulkner CNP   cyclobenzaprine (FLEXERIL) 10 MG tablet take 1/2 to 1 tablet by mouth three times a day if needed 9/7/21   CHRISTIAN Faulkner CNP   DULoxetine (CYMBALTA) 60 MG extended release capsule take 1 capsule by mouth once daily 8/23/21   CHRISTIAN Kirkland CNP   LACTULOSE PO Take 30 mLs by mouth 2 times daily    Historical Provider, MD   nitroGLYCERIN (NITROSTAT) 0.4 MG SL tablet up to max of 3 total doses.  If no relief after 1 dose, call 911. 6/28/21   CHRISTIAN Faulkner CNP   rifaximin Monse Keepers) 550 MG tablet Take 1 tablet by mouth 2 times daily 6/2/21   CHRISTIAN Merchant CNP   atorvastatin (LIPITOR) 40 MG tablet take 1 tablet by mouth nightly 5/17/21   CHRISTIAN Faulkner CNP   aspirin 81 MG chewable tablet Take 1 tablet by mouth daily 10/15/19   Tanesha Gamino MD       Future Appointments   Date Time Provider Kaelyn Rojas   11/18/2021 11:40 AM CHRISTIAN Faulkner CNP [facility] [place]   12/2/2021 12:45 PM CHRISTIAN Merchant CNP [facility]   3/10/2022  3:00 PM Gallo Tovar MD N [facility]

## 2021-11-12 DIAGNOSIS — M54.50 CHRONIC MIDLINE LOW BACK PAIN, UNSPECIFIED WHETHER SCIATICA PRESENT: ICD-10-CM

## 2021-11-12 DIAGNOSIS — G89.29 CHRONIC MIDLINE LOW BACK PAIN, UNSPECIFIED WHETHER SCIATICA PRESENT: ICD-10-CM

## 2021-11-12 RX ORDER — TRAMADOL HYDROCHLORIDE 50 MG/1
50 TABLET ORAL EVERY 6 HOURS PRN
Qty: 120 TABLET | Refills: 0 | Status: SHIPPED | OUTPATIENT
Start: 2021-11-12 | End: 2021-12-10 | Stop reason: SDUPTHER

## 2021-11-16 RX ORDER — ATORVASTATIN CALCIUM 40 MG/1
40 TABLET, FILM COATED ORAL NIGHTLY
Qty: 30 TABLET | Refills: 5 | Status: SHIPPED | OUTPATIENT
Start: 2021-11-16 | End: 2022-05-13

## 2021-11-28 ENCOUNTER — TELEPHONE (OUTPATIENT)
Dept: FAMILY MEDICINE CLINIC | Age: 56
End: 2021-11-28

## 2021-11-28 NOTE — TELEPHONE ENCOUNTER
Pt phoned on call service and stated the last several days he has been having hallucinations, calling out to people not there  and feeling confused. States he has to concentrate and focus on his words or his speech would slur. States he saw his son yesterday and his stated the apartment was in disarray and the pt did not remember messing it up. States he has been calling out for people not there and keeps repeating himself. He states he has had these same symptoms before, he is being treated for cirrhosis of the liver. He was A&O X 3 on the phone. Pt verbalized fluently on the phone. He denied a headache and N/T of his body. I advised pt to report to ER for evaluation to R/O stroke vs TIA vs infection etc. Pt voiced understanding and agreed.

## 2021-11-29 ENCOUNTER — TELEPHONE (OUTPATIENT)
Dept: NEUROSURGERY | Age: 56
End: 2021-11-29

## 2021-11-29 ENCOUNTER — CARE COORDINATION (OUTPATIENT)
Dept: CARE COORDINATION | Age: 56
End: 2021-11-29

## 2021-11-29 NOTE — TELEPHONE ENCOUNTER
Evita Campbell called in to talk to \"Dr. Aide Foreman" about his well being and his headaches and pain. He states he's having a very hard time remembering things, and thinks he's taking care of people and babysitting them when he's been home alone. A mother like figure stopped over to check on him and he was very dazed and not like himself. He is suppose to go on a trip to his brother out of state for [place] and wants to know if he is safe to travel. He would like to be seen soon, or have someone call him back.

## 2021-11-30 ENCOUNTER — TELEPHONE (OUTPATIENT)
Dept: FAMILY MEDICINE CLINIC | Age: 56
End: 2021-11-30

## 2021-11-30 ENCOUNTER — TELEPHONE (OUTPATIENT)
Dept: NEUROLOGY | Age: 56
End: 2021-11-30

## 2021-11-30 NOTE — TELEPHONE ENCOUNTER
Received a call from BETTY ROSENTHAL [facility], Interventional Neurology- [address] [facility] [address]. Robert Schmidt called them with concerns about his memory and confusion. His son and a \"mother figure\" stopped by his home and noticed his house to be very messy. This is not like Robert Schmidt to keep a messy house. Robert Schmidt told them he was having a birthday party for [employer] and some of them were in the back room sleeping\". This, of course, was not true. There was no one in the house and he had no idea how the house became so messy. He does not even remember telling them that. Robert Schmidt states he feels fine today, no memory issues. No alcohol or drugs are involved. He has a history of TIA's and has been referred to Dr Vikash Brown but missed 2 new patient appts with him, so they will not see him. I called Robert Schmidt and explained to him that he will need to be examined and referred to a new neurologist. He is scheduled for 12-1-21 at 11:00 with Casey Benites.

## 2021-11-30 NOTE — CARE COORDINATION
Ambulatory Care Coordination Note  11/30/2021  CM Risk Score: 4  Charlson 10 Year Mortality Risk Score: 98%     ACC: Wilbert River RN    Summary Note:     Having more confusion lately. He spoke with NP on Sunday and was advised to go to ED for eval of stroke or TIA. He didn't go. Thinks ammonia level could be elevated. He has been taking lactulose. States he is waiting for  (neuro) for call back regarding increased confusion. His son doesn't think he is currently confused but has his moments. Patient doesn't want to go to ED at this time. He had ammonia level drawn today. He has a call out to neurosurgery and GI office. He is planning to go out of town 12/2-12/12. His family doesn't think he should go. Plan -   Go to ED as advised by on call NP   MAP referral for rifaximin possibly after new insurance  Patient getting new insurance   Report new or worsening symptoms immediately  12/15 GI  12/14 PCP  Report any confusion or AMS or abdominal bloating  12/1 appt with Ivone Mantilla at PCP office, plan for referral to neurology        Care Coordination Interventions    Program Enrollment: Complex Care  Referral from Primary Care Provider: No  Suggested Interventions and Community Resources  Meals on Wheels: Declined  Medication Assistance Program: Completed  Smoking Cessation: Declined  Transportation Support: Completed  Other Services or Interventions: est with Zach ADRIAN and Dr. Lindquist Keep cardiology         Goals Addressed                 This Visit's Progress     Conditions and Symptoms   On track     I will schedule office visits, as directed by my provider. I will keep my appointment or reschedule if I have to cancel. I will notify my provider of any barriers to my plan of care. I will follow my Zone Management tool to seek urgent or emergent care. I will notify my provider of any symptoms that indicate a worsening of my condition.     Barriers: impairment:  physical: liver cirrhosis and cognitive, lack of support, and overwhelmed by complexity of regimen  Plan for overcoming my barriers: care coordination, follow up  Confidence: 9/10  Anticipated Goal Completion Date: 11/6/21              Prior to Admission medications    Medication Sig Start Date End Date Taking? Authorizing Provider   ALPRAZolam Christinenick Grullon) 1 MG tablet take 1 tablet by mouth three times a day if needed for anxiety or sleep 10/26/21 1/23/22  CHRISTIAN Medina CNP   atorvastatin (LIPITOR) 40 MG tablet take 1 tablet by mouth nightly 11/16/21   CHRISTIAN Medina CNP   traMADol (ULTRAM) 50 MG tablet Take 1 tablet by mouth every 6 hours as needed for Pain for up to 30 days. 11/12/21 12/12/21  CHRISTIAN Medina CNP   gabapentin (NEURONTIN) 300 MG capsule Take 1 capsule by mouth 3 times daily for 30 days. 11/2/21 12/2/21  CHRISTIAN Medina CNP   albuterol sulfate HFA (VENTOLIN HFA) 108 (90 Base) MCG/ACT inhaler Inhale 2 puffs into the lungs 4 times daily as needed for Wheezing 10/19/21   CHRISTIAN Medina CNP   spironolactone (ALDACTONE) 100 MG tablet take 1 tablet by mouth every morning 9/24/21   CHRISTIAN Medina CNP   cyclobenzaprine (FLEXERIL) 10 MG tablet take 1/2 to 1 tablet by mouth three times a day if needed 9/7/21   CHRISTIAN Medina CNP   DULoxetine (CYMBALTA) 60 MG extended release capsule take 1 capsule by mouth once daily 8/23/21   CHRISTIAN Kirkland CNP   LACTULOSE PO Take 30 mLs by mouth 2 times daily    Historical Provider, MD   nitroGLYCERIN (NITROSTAT) 0.4 MG SL tablet up to max of 3 total doses.  If no relief after 1 dose, call 911. 6/28/21   CHRISTIAN Medina CNP   rifaximin [place]) 550 MG tablet Take 1 tablet by mouth 2 times daily 6/2/21   CHRISTIAN Skinner CNP   aspirin 81 MG chewable tablet Take 1 tablet by mouth daily 10/15/19   Alfie Gutierrez MD       Future Appointments   Date Time Provider Kaelyn Rojas   12/14/2021  1:20 PM Rainer Diallo, APRN - [place] [facility]   12/15/2021 11:45 AM CHRISTIAN Arteaga - CNP [facility]   3/10/2022  3:00 PM MD CANDACE Wright [facility] [place]

## 2021-11-30 NOTE — TELEPHONE ENCOUNTER
Mailed pt appt times for 6 month follow up in office. No imagine required before appt per Dr. Yoandy Meza.

## 2021-12-01 ENCOUNTER — OFFICE VISIT (OUTPATIENT)
Dept: FAMILY MEDICINE CLINIC | Age: 56
End: 2021-12-01
Payer: COMMERCIAL

## 2021-12-01 VITALS
TEMPERATURE: 98.2 F | DIASTOLIC BLOOD PRESSURE: 68 MMHG | OXYGEN SATURATION: 96 % | WEIGHT: 240 LBS | RESPIRATION RATE: 16 BRPM | BODY MASS INDEX: 38.74 KG/M2 | SYSTOLIC BLOOD PRESSURE: 108 MMHG | HEART RATE: 103 BPM

## 2021-12-01 DIAGNOSIS — G45.9 TIA (TRANSIENT ISCHEMIC ATTACK): Primary | ICD-10-CM

## 2021-12-01 DIAGNOSIS — Z23 NEED FOR INFLUENZA VACCINATION: ICD-10-CM

## 2021-12-01 PROCEDURE — G8427 DOCREV CUR MEDS BY ELIG CLIN: HCPCS | Performed by: NURSE PRACTITIONER

## 2021-12-01 PROCEDURE — 4004F PT TOBACCO SCREEN RCVD TLK: CPT | Performed by: NURSE PRACTITIONER

## 2021-12-01 PROCEDURE — 90674 CCIIV4 VAC NO PRSV 0.5 ML IM: CPT | Performed by: NURSE PRACTITIONER

## 2021-12-01 PROCEDURE — G8417 CALC BMI ABV UP PARAM F/U: HCPCS | Performed by: NURSE PRACTITIONER

## 2021-12-01 PROCEDURE — 90471 IMMUNIZATION ADMIN: CPT | Performed by: NURSE PRACTITIONER

## 2021-12-01 PROCEDURE — G8482 FLU IMMUNIZE ORDER/ADMIN: HCPCS | Performed by: NURSE PRACTITIONER

## 2021-12-01 PROCEDURE — 99213 OFFICE O/P EST LOW 20 MIN: CPT | Performed by: NURSE PRACTITIONER

## 2021-12-01 PROCEDURE — 3017F COLORECTAL CA SCREEN DOC REV: CPT | Performed by: NURSE PRACTITIONER

## 2021-12-01 ASSESSMENT — ENCOUNTER SYMPTOMS
CONSTIPATION: 0
BACK PAIN: 0
SORE THROAT: 0
EYE DISCHARGE: 0
WHEEZING: 0
DIARRHEA: 0
SHORTNESS OF BREATH: 0
ALLERGIC/IMMUNOLOGIC NEGATIVE: 1
VOMITING: 0
NAUSEA: 0
EYE PAIN: 0
EYE REDNESS: 0
TROUBLE SWALLOWING: 0
ABDOMINAL PAIN: 0
COUGH: 0
RHINORRHEA: 0

## 2021-12-01 NOTE — PROGRESS NOTES
[address] [address] [address]  [address] [facility] [address]  Dept: [phone]  Dept Fax: [phone]  Loc: [phone]     Visit Date:  12/1/2021      Patient:  Padma Love  YOB: 1965    HPI:     Chief Complaint   Patient presents with    Altered Mental Status     last friday had an incident where he told his freind that he had some kids eating  and some sleeping in his house but there was actually no one was there.  Flu Vaccine       Patient referred here for evaluation after this phone call yesterday - \"Received a call from Shilpa Baer, Interventional Neurology- [facility]. Alexander Meeks called them with concerns about his memory and confusion. His son and a \"mother figure\" stopped by his home and noticed his house to be very messy. This is not like Alexander Meeks to keep a messy house. Alexander Meeks told them he was having a birthday party for [employer] and some of them were in the back room sleeping\". This, of course, was not true. There was no one in the house and he had no idea how the house became so messy. He does not even remember telling them that. Alexander Meeks states he feels fine today, no memory issues. No alcohol or drugs are involved. He has a history of TIA's and has been referred to Dr Tang Hayes but missed 2 new patient appts with him, so they will not see him. I called Alexander Meeks and explained to him that he will need to be examined and referred to a new neurologist.\"    Additional comments from Care Coordinator yesterday- \"Having more confusion lately. He spoke with NP on Sunday and was advised to go to ED for eval of stroke or TIA. He didn't go. Thinks ammonia level could be elevated. He has been taking lactulose. States he is waiting for  (neuro) for call back regarding increased confusion. His son doesn't think he is currently confused but has his moments.  Patient doesn't want to go to ED at this time.      He had ammonia level drawn today.     He has a call out to neurosurgery and GI office.      He is planning to go out of town 12/2-12/12. His family doesn't think he should go.      Plan -   Go to ED as advised by on call NP   MAP referral for rifaximin possibly after new insurance  Patient getting new insurance   Report new or worsening symptoms immediately  12/15 GI  12/14 PCP  Report any confusion or AMS or abdominal bloating  12/1 appt with Veronica Qucammy at PCP office, plan for referral to neurology\"      Medications    Current Outpatient Medications:     atorvastatin (LIPITOR) 40 MG tablet, take 1 tablet by mouth nightly, Disp: 30 tablet, Rfl: 5    traMADol (ULTRAM) 50 MG tablet, Take 1 tablet by mouth every 6 hours as needed for Pain for up to 30 days. , Disp: 120 tablet, Rfl: 0    gabapentin (NEURONTIN) 300 MG capsule, Take 1 capsule by mouth 3 times daily for 30 days. , Disp: 90 capsule, Rfl: 2    ALPRAZolam (XANAX) 1 MG tablet, take 1 tablet by mouth three times a day if needed for anxiety or sleep, Disp: 90 tablet, Rfl: 2    albuterol sulfate HFA (VENTOLIN HFA) 108 (90 Base) MCG/ACT inhaler, Inhale 2 puffs into the lungs 4 times daily as needed for Wheezing, Disp: 18 g, Rfl: 0    spironolactone (ALDACTONE) 100 MG tablet, take 1 tablet by mouth every morning, Disp: 90 tablet, Rfl: 2    cyclobenzaprine (FLEXERIL) 10 MG tablet, take 1/2 to 1 tablet by mouth three times a day if needed, Disp: 60 tablet, Rfl: 3    DULoxetine (CYMBALTA) 60 MG extended release capsule, take 1 capsule by mouth once daily, Disp: 90 capsule, Rfl: 3    LACTULOSE PO, Take 30 mLs by mouth 2 times daily, Disp: , Rfl:     nitroGLYCERIN (NITROSTAT) 0.4 MG SL tablet, up to max of 3 total doses.  If no relief after 1 dose, call 911., Disp: 25 tablet, Rfl: 0    rifaximin (XIFAXAN) 550 MG tablet, Take 1 tablet by mouth 2 times daily, Disp: 60 tablet, Rfl: 6    aspirin 81 MG chewable tablet, Take 1 tablet by mouth daily, Disp: 30 tablet, Rfl: 0    The patient has No Known Allergies. Past Medical History  Sada Herrera  has a past medical history of Anxiety, Arthritis, Chronic kidney disease, Cirrhosis ([address] Diverticulitis, Diverticulosis, GERD (gastroesophageal reflux disease), Hypertension, Other disorders of kidney and ureter in diseases classified elsewhere, Psychiatric problem, and Stroke of unknown cause ([facility] [address] Subjective:      Review of Systems   Constitutional: Negative for activity change, fatigue and fever. HENT: Negative for congestion, ear pain, rhinorrhea, sore throat and trouble swallowing. Eyes: Negative for pain, discharge and redness. Respiratory: Negative for cough, shortness of breath and wheezing. Cardiovascular: Negative. Gastrointestinal: Negative for abdominal pain, constipation, diarrhea, nausea and vomiting. Endocrine: Negative. Genitourinary: Negative for dysuria, frequency and urgency. Musculoskeletal: Negative for arthralgias, back pain and myalgias. Skin: Negative for rash. Allergic/Immunologic: Negative. Neurological: Negative for dizziness, tremors, weakness and headaches. Hematological: Negative. Psychiatric/Behavioral: Negative for dysphoric mood and sleep disturbance. The patient is not nervous/anxious. Objective:     /68   Pulse 103   Temp 98.2 °F (36.8 °C) (Oral)   Resp 16   Wt 240 lb (108.9 kg)   SpO2 96%   BMI 38.74 kg/m²     Physical Exam  Constitutional:       General: He is not in acute distress. Appearance: He is well-developed. He is not diaphoretic. HENT:      Right Ear: External ear normal.      Left Ear: External ear normal.      Nose: Nose normal.      Mouth/Throat:      Mouth: Mucous membranes are moist.   Eyes:      General:         Right eye: No discharge. Left eye: No discharge. Conjunctiva/sclera: Conjunctivae normal.      Pupils: Pupils are equal, round, and reactive to light. Neck:      Vascular: No JVD.    Cardiovascular:      Rate and Rhythm: Normal rate and regular rhythm. Heart sounds: Normal heart sounds. No murmur heard. Pulmonary:      Effort: Pulmonary effort is normal. No respiratory distress. Breath sounds: Normal breath sounds. Musculoskeletal:         General: No tenderness or deformity. Normal range of motion. Cervical back: Normal range of motion. Skin:     General: Skin is warm and dry. Capillary Refill: Capillary refill takes less than 2 seconds. Coloration: Skin is not pale. Findings: No erythema or rash. Neurological:      Mental Status: He is alert and oriented to person, place, and time. Coordination: Coordination normal.   Psychiatric:         Behavior: Behavior normal.         Thought Content: Thought content normal.         Judgment: Judgment normal.         Assessment/Plan:      Shelly Kelley was seen today for altered mental status and flu vaccine. Diagnoses and all orders for this visit:    TIA (transient ischemic attack)  -     External Referral To Neurology    Need for influenza vaccination  -     INFLUENZA, MDCK QUADV, 2 YRS AND OLDER, IM, PF, PREFILL SYR OR SDV, 0.5ML (FLUCELVAX QUADV, PF)    Patient is currently asymptomatic. Pathology labs was called and we got his ammonia level result from yesterday and it was 61. Patient states he is taking his lactulose. Referral is given for neurology for him for follow-up. Patient getting his flu shot today. Return if symptoms worsen or fail to improve. Patient instructions given jayyd.         Electronically signed by CHRISTIAN Loyola CNP on 12/1/2021 at 11:53 AM

## 2021-12-09 ENCOUNTER — CARE COORDINATION (OUTPATIENT)
Dept: CARE COORDINATION | Age: 56
End: 2021-12-09

## 2021-12-09 NOTE — CARE COORDINATION
12/12/21  CHRISTIAN Harley CNP   gabapentin (NEURONTIN) 300 MG capsule Take 1 capsule by mouth 3 times daily for 30 days. 11/2/21 12/2/21  CHRISTIAN Harley CNP   ALPRAZolam Ilda Buggy) 1 MG tablet take 1 tablet by mouth three times a day if needed for anxiety or sleep 10/26/21 1/23/22  CHRISTIAN Harley CNP   albuterol sulfate HFA (VENTOLIN HFA) 108 (90 Base) MCG/ACT inhaler Inhale 2 puffs into the lungs 4 times daily as needed for Wheezing 10/19/21   CHRISTIAN Harley CNP   spironolactone (ALDACTONE) 100 MG tablet take 1 tablet by mouth every morning 9/24/21   CHRISTIAN Harley CNP   cyclobenzaprine (FLEXERIL) 10 MG tablet take 1/2 to 1 tablet by mouth three times a day if needed 9/7/21   CHRISTIAN Harley CNP   DULoxetine (CYMBALTA) 60 MG extended release capsule take 1 capsule by mouth once daily 8/23/21   CHRISTIAN Kirkland CNP   LACTULOSE PO Take 30 mLs by mouth 2 times daily    Historical Provider, MD   nitroGLYCERIN (NITROSTAT) 0.4 MG SL tablet up to max of 3 total doses.  If no relief after 1 dose, call 911. 6/28/21   CHRISTIAN Harley CNP   rifaximin Lucyann Been) 550 MG tablet Take 1 tablet by mouth 2 times daily 6/2/21   CHRISTIAN Sauer CNP   aspirin 81 MG chewable tablet Take 1 tablet by mouth daily 10/15/19   Abdullahi Azevedo MD       Future Appointments   Date Time Provider Kaelyn Rojas   12/14/2021  1:20 PM CHRISTIAN Harley CNP SRPX OLIVERA [facility] [place] [email or web address]   12/15/2021 11:45 AM CHRISTIAN Sauer CNP AFLGASL [facility]   2/8/2022  2:00 PM SCHEDULE, SRPX NEUROINTERVENTION SRPX NEURINT [facility] [place] [email or web address]   3/10/2022  3:00 PM MD CANDACE Meneses SRPX Heart [facility] [email or web address]

## 2021-12-10 ENCOUNTER — CARE COORDINATION (OUTPATIENT)
Dept: CARE COORDINATION | Age: 56
End: 2021-12-10

## 2021-12-10 DIAGNOSIS — G89.29 CHRONIC MIDLINE LOW BACK PAIN, UNSPECIFIED WHETHER SCIATICA PRESENT: ICD-10-CM

## 2021-12-10 DIAGNOSIS — M54.50 CHRONIC MIDLINE LOW BACK PAIN, UNSPECIFIED WHETHER SCIATICA PRESENT: ICD-10-CM

## 2021-12-10 RX ORDER — TRAMADOL HYDROCHLORIDE 50 MG/1
50 TABLET ORAL EVERY 6 HOURS PRN
Qty: 120 TABLET | Refills: 0 | Status: SHIPPED | OUTPATIENT
Start: 2021-12-10 | End: 2022-01-06

## 2021-12-10 NOTE — TELEPHONE ENCOUNTER
----- Message from Brad Carter sent at 12/10/2021 12:13 PM EST -----  Subject: Refill Request    QUESTIONS  Name of Medication? traMADol (ULTRAM) 50 MG tablet  Patient-reported dosage and instructions? 50mg tab. twice as needed   How many days do you have left? 0  Preferred Pharmacy? [id number] phone number (if available)? [phone]  ---------------------------------------------------------------------------  --------------  CALL BACK INFO  What is the best way for the office to contact you? OK to leave message on   voicemail  Preferred Call Back Phone Number?  [phone]

## 2021-12-10 NOTE — CARE COORDINATION
I called and spoke with Sada Herrera about getting him assistance on his Xifaxin, I told him I will be sending him his portion of the application soon. I did fax the provider their portion to fill out and send back to me.           [facility]   Medication Assistance  [facility] and [place]    Y) [phone] (g) [phone]

## 2021-12-14 ENCOUNTER — OFFICE VISIT (OUTPATIENT)
Dept: FAMILY MEDICINE CLINIC | Age: 56
End: 2021-12-14
Payer: COMMERCIAL

## 2021-12-14 VITALS
HEART RATE: 96 BPM | WEIGHT: 245 LBS | RESPIRATION RATE: 16 BRPM | BODY MASS INDEX: 39.54 KG/M2 | TEMPERATURE: 98.2 F | SYSTOLIC BLOOD PRESSURE: 118 MMHG | DIASTOLIC BLOOD PRESSURE: 62 MMHG | OXYGEN SATURATION: 99 %

## 2021-12-14 DIAGNOSIS — M54.50 CHRONIC MIDLINE LOW BACK PAIN, UNSPECIFIED WHETHER SCIATICA PRESENT: ICD-10-CM

## 2021-12-14 DIAGNOSIS — G45.9 TIA (TRANSIENT ISCHEMIC ATTACK): Primary | ICD-10-CM

## 2021-12-14 DIAGNOSIS — R26.81 UNSTEADY GAIT: ICD-10-CM

## 2021-12-14 DIAGNOSIS — R41.0 CONFUSION: ICD-10-CM

## 2021-12-14 DIAGNOSIS — E78.2 MIXED HYPERLIPIDEMIA: ICD-10-CM

## 2021-12-14 DIAGNOSIS — F41.9 ANXIETY: ICD-10-CM

## 2021-12-14 DIAGNOSIS — K70.30 ALCOHOLIC CIRRHOSIS, UNSPECIFIED WHETHER ASCITES PRESENT (HCC): ICD-10-CM

## 2021-12-14 DIAGNOSIS — Q28.2 CEREBRAL AVM: ICD-10-CM

## 2021-12-14 DIAGNOSIS — M48.061 SPINAL STENOSIS OF LUMBAR REGION, UNSPECIFIED WHETHER NEUROGENIC CLAUDICATION PRESENT: ICD-10-CM

## 2021-12-14 DIAGNOSIS — H92.01 OTALGIA, RIGHT EAR: ICD-10-CM

## 2021-12-14 DIAGNOSIS — G89.29 CHRONIC MIDLINE LOW BACK PAIN, UNSPECIFIED WHETHER SCIATICA PRESENT: ICD-10-CM

## 2021-12-14 PROCEDURE — 3017F COLORECTAL CA SCREEN DOC REV: CPT | Performed by: NURSE PRACTITIONER

## 2021-12-14 PROCEDURE — G8417 CALC BMI ABV UP PARAM F/U: HCPCS | Performed by: NURSE PRACTITIONER

## 2021-12-14 PROCEDURE — G8482 FLU IMMUNIZE ORDER/ADMIN: HCPCS | Performed by: NURSE PRACTITIONER

## 2021-12-14 PROCEDURE — 99214 OFFICE O/P EST MOD 30 MIN: CPT | Performed by: NURSE PRACTITIONER

## 2021-12-14 PROCEDURE — 4004F PT TOBACCO SCREEN RCVD TLK: CPT | Performed by: NURSE PRACTITIONER

## 2021-12-14 PROCEDURE — G8427 DOCREV CUR MEDS BY ELIG CLIN: HCPCS | Performed by: NURSE PRACTITIONER

## 2021-12-16 ASSESSMENT — ENCOUNTER SYMPTOMS
RESPIRATORY NEGATIVE: 1
EYES NEGATIVE: 1
ABDOMINAL PAIN: 1

## 2021-12-16 NOTE — PROGRESS NOTES
[address] [place] [address]  Dept: [phone]  Dept Fax: [phone]  Loc: [phone]  PROGRESS NOTE      VisitDate: 12/14/2021    Lavelle Ocasio is a 61 y.o. male who presents today for:     Chief Complaint   Patient presents with    3 Month Follow-Up         Subjective:  Patient presents for routine 3-month follow-up/medication refills. History of anxiety, arthritis, chronic kidney disease, alcoholic cirrhosis diverticulitis, cerebral AVM, GERD hypertension. History of half pack a day smoker 25 years. Patient continues to abstain from alcohol. Recent ER visits suspected TIA/acute confusion. Denies any additional episodes since hospitalization. Denies any fever, syncope, dizziness, chest pain, shortness of breath, edema. Patient reports a minimal upper abdominal pain and abdominal bloating. Denies any nausea, vomiting, diarrhea. Patient complains of continued intermittent sharp right ear pain/ otalgia past several weeks. Review of Systems   Constitutional: Negative. HENT: Positive for ear pain. Eyes: Negative. Respiratory: Negative. Cardiovascular: Negative. Gastrointestinal: Positive for abdominal pain. Genitourinary: Negative. Musculoskeletal: Positive for arthralgias. Psychiatric/Behavioral: Positive for agitation and confusion. Past Medical History:   Diagnosis Date    Anxiety     Arthritis     Chronic kidney disease     Cirrhosis ([address]     Diverticulitis     Diverticulosis     GERD (gastroesophageal reflux disease)     Hypertension     Other disorders of kidney and ureter in diseases classified elsewhere     Psychiatric problem     Stroke of unknown cause ([address] 8/12/2021      Past Surgical History:   Procedure Laterality Date    ABDOMEN SURGERY      BACK SURGERY      cervical plate    CARDIAC CATHETERIZATION  2007?     CERVICAL DISC SURGERY      x 2 ---broken neck 7-2003    COLON SURGERY  2004    partial, due to diverticulitis    COLONOSCOPY      DILATATION, ESOPHAGUS      ENDOSCOPY, COLON, DIAGNOSTIC      FRACTURE SURGERY      Broke neck in 2003    NERVE BLOCK Bilateral 10/02/2017    Cervical Facet MBB at C4-5, C5-6, C6-7     MI INJ,PARAVERTEBRAL L/S,1 LEVEL Bilateral 10/2/2017    C-FACET MBB C4-5, C5-6, C6-7 BILATERAL performed by Rickey Lee MD at [facility] GASTROINTESTINAL ENDOSCOPY  2019    UPPER GASTROINTESTINAL ENDOSCOPY Left 9/28/2020    EGD BAND LIGATION performed by Nathaly Claros MD at [facility]     Family History   Problem Relation Age of Onset    Hypertension Mother     Heart Disease Mother     Alzheimer's Disease Mother     Heart Failure Mother     Hypertension Father     Heart Disease Father     Cancer Father     High Blood Pressure Paternal Uncle     Heart Disease Paternal Uncle     Colon Cancer Neg Hx     Colon Polyps Neg Hx      Social History     Tobacco Use    Smoking status: Current Every Day Smoker     Packs/day: 0.50     Years: 25.00     Pack years: 12.50     Types: Cigarettes    Smokeless tobacco: Never Used    Tobacco comment: printed to avs   Substance Use Topics    Alcohol use: Not Currently     Comment: Quit 15 years ago      Current Outpatient Medications   Medication Sig Dispense Refill    traMADol (ULTRAM) 50 MG tablet Take 1 tablet by mouth every 6 hours as needed for Pain for up to 30 days.  120 tablet 0    atorvastatin (LIPITOR) 40 MG tablet take 1 tablet by mouth nightly 30 tablet 5    ALPRAZolam (XANAX) 1 MG tablet take 1 tablet by mouth three times a day if needed for anxiety or sleep 90 tablet 2    albuterol sulfate HFA (VENTOLIN HFA) 108 (90 Base) MCG/ACT inhaler Inhale 2 puffs into the lungs 4 times daily as needed for Wheezing 18 g 0    spironolactone (ALDACTONE) 100 MG tablet take 1 tablet by mouth every morning 90 tablet 2    cyclobenzaprine (FLEXERIL) 10 MG tablet take 1/2 to 1 tablet by mouth three times a day if needed 60 tablet 3    DULoxetine (CYMBALTA) 60 MG extended release capsule take 1 capsule by mouth once daily 90 capsule 3    LACTULOSE PO Take 30 mLs by mouth 2 times daily      nitroGLYCERIN (NITROSTAT) 0.4 MG SL tablet up to max of 3 total doses. If no relief after 1 dose, call 911. 25 tablet 0    rifaximin (XIFAXAN) 550 MG tablet Take 1 tablet by mouth 2 times daily 60 tablet 6    aspirin 81 MG chewable tablet Take 1 tablet by mouth daily 30 tablet 0    gabapentin (NEURONTIN) 300 MG capsule Take 1 capsule by mouth 3 times daily for 30 days. 90 capsule 2     No current facility-administered medications for this visit. No Known Allergies  Health Maintenance   Topic Date Due    Hepatitis A vaccine (1 of 2 - Risk 2-dose series) Never done    COVID-19 Vaccine (1) Never done    HIV screen  Never done    Hepatitis B vaccine (1 of 3 - Risk 3-dose series) Never done    Shingles Vaccine (1 of 2) Never done    Lipid screen  08/13/2022    Potassium monitoring  11/30/2022    Creatinine monitoring  11/30/2022    Diabetes screen  08/13/2024    Colon cancer screen colonoscopy  03/01/2029    Pneumococcal 0-64 years Vaccine (2 of 2 - PPSV23) 06/03/2029    DTaP/Tdap/Td vaccine (2 - Td or Tdap) 07/27/2030    Flu vaccine  Completed    Hepatitis C screen  Completed    Hib vaccine  Aged Out    Meningococcal (ACWY) vaccine  Aged Out         Objective:     Physical Exam  Vitals and nursing note reviewed. Constitutional:       Appearance: Normal appearance. He is well-developed. He is obese. He is not diaphoretic. HENT:      Head: Normocephalic and atraumatic. Not macrocephalic and not microcephalic. Right Ear: Hearing, tympanic membrane, ear canal and external ear normal. No drainage or tenderness. No middle ear effusion. No hemotympanum.  Tympanic membrane is not injected, scarred, perforated or bulging. Left Ear: Hearing, tympanic membrane, ear canal and external ear normal. No drainage or tenderness. No middle ear effusion. No hemotympanum. Tympanic membrane is not injected, scarred, perforated or bulging. Nose: Nose normal. No nasal deformity, septal deviation, mucosal edema or rhinorrhea. Right Sinus: No maxillary sinus tenderness or frontal sinus tenderness. Left Sinus: No maxillary sinus tenderness or frontal sinus tenderness. Mouth/Throat:      Mouth: No oral lesions. Dentition: Normal dentition. Does not have dentures. No dental caries or dental abscesses. Pharynx: Oropharynx is clear. No oropharyngeal exudate or posterior oropharyngeal erythema. Tonsils: No tonsillar abscesses. Eyes:      General: Lids are normal. No scleral icterus. Extraocular Movements:      Right eye: Normal extraocular motion. Left eye: Normal extraocular motion. Conjunctiva/sclera: Conjunctivae normal.      Right eye: Right conjunctiva is not injected. Left eye: Left conjunctiva is not injected. Pupils: Pupils are equal, round, and reactive to light. Neck:      Thyroid: No thyroid mass or thyromegaly. Vascular: No carotid bruit or JVD. Trachea: Trachea normal.   Cardiovascular:      Rate and Rhythm: Normal rate and regular rhythm. Pulses: Normal pulses. Heart sounds: Normal heart sounds, S1 normal and S2 normal. No murmur heard. No friction rub. No gallop. Pulmonary:      Effort: Pulmonary effort is normal. No respiratory distress. Breath sounds: Normal breath sounds. No wheezing, rhonchi or rales. Chest:      Chest wall: No tenderness. Breasts:      Right: No supraclavicular adenopathy. Left: No supraclavicular adenopathy. Abdominal:      General: Bowel sounds are normal.      Palpations: Abdomen is soft. There is no hepatomegaly, splenomegaly or mass. Tenderness: There is no guarding or rebound.       Hernia: No hernia is present. There is no hernia in the ventral area or left inguinal area. Musculoskeletal:         General: No tenderness. Normal range of motion. Cervical back: Normal range of motion and neck supple. No edema or erythema. Normal range of motion. Lymphadenopathy:      Head:      Right side of head: No submental, submandibular, tonsillar, preauricular, posterior auricular or occipital adenopathy. Left side of head: No submental, submandibular, tonsillar, preauricular, posterior auricular or occipital adenopathy. Cervical: No cervical adenopathy. Right cervical: No superficial, deep or posterior cervical adenopathy. Left cervical: No superficial, deep or posterior cervical adenopathy. Upper Body:      Right upper body: No supraclavicular or pectoral adenopathy. Left upper body: No supraclavicular or pectoral adenopathy. Skin:     General: Skin is warm and dry. Coloration: Skin is not pale. Findings: No bruising, ecchymosis, laceration, lesion or rash. Nails: There is no clubbing. Neurological:      Mental Status: He is alert and oriented to person, place, and time. Cranial Nerves: No cranial nerve deficit. Motor: No abnormal muscle tone. Coordination: Coordination normal.      Deep Tendon Reflexes: Reflexes normal.   Psychiatric:         Speech: Speech normal.         Behavior: Behavior normal.         Thought Content: Thought content normal.         Judgment: Judgment normal.       /62   Pulse 96   Temp 98.2 °F (36.8 °C) (Oral)   Resp 16   Wt 245 lb (111.1 kg)   SpO2 99%   BMI 39.54 kg/m²       Impression/Plan:  1. TIA (transient ischemic attack)    2. Alcoholic cirrhosis, unspecified whether ascites present ([facility] [address]    3. Chronic midline low back pain, unspecified whether sciatica present    4. Anxiety    5. Cerebral AVM    6. Spinal stenosis of lumbar region, unspecified whether neurogenic claudication present    7.  Mixed hyperlipidemia    8. Confusion    9. Otalgia, right ear    unsteady giat  Requested Prescriptions      No prescriptions requested or ordered in this encounter     No orders of the defined types were placed in this encounter. Patient giveneducational materials - see patient instructions. Discussed use, benefit, and side effects of prescribed medications. All patient questions answered. Pt voiced understanding. Reviewed health maintenance. Patient agreedwith treatment plan. Follow up as directed. Continue medications as prescribed. Educational information on above diagnosis  provided per AVS.  Continue routine follow-up with neurology and gastroenterology. Outpatient Encounter Medications as of 12/14/2021   Medication Sig Dispense Refill    traMADol (ULTRAM) 50 MG tablet Take 1 tablet by mouth every 6 hours as needed for Pain for up to 30 days. 120 tablet 0    atorvastatin (LIPITOR) 40 MG tablet take 1 tablet by mouth nightly 30 tablet 5    ALPRAZolam (XANAX) 1 MG tablet take 1 tablet by mouth three times a day if needed for anxiety or sleep 90 tablet 2    albuterol sulfate HFA (VENTOLIN HFA) 108 (90 Base) MCG/ACT inhaler Inhale 2 puffs into the lungs 4 times daily as needed for Wheezing 18 g 0    spironolactone (ALDACTONE) 100 MG tablet take 1 tablet by mouth every morning 90 tablet 2    cyclobenzaprine (FLEXERIL) 10 MG tablet take 1/2 to 1 tablet by mouth three times a day if needed 60 tablet 3    DULoxetine (CYMBALTA) 60 MG extended release capsule take 1 capsule by mouth once daily 90 capsule 3    LACTULOSE PO Take 30 mLs by mouth 2 times daily      nitroGLYCERIN (NITROSTAT) 0.4 MG SL tablet up to max of 3 total doses.  If no relief after 1 dose, call 911. 25 tablet 0    rifaximin (XIFAXAN) 550 MG tablet Take 1 tablet by mouth 2 times daily 60 tablet 6    aspirin 81 MG chewable tablet Take 1 tablet by mouth daily 30 tablet 0    gabapentin (NEURONTIN) 300 MG capsule Take 1 capsule by mouth 3 times daily for 30 days. 90 capsule 2     No facility-administered encounter medications on file as of 12/14/2021. No orders of the defined types were placed in this encounter. Cortisporin otic drops as prescribed previously. Patient reports that he has ample amount at home. Continue medications as prescribed. Educational information on above diagnosis  provided per AVS.  Patient requires a walker for safe performance of ADLs as well as ambulation in and out of home. This is a medical necessity for a walker. Patient is unable to use a cane effectively due to chronic neck pain weakness of arms. I feel that her walker is more suitable device for his safe transfer and ambulation.   Piper ALVAREZP      Electronically signed by CHRISTIAN Landaverde CNP on 12/16/2021 at 12:27 PM

## 2021-12-20 ENCOUNTER — TELEPHONE (OUTPATIENT)
Dept: FAMILY MEDICINE CLINIC | Age: 56
End: 2021-12-20

## 2021-12-20 RX ORDER — CYCLOBENZAPRINE HCL 10 MG
TABLET ORAL
Qty: 60 TABLET | Refills: 3 | Status: SHIPPED | OUTPATIENT
Start: 2021-12-20 | End: 2022-03-31

## 2021-12-20 NOTE — TELEPHONE ENCOUNTER
See message below. I faxed patient's referral today to [facility]. Any further recommendations?

## 2021-12-20 NOTE — TELEPHONE ENCOUNTER
----- Message from CRISTINO OVEROTN sent at 12/20/2021 12:07 PM EST -----  Subject: Message to Provider    QUESTIONS  Information for Provider? Patient was outside getting mail on Saturday and   his legs went number and gave out. He couldnt get up due to it and his   neighbor found him and got him in the house. He advised that he feels fine   now, no numbness or anything but his balance is off. He also advised he   just wants to rest today and that he didnt want to go to the ER today. He   said if he needs to go to the ER he would like to go tomorrow. Please give   him a call back when able.  ---------------------------------------------------------------------------  --------------  CALL BACK INFO  What is the best way for the office to contact you? OK to leave message on   voicemail  Preferred Call Back Phone Number? [phone]  ---------------------------------------------------------------------------  --------------  SCRIPT ANSWERS  Relationship to Patient?  Self

## 2021-12-23 ENCOUNTER — CARE COORDINATION (OUTPATIENT)
Dept: CARE COORDINATION | Age: 56
End: 2021-12-23

## 2021-12-23 NOTE — CARE COORDINATION
Attempted care management follow up. Left message to return phone call to ambulatory care manager at [phone].     Plan -   Fall precautions use walker at all times  MAP referral for rifaximin possibly after new insurance  Patient getting new insurance   Report new or worsening symptoms immediately  2/8 neuro  3/10 cardiology  Report any confusion or AMS or abdominal bloating

## 2021-12-27 ENCOUNTER — TELEPHONE (OUTPATIENT)
Dept: FAMILY MEDICINE CLINIC | Age: 56
End: 2021-12-27

## 2021-12-27 NOTE — TELEPHONE ENCOUNTER
He is changing insurance after Jan 1 and was wanting refills now. I explained to him that these medications are controlled and he must follow the law, he will not be due for the Tramadol until 1-8-21 no matter what insurance he has. He verbalized understanding.

## 2022-01-02 DIAGNOSIS — M54.50 CHRONIC MIDLINE LOW BACK PAIN, UNSPECIFIED WHETHER SCIATICA PRESENT: ICD-10-CM

## 2022-01-02 DIAGNOSIS — G89.29 CHRONIC MIDLINE LOW BACK PAIN, UNSPECIFIED WHETHER SCIATICA PRESENT: ICD-10-CM

## 2022-01-03 ENCOUNTER — CARE COORDINATION (OUTPATIENT)
Dept: CARE COORDINATION | Age: 57
End: 2022-01-03

## 2022-01-03 NOTE — CARE COORDINATION
Attempted care management follow up.  Left message to return phone call to ambulatory care manager at [phone].     Plan -   Fall precautions use walker at all times  MAP referral for rifaximin possibly after new insurance  Patient getting new insurance   Report new or worsening symptoms immediately  2/8 neuro  3/10 cardiology  Report any confusion or AMS or abdominal bloating

## 2022-01-07 RX ORDER — TRAMADOL HYDROCHLORIDE 50 MG/1
50 TABLET ORAL EVERY 6 HOURS PRN
Qty: 120 TABLET | Refills: 0 | Status: SHIPPED | OUTPATIENT
Start: 2022-01-07 | End: 2022-02-07

## 2022-01-11 ENCOUNTER — CARE COORDINATION (OUTPATIENT)
Dept: CARE COORDINATION | Age: 57
End: 2022-01-11

## 2022-01-11 DIAGNOSIS — G31.2 ALCOHOLIC ENCEPHALOPATHY (HCC): ICD-10-CM

## 2022-01-11 DIAGNOSIS — R26.81 UNSTEADY GAIT: Primary | ICD-10-CM

## 2022-01-11 DIAGNOSIS — R29.6 FREQUENT FALLS: ICD-10-CM

## 2022-01-11 NOTE — CARE COORDINATION
Ambulatory Care Coordination Note  1/12/2022  CM Risk Score: 4  Charlson 10 Year Mortality Risk Score: 98%     ACC: Temitope Del Toro, RN    Summary Note:     Utilizing food ron. Alyssa Estonian going upstairs. Patient reports legs are weak today. He only has a cane. Would benefit from using walker at all times. Note routed to PCP for order. Harlan Jang has walker ready for . Updated patient. Plan -   Order for walker  Fall precautions   Report new or worsening symptoms immediately  2/8 neuro  3/10 cardiology  Report any confusion or AMS or abdominal bloating  Medication adherence        Care Coordination Interventions    Program Enrollment: Complex Care  Referral from Primary Care Provider: No  Suggested Interventions and Community Resources  Meals on Wheels: Declined  Medication Assistance Program: Completed  Smoking Cessation: Declined  Transportation Support: Completed  Other Services or Interventions: est with [facility] and Dr. Ashlyn Diane cardiology         Goals Addressed                 This Visit's Progress     COMPLETED: Conditions and Symptoms        I will schedule office visits, as directed by my provider. I will keep my appointment or reschedule if I have to cancel. I will notify my provider of any barriers to my plan of care. I will follow my Zone Management tool to seek urgent or emergent care. I will notify my provider of any symptoms that indicate a worsening of my condition. Barriers: impairment:  physical: liver cirrhosis and cognitive, lack of support, and overwhelmed by complexity of regimen  Plan for overcoming my barriers: care coordination, follow up  Confidence: 9/10  Anticipated Goal Completion Date: 11/6/21              Prior to Admission medications    Medication Sig Start Date End Date Taking?  Authorizing Provider   traMADol (ULTRAM) 50 MG tablet take 1 tablet by mouth every 6 hours AS NEEDED FOR PAIN for up to 30 DAYS 1/7/22 2/6/22  CHRISTIAN Davis - CNP cyclobenzaprine (FLEXERIL) 10 MG tablet take 1/2 to 1 tablet by mouth three times a day if needed 12/20/21   CHRISTIAN Huynh CNP   atorvastatin (LIPITOR) 40 MG tablet take 1 tablet by mouth nightly 11/16/21   CHRISTIAN Huynh - CNP   gabapentin (NEURONTIN) 300 MG capsule Take 1 capsule by mouth 3 times daily for 30 days. 11/2/21 12/2/21  CHRISTIAN Huynh CNP   ALPRAZolam Moca Eamon) 1 MG tablet take 1 tablet by mouth three times a day if needed for anxiety or sleep 10/26/21 1/23/22  CHRISTIAN Huynh CNP   albuterol sulfate HFA (VENTOLIN HFA) 108 (90 Base) MCG/ACT inhaler Inhale 2 puffs into the lungs 4 times daily as needed for Wheezing 10/19/21   CHRISTIAN Huynh CNP   spironolactone (ALDACTONE) 100 MG tablet take 1 tablet by mouth every morning 9/24/21   CHRISTIAN Huynh CNP   DULoxetine (CYMBALTA) 60 MG extended release capsule take 1 capsule by mouth once daily 8/23/21   CHRISTIAN Kirkland CNP   LACTULOSE PO Take 30 mLs by mouth 2 times daily    Historical Provider, MD   nitroGLYCERIN (NITROSTAT) 0.4 MG SL tablet up to max of 3 total doses.  If no relief after 1 dose, call 911. 6/28/21   CHRISTIAN Huynh CNP   rifaximin Ryan Felling) 550 MG tablet Take 1 tablet by mouth 2 times daily 6/2/21   CHRISTIAN Pandya CNP   aspirin 81 MG chewable tablet Take 1 tablet by mouth daily 10/15/19   Dani Reza MD       Future Appointments   Date Time Provider Kaelyn Crystal   2/8/2022  2:00 PM BHANU Finch NEUROINTERVENTION [facility]PX NEURINT [facility] - [address] [facility]   3/10/2022  3:00 PM Ulises Pierce MD N SRPX Heart [facility] - [address] [facility]   3/17/2022 11:45 AM CHRISTIAN Pandya CNP

## 2022-01-17 DIAGNOSIS — F41.9 ANXIETY: ICD-10-CM

## 2022-01-17 DIAGNOSIS — K70.30 ALCOHOLIC CIRRHOSIS, UNSPECIFIED WHETHER ASCITES PRESENT (HCC): ICD-10-CM

## 2022-01-21 RX ORDER — ALPRAZOLAM 1 MG/1
TABLET ORAL
Qty: 90 TABLET | Refills: 2 | Status: SHIPPED | OUTPATIENT
Start: 2022-01-21 | End: 2022-03-15 | Stop reason: SDUPTHER

## 2022-01-25 RX ORDER — GABAPENTIN 300 MG/1
300 CAPSULE ORAL 3 TIMES DAILY
Qty: 90 CAPSULE | Refills: 2 | Status: SHIPPED | OUTPATIENT
Start: 2022-01-25 | End: 2022-04-21

## 2022-01-26 ENCOUNTER — TELEPHONE (OUTPATIENT)
Dept: FAMILY MEDICINE CLINIC | Age: 57
End: 2022-01-26

## 2022-01-26 ENCOUNTER — TELEPHONE (OUTPATIENT)
Dept: NEUROLOGY | Age: 57
End: 2022-01-26

## 2022-01-26 ENCOUNTER — CARE COORDINATION (OUTPATIENT)
Dept: CARE COORDINATION | Age: 57
End: 2022-01-26

## 2022-01-26 NOTE — CARE COORDINATION
Patient can now afford the Xifaxin and does not need my assistance. I told him to call me in the future if he would like my help.       [address] [facility]   Medication Assistance  [address] [facility], [facility]    (S) [phone]  (L) [phone]

## 2022-01-26 NOTE — TELEPHONE ENCOUNTER
----- Message from Becky Aragon sent at 1/26/2022  7:44 AM EST -----  Subject: Message to Provider    QUESTIONS  Information for Provider? Pt called and stated that he would like to talk   to Chele [address] when she gets a chance. Pt would like to receive a call back. ---------------------------------------------------------------------------  --------------  Mare Pak INFO  What is the best way for the office to contact you? OK to leave message on   voicemail  Preferred Call Back Phone Number? [phone]  ---------------------------------------------------------------------------  --------------  SCRIPT ANSWERS  Relationship to Patient?  Self

## 2022-01-26 NOTE — TELEPHONE ENCOUNTER
Pt seen previously in hospital by Dr. Dolores Alejandro. He states he may have had a TIA 1-2 weeks ago. He fell in the parking lot because his legs were numb. He said he laid there and couldn't move his legs at all and his arms were so weak he couldn't pick himself up. He laid there until a neighbor arrived and could help him. He said he fell asleep in his recliner unable to move his legs. When he woke up he could move them again. He said he did not seek medical attention, which it was reiterated for him to contact medical attention immediately if anything like that happens again or if he has signs of stroke. He expressed fear with all his symptoms and stated this is what happened to his mother before she  of a stroke. He denies being on a blood thinner even though it was stated to start Plavix in previous notations. He stated he was on one years ago and his blood was \"too thin\". He stated he would consider starting one. Please address. Pt is on for 3-1-22. Explained to patient I would see if new images or testing were needed and if it is appropriate to move appt up.

## 2022-01-27 ENCOUNTER — TELEPHONE (OUTPATIENT)
Dept: FAMILY MEDICINE CLINIC | Age: 57
End: 2022-01-27

## 2022-01-27 DIAGNOSIS — R29.898 TRANSIENT WEAKNESS OF LOWER EXTREMITY: Primary | ICD-10-CM

## 2022-01-27 NOTE — TELEPHONE ENCOUNTER
----- Message from Wood Luevano sent at 1/27/2022  7:37 AM EST -----  Subject: Message to Provider    QUESTIONS  Information for Provider? Mr. Ashley Francis called today to say that a person   called him to advise him that he is to on a blood thinner. He is asking   Kay to call him regarding. Because they took him off of blood   thinners. ---------------------------------------------------------------------------  --------------  Lico ECHAVARRIA  What is the best way for the office to contact you? OK to leave message on   voicemail  Preferred Call Back Phone Number? [phone]  ---------------------------------------------------------------------------  --------------  SCRIPT ANSWERS  Relationship to Patient?  Self

## 2022-01-27 NOTE — TELEPHONE ENCOUNTER
I reviewed the note from Neurology yesterday, it appears they may want him to be on Plavix. I explained this to Chacorta Minaya and why. I asked him to take some notes regarding this phone call, that way he will remember when neurology calls him, what it is regarding. He will call me if he has any further questions.

## 2022-01-27 NOTE — TELEPHONE ENCOUNTER
Pt notified of MRI and follow up information. Also educated in depth regarding stroke signs and symptoms and regarding the importance of timing and getting to ED or calling 911 as soon as stroke symptoms occur.

## 2022-02-01 ENCOUNTER — CARE COORDINATION (OUTPATIENT)
Dept: CARE COORDINATION | Age: 57
End: 2022-02-01

## 2022-02-01 NOTE — CARE COORDINATION
Ambulatory Care Coordination Note  2/1/2022  CM Risk Score: 4  Charlson 10 Year Mortality Risk Score: 98%     ACC: Emmanuelle Painting RN    Summary Note:     Now has [facility]. Discussed home care waiver. Referral made to [employer]. Plan -   [employer] referral for home care waiver  2/3 MRI  has transportation  3/1 neuro  Fall precautions use walker at all times  Report new or worsening symptoms immediately  3/10 cardiology  Report any confusion or AMS or abdominal bloating  Medication adherence         Care Coordination Interventions    Program Enrollment: Complex Care  Referral from Primary Care Provider: No  Suggested Interventions and Community Resources  Meals on Wheels: Declined  Medication Assistance Program: Completed  Smoking Cessation: Declined  Transportation Support: Completed  Other Services or Interventions: est with [facility]DI[facility] and Dr. Cooper Borrego cardiology         Goals Addressed    None         Prior to Admission medications    Medication Sig Start Date End Date Taking? Authorizing Provider   gabapentin (NEURONTIN) 300 MG capsule Take 1 capsule by mouth 3 times daily for 30 days.  1/25/22 2/24/22  CHRISTIAN Abbott CNP   ALPRAZolam Ralmigdalia Saucedael) 1 MG tablet take 1 tablet by mouth three times a day if needed for anxiety or sleep 1/21/22 4/22/22  Sherice Doshi MD   traMADol (ULTRAM) 50 MG tablet take 1 tablet by mouth every 6 hours AS NEEDED FOR PAIN for up to 30 DAYS 1/7/22 2/6/22  CHRISTIAN Manning CNP   cyclobenzaprine (FLEXERIL) 10 MG tablet take 1/2 to 1 tablet by mouth three times a day if needed 12/20/21   CHRISTIAN Manning CNP   atorvastatin (LIPITOR) 40 MG tablet take 1 tablet by mouth nightly 11/16/21   CHRISTIAN Manning CNP   albuterol sulfate HFA (VENTOLIN HFA) 108 (90 Base) MCG/ACT inhaler Inhale 2 puffs into the lungs 4 times daily as needed for Wheezing 10/19/21   CHRISTIAN Manning CNP   spironolactone (ALDACTONE) 100 MG tablet take 1 tablet by mouth every morning 9/24/21   CHRISTIAN Petersen CNP   DULoxetine (CYMBALTA) 60 MG extended release capsule take 1 capsule by mouth once daily 8/23/21   CHRISTIAN Kirkland CNP   LACTULOSE PO Take 30 mLs by mouth 2 times daily    Historical Provider, MD   nitroGLYCERIN (NITROSTAT) 0.4 MG SL tablet up to max of 3 total doses.  If no relief after 1 dose, call 911. 6/28/21   CHRISTIAN Petersen CNP   rifaximin Adam Maha) 550 MG tablet Take 1 tablet by mouth 2 times daily 6/2/21   CHRISTIAN Araujo CNP   aspirin 81 MG chewable tablet Take 1 tablet by mouth daily 10/15/19   Gian Diego MD       Future Appointments   Date Time Provider Kaelyn Rojas   2/3/2022  8:00 PM STR MRI RM1 STRZ MRI STR Radiolog   3/1/2022  2:00 PM SCHEDULE, SRPX NEUROINTERVENTION SRPX NEURINT [facility]   3/10/2022  3:00 PM Graciela Mcrae MD N SRPX Heart [facility]   3/17/2022 11:45 AM Nuno Charleston, APRN - CNP [facility]

## 2022-02-07 DIAGNOSIS — M54.50 CHRONIC MIDLINE LOW BACK PAIN, UNSPECIFIED WHETHER SCIATICA PRESENT: ICD-10-CM

## 2022-02-07 DIAGNOSIS — G89.29 CHRONIC MIDLINE LOW BACK PAIN, UNSPECIFIED WHETHER SCIATICA PRESENT: ICD-10-CM

## 2022-02-07 RX ORDER — TRAMADOL HYDROCHLORIDE 50 MG/1
50 TABLET ORAL EVERY 6 HOURS PRN
Qty: 120 TABLET | Refills: 0 | Status: SHIPPED | OUTPATIENT
Start: 2022-02-07 | End: 2022-03-11

## 2022-02-16 ENCOUNTER — HOSPITAL ENCOUNTER (OUTPATIENT)
Dept: MRI IMAGING | Age: 57
Discharge: HOME | End: 2022-02-16
Payer: MEDICAID

## 2022-02-16 DIAGNOSIS — R29.898 TRANSIENT WEAKNESS OF LOWER EXTREMITY: ICD-10-CM

## 2022-02-16 PROCEDURE — 70551 MRI BRAIN STEM W/O DYE: CPT

## 2022-02-17 ENCOUNTER — TELEPHONE (OUTPATIENT)
Dept: NEUROLOGY | Age: 57
End: 2022-02-17

## 2022-02-17 NOTE — TELEPHONE ENCOUNTER
Impression       1. 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 2. Stable moderate severity scattered chronic small vessel ischemic changes. **This report has been created using voice recognition software. It may contain minor errors which are inherent in voice recognition technology. **       Final report electronically signed by Dr. Omie Nageotte on 2/17/2022 8:39 AM       His AVM is stable since 8/2021.  No other acute intracranial abnormalities

## 2022-02-22 ENCOUNTER — CARE COORDINATION (OUTPATIENT)
Dept: CARE COORDINATION | Age: 57
End: 2022-02-22

## 2022-02-22 NOTE — CARE COORDINATION
Attempted care management follow up. Left message to return phone call to ambulatory care manager at [phone].   Plan -   [employer] referral for home care waiver  3/1 neuro  Fall precautions use walker at all times  Report new or worsening symptoms immediately  3/10 cardiology  Report any confusion or AMS or abdominal bloating  Medication adherence

## 2022-03-01 ENCOUNTER — OFFICE VISIT (OUTPATIENT)
Dept: NEUROLOGY | Age: 57
End: 2022-03-01
Payer: MEDICAID

## 2022-03-01 VITALS
DIASTOLIC BLOOD PRESSURE: 80 MMHG | WEIGHT: 233.6 LBS | SYSTOLIC BLOOD PRESSURE: 116 MMHG | HEART RATE: 102 BPM | OXYGEN SATURATION: 92 % | HEIGHT: 65 IN | BODY MASS INDEX: 38.92 KG/M2

## 2022-03-01 DIAGNOSIS — R29.2 HYPERREFLEXIA: ICD-10-CM

## 2022-03-01 DIAGNOSIS — Q27.30 AVM (ARTERIOVENOUS MALFORMATION): ICD-10-CM

## 2022-03-01 DIAGNOSIS — R41.82 ALTERED MENTAL STATUS, UNSPECIFIED ALTERED MENTAL STATUS TYPE: Primary | ICD-10-CM

## 2022-03-01 PROCEDURE — G8427 DOCREV CUR MEDS BY ELIG CLIN: HCPCS | Performed by: NURSE PRACTITIONER

## 2022-03-01 PROCEDURE — 4004F PT TOBACCO SCREEN RCVD TLK: CPT | Performed by: NURSE PRACTITIONER

## 2022-03-01 PROCEDURE — 3017F COLORECTAL CA SCREEN DOC REV: CPT | Performed by: NURSE PRACTITIONER

## 2022-03-01 PROCEDURE — 99214 OFFICE O/P EST MOD 30 MIN: CPT | Performed by: NURSE PRACTITIONER

## 2022-03-01 PROCEDURE — G8417 CALC BMI ABV UP PARAM F/U: HCPCS | Performed by: NURSE PRACTITIONER

## 2022-03-01 PROCEDURE — G8482 FLU IMMUNIZE ORDER/ADMIN: HCPCS | Performed by: NURSE PRACTITIONER

## 2022-03-01 ASSESSMENT — ENCOUNTER SYMPTOMS
RHINORRHEA: 0
ABDOMINAL PAIN: 0
SORE THROAT: 0
NAUSEA: 0
VOMITING: 0
SHORTNESS OF BREATH: 1
COUGH: 1

## 2022-03-01 NOTE — PROGRESS NOTES
Neurology Office Note    DJEV:4/6/5414        Patient Name:Yosvany Domingo     YOB: 1965     Age:56 y.o. Requesting Physician: No att. providers found     Reason for follow up:  AVM      Chief Complaint:   Chief Complaint   Patient presents with    Follow-Up from Hospital     6 m [facility] f/u per Dr Hollis Samson is a 61year old male who presents to our clinic today for a follow up regarding his AVM in the left inferior medial frontal lobe. He had an MRI on 2/17/22 that was stable in nature since past MRI brain. He is continuing to complain of gait disturbance and worsening confusion, memory problems. He mentions occasionally with walking his legs will both go numb, causing him to fall. He uses a cane at baseline, but has had to start using a walker as well. In speaking with the patient/family they report the memory issues have been ongoing for 2-3 and that it tends to wax and wane. His balance issues have also been ongoing to 1.5 years. Family is concerned as this is ongoing, and seeming to occur more frequently. The patient also reports he sleeps all the time. He denies any vision changes, weakness. Does report numbess in his arms bilaterally since his cervical spine surgery 13 years ago, legs occasionally with walking, lightheadedness, dizziness, and some speech difficulty. Review of Systems   Review of Systems   Constitutional: Positive for fatigue. Negative for chills and fever. HENT: Negative for rhinorrhea and sore throat. Eyes: Negative for visual disturbance. Respiratory: Positive for cough and shortness of breath. Cardiovascular: Negative for chest pain and palpitations. Gastrointestinal: Negative for abdominal pain, nausea and vomiting. Genitourinary: Negative for dysuria. Musculoskeletal: Negative for arthralgias and myalgias. Skin: Negative for rash.    Neurological: Positive for dizziness, speech difficulty, weakness (generalized), light-headedness and numbness (bilateral arms since c-spine surgery. , numbness in legs intermittently with walking). Negative for headaches. Psychiatric/Behavioral: Positive for confusion. The patient is nervous/anxious. Medications   Home Meds:   Current Outpatient Medications on File Prior to Visit   Medication Sig Dispense Refill    traMADol (ULTRAM) 50 MG tablet take 1 tablet by mouth every 6 hours AS NEEDED FOR PAIN for up to 30 DAYS 120 tablet 0    ALPRAZolam (XANAX) 1 MG tablet take 1 tablet by mouth three times a day if needed for anxiety or sleep 90 tablet 2    cyclobenzaprine (FLEXERIL) 10 MG tablet take 1/2 to 1 tablet by mouth three times a day if needed 60 tablet 3    atorvastatin (LIPITOR) 40 MG tablet take 1 tablet by mouth nightly 30 tablet 5    albuterol sulfate HFA (VENTOLIN HFA) 108 (90 Base) MCG/ACT inhaler Inhale 2 puffs into the lungs 4 times daily as needed for Wheezing 18 g 0    spironolactone (ALDACTONE) 100 MG tablet take 1 tablet by mouth every morning 90 tablet 2    DULoxetine (CYMBALTA) 60 MG extended release capsule take 1 capsule by mouth once daily 90 capsule 3    LACTULOSE PO Take 30 mLs by mouth 2 times daily      nitroGLYCERIN (NITROSTAT) 0.4 MG SL tablet up to max of 3 total doses. If no relief after 1 dose, call 911. 25 tablet 0    rifaximin (XIFAXAN) 550 MG tablet Take 1 tablet by mouth 2 times daily 60 tablet 6    aspirin 81 MG chewable tablet Take 1 tablet by mouth daily 30 tablet 0    gabapentin (NEURONTIN) 300 MG capsule Take 1 capsule by mouth 3 times daily for 30 days. 90 capsule 2     No current facility-administered medications on file prior to visit.       PRN Meds:     Past History    Past Medical History:   has a past medical history of Anxiety, Arthritis, Chronic kidney disease, Cirrhosis ([address] Diverticulitis, Diverticulosis, GERD (gastroesophageal reflux disease), Hypertension, Other disorders of kidney and ureter in diseases classified elsewhere, Psychiatric problem, and Stroke of unknown cause ([facility] [address] Social History:   reports that he has been smoking cigarettes. He has a 12.50 pack-year smoking history. He has never used smokeless tobacco. He reports previous alcohol use. He reports that he does not use drugs. Family History:   Family History   Problem Relation Age of Onset    Hypertension Mother     Heart Disease Mother     Alzheimer's Disease Mother     Heart Failure Mother     Hypertension Father     Heart Disease Father     Cancer Father     High Blood Pressure Paternal Uncle     Heart Disease Paternal Uncle     Colon Cancer Neg Hx     Colon Polyps Neg Hx        Physical Examination      Vitals:  /80 (Site: Left Upper Arm, Position: Sitting, Cuff Size: Large Adult)   Pulse 102   Ht 5' 5\" (1.651 m)   Wt 233 lb 9.6 oz (106 kg)   SpO2 92%   BMI 38.87 kg/m²   No data recorded. Physical Exam  Vitals reviewed. Constitutional:       General: He is not in acute distress. Appearance: Normal appearance. He is not ill-appearing. HENT:      Head: Normocephalic and atraumatic. Right Ear: External ear normal.      Left Ear: External ear normal.      Nose: Nose normal.      Mouth/Throat:      Mouth: Mucous membranes are moist.      Pharynx: No oropharyngeal exudate or posterior oropharyngeal erythema. Eyes:      Extraocular Movements: EOM normal.      Pupils: Pupils are equal, round, and reactive to light. Cardiovascular:      Rate and Rhythm: Normal rate and regular rhythm. Heart sounds: Normal heart sounds. No murmur heard. Pulmonary:      Effort: Pulmonary effort is normal. No respiratory distress. Breath sounds: Normal breath sounds. No wheezing. Abdominal:      General: Bowel sounds are normal.      Palpations: Abdomen is soft. Tenderness: There is no abdominal tenderness. Musculoskeletal:         General: Normal range of motion. Right lower leg: No edema.       Left lower leg: No edema. Skin:     General: Skin is warm. Findings: No rash. Neurological:      Mental Status: He is alert and oriented to person, place, and time. Coordination: Finger-Nose-Finger Test and Heel to NIX [facility] Test normal.      Gait: Gait is intact. Deep Tendon Reflexes:      Reflex Scores:       Bicep reflexes are 2+ on the right side and 2+ on the left side. Brachioradialis reflexes are 2+ on the right side and 2+ on the left side. Patellar reflexes are 3+ on the right side and 3+ on the left side. Psychiatric:         Mood and Affect: Mood normal.         Speech: Speech normal.         Behavior: Behavior normal.       Neurologic Exam     Mental Status   Oriented to person, place, and time. Follows 2 step commands. Attention: normal. Concentration: normal.   Speech: speech is normal   Level of consciousness: alert  Knowledge: good. Cranial Nerves     CN II   Visual fields full to confrontation. CN III, IV, VI   Pupils are equal, round, and reactive to light. Extraocular motions are normal.   Right pupil: Size: 3 mm. Shape: regular. Reactivity: brisk. Left pupil: Size: 3 mm. Shape: regular. Reactivity: brisk. CN V   Facial sensation intact. CN VII   Facial expression full, symmetric.      CN VIII   CN VIII normal.     CN IX, X   CN IX normal.   CN X normal.   Palate: symmetric    CN XI   CN XI normal.   Right trapezius strength: normal  Left trapezius strength: normal    CN XII   CN XII normal.   Tongue: not atrophic  Fasciculations: absent  Tongue deviation: none    Motor Exam   Muscle bulk: normal  Overall muscle tone: normal  Right arm pronator drift: absent  Left arm pronator drift: absent    BUE: 5/5  BLE: 5/5     Sensory Exam   Light touch normal.     Gait, Coordination, and Reflexes     Gait  Gait: normal    Coordination   Finger to nose coordination: normal  Heel to shin coordination: normal    Tremor   Resting tremor: absent  Intention tremor: absent  Action tremor: absent    Reflexes   Right brachioradialis: 2+  Left brachioradialis: 2+  Right biceps: 2+  Left biceps: 2+  Right patellar: 3+  Left patellar: 3+       Labs/Imaging/Diagnostics     Imaging:  MRI BRAIN WO CONTRAST    Result Date: 2/17/2022  1. 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 2. Stable moderate severity scattered chronic small vessel ischemic changes. **This report has been created using voice recognition software. It may contain minor errors which are inherent in voice recognition technology. ** Final report electronically signed by Dr. Jean Paul Beaver on 2/17/2022 8:39 AM    Assessment and Plan:          1. 2.8cm AVM in left inferior medial frontal lobe  · MRI on 2/17/22 appears stable  · Follow up in our clinic in 1 year with repeat MRI brain    2. Intermittent confusion, memory issue gait disturbance  · EEG ordered  · MRI brain  · Follow-up with [facility] in 1 month    3. Hyperreflexia  · MRI C-spine    This patient was seen and evaluated with Dr. Tank Albrecht and he is in agreement with the assessment and plan.     Electronically signed by CHRISTIAN Caruso CNP on 3/1/22 at 2:06 PM EST

## 2022-03-07 ENCOUNTER — CARE COORDINATION (OUTPATIENT)
Dept: CARE COORDINATION | Age: 57
End: 2022-03-07

## 2022-03-07 NOTE — CARE COORDINATION
Ambulatory Care Coordination Note  3/7/2022  CM Risk Score: 4  Charlson 10 Year Mortality Risk Score: 98%     ACC: Fred Escobar RN    Summary Note:     Neuro interventional AVM stable, follow up in 1 yr  Intermittent confusion, memory issue, gait disturbance - MRI brain, EEG  Hyperreflexia - MRI C-spine  Recommended follow up with Dr. Ralph Bell in one month. Neuro reviewed and recommending higher level of care. Discussed with patient. He is agreeable to go to [facility]. Note routed to Brittany Verma NP    Plan -   Needs referral to neuro higher level of care  AAA3 referral for home care waiver  Fall precautions use walker at all times  Report new or worsening symptoms immediately  3/10 cardiology  3/11 PCP  3/17 KAYLAH Mcfarland  3/18 MRI  Report any confusion or AMS or abdominal bloating  Medication adherence        Care Coordination Interventions    Program Enrollment: Complex Care  Referral from Primary Care Provider: No  Suggested Interventions and Community Resources  Home Care Waiver: In Process  Meals on Wheels: Declined  Medication Assistance Program: Completed  Smoking Cessation: Declined  Transportation Support: Completed  Other Services or Interventions: est with Tejal Mcfarland GI and Dr. Sherman Whitfield cardiology         Goals Addressed    None         Prior to Admission medications    Medication Sig Start Date End Date Taking? Authorizing Provider   traMADol (ULTRAM) 50 MG tablet take 1 tablet by mouth every 6 hours AS NEEDED FOR PAIN for up to 30 DAYS 2/7/22 3/9/22  CHRISTIAN Swanson CNP   gabapentin (NEURONTIN) 300 MG capsule Take 1 capsule by mouth 3 times daily for 30 days.  1/25/22 2/24/22  CHRISTIAN Salgado CNP   ALPRAZolam Oletha [place]) 1 MG tablet take 1 tablet by mouth three times a day if needed for anxiety or sleep 1/21/22 4/22/22  Virgen Bran MD   cyclobenzaprine (FLEXERIL) 10 MG tablet take 1/2 to 1 tablet by mouth three times a day if needed 12/20/21   CHRISTIAN Swanson CNP atorvastatin (LIPITOR) 40 MG tablet take 1 tablet by mouth nightly 11/16/21   CHRISTIAN Colby - CNP   albuterol sulfate HFA (VENTOLIN HFA) 108 (90 Base) MCG/ACT inhaler Inhale 2 puffs into the lungs 4 times daily as needed for Wheezing 10/19/21   CHRISTIAN Colby - CNP   spironolactone (ALDACTONE) 100 MG tablet take 1 tablet by mouth every morning 9/24/21   CHRISTIAN Colby - CNP   DULoxetine (CYMBALTA) 60 MG extended release capsule take 1 capsule by mouth once daily 8/23/21   CHRISTIAN Kirkland - CNP   LACTULOSE PO Take 30 mLs by mouth 2 times daily    Historical Provider, MD   nitroGLYCERIN (NITROSTAT) 0.4 MG SL tablet up to max of 3 total doses.  If no relief after 1 dose, call 911. 6/28/21   CHRISTIAN Colby - CNP   rifaximin Janeth Rickie) 550 MG tablet Take 1 tablet by mouth 2 times daily 6/2/21   CHRISTIAN Gross - CNP   aspirin 81 MG chewable tablet Take 1 tablet by mouth daily 10/15/19   Marilu Lares MD       Future Appointments   Date Time Provider Kaelyn Rojas   3/10/2022  3:00 PM Ko Beckford MD N [email or web address]   3/11/2022 11:00 AM CHRISTIAN Colby CNP [facility]   3/17/2022 11:45 AM CHRISTIAN Gross CNP AFLGASL [facility]   3/18/2022 12:00 PM STR MRI RM1 STRZ MRI STR Radiolog   3/18/2022 12:30 PM STR MRI RM1 STRZ MRI STR Radiolog   3/18/2022  1:00 PM STR MRI RM1 STRZ MRI STR Radiolog   2/28/2023  1:00 PM SCHEDULE, SRPX NEUROINTERVENTION [facility] [facility]

## 2022-03-10 ENCOUNTER — OFFICE VISIT (OUTPATIENT)
Dept: CARDIOLOGY CLINIC | Age: 57
End: 2022-03-10
Payer: MEDICAID

## 2022-03-10 VITALS
SYSTOLIC BLOOD PRESSURE: 122 MMHG | DIASTOLIC BLOOD PRESSURE: 78 MMHG | HEIGHT: 65 IN | HEART RATE: 103 BPM | BODY MASS INDEX: 38.3 KG/M2 | WEIGHT: 229.9 LBS

## 2022-03-10 DIAGNOSIS — K70.31 ALCOHOLIC CIRRHOSIS OF LIVER WITH ASCITES (HCC): ICD-10-CM

## 2022-03-10 DIAGNOSIS — E78.00 PURE HYPERCHOLESTEROLEMIA: ICD-10-CM

## 2022-03-10 DIAGNOSIS — R07.89 CHEST PAIN, ATYPICAL: ICD-10-CM

## 2022-03-10 DIAGNOSIS — R06.02 SOB (SHORTNESS OF BREATH) ON EXERTION: ICD-10-CM

## 2022-03-10 DIAGNOSIS — I10 PRIMARY HYPERTENSION: Primary | Chronic | ICD-10-CM

## 2022-03-10 PROCEDURE — G8427 DOCREV CUR MEDS BY ELIG CLIN: HCPCS | Performed by: INTERNAL MEDICINE

## 2022-03-10 PROCEDURE — G8482 FLU IMMUNIZE ORDER/ADMIN: HCPCS | Performed by: INTERNAL MEDICINE

## 2022-03-10 PROCEDURE — 99214 OFFICE O/P EST MOD 30 MIN: CPT | Performed by: INTERNAL MEDICINE

## 2022-03-10 PROCEDURE — G8417 CALC BMI ABV UP PARAM F/U: HCPCS | Performed by: INTERNAL MEDICINE

## 2022-03-10 PROCEDURE — 3017F COLORECTAL CA SCREEN DOC REV: CPT | Performed by: INTERNAL MEDICINE

## 2022-03-10 PROCEDURE — 4004F PT TOBACCO SCREEN RCVD TLK: CPT | Performed by: INTERNAL MEDICINE

## 2022-03-10 SDOH — ECONOMIC STABILITY: TRANSPORTATION INSECURITY
IN THE PAST 12 MONTHS, HAS LACK OF TRANSPORTATION KEPT YOU FROM MEETINGS, WORK, OR FROM GETTING THINGS NEEDED FOR DAILY LIVING?: YES

## 2022-03-10 SDOH — ECONOMIC STABILITY: TRANSPORTATION INSECURITY
IN THE PAST 12 MONTHS, HAS THE LACK OF TRANSPORTATION KEPT YOU FROM MEDICAL APPOINTMENTS OR FROM GETTING MEDICATIONS?: YES

## 2022-03-10 ASSESSMENT — SOCIAL DETERMINANTS OF HEALTH (SDOH)
DO YOU BELONG TO ANY CLUBS OR ORGANIZATIONS SUCH AS CHURCH GROUPS UNIONS, FRATERNAL OR ATHLETIC GROUPS, OR SCHOOL GROUPS?: NO
HOW OFTEN DO YOU ATTENT MEETINGS OF THE CLUB OR ORGANIZATION YOU BELONG TO?: NEVER
HOW OFTEN DO YOU ATTEND CHURCH OR RELIGIOUS SERVICES?: NEVER
IN A TYPICAL WEEK, HOW MANY TIMES DO YOU TALK ON THE PHONE WITH FAMILY, FRIENDS, OR NEIGHBORS?: THREE TIMES A WEEK
HOW OFTEN DO YOU GET TOGETHER WITH FRIENDS OR RELATIVES?: THREE TIMES A WEEK

## 2022-03-10 ASSESSMENT — LIFESTYLE VARIABLES: HOW OFTEN DO YOU HAVE A DRINK CONTAINING ALCOHOL: NEVER

## 2022-03-10 NOTE — PROGRESS NOTES
Chief Complaint   Patient presents with    1 Year Follow Up    Hypertension    originally here  Abnormal echo  Chest pain atypical for many years      1 year follow up. EKG done 8-. Occasional; atypical cp last few seconds for yrs  No change and cath nonrevealing    Sob on exertion    Denied palpitation or dizziness    No leg edema    On aldactone for CLD cirrhosis    Smokes 1/2 ppd for 28 yrs    FHx  Mother had heart problem      Patient Active Problem List   Diagnosis    Diverticulosis    HTN (hypertension)    Ascites    Liver cirrhosis (HCC)    Cystic kidney disease    Leukocytosis    Hypotension    Alcohol abuse    Hyponatremia    Hypokalemia    Perianal ulcer (HCC)    Hyperkalemia    Increased ammonia level    Confusion    Change in mental status    Acute renal failure (HCC)    Metabolic encephalopathy    Altered mental status    Hepatic encephalopathy (HCC)    Noncompliance with medications    Ascites due to alcoholic cirrhosis (HCC)    Hypotension (arterial)    Alcoholic cirrhosis of liver with ascites (HCC)    Lactic acidosis    Generalized abdominal pain    Tobacco abuse    Spondylosis of cervical region without myelopathy or radiculopathy    Chest pain    Elevated INR    Opiate abuse, episodic (HCC)    Marijuana abuse    Dyspnea on exertion    SOB (shortness of breath) on exertion    Chest pain, atypical- for yrs     Stroke of unknown cause ([facility] [address]    Alteration in speech    Pure hypercholesterolemia       Past Surgical History:   Procedure Laterality Date    ABDOMEN SURGERY      BACK SURGERY      cervical plate    CARDIAC CATHETERIZATION  2007?     CERVICAL DISC SURGERY      x 2 ---broken neck 7-2003    COLON SURGERY  2004    partial, due to diverticulitis    COLONOSCOPY      DILATATION, ESOPHAGUS      ENDOSCOPY, COLON, DIAGNOSTIC      FRACTURE SURGERY      Broke neck in 2003    NERVE BLOCK Bilateral 10/02/2017    Cervical Facet MBB at C4-5, C5-6, C6-7     KS INJ,PARAVERTEBRAL L/S,1 LEVEL Bilateral 10/2/2017    C-FACET MBB C4-5, C5-6, C6-7 BILATERAL performed by Yrn Stockton MD at [address] [place] [address] ENDOSCOPY  2019    UPPER GASTROINTESTINAL ENDOSCOPY Left 9/28/2020    EGD BAND LIGATION performed by Julee Quiñones MD at [facility]       No Known Allergies     Family History   Problem Relation Age of Onset    Hypertension Mother     Heart Disease Mother     Alzheimer's Disease Mother     Heart Failure Mother     Hypertension Father     Heart Disease Father     Cancer Father     High Blood Pressure Paternal Uncle     Heart Disease Paternal Uncle     Colon Cancer Neg Hx     Colon Polyps Neg Hx         Social History     Socioeconomic History    Marital status:      Spouse name: Not on file    Number of children: 2    Years of education: Not on file    Highest education level: Not on file   Occupational History    Not on file   Tobacco Use    Smoking status: Current Every Day Smoker     Packs/day: 0.50     Years: 25.00     Pack years: 12.50     Types: Cigarettes    Smokeless tobacco: Never Used    Tobacco comment: printed to avs   Vaping Use    Vaping Use: Never used   Substance and Sexual Activity    Alcohol use: Not Currently     Comment: Quit 15 years ago    Drug use: No    Sexual activity: Not Currently   Other Topics Concern    Not on file   Social History Narrative    Not on file     Social Determinants of Health     Financial Resource Strain: Low Risk     Difficulty of Paying Living Expenses: Not hard at all   Food Insecurity: No Food Insecurity    Worried About Running Out of Food in the Last Year: Never true    Kaleb of Food in the Last Year: Never true   Transportation Needs: Unmet Transportation Needs    Lack of Transportation (Medical):  Yes    Lack of Transportation (Non-Medical): Yes   Physical Activity:     Days of Exercise per Week: Not on file    Minutes of Exercise per Session: Not on file   Stress:     Feeling of Stress : Not on file   Social Connections: Socially Isolated    Frequency of Communication with Friends and Family: Three times a week    Frequency of Social Gatherings with Friends and Family: Three times a week    Attends U[employer] Services: Never    Active Member of Clubs or Organizations: No    Attends Club or Organization Meetings: Never    Marital Status:    Intimate Partner Violence:     Fear of Current or Ex-Partner: Not on file    Emotionally Abused: Not on file    Physically Abused: Not on file    Sexually Abused: Not on file   Housing Stability:     Unable to Pay for Housing in the Last Year: Not on file    Number of [place] in the Last Year: Not on file    Unstable Housing in the Last Year: Not on file       Current Outpatient Medications   Medication Sig Dispense Refill    ALPRAZolam (XANAX) 1 MG tablet take 1 tablet by mouth three times a day if needed for anxiety or sleep 90 tablet 2    cyclobenzaprine (FLEXERIL) 10 MG tablet take 1/2 to 1 tablet by mouth three times a day if needed 60 tablet 3    atorvastatin (LIPITOR) 40 MG tablet take 1 tablet by mouth nightly 30 tablet 5    albuterol sulfate HFA (VENTOLIN HFA) 108 (90 Base) MCG/ACT inhaler Inhale 2 puffs into the lungs 4 times daily as needed for Wheezing 18 g 0    spironolactone (ALDACTONE) 100 MG tablet take 1 tablet by mouth every morning 90 tablet 2    DULoxetine (CYMBALTA) 60 MG extended release capsule take 1 capsule by mouth once daily 90 capsule 3    LACTULOSE PO Take 30 mLs by mouth 2 times daily      nitroGLYCERIN (NITROSTAT) 0.4 MG SL tablet up to max of 3 total doses.  If no relief after 1 dose, call 911. 25 tablet 0    rifaximin (XIFAXAN) 550 MG tablet Take 1 tablet by mouth 2 times daily 60 tablet 6    aspirin 81 MG chewable tablet Take 1 tablet by mouth daily 30 tablet 0    gabapentin (NEURONTIN) 300 MG capsule Take 1 capsule by mouth 3 times daily for 30 days. 90 capsule 2     No current facility-administered medications for this visit. Review of Systems -     General ROS: negative  Psychological ROS: negative  Hematological and Lymphatic ROS: No history of blood clots or bleeding disorder. Respiratory ROS: no cough,  or wheezing, the rest see HPI  Cardiovascular ROS: See HPI  Gastrointestinal ROS: negative  Genito-Urinary ROS: no dysuria, trouble voiding, or hematuria  Musculoskeletal ROS: negative  Neurological ROS: no TIA or stroke symptoms  Dermatological ROS: negative      Blood pressure 122/78, pulse 103, height 5' 5\" (1.651 m), weight 229 lb 14.4 oz (104.3 kg). Physical Examination:    General appearance - alert, well appearing, and in no distress  HEENT- Pink conjunctiva  , Non-icteri sclera,PERRLA  Mental status - alert, oriented to person, place, and time  Neck - supple, no significant adenopathy, no JVD, or carotid bruits  Chest - clear to auscultation, no wheezes, rales or rhonchi, symmetric air entry  Heart - normal rate, regular rhythm, normal S1, S2, no murmurs, rubs, clicks or gallops  Abdomen - soft, nontender, nondistended, no masses or organomegaly  KENYON- no CVA or flank tenderness, no suprapubic tenderness  Neurological - alert, oriented, normal speech, no focal findings or movement disorder noted  Musculoskeletal/limbs - no joint tenderness, deformity or swelling   - peripheral pulses normal, no pedal edema, no clubbing or cyanosis  Skin - normal coloration and turgor, no rashes, no suspicious skin lesions noted  Psych- appropriate mood and affect    Lab  No results for input(s): CKTOTAL, CKMB, CKMBINDEX, TROPONINI in the last 72 hours.   CBC:   Lab Results   Component Value Date    WBC 5.1 08/13/2021    RBC 3.74 08/13/2021    RBC 4.07 02/22/2021    HGB 12.2 08/13/2021    HCT 37.4 08/13/2021    .0 08/13/2021    MCH 32.6 08/13/2021    MCHC 32.6 08/13/2021 RDW 14.8 02/22/2021     08/13/2021    MPV 10.9 08/13/2021     BMP:    Lab Results   Component Value Date     08/12/2021    K 4.0 08/12/2021     08/12/2021    CO2 22 08/12/2021    BUN 8 08/12/2021    LABALBU 3.9 08/12/2021    LABALBU 4.6 11/04/2011    CREATININE 0.8 08/12/2021    CALCIUM 9.1 08/12/2021    LABGLOM >90 08/12/2021    GLUCOSE 108 08/12/2021    GLUCOSE 86 02/22/2021     Hepatic Function Panel:    Lab Results   Component Value Date    ALKPHOS 73 08/12/2021    ALT 20 08/12/2021    AST 22 08/12/2021    PROT 7.4 08/12/2021    BILITOT 0.8 08/12/2021    BILIDIR 0.3 08/05/2021    LABALBU 3.9 08/12/2021    LABALBU 4.6 11/04/2011     Magnesium:    Lab Results   Component Value Date    MG 1.8 01/14/2021     Warfarin PT/INR:  No components found for: PTPATWAR, PTINRWAR  HgBA1c:    Lab Results   Component Value Date    LABA1C 5.0 08/13/2021     FLP:    Lab Results   Component Value Date    TRIG 50 08/13/2021    HDL 41 08/13/2021    LDLCALC 34 08/13/2021    LABVLDL 9 02/22/2021     TSH:    Lab Results   Component Value Date    TSH 2.120 09/18/2017      CONCLUSION:  Nonobstructive coronary artery disease.     PLAN:  1. Medical therapy for nonobstructive coronary artery disease. 2.  Aggressive risk factor modification for CAD. 3.  Continue aspirin 81 mg p.o. daily. 4.  Start statin therapy, closely monitor liver function tests. 5.  Outpatient followup with Cardiology and primary care physician.     The above findings and plan of care were discussed with the patient and  his family. Questions were answered.           Ijeoma Thomas MD     D: 10/14/2019 9:56:         Conclusions      Summary   Lexiscan EKG stress test is not suggestive for ischemia. The nuclear images is not suggestive for myocardial ischemia.       Signatures      ----------------------------------------------------------------   Electronically signed by Naya Allen MD (Interpreting   Cardiologist) on 02/23/2021 at 21:23     Conclusions      Summary   Ejection fraction is visually estimated at 55%. Overall left ventricular function is normal.   Aortic valve appears tricuspid. Aortic valve leaflets are somewhat thickened. Aortic valve leaflets are Mildly calcified. Signature      ----------------------------------------------------------------   Electronically signed by Sada Dubois MD (Interpreting   physician) on 02/23/2021 at 07:54 PM   ----------------------------------------------------------------       Conclusions      Summary   Normal left ventricle size and systolic function. Ejection fraction was   estimated at 55 %. There were no regional left ventricular wall motion   abnormalities and wall thickness was within normal limits. The Bubble study with agitated saline is of poor quality and show no   obvious right to left shunt      Signature      ----------------------------------------------------------------   Electronically signed by Mary Anderson MD (Interpreting   physician) on 08/13/2021 at 09:36 PM   ----------------------------------------------------------------         ekg 3/11/21  Sinus  Rhythm   Low voltage in precordial leads. ABNORMAL    ekg 8/12/21   Normal sinus rhythm Normal ECG When compared with ECG of 14-JAN-2021 00:35, Previous ECG has undetermined rhythm, needs review      Assessment   Diagnosis Orders   1. Primary hypertension     2. Pure hypercholesterolemia     3. Chest pain, atypical- for yrs      4. SOB (shortness of breath) on exertion     5. Alcoholic cirrhosis of liver with ascites ([facility]           Plan     The current meds and labs reviewed    Continue the current treatment and with constant vigilance to changes in symptoms and also any potential side effects. Return for care or seek medical attention immediately if symptoms got worse and/or develop new symptoms. Hypertension, on medical treatment. Seems to be under good control.  Patient is compliant with medical treatment. Hyperlipidemia: on statins, followed periodically. Patient need periodic lipid and liver profile. Hx of Chest pain atypical chronic noncardiac  Cath neg 10/2019 and nuc stress neg feb 2021    Echo no siginificant abn    D/w the pat the plan of care    Memory issue to see neurologist in [facility]    .swtable from cardiac stand    Discussed use, benefit, and side effects of prescribed medications. All patient questions answered. Pt voiced understanding. Instructed to continue current medications, diet and exercise. Continue risk factor modification and medical management. Patient agreed with treatment plan. Follow up as directed.       RTC in 1 year      [facility]

## 2022-03-11 ENCOUNTER — CARE COORDINATION (OUTPATIENT)
Dept: CARE COORDINATION | Age: 57
End: 2022-03-11

## 2022-03-11 DIAGNOSIS — G89.29 CHRONIC MIDLINE LOW BACK PAIN, UNSPECIFIED WHETHER SCIATICA PRESENT: ICD-10-CM

## 2022-03-11 DIAGNOSIS — M54.50 CHRONIC MIDLINE LOW BACK PAIN, UNSPECIFIED WHETHER SCIATICA PRESENT: ICD-10-CM

## 2022-03-11 RX ORDER — TRAMADOL HYDROCHLORIDE 50 MG/1
50 TABLET ORAL EVERY 6 HOURS PRN
Qty: 120 TABLET | Refills: 0 | Status: SHIPPED | OUTPATIENT
Start: 2022-03-11 | End: 2022-03-15 | Stop reason: SDUPTHER

## 2022-03-11 NOTE — CARE COORDINATION
Call from patient. His ride (friend) canceled on him today. Asking to change to virtual visit. PCP prefers in office. Rescheduled for 3/15. Discussed using [employer] transportation and patient is agreeable. Scheduled transportation. They will contact patient with  information and trip ID. Updated patient.

## 2022-03-11 NOTE — CARE COORDINATION
Call back from patient. Henrine Hashimoto informed him, he doesn't have transportation benefits. Friend is taking him to appt. Can use [facility] if needed or [employer] in future.

## 2022-03-11 NOTE — TELEPHONE ENCOUNTER
Please approve or deny     Last Visit Date:  12/14/2021       Next Visit Date:    3/15/2022       Dispense: 120  Refill: 0  Ordered: 2/7/22

## 2022-03-15 ENCOUNTER — OFFICE VISIT (OUTPATIENT)
Dept: FAMILY MEDICINE CLINIC | Age: 57
End: 2022-03-15
Payer: MEDICAID

## 2022-03-15 VITALS
OXYGEN SATURATION: 99 % | WEIGHT: 229 LBS | SYSTOLIC BLOOD PRESSURE: 104 MMHG | HEART RATE: 96 BPM | BODY MASS INDEX: 38.11 KG/M2 | RESPIRATION RATE: 16 BRPM | DIASTOLIC BLOOD PRESSURE: 62 MMHG

## 2022-03-15 DIAGNOSIS — R29.898 WEAKNESS OF BOTH LOWER EXTREMITIES: ICD-10-CM

## 2022-03-15 DIAGNOSIS — R40.4 TRANSIENT ALTERATION OF AWARENESS: ICD-10-CM

## 2022-03-15 DIAGNOSIS — G89.29 CHRONIC MIDLINE LOW BACK PAIN, UNSPECIFIED WHETHER SCIATICA PRESENT: ICD-10-CM

## 2022-03-15 DIAGNOSIS — F41.9 ANXIETY: ICD-10-CM

## 2022-03-15 DIAGNOSIS — Q28.2 CEREBRAL AVM: ICD-10-CM

## 2022-03-15 DIAGNOSIS — M48.061 SPINAL STENOSIS OF LUMBAR REGION, UNSPECIFIED WHETHER NEUROGENIC CLAUDICATION PRESENT: ICD-10-CM

## 2022-03-15 DIAGNOSIS — R42 DIZZINESS: ICD-10-CM

## 2022-03-15 DIAGNOSIS — I10 ESSENTIAL HYPERTENSION: ICD-10-CM

## 2022-03-15 DIAGNOSIS — E78.2 MIXED HYPERLIPIDEMIA: ICD-10-CM

## 2022-03-15 DIAGNOSIS — K70.30 ALCOHOLIC CIRRHOSIS, UNSPECIFIED WHETHER ASCITES PRESENT (HCC): Primary | ICD-10-CM

## 2022-03-15 DIAGNOSIS — R26.81 UNSTEADY GAIT: ICD-10-CM

## 2022-03-15 DIAGNOSIS — M54.50 CHRONIC MIDLINE LOW BACK PAIN, UNSPECIFIED WHETHER SCIATICA PRESENT: ICD-10-CM

## 2022-03-15 PROCEDURE — 99214 OFFICE O/P EST MOD 30 MIN: CPT | Performed by: NURSE PRACTITIONER

## 2022-03-15 PROCEDURE — 4004F PT TOBACCO SCREEN RCVD TLK: CPT | Performed by: NURSE PRACTITIONER

## 2022-03-15 PROCEDURE — G8427 DOCREV CUR MEDS BY ELIG CLIN: HCPCS | Performed by: NURSE PRACTITIONER

## 2022-03-15 PROCEDURE — G8417 CALC BMI ABV UP PARAM F/U: HCPCS | Performed by: NURSE PRACTITIONER

## 2022-03-15 PROCEDURE — 3017F COLORECTAL CA SCREEN DOC REV: CPT | Performed by: NURSE PRACTITIONER

## 2022-03-15 PROCEDURE — G8482 FLU IMMUNIZE ORDER/ADMIN: HCPCS | Performed by: NURSE PRACTITIONER

## 2022-03-15 RX ORDER — TRAMADOL HYDROCHLORIDE 50 MG/1
50 TABLET ORAL EVERY 6 HOURS PRN
Qty: 120 TABLET | Refills: 0 | Status: SHIPPED | OUTPATIENT
Start: 2022-03-15 | End: 2022-04-11

## 2022-03-15 RX ORDER — ALPRAZOLAM 1 MG/1
TABLET ORAL
Qty: 90 TABLET | Refills: 0 | Status: SHIPPED | OUTPATIENT
Start: 2022-03-15 | End: 2022-05-16 | Stop reason: SDUPTHER

## 2022-03-15 ASSESSMENT — PATIENT HEALTH QUESTIONNAIRE - PHQ9
SUM OF ALL RESPONSES TO PHQ9 QUESTIONS 1 & 2: 0
SUM OF ALL RESPONSES TO PHQ QUESTIONS 1-9: 0
2. FEELING DOWN, DEPRESSED OR HOPELESS: 0
1. LITTLE INTEREST OR PLEASURE IN DOING THINGS: 0

## 2022-03-16 ENCOUNTER — TELEPHONE (OUTPATIENT)
Dept: NEUROLOGY | Age: 57
End: 2022-03-16

## 2022-03-16 NOTE — TELEPHONE ENCOUNTER
Per [facility], the Brain MRI ordered for this patient is pending denial for a peer to peer as the clinical information does not support medical necessity for the ordered test. All available clinical has been submitted at this time.  The denial reasoning has been uploaded into the media tab for you review    Peer to peer available  # [phone]  Case# [id number]    Please advise    **PATIENT IS SCHEDULED TOMORROW**

## 2022-03-16 NOTE — TELEPHONE ENCOUNTER
I apologize, I cancelled these. I didn't realize, but the patient scheduled these since the provider put the orders in. The MRI is not to be done until March 2023 as an annual follow up. Will reschedule this for patient.

## 2022-03-17 ASSESSMENT — ENCOUNTER SYMPTOMS
BACK PAIN: 0
CHEST TIGHTNESS: 0
COUGH: 0
SHORTNESS OF BREATH: 0
ABDOMINAL DISTENTION: 0
WHEEZING: 0
ABDOMINAL PAIN: 0
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
## 2022-03-25 ENCOUNTER — CARE COORDINATION (OUTPATIENT)
Dept: CARE COORDINATION | Age: 57
End: 2022-03-25

## 2022-03-25 NOTE — CARE COORDINATION
Ambulatory Care Coordination Note  3/25/2022  CM Risk Score: 4  Charlson 10 Year Mortality Risk Score: 98%     ACC: Georgia Sunshine, RN    Summary Note:     Declined area agency on aging services. Good support of son and friends. Established with GI, NS, PCP with future appts. Good medication adherence. Neuro referral placed by NS. [facility] neuro on wait list. Appt in 9/2022. No recent utilization. Good understanding of fall precautions. Good understanding of symptom monitoring. Education complete. Goals met. Ready for graduation. Care Coordination Interventions    Program Enrollment: Complex Care  Referral from Primary Care Provider: No  Suggested Interventions and Community Resources  Home Care Waiver: Declined  Meals on Wheels: Declined  Medication Assistance Program: Completed  Smoking Cessation: Declined  Transportation Support: Completed  Other Services or Interventions: est with [facility] and Dr. Melissa Palmer cardiology         Goals Addressed    None         Prior to Admission medications    Medication Sig Start Date End Date Taking? Authorizing Provider   traMADol (ULTRAM) 50 MG tablet Take 1 tablet by mouth every 6 hours as needed for Pain for up to 30 days. 3/15/22 4/14/22  Rik Weiss APRN - CNP   ALPRAZolam Chacha Stephanie) 1 MG tablet take 1 tablet by mouth three times a day if needed for anxiety or sleep 3/15/22 6/14/22  Rik Weiss APRN - CNP   gabapentin (NEURONTIN) 300 MG capsule Take 1 capsule by mouth 3 times daily for 30 days.  1/25/22 2/24/22  Radha Fagan APRN - CNP   cyclobenzaprine (FLEXERIL) 10 MG tablet take 1/2 to 1 tablet by mouth three times a day if needed 12/20/21   Rik Weiss APRN - CNP   atorvastatin (LIPITOR) 40 MG tablet take 1 tablet by mouth nightly 11/16/21   Rik Weiss APRN - CNP   albuterol sulfate HFA (VENTOLIN HFA) 108 (90 Base) MCG/ACT inhaler Inhale 2 puffs into the lungs 4 times daily as needed for Wheezing 10/19/21   Rik Weiss APRN - OLIVERIO spironolactone (ALDACTONE) 100 MG tablet take 1 tablet by mouth every morning 9/24/21   CHRISTIAN King CNP   DULoxetine (CYMBALTA) 60 MG extended release capsule take 1 capsule by mouth once daily 8/23/21   CHRISTIAN Kirkland CNP   LACTULOSE PO Take 30 mLs by mouth 2 times daily    Historical Provider, MD   nitroGLYCERIN (NITROSTAT) 0.4 MG SL tablet up to max of 3 total doses.  If no relief after 1 dose, call 911. 6/28/21   CHRISTIAN King CNP   rifaximin Remberto Beavers) 550 MG tablet Take 1 tablet by mouth 2 times daily 6/2/21   CHRISTIAN Salcido CNP   aspirin 81 MG chewable tablet Take 1 tablet by mouth daily 10/15/19   Alyssa Martinez MD       Future Appointments   Date Time Provider Kaelyn Rojas   3/31/2022  2:00 PM CHRISTIAN Salcido CNP [facility]   2/28/2023  1:00 PM SCHEDULE, SRPX NEUROINTERVENTION SRPX NEURINT [facility] Ashleigh LEONARD II.VIERTGARY   3/9/2023  3:00 PM Willie Cleveland MD N SRPX Heart PRAVIN LEONARD II.LILIANA

## 2022-03-28 NOTE — CARE COORDINATION
Dr. Marnie Kent 9/15/2022 at [phone] at [facility]  10/20 at [phone]  10/27 at [address] [place] [address]  Added to follow up

## 2022-03-31 RX ORDER — CYCLOBENZAPRINE HCL 10 MG
TABLET ORAL
Qty: 60 TABLET | Refills: 1 | Status: SHIPPED | OUTPATIENT
Start: 2022-03-31 | End: 2022-06-06

## 2022-04-05 DIAGNOSIS — G89.29 CHRONIC MIDLINE LOW BACK PAIN, UNSPECIFIED WHETHER SCIATICA PRESENT: ICD-10-CM

## 2022-04-05 DIAGNOSIS — M54.50 CHRONIC MIDLINE LOW BACK PAIN, UNSPECIFIED WHETHER SCIATICA PRESENT: ICD-10-CM

## 2022-04-05 RX ORDER — TRAMADOL HYDROCHLORIDE 50 MG/1
50 TABLET ORAL EVERY 6 HOURS PRN
Qty: 120 TABLET | OUTPATIENT
Start: 2022-04-05 | End: 2022-05-05

## 2022-04-11 DIAGNOSIS — G89.29 CHRONIC MIDLINE LOW BACK PAIN, UNSPECIFIED WHETHER SCIATICA PRESENT: ICD-10-CM

## 2022-04-11 DIAGNOSIS — M54.50 CHRONIC MIDLINE LOW BACK PAIN, UNSPECIFIED WHETHER SCIATICA PRESENT: ICD-10-CM

## 2022-04-11 RX ORDER — TRAMADOL HYDROCHLORIDE 50 MG/1
50 TABLET ORAL EVERY 6 HOURS PRN
Qty: 120 TABLET | Refills: 0 | Status: SHIPPED | OUTPATIENT
Start: 2022-04-11 | End: 2022-05-23 | Stop reason: SDUPTHER

## 2022-04-21 RX ORDER — GABAPENTIN 300 MG/1
CAPSULE ORAL
Qty: 90 CAPSULE | Refills: 2 | Status: SHIPPED | OUTPATIENT
Start: 2022-04-21 | End: 2022-07-18

## 2022-05-03 ENCOUNTER — TELEPHONE (OUTPATIENT)
Dept: FAMILY MEDICINE CLINIC | Age: 57
End: 2022-05-03

## 2022-05-03 ENCOUNTER — OFFICE VISIT (OUTPATIENT)
Dept: FAMILY MEDICINE CLINIC | Age: 57
End: 2022-05-03

## 2022-05-03 VITALS
RESPIRATION RATE: 24 BRPM | HEART RATE: 116 BPM | TEMPERATURE: 97.8 F | BODY MASS INDEX: 35.79 KG/M2 | SYSTOLIC BLOOD PRESSURE: 104 MMHG | WEIGHT: 228 LBS | HEIGHT: 67 IN | DIASTOLIC BLOOD PRESSURE: 78 MMHG

## 2022-05-03 DIAGNOSIS — K76.9 LIVER LESION: ICD-10-CM

## 2022-05-03 DIAGNOSIS — N28.1 RENAL CYST: ICD-10-CM

## 2022-05-03 DIAGNOSIS — S20.219A CONTUSION OF CHEST WALL, UNSPECIFIED LATERALITY, INITIAL ENCOUNTER: ICD-10-CM

## 2022-05-03 DIAGNOSIS — S22.20XA CLOSED FRACTURE OF STERNUM, UNSPECIFIED PORTION OF STERNUM, INITIAL ENCOUNTER: ICD-10-CM
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
## 2022-05-03 DIAGNOSIS — K70.30 ALCOHOLIC CIRRHOSIS, UNSPECIFIED WHETHER ASCITES PRESENT (HCC): ICD-10-CM

## 2022-05-03 DIAGNOSIS — S60.222A CONTUSION OF LEFT HAND, INITIAL ENCOUNTER: ICD-10-CM

## 2022-05-03 PROCEDURE — 99214 OFFICE O/P EST MOD 30 MIN: CPT | Performed by: NURSE PRACTITIONER

## 2022-05-03 RX ORDER — HYDROCODONE BITARTRATE AND ACETAMINOPHEN 5; 325 MG/1; MG/1
1 TABLET ORAL EVERY 6 HOURS PRN
Qty: 28 TABLET | Refills: 0 | Status: SHIPPED | OUTPATIENT
Start: 2022-05-03 | End: 2022-06-02 | Stop reason: SDUPTHER

## 2022-05-03 RX ORDER — PREDNISONE 10 MG/1
10 TABLET ORAL 2 TIMES DAILY
Qty: 10 TABLET | Refills: 0 | Status: SHIPPED | OUTPATIENT
Start: 2022-05-03 | End: 2022-05-08

## 2022-05-03 NOTE — PATIENT INSTRUCTIONS
Patient Education        Chest Contusion: Care Instructions  Your Care Instructions  A chest contusion, or bruise, is caused by a fall or direct blow to the chest. Car crashes, falls, getting punched, and injury from bicycle handlebars are common causes of chest contusions. A very forceful blow to the chest can injure the heart or blood vessels in the chest, the lungs, the airway, the liver, orthe spleen. Pain may be caused by an injury to muscles, cartilage, or ribs. Deep breathing, coughing, or sneezing can increase your pain. Lying on the injured area alsocan cause pain. Follow-up care is a key part of your treatment and safety. Be sure to make and go to all appointments, and call your doctor if you are having problems. It's also a good idea to know your test results and keep alist of the medicines you take. How can you care for yourself at home?  Rest and protect the injured or sore area. Stop, change, or take a break from any activity that may be causing your pain.  Put ice or a cold pack on the area for 10 to 20 minutes at a time. Put a thin cloth between the ice and your skin.  After 2 or 3 days, if your swelling is gone, apply a heating pad set on low or a warm cloth to your chest. Some doctors suggest that you go back and forth between hot and cold. Put a thin cloth between the heating pad and your skin.  Do not wrap or tape your ribs for support. This may cause you to take smaller breaths, which could increase your risk of pneumonia and lung collapse.  Ask your doctor if you can take an over-the-counter pain medicine, such as acetaminophen (Tylenol), ibuprofen (Advil, Motrin), or naproxen (Aleve). Be safe with medicines. Read and follow all instructions on the label.  Take your medicines exactly as prescribed. Call your doctor if you think you are having a problem with your medicine.    Gentle stretching and massage may help you feel better after a few days of rest. Stretch slowly to the point just before discomfort begins, then hold the stretch for at least 15 to 30 seconds. Do this 3 or 4 times per day.  As your pain gets better, slowly return to your normal activities. Be patient, because chest bruises can take weeks or months to heal. Any increased pain may be a sign that you need to rest a while longer. When should you call for help? Call 911 anytime you think you may need emergency care. For example, call if:     You have severe trouble breathing.      You cough up blood. Call your doctor now or seek immediate medical care if:     You have belly pain.      You are dizzy or lightheaded, or you feel like you may faint.      You develop new symptoms with the chest pain.      Your chest pain gets worse.      You have a fever.      You have some shortness of breath.      You have a cough that brings up mucus from the lungs. Watch closely for changes in your health, and be sure to contact your doctor if:     Your chest pain is not improving after 1 week. Where can you learn more? Go to https://[employer].Cephasonics. org and sign in to your [employer] Central account. Enter I174 in the Ze[employer] box to learn more about \"Chest Contusion: Care Instructions. \"     If you do not have an account, please click on the \"Sign Up Now\" link. Current as of: July 1, 2021               Content Version: 13.2  © 2006-2022 Healthwise, Incorporated. Care instructions adapted under license by [facility] ([facility]). If you have questions about a medical condition or this instruction, always ask your healthcare professional. [place] [address] any warranty or liability for your use of this information. Patient Education        Bruised Rib: Care Instructions  Overview     You can get a bruised rib if you fall or get hit, such as in an accident or while playing sports. The medical term for a bruise is \"contusion. \" Small bloodvessels get torn and leak blood under the skin.  Most people think of a bruise as a black-and-blue area. But bones and muscles can also get bruised. An injury may damage the rib but not cause a bruise thatyou can see. Sometimes it can be hard to tell if a rib is bruised or broken. The symptoms may be the same. And a broken bone can't always be seen on an X-ray. But thetreatment for a bruised rib is often the same as treatment for a broken one. An injury to the ribs can cause pain. The pain may be worse when you breathedeeply, cough, or sneeze. In most cases, a bruised rib will heal on its own. You can take pain medicinewhile the rib mends. Pain relief allows you to take deep breaths. Follow-up care is a key part of your treatment and safety. Be sure to make and go to all appointments, and call your doctor if you are having problems. It's also a good idea to know your test results and keep alist of the medicines you take. How can you care for yourself at home?  Rest and protect the injured or sore area. Stop, change, or take a break from any activity that causes pain.  Put ice or a cold pack on the area for 10 to 20 minutes at a time. Put a thin cloth between the ice and your skin.  After 2 or 3 days, if your swelling is gone, put a heating pad set on low or a warm cloth on your chest. Some doctors suggest that you go back and forth between hot and cold. Put a thin cloth between the heating pad and your skin.  Ask your doctor if you can take an over-the-counter pain medicine, such as acetaminophen (Tylenol), ibuprofen (Advil, Motrin), or naproxen (Aleve). Be safe with medicines. Read and follow all instructions on the label.  As your pain gets better, slowly return to your normal activities. Be patient. Rib bruises can take weeks or months to heal. If the pain gets worse, it may be a sign that you need to rest a while longer. When should you call for help? Call 911  anytime you think you may need emergency care.  For example, call if:     You have severe trouble breathing. Call your doctor now or seek immediate medical care if:     You have trouble breathing.      You have a fever.      You have a new or worse cough.      You have new or worse pain. Watch closely for changes in your health, and be sure to contact your doctor if:     You do not get better as expected. Where can you learn more? Go to https://SoCore Energypepiceweb.Clifford Thames. org and sign in to your SIPphone account. Enter R125 in the [employer] box to learn more about \"Bruised Rib: Care Instructions. \"     If you do not have an account, please click on the \"Sign Up Now\" link. Current as of: July 1, 2021               Content Version: 13.2  © 2006-2022 Healthwise, Incorporated. Care instructions adapted under license by [facility] ([facility]). If you have questions about a medical condition or this instruction, always ask your healthcare professional. [address] any warranty or liability for your use of this information.

## 2022-05-03 NOTE — TELEPHONE ENCOUNTER
Here to see Amandeep Al today, s/p MICHAEL Tee is requesting he receive Norco 5/325 one every 6 hours #30x0      WCP  Bill

## 2022-05-03 NOTE — TELEPHONE ENCOUNTER
Shahana Alcala will need a prior authorization for his Norco, this is submitted and waiting on response. Patient notified.

## 2022-05-03 NOTE — TELEPHONE ENCOUNTER
----- Message from Vicki Leonard sent at 5/3/2022  4:20 PM EDT -----  Subject: Message to Provider    QUESTIONS  Information for Provider? would like Arian Scherer to call him back   ---------------------------------------------------------------------------  --------------  CALL BACK INFO  What is the best way for the office to contact you? OK to leave message on   voicemail  Preferred Call Back Phone Number?  [phone]  ---------------------------------------------------------------------------  --------------  SCRIPT ANSWERS  undefined

## 2022-05-04 ENCOUNTER — TELEPHONE (OUTPATIENT)
Dept: FAMILY MEDICINE CLINIC | Age: 57
End: 2022-05-04

## 2022-05-04 NOTE — TELEPHONE ENCOUNTER
----- Message from Sheila Mireles MA sent at 5/3/2022  5:13 PM EDT -----  Subject: Medication Problem    QUESTIONS  Name of Medication? HYDROcodone-acetaminophen (NORCO) 5-325 MG per tablet  Patient-reported dosage and instructions? Take 1 tablet by mouth every 6   hours as needed for Pain for up to 7 days  What question or problem do you have with the medication? Román Alexis with [facility] [place] member services request a prior authorization as soon as   possible pt is in need of the medication. [id number] ORL#[phone] or   call [phone]  Preferred Pharmacy? [facility] [address] [facility]  Pharmacy phone number (if available)? [phone]  Additional Information for Provider? Call back# is for the providers line  ---------------------------------------------------------------------------  --------------  CALL BACK INFO  What is the best way for the office to contact you? Do not leave any   message, patient will call back for answer  Preferred Call Back Phone Number? [phone]  ---------------------------------------------------------------------------  --------------  SCRIPT ANSWERS  Relationship to Patient? Third Party  Third Party Type? Pharmacy? Representative Name?  Román Alexis

## 2022-05-04 NOTE — TELEPHONE ENCOUNTER
Patient called in wanting update on status of PA. Explained to patient that it was submitted we are just waiting on approval. Patient hung up phone on me.

## 2022-05-05 ENCOUNTER — TELEPHONE (OUTPATIENT)
Dept: FAMILY MEDICINE CLINIC | Age: 57
End: 2022-05-05

## 2022-05-05 ASSESSMENT — ENCOUNTER SYMPTOMS
CONSTIPATION: 0
WHEEZING: 0
BACK PAIN: 0
BLOOD IN STOOL: 0
NAUSEA: 0
VOMITING: 0
CHEST TIGHTNESS: 0
COUGH: 0
ABDOMINAL DISTENTION: 1
DIARRHEA: 0
ABDOMINAL PAIN: 1
SHORTNESS OF BREATH: 0
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
## 2022-05-13 RX ORDER — ATORVASTATIN CALCIUM 40 MG/1
40 TABLET, FILM COATED ORAL NIGHTLY
Qty: 30 TABLET | Refills: 5 | Status: SHIPPED | OUTPATIENT
Start: 2022-05-13

## 2022-05-16 DIAGNOSIS — F41.9 ANXIETY: ICD-10-CM

## 2022-05-16 DIAGNOSIS — K70.30 ALCOHOLIC CIRRHOSIS, UNSPECIFIED WHETHER ASCITES PRESENT (HCC): ICD-10-CM

## 2022-05-16 RX ORDER — ALPRAZOLAM 1 MG/1
TABLET ORAL
Qty: 90 TABLET | Refills: 0 | Status: SHIPPED | OUTPATIENT
Start: 2022-05-16 | End: 2022-06-13

## 2022-05-19 ENCOUNTER — TELEPHONE (OUTPATIENT)
Dept: FAMILY MEDICINE CLINIC | Age: 57
End: 2022-05-19

## 2022-05-19 NOTE — TELEPHONE ENCOUNTER
----- Message from Gris Jimiraymundo sent at 5/19/2022 10:38 AM EDT -----  Subject: Message to Provider    QUESTIONS  Information for Provider? Patient would like to know if [place] [id number] [address]   by himself?  ---------------------------------------------------------------------------  --------------  [address]  What is the best way for the office to contact you? OK to leave message on   voicemail  Preferred Call Back Phone Number? [phone]  ---------------------------------------------------------------------------  --------------  SCRIPT ANSWERS  Relationship to Patient?  Self ASSESSMENT: Christian Rees is a 68 y.o. male who presents for consultation at the request of [facility] PCP Cristela De Jesus MD, with a past medical history significant for generalized anxiety disorder, right bundle branch block, chronic diastolic heart failure, coronary artery disease, venous hypertension, post thrombotic syndrome, acquired lymphedema of the leg, chronic bronchospasm, familial tremor, hypertension, hyperlipidemia, dizziness, anemia, gout, B12 and vitamin D deficiency, history bilateral total hip replacement, lung cancer, controlled substance agreement signed for Percocet 10/2/2017 PCP, who presents today for new patient evaluation of acute right-sided low back pain with right radiculitis S1 dermatomal pattern that was significant beginning of October that has improved.    He is neurologically intact on exam.         WHO 5: 16     PHQ-2: 0    Diagnoses and all orders for this visit:    Acute right-sided low back pain with right-sided sciatica  -     Ambulatory referral to PT/OT    Chronic bilateral low back pain without sciatica  -     Ambulatory referral to PT/OT    Lumbar facet arthropathy  -     Ambulatory referral to PT/OT    Degenerative disc disease, lumbar  -     Ambulatory referral to PT/OT    Claustrophobia      PLAN:  Reviewed spine anatomy and disease process. Discussed diagnosis and treatment options with the patient today. A shared decision making model was used.  The patient's values and choices were respected. The following represents what was discussed and decided upon by the provider and the patient.      -DIAGNOSTIC TESTS:  Images were personally reviewed and interpreted.   --Discussed that if his pain worsens at any time or physical therapy is not improving we can consider an MRI, he is significantly claustrophobic therefore would require upright seated MRI without sedation.  He was unable to do a lying MRI with oral sedation in the past..  --Lumbar x-ray 10/25/2017 does reveal  10  levoscoliosis.  6 mm retrolisthesis L2-3.  Moderate to advanced degenerative disc disease and facet arthropathy multiple levels with osteophyte formation.    -PHYSICAL THERAPY: Referral to physical therapy sent to [facility] [place] for home exercise program establishing and nerve glide exercises.  Discussed the importance of core strengthening, ROM, stretching exercises with the patient and how each of these entities is important in decreasing pain.  Explained to the patient that the purpose of physical therapy is to teach the patient a home exercise program.  These exercises need to be performed every day in order to decrease pain and prevent future occurrences of pain.        -MEDICATIONS: Did discuss low-dose gabapentin 100 mg 1 tablet 3 times daily if pain worsens in the future.  Wants to hold off at this time however as pain is tolerable.  -Advised patient continue Percocet as needed for severe breakthrough pain per PCP, he does have a signed contract with them.  However he is taking this infrequent currently as his pain has significantly improved on its own.  Discussed side effects of medications and proper use. Patient verbalized understanding.    -PATIENT EDUCATION:  40 minutes of total visit time was spent face to face with the patient today, 60 % of the visit was spent on counseling, education, and coordinating care.   -10 minutes spent outside of visit time, non-face-to-face time, reviewing chart.    -FOLLOW-UP:   Follow-up 4 weeks if symptoms are not improving, sooner if pain is worsening or new symptoms arise.    Advised pt to call the [facility] if symptoms worsen or you have problems controlling bladder and bowel function.   ______________________________________________________________________    SUBJECTIVE:  HPI:  Christian Rees  Is a 68 y.o. male who presents today for new patient evaluation of low back pain on the right lumbosacral junction that was rating into the posterior  buttock and posterior thigh stopping at the knee that initially was severe at a 10/10 starting 10/1/2017 however in the course of the month it has significantly improved, currently his pain is a 1/10 and he denies any radicular pain.    He denied any numbness or tingling at any time, denied weakness, denies recent trips or falls or balance changes, denies bowel or bladder dysfunction.    Does have chronic low back pain across lumbosacral junction however he reports that this is been different than his typical, and in the last couple of years he has had minimal pain and is been doing well.    --Patient was seen last in October 2015 here at the spine center for bilateral low back pain, recommend physical therapy at that time.     -Treatment to Date: Prior spinal surgery or spinal injection.  Physical therapy years ago with no relief.    -Medications:  Percocet 5/325 mg 1 tablet every 8 hours as needed prescribed 60 tablets 10/2/2017 per PCP.  Aleve recommended per PCP with some relief.    Current Outpatient Prescriptions on File Prior to Encounter   Medication Sig Dispense Refill     aspirin 81 MG EC tablet Take 1 tablet (81 mg total) by mouth daily. Pt taking every other day or as tolerated 150 tablet 2     atorvastatin (LIPITOR) 40 MG tablet TAKE ONE TABLET BY MOUTH EVERY DAY 90 tablet 2     carvedilol (COREG) 12.5 MG tablet Take 1 tablet (12.5 mg total) by mouth 2 (two) times a day with meals. 180 tablet 3     folic acid (FOLVITE) 1 MG tablet TAKE ONE TABLET BY MOUTH EVERY  tablet 3     furosemide (LASIX) 40 MG tablet TAKE ONE TABLET BY MOUTH EVERY DAY 90 tablet 3     indomethacin (INDOCIN) 50 MG capsule TAKE ONE CAPSULE BY MOUTH THREE TIMES A DAY WITH FOOD AS NEEDED 30 capsule 12     IPRATROPIUM/ALBUTEROL SULFATE (IPRATROPIUM-ALBUTEROL INHL) Inhale 2 puffs 3 (three) times a day as needed.        lisinopril (PRINIVIL,ZESTRIL) 20 MG tablet Take 1 tablet (20 mg total) by mouth 2 (two) times a day. 180 tablet 3      magnesium oxide (MAGOX) 400 mg tablet 400mg daily 90 tablet 3     multivitamin (MULTIVITAMIN) per tablet Take 1 tablet by mouth daily.       omeprazole (PRILOSEC) 20 MG capsule Take 20 mg by mouth bedtime.       oxyCODONE-acetaminophen (PERCOCET) 5-325 mg per tablet Take 1 tablet by mouth every 8 (eight) hours as needed for pain. 60 tablet 0     PARoxetine (PAXIL) 40 MG tablet TAKE ONE TABLET BY MOUTH EVERY DAY 90 tablet 3     potassium chloride SA (K-DUR,KLOR-CON) 20 MEQ tablet Take 1 tablet (20 mEq total) by mouth daily. 30 tablet 6     Current Facility-Administered Medications on File Prior to Encounter   Medication Dose Route Frequency Provider Last Rate Last Dose     cyanocobalamin injection 1,000 mcg  1,000 mcg Intramuscular Q30 Days Jag Li MD   1,000 mcg at 10/04/17 1108       Allergies   Allergen Reactions     Oxycontin [Oxycodone] Itching       Past Medical History:   Diagnosis Date     Anxiety      Closed Posterior Dislocation Of The Hip     Created by Conversion      Depression      DVT (deep venous thrombosis)      Gout      Heart failure      Hypertension      Lung cancer     Left lung     Persistent Atrial Fibrillation     Created by LK FREEMAN [facility] Annotation: Apr 17 2014 11:48AM - Ismael Holbrooket: 3/14 new afib  with contr VR with hip replacement [id number] score of  3 with 1  point each for age 65 and older, HTN and CHF hx---new warfarin start         Patient Active Problem List   Diagnosis     Generalized Anxiety Disorder     Right Bundle Branch Block With Left Anterior Fascicular Block     Chronic Bronchospasm     Familial (Benign Essential) Tremor     Mixed hyperlipidemia     Essential Hypertension     Dizziness     Pigmented Nevus     Joint Pain, Localized In The Shoulder     Anemia     Edema     Joint Pain, Localized In The Hip     Gout     Excessive Sweating     B12 deficiency     Lung cancer     Low magnesium levels     Coronary artery disease due  to calcified coronary lesion     Venous hypertension of both lower extremities     Postthrombotic syndrome     Acquired lymphedema of leg     Vitamin D deficiency     Chronic diastolic heart failure     Functional diarrhea     Controlled substance agreement signed       Past Surgical History:   Procedure Laterality Date     LUNG SURGERY       IN CARDIOVERSION ELECTIVE ARRHYTHMIA EXTERNAL      Description: Elective Cardioversion External;  Recorded: 05/16/2014;  Comments: 4/25/14     IN REMOVAL OF LUNG,LOBECTOMY      Description: Lung Lobectomy;  Proc Date: 01/01/2013;  Comments: left lower     IN TOTAL HIP ARTHROPLASTY Bilateral Left (2011), right (2012)     Description: Total Hip Replacement;  Recorded: 01/11/2013;  Comments: right 2003; ; left 2007 x2     SHOULDER SURGERY Left 2009?    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  Patient denies smoking history, occasionally drinks a glass of wine after dinner, but denies being a heavy drinker, denies recreational drug use.    ROS: Positive for swelling in feet on a chronic basis, shortness of breath, anxiety, back pain, leg pain, depression/worry.  Specifically negative for bowel/bladder dysfunction, balance changes, headache, dizziness, foot drop, fevers, chills, appetite changes, nausea/vomiting, unexplained weight loss. Otherwise 13 systems reviewed are negative. Please see the patient's intake questionnaire from today for details.    OBJECTIVE:  /86 (Patient Site: Right Arm, Patient Position: Sitting)  Pulse 85  Wt 202 lb (91.6 kg)  SpO2 92%  BMI 32.6 kg/m2    PHYSICAL EXAMINATION:    --CONSTITUTIONAL:  Vital signs as above.  No acute distress.   The patient is well nourished and well groomed.  --PSYCHIATRIC:  Appropriate mood and affect. The patient is awake, alert, oriented to person, place, time and answering questions appropriately with clear speech.    --SKIN:  Skin over the face, bilateral lower extremities, and posterior torso is clean, dry, intact without rashes.    --RESPIRATORY: Normal rhythm and effort. No abnormal accessory muscle breathing patterns noted.   --STANDING EXAMINATION:  Normal lumbar lordosis noted, no lateral shift.  --MUSCULOSKELETAL: Lumbar spine inspection reveals no evidence of deformity. Range of motion is not limited in lumbar flexion, extension. No tenderness to palpation lumbar spine. Straight leg raising in the seated position is negative to radicular pain. Sciatic notch non-tender.  --GROSS MOTOR: Gait is non-antalgic. Easily arises from a seated position. Toe walking and heel walking are normal without significant difficulty.    --LOWER EXTREMITY MOTOR TESTING:  Plantar flexion left 5/5, right 5/5   Dorsiflexion left 5/5, right 5/5   Great toe MTP extension left 5/5, right 5/5  Knee flexion left 5/5, right 5/5  Knee extension left 5/5, right 5/5   Hip flexion left 5/5, right 5/5  Hip abduction left 5/5, right 5/5  Hip adduction left 5/5, right 5/5   --HIPS: Full range of motion bilaterally. Negative FABERs on the involved lower extremity.   --NEUROLOGICAL:  2/4 patellar, medial hamstring, and achilles reflexes bilaterally.  Sensation to light touch is intact in the bilateral L4, L5, and S1 dermatomes. Babinski is negative. No clonus.  --VASCULAR:  2/4 dorsalis pedis and posterior tibialsi pulses bilaterally.  Bilateral lower extremities are warm.  There is no pitting edema of the bilateral lower extremities.    RESULTS: Prior medical records from 10/2/2017 to current were reviewed today.    Imaging: Lumbar spine Imaging was personally reviewed and interpreted today. The images were shown to the patient and the findings  were explained using a spine model.      Xr Lumbar Spine 2 Or 3 Vws  Result Date: 10/26/2017  INDICATION: radicular pain right lower back/leg, no injury COMPARISON: None. FINDINGS: 5 lumbar type vertebral bodies. Bilateral total hip arthroplasties. 10 degrees lumbar levoscoliotic curvature. Mild apex right curvature thoracolumbar junction. Bone subjectively osteopenic. No compression fracture. 6 mm retrolisthesis L2 on L3. Moderate to advanced multilevel degenerative disc disease with loss of disc height and endplate osteophyte formation throughout the lumbar spine. Moderate degenerative set disease in lower lumbar spine. Atherosclerotic disease in the prevertebral soft tissues. Aorta appears to measure up to 3.2 cm suggesting mild aneurysmal dilatation.       Ct Chest Abdomen Pelvis Without Oral With Iv Contrast  Result Date: 10/10/2017  INDICATION: F/U lung cancer. TECHNIQUE: CT chest, abdomen, and pelvis. Dose reduction techniques were used. IV CONTRAST: Iohexol (Omni) 100 mL. COMPARISON: CT scan of the chest, abdomen and pelvis, 7/11/2017. FINDINGS:   CHEST: There is a 7 x 5 mm pulmonary nodule involving the medial aspect of the right upper lobe (series 3 image 33). This compares with 5 x 4 mm previously. This nodule demonstrates a tree-in-bud appearance, most suggestive of a postinfectious process. There are several other smaller groundglass and solid right lung pulmonary nodules identified which have not significantly changed. Left lower lobectomy. Small amount of pleural fluid is seen on the left. Heart size is stable. Thoracic aorta is normal in  caliber with diffuse atherosclerotic calcification. No lymphadenopathy.    ABDOMEN: The liver is low-dense in appearance, consistent with hepatic steatosis. The pancreas, adrenal glands and kidneys are grossly normal. Stones are present within the gallbladder. Low dense lesion within the posterior aspect of the spleen is stable and of questionable significance. The  abdominal aorta is caliber with diffuse atherosclerotic calcification. No lymphadenopathy. No bowel obstruction or abnormal bowel wall thickening.  PELVIS: The appendix is normal. The urinary bladder is grossly normal. No free fluid or lymphadenopathy.   MUSCULOSKELETAL: Bilateral hip arthroplasties. No suspicious osseous lesions.   CONCLUSION:   1.  No compelling evidence for recurrent or metastatic disease. There are several right lung pulmonary nodules, one of which has increased in size since comparison CT scan. This nodule however demonstrates a similar tree-in-bud appearance, suggesting sequela of infectious process. Remainder of the solid and groundglass nodules are stable.

## 2022-05-19 NOTE — TELEPHONE ENCOUNTER
Patient called back stating he was planning on traveling in September to [place] to visit his cousin and wanted to ask what [facility] Staff thinks if he can Fly alone.

## 2022-05-19 NOTE — TELEPHONE ENCOUNTER
Attempted to call patient to get more info, LM for call back. Please advise if you know if he can travel alone?

## 2022-05-23 DIAGNOSIS — M54.50 CHRONIC MIDLINE LOW BACK PAIN, UNSPECIFIED WHETHER SCIATICA PRESENT: ICD-10-CM

## 2022-05-23 DIAGNOSIS — G89.29 CHRONIC MIDLINE LOW BACK PAIN, UNSPECIFIED WHETHER SCIATICA PRESENT: ICD-10-CM

## 2022-05-23 RX ORDER — TRAMADOL HYDROCHLORIDE 50 MG/1
50 TABLET ORAL EVERY 6 HOURS PRN
Qty: 120 TABLET | Refills: 0 | Status: SHIPPED | OUTPATIENT
Start: 2022-05-23 | End: 2022-07-21 | Stop reason: SDUPTHER

## 2022-05-23 NOTE — TELEPHONE ENCOUNTER
----- Message from Jessenia Enrike sent at 5/23/2022  9:29 AM EDT -----  Subject: Refill Request    QUESTIONS  Name of Medication? traMADol (ULTRAM) 50 MG tablet  Patient-reported dosage and instructions? 1 tablet every 6 hours  How many days do you have left? 0  Preferred Pharmacy? [facility] phone number (if available)? [phone]  Additional Information for Provider? son will be picking up scripts [facility]  ---------------------------------------------------------------------------  --------------  CALL BACK INFO  What is the best way for the office to contact you? OK to leave message on   voicemail  Preferred Call Back Phone Number? [phone]  ---------------------------------------------------------------------------  --------------  SCRIPT ANSWERS  Relationship to Patient?  Self

## 2022-05-31 ENCOUNTER — HOSPITAL ENCOUNTER (OUTPATIENT)
Dept: MRI IMAGING | Age: 57
Discharge: HOME | End: 2022-05-31
Payer: MEDICAID

## 2022-05-31 DIAGNOSIS — N28.1 RENAL CYST: ICD-10-CM

## 2022-05-31 DIAGNOSIS — K76.9 LIVER LESION: ICD-10-CM

## 2022-05-31 PROCEDURE — 74183 MRI ABD W/O CNTR FLWD CNTR: CPT

## 2022-05-31 PROCEDURE — 6360000004 HC RX CONTRAST MEDICATION: Performed by: NURSE PRACTITIONER

## 2022-05-31 PROCEDURE — A9579 GAD-BASE MR CONTRAST NOS,1ML: HCPCS | Performed by: NURSE PRACTITIONER

## 2022-05-31 RX ADMIN — GADOTERIDOL 20 ML: 279.3 INJECTION, SOLUTION INTRAVENOUS at 14:57

## 2022-06-01 ENCOUNTER — HOSPITAL ENCOUNTER (OUTPATIENT)
Dept: CT IMAGING | Age: 57
Discharge: HOME | End: 2022-06-01

## 2022-06-01 ENCOUNTER — TELEPHONE (OUTPATIENT)
Dept: FAMILY MEDICINE CLINIC | Age: 57
End: 2022-06-01

## 2022-06-01 DIAGNOSIS — K76.9 LIVER LESION: Primary | ICD-10-CM

## 2022-06-01 DIAGNOSIS — Z00.6 EXAMINATION FOR NORMAL COMPARISON FOR CLINICAL RESEARCH: ICD-10-CM
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
## 2022-06-01 NOTE — TELEPHONE ENCOUNTER
Pt agreeable to see Dayana Garcia next week. Agreeable to [facility], pt notified they will call him to schedule. Previously pt had a rx for norco, he does c/o ongoing pain and states that did help. He is requesting a refill of norco. LOV 5/3/22, last refill of norco #28/0, 5/3/22.  Pt uses [facility]

## 2022-06-01 NOTE — TELEPHONE ENCOUNTER
The MRI shows a possible cancerous lesion in the patient's liver, and I recommend the patient follow-up as soon as possible in office with [facility] for further determination of what to do next.

## 2022-06-01 NOTE — TELEPHONE ENCOUNTER
Pt notified of response. Pt wonders if appt could be made for this week with TM to discuss results, he read results on Protean Electrict and has a sense of urgency to go over them right away.  Please

## 2022-06-01 NOTE — TELEPHONE ENCOUNTER
He is welcome to make an appointment with me this week if I have any openings. There is not any further investigation that I would be doing at this time but I am happy to go over the results with him. Also, go ahead and put in a referral to oncology.

## 2022-06-02 ENCOUNTER — HOSPITAL ENCOUNTER (OUTPATIENT)
Dept: CT IMAGING | Age: 57
Discharge: HOME | End: 2022-06-02

## 2022-06-02 DIAGNOSIS — Z00.6 EXAMINATION FOR NORMAL COMPARISON FOR CLINICAL RESEARCH: ICD-10-CM

## 2022-06-02 RX ORDER — HYDROCODONE BITARTRATE AND ACETAMINOPHEN 5; 325 MG/1; MG/1
1 TABLET ORAL EVERY 6 HOURS PRN
Qty: 28 TABLET | Refills: 0 | Status: SHIPPED | OUTPATIENT
Start: 2022-06-02 | End: 2022-06-09

## 2022-06-06 RX ORDER — CYCLOBENZAPRINE HCL 10 MG
TABLET ORAL
Qty: 60 TABLET | Refills: 1 | Status: SHIPPED | OUTPATIENT
Start: 2022-06-06 | End: 2022-08-11

## 2022-06-10 ENCOUNTER — HOSPITAL ENCOUNTER (OUTPATIENT)
Dept: CT IMAGING | Age: 57
Discharge: HOME | End: 2022-06-10
Payer: MEDICAID

## 2022-06-10 VITALS
TEMPERATURE: 98.2 F | SYSTOLIC BLOOD PRESSURE: 96 MMHG | OXYGEN SATURATION: 95 % | HEART RATE: 82 BPM | DIASTOLIC BLOOD PRESSURE: 53 MMHG | RESPIRATION RATE: 18 BRPM

## 2022-06-10 DIAGNOSIS — R16.0 LIVER MASS: ICD-10-CM

## 2022-06-10 LAB
CREAT SERPL-MCNC: 0.7 MG/DL (ref 0.4–1.2)
ERYTHROCYTE [DISTWIDTH] IN BLOOD BY AUTOMATED COUNT: 15.2 % (ref 11.5–14.5)
ERYTHROCYTE [DISTWIDTH] IN BLOOD BY AUTOMATED COUNT: 53 FL (ref 35–45)
GFR SERPL CREATININE-BSD FRML MDRD: > 90 ML/MIN/1.73M2
HCT VFR BLD CALC: 42 % (ref 42–52)
HEMOGLOBIN: 14 GM/DL (ref 14–18)
MCH RBC QN AUTO: 31.7 PG (ref 26–33)
MCHC RBC AUTO-ENTMCNC: 33.3 GM/DL (ref 32.2–35.5)
MCV RBC AUTO: 95 FL (ref 80–94)
PLATELET # BLD: 183 THOU/MM3 (ref 130–400)
PMV BLD AUTO: 9.9 FL (ref 9.4–12.4)
RBC # BLD: 4.42 MILL/MM3 (ref 4.7–6.1)
WBC # BLD: 6.4 THOU/MM3 (ref 4.8–10.8)

## 2022-06-10 PROCEDURE — 82565 ASSAY OF CREATININE: CPT

## 2022-06-10 PROCEDURE — 6370000000 HC RX 637 (ALT 250 FOR IP): Performed by: RADIOLOGY

## 2022-06-10 PROCEDURE — 88342 IMHCHEM/IMCYTCHM 1ST ANTB: CPT

## 2022-06-10 PROCEDURE — 85027 COMPLETE CBC AUTOMATED: CPT

## 2022-06-10 PROCEDURE — 6360000002 HC RX W HCPCS: Performed by: RADIOLOGY

## 2022-06-10 PROCEDURE — 2580000003 HC RX 258: Performed by: RADIOLOGY

## 2022-06-10 PROCEDURE — 88333 PATH CONSLTJ SURG CYTO XM 1: CPT

## 2022-06-10 PROCEDURE — 88307 TISSUE EXAM BY PATHOLOGIST: CPT

## 2022-06-10 PROCEDURE — 88341 IMHCHEM/IMCYTCHM EA ADD ANTB: CPT

## 2022-06-10 PROCEDURE — 77012 CT SCAN FOR NEEDLE BIOPSY: CPT

## 2022-06-10 PROCEDURE — 99211 OFF/OP EST MAY X REQ PHY/QHP: CPT

## 2022-06-10 PROCEDURE — 47000 NEEDLE BIOPSY OF LIVER PERQ: CPT

## 2022-06-10 RX ORDER — BACITRACIN, NEOMYCIN, POLYMYXIN B 400; 3.5; 5 [USP'U]/G; MG/G; [USP'U]/G
OINTMENT TOPICAL
Status: COMPLETED | OUTPATIENT
Start: 2022-06-10 | End: 2022-06-10

## 2022-06-10 RX ORDER — MIDAZOLAM HYDROCHLORIDE 1 MG/ML
INJECTION INTRAMUSCULAR; INTRAVENOUS
Status: COMPLETED | OUTPATIENT
Start: 2022-06-10 | End: 2022-06-10

## 2022-06-10 RX ORDER — FENTANYL CITRATE 50 UG/ML
INJECTION, SOLUTION INTRAMUSCULAR; INTRAVENOUS
Status: COMPLETED | OUTPATIENT
Start: 2022-06-10 | End: 2022-06-10

## 2022-06-10 RX ORDER — HYDROCODONE BITARTRATE AND ACETAMINOPHEN 5; 325 MG/1; MG/1
1 TABLET ORAL ONCE
Status: COMPLETED | OUTPATIENT
Start: 2022-06-10 | End: 2022-06-10

## 2022-06-10 RX ORDER — FENTANYL CITRATE 50 UG/ML
50 INJECTION, SOLUTION INTRAMUSCULAR; INTRAVENOUS ONCE
Status: COMPLETED | OUTPATIENT
Start: 2022-06-10 | End: 2022-06-10

## 2022-06-10 RX ORDER — MIDAZOLAM HYDROCHLORIDE 1 MG/ML
1 INJECTION INTRAMUSCULAR; INTRAVENOUS ONCE
Status: COMPLETED | OUTPATIENT
Start: 2022-06-10 | End: 2022-06-10

## 2022-06-10 RX ORDER — SODIUM CHLORIDE 450 MG/100ML
INJECTION, SOLUTION INTRAVENOUS CONTINUOUS
Status: DISCONTINUED | OUTPATIENT
Start: 2022-06-10 | End: 2022-06-11 | Stop reason: HOSPADM

## 2022-06-10 RX ADMIN — FENTANYL CITRATE 50 MCG: 50 INJECTION, SOLUTION INTRAMUSCULAR; INTRAVENOUS at 09:39

## 2022-06-10 RX ADMIN — FENTANYL CITRATE 25 MCG: 50 INJECTION, SOLUTION INTRAMUSCULAR; INTRAVENOUS at 09:33

## 2022-06-10 RX ADMIN — MIDAZOLAM 0.5 MG: 1 INJECTION INTRAMUSCULAR; INTRAVENOUS at 09:29

## 2022-06-10 RX ADMIN — MIDAZOLAM 0.5 MG: 1 INJECTION INTRAMUSCULAR; INTRAVENOUS at 09:33

## 2022-06-10 RX ADMIN — FENTANYL CITRATE 50 MCG: 50 INJECTION, SOLUTION INTRAMUSCULAR; INTRAVENOUS at 09:23

## 2022-06-10 RX ADMIN — SODIUM CHLORIDE: 4.5 INJECTION, SOLUTION INTRAVENOUS at 07:20

## 2022-06-10 RX ADMIN — MIDAZOLAM HYDROCHLORIDE 1 MG: 1 INJECTION, SOLUTION INTRAMUSCULAR; INTRAVENOUS at 09:23

## 2022-06-10 RX ADMIN — MIDAZOLAM 1 MG: 1 INJECTION INTRAMUSCULAR; INTRAVENOUS at 09:39

## 2022-06-10 RX ADMIN — BACITRACIN ZINC, NEOMYCIN SULFATE, AND POLYMYXIN B SULFATE 1 EACH: 400; 3.5; 5 OINTMENT TOPICAL at 09:56

## 2022-06-10 RX ADMIN — FENTANYL CITRATE 25 MCG: 50 INJECTION, SOLUTION INTRAMUSCULAR; INTRAVENOUS at 09:29

## 2022-06-10 RX ADMIN — HYDROCODONE BITARTRATE AND ACETAMINOPHEN 1 TABLET: 5; 325 TABLET ORAL at 10:29

## 2022-06-10 ASSESSMENT — PAIN SCALES - GENERAL
PAINLEVEL_OUTOF10: 5
PAINLEVEL_OUTOF10: 0
PAINLEVEL_OUTOF10: 7

## 2022-06-10 ASSESSMENT — PAIN DESCRIPTION - LOCATION
LOCATION: ABDOMEN
LOCATION: ABDOMEN

## 2022-06-10 ASSESSMENT — PAIN DESCRIPTION - ORIENTATION
ORIENTATION: LEFT
ORIENTATION: RIGHT

## 2022-06-10 NOTE — PROGRESS NOTES
0880 Pt arrived to CT for liver lesion Biopsy. 0554 Procedure explained using teach back method. Pt states understanding. 2102 Dr Jess Fishman in to assess patient and explain procedure. 0982 This procedure has been fully reviewed with the patient and written informed consent has been obtained. 5298 Patient assisted to table in supine position. Monitor attached to patient. Comfort ensured. 0745 Pre-procedure images obtained. Pathology called. 6949 Procedure begins. Pathology arrived.   0121 Procedure complete. Samples obtained and given to the pathologist.   1036 Post-procedure images obtained. 7132 Monitor removed. Patient assisted to cart in semi-fowlers position. Comfort ensured. 1000 Patient transported to [facility] in stable condition. 1001 Report called Marzena Butcher RN in [facility].

## 2022-06-10 NOTE — H&P
Formulation and discussion of sedation / procedure plans, risks, benefits, side effects and alternatives with patient and/or responsible adult completed.     Electronically signed by Cynthia Jean MD on 6/10/2022 at 8:55 AM

## 2022-06-10 NOTE — PROGRESS NOTES
1415 pt calling asking about leaving his watch in his room. This RN does not remember pt having a watch. IV was started on left wrist and watch was not removed prior to IV insertion. BP was taken in right arm pre and post procedure and there was not a watch on his arm. Tray table was never extended out and the top of tray table was cleaned off multiple times with no watch on the table. Counter by sink was cleaned off after IV was started prior to procedure and there was not a watch on the counter. 1430 pt calls back again asking about watch. This RN and Vasile Reyes RN looking in pt's room. No watch located. House keeper looked as well and no watch was located. Vasile Stallings RN spoke to CT tech jaguar and she did not find the watch in the CT room. 1435 pt called back and advised his watch was not located. Pt verbalized understanding to conversation and states he will look at home.

## 2022-06-10 NOTE — PROGRESS NOTES
4834 pt arrives ambulatory with son for liver bx. Procedure explained and questions answered. PT RIGHTS AND RESPONSIBILITIES OFFERED TO PT. Pt has not taken any blood thinners in past 5 days. Pt has been NPO since 0000.   0720 labs drawn and sent down as ordered. 0820 pt to CT via bed. 1008 pt back from CT. Vitals stable. Site on right abdomen clean, dry and intact. No bleeding drainage swelling or redness noted. 80 Dr. Daniel Givens notified that pt having 8/10 pain. New orders received. 1026 vitals stable. Pt provided with muffin peanut butter and water. Pain med given as ordered. 1045 vitals stable. Pt tolerating snack. Site unchanged. Pt provided with water and lunch box. 1100 vitals stable. Site unchanged. Pt resting quietly. 1212 pt tolerating lunch. Vitals stable. bandaid unchanged. 1335 vitals stable. Site unchanged. Pt watching tv.  1355 pt discharged via wheelchair with son with instructions with no complaints. bandaid on right abdomen clean, dry and intact. No bleeding drainage redness or swelling noted.            _m___ Safety:       (Environmental)   Boomer to environment   Ensure ID band is correct and in place/ allergy band as needed   Assess for fall risk   Initiate fall precautions as applicable (fall band, side rails, etc.)   Call light within reach   Bed in low position/ wheels locked    __m__ Pain:        Assess pain level and characteristics   Administer analgesics as ordered   Assess effectiveness of pain management and report to MD as needed    _m___ Knowledge Deficit:   Assess baseline knowledge   Provide teaching at level of understanding   Provide teaching via preferred learning method   Evaluate teaching effectiveness    _m___ Hemodynamic/Respiratory Status:       (Pre and Post Procedure Monitoring)   Assess/Monitor vital signs and LOC   Assess Baseline SpO2 prior to any sedation   Obtain weight/height   Assess vital signs/ LOC until patient meets discharge criteria   Monitor procedure site and notify MD of any issues

## 2022-06-10 NOTE — OP NOTE
Department of Radiology  Post Procedure Progress Note      Pre-Procedure Diagnosis:  Liver mass    Procedure Performed:  CT guided biopsy    Anesthesia: local / versed and fentanyl    Findings: successful    Immediate Complications:  None    Estimated Blood Loss: minimal    SEE DICTATED PROCEDURE NOTE FOR COMPLETE DETAILS.     Electronically signed by Lavella Goodell, MD on 6/10/2022 at 9:59 AM

## 2022-06-10 NOTE — H&P
[address] [address]  Sedation/Analgesia History & Physical    Pt Name: Vicky Nunez  MRN: [id number]  YOB: 1965  Provider Performing Procedure: Bianka Dill MD, MD  Primary Care Physician: David Bae, CHRISTIAN - CNP    PRE-PROCEDURE   DNR-CCA/DNR-CC []Yes [x]No  Brief History/Pre-Procedure Diagnosis: Liver mass          MEDICAL HISTORY  []CAD/Valve  [x]Liver Disease  []Lung Disease []Diabetes  [x]Hypertension [x]Renal Disease  []Additional information:       has a past medical history of Anxiety, Arthritis, Chronic kidney disease, Cirrhosis ([facility] [address] Diverticulitis, Diverticulosis, GERD (gastroesophageal reflux disease), Hypertension, Other disorders of kidney and ureter in diseases classified elsewhere, Psychiatric problem, Pure hypercholesterolemia, and Stroke of unknown cause ([facility] [address] SURGICAL HISTORY   has a past surgical history that includes Cervical disc surgery; Colon surgery (2004); Tympanostomy tube placement; Tonsillectomy; Abdomen surgery; Colonoscopy; Endoscopy, colon, diagnostic; fracture surgery; Dilatation, esophagus; Cardiac catheterization (2007?); Nerve Block (Bilateral, 10/02/2017); pr inj,paravertebral l/s,1 level (Bilateral, 10/2/2017); Upper gastrointestinal endoscopy (2019); back surgery; and Upper gastrointestinal endoscopy (Left, 9/28/2020).   Additional information:       ALLERGIES   Allergies as of 06/10/2022    (No Known Allergies)     Additional information:       MEDICATIONS   Coumadin Use Last 5 Days [x]No []Yes  Antiplatelet drug therapy use last 5 days  [x]No []Yes  Other anticoagulant use last 5 days  [x]No []Yes    Current Outpatient Medications:     cyclobenzaprine (FLEXERIL) 10 mg tablet, take 1/2 to 1 tablet by mouth three times a day if needed, Disp: 60 tablet, Rfl: 1    omeprazole (PRILOSEC) 40 MG delayed release capsule, Take 1 capsule by mouth daily, Disp: 30 capsule, Rfl: 5    traMADol (ULTRAM) 50 MG tablet, Take 1 tablet by mouth every 6 hours as needed for Pain for up to 30 days. , Disp: 120 tablet, Rfl: 0    ALPRAZolam (XANAX) 1 MG tablet, take 1 tablet by mouth three times a day if needed for anxiety or sleep, Disp: 90 tablet, Rfl: 0    atorvastatin (LIPITOR) 40 MG tablet, take 1 tablet by mouth nightly, Disp: 30 tablet, Rfl: 5    rifAXIMin (XIFAXAN) 550 MG tablet, Take 1 tablet by mouth 2 times daily, Disp: 60 tablet, Rfl: 6    gabapentin (NEURONTIN) 300 MG capsule, take 1 capsule by mouth three times a day, Disp: 90 capsule, Rfl: 2    albuterol sulfate HFA (VENTOLIN HFA) 108 (90 Base) MCG/ACT inhaler, Inhale 2 puffs into the lungs 4 times daily as needed for Wheezing, Disp: 18 g, Rfl: 0    spironolactone (ALDACTONE) 100 MG tablet, take 1 tablet by mouth every morning, Disp: 90 tablet, Rfl: 2    DULoxetine (CYMBALTA) 60 MG extended release capsule, take 1 capsule by mouth once daily, Disp: 90 capsule, Rfl: 3    LACTULOSE PO, Take 30 mLs by mouth 2 times daily, Disp: , Rfl:     nitroGLYCERIN (NITROSTAT) 0.4 MG SL tablet, up to max of 3 total doses. If no relief after 1 dose, call 911., Disp: 25 tablet, Rfl: 0    aspirin 81 MG chewable tablet, Take 1 tablet by mouth daily (Patient not taking: Reported on 6/10/2022), Disp: 30 tablet, Rfl: 0    Current Facility-Administered Medications:     0.45 % sodium chloride infusion, , IntraVENous, Continuous, Sharin Hammans, MD, Last Rate: 20 mL/hr at 06/10/22 0720, New Bag at 06/10/22 0720    fentaNYL (SUBLIMAZE) injection 50 mcg, 50 mcg, IntraVENous, Once, Sharin Hammans, MD    midazolam (VERSED) injection 1 mg, 1 mg, IntraVENous, Once, Sharin Hammans, MD  Prior to Admission medications    Medication Sig Start Date End Date Taking?  Authorizing Provider   cyclobenzaprine (FLEXERIL) 10 mg tablet take 1/2 to 1 tablet by mouth three times a day if needed 6/6/22   CHRISTIAN Barnett CNP   omeprazole (PRILOSEC) 40 MG delayed release capsule Take 1 capsule by mouth daily 6/3/22   Janet KIM CHRISTIAN Patterson CNP   traMADol (ULTRAM) 50 MG tablet Take 1 tablet by mouth every 6 hours as needed for Pain for up to 30 days. 5/23/22 6/22/22  CHRISTIAN Fitzpatrick CNP   ALPRAZolam Denis Nasuti) 1 MG tablet take 1 tablet by mouth three times a day if needed for anxiety or sleep 5/16/22 8/15/22  CHRISTIAN Fitzpatrick CNP   atorvastatin (LIPITOR) 40 MG tablet take 1 tablet by mouth nightly 5/13/22   CHRISTIAN Fitzpatrick CNP   rifAXIMin (XIFAXAN) 550 MG tablet Take 1 tablet by mouth 2 times daily 4/26/22   Julio Cesar Ankur, APRN - CNP   gabapentin (NEURONTIN) 300 MG capsule take 1 capsule by mouth three times a day 4/21/22 7/21/22  CHRISTIAN Burroughs CNP   albuterol sulfate HFA (VENTOLIN HFA) 108 (90 Base) MCG/ACT inhaler Inhale 2 puffs into the lungs 4 times daily as needed for Wheezing 10/19/21   CHRISTIAN Fitzpatrick CNP   spironolactone (ALDACTONE) 100 MG tablet take 1 tablet by mouth every morning 9/24/21   CHRISTIAN Fitzpatrick CNP   DULoxetine (CYMBALTA) 60 MG extended release capsule take 1 capsule by mouth once daily 8/23/21   CHRISTIAN Kirkland CNP   LACTULOSE PO Take 30 mLs by mouth 2 times daily    Historical Provider, MD   nitroGLYCERIN (NITROSTAT) 0.4 MG SL tablet up to max of 3 total doses.  If no relief after 1 dose, call 911. 6/28/21   CHRISTIAN Fitzpatrick CNP   aspirin 81 MG chewable tablet Take 1 tablet by mouth daily  Patient not taking: Reported on 6/10/2022 10/15/19   Chelsea Church MD     Additional information:       VITAL SIGNS   Vitals:    06/10/22 0710   BP: 110/69   Pulse: 78   Resp: 20   Temp: 98.2 °F (36.8 °C)   SpO2: 95%       PHYSICAL:   Heart:  [x]Regular rate and rhythm  []Other:    Lungs:  [x]Clear    []Other:    Abdomen: [x]Soft    []Other:    Mental Status: [x]Alert & Oriented  []Other:      PLANNED PROCEDURE   [x]Biospy []Arteriogram              []Drainage   []Mediport Insertion  []Fistulogram []IV access       []Vertebroplasty / Augmentation  []IVC filter []Dialysis catheter []Biliary drainage  []Other: []CAPD Catheter []Nephrostomy Tube / Stent  SEDATION  Planned agent:[x]Midazolam []Meperidine [x]Sublimaze []Dilaudid []Morphine     []Diazepam  []Other:     ASA Classification:  []1 [x]2 []3 []4 []5  Class 1: A normal healthy patient  Class 2: Pt with mild to moderate systemic disease  Class 3: Severe systemic disease or disturbance  Class 4: Severe systemic disorders that are already life threatening. Class 5: Moribund pt with little chances of survival, for more than 24 hours. Mallampati I Airway Classification:   []1 [x]2 []3 []4    [x]Pre-procedure diagnostic studies complete and results available. Comment:    [x]Previous sedation/anesthesia experiences assessed. Comment:    [x]The patient is an appropriate candidate to undergo the planned procedure sedation and anesthesia. (Refer to nursing sedation/analgesia documentation record)  [x]Formulation and discussion of sedation/procedure plan, risks, and expectations with patient and/or responsible adult completed. [x]Patient examined immediately prior to the procedure.  (Refer to nursing sedation/analgesia documentation record)    Dora Tierney MD, MD  Electronically signed 6/10/2022 at 8:55 AM

## 2022-06-11 DIAGNOSIS — F41.9 ANXIETY: ICD-10-CM

## 2022-06-11 DIAGNOSIS — K70.30 ALCOHOLIC CIRRHOSIS, UNSPECIFIED WHETHER ASCITES PRESENT (HCC): ICD-10-CM

## 2022-06-13 RX ORDER — ALPRAZOLAM 1 MG/1
TABLET ORAL
Qty: 90 TABLET | Refills: 0 | Status: SHIPPED | OUTPATIENT
Start: 2022-06-13 | End: 2022-07-21 | Stop reason: SDUPTHER

## 2022-06-16 ENCOUNTER — HOSPITAL ENCOUNTER (OUTPATIENT)
Age: 57
Setting detail: OBSERVATION
Discharge: HOME | End: 2022-06-17
Attending: PHYSICIAN ASSISTANT | Admitting: INTERNAL MEDICINE
Payer: MEDICAID

## 2022-06-16 ENCOUNTER — APPOINTMENT (OUTPATIENT)
Dept: CT IMAGING | Age: 57
End: 2022-06-16
Payer: MEDICAID

## 2022-06-16 ENCOUNTER — APPOINTMENT (OUTPATIENT)
Dept: GENERAL RADIOLOGY | Age: 57
End: 2022-06-16
Payer: MEDICAID

## 2022-06-16 DIAGNOSIS — G93.40 ENCEPHALOPATHY ACUTE: Primary | ICD-10-CM

## 2022-06-16 DIAGNOSIS — R44.3 HALLUCINATIONS: ICD-10-CM

## 2022-06-16 DIAGNOSIS — C22.0 HEPATOCELLULAR CARCINOMA (HCC): ICD-10-CM

## 2022-06-16 PROBLEM — G93.41 ACUTE METABOLIC ENCEPHALOPATHY: Status: ACTIVE | Noted: 2022-06-16

## 2022-06-16 LAB
ALBUMIN SERPL-MCNC: 3.8 G/DL (ref 3.5–5.1)
ALLEN TEST: POSITIVE
ALP BLD-CCNC: 115 U/L (ref 38–126)
ALT SERPL-CCNC: 19 U/L (ref 11–66)
AMMONIA: 48 UMOL/L (ref 11–60)
AMORPHOUS: ABNORMAL
AMPHETAMINE+METHAMPHETAMINE URINE SCREEN: NEGATIVE
ANION GAP SERPL CALCULATED.3IONS-SCNC: 17 MEQ/L (ref 8–16)
APTT: 34.6 SECONDS (ref 22–38)
AST SERPL-CCNC: 23 U/L (ref 5–40)
BACTERIA: ABNORMAL /HPF
BARBITURATE QUANTITATIVE URINE: NEGATIVE
BASE EXCESS (CALCULATED): -2.4 MMOL/L (ref -2.5–2.5)
BASOPHILS # BLD: 0.6 %
BASOPHILS ABSOLUTE: 0.1 THOU/MM3 (ref 0–0.1)
BENZODIAZEPINE QUANTITATIVE URINE: POSITIVE
BILIRUB SERPL-MCNC: 1.1 MG/DL (ref 0.3–1.2)
BILIRUBIN DIRECT: 0.3 MG/DL (ref 0–0.3)
BILIRUBIN URINE: NEGATIVE
BLOOD, URINE: ABNORMAL
BUN BLDV-MCNC: 14 MG/DL (ref 7–22)
CALCIUM SERPL-MCNC: 10.2 MG/DL (ref 8.5–10.5)
CANNABINOID QUANTITATIVE URINE: POSITIVE
CASTS 2: ABNORMAL /LPF
CASTS UA: ABNORMAL /LPF
CHARACTER, URINE: CLEAR
CHLORIDE BLD-SCNC: 100 MEQ/L (ref 98–111)
CO2: 17 MEQ/L (ref 23–33)
COCAINE METABOLITE QUANTITATIVE URINE: POSITIVE
COLLECTED BY:: ABNORMAL
COLOR: ABNORMAL
CREAT SERPL-MCNC: 0.9 MG/DL (ref 0.4–1.2)
CRYSTALS, UA: ABNORMAL
DEVICE: ABNORMAL
EKG ATRIAL RATE: 114 BPM
EKG P AXIS: 41 DEGREES
EKG P-R INTERVAL: 114 MS
EKG Q-T INTERVAL: 346 MS
EKG QRS DURATION: 90 MS
EKG QTC CALCULATION (BAZETT): 476 MS
EKG R AXIS: 62 DEGREES
EKG T AXIS: 22 DEGREES
EKG VENTRICULAR RATE: 114 BPM
EOSINOPHIL # BLD: 2 %
EOSINOPHILS ABSOLUTE: 0.2 THOU/MM3 (ref 0–0.4)
EPITHELIAL CELLS, UA: ABNORMAL /HPF
ERYTHROCYTE [DISTWIDTH] IN BLOOD BY AUTOMATED COUNT: 14.4 % (ref 11.5–14.5)
ERYTHROCYTE [DISTWIDTH] IN BLOOD BY AUTOMATED COUNT: 47.7 FL (ref 35–45)
ETHYL ALCOHOL, SERUM: < 0.01 %
GFR SERPL CREATININE-BSD FRML MDRD: 87 ML/MIN/1.73M2
GLUCOSE BLD-MCNC: 116 MG/DL (ref 70–108)
GLUCOSE URINE: NEGATIVE MG/DL
HAV IGM SER IA-ACNC: NEGATIVE
HCO3: 22 MMOL/L (ref 23–28)
HCT VFR BLD CALC: 39 % (ref 42–52)
HEMOGLOBIN: 13.3 GM/DL (ref 14–18)
HEPATITIS B CORE IGM ANTIBODY: NEGATIVE
HEPATITIS B SURFACE ANTIGEN: NEGATIVE
HEPATITIS C ANTIBODY: NEGATIVE
IMMATURE GRANS (ABS): 0.07 THOU/MM3 (ref 0–0.07)
IMMATURE GRANULOCYTES: 0.6 %
INR BLD: 1.32 (ref 0.85–1.13)
KETONES, URINE: ABNORMAL
LACTIC ACID: 1.1 MMOL/L (ref 0.5–2)
LACTIC ACID: 2 MMOL/L (ref 0.5–2)
LEUKOCYTE ESTERASE, URINE: NEGATIVE
LIPASE: 14.9 U/L (ref 5.6–51.3)
LYMPHOCYTES # BLD: 11.8 %
LYMPHOCYTES ABSOLUTE: 1.4 THOU/MM3 (ref 1–4.8)
MAGNESIUM: 1.6 MG/DL (ref 1.6–2.4)
MCH RBC QN AUTO: 31.2 PG (ref 26–33)
MCHC RBC AUTO-ENTMCNC: 34.1 GM/DL (ref 32.2–35.5)
MCV RBC AUTO: 91.5 FL (ref 80–94)
MISCELLANEOUS 2: ABNORMAL
MONOCYTES # BLD: 14.8 %
MONOCYTES ABSOLUTE: 1.7 THOU/MM3 (ref 0.4–1.3)
NITRITE, URINE: NEGATIVE
NUCLEATED RED BLOOD CELLS: 0 /100 WBC
O2 SATURATION: 96 %
OPIATES, URINE: NEGATIVE
OSMOLALITY CALCULATION: 269.7 MOSMOL/KG (ref 275–300)
OXYCODONE: POSITIVE
PCO2: 38 MMHG (ref 35–45)
PH BLOOD GAS: 7.38 (ref 7.35–7.45)
PH UA: 5 (ref 5–9)
PHENCYCLIDINE QUANTITATIVE URINE: NEGATIVE
PLATELET # BLD: 239 THOU/MM3 (ref 130–400)
PMV BLD AUTO: 10.1 FL (ref 9.4–12.4)
PO2: 79 MMHG (ref 71–104)
POTASSIUM SERPL-SCNC: 3.5 MEQ/L (ref 3.5–5.2)
PRO-BNP: 45.7 PG/ML (ref 0–900)
PROCALCITONIN: 0.04 NG/ML (ref 0.01–0.09)
PROTEIN UA: ABNORMAL
RBC # BLD: 4.26 MILL/MM3 (ref 4.7–6.1)
RBC URINE: ABNORMAL /HPF
RENAL EPITHELIAL, UA: ABNORMAL
SEG NEUTROPHILS: 70.2 %
SEGMENTED NEUTROPHILS ABSOLUTE COUNT: 8.2 THOU/MM3 (ref 1.8–7.7)
SODIUM BLD-SCNC: 134 MEQ/L (ref 135–145)
SOURCE, BLOOD GAS: ABNORMAL
SPECIFIC GRAVITY, URINE: 1.03 (ref 1–1.03)
TOTAL PROTEIN: 8.7 G/DL (ref 6.1–8)
TROPONIN T: < 0.01 NG/ML
TSH SERPL DL<=0.05 MIU/L-ACNC: 0.51 UIU/ML (ref 0.4–4.2)
UROBILINOGEN, URINE: 1 EU/DL (ref 0–1)
WBC # BLD: 11.7 THOU/MM3 (ref 4.8–10.8)
WBC UA: ABNORMAL /HPF
YEAST: ABNORMAL

## 2022-06-16 PROCEDURE — 93010 ELECTROCARDIOGRAM REPORT: CPT | Performed by: NUCLEAR MEDICINE

## 2022-06-16 PROCEDURE — 82140 ASSAY OF AMMONIA: CPT

## 2022-06-16 PROCEDURE — 80053 COMPREHEN METABOLIC PANEL: CPT

## 2022-06-16 PROCEDURE — 2580000003 HC RX 258: Performed by: STUDENT IN AN ORGANIZED HEALTH CARE EDUCATION/TRAINING PROGRAM

## 2022-06-16 PROCEDURE — 80074 ACUTE HEPATITIS PANEL: CPT

## 2022-06-16 PROCEDURE — 85730 THROMBOPLASTIN TIME PARTIAL: CPT

## 2022-06-16 PROCEDURE — 70470 CT HEAD/BRAIN W/O & W/DYE: CPT

## 2022-06-16 PROCEDURE — G0378 HOSPITAL OBSERVATION PER HR: HCPCS

## 2022-06-16 PROCEDURE — 93005 ELECTROCARDIOGRAM TRACING: CPT | Performed by: NURSE PRACTITIONER

## 2022-06-16 PROCEDURE — 82803 BLOOD GASES ANY COMBINATION: CPT

## 2022-06-16 PROCEDURE — 99285 EMERGENCY DEPT VISIT HI MDM: CPT

## 2022-06-16 PROCEDURE — 82248 BILIRUBIN DIRECT: CPT

## 2022-06-16 PROCEDURE — 6360000002 HC RX W HCPCS: Performed by: NURSE PRACTITIONER

## 2022-06-16 PROCEDURE — 96367 TX/PROPH/DG ADDL SEQ IV INF: CPT

## 2022-06-16 PROCEDURE — 80307 DRUG TEST PRSMV CHEM ANLYZR: CPT

## 2022-06-16 PROCEDURE — 36415 COLL VENOUS BLD VENIPUNCTURE: CPT

## 2022-06-16 PROCEDURE — 6370000000 HC RX 637 (ALT 250 FOR IP): Performed by: STUDENT IN AN ORGANIZED HEALTH CARE EDUCATION/TRAINING PROGRAM

## 2022-06-16 PROCEDURE — 83880 ASSAY OF NATRIURETIC PEPTIDE: CPT

## 2022-06-16 PROCEDURE — 36600 WITHDRAWAL OF ARTERIAL BLOOD: CPT

## 2022-06-16 PROCEDURE — 1200000000 HC SEMI PRIVATE

## 2022-06-16 PROCEDURE — 84443 ASSAY THYROID STIM HORMONE: CPT

## 2022-06-16 PROCEDURE — 96365 THER/PROPH/DIAG IV INF INIT: CPT

## 2022-06-16 PROCEDURE — 85610 PROTHROMBIN TIME: CPT

## 2022-06-16 PROCEDURE — 96374 THER/PROPH/DIAG INJ IV PUSH: CPT

## 2022-06-16 PROCEDURE — 96372 THER/PROPH/DIAG INJ SC/IM: CPT

## 2022-06-16 PROCEDURE — 87040 BLOOD CULTURE FOR BACTERIA: CPT

## 2022-06-16 PROCEDURE — 6360000002 HC RX W HCPCS: Performed by: PHYSICIAN ASSISTANT

## 2022-06-16 PROCEDURE — 83690 ASSAY OF LIPASE: CPT

## 2022-06-16 PROCEDURE — 83605 ASSAY OF LACTIC ACID: CPT

## 2022-06-16 PROCEDURE — 6360000004 HC RX CONTRAST MEDICATION: Performed by: NURSE PRACTITIONER

## 2022-06-16 PROCEDURE — 83735 ASSAY OF MAGNESIUM: CPT

## 2022-06-16 PROCEDURE — 96366 THER/PROPH/DIAG IV INF ADDON: CPT

## 2022-06-16 PROCEDURE — 84145 PROCALCITONIN (PCT): CPT

## 2022-06-16 PROCEDURE — 96375 TX/PRO/DX INJ NEW DRUG ADDON: CPT

## 2022-06-16 PROCEDURE — 81001 URINALYSIS AUTO W/SCOPE: CPT

## 2022-06-16 PROCEDURE — 85025 COMPLETE CBC W/AUTO DIFF WBC: CPT

## 2022-06-16 PROCEDURE — 90792 PSYCH DIAG EVAL W/MED SRVCS: CPT | Performed by: PSYCHIATRY & NEUROLOGY

## 2022-06-16 PROCEDURE — 2580000003 HC RX 258: Performed by: NURSE PRACTITIONER

## 2022-06-16 PROCEDURE — 99222 1ST HOSP IP/OBS MODERATE 55: CPT | Performed by: PHYSICIAN ASSISTANT

## 2022-06-16 PROCEDURE — 6370000000 HC RX 637 (ALT 250 FOR IP): Performed by: PHYSICIAN ASSISTANT

## 2022-06-16 PROCEDURE — 71045 X-RAY EXAM CHEST 1 VIEW: CPT

## 2022-06-16 PROCEDURE — 84484 ASSAY OF TROPONIN QUANT: CPT

## 2022-06-16 PROCEDURE — 82077 ASSAY SPEC XCP UR&BREATH IA: CPT

## 2022-06-16 RX ORDER — POLYETHYLENE GLYCOL 3350 17 G/17G
17 POWDER, FOR SOLUTION ORAL DAILY PRN
Status: DISCONTINUED | OUTPATIENT
Start: 2022-06-16 | End: 2022-06-17 | Stop reason: HOSPADM

## 2022-06-16 RX ORDER — ONDANSETRON 4 MG/1
4 TABLET, ORALLY DISINTEGRATING ORAL EVERY 8 HOURS PRN
Status: DISCONTINUED | OUTPATIENT
Start: 2022-06-16 | End: 2022-06-17 | Stop reason: HOSPADM

## 2022-06-16 RX ORDER — DULOXETIN HYDROCHLORIDE 60 MG/1
60 CAPSULE, DELAYED RELEASE ORAL DAILY
Status: DISCONTINUED | OUTPATIENT
Start: 2022-06-16 | End: 2022-06-17 | Stop reason: HOSPADM

## 2022-06-16 RX ORDER — SPIRONOLACTONE 25 MG/1
100 TABLET ORAL DAILY
Status: DISCONTINUED | OUTPATIENT
Start: 2022-06-16 | End: 2022-06-17 | Stop reason: HOSPADM

## 2022-06-16 RX ORDER — LACTULOSE 10 G/15ML
30 SOLUTION ORAL 2 TIMES DAILY
Status: DISCONTINUED | OUTPATIENT
Start: 2022-06-16 | End: 2022-06-17 | Stop reason: HOSPADM

## 2022-06-16 RX ORDER — ACETAMINOPHEN 325 MG/1
650 TABLET ORAL EVERY 6 HOURS PRN
Status: DISCONTINUED | OUTPATIENT
Start: 2022-06-16 | End: 2022-06-17 | Stop reason: HOSPADM

## 2022-06-16 RX ORDER — ACETAMINOPHEN 650 MG/1
650 SUPPOSITORY RECTAL EVERY 6 HOURS PRN
Status: DISCONTINUED | OUTPATIENT
Start: 2022-06-16 | End: 2022-06-17 | Stop reason: HOSPADM

## 2022-06-16 RX ORDER — SODIUM CHLORIDE 0.9 % (FLUSH) 0.9 %
5-40 SYRINGE (ML) INJECTION PRN
Status: DISCONTINUED | OUTPATIENT
Start: 2022-06-16 | End: 2022-06-17 | Stop reason: HOSPADM

## 2022-06-16 RX ORDER — LANOLIN ALCOHOL/MO/W.PET/CERES
100 CREAM (GRAM) TOPICAL DAILY
Status: DISCONTINUED | OUTPATIENT
Start: 2022-06-16 | End: 2022-06-17 | Stop reason: HOSPADM

## 2022-06-16 RX ORDER — ALBUTEROL SULFATE 90 UG/1
2 AEROSOL, METERED RESPIRATORY (INHALATION) 4 TIMES DAILY PRN
Status: DISCONTINUED | OUTPATIENT
Start: 2022-06-16 | End: 2022-06-16

## 2022-06-16 RX ORDER — ATORVASTATIN CALCIUM 40 MG/1
40 TABLET, FILM COATED ORAL NIGHTLY
Status: DISCONTINUED | OUTPATIENT
Start: 2022-06-16 | End: 2022-06-17 | Stop reason: HOSPADM

## 2022-06-16 RX ORDER — AZITHROMYCIN 250 MG/1
500 TABLET, FILM COATED ORAL DAILY
Status: DISCONTINUED | OUTPATIENT
Start: 2022-06-17 | End: 2022-06-16

## 2022-06-16 RX ORDER — SODIUM CHLORIDE 9 MG/ML
INJECTION, SOLUTION INTRAVENOUS PRN
Status: DISCONTINUED | OUTPATIENT
Start: 2022-06-16 | End: 2022-06-17 | Stop reason: HOSPADM

## 2022-06-16 RX ORDER — CYCLOBENZAPRINE HCL 10 MG
10 TABLET ORAL 3 TIMES DAILY PRN
Status: DISCONTINUED | OUTPATIENT
Start: 2022-06-16 | End: 2022-06-17 | Stop reason: HOSPADM

## 2022-06-16 RX ORDER — SODIUM CHLORIDE 0.9 % (FLUSH) 0.9 %
5-40 SYRINGE (ML) INJECTION EVERY 12 HOURS SCHEDULED
Status: DISCONTINUED | OUTPATIENT
Start: 2022-06-16 | End: 2022-06-17 | Stop reason: HOSPADM

## 2022-06-16 RX ORDER — ENOXAPARIN SODIUM 100 MG/ML
30 INJECTION SUBCUTANEOUS 2 TIMES DAILY
Status: DISCONTINUED | OUTPATIENT
Start: 2022-06-16 | End: 2022-06-17 | Stop reason: HOSPADM

## 2022-06-16 RX ORDER — NITROGLYCERIN 0.4 MG/1
0.4 TABLET SUBLINGUAL EVERY 5 MIN PRN
Status: DISCONTINUED | OUTPATIENT
Start: 2022-06-16 | End: 2022-06-17 | Stop reason: HOSPADM

## 2022-06-16 RX ORDER — ONDANSETRON 2 MG/ML
4 INJECTION INTRAMUSCULAR; INTRAVENOUS EVERY 6 HOURS PRN
Status: DISCONTINUED | OUTPATIENT
Start: 2022-06-16 | End: 2022-06-17 | Stop reason: HOSPADM

## 2022-06-16 RX ORDER — LORAZEPAM 2 MG/ML
1 INJECTION INTRAMUSCULAR ONCE
Status: COMPLETED | OUTPATIENT
Start: 2022-06-16 | End: 2022-06-16

## 2022-06-16 RX ORDER — SODIUM CHLORIDE 9 MG/ML
INJECTION, SOLUTION INTRAVENOUS PRN
Status: DISCONTINUED | OUTPATIENT
Start: 2022-06-16 | End: 2022-06-16 | Stop reason: SDUPTHER

## 2022-06-16 RX ORDER — GABAPENTIN 300 MG/1
300 CAPSULE ORAL 3 TIMES DAILY
Status: DISCONTINUED | OUTPATIENT
Start: 2022-06-16 | End: 2022-06-17 | Stop reason: HOSPADM

## 2022-06-16 RX ORDER — ALPRAZOLAM 0.5 MG/1
1 TABLET ORAL 3 TIMES DAILY PRN
Status: DISCONTINUED | OUTPATIENT
Start: 2022-06-16 | End: 2022-06-17 | Stop reason: HOSPADM

## 2022-06-16 RX ORDER — SODIUM CHLORIDE 0.9 % (FLUSH) 0.9 %
10 SYRINGE (ML) INJECTION PRN
Status: DISCONTINUED | OUTPATIENT
Start: 2022-06-16 | End: 2022-06-16 | Stop reason: SDUPTHER

## 2022-06-16 RX ORDER — PANTOPRAZOLE SODIUM 40 MG/1
40 TABLET, DELAYED RELEASE ORAL
Status: DISCONTINUED | OUTPATIENT
Start: 2022-06-16 | End: 2022-06-17 | Stop reason: HOSPADM

## 2022-06-16 RX ORDER — SODIUM CHLORIDE 0.9 % (FLUSH) 0.9 %
10 SYRINGE (ML) INJECTION EVERY 12 HOURS SCHEDULED
Status: DISCONTINUED | OUTPATIENT
Start: 2022-06-16 | End: 2022-06-16 | Stop reason: SDUPTHER

## 2022-06-16 RX ADMIN — RIFAXIMIN 550 MG: 550 TABLET ORAL at 12:37

## 2022-06-16 RX ADMIN — CEFTRIAXONE SODIUM 1000 MG: 1 INJECTION, POWDER, FOR SOLUTION INTRAMUSCULAR; INTRAVENOUS at 05:33

## 2022-06-16 RX ADMIN — LACTULOSE 30 G: 20 SOLUTION ORAL at 12:37

## 2022-06-16 RX ADMIN — SODIUM CHLORIDE, PRESERVATIVE FREE 10 ML: 5 INJECTION INTRAVENOUS at 19:44

## 2022-06-16 RX ADMIN — ENOXAPARIN SODIUM 30 MG: 100 INJECTION SUBCUTANEOUS at 19:44

## 2022-06-16 RX ADMIN — PANTOPRAZOLE SODIUM 40 MG: 40 TABLET, DELAYED RELEASE ORAL at 12:37

## 2022-06-16 RX ADMIN — GABAPENTIN 300 MG: 300 CAPSULE ORAL at 19:44

## 2022-06-16 RX ADMIN — Medication 100 MG: at 14:07

## 2022-06-16 RX ADMIN — SPIRONOLACTONE 100 MG: 25 TABLET ORAL at 12:36

## 2022-06-16 RX ADMIN — ATORVASTATIN CALCIUM 40 MG: 40 TABLET, FILM COATED ORAL at 19:44

## 2022-06-16 RX ADMIN — RIFAXIMIN 550 MG: 550 TABLET ORAL at 19:44

## 2022-06-16 RX ADMIN — LORAZEPAM 1 MG: 2 INJECTION INTRAMUSCULAR; INTRAVENOUS at 02:30

## 2022-06-16 RX ADMIN — SODIUM CHLORIDE, PRESERVATIVE FREE 10 ML: 5 INJECTION INTRAVENOUS at 12:36

## 2022-06-16 RX ADMIN — LACTULOSE 30 G: 20 SOLUTION ORAL at 19:44

## 2022-06-16 RX ADMIN — GABAPENTIN 300 MG: 300 CAPSULE ORAL at 12:36

## 2022-06-16 RX ADMIN — AZITHROMYCIN DIHYDRATE 500 MG: 500 INJECTION, POWDER, LYOPHILIZED, FOR SOLUTION INTRAVENOUS at 06:14

## 2022-06-16 RX ADMIN — DULOXETINE HYDROCHLORIDE 60 MG: 60 CAPSULE, DELAYED RELEASE ORAL at 12:37

## 2022-06-16 RX ADMIN — IOPAMIDOL 80 ML: 755 INJECTION, SOLUTION INTRAVENOUS at 05:38

## 2022-06-16 ASSESSMENT — LIFESTYLE VARIABLES: HOW OFTEN DO YOU HAVE A DRINK CONTAINING ALCOHOL: NEVER

## 2022-06-16 NOTE — CARE COORDINATION
6/16/22, 12:17 PM EDT  DISCHARGE PLANNING EVALUATION:    Jemima Rankin       Admitted: 6/16/2022/ [facility] day: 0   Location: -28/028-A Reason for admit: Hallucinations [R44.3]  Hepatocellular carcinoma ([facility] [address] [C22.0]  Encephalopathy acute [A93.41]  Acute metabolic encephalopathy [H90.54]   PMH:  has a past medical history of Anxiety, Arthritis, Chronic kidney disease, Cirrhosis ([facility] [address] Diverticulitis, Diverticulosis, GERD (gastroesophageal reflux disease), Hypertension, Other disorders of kidney and ureter in diseases classified elsewhere, Psychiatric problem, Pure hypercholesterolemia, and Stroke of unknown cause ([facility] [address] Procedure: No.  Barriers to Discharge: To ER with hallucinations. Recent liver biopsy. SW and Addictions consulted. CIWA. Psych consulted. PT/OT ordered. . Consult to Oncology. PCP: CHRISTIAN Hensley - CNP  Readmission Risk Score: 13.2 ( )%    Pt medical spokesperson would be his sons. Pt is not . Patient Goals/Plan/Treatment Preferences: Met with pt son Marine Butt as pt appears soundly asleep. Pt resides alone and sons live near by and check in with him frequently. Pt is independent although he does not drive. Sons provide transportation. Pt has no current [facility] service. He does have a cane and walker available should he need it. He has a PCP, he can obtain meds without issue and he is insured. CM will follow for discharge needs. Transportation/Food Security/Housekeeping Addressed:  No issues identified.

## 2022-06-16 NOTE — FLOWSHEET NOTE
06/16/22 0913   Treatment Team Notification   Reason for Communication Evaluate; Review case   Team Member Name Dr. Krishnan Headings Provider   Method of Communication Secure Message   Response Waiting for response   Notification Time 2888   Hem/Onc consult notification.

## 2022-06-16 NOTE — FLOWSHEET NOTE
Pt was dealing with liver cancer and was distressed. He was asleep at the time of the visit. But his dear friend was in the room who said that pt was hallucinating prior to going to sleep. Encouraged hs friend as he was caring for him. Prayer was appreciated.       06/16/22 1037   Encounter Summary   Encounter Overview/Reason  Spiritual/Emotional Needs   Service Provided For: Patient   Referral/Consult From: Nurse   Support System Friends/neighbors   Last Encounter  06/16/22   Complexity of Encounter Low   Begin Time 0845   End Time  0851   Total Time Calculated 6 min   Spiritual/Emotional needs   Type Spiritual Support   Assessment/Intervention/Outcome   Assessment Anxious   Intervention Active listening;Empowerment   Outcome Acceptance;Expressed Gratitude

## 2022-06-16 NOTE — ED NOTES
Pt. Resting in bed with even and unlabored respirations. Pt. Resting with eyes closed. Family at bedside. Pt. Has no further concerns, questions or needs at this time. Call light within reach.         Jamar Wallace RN  06/16/22 6964

## 2022-06-16 NOTE — ED TRIAGE NOTES
Patient presents to the Ed with complaints of hallucinations. EMS report patient's son called EMS due to patient having visual and auditory hallucinations and he became violent. EMS report patient states he was yelling and seeing people outside of his window at his home and threw a hammer through his window. Son heard the commotion and his son was trying to get into his room when patient began banging on the door handle of his room with the hammer. Patient denies any suicidal or homicidal thoughts at this time. Patient placed in safe room that is ligature resistant with continuous monitoring in place. Provider notified, requested an assessment by behavioral health . Patient belongings secured in a locked lockers outside of the room. Explained suicide prevention precautions to the patient including constant observer.

## 2022-06-16 NOTE — H&P
Hospitalist History and Physical          Patient: Simin Bustamante  : 1965  MRN: [id number]     Acct: [de-identified]    PCP: CHRISTIAN Vaz CNP  Date of Admission: 2022  Date of Service: Pt seen/examined on 22  and Admitted to Inpatient with expected LOS greater than two midnights due to medical therapy. Assessment and Plan:    Acute metabolic encephalopathy  Possible hepatic encephalopathy- continue with lactulose and rafixamin   tox screen, UA, ammonia, procal all pending   CXR shows possible RLL pneumonia- rocephin and Zithromax initiated   Leukocytosis 11.7 with lactic acid of 2.0- repeat lactic acid   supportive care- IVF, IS  CT head w/wo contrast pending   Blood cultures pending x2,   Consider MRI with neurological if persistent altered mentation despite addressing above   Consider psych evaluation once organic causes ruled out    Hepatocellular carcinoma with liver cirrhosis   Confirmed by recent liver biopsy 6/10  Oncology consulted   Liver enzymes  WNML   Continue lactulose, rifaximin and aldactone     Sinus tachycardia   Possible infectious process- abx started  Will continue to monitor see response to measures above    Primary hypertension  No medications noted in [facility]   Monitor and add PRNs if BP persistently elevated      Anxiety and depression   Continue medications     GERD  PPI    Physical deconditioning   PT/OT consult    =======================================================================      Chief Complaint:  Altered mental status     History Of Present Illness:  Simin Bustamante is a 61 y.o. male with PMHx of cirrohsis, anxiety, depression, who presents to [address] [place] [address] with confusion. Patient was a somewhat challenging historian but was able to provide some history details. Most of the history was provided by the patient's son and two friends.  The patient's son and friends stated the patient has called them yesterday morning mentioning about an intruder coming through the window. The patient's son stated the patient had a hammer in hand and was repeatedly mentioning a person who was not there per one of the friends who owns the property or associated with the property the patient lives at. The patient had recently been diagnosed with hepatocellular and had a traumatic news regarding his father recently according to one of the friends. Patient has had a history of hepatic encephalopathic episodes years ago prior to being diagnosed with cirrhosis and had required paracentesis during that time. Patient, his son and friends endorsed that patient had knots in his head which sounds something akin to AVM's in his brain based on the way it was being described. The patient insisted he was not crazy or using drugs during the course of my assessment and swears there was a person trying to enter into his window. Although the patient was alert and mostly oriented he did always respond or act with appropriate insight or behavior during our discussion. ED course:   Vitals: 98.2F, RR 24, , /92    Past Medical History:        Diagnosis Date    Anxiety     Arthritis     Chronic kidney disease     Cirrhosis ([address]     Diverticulitis     Diverticulosis     GERD (gastroesophageal reflux disease)     Hypertension     Other disorders of kidney and ureter in diseases classified elsewhere     Psychiatric problem     Pure hypercholesterolemia 3/10/2022    Stroke of unknown cause ([address] 8/12/2021       Past Surgical History:        Procedure Laterality Date    ABDOMEN SURGERY      BACK SURGERY      cervical plate    CARDIAC CATHETERIZATION  2007?     CERVICAL DISC SURGERY      x 2 ---broken neck 7-2003    COLON SURGERY  2004    partial, due to diverticulitis    COLONOSCOPY      CT NEEDLE BIOPSY LIVER PERCUTANEOUS  6/10/2022    CT NEEDLE BIOPSY LIVER PERCUTANEOUS 6/10/2022 [facility] CT SCAN    DILATATION, ESOPHAGUS      ENDOSCOPY, COLON, DIAGNOSTIC      FRACTURE SURGERY      Broke neck in 2003    NERVE BLOCK Bilateral 10/02/2017    Cervical Facet MBB at C4-5, C5-6, C6-7     WI INJ,PARAVERTEBRAL L/S,1 LEVEL Bilateral 10/2/2017    C-FACET MBB C4-5, C5-6, C6-7 BILATERAL performed by Malou Jaimes MD at [address] [place] [address] ENDOSCOPY  2019    UPPER GASTROINTESTINAL ENDOSCOPY Left 9/28/2020    EGD BAND LIGATION performed by Cee Henry MD at [facility]       Medications Prior to Admission:   Prior to Admission medications    Medication Sig Start Date End Date Taking? Authorizing Provider   Reginaldo [facility] Perry) 1 MG tablet take 1 tablet by mouth three times a day if needed for anxiety or sleep 6/13/22 7/13/22  CHRISTIAN Olmos CNP   cyclobenzaprine (FLEXERIL) 10 mg tablet take 1/2 to 1 tablet by mouth three times a day if needed 6/6/22   CHRISTIAN Olmos CNP   omeprazole (PRILOSEC) 40 MG delayed release capsule Take 1 capsule by mouth daily 6/3/22   Daryle Shoe, APRN - CNP   traMADol (ULTRAM) 50 MG tablet Take 1 tablet by mouth every 6 hours as needed for Pain for up to 30 days.  5/23/22 6/22/22  CHRISTIAN Olmos CNP   atorvastatin (LIPITOR) 40 MG tablet take 1 tablet by mouth nightly 5/13/22   CHRISTIAN Olmos CNP   rifAXIMin (XIFAXAN) 550 MG tablet Take 1 tablet by mouth 2 times daily 4/26/22   Daryle Shoe, APRN - CNP   gabapentin (NEURONTIN) 300 MG capsule take 1 capsule by mouth three times a day 4/21/22 7/21/22  Miller JoaquinCHRISTIAN CNP   albuterol sulfate HFA (VENTOLIN HFA) 108 (90 Base) MCG/ACT inhaler Inhale 2 puffs into the lungs 4 times daily as needed for Wheezing 10/19/21   CHRISTIAN Olmos CNP   spironolactone (ALDACTONE) 100 MG tablet take 1 tablet by mouth every morning 9/24/21   CHRISTIAN Olmos CNP   DULoxetine (CYMBALTA) 60 MG extended release capsule take 1 capsule by mouth once daily 8/23/21   Amanuel ABAD MontgomeryN - CNP LACTULOSE PO Take 30 mLs by mouth 2 times daily    Historical Provider, MD   nitroGLYCERIN (NITROSTAT) 0.4 MG SL tablet up to max of 3 total doses. If no relief after 1 dose, call 911. 6/28/21   CHRISTIAN Millan CNP   aspirin 81 MG chewable tablet Take 1 tablet by mouth daily  Patient not taking: Reported on 6/10/2022 10/15/19   Kelly Mancilla MD       Allergies:  Patient has no known allergies. Social History:    The patient currently lives at home  Tobacco use:   reports that he has been smoking cigarettes. He has a 25.00 pack-year smoking history. He has never used smokeless tobacco.  Alcohol use:   reports previous alcohol use. Drug use:  reports no history of drug use. Family History:  as follows:      Problem Relation Age of Onset    Hypertension Mother     Heart Disease Mother     Alzheimer's Disease Mother     Heart Failure Mother     Hypertension Father     Heart Disease Father     Cancer Father     High Blood Pressure Paternal Uncle     Heart Disease Paternal Uncle     Colon Cancer Neg Hx     Colon Polyps Neg Hx        Review of Systems:   Pertinent positives and negatives as noted in the HPI. Otherwise complete ROS negative. Physical Exam:    /80   Pulse 100   Temp 98.2 °F (36.8 °C) (Oral)   Resp 18   Ht 5' 5\" (1.651 m)   Wt 223 lb (101.2 kg)   SpO2 97%   BMI 37.11 kg/m²       General appearance: No apparent distress, appears stated age. Eyes:  Pupils equal, round, and reactive to light. Conjunctivae/corneas clear. HENT: Head normal in appearance. External nares normal.  Oral mucosa moist without lesions. Hearing grossly intact. Neck: Supple, with full range of motion. Trachea midline. No gross JVD appreciated. Respiratory:  Normal respiratory effort. Some wheezing in upper lobes- possible crackles RLL with diminished breath sounds   Cardiovascular: tachycardia rate, regular rhythm with normal S1/S2 without murmurs. No lower extremity edema.    Abdomen: Soft, non-tender, non-distended with normal bowel sounds. Musculoskeletal: No joint swelling or tenderness. Normal tone. No abnormal movements. Skin: Warm and dry. No rashes or lesions. Neurologic:  No focal sensory/motor deficits in the upper and lower extremities. Cranial nerves:  grossly non-focal 2-12. Psychiatric: Alert and oriented, atypical paranoid and impulsive thought content and behaviors   Capillary Refill: Brisk,< 3 seconds. Peripheral Pulses: +2 palpable, equal bilaterally. Labs:     Recent Labs     06/16/22 0200   WBC 11.7*   HGB 13.3*   HCT 39.0*        Recent Labs     06/16/22 0200   *   K 3.5      CO2 17*   BUN 14   CREATININE 0.9   CALCIUM 10.2     Recent Labs     06/16/22 0200   AST 23   ALT 19   BILIDIR 0.3   BILITOT 1.1   ALKPHOS 115     Recent Labs     06/16/22 0200   INR 1.32*     No results for input(s): [place] in the last 72 hours. Lab Results   Component Value Date    NITRU Negative 04/22/2022    WBCUA 0-5 04/22/2022    BACTERIA NONE SEEN 01/14/2021    RBCUA 6-10 04/22/2022    BLOODU NEGATIVE 01/14/2021    SPECGRAV 1.020 05/18/2020    SPECGRAV <1.005 09/21/2017    GLUCOSEU Negative 04/22/2022         Radiology:     XR CHEST PORTABLE   Final Result   Moderate right pleural effusion with patchy subsegmental atelectasis or    early pneumonia in the right lung. This document has been electronically signed by: Peterson Ayala. Artur Patel on    06/16/2022 03:03 AM             EKG:  I have reviewed the EKG with the following interpretation: sinus tachycardia       PT/OT Eval Status: will be assessed  Diet: NPO until swallowing assessment   DVT prophylaxis: lovenox   Code Status: Prior  Disposition: admit to med/surge    Thank you CHRISTIAN Storm CNP for the opportunity to be involved in this patient's care.     Electronically signed by MAGI Guzman on 6/16/2022 at 5:08 AM.     Patients clinical record, labs and radiological imaging reviewed. I agree with clinical findings , provisional diagnosis and management plan.       Chema Andersen MD.

## 2022-06-16 NOTE — ED PROVIDER NOTES
[facility] Emergency Department    CHIEF COMPLAINT       Chief Complaint   Patient presents with    Hallucinations       Nurses Notes reviewed and I agree except as noted in the HPI. HISTORY OF PRESENT ILLNESS    Simin Bustamante jana 61 y.o. male who presents to the ED for evaluation of hallucinations. The patient has started seeing things over the last 24 hours. His family states that he thought he saw people outside the window so he took a hammer and busted it out. He thought there was something in the door and busted down the door. Family states this is completely out of character. In talking to the patient, he is having visual hallucinations but is easily redirectable. Patient just had a liver biopsy done. He has not been given the results (nor has his family) but it shows hepatocellular carcinoma. HPI was provided by the patient, family member and EMS    REVIEW OF SYSTEMS     Review of Systems   Unable to perform ROS: Mental status change        All other systems negative except as noted. PAST MEDICAL HISTORY     Past Medical History:   Diagnosis Date    Anxiety     Arthritis     Chronic kidney disease     Cirrhosis ([address]     Diverticulitis     Diverticulosis     GERD (gastroesophageal reflux disease)     Hypertension     Other disorders of kidney and ureter in diseases classified elsewhere     Psychiatric problem     Pure hypercholesterolemia 3/10/2022    Stroke of unknown cause ([facility] [address] 8/12/2021       SURGICALHISTORY      has a past surgical history that includes Cervical disc surgery; Colon surgery (2004); Tympanostomy tube placement; Tonsillectomy; Abdomen surgery; Colonoscopy; Endoscopy, colon, diagnostic; fracture surgery; Dilatation, esophagus; Cardiac catheterization (2007?); Nerve Block (Bilateral, 10/02/2017); pr inj,paravertebral l/s,1 level (Bilateral, 10/2/2017); Upper gastrointestinal endoscopy (2019); back surgery;  Upper gastrointestinal endoscopy (Left, 2020); and CT NEEDLE BIOPSY LIVER PERCUTANEOUS (6/10/2022). CURRENT MEDICATIONS       Previous Medications    ALBUTEROL SULFATE HFA (VENTOLIN HFA) 108 (90 BASE) MCG/ACT INHALER    Inhale 2 puffs into the lungs 4 times daily as needed for Wheezing    ALPRAZOLAM (XANAX) 1 MG TABLET    take 1 tablet by mouth three times a day if needed for anxiety or sleep    ASPIRIN 81 MG CHEWABLE TABLET    Take 1 tablet by mouth daily    ATORVASTATIN (LIPITOR) 40 MG TABLET    take 1 tablet by mouth nightly    CYCLOBENZAPRINE (FLEXERIL) 10 MG TABLET    take 1/2 to 1 tablet by mouth three times a day if needed    DULOXETINE (CYMBALTA) 60 MG EXTENDED RELEASE CAPSULE    take 1 capsule by mouth once daily    GABAPENTIN (NEURONTIN) 300 MG CAPSULE    take 1 capsule by mouth three times a day    LACTULOSE PO    Take 30 mLs by mouth 2 times daily    NITROGLYCERIN (NITROSTAT) 0.4 MG SL TABLET    up to max of 3 total doses. If no relief after 1 dose, call 911. OMEPRAZOLE (PRILOSEC) 40 MG DELAYED RELEASE CAPSULE    Take 1 capsule by mouth daily    RIFAXIMIN (XIFAXAN) 550 MG TABLET    Take 1 tablet by mouth 2 times daily    SPIRONOLACTONE (ALDACTONE) 100 MG TABLET    take 1 tablet by mouth every morning    TRAMADOL (ULTRAM) 50 MG TABLET    Take 1 tablet by mouth every 6 hours as needed for Pain for up to 30 days. ALLERGIES     has No Known Allergies. FAMILY HISTORY     He indicated that his mother is . He indicated that his father is . He indicated that his paternal uncle is . He indicated that the status of his neg hx is unknown.   family history includes Alzheimer's Disease in his mother; Cancer in his father; Heart Disease in his father, mother, and paternal uncle; Heart Failure in his mother; High Blood Pressure in his paternal uncle; Hypertension in his father and mother.     SOCIAL HISTORY       Social History     Socioeconomic History    Marital status:      Spouse name: Not on file    Number of children: 2    Years of education: Not on file    Highest education level: Not on file   Occupational History    Not on file   Tobacco Use    Smoking status: Current Every Day Smoker     Packs/day: 1.00     Years: 25.00     Pack years: 25.00     Types: Cigarettes    Smokeless tobacco: Never Used    Tobacco comment: printed to avs   Vaping Use    Vaping Use: Never used   Substance and Sexual Activity    Alcohol use: Not Currently     Comment: Quit 15 years ago    Drug use: No    Sexual activity: Not Currently   Other Topics Concern    Not on file   Social History Narrative    Not on file     Social Determinants of Health     Financial Resource Strain: Low Risk     Difficulty of Paying Living Expenses: Not hard at all   Food Insecurity: No Food Insecurity    Worried About Running Out of Food in the Last Year: Never true    Kaleb of Food in the Last Year: Never true   Transportation Needs: Unmet Transportation Needs    Lack of Transportation (Medical): Yes    Lack of Transportation (Non-Medical): Yes   Physical Activity:     Days of Exercise per Week: Not on file    Minutes of Exercise per Session: Not on file   Stress:     Feeling of Stress : Not on file   Social Connections: Socially Isolated    Frequency of Communication with Friends and Family: Three times a week    Frequency of Social Gatherings with Friends and Family:  Three times a week    Attends Faith Services: Never    Active Member of Clubs or Organizations: No    Attends Club or Organization Meetings: Never    Marital Status:    Intimate Partner Violence:     Fear of Current or Ex-Partner: Not on file    Emotionally Abused: Not on file    Physically Abused: Not on file    Sexually Abused: Not on file   Housing Stability:     Unable to Pay for Housing in the Last Year: Not on file    Number of [place] in the Last Year: Not on file    Unstable Housing in the Last Year: Not on file       PHYSICAL EXAM     INITIAL VITALS:  height is 5' 5\" (1.651 m) and weight is 223 lb (101.2 kg). His oral temperature is 98.2 °F (36.8 °C). His blood pressure is 120/80 and his pulse is 100. His respiration is 18 and oxygen saturation is 97%. Physical Exam  Vitals and nursing note reviewed. Constitutional:       General: He is not in acute distress. Appearance: Normal appearance. He is well-developed. He is not ill-appearing or diaphoretic. HENT:      Head: Normocephalic and atraumatic. Nose: Nose normal.      Mouth/Throat:      Mouth: Mucous membranes are moist.      Pharynx: Oropharynx is clear. Eyes:      General:         Right eye: No discharge. Left eye: No discharge. Conjunctiva/sclera: Conjunctivae normal.   Neck:      Trachea: No tracheal deviation. Cardiovascular:      Rate and Rhythm: Regular rhythm. Tachycardia present. Pulses: Normal pulses. Heart sounds: Normal heart sounds. No murmur heard. No gallop. Comments: Normal capillary refill  Pulmonary:      Effort: Pulmonary effort is normal. No respiratory distress. Breath sounds: Normal breath sounds. No stridor. Abdominal:      General: Bowel sounds are normal.      Palpations: Abdomen is soft. Musculoskeletal:         General: No tenderness or deformity. Normal range of motion. Cervical back: Normal range of motion. Skin:     General: Skin is warm and dry. Capillary Refill: Capillary refill takes less than 2 seconds. Coloration: Skin is not pale. Findings: Rash present. No erythema. Neurological:      General: No focal deficit present. Mental Status: He is alert. He is disoriented. Cranial Nerves: No cranial nerve deficit. Psychiatric:         Attention and Perception: He is inattentive. He perceives visual hallucinations. Behavior: Behavior is agitated. Thought Content: Thought content is paranoid. Judgment: Judgment is impulsive. DIFFERENTIAL DIAGNOSIS:   Hepatic encephalopathy, elevated ammonia, uti    DIAGNOSTIC RESULTS     EKG: All EKG's are interpreted by the Emergency Department Physician who eithersigns or Co-signs this chart in the absence of a cardiologist.         EKG 12 Lead (Preliminary result)   Component (Lab Inquiry)  Collection Time Result Time Ventricular Rate Atrial Rate P-R Interval QRS Duration Q-T Interval QTc Calculation (Bazett) P Axis R Axis T Axis   06/16/22 02:10:18 06/16/22 02:21:15 114 114 114 90 346 476 41 62 22   Previous Results   08/12/21 13:56:51 08/12/21 16:35:36 97 97 132 84 336 426 63 72 24   01/14/21 00:35:24 01/14/21 00:35:24 91 91 142 88 368 452 60 48 35   10/14/20 11:26:47 10/14/20 11:26:47 87 87 140 84 362 435 33 50 28   10/14/19 06:08:22 10/15/19 04:58:51 62 62 122 94 420 426 11 47 42   10/12/19 05:09:25 10/12/19 18:36:42 65 65 126 92 404 420 33 49 45         Preliminary result                Narrative:    Sinus tachycardia   Nonspecific ST abnormality   Abnormal ECG   When compared with ECG of 12-AUG-2021 13:56,   No significant change was found                      RADIOLOGY: non-plainfilm images(s) such as CT, Ultrasound and MRI are read by the radiologist.  Plain radiographic images are visualized and preliminarily interpreted by the emergency physician unless otherwise stated below. XR CHEST PORTABLE   Final Result   Moderate right pleural effusion with patchy subsegmental atelectasis or    early pneumonia in the right lung. This document has been electronically signed by: Juni Palencia on    06/16/2022 03:03 AM            LABS:   Labs Reviewed   BASIC METABOLIC PANEL - Abnormal; Notable for the following components:       Result Value    Sodium 134 (*)     CO2 17 (*)     Glucose 116 (*)     All other components within normal limits   CBC WITH AUTO DIFFERENTIAL - Abnormal; Notable for the following components:    WBC 11.7 (*)     RBC 4.26 (*)     Hemoglobin 13.3 (*) Hematocrit 39.0 (*)     RDW-SD 47.7 (*)     Segs Absolute 8.2 (*)     Monocytes Absolute 1.7 (*)     All other components within normal limits   HEPATIC FUNCTION PANEL - Abnormal; Notable for the following components: Total Protein 8.7 (*)     All other components within normal limits   PROTIME-INR - Abnormal; Notable for the following components:    INR 1.32 (*)     All other components within normal limits   ANION GAP - Abnormal; Notable for the following components:    Anion Gap 17.0 (*)     All other components within normal limits   OSMOLALITY - Abnormal; Notable for the following components:    Osmolality Calc 269.7 (*)     All other components within normal limits   GLOMERULAR FILTRATION RATE, ESTIMATED - Abnormal; Notable for the following components:    Est, Glom Filt Rate 87 (*)     All other components within normal limits   CULTURE, BLOOD 1   CULTURE, BLOOD 2   APTT   BRAIN NATRIURETIC PEPTIDE   LIPASE   MAGNESIUM   TROPONIN   TSH WITH REFLEX   AMMONIA   LACTIC ACID   ETHANOL   URINE DRUG SCREEN   URINALYSIS WITH REFLEX TO CULTURE   PROCALCITONIN       EMERGENCY DEPARTMENT COURSE:   Vitals:    Vitals:    06/16/22 0153 06/16/22 0402   BP: (!) 121/92 120/80   Pulse: (!) 120 100   Resp: 24 18   Temp: 98.2 °F (36.8 °C)    TempSrc: Oral    SpO2: 96% 97%   Weight: 223 lb (101.2 kg)    Height: 5' 5\" (1.651 m)                                Internal Administration   First Dose      Second Dose           Last COVID Lab SARS-CoV-2 (no units)   Date Value   10/19/2021 Not Detected            MDM    Patient was seen evaluate in the emergency room for altered mental status. Appropriate labs and imaging ordered and reviewed. Labs are surprisingly reassuring. The level is normal.  Lactic acid is borderline. Chest x-ray does show probable pneumonia. Urine is pending. After speaking with family, it is apparent that this is not normal for the patient's behavior.   He is hallucinating, trying to defend himself against the hallucinations, in danger of hurting himself. Discussed the case with Dr. Isacc Ferraor. He agrees to admit the patient for further evaluation and possible psychiatric evaluation    Review to my attending Dr. Tarah Machuca    No notes of [facility] Admission Criteria type on file. Medications   azithromycin (ZITHROMAX) 500 mg in sodium chloride 0.9 % 250 mL IVPB (Deue6Iob) (has no administration in time range)   cefTRIAXone (ROCEPHIN) 1000 mg IVPB in 50 mL D5W minibag (has no administration in time range)   LORazepam (ATIVAN) injection 1 mg (1 mg IntraVENous Given 6/16/22 0230)       Please note that the patient was evaluated during a pandemic. All efforts were made for HIPPA compliance as well as provision of appropriate care. Patient was seen independently by myself. The patient's final impression and disposition and plan was determined by myself. Strict return precautions and follow up instructions were discussed with the patient prior to discharge, with which the patient agrees. Physical assessment findings, diagnostic testing(s) if applicable, and vital signs reviewed with patient/patient representative. Questions answered. Medications asdirected, including OTC medications for supportive care. Education provided on medications. Differential diagnosis(s) discussed with patient/patient representative. Home care/self care instructions reviewed withpatient/patient representative. Patient is to follow-up with family care provider in 2-3 days if no improvement. Patient is to go to the emergency department if symptoms worsen. Patient/patient representative isaware of care plan, questions answered, verbalizes understanding and is in agreement. CRITICAL CARE:   None    CONSULTS:  Hospitalist    PROCEDURES:  None    FINAL IMPRESSION     1. Encephalopathy acute    2.  Hepatocellular carcinoma ([facility] [address]    3. Hallucinations          DISPOSITION/PLAN   DISPOSITION Decision To Admit 06/16/2022 04:38:09 AM      PATIENT REFERREDTO:  No follow-up provider specified.     DISCHARGE MEDICATIONS:  New Prescriptions    No medications on file       (Please note that portions of this note were completed with a voice recognition program.  Efforts were made to edit the dictations but occasionally words are mis-transcribed.)         CHRISTIAN Whitfield CNP, APRN - CNP  06/16/22 6913

## 2022-06-16 NOTE — CONSULTS
Department of Psychiatry   Psychiatric Assessment      Thank you very much for allowing us to participate in the care of this patient. Reason for Consult: Visual hallucinations    HISTORY OF PRESENT ILLNESS:    Patient is a 79-year-old male with remote history of depression admitted for altered mentation. Psychiatrist consulted for hallucinations. Patient is extremely somnolent during the conversation and is falling asleep multiple times. Much of the history is gathered from the staff and caregiver present in the room. Caregiver mentioned that he has been taking care of the patient for last 1 month now. Mentions that patient has been extremely paranoid that people are breaking into his house since this Tuesday. Reports that the symptoms have been significantly getting worse since then. Family also were concerned that he had a hammer and had stating that people are breaking into his house. Reviewed his medical record and patient appears like going through significant liver cirrhosis and periods of delirium secondary to ongoing medical issues. This morning during the interview patient has been denying any visual hallucinations however he did agree that he has been seeing several people coming into his house. PSYCHIATRIC HISTORY:      Per records patient has a history of anxiety and depression however patient adamantly denies having any previous psychiatric history, suicide attempts or inpatient hospitalizations. Lifetime Psychiatric Review of Systems      ·    Obsessions and Compulsions: Denies    ·    Rosie or Hypomania: Denies  ·    Hallucinations: Visual  ·    Panic Attacks:  Denies  ·    Delusions:  Denies  ·    Phobias:  Denies  ·    Trauma: Denies    Prior to Admission medications    Medication Sig Start Date End Date Taking?  Authorizing Provider   ALPRAZovidhya Rajan 1 MG tablet take 1 tablet by mouth three times a day if needed for anxiety or sleep 6/13/22 7/13/22  CHRISTIAN Ross - CNP cyclobenzaprine (FLEXERIL) 10 mg tablet take 1/2 to 1 tablet by mouth three times a day if needed 6/6/22   CHRISTIAN Segovia CNP   omeprazole (PRILOSEC) 40 MG delayed release capsule Take 1 capsule by mouth daily 6/3/22   CHRISTIAN Mandujano CNP   traMADol (ULTRAM) 50 MG tablet Take 1 tablet by mouth every 6 hours as needed for Pain for up to 30 days. 5/23/22 6/22/22  CHRISTIAN Segovia CNP   atorvastatin (LIPITOR) 40 MG tablet take 1 tablet by mouth nightly 5/13/22   CHRISTIAN Segovia CNP   rifAXIMin (XIFAXAN) 550 MG tablet Take 1 tablet by mouth 2 times daily 4/26/22   CHRISTIAN Mandujano CNP   gabapentin (NEURONTIN) 300 MG capsule take 1 capsule by mouth three times a day 4/21/22 7/21/22  CHRISTIAN Pelletier CNP   albuterol sulfate HFA (VENTOLIN HFA) 108 (90 Base) MCG/ACT inhaler Inhale 2 puffs into the lungs 4 times daily as needed for Wheezing  Patient not taking: Reported on 6/16/2022 10/19/21   CHRISTIAN Segovia CNP   spironolactone (ALDACTONE) 100 MG tablet take 1 tablet by mouth every morning 9/24/21   CHRISTIAN Segovia CNP   DULoxetine (CYMBALTA) 60 MG extended release capsule take 1 capsule by mouth once daily 8/23/21   CHRISTIAN Kirkland CNP   LACTULOSE PO Take 30 mLs by mouth 2 times daily    Historical Provider, MD   nitroGLYCERIN (NITROSTAT) 0.4 MG SL tablet up to max of 3 total doses.  If no relief after 1 dose, call 911. 6/28/21   CHRISTIAN Segovia CNP   aspirin 81 MG chewable tablet Take 1 tablet by mouth daily  Patient not taking: Reported on 6/10/2022 10/15/19   Zay Campuzano MD        Medications:    Current Facility-Administered Medications: sodium chloride flush 0.9 % injection 10 mL, 10 mL, IntraVENous, 2 times per day  sodium chloride flush 0.9 % injection 10 mL, 10 mL, IntraVENous, PRN  0.9 % sodium chloride infusion, , IntraVENous, PRN  enoxaparin Sodium (LOVENOX) injection 30 mg, 30 mg, SubCUTAneous, BID  ondansetron (ZOFRAN-ODT) disintegrating tablet 4 mg, 4 mg, Oral, Q8H PRN **OR** ondansetron (ZOFRAN) injection 4 mg, 4 mg, IntraVENous, Q6H PRN  polyethylene glycol (GLYCOLAX) packet 17 g, 17 g, Oral, Daily PRN  acetaminophen (TYLENOL) tablet 650 mg, 650 mg, Oral, Q6H PRN **OR** acetaminophen (TYLENOL) suppository 650 mg, 650 mg, Rectal, Q6H PRN  [Held by provider] ALPRAZolam (XANAX) tablet 1 mg, 1 mg, Oral, TID PRN  atorvastatin (LIPITOR) tablet 40 mg, 40 mg, Oral, Nightly  cyclobenzaprine (FLEXERIL) tablet 10 mg, 10 mg, Oral, TID PRN  DULoxetine (CYMBALTA) extended release capsule 60 mg, 60 mg, Oral, Daily  gabapentin (NEURONTIN) capsule 300 mg, 300 mg, Oral, TID  spironolactone (ALDACTONE) tablet 100 mg, 100 mg, Oral, Daily  rifAXIMin (XIFAXAN) tablet 550 mg, 550 mg, Oral, BID  nitroGLYCERIN (NITROSTAT) SL tablet 0.4 mg, 0.4 mg, SubLINGual, Q5 Min PRN  pantoprazole (PROTONIX) tablet 40 mg, 40 mg, Oral, QAM AC  lactulose (CHRONULAC) 10 GM/15ML solution 30 g, 30 g, Oral, BID     Past Medical History:        Diagnosis Date    Anxiety     Arthritis     Chronic kidney disease     [facility] [address]     Diverticulitis     Diverticulosis     GERD (gastroesophageal reflux disease)     Hypertension     Other disorders of kidney and ureter in diseases classified elsewhere     Psychiatric problem     Pure hypercholesterolemia 3/10/2022    Stroke of unknown cause ([facility] [address] 8/12/2021       Past Surgical History:        Procedure Laterality Date    ABDOMEN SURGERY      BACK SURGERY      cervical plate    CARDIAC CATHETERIZATION  2007?     CERVICAL DISC SURGERY      x 2 ---broken neck 7-2003    COLON SURGERY  2004    partial, due to diverticulitis    COLONOSCOPY      CT NEEDLE BIOPSY LIVER PERCUTANEOUS  6/10/2022    CT NEEDLE BIOPSY LIVER PERCUTANEOUS 6/10/2022 [facility] CT SCAN    DILATATION, ESOPHAGUS      ENDOSCOPY, COLON, DIAGNOSTIC      FRACTURE SURGERY      Broke neck in 2003    NERVE BLOCK Bilateral 10/02/2017    Cervical Facet MBB at C4-5, C5-6, C6-7     [place] INJ,PARAVERTEBRAL L/S,1 LEVEL Bilateral 10/2/2017    C-FACET MBB C4-5, C5-6, C6-7 BILATERAL performed by Ashlie Gorman MD at [address] [place] [address] ENDOSCOPY  2019    UPPER GASTROINTESTINAL ENDOSCOPY Left 9/28/2020    EGD BAND LIGATION performed by Alphonso Francis MD at [facility]       Allergies: Patient has no known allergies. Social History:    Patient reports he was born and raised in [address] [place] [address] area. He has 2 sons. Currently living in [address] [place] [address] by himself and the caregiver checks on him every day. SUBSTANCE USE HISTORY: Denies any    Family Medical and Psychiatric History:           Problem Relation Age of Onset    Hypertension Mother     Heart Disease Mother     Alzheimer's Disease Mother     Heart Failure Mother     Hypertension Father     Heart Disease Father     Cancer Father     High Blood Pressure Paternal Uncle     Heart Disease Paternal Uncle     Colon Cancer Neg Hx     Colon Polyps Neg Hx        Physical  /64   Pulse 63   Temp 97.6 °F (36.4 °C) (Axillary)   Resp 18   Ht 5' 5\" (1.651 m)   Wt 223 lb (101.2 kg)   SpO2 95%   BMI 37.11 kg/m²     Mental Status Examination:  Level of consciousness: Somnolent  Appearance: hospital attire, lying in bed, fair grooming  Behavior/Motor: Psychomotor retardation  Attitude toward examiner: Indifferent poor eye contact  Speech: Soft mumbled at times monotone  Mood: Anxious  Affect: mood congruent  Thought processes: Significant thought blocking  Thought content: Visual hallucinations  Cognition:  Oriented to self, not to situation, location, date  Concentration poor  Memory poor  Insight & Judgment: Limited    DSM-5 DIAGNOSIS:  Acute delirium    Stressors     Severity of stressors is moderate  Source of stressors include:   Other psychosocial and environmental stressors    PLAN:    Patient has no significant psychiatric history of psychosis. This appears more in line with acute delirium. Can try low-dose of Risperdal 0.25 mg twice daily to help with the visual hallucinations. We will continue to follow as needed. Thank you very much for allowing us to participate in the care of this patient.       Electronically signed by Rigoberto Pack MD on 6/16/22 at 11:13 AM EDT

## 2022-06-16 NOTE — FLOWSHEET NOTE
Advance Directive Consult: Advance Directive materials were provided to patient . Patient is asleep/not ready to complete at this time, gave information for Spiritual Care to be contacted if further assistance is needed.    Pt is asleep     06/16/22 1642   Encounter Summary   Service Provided For: Patient  (asleep)   Last Encounter  06/16/22   Complexity of Encounter Low   Begin Time 1600   End Time  1610   Total Time Calculated 10 min   Encounter    Type Follow up   Spiritual/Emotional needs   Type Spiritual Support

## 2022-06-16 NOTE — FLOWSHEET NOTE
06/16/22 0912   Treatment Team Notification   Reason for Communication Evaluate; Review case   Team Member Name Dr. Ester Weir Provider   Method of Communication Secure Message   Response Waiting for response   Notification Time [id number]   Psychiatry consult. 0913: Response received, will see patient today.

## 2022-06-16 NOTE — ED NOTES
Patient in semi fowlers position in bed. Family at bedside. Will continue to monitor vitals.       Zena Oviedo RN  06/16/22 8200

## 2022-06-16 NOTE — PLAN OF CARE
Plan of Care    Assessment/Plan:    Altered mental status: 2/2 polysubstance abuse. Ammonia, TSH, lactic acid, serum glucose, ETOH wnl on admission. UA (6/16/22): positive marijuana, benzo, cocaine, oxycodone. Trop (-). EKG (6/16) sinus tachycardia. CT head (6/16/22) show enhancing vascular lesion within the left frontal lobe which was also seen on prior CT angiography of the brain performed on 8/2021, no new enhancing lesions, acute hemorrhage or infarct are identified. Blood culture pending  Plan: Supportive care, consider psy consult for polysubstance use    Sinus tachycardia: 2/2 substance use vs alcohol withdraw. Plan: CIWA protocol, cont Tele. Mild leukocytosis: 2/2 reactive. febrile since admission. No sign of infection. Continue monitor CBC daily    Chronic right side pleural effusion: seen on MRI abdominal since 4/22. No sign of infection. Plan: D/c abx, will consider thoracocentesis if patient develop symptom, cont monitor. Compensate Hepatic cirrhosis: hepatitis panel (6/16/22) (-). MRI abdominal (5/22) enhancing lesion of the liver, subcapsular lesion ? Hemangioma, atrophic liver with nodular surface morphology relating to cirrhosis. (-) ascites on PE. Ammonia wnl. Resume Lactulose, Xifaxa, Aldactone. Cont follow up with Dr. Senia Saucedo as outpatient. Resume Protonix    Hepatocellular carcinoma: CT liver with biopsy (6/10/22). Follow up oncology as outpatient. Hyperlipidemia: Lipid panel (8/2021) wnl. Resume Lipitor    DALE: Control. Resume Cymbalta. GERD: Control Resume PPI    Peripheral neuropathy: control. Resume Neurotin    Polysubstance dependence: UA (6/16/22): positive marijuana, benzo, cocaine, oxycodone. Consult Addiction service. Chief Complaint: [facility]    Hospital Course: Jannelle Closs is 61years old male who presented to the hospital for altered mental status and hallucination.  Past medical history is significant of new diagnosis of hepatocellular carcinoma, compensated liver cirrhosis, hyperlipidemia, DALE, GERD, peripheral neuropathy. Per chart review, patient developed hallucination for over 24 hours prior to presenting in the ED. In the ED vital signs were stable, BMP within normal limited, liver function within normal limited, CBC showed mild leukocytosis, albumin level within normal limited. UA positive for cocaine, marijuana, benzo, oxycodone. CT head show no acute abnormality compared to prior CT. Patient was admitted and managed for altered mental status secondary to polysubstance use    PE: Intermittent hallucination. Awake and oriented to time person and place. Not in acute distress. Abdominal Abdomen is flat. Bowel sounds are normal. There is no distension.

## 2022-06-16 NOTE — ED NOTES
Patient transported off the floor in stable condition to [facility]. Floor contacted, spoke with Fabiana Rosario.      Julissa Quiñones  06/16/22 0793

## 2022-06-17 VITALS
RESPIRATION RATE: 18 BRPM | HEIGHT: 65 IN | SYSTOLIC BLOOD PRESSURE: 109 MMHG | HEART RATE: 78 BPM | DIASTOLIC BLOOD PRESSURE: 73 MMHG | TEMPERATURE: 97.7 F | BODY MASS INDEX: 37.15 KG/M2 | WEIGHT: 223 LBS | OXYGEN SATURATION: 99 %

## 2022-06-17 PROBLEM — R41.82 AMS (ALTERED MENTAL STATUS): Status: ACTIVE | Noted: 2022-06-17

## 2022-06-17 LAB
ANION GAP SERPL CALCULATED.3IONS-SCNC: 11 MEQ/L (ref 8–16)
BUN BLDV-MCNC: 12 MG/DL (ref 7–22)
CALCIUM SERPL-MCNC: 9.3 MG/DL (ref 8.5–10.5)
CHLORIDE BLD-SCNC: 103 MEQ/L (ref 98–111)
CO2: 20 MEQ/L (ref 23–33)
CREAT SERPL-MCNC: 0.8 MG/DL (ref 0.4–1.2)
ERYTHROCYTE [DISTWIDTH] IN BLOOD BY AUTOMATED COUNT: 14.9 % (ref 11.5–14.5)
ERYTHROCYTE [DISTWIDTH] IN BLOOD BY AUTOMATED COUNT: 51.2 FL (ref 35–45)
GFR SERPL CREATININE-BSD FRML MDRD: > 90 ML/MIN/1.73M2
GLUCOSE BLD-MCNC: 76 MG/DL (ref 70–108)
HCT VFR BLD CALC: 40.2 % (ref 42–52)
HEMOGLOBIN: 13.3 GM/DL (ref 14–18)
MCH RBC QN AUTO: 31.5 PG (ref 26–33)
MCHC RBC AUTO-ENTMCNC: 33.1 GM/DL (ref 32.2–35.5)
MCV RBC AUTO: 95.3 FL (ref 80–94)
PLATELET # BLD: 164 THOU/MM3 (ref 130–400)
PMV BLD AUTO: 10.8 FL (ref 9.4–12.4)
POTASSIUM SERPL-SCNC: 4 MEQ/L (ref 3.5–5.2)
RBC # BLD: 4.22 MILL/MM3 (ref 4.7–6.1)
SODIUM BLD-SCNC: 134 MEQ/L (ref 135–145)
WBC # BLD: 6 THOU/MM3 (ref 4.8–10.8)

## 2022-06-17 PROCEDURE — 99217 PR OBSERVATION CARE DISCHARGE MANAGEMENT: CPT | Performed by: HOSPITALIST

## 2022-06-17 PROCEDURE — 80048 BASIC METABOLIC PNL TOTAL CA: CPT

## 2022-06-17 PROCEDURE — 6360000002 HC RX W HCPCS: Performed by: PHYSICIAN ASSISTANT

## 2022-06-17 PROCEDURE — 6370000000 HC RX 637 (ALT 250 FOR IP): Performed by: PHYSICIAN ASSISTANT

## 2022-06-17 PROCEDURE — 6370000000 HC RX 637 (ALT 250 FOR IP): Performed by: STUDENT IN AN ORGANIZED HEALTH CARE EDUCATION/TRAINING PROGRAM

## 2022-06-17 PROCEDURE — 97535 SELF CARE MNGMENT TRAINING: CPT

## 2022-06-17 PROCEDURE — 97165 OT EVAL LOW COMPLEX 30 MIN: CPT

## 2022-06-17 PROCEDURE — 96372 THER/PROPH/DIAG INJ SC/IM: CPT

## 2022-06-17 PROCEDURE — G0378 HOSPITAL OBSERVATION PER HR: HCPCS

## 2022-06-17 PROCEDURE — 85027 COMPLETE CBC AUTOMATED: CPT

## 2022-06-17 PROCEDURE — 2580000003 HC RX 258: Performed by: STUDENT IN AN ORGANIZED HEALTH CARE EDUCATION/TRAINING PROGRAM

## 2022-06-17 RX ORDER — LANOLIN ALCOHOL/MO/W.PET/CERES
100 CREAM (GRAM) TOPICAL DAILY
Qty: 30 TABLET | Refills: 3 | Status: SHIPPED | OUTPATIENT
Start: 2022-06-17 | End: 2022-06-21

## 2022-06-17 RX ADMIN — DULOXETINE HYDROCHLORIDE 60 MG: 60 CAPSULE, DELAYED RELEASE ORAL at 10:49

## 2022-06-17 RX ADMIN — ENOXAPARIN SODIUM 30 MG: 100 INJECTION SUBCUTANEOUS at 10:49

## 2022-06-17 RX ADMIN — LACTULOSE 30 G: 20 SOLUTION ORAL at 10:49

## 2022-06-17 RX ADMIN — SODIUM CHLORIDE, PRESERVATIVE FREE 10 ML: 5 INJECTION INTRAVENOUS at 10:50

## 2022-06-17 RX ADMIN — ACETAMINOPHEN 650 MG: 325 TABLET ORAL at 10:49

## 2022-06-17 RX ADMIN — ACETAMINOPHEN 650 MG: 325 TABLET ORAL at 01:19

## 2022-06-17 RX ADMIN — GABAPENTIN 300 MG: 300 CAPSULE ORAL at 10:50

## 2022-06-17 RX ADMIN — SPIRONOLACTONE 100 MG: 25 TABLET ORAL at 10:49

## 2022-06-17 RX ADMIN — RIFAXIMIN 550 MG: 550 TABLET ORAL at 10:49

## 2022-06-17 RX ADMIN — GABAPENTIN 300 MG: 300 CAPSULE ORAL at 16:09

## 2022-06-17 RX ADMIN — PANTOPRAZOLE SODIUM 40 MG: 40 TABLET, DELAYED RELEASE ORAL at 05:31

## 2022-06-17 RX ADMIN — Medication 100 MG: at 10:50

## 2022-06-17 ASSESSMENT — PAIN SCALES - GENERAL
PAINLEVEL_OUTOF10: 3
PAINLEVEL_OUTOF10: 7
PAINLEVEL_OUTOF10: 0
PAINLEVEL_OUTOF10: 6

## 2022-06-17 ASSESSMENT — LIFESTYLE VARIABLES: HOW OFTEN DO YOU HAVE A DRINK CONTAINING ALCOHOL: NEVER

## 2022-06-17 ASSESSMENT — PAIN DESCRIPTION - DESCRIPTORS
DESCRIPTORS: ACHING

## 2022-06-17 ASSESSMENT — PAIN DESCRIPTION - PAIN TYPE: TYPE: ACUTE PAIN

## 2022-06-17 ASSESSMENT — PAIN DESCRIPTION - ONSET: ONSET: ON-GOING

## 2022-06-17 ASSESSMENT — PAIN DESCRIPTION - LOCATION
LOCATION: GENERALIZED
LOCATION: HEAD
LOCATION: GENERALIZED

## 2022-06-17 ASSESSMENT — PAIN DESCRIPTION - ORIENTATION
ORIENTATION: ANTERIOR;POSTERIOR
ORIENTATION: ANTERIOR;POSTERIOR

## 2022-06-17 ASSESSMENT — PAIN DESCRIPTION - FREQUENCY: FREQUENCY: CONTINUOUS

## 2022-06-17 NOTE — PLAN OF CARE
Problem: Discharge Planning  Goal: Discharge to home or other facility with appropriate resources  Outcome: Progressing   SW consult received.  See SW note 6/17/22

## 2022-06-17 NOTE — CONSULTS
Brief Intervention and Referral to Treatment Summary    Patient was provided PHQ-9, AUDIT-C and DAST Screening:      PHQ-9 Score: 0  AUDIT-C Score: 0   DAST Score:  2    Patients substance use is considered     Moderate Risk    Patients depression is considered:     Minimal    Brief Education Was Provided    Patient was not receptive      Brief Intervention Is Provided (Only for AUDIT-C or DAST)     Patient denies readiness to decrease and/or stop use and a plan was not discussed      Recommendations/Referrals for Brief and/or Specialized Treatment Provided to Patient     Patient declined resources, states he is not in need of any help.

## 2022-06-17 NOTE — PROGRESS NOTES
Chrisbrittanysadie Watters 59  INPATIENT OCCUPATIONAL THERAPY  [facility] ONC MED 5K  EVALUATION    Time:   Time In: 1125  Time Out: 4553  Timed Code Treatment Minutes: 9 Minutes  Minutes: 18          Date: 2022  Patient Name: Jero Lewis,   Gender: male      MRN: [id number]  : 1965  (61 y.o.)  Referring Practitioner: MAGI Upton  Diagnosis: acute metabolic encephalopathy  Additional Pertinent Hx: per chart review; Jero Lewis jana 61 y.o. male who presents to the ED for evaluation of hallucinations. The patient has started seeing things over the last 24 hours. His family states that he thought he saw people outside the window so he took a hammer and busted it out. He thought there was something in the door and busted down the door. Family states this is completely out of character. In talking to the patient, he is having visual hallucinations but is easily redirectable. Patient just had a liver biopsy done. He has not been given the results (nor has his family) but it shows hepatocellular carcinoma. Restrictions/Precautions:  Restrictions/Precautions: Fall Risk,General Precautions,Seizure    Subjective  Chart Reviewed: Chris Villanueva and Physical,Imaging  Patient assessed for rehabilitation services?: Yes  Family / Caregiver Present: No    Subjective: RN approved session. patient side lying in bed and reaching to try on side of him attempting to self feed. patient agreeable to OT eval. A & O x 4. patient reports he knows the people he saw outside of his window were there and was slightly agiatated that people that brought him to the hospital doubted him.     Pain: 0/10:     Vitals: Vitals not assessed per clinical judgement, see nursing flowsheet    Social/Functional History:  Lives With: Alone  Type of Home: Apartment  Home Layout: One level  Home Access: Level entry  Home Equipment: Cane,Walker, rolling   Bathroom Shower/Tub: Tub/Shower unit  Bathroom Toilet: Standard  Bathroom Equipment: Grab bars in shower  Bathroom Accessibility: Accessible    Receives Help From: Family  ADL Assistance: Independent  Homemaking Assistance: Independent  Ambulation Assistance: Independent  Transfer Assistance: Independent    Active : No  Patient's  Info: still has license but does not drive - reports sons can drive him,. Oldest son is 24  Occupation: On disability  Additional Comments: patient states he uses an AD inconsistently. VISION:patient has been having hallucinations     HEARING:  slightly hard of hearing     COGNITION: Decreased Insight, Decreased Safety Awareness, Impaired Attention and Impulsive    RANGE OF MOTION:  Right Upper Extremity: WFL  Left Upper Extremity:  WFL    STRENGTH:  Right Upper Extremity: WFL  Left Upper Extremity:  WFL    SENSATION:   Reports he had a cervical spine injury a few years ago and now has numbness and tingling in hands occasionally. ADL:   Feeding: Independent.  lying on side in bed eating off of breakfast tray with patient declining for readjustment and edu on good positioning for proper swallowing  Lower Extremity Dressing: Supervision. to doff/don slipper socks seated EOB  Toilet Transfer: [address] with use of 2 w/w for support. BALANCE:  Sitting Balance:  Stand By Assistance. c/o dizziness upon sitting upright on EOB. cues to sit and rest prior to standing to prevent LOB  Standing Balance: Contact Guard Assistance. with use of 2 w/w for support; slight c/o dizziness when standing     BED MOBILITY:  Supine to Sit: Minimal Assistance patient impulsively, erratically \"throwing\" self to sit EOB with cues for tech to prevent fast movements and cause more disorientations  Sit to Supine: Stand By Assistance cues for tech for effective bed positioning     TRANSFERS:  Sit to Stand:  Contact Guard Assistance. cues for hand placement/safety and to pace self.      FUNCTIONAL MOBILITY:  Assistive Device: Rolling Walker  Assist Level: Contact Guard Assistance. Distance: To and from bathroom  Patient had one post LOB when exiting BR with slight MIN A, patient picked up 2 w/w impulsively and then slammed back onto ground. Activity Tolerance:  Patient tolerance of  treatment: good. No c/o pain, fatigue or demo SOB, some c/o dizziness with change in position. Assessment:  Assessment: patient demonstrates unstadiness and c/o dizziness with change in position with decreased activity tolerance impacting his ability to complete ADLs and functional transfers safely and as prior and would benefit from continued, skilled OT to increase activity tolerance, ease and (I) with ADLs and functional transfers and decrease caregiver burden. Performance deficits / Impairments: Decreased functional mobility ,Decreased ADL status,Decreased endurance,Decreased safe awareness  Prognosis: Good  REQUIRES OT FOLLOW-UP: Yes  Decision Making: Low Complexity    Treatment Initiated: Treatment and education initiated within context of evaluation. Evaluation time included review of current medical information, gathering information related to past medical, social and functional history, completion of standardized testing, formal and informal observation of tasks, assessment of data and development of plan of care and goals. Treatment time included skilled education and facilitation of tasks to increase safety and independence with ADL's for improved functional independence and quality of life.     Discharge Recommendations:  Continue to assess pending progress,Patient would benefit from continued therapy after discharge,Home with assist PRN,Home with Home health OT    Patient Education:     Patient Education  Education Given To: Patient  Education Provided: Role of [address] of Care,Transfer Training,Precautions  Education Provided Comments: importance of increasing activity  Barriers to Learning: Cognition,Hearing,Vision    Equipment Recommendations:  Equipment Needed: No    Plan:  Times per Week: 3-5x  Times per Day: Daily  Current Treatment Recommendations: Balance training,Functional mobility training,Endurance training,Safety education & training,Neuromuscular re-education,Patient/Caregiver education & training,Self-Care / ADL. See long-term goal time frame for expected duration of plan of care. If no long-term goals established, a short length of stay is anticipated. Goals:  Patient goals : return home [facility]  Short Term Goals  Time Frame for Short term goals: by discharge  Short Term Goal 1: patient will tolerate 6 min functional dyn standing with two hand release with (S) to increase activity tolerance for toileting and grooming. Short Term Goal 2: patient will complete ADL routine with (S) with 0-1 verbal/tactile cues for safety and sequencing. Following session, patient left in safe position with all fall risk precautions in place.

## 2022-06-17 NOTE — FLOWSHEET NOTE
06/17/22 0931   Encounter Summary   Encounter Overview/Reason  Spiritual/Emotional Needs   Service Provided For: Patient   Referral/Consult From: [employer]   Support System Children   Last Encounter  06/17/22   Complexity of Encounter Moderate   Begin Time 0917   End Time  0931   Total Time Calculated 14 min   Encounter    Type Initial Screen/Assessment   Spiritual/Emotional needs   Type Spiritual Support;Emotional Distress   Assessment/Intervention/Outcome   Assessment Anxious; Coping;Fearful   Intervention Active listening   Outcome Coping   Assessment: In my encounter with the 61 yr old patient, while rounding  the unit 5K the pt stated that he was emotionally distressed and fearful because he has cancer. He talked about his struggles with depression and drug use. I provided spiritual care to patient through conversation, I also came to assess the patients spiritual needs present. The pt was admitted due to acute metabolic encephalopathy. The pt likes to be called Jacob Travis. Interventions:  I provided prayer, emotional support and words of comfort.  provided a listening presence and encouraged pt to share their beliefs and how they support him during their hospitalization. Outcomes: The patient was encouraged and didnt share any further spiritual needs at this time. Plan:  Chaplains will follow-up at a later time for assessment of any spiritual care needs present.

## 2022-06-17 NOTE — PROGRESS NOTES
All discharge instructions given to patient with no further questions at this time. Patient discharged off unit via wheelchair. Chart contents placed in yellow bin.

## 2022-06-17 NOTE — CARE COORDINATION
6/17/22, 8:38 AM EDT    DISCHARGE ON GOING EVALUATION    Juan J Johnston day: 1  Location: -28/028-A Reason for admit: Hallucinations [R44.3]  Hepatocellular carcinoma ([address] [C22.0]  Encephalopathy acute [M14.00]  Acute metabolic encephalopathy [Y11.59]   Procedure:   6/16 CXR: Moderate right pleural effusion with patchy subsegmental atelectasis or early pneumonia in the right lung. 6/16 CT Head: Enhancing vascular lesion within the left frontal lobe which was also seen on prior CT angiography of the brain performed on 8/2021. No new   enhancing lesions, acute hemorrhage or infarct are identified on this   study limited by patient motion. Barriers to Discharge: Hospitalist, Psych and Oncology following. Psych recommended adding low dose Risperdal. PT/OT. Tox screen was positive for Benzodiazepine, Cannabinoid and Cocaine. Lovenox. Neurontin, Lactulose, Rifaximin, Aldactone, Thiamine. Zithromax iv x1. PCP: CHRISTIAN Abad CNP  Readmission Risk Score: 13.3 ( )%  Patient Goals/Plan/Treatment Preferences: From home alone, sons assist and live nearby. 6/17/22, 11:14 AM EDT    Patient goals/plan/ treatment preferences discussed by  and . Patient goals/plan/ treatment preferences reviewed with patient/ family. Patient/ family verbalize understanding of discharge plan and are in agreement with goal/plan/treatment preferences. Understanding was demonstrated using the teach back method. AVS provided by RN at time of discharge, which includes all necessary medical information pertaining to the patients current course of illness, treatment, post-discharge goals of care, and treatment preferences.      Services At/After Discharge: None

## 2022-06-17 NOTE — DISCHARGE SUMMARY
Hospital Medicine Discharge Summary      Patient Identification:   Stephanie Sylvester   : 1965  MRN: [id number]   Account: [de-identified]      Patient's PCP: CHRISTIAN Castellon - CNP    Admit Date: 2022     Discharge Date:   22    Admitting Physician: Jb Fisher MD     Discharge Physician: Nicci Elias, DO     Discharge Diagnoses: The patient was seen and examined on day of discharge and this discharge summary is in conjunction with any daily progress note from day of discharge. Altered mental status: 2/2 polysubstance abuse. Ammonia, TSH, lactic acid, serum glucose, ETOH wnl on admission. UA (22): positive marijuana, benzo, cocaine, oxycodone. Trop (-). EKG () sinus tachycardia. CT head (22) show enhancing vascular lesion within the left frontal lobe which was also seen on prior CT angiography of the brain performed on 2021, no new enhancing lesions, acute hemorrhage or infarct are identified. Blood culture pending  : Supportive care, consider psy consult for polysubstance use.  : AMS resolved. Back to normal baseline. Patient is recommend to follow up with Addition service as outpatient.     Sinus tachycardia: Resolved. 2/2 substance use vs alcohol withdraw. Plan: CIWA protocol, cont Tele.     Mild leukocytosis: 2/2 reactive. Afebrile since admission. No sign of infection. Continue monitor CBC daily     Chronic right side pleural effusion: seen on MRI abdominal since . No sign of infection. Plan: D/c abx, will consider thoracocentesis if patient develop symptom, cont monitor.     Compensate Hepatic cirrhosis: hepatitis panel (22) (-). MRI abdominal () enhancing lesion of the liver, subcapsular lesion ? Hemangioma, atrophic liver with nodular surface morphology relating to cirrhosis. (-) ascites on PE. Ammonia wnl. Resume Lactulose, Xifaxa, Aldactone. Cont follow up with Dr. Mahsa Frausto as outpatient.  Resume Protonix     Hepatocellular carcinoma: CT liver with biopsy (6/10/22). Follow up oncology as outpatient.     Hyperlipidemia: Lipid panel (8/2021) wnl. Resume Lipitor     DALE: Control. Resume Cymbalta.     GERD: Control Resume PPI     Peripheral neuropathy: control. Resume Neurotin     Polysubstance dependence: UA (6/16/22): positive marijuana, benzo, cocaine, oxycodone. Consult Addiction service. Recommend follow up as outpatient. Hospital Course:   Airam Marinelli is 61years old male who presented to the hospital for altered mental status and hallucination. Past medical history is significant of new diagnosis of hepatocellular carcinoma, compensated liver cirrhosis, hyperlipidemia, DALE, GERD, peripheral neuropathy. Per chart review, patient developed hallucination for over 24 hours prior to presenting in the ED. In the ED vital signs were stable, BMP within normal limited, liver function within normal limited, CBC showed mild leukocytosis, albumin level within normal limited. UA positive for cocaine, marijuana, benzo, oxycodone. CT head show no acute abnormality compared to prior CT. Patient was admitted and managed for altered mental status secondary to polysubstance use. Hospital day 2, patient's AMS is resolved. Patient is back to his normal baseline. Patient is stable to discharged home and follow up with Addition service as outpatient. Review of System  Constitutional: Negative for appetite change, chills, diaphoresis, fever and unexpected weight change. HENT: Negative for congestion, dental problem, mouth sores, nosebleed  Respiratory: Negative for choking, chest tightness, wheezing and stridor. Cardiovascular: Negative for chest pain and palpitations. Gastrointestinal: Negative for anal bleeding, diarrhea, nausea and vomiting. Genitourinary: Negative for dysuria, flank pain, hematuria and urgency. Psychiatric/Behavioral: Negative for agitation, behavioral problems, confusion, decreased concentration, dysphoric mood and hallucinations. Exam:     Vitals:  Vitals:    06/16/22 1930 06/16/22 2346 06/17/22 0351 06/17/22 0825   BP: 109/74 (!) 96/59 123/70 109/73   Pulse: 67 100 63 78   Resp: 16 18 18 18   Temp: 98.8 °F (37.1 °C) 98.2 °F (36.8 °C) 98.3 °F (36.8 °C) 97.7 °F (36.5 °C)   TempSrc: Oral Oral Oral Oral   SpO2: 96% 95% 97% 99%   Weight:       Height:         Weight: Weight: 223 lb (101.2 kg)     24 hour intake/output:    Intake/Output Summary (Last 24 hours) at 6/17/2022 6148  Last data filed at 6/16/2022 1330  Gross per 24 hour   Intake 10 ml   Output    Net 10 ml       General appearance:  No apparent distress, appears stated age and cooperative. HEENT:  Normal cephalic, atraumatic without obvious deformity. Pupils equal, round, and reactive to light. Extra ocular muscles intact. Conjunctivae/corneas clear. Neck: Supple, with full range of motion. No jugular venous distention. Trachea midline. Respiratory:  Normal respiratory effort. Clear to auscultation, bilaterally without Rales/Wheezes/Rhonchi. Cardiovascular:  Regular rate and rhythm with normal S1/S2 without murmurs, rubs or gallops. Abdomen: Soft, non-tender, non-distended with normal bowel sounds. Musculoskeletal:  No clubbing, cyanosis or edema bilaterally. Full range of motion without deformity. Skin: Skin color, texture, turgor normal.  No rashes or lesions. Neurologic:  Neurovascularly intact without any focal sensory/motor deficits. Cranial nerves: II-XII intact, grossly non-focal.  Psychiatric:  Alert and oriented, thought content appropriate, normal insight  Capillary Refill: Brisk,< 3 seconds   Peripheral Pulses: +2 palpable, equal bilaterally       Labs:  For convenience and continuity at follow-up the following most recent labs are provided:      CBC:    Lab Results   Component Value Date    WBC 11.7 06/16/2022    HGB 13.3 06/16/2022    HCT 39.0 06/16/2022     06/16/2022       Renal:    Lab Results   Component Value Date     06/16/2022    K 3.5 06/16/2022    K 4.0 08/12/2021     06/16/2022    CO2 17 06/16/2022    BUN 14 06/16/2022    CREATININE 0.9 06/16/2022    CALCIUM 10.2 06/16/2022    PHOS 2.9 10/14/2020         Significant Diagnostic Studies    Radiology:   CT HEAD W WO CONTRAST   Final Result   Impression:   1. Enhancing vascular lesion within the left frontal lobe which was also    seen on prior CT angiography of the brain performed on 8/2021. No new    enhancing lesions, acute hemorrhage or infarct are identified on this    study limited by patient motion. This document has been electronically signed by: Lakshmi Peña MD on    06/16/2022 07:15 AM      All CTs at this facility use dose modulation techniques and iterative    reconstructions, and/or weight-based dosing   when appropriate to reduce radiation to a low as reasonably achievable. XR CHEST PORTABLE   Final Result   Moderate right pleural effusion with patchy subsegmental atelectasis or    early pneumonia in the right lung. This document has been electronically signed by: Jana Joyner on    06/16/2022 03:03 AM             Consults:     IP CONSULT TO SPIRITUAL SERVICES  IP CONSULT TO PSYCHIATRY  IP CONSULT TO SOCIAL WORK  IP CONSULT TO ADDICTION SERVICES  IP CONSULT TO SPIRITUAL SERVICES    Disposition: Home  Condition at Discharge: Stable    Code Status:  Full Code     Patient Instructions:    Discharge lab work: Activity: activity as tolerated  Diet: ADULT DIET; Regular      Follow-up visits:   No follow-up provider specified. Discharge Medications:        Medication List      START taking these medications     thiamine 100 MG tablet; Take 1 tablet by mouth daily     CONTINUE taking these medications     albuterol sulfate  (90 Base) MCG/ACT inhaler; Commonly known as:   Ventolin HFA;  Inhale 2 puffs into the lungs 4 times daily as needed for   Wheezing   ALPRAZolam 1 MG tablet; Commonly known as: XANAX; take 1 tablet by mouth   three times a day if needed for anxiety or sleep   aspirin 81 MG chewable tablet; Take 1 tablet by mouth daily   atorvastatin 40 MG tablet; Commonly known as: LIPITOR; take 1 tablet by   mouth nightly   cyclobenzaprine 10 MG tablet; Commonly known as: FLEXERIL; take 1/2 to 1   tablet by mouth three times a day if needed   DULoxetine 60 MG extended release capsule; Commonly known as: CYMBALTA;   take 1 capsule by mouth once daily   gabapentin 300 MG capsule; Commonly known as: NEURONTIN; take 1 capsule   by mouth three times a day   LACTULOSE PO   nitroGLYCERIN 0.4 MG SL tablet; Commonly known as: NITROSTAT; up to max   of 3 total doses. If no relief after 1 dose, call 911.  omeprazole 40 MG delayed release capsule; Commonly known as: PRILOSEC; Take 1 capsule by mouth daily   rifAXIMin 550 MG tablet; Commonly known as: XIFAXAN; Take 1 tablet by   mouth 2 times daily   spironolactone 100 MG tablet; Commonly known as: ALDACTONE; take 1   tablet by mouth every morning   traMADol 50 MG tablet; Commonly known as: ULTRAM; Take 1 tablet by mouth   every 6 hours as needed for Pain for up to 30 days. Time Spent on discharge is more than 45 minutes in the examination, evaluation, counseling and review of medications and discharge plan. Signed: Thank you CHRISTIAN Carrasquillo CNP for the opportunity to be involved in this patient's care.     Electronically signed by Anam Pineda DO on 6/17/2022 at 9:22 AM

## 2022-06-17 NOTE — CARE COORDINATION
DISCHARGE/PLANNING EVALUATION  6/17/22, 10:44 AM EDT    Reason for Referral: for consideration for rehab  Mental Status: alert and answers questions appropriately  Decision Making: self  Family/Social/Home Environment: lives alone in apartment, has two sons, reports they live in [place] and are support. Pt reports he goes grocery shopping with his son as he states he does not drive. Pt reports he does his own cooking and cleaning  Current Services including food security, transportation and housekeeping: no concerns, pt does not drive, reports son helps with this. Current Equipment:cane  Payment [address]  Concerns or Barriers to Discharge: none  Post acute provider list with quality measures, geographic area and applicable managed care information provided. Questions regarding selection process answered:declines    Teach Back Method used with Zoraida Whitley regarding care plan and needs  Patient verbalize understanding of the plan of care and contribute to goal setting. Patient goals, treatment preferences and discharge plan: Return home with support from sons. Pt emotional/tearful at times during visit. Pt admits to drug use and explained he is embarrassed about incident. Pt reports he started using marijuana since his recent cancer diagnosis. Pt reports he sees oncology next week. Pt reports hx of alcoholism, but reports he has been sober for 14 years. Pt reports it's hard at times living alone. SW discuss looking into [facility] program/social gatherings to be around people more often. ERIC provided information on home care waiver program if needed in the future as well as information on [employer]. Electronically signed by Felix Mcardle, LSW on 6/17/2022 at 10:44 AM     3:20 PM   Nurse reports pt may need cab transport. SW did call son Marleni Patel, reports he is out of town, but can call someone to come get him if he can give him a time when he's ready.  SW did let him know nurse would call him. SW did updated pt's nurse to contact son and he will have someone come get pt.

## 2022-06-20 NOTE — PROGRESS NOTES
CLINICAL PHARMACY NOTE: MEDS TO BEDS    Total # of Prescriptions Filled: 1   The following medications were delivered to the patient:   Thiamine 100mg    Additional Documentation:

## 2022-06-21 ENCOUNTER — OFFICE VISIT (OUTPATIENT)
Dept: ONCOLOGY | Age: 57
End: 2022-06-21
Payer: MEDICAID

## 2022-06-21 ENCOUNTER — CLINICAL DOCUMENTATION (OUTPATIENT)
Dept: CASE MANAGEMENT | Age: 57
End: 2022-06-21

## 2022-06-21 ENCOUNTER — HOSPITAL ENCOUNTER (OUTPATIENT)
Dept: INFUSION THERAPY | Age: 57
Discharge: HOME | End: 2022-06-21
Payer: MEDICAID

## 2022-06-21 VITALS
RESPIRATION RATE: 18 BRPM | SYSTOLIC BLOOD PRESSURE: 120 MMHG | HEART RATE: 110 BPM | BODY MASS INDEX: 36.65 KG/M2 | DIASTOLIC BLOOD PRESSURE: 80 MMHG | HEIGHT: 65 IN | TEMPERATURE: 98.1 F | WEIGHT: 220 LBS | OXYGEN SATURATION: 97 %

## 2022-06-21 VITALS
TEMPERATURE: 98.1 F | RESPIRATION RATE: 18 BRPM | SYSTOLIC BLOOD PRESSURE: 120 MMHG | DIASTOLIC BLOOD PRESSURE: 80 MMHG | HEART RATE: 110 BPM

## 2022-06-21 DIAGNOSIS — K74.60 CIRRHOSIS OF LIVER WITH ASCITES, UNSPECIFIED HEPATIC CIRRHOSIS TYPE (HCC): ICD-10-CM

## 2022-06-21 DIAGNOSIS — C22.0 HEPATOCELLULAR CARCINOMA (HCC): Primary | ICD-10-CM

## 2022-06-21 DIAGNOSIS — R18.8 CIRRHOSIS OF LIVER WITH ASCITES, UNSPECIFIED HEPATIC CIRRHOSIS TYPE (HCC): ICD-10-CM

## 2022-06-21 DIAGNOSIS — R01.1 MURMUR, CARDIAC: ICD-10-CM

## 2022-06-21 DIAGNOSIS — C22.0 HEPATOCELLULAR CARCINOMA (HCC): ICD-10-CM

## 2022-06-21 LAB
ABSOLUTE IMMATURE GRANULOCYTE: 0 THOU/MM3 (ref 0–0.07)
ALBUMIN SERPL-MCNC: 3.7 G/DL (ref 3.5–5.1)
ALP BLD-CCNC: 108 U/L (ref 38–126)
ALT SERPL-CCNC: 35 U/L (ref 11–66)
AMMONIA: 23 UMOL/L (ref 11–60)
ANION GAP SERPL CALCULATED.3IONS-SCNC: 10 MEQ/L (ref 8–16)
APTT: 34.6 SECONDS (ref 22–38)
AST SERPL-CCNC: 40 U/L (ref 5–40)
BASINOPHIL, AUTOMATED: 1 % (ref 0–3)
BASOPHILS ABSOLUTE: 0 THOU/MM3 (ref 0–0.1)
BILIRUB SERPL-MCNC: 0.7 MG/DL (ref 0.3–1.2)
BLOOD CULTURE, ROUTINE: NORMAL
BLOOD CULTURE, ROUTINE: NORMAL
BUN BLDV-MCNC: 9 MG/DL (ref 7–22)
CALCIUM SERPL-MCNC: 9.2 MG/DL (ref 8.5–10.5)
CHLORIDE BLD-SCNC: 105 MEQ/L (ref 98–111)
CO2: 23 MEQ/L (ref 23–33)
CREAT SERPL-MCNC: 0.7 MG/DL (ref 0.4–1.2)
EOSINOPHILS ABSOLUTE: 0.2 THOU/MM3 (ref 0–0.4)
EOSINOPHILS RELATIVE PERCENT: 3 % (ref 0–4)
GAMMA GLUTAMYL TRANSFERASE: 41 U/L (ref 8–69)
GFR SERPL CREATININE-BSD FRML MDRD: > 90 ML/MIN/1.73M2
GLUCOSE BLD-MCNC: 97 MG/DL (ref 70–108)
HBV SURFACE AB TITR SER: POSITIVE {TITER}
HCT VFR BLD CALC: 42.3 % (ref 42–52)
HEMOGLOBIN: 13.8 GM/DL (ref 14–18)
HEPATITIS B CORE IGM ANTIBODY: NEGATIVE
HEPATITIS B SURFACE ANTIGEN: NEGATIVE
HEPATITIS C ANTIBODY: NEGATIVE
IMMATURE GRANULOCYTES: 0 %
INR BLD: 1.25 (ref 0.85–1.13)
LYMPHOCYTES # BLD: 21 % (ref 15–47)
LYMPHOCYTES ABSOLUTE: 1.3 THOU/MM3 (ref 1–4.8)
MCH RBC QN AUTO: 31.4 PG (ref 26–33)
MCHC RBC AUTO-ENTMCNC: 32.6 GM/DL (ref 32.2–35.5)
MCV RBC AUTO: 96 FL (ref 80–94)
MONOCYTES ABSOLUTE: 0.7 THOU/MM3 (ref 0.4–1.3)
MONOCYTES: 11 % (ref 0–12)
PDW BLD-RTO: 15.3 % (ref 11.5–14.5)
PLATELET # BLD: 181 THOU/MM3 (ref 130–400)
PMV BLD AUTO: 10.2 FL (ref 9.4–12.4)
POTASSIUM SERPL-SCNC: 4.3 MEQ/L (ref 3.5–5.2)
RBC # BLD: 4.4 MILL/MM3 (ref 4.7–6.1)
SEG NEUTROPHILS: 65 % (ref 43–75)
SEGMENTED NEUTROPHILS ABSOLUTE COUNT: 4 THOU/MM3 (ref 1.8–7.7)
SODIUM BLD-SCNC: 138 MEQ/L (ref 135–145)
TOTAL PROTEIN: 8.4 G/DL (ref 6.1–8)
WBC # BLD: 6.3 THOU/MM3 (ref 4.8–10.8)

## 2022-06-21 PROCEDURE — 36415 COLL VENOUS BLD VENIPUNCTURE: CPT

## 2022-06-21 PROCEDURE — 85730 THROMBOPLASTIN TIME PARTIAL: CPT

## 2022-06-21 PROCEDURE — 85610 PROTHROMBIN TIME: CPT

## 2022-06-21 PROCEDURE — 87389 HIV-1 AG W/HIV-1&-2 AB AG IA: CPT

## 2022-06-21 PROCEDURE — 86704 HEP B CORE ANTIBODY TOTAL: CPT

## 2022-06-21 PROCEDURE — G8427 DOCREV CUR MEDS BY ELIG CLIN: HCPCS | Performed by: INTERNAL MEDICINE

## 2022-06-21 PROCEDURE — 86706 HEP B SURFACE ANTIBODY: CPT

## 2022-06-21 PROCEDURE — 3017F COLORECTAL CA SCREEN DOC REV: CPT | Performed by: INTERNAL MEDICINE

## 2022-06-21 PROCEDURE — 82977 ASSAY OF GGT: CPT

## 2022-06-21 PROCEDURE — 82105 ALPHA-FETOPROTEIN SERUM: CPT

## 2022-06-21 PROCEDURE — 86803 HEPATITIS C AB TEST: CPT

## 2022-06-21 PROCEDURE — 80053 COMPREHEN METABOLIC PANEL: CPT

## 2022-06-21 PROCEDURE — 85025 COMPLETE CBC W/AUTO DIFF WBC: CPT

## 2022-06-21 PROCEDURE — 99205 OFFICE O/P NEW HI 60 MIN: CPT | Performed by: INTERNAL MEDICINE

## 2022-06-21 PROCEDURE — 82140 ASSAY OF AMMONIA: CPT

## 2022-06-21 PROCEDURE — 86705 HEP B CORE ANTIBODY IGM: CPT

## 2022-06-21 PROCEDURE — G8417 CALC BMI ABV UP PARAM F/U: HCPCS | Performed by: INTERNAL MEDICINE

## 2022-06-21 PROCEDURE — 99211 OFF/OP EST MAY X REQ PHY/QHP: CPT

## 2022-06-21 PROCEDURE — 4004F PT TOBACCO SCREEN RCVD TLK: CPT | Performed by: INTERNAL MEDICINE

## 2022-06-21 PROCEDURE — 87340 HEPATITIS B SURFACE AG IA: CPT
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
## 2022-06-21 NOTE — PATIENT INSTRUCTIONS
Lab Today. Echo- murmur  Ref Surg Onc [facility]- Localized Hepatocell ca- eval for resection  Fu 3 mth w me.

## 2022-06-23 DIAGNOSIS — G89.29 CHRONIC MIDLINE LOW BACK PAIN, UNSPECIFIED WHETHER SCIATICA PRESENT: ICD-10-CM

## 2022-06-23 DIAGNOSIS — M54.50 CHRONIC MIDLINE LOW BACK PAIN, UNSPECIFIED WHETHER SCIATICA PRESENT: ICD-10-CM

## 2022-06-23 LAB
AFP-TUMOR MARKER: 549.5 UG/L
HEPATITIS B CORE TOTAL ANTIBODY: NONREACTIVE
HIV AG/AB: NONREACTIVE

## 2022-06-23 RX ORDER — TRAMADOL HYDROCHLORIDE 50 MG/1
50 TABLET ORAL EVERY 6 HOURS PRN
Qty: 120 TABLET | OUTPATIENT
Start: 2022-06-23 | End: 2022-07-23

## 2022-06-23 RX ORDER — TRAMADOL HYDROCHLORIDE 50 MG/1
50 TABLET ORAL EVERY 6 HOURS PRN
Qty: 120 TABLET | Refills: 0 | OUTPATIENT
Start: 2022-06-23 | End: 2022-07-23

## 2022-06-23 NOTE — TELEPHONE ENCOUNTER
Discussed verbally with JR. He wants patient to come in for appt to discuss. Patient will call back next week when his kids are back from [place].

## 2022-06-23 NOTE — TELEPHONE ENCOUNTER
Patient called asking about this refill,    Separate request was sent today but it was refused - refill not appropriate, please advise why, patient states he has been on this.      LOV 05/03/2022  Next OV 07/11/2022    Last ordered 05/23/2022 disp 120/0

## 2022-06-24 ENCOUNTER — TELEPHONE (OUTPATIENT)
Dept: ONCOLOGY | Age: 57
End: 2022-06-24

## 2022-06-27 ENCOUNTER — OFFICE VISIT (OUTPATIENT)
Dept: FAMILY MEDICINE CLINIC | Age: 57
End: 2022-06-27
Payer: MEDICAID

## 2022-06-27 VITALS
HEART RATE: 96 BPM | WEIGHT: 218 LBS | TEMPERATURE: 97.9 F | DIASTOLIC BLOOD PRESSURE: 78 MMHG | RESPIRATION RATE: 24 BRPM | SYSTOLIC BLOOD PRESSURE: 108 MMHG | BODY MASS INDEX: 35.03 KG/M2 | HEIGHT: 66 IN

## 2022-06-27 DIAGNOSIS — M48.061 SPINAL STENOSIS OF LUMBAR REGION, UNSPECIFIED WHETHER NEUROGENIC CLAUDICATION PRESENT: ICD-10-CM

## 2022-06-27 DIAGNOSIS — F19.90 DRUG USE: ICD-10-CM

## 2022-06-27 DIAGNOSIS — C22.0 HEPATOCELLULAR CARCINOMA (HCC): Primary | ICD-10-CM

## 2022-06-27 DIAGNOSIS — F41.8 SITUATIONAL ANXIETY: ICD-10-CM

## 2022-06-27 DIAGNOSIS — Z09 HOSPITAL DISCHARGE FOLLOW-UP: ICD-10-CM

## 2022-06-27 DIAGNOSIS — K70.30 ALCOHOLIC CIRRHOSIS, UNSPECIFIED WHETHER ASCITES PRESENT (HCC): ICD-10-CM

## 2022-06-27 PROCEDURE — 99214 OFFICE O/P EST MOD 30 MIN: CPT | Performed by: NURSE PRACTITIONER

## 2022-06-27 PROCEDURE — 1111F DSCHRG MED/CURRENT MED MERGE: CPT | Performed by: NURSE PRACTITIONER

## 2022-07-05 ENCOUNTER — TELEPHONE (OUTPATIENT)
Dept: ONCOLOGY | Age: 57
End: 2022-07-05

## 2022-07-06 ASSESSMENT — ENCOUNTER SYMPTOMS
ABDOMINAL DISTENTION: 0
SHORTNESS OF BREATH: 0
NAUSEA: 0
ABDOMINAL PAIN: 0
BACK PAIN: 1
WHEEZING: 0
COUGH: 0
VOMITING: 0
CHEST TIGHTNESS: 0

## 2022-07-06 NOTE — PROGRESS NOTES
Post-Discharge Transitional Care Management Services or Hospital Follow Up      Lisa Higgins   YOB: 1965    Date of Office Visit:  6/27/2022  Date of Hospital Admission: 6/16/22  Date of Hospital Discharge: 6/17/22  Readmission Risk Score(high >=14%.  Medium >=10%):Readmission Risk Score: 13.3 ( )      Care management risk score Rising risk (score 2-5) and Complex Care (Scores >=6): 4     Non faceto face  following discharge, date last encounter closed (first attempt may have been earlier): *No documented post hospital discharge outreach found in the last 14 days *No documented post hospital discharge outreach found in the last 14 days    Call initiated 2 business days ofdischarge: *No response recorded in the last 14 days     Patient Active Problem List   Diagnosis    Diverticulosis    HTN (hypertension)    Ascites    Liver cirrhosis ([address]    Cystic kidney disease    Leukocytosis    Hypotension    Alcohol abuse    Hyponatremia    Hypokalemia    Perianal ulcer ([address]    Hyperkalemia    Increased ammonia level    Confusion    Change in mental status    Acute renal failure ([address]    Metabolic encephalopathy    Altered mental status    Hepatic encephalopathy ([address]    Noncompliance with medications    Ascites due to alcoholic cirrhosis ([address]    Hypotension (arterial)    Alcoholic cirrhosis of liver with ascites (HCC)    Lactic acidosis    Generalized abdominal pain    Tobacco abuse    Spondylosis of cervical region without myelopathy or radiculopathy    Chest pain    Elevated INR    Opiate abuse, episodic (HCC)    Marijuana abuse    Dyspnea on exertion    SOB (shortness of breath) on exertion    Chest pain, atypical- for yrs     Stroke of unknown cause ([address]    Alteration in speech    Pure hypercholesterolemia    Acute metabolic encephalopathy    Acute delirium    Encephalopathy acute    AMS (altered mental status)       No Known Allergies    Medications listed as ordered at the time of discharge from hospital     Medication List          Accurate as of June 27, 2022 11:59 PM. If you have any questions, ask your nurse or doctor. CONTINUE taking these medications    albuterol sulfate  (90 Base) MCG/ACT inhaler  Commonly known as: Ventolin HFA  Inhale 2 puffs into the lungs 4 times daily as needed for Wheezing     ALPRAZolam 1 MG tablet  Commonly known as: XANAX  take 1 tablet by mouth three times a day if needed for anxiety or sleep     atorvastatin 40 MG tablet  Commonly known as: LIPITOR  take 1 tablet by mouth nightly     cyclobenzaprine 10 MG tablet  Commonly known as: FLEXERIL  take 1/2 to 1 tablet by mouth three times a day if needed     DULoxetine 60 MG extended release capsule  Commonly known as: CYMBALTA  take 1 capsule by mouth once daily     gabapentin 300 MG capsule  Commonly known as: NEURONTIN  take 1 capsule by mouth three times a day     LACTULOSE PO     nitroGLYCERIN 0.4 MG SL tablet  Commonly known as: NITROSTAT  up to max of 3 total doses. If no relief after 1 dose, call 911.      omeprazole 40 MG delayed release capsule  Commonly known as: PRILOSEC  Take 1 capsule by mouth daily     rifAXIMin 550 MG tablet  Commonly known as: XIFAXAN  Take 1 tablet by mouth 2 times daily     spironolactone 100 MG tablet  Commonly known as: ALDACTONE  take 1 tablet by mouth every morning              Medications marked \"taking\" at this time  Outpatient Medications Marked as Taking for the 6/27/22 encounter (Office Visit) with CHRISTIAN Acosta CNP   Medication Sig Dispense Refill    ALPRAZolam (XANAX) 1 MG tablet take 1 tablet by mouth three times a day if needed for anxiety or sleep 90 tablet 0    cyclobenzaprine (FLEXERIL) 10 mg tablet take 1/2 to 1 tablet by mouth three times a day if needed 60 tablet 1    omeprazole (PRILOSEC) 40 MG delayed release capsule Take 1 capsule by mouth daily 30 capsule 5    atorvastatin (LIPITOR) 40 MG tablet take 1 tablet by mouth nightly 30 tablet 5    rifAXIMin (XIFAXAN) 550 MG tablet Take 1 tablet by mouth 2 times daily 60 tablet 6    gabapentin (NEURONTIN) 300 MG capsule take 1 capsule by mouth three times a day 90 capsule 2    spironolactone (ALDACTONE) 100 MG tablet take 1 tablet by mouth every morning 90 tablet 2    DULoxetine (CYMBALTA) 60 MG extended release capsule take 1 capsule by mouth once daily 90 capsule 3    LACTULOSE PO Take 30 mLs by mouth 2 times daily          Medications patient taking asof now reconciled against medications ordered at time of hospital discharge: Yes    Chief Complaint   Patient presents with    Medication Refill     Tramadol    Follow-Up from Hospital     Was in 23 hour observation at [facility] for a \"melt down\". He states he broke a couple windows in his apartment. He is having a difficult time dealing with his diagnosis and states he just \"had a melt down\". Presents for transitional care follow-up visit acute encephalopathy, new diagnosis of hepatocellular carcinoma, drug use, alcoholic cirrhosis. Patient reports that he was overwhelmed with the magnitude of bad news he is received recently and began to use cocaine and marijuana to help cope. Denies any additional use of cocaine since hospital discharge. Reports that he continues to use marijuana routinely. Here today requesting refill of tramadol. Patient reports that he had a mental meltdown before admission to hospital.  Currently reports mood stable. Denies any homicidal or suicidal ideations. Inpatient course: Discharge summary reviewed- see chart. Interval history/Current status: improved/stable    Review of Systems   Constitutional: Negative for activity change, appetite change, chills, fatigue and fever. Eyes: Negative for visual disturbance. Respiratory: Negative for cough, chest tightness, shortness of breath and wheezing. Cardiovascular: Negative for chest pain, palpitations and leg swelling. Gastrointestinal: Negative for abdominal distention, abdominal pain, nausea and vomiting. Genitourinary: Negative for dysuria. Musculoskeletal: Positive for arthralgias, back pain and neck pain. Skin: Negative. Negative for rash. Neurological: Negative for dizziness, light-headedness and headaches. Hematological: Negative for adenopathy. Psychiatric/Behavioral: Positive for decreased concentration, dysphoric mood and sleep disturbance. The patient is nervous/anxious. All other systems reviewed and are negative. Vitals:    06/27/22 0942   BP: 108/78   Pulse: 96   Resp: 24   Temp: 97.9 °F (36.6 °C)   TempSrc: Oral   Weight: 218 lb (98.9 kg)   Height: 5' 6\" (1.676 m)     Body mass index is 35.19 kg/m². Wt Readings from Last 3 Encounters:   06/27/22 218 lb (98.9 kg)   06/21/22 220 lb (99.8 kg)   06/16/22 223 lb (101.2 kg)     BP Readings from Last 3 Encounters:   06/27/22 108/78   06/21/22 120/80   06/21/22 120/80       Physical Exam  Vitals and nursing note reviewed. Constitutional:       Appearance: Normal appearance. He is well-developed and normal weight. He is not diaphoretic. HENT:      Head: Normocephalic and atraumatic. Not macrocephalic and not microcephalic. Right Ear: Hearing, tympanic membrane, ear canal and external ear normal. No drainage or tenderness. No middle ear effusion. No hemotympanum. Tympanic membrane is not injected, scarred, perforated or bulging. Left Ear: Hearing, tympanic membrane, ear canal and external ear normal. No drainage or tenderness. No middle ear effusion. No hemotympanum. Tympanic membrane is not injected, scarred, perforated or bulging. Nose: Nose normal. No nasal deformity, septal deviation, mucosal edema or rhinorrhea. Right Sinus: No maxillary sinus tenderness or frontal sinus tenderness. Left Sinus: No maxillary sinus tenderness or frontal sinus tenderness. Mouth/Throat:      Mouth: No oral lesions.       Dentition: preauricular, posterior auricular or occipital adenopathy. Cervical: No cervical adenopathy. Right cervical: No superficial, deep or posterior cervical adenopathy. Left cervical: No superficial, deep or posterior cervical adenopathy. Upper Body:      Right upper body: No supraclavicular or pectoral adenopathy. Left upper body: No supraclavicular or pectoral adenopathy. Skin:     General: Skin is warm and dry. Coloration: Skin is not pale. Findings: No bruising, ecchymosis, laceration, lesion or rash. Nails: There is no clubbing. Neurological:      General: No focal deficit present. Mental Status: He is alert and oriented to person, place, and time. Cranial Nerves: No cranial nerve deficit. Motor: No abnormal muscle tone. Coordination: Coordination normal.      Deep Tendon Reflexes: Reflexes normal.   Psychiatric:         Attention and Perception: Attention normal.         Mood and Affect: Mood is anxious. Speech: Speech normal.         Behavior: Behavior normal. Behavior is cooperative. Thought Content: Thought content normal.         Cognition and Memory: Cognition normal.         Judgment: Judgment normal.             Assessment/Plan:   Diagnosis Orders   1. Hepatocellular carcinoma ([facility]     2. Alcoholic cirrhosis, unspecified whether ascites present ([address]     3. Spinal stenosis of lumbar region, unspecified whether neurogenic claudication present     4. Situational anxiety     5. Drug use  Urine Drug Screen       Medical Decision Making: moderate complexity   Diagnosis Orders   1. Hepatocellular carcinoma ([address]     2. Alcoholic cirrhosis, unspecified whether ascites present ([address]     3. Spinal stenosis of lumbar region, unspecified whether neurogenic claudication present     4. Situational anxiety     5.  Drug use  Urine Drug Screen     Orders Placed This Encounter   Procedures    Urine Drug Screen     Standing Status:   Future Standing Expiration Date:   6/27/2023     Outpatient Encounter Medications as of 6/27/2022   Medication Sig Dispense Refill    ALPRAZolam (XANAX) 1 MG tablet take 1 tablet by mouth three times a day if needed for anxiety or sleep 90 tablet 0    cyclobenzaprine (FLEXERIL) 10 mg tablet take 1/2 to 1 tablet by mouth three times a day if needed 60 tablet 1    omeprazole (PRILOSEC) 40 MG delayed release capsule Take 1 capsule by mouth daily 30 capsule 5    atorvastatin (LIPITOR) 40 MG tablet take 1 tablet by mouth nightly 30 tablet 5    rifAXIMin (XIFAXAN) 550 MG tablet Take 1 tablet by mouth 2 times daily 60 tablet 6    gabapentin (NEURONTIN) 300 MG capsule take 1 capsule by mouth three times a day 90 capsule 2    spironolactone (ALDACTONE) 100 MG tablet take 1 tablet by mouth every morning 90 tablet 2    DULoxetine (CYMBALTA) 60 MG extended release capsule take 1 capsule by mouth once daily 90 capsule 3    LACTULOSE PO Take 30 mLs by mouth 2 times daily      albuterol sulfate HFA (VENTOLIN HFA) 108 (90 Base) MCG/ACT inhaler Inhale 2 puffs into the lungs 4 times daily as needed for Wheezing (Patient not taking: Reported on 6/16/2022) 18 g 0    nitroGLYCERIN (NITROSTAT) 0.4 MG SL tablet up to max of 3 total doses. If no relief after 1 dose, call 911. (Patient not taking: Reported on 6/21/2022) 25 tablet 0     No facility-administered encounter medications on file as of 6/27/2022. Patient instructed that tramadol would not be refilled at this time. Recommend that he repeat a urine drug screen. Continue other current medications. Continue with consultation with oncology at [facility]. Recommend to avoid illicit drug use. .  Patient in agreement to avoid cocaine use. Continue medications as prescribed.  Educational information on above diagnosis  provided per AVS.

## 2022-07-09 DIAGNOSIS — F41.9 ANXIETY: ICD-10-CM

## 2022-07-09 DIAGNOSIS — K70.30 ALCOHOLIC CIRRHOSIS, UNSPECIFIED WHETHER ASCITES PRESENT (HCC): ICD-10-CM

## 2022-07-11 RX ORDER — ALPRAZOLAM 1 MG/1
TABLET ORAL
Qty: 90 TABLET | OUTPATIENT
Start: 2022-07-11

## 2022-07-11 NOTE — TELEPHONE ENCOUNTER
Last seen 6/27/22  Last Xanax #90 6/13-7/13/22.    7/11/22 refill request states pt is to have a repeat drug screen before any controlled substances can be refilled. This request is denied.

## 2022-07-11 NOTE — TELEPHONE ENCOUNTER
Last office visit 6/27/22  Requesting refills of Gabapentin and Xanax. Are you refilling controlled medications for him due to his positive drug screen on 6/16 for cannabinoids, cocaine and oxycodone? He gets hydrocodone from our office.

## 2022-07-11 NOTE — TELEPHONE ENCOUNTER
Patient informed. He will get urine drug screen done today. Patient is also informed that if he has a positive cocaine drug screen in the future, he will no longer get controlled medications from our office.

## 2022-07-12 ENCOUNTER — TELEPHONE (OUTPATIENT)
Dept: ONCOLOGY | Age: 57
End: 2022-07-12

## 2022-07-18 ENCOUNTER — TELEPHONE (OUTPATIENT)
Dept: FAMILY MEDICINE CLINIC | Age: 57
End: 2022-07-18

## 2022-07-18 ENCOUNTER — TELEPHONE (OUTPATIENT)
Dept: ONCOLOGY | Age: 57
End: 2022-07-18

## 2022-07-18 RX ORDER — GABAPENTIN 300 MG/1
CAPSULE ORAL
Qty: 90 CAPSULE | Refills: 2 | Status: SHIPPED | OUTPATIENT
Start: 2022-07-18 | End: 2022-10-11

## 2022-07-18 RX ORDER — SPIRONOLACTONE 100 MG/1
TABLET, FILM COATED ORAL
Qty: 90 TABLET | Refills: 2 | Status: SHIPPED | OUTPATIENT
Start: 2022-07-18

## 2022-07-18 NOTE — TELEPHONE ENCOUNTER
----- Message from David Miller sent at 7/18/2022 10:46 AM EDT -----  Subject: Message to Provider    QUESTIONS  Information for Provider? Pt would like to let Dr. Manpreet Arroyo know he is   going to have his urinalysis done this Wednesday 07/20  ---------------------------------------------------------------------------  --------------  Nae Lucero INFO  [phone]; OK to leave message on voicemail  ---------------------------------------------------------------------------  --------------  SCRIPT ANSWERS  Relationship to Patient?  Self

## 2022-07-20 DIAGNOSIS — K70.30 ALCOHOLIC CIRRHOSIS, UNSPECIFIED WHETHER ASCITES PRESENT (HCC): ICD-10-CM

## 2022-07-20 DIAGNOSIS — F41.9 ANXIETY: ICD-10-CM

## 2022-07-21 DIAGNOSIS — G89.29 CHRONIC MIDLINE LOW BACK PAIN, UNSPECIFIED WHETHER SCIATICA PRESENT: ICD-10-CM

## 2022-07-21 DIAGNOSIS — F41.9 ANXIETY: ICD-10-CM

## 2022-07-21 DIAGNOSIS — M54.50 CHRONIC MIDLINE LOW BACK PAIN, UNSPECIFIED WHETHER SCIATICA PRESENT: ICD-10-CM

## 2022-07-21 DIAGNOSIS — K70.30 ALCOHOLIC CIRRHOSIS, UNSPECIFIED WHETHER ASCITES PRESENT (HCC): ICD-10-CM

## 2022-07-21 RX ORDER — ALPRAZOLAM 1 MG/1
TABLET ORAL
Qty: 90 TABLET | Refills: 0 | Status: SHIPPED | OUTPATIENT
Start: 2022-07-21 | End: 2022-08-19

## 2022-07-21 RX ORDER — TRAMADOL HYDROCHLORIDE 50 MG/1
50 TABLET ORAL EVERY 6 HOURS PRN
Qty: 120 TABLET | Refills: 0 | Status: SHIPPED | OUTPATIENT
Start: 2022-07-21 | End: 2022-08-19

## 2022-07-21 RX ORDER — ALPRAZOLAM 1 MG/1
TABLET ORAL
Qty: 90 TABLET | OUTPATIENT
Start: 2022-07-21

## 2022-08-11 RX ORDER — CYCLOBENZAPRINE HCL 10 MG
TABLET ORAL
Qty: 60 TABLET | Refills: 1 | Status: SHIPPED | OUTPATIENT
Start: 2022-08-11 | End: 2022-10-07

## 2022-08-15 DIAGNOSIS — K70.30 ALCOHOLIC CIRRHOSIS, UNSPECIFIED WHETHER ASCITES PRESENT (HCC): ICD-10-CM

## 2022-08-15 DIAGNOSIS — M54.50 CHRONIC MIDLINE LOW BACK PAIN, UNSPECIFIED WHETHER SCIATICA PRESENT: ICD-10-CM

## 2022-08-15 DIAGNOSIS — F41.9 ANXIETY: ICD-10-CM

## 2022-08-15 DIAGNOSIS — G89.29 CHRONIC MIDLINE LOW BACK PAIN, UNSPECIFIED WHETHER SCIATICA PRESENT: ICD-10-CM

## 2022-08-15 NOTE — TELEPHONE ENCOUNTER
LOV 06/27/2022      HOLD UNTIL 08/18/2022    Last ordered xanax 07/21/2022 disp 90/0  Last ordered tramadol 07/21/2022 disp 120/0

## 2022-08-18 ENCOUNTER — TELEPHONE (OUTPATIENT)
Dept: ONCOLOGY | Age: 57
End: 2022-08-18

## 2022-08-19 RX ORDER — ALPRAZOLAM 1 MG/1
TABLET ORAL
Qty: 90 TABLET | Refills: 0 | Status: SHIPPED | OUTPATIENT
Start: 2022-08-19 | End: 2022-09-16 | Stop reason: SDUPTHER

## 2022-08-19 RX ORDER — TRAMADOL HYDROCHLORIDE 50 MG/1
50 TABLET ORAL EVERY 6 HOURS PRN
Qty: 120 TABLET | Refills: 0 | Status: SHIPPED | OUTPATIENT
Start: 2022-08-19 | End: 2022-09-16 | Stop reason: SDUPTHER

## 2022-08-23 ENCOUNTER — TELEPHONE (OUTPATIENT)
Dept: FAMILY MEDICINE CLINIC | Age: 57
End: 2022-08-23

## 2022-08-23 NOTE — TELEPHONE ENCOUNTER
----- Message from Kayla Abbott sent at 8/23/2022  8:09 AM EDT -----  Subject: Message to Provider    QUESTIONS  Information for Provider? PT is wondering if he needs to come in again, He   thought he had an apt for 08/23 so he was calling to reschedule for   Thursday. But now he found out he doesnt have an apt. He is not sure if he   needs to be seen or not? If he needs to be seen it needs to be a Tues or a   Thurs for his transportation. please advise.   ---------------------------------------------------------------------------  --------------  Roseann KerrARS [employer] JERICHO  [phone]; OK to leave message on voicemail  ---------------------------------------------------------------------------  --------------  SCRIPT ANSWERS  Relationship to Patient?  Self

## 2022-09-15 DIAGNOSIS — M54.50 CHRONIC MIDLINE LOW BACK PAIN, UNSPECIFIED WHETHER SCIATICA PRESENT: ICD-10-CM

## 2022-09-15 DIAGNOSIS — K70.30 ALCOHOLIC CIRRHOSIS, UNSPECIFIED WHETHER ASCITES PRESENT (HCC): ICD-10-CM

## 2022-09-15 DIAGNOSIS — G89.29 CHRONIC MIDLINE LOW BACK PAIN, UNSPECIFIED WHETHER SCIATICA PRESENT: ICD-10-CM

## 2022-09-15 DIAGNOSIS — F41.9 ANXIETY: ICD-10-CM

## 2022-09-15 NOTE — TELEPHONE ENCOUNTER
LOV 6-27-22  Next OV 9-19-22  Last refill Xanax 8-19-22 #90 x 0  Last refill Tramadol 8-19-22 #120 x 0

## 2022-09-16 RX ORDER — DULOXETIN HYDROCHLORIDE 60 MG/1
CAPSULE, DELAYED RELEASE ORAL
Qty: 90 CAPSULE | Refills: 2 | Status: SHIPPED | OUTPATIENT
Start: 2022-09-16

## 2022-09-16 RX ORDER — ALPRAZOLAM 1 MG/1
TABLET ORAL
Qty: 90 TABLET | Refills: 0 | Status: SHIPPED | OUTPATIENT
Start: 2022-09-16 | End: 2022-10-12

## 2022-09-16 RX ORDER — TRAMADOL HYDROCHLORIDE 50 MG/1
50 TABLET ORAL EVERY 6 HOURS PRN
Qty: 120 TABLET | Refills: 0 | Status: SHIPPED | OUTPATIENT
Start: 2022-09-16 | End: 2022-10-28

## 2022-09-21 ENCOUNTER — HOSPITAL ENCOUNTER (OUTPATIENT)
Dept: INFUSION THERAPY | Age: 57
Discharge: HOME | End: 2022-09-21
Payer: MEDICAID

## 2022-09-21 ENCOUNTER — OFFICE VISIT (OUTPATIENT)
Dept: ONCOLOGY | Age: 57
End: 2022-09-21
Payer: MEDICAID

## 2022-09-21 VITALS
BODY MASS INDEX: 34.39 KG/M2 | RESPIRATION RATE: 18 BRPM | SYSTOLIC BLOOD PRESSURE: 123 MMHG | DIASTOLIC BLOOD PRESSURE: 78 MMHG | HEART RATE: 90 BPM | OXYGEN SATURATION: 98 % | WEIGHT: 214 LBS | TEMPERATURE: 98.2 F | HEIGHT: 66 IN

## 2022-09-21 VITALS
DIASTOLIC BLOOD PRESSURE: 78 MMHG | BODY MASS INDEX: 34.39 KG/M2 | RESPIRATION RATE: 18 BRPM | HEART RATE: 90 BPM | SYSTOLIC BLOOD PRESSURE: 123 MMHG | OXYGEN SATURATION: 98 % | WEIGHT: 214 LBS | TEMPERATURE: 98.2 F | HEIGHT: 66 IN

## 2022-09-21 DIAGNOSIS — C22.0 HEPATOCELLULAR CARCINOMA (HCC): Primary | ICD-10-CM

## 2022-09-21 PROCEDURE — 3017F COLORECTAL CA SCREEN DOC REV: CPT | Performed by: INTERNAL MEDICINE

## 2022-09-21 PROCEDURE — 99211 OFF/OP EST MAY X REQ PHY/QHP: CPT

## 2022-09-21 PROCEDURE — G8417 CALC BMI ABV UP PARAM F/U: HCPCS | Performed by: INTERNAL MEDICINE

## 2022-09-21 PROCEDURE — 4004F PT TOBACCO SCREEN RCVD TLK: CPT | Performed by: INTERNAL MEDICINE

## 2022-09-21 PROCEDURE — G8427 DOCREV CUR MEDS BY ELIG CLIN: HCPCS | Performed by: INTERNAL MEDICINE

## 2022-09-21 PROCEDURE — 99213 OFFICE O/P EST LOW 20 MIN: CPT | Performed by: INTERNAL MEDICINE
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
## 2022-10-07 RX ORDER — CYCLOBENZAPRINE HCL 10 MG
TABLET ORAL
Qty: 60 TABLET | Refills: 1 | Status: SHIPPED | OUTPATIENT
Start: 2022-10-07

## 2022-10-10 ENCOUNTER — APPOINTMENT (OUTPATIENT)
Dept: CT IMAGING | Age: 57
End: 2022-10-10
Payer: MEDICAID

## 2022-10-10 ENCOUNTER — APPOINTMENT (OUTPATIENT)
Dept: GENERAL RADIOLOGY | Age: 57
End: 2022-10-10
Payer: MEDICAID

## 2022-10-10 ENCOUNTER — HOSPITAL ENCOUNTER (EMERGENCY)
Age: 57
Discharge: HOME | End: 2022-10-10
Attending: STUDENT IN AN ORGANIZED HEALTH CARE EDUCATION/TRAINING PROGRAM
Payer: MEDICAID

## 2022-10-10 VITALS
OXYGEN SATURATION: 98 % | HEIGHT: 66 IN | SYSTOLIC BLOOD PRESSURE: 105 MMHG | RESPIRATION RATE: 18 BRPM | BODY MASS INDEX: 35.2 KG/M2 | TEMPERATURE: 98.1 F | HEART RATE: 87 BPM | WEIGHT: 219 LBS | DIASTOLIC BLOOD PRESSURE: 79 MMHG

## 2022-10-10 DIAGNOSIS — E72.20 HYPERAMMONEMIA (HCC): Primary | ICD-10-CM

## 2022-10-10 LAB
ALBUMIN SERPL-MCNC: 3.5 G/DL (ref 3.5–5.1)
ALP BLD-CCNC: 128 U/L (ref 38–126)
ALT SERPL-CCNC: 24 U/L (ref 11–66)
AMMONIA: 107 UMOL/L (ref 11–60)
ANION GAP SERPL CALCULATED.3IONS-SCNC: 11 MEQ/L (ref 8–16)
AST SERPL-CCNC: 27 U/L (ref 5–40)
BASOPHILS # BLD: 0.6 %
BASOPHILS ABSOLUTE: 0 THOU/MM3 (ref 0–0.1)
BILIRUB SERPL-MCNC: 0.9 MG/DL (ref 0.3–1.2)
BILIRUBIN DIRECT: 0.3 MG/DL (ref 0–0.3)
BUN BLDV-MCNC: 13 MG/DL (ref 7–22)
CALCIUM SERPL-MCNC: 9.5 MG/DL (ref 8.5–10.5)
CHLORIDE BLD-SCNC: 102 MEQ/L (ref 98–111)
CO2: 22 MEQ/L (ref 23–33)
CREAT SERPL-MCNC: 0.8 MG/DL (ref 0.4–1.2)
EOSINOPHIL # BLD: 1.9 %
EOSINOPHILS ABSOLUTE: 0.2 THOU/MM3 (ref 0–0.4)
ERYTHROCYTE [DISTWIDTH] IN BLOOD BY AUTOMATED COUNT: 15.2 % (ref 11.5–14.5)
ERYTHROCYTE [DISTWIDTH] IN BLOOD BY AUTOMATED COUNT: 53.3 FL (ref 35–45)
GFR SERPL CREATININE-BSD FRML MDRD: > 90 ML/MIN/1.73M2
GLUCOSE BLD-MCNC: 101 MG/DL (ref 70–108)
HCT VFR BLD CALC: 43 % (ref 42–52)
HEMOGLOBIN: 14.4 GM/DL (ref 14–18)
IMMATURE GRANS (ABS): 0.02 THOU/MM3 (ref 0–0.07)
IMMATURE GRANULOCYTES: 0.3 %
INR BLD: 1.22 (ref 0.85–1.13)
LIPASE: 14.3 U/L (ref 5.6–51.3)
LYMPHOCYTES # BLD: 20.2 %
LYMPHOCYTES ABSOLUTE: 1.6 THOU/MM3 (ref 1–4.8)
MCH RBC QN AUTO: 32.3 PG (ref 26–33)
MCHC RBC AUTO-ENTMCNC: 33.5 GM/DL (ref 32.2–35.5)
MCV RBC AUTO: 96.4 FL (ref 80–94)
MONOCYTES # BLD: 8.2 %
MONOCYTES ABSOLUTE: 0.6 THOU/MM3 (ref 0.4–1.3)
NUCLEATED RED BLOOD CELLS: 0 /100 WBC
OSMOLALITY CALCULATION: 270.4 MOSMOL/KG (ref 275–300)
PLATELET # BLD: 159 THOU/MM3 (ref 130–400)
PMV BLD AUTO: 10.7 FL (ref 9.4–12.4)
POTASSIUM REFLEX MAGNESIUM: 4.1 MEQ/L (ref 3.5–5.2)
PRO-BNP: < 5 PG/ML (ref 0–900)
RBC # BLD: 4.46 MILL/MM3 (ref 4.7–6.1)
SEG NEUTROPHILS: 68.8 %
SEGMENTED NEUTROPHILS ABSOLUTE COUNT: 5.4 THOU/MM3 (ref 1.8–7.7)
SODIUM BLD-SCNC: 135 MEQ/L (ref 135–145)
TOTAL PROTEIN: 8.2 G/DL (ref 6.1–8)
TROPONIN T: < 0.01 NG/ML
WBC # BLD: 7.9 THOU/MM3 (ref 4.8–10.8)

## 2022-10-10 PROCEDURE — 71045 X-RAY EXAM CHEST 1 VIEW: CPT

## 2022-10-10 PROCEDURE — 80048 BASIC METABOLIC PNL TOTAL CA: CPT

## 2022-10-10 PROCEDURE — 99285 EMERGENCY DEPT VISIT HI MDM: CPT

## 2022-10-10 PROCEDURE — 85025 COMPLETE CBC W/AUTO DIFF WBC: CPT

## 2022-10-10 PROCEDURE — 36415 COLL VENOUS BLD VENIPUNCTURE: CPT

## 2022-10-10 PROCEDURE — 84484 ASSAY OF TROPONIN QUANT: CPT

## 2022-10-10 PROCEDURE — 70450 CT HEAD/BRAIN W/O DYE: CPT

## 2022-10-10 PROCEDURE — 71275 CT ANGIOGRAPHY CHEST: CPT

## 2022-10-10 PROCEDURE — 82140 ASSAY OF AMMONIA: CPT

## 2022-10-10 PROCEDURE — 83880 ASSAY OF NATRIURETIC PEPTIDE: CPT

## 2022-10-10 PROCEDURE — 85610 PROTHROMBIN TIME: CPT

## 2022-10-10 PROCEDURE — 6360000004 HC RX CONTRAST MEDICATION: Performed by: STUDENT IN AN ORGANIZED HEALTH CARE EDUCATION/TRAINING PROGRAM

## 2022-10-10 PROCEDURE — 6360000002 HC RX W HCPCS: Performed by: STUDENT IN AN ORGANIZED HEALTH CARE EDUCATION/TRAINING PROGRAM

## 2022-10-10 PROCEDURE — 83690 ASSAY OF LIPASE: CPT

## 2022-10-10 PROCEDURE — 96372 THER/PROPH/DIAG INJ SC/IM: CPT

## 2022-10-10 PROCEDURE — 93005 ELECTROCARDIOGRAM TRACING: CPT | Performed by: STUDENT IN AN ORGANIZED HEALTH CARE EDUCATION/TRAINING PROGRAM

## 2022-10-10 PROCEDURE — 80076 HEPATIC FUNCTION PANEL: CPT

## 2022-10-10 RX ORDER — KETOROLAC TROMETHAMINE 30 MG/ML
15 INJECTION, SOLUTION INTRAMUSCULAR; INTRAVENOUS ONCE
Status: COMPLETED | OUTPATIENT
Start: 2022-10-10 | End: 2022-10-10

## 2022-10-10 RX ADMIN — KETOROLAC TROMETHAMINE 15 MG: 30 INJECTION, SOLUTION INTRAMUSCULAR; INTRAVENOUS at 20:39

## 2022-10-10 RX ADMIN — IOPAMIDOL 80 ML: 755 INJECTION, SOLUTION INTRAVENOUS at 19:29

## 2022-10-10 ASSESSMENT — PAIN - FUNCTIONAL ASSESSMENT
PAIN_FUNCTIONAL_ASSESSMENT: 0-10

## 2022-10-10 ASSESSMENT — ENCOUNTER SYMPTOMS
COUGH: 0
SORE THROAT: 0
DIARRHEA: 0
BACK PAIN: 0
ABDOMINAL PAIN: 0
TROUBLE SWALLOWING: 0
VOMITING: 0
PHOTOPHOBIA: 0
NAUSEA: 0
SHORTNESS OF BREATH: 0

## 2022-10-10 ASSESSMENT — PAIN SCALES - GENERAL
PAINLEVEL_OUTOF10: 7

## 2022-10-10 NOTE — ED NOTES
Patient transported to Radiology department via [employer] in stable condition.        Shannan Langston RN  10/10/22 1926

## 2022-10-10 NOTE — ED PROVIDER NOTES
[place] ENCOUNTER          Pt Name: Martin Gagnon  MRN: [id number]  [place] 1965  Date of evaluation: 10/10/2022  Treating Resident Physician: Bernadine Abdalla MD  Supervising Physician: Boni Wallace MD      CHIEF COMPLAINT       Chief Complaint   Patient presents with    Shortness of Breath    Headache     History obtained from the patient. HISTORY OF PRESENT ILLNESS    HPI  Martin Gagnon is a 61 y.o. male with PMHx of the diagnosis of hepatocellular carcinoma and cirrhosis who presents to the emergency department for evaluation of shortness of breath. Patient reports gradual onset headache that began after a nap yesterday. States this began as a frontal headache but then spread to the rest of the head. Patient follows with Dr. Rhonda Guo for medical oncology and at [facility] for surgical oncology and is felt to be too high risk for surgical resection. Plan for case and evaluation for liver transplant. Patient denies abdominal pain, nausea or vomiting. Denies any fevers or chills. Patient reports left-sided shoulder pain and shortness of breath. The patient has no other acute complaints at this time. REVIEW OF SYSTEMS   Review of Systems   Constitutional:  Negative for chills and fever. HENT:  Negative for sore throat and trouble swallowing. Eyes:  Negative for photophobia and visual disturbance. Respiratory:  Negative for cough and shortness of breath. Cardiovascular:  Negative for chest pain, palpitations and leg swelling. Gastrointestinal:  Negative for abdominal pain, diarrhea, nausea and vomiting. Genitourinary:  Negative for difficulty urinating and flank pain. Musculoskeletal:  Negative for arthralgias, back pain, myalgias and neck pain. Skin:  Negative for rash. Neurological:  Positive for light-headedness and headaches. Negative for seizures, syncope and weakness.        PAST MEDICAL AND SURGICAL HISTORY     Past Medical History:   Diagnosis Date    Anxiety     Arthritis     Chronic kidney disease     Cirrhosis ([facility]     Diverticulitis     Diverticulosis     GERD (gastroesophageal reflux disease)     Hypertension     Other disorders of kidney and ureter in diseases classified elsewhere     Psychiatric problem     Pure hypercholesterolemia 3/10/2022    Stroke of unknown cause ([facility] 8/12/2021     Past Surgical History:   Procedure Laterality Date    ABDOMEN SURGERY      BACK SURGERY      cervical plate    CARDIAC CATHETERIZATION  2007? CERVICAL DISC SURGERY      x 2 ---broken neck 7-2003    COLON SURGERY  2004    partial, due to diverticulitis    COLONOSCOPY      CT NEEDLE BIOPSY LIVER PERCUTANEOUS  6/10/2022    CT NEEDLE BIOPSY LIVER PERCUTANEOUS 6/10/2022 [facility] CT SCAN    DILATATION, ESOPHAGUS      ENDOSCOPY, COLON, DIAGNOSTIC      FRACTURE SURGERY      Broke neck in 2003    NERVE BLOCK Bilateral 10/02/2017    Cervical Facet MBB at C4-5, C5-6, C6-7     AK INJ,PARAVERTEBRAL L/S,1 LEVEL Bilateral 10/2/2017    C-FACET MBB C4-5, C5-6, C6-7 BILATERAL performed by Vani Garcia MD at [place] [address] ENDOSCOPY  2019    UPPER GASTROINTESTINAL ENDOSCOPY Left 9/28/2020    EGD BAND LIGATION performed by Pam Colunga MD at [address] [place] [address]   No current facility-administered medications for this encounter. Current Outpatient Medications:     cyclobenzaprine (FLEXERIL) 10 MG tablet, take 1/2 to 1 tablet by mouth three times a day if needed, Disp: 60 tablet, Rfl: 1    ALPRAZolam (XANAX) 1 MG tablet, take 1 tablet by mouth three times a day if needed, Disp: 90 tablet, Rfl: 0    traMADol (ULTRAM) 50 MG tablet, Take 1 tablet by mouth every 6 hours as needed for Pain for up to 30 days. , Disp: 120 tablet, Rfl: 0    DULoxetine (CYMBALTA) 60 MG extended release capsule, take 1 capsule by mouth once daily, Disp: 90 capsule, Rfl: 2 spironolactone (ALDACTONE) 100 MG tablet, take 1 tablet by mouth every morning, Disp: 90 tablet, Rfl: 2    gabapentin (NEURONTIN) 300 MG capsule, take 1 capsule by mouth three times a day, Disp: 90 capsule, Rfl: 2    omeprazole (PRILOSEC) 40 MG delayed release capsule, Take 1 capsule by mouth daily, Disp: 30 capsule, Rfl: 5    atorvastatin (LIPITOR) 40 MG tablet, take 1 tablet by mouth nightly, Disp: 30 tablet, Rfl: 5    rifAXIMin (XIFAXAN) 550 MG tablet, Take 1 tablet by mouth 2 times daily, Disp: 60 tablet, Rfl: 6    albuterol sulfate HFA (VENTOLIN HFA) 108 (90 Base) MCG/ACT inhaler, Inhale 2 puffs into the lungs 4 times daily as needed for Wheezing (Patient not taking: No sig reported), Disp: 18 g, Rfl: 0    LACTULOSE PO, Take 30 mLs by mouth 2 times daily, Disp: , Rfl:     nitroGLYCERIN (NITROSTAT) 0.4 MG SL tablet, up to max of 3 total doses. If no relief after 1 dose, call 911.  (Patient not taking: No sig reported), Disp: 25 tablet, Rfl: 0      SOCIAL HISTORY     Social History     Social History Narrative    Not on file     Social History     Tobacco Use    Smoking status: Every Day     Packs/day: 1.00     Years: 25.00     Pack years: 25.00     Types: Cigarettes    Smokeless tobacco: Never    Tobacco comments:     9/21/22 declines cessation counseling   Vaping Use    Vaping Use: Never used   Substance Use Topics    Alcohol use: Not Currently     Comment: Quit 15 years ago    Drug use: Not Currently         ALLERGIES   No Known Allergies      FAMILY HISTORY     Family History   Problem Relation Age of Onset    Hypertension Mother     Heart Disease Mother     Alzheimer's Disease Mother     Heart Failure Mother     Hypertension Father     Heart Disease Father     Cancer Father         lung    Diabetes Brother     High Blood Pressure Paternal Uncle     Heart Disease Paternal Uncle     Colon Cancer Neg Hx     Colon Polyps Neg Hx          PREVIOUS RECORDS   Previous records reviewed: I reviewed the patient's past medical records including relevant labs, imaging and procedures. PHYSICAL EXAM     ED Triage Vitals [10/10/22 1735]   BP Temp Temp Source Heart Rate Resp SpO2 Height Weight   111/86 98.1 °F (36.7 °C) Oral (!) 103 15 -- 5' 6\" (1.676 m) 219 lb (99.3 kg)     Initial vital signs and nursing assessment reviewed and normal. Body mass index is 35.35 kg/m². Pulsoximetry is normal per my interpretation. Additional Vital Signs:  Vitals:    10/10/22 1924   BP: 108/70   Pulse: 84   Resp: 18   Temp:    SpO2: 99%       Physical Exam  Vitals and nursing note reviewed. Constitutional:       General: He is not in acute distress. Appearance: Normal appearance. He is normal weight. He is not toxic-appearing. HENT:      Head: Normocephalic and atraumatic. Right Ear: Tympanic membrane normal.      Left Ear: Tympanic membrane normal.      Nose: Nose normal.      Mouth/Throat:      Mouth: Mucous membranes are moist.      Pharynx: Oropharynx is clear. Eyes:      General: No scleral icterus. Extraocular Movements: Extraocular movements intact. Conjunctiva/sclera: Conjunctivae normal.      Pupils: Pupils are equal, round, and reactive to light. Cardiovascular:      Rate and Rhythm: Normal rate and regular rhythm. Pulses: Normal pulses. Heart sounds: Normal heart sounds. No murmur heard. No friction rub. No gallop. Pulmonary:      Effort: Pulmonary effort is normal.      Breath sounds: Normal breath sounds. No wheezing or rales. Abdominal:      Palpations: Abdomen is soft. Tenderness: There is no abdominal tenderness. There is no guarding or rebound. Comments: Abdomen mildly distended. Nontender   Musculoskeletal:         General: Normal range of motion. Cervical back: Normal range of motion and neck supple. Right lower leg: No edema. Left lower leg: No edema. Skin:     General: Skin is warm and dry.       Capillary Refill: Capillary refill takes less than 2 seconds. Neurological:      General: No focal deficit present. Mental Status: He is alert and oriented to person, place, and time. Cranial Nerves: No cranial nerve deficit. Sensory: No sensory deficit. Motor: No weakness. Coordination: Coordination normal.           ED RESULTS   Laboratory results:  Labs Reviewed   CBC WITH AUTO DIFFERENTIAL - Abnormal; Notable for the following components:       Result Value    RBC 4.46 (*)     MCV 96.4 (*)     RDW-CV 15.2 (*)     RDW-SD 53.3 (*)     All other components within normal limits   BASIC METABOLIC PANEL W/ REFLEX TO MG FOR LOW K - Abnormal; Notable for the following components:    CO2 22 (*)     All other components within normal limits   HEPATIC FUNCTION PANEL - Abnormal; Notable for the following components:    Alkaline Phosphatase 128 (*)     Total Protein 8.2 (*)     All other components within normal limits   PROTIME-INR - Abnormal; Notable for the following components:    INR 1.22 (*)     All other components within normal limits   AMMONIA - Abnormal; Notable for the following components:    Ammonia 107 (*)     All other components within normal limits   OSMOLALITY - Abnormal; Notable for the following components:    Osmolality Calc 270.4 (*)     All other components within normal limits   LIPASE   BRAIN NATRIURETIC PEPTIDE   TROPONIN   ANION GAP   GLOMERULAR FILTRATION RATE, ESTIMATED       Radiologic studies results:  CTA CHEST W WO CONTRAST   Final Result   1. No acute pulmonary embolus within the limitations of the exam.   2. Right lung pleural-parenchymal scarring with no consolidation. 3. Small amount of ascites in the upper abdomen. This document has been electronically signed by:  Fletcher Dove MD on 10/10/2022 08:04 PM      All CTs at this facility use dose modulation techniques and iterative    reconstructions, and/or weight-based dosing   when appropriate to reduce radiation to a low as reasonably achievable. 3D Post-processing was performed on this study. CT Head WO Contrast   Final Result   1. No acute intracranial findings      This document has been electronically signed by: Daniel Gosselin, MD on 10/10/2022 08:02 PM      All CTs at this facility use dose modulation techniques and iterative    reconstructions, and/or weight-based dosing   when appropriate to reduce radiation to a low as reasonably achievable. XR CHEST PORTABLE   Final Result   1. No acute findings. This document has been electronically signed by: Daniel Gosselin, MD on 10/10/2022 06:46 PM          ED Medications administered this visit:   Medications   iopamidol (ISOVUE-370) 76 % injection 80 mL (80 mLs IntraVENous Given 10/10/22 1929)         ED COURSE     ED Course as of 10/10/22 2023   Mon Oct 10, 2022   1856 AMMONIA, PLASMA, [id number](!): 107 [TM]   2008 CTA CHEST W WO CONTRAST  IMPRESSION:  1. No acute pulmonary embolus within the limitations of the exam.  2. Right lung pleural-parenchymal scarring with no consolidation. 3. Small amount of ascites in the upper abdomen. [TM]   2008 CT Head WO Contrast  IMPRESSION:  1. No acute intracranial findings [TM]      ED Course User Index  [TM] Vick Norris MD           MEDICAL DECISION MAKING   Given the patient's above chief complaint and findings on history and physical examination, I thought it was appropriate to consider the following emergency medical conditions:  Hyperammonemia, stroke, ICH/SAH, electrolyte abnormality, worsening cirrhosis, metastasis  Although some of these diagnoses are unlikely they were considered in my medical decision making. The patient presents with headache. Headache resolved. Patient's chief complaint was most that he felt very lightheaded. I suspect this is likely secondary to hyperammonemia as it is ammonia is 107. Patient reports he has been compliant with lactulose and rifaximin.   States he will take another dose when he gets home. Patient reports that he has good follow-up with Katt Omalley who is his cirrhosis doctor in [facility]. I sent her message via stickK to let her know. Ramiro Madison states that she will have the office call tomorrow to schedule appointment. Patient had no flapping tremor was not altered. CT head revealed no acute abnormalities or hemorrhage. Patient states that he will follow-up with his GI doctor        Strict return precautions and follow up instructions were discussed with the patient prior to discharge, with which the patient agrees. MEDICATION CHANGES     New Prescriptions    No medications on file         FINAL DISPOSITION     Final diagnoses:   Hyperammonemia ([facility]     Condition: condition: stable  Dispo: Discharge to home      This transcription was electronically signed. Parts of this transcriptions may have been dictated by use of voice recognition software and electronically transcribed, and parts may have been transcribed with the assistance of an ED scribe. The transcription may contain errors not detected in proofreading. Please refer to my supervising physician's documentation if my documentation differs.     Electronically Signed: Melecio Orosco MD, 10/10/22, 8:15 PM         Winsome Gastelum MD  Resident  10/10/22 3222       Winsome Gastelum MD  Resident  10/10/22 6792

## 2022-10-10 NOTE — ED NOTES
Pt resting on cot comfortably at this time. Pt denies any pain at this time.      Jordan Olivo RN  10/10/22 1925

## 2022-10-10 NOTE — ED TRIAGE NOTES
Pt to the ED via lobby with complaint of shortness of breath and a headache that has been going on since yesterday. Pt stated he was recently diagnosed with liver cancer and it is in his kidneys. Pt stated he took a tramadol for pain. Pt alert and oriented x3. Respirations even yuliet unlabored.

## 2022-10-11 LAB
EKG ATRIAL RATE: 101 BPM
EKG P AXIS: 39 DEGREES
EKG P-R INTERVAL: 138 MS
EKG Q-T INTERVAL: 336 MS
EKG QRS DURATION: 82 MS
EKG QTC CALCULATION (BAZETT): 435 MS
EKG R AXIS: 51 DEGREES
EKG T AXIS: 24 DEGREES
EKG VENTRICULAR RATE: 101 BPM

## 2022-10-11 PROCEDURE — 93010 ELECTROCARDIOGRAM REPORT: CPT | Performed by: INTERNAL MEDICINE

## 2022-10-11 RX ORDER — GABAPENTIN 300 MG/1
CAPSULE ORAL
Qty: 90 CAPSULE | Refills: 2 | Status: SHIPPED | OUTPATIENT
Start: 2022-10-11 | End: 2023-01-10

## 2022-10-11 NOTE — ED NOTES
Pt medicated per MAR. Pt advised about discharge. Pt respirations even and unlabored. GREG Olivares RN  10/10/22 2043

## 2022-10-19 ENCOUNTER — OFFICE VISIT (OUTPATIENT)
Dept: FAMILY MEDICINE CLINIC | Age: 57
End: 2022-10-19
Payer: MEDICAID

## 2022-10-19 VITALS
TEMPERATURE: 97.5 F | SYSTOLIC BLOOD PRESSURE: 110 MMHG | HEART RATE: 112 BPM | DIASTOLIC BLOOD PRESSURE: 74 MMHG | RESPIRATION RATE: 22 BRPM | OXYGEN SATURATION: 99 % | BODY MASS INDEX: 32.88 KG/M2 | WEIGHT: 204.6 LBS | HEIGHT: 66 IN

## 2022-10-19 DIAGNOSIS — F41.9 ANXIETY: ICD-10-CM

## 2022-10-19 DIAGNOSIS — K70.30 ALCOHOLIC CIRRHOSIS, UNSPECIFIED WHETHER ASCITES PRESENT (HCC): ICD-10-CM

## 2022-10-19 DIAGNOSIS — C22.0 HEPATOCELLULAR CARCINOMA (HCC): ICD-10-CM

## 2022-10-19 DIAGNOSIS — Z09 HOSPITAL DISCHARGE FOLLOW-UP: Primary | ICD-10-CM

## 2022-10-19 DIAGNOSIS — Z23 FLU VACCINE NEED: ICD-10-CM

## 2022-10-19 DIAGNOSIS — M48.061 SPINAL STENOSIS OF LUMBAR REGION, UNSPECIFIED WHETHER NEUROGENIC CLAUDICATION PRESENT: ICD-10-CM

## 2022-10-19 PROCEDURE — 99214 OFFICE O/P EST MOD 30 MIN: CPT | Performed by: NURSE PRACTITIONER

## 2022-10-19 PROCEDURE — 3017F COLORECTAL CA SCREEN DOC REV: CPT | Performed by: NURSE PRACTITIONER

## 2022-10-19 PROCEDURE — G8427 DOCREV CUR MEDS BY ELIG CLIN: HCPCS | Performed by: NURSE PRACTITIONER

## 2022-10-19 PROCEDURE — G8417 CALC BMI ABV UP PARAM F/U: HCPCS | Performed by: NURSE PRACTITIONER

## 2022-10-19 PROCEDURE — G8484 FLU IMMUNIZE NO ADMIN: HCPCS | Performed by: NURSE PRACTITIONER

## 2022-10-19 PROCEDURE — 4004F PT TOBACCO SCREEN RCVD TLK: CPT | Performed by: NURSE PRACTITIONER

## 2022-10-19 RX ORDER — OXYCODONE HYDROCHLORIDE 5 MG/1
5 TABLET ORAL EVERY 6 HOURS PRN
Qty: 28 TABLET | Refills: 0 | Status: SHIPPED | OUTPATIENT
Start: 2022-10-19 | End: 2022-10-26

## 2022-10-19 RX ORDER — ALPRAZOLAM 1 MG/1
TABLET ORAL
Qty: 90 TABLET | Refills: 0 | OUTPATIENT
Start: 2022-10-19 | End: 2022-11-14

## 2022-10-19 RX ORDER — ALPRAZOLAM 1 MG/1
TABLET ORAL
Qty: 90 TABLET | Refills: 0 | Status: SHIPPED | OUTPATIENT
Start: 2022-10-19 | End: 2022-11-15

## 2022-10-19 SDOH — ECONOMIC STABILITY: FOOD INSECURITY: WITHIN THE PAST 12 MONTHS, YOU WORRIED THAT YOUR FOOD WOULD RUN OUT BEFORE YOU GOT MONEY TO BUY MORE.: NEVER TRUE

## 2022-10-19 SDOH — ECONOMIC STABILITY: FOOD INSECURITY: WITHIN THE PAST 12 MONTHS, THE FOOD YOU BOUGHT JUST DIDN'T LAST AND YOU DIDN'T HAVE MONEY TO GET MORE.: NEVER TRUE

## 2022-10-19 ASSESSMENT — ENCOUNTER SYMPTOMS
WHEEZING: 0
NAUSEA: 0
ABDOMINAL PAIN: 0
BACK PAIN: 1
SORE THROAT: 0
EYE PAIN: 0
DIARRHEA: 0
SHORTNESS OF BREATH: 0
TROUBLE SWALLOWING: 0
EYE REDNESS: 0
CONSTIPATION: 0
RHINORRHEA: 0
COUGH: 0
ALLERGIC/IMMUNOLOGIC NEGATIVE: 1
EYE DISCHARGE: 0
VOMITING: 0

## 2022-10-19 ASSESSMENT — SOCIAL DETERMINANTS OF HEALTH (SDOH): HOW HARD IS IT FOR YOU TO PAY FOR THE VERY BASICS LIKE FOOD, HOUSING, MEDICAL CARE, AND HEATING?: SOMEWHAT HARD

## 2022-10-19 NOTE — TELEPHONE ENCOUNTER
----- Message from Lazarus Medal sent at 10/19/2022 11:01 AM EDT -----  Subject: Refill Request    QUESTIONS  Name of Medication? ALPRAZolam (XANAX) 1 MG tablet  Patient-reported dosage and instructions? as needed  How many days do you have left? 3  Preferred Pharmacy? [facility] [address]  Pharmacy phone number (if available)? [phone]  Additional Information for Provider? pt thinks he has 3 or 4 left.  ---------------------------------------------------------------------------  --------------  CALL BACK INFO  What is the best way for the office to contact you? OK to leave message on   voicemail  Preferred Call Back Phone Number? [phone]  ---------------------------------------------------------------------------  --------------  SCRIPT ANSWERS  Relationship to Patient?  Self

## 2022-10-19 NOTE — PROGRESS NOTES
[address] [facility] [address]  Dept: [phone]  Dept Fax: [phone]  Loc: [phone]     Visit Date:  10/19/2022      Patient:  Jory Peña  YOB: 1965    HPI:     Chief Complaint   Patient presents with    Follow-up     New cancer dx and ammonia level 107       Patient following up after being in the emergency department over a week ago for hyperammonemia. Patient states he was not taking the right amount of lactulose but now he is doing better with that. No appetite, not eating much, lost 15 pounds in the last week or so. Has follow-up tomorrow with cancer specialty to see what the next steps are for his evaluated process. Patient having significant amount of neck and back pain due to his chronic spinal problems but also generally in part due to his cancer. Patient has been taking tramadol and it is not easing the pain very much. Medications    Current Outpatient Medications:     ALPRAZolam (XANAX) 1 MG tablet, take 1 tablet by mouth three times a day if needed, Disp: 90 tablet, Rfl: 0    oxyCODONE (ROXICODONE) 5 MG immediate release tablet, Take 1 tablet by mouth every 6 hours as needed for Pain for up to 7 days. Intended supply: 7 days.  Take lowest dose possible to manage pain, Disp: 28 tablet, Rfl: 0    lactulose (CHRONULAC) 10 GM/15ML solution, Take 30 mLs by mouth 2 times daily, Disp: 1800 mL, Rfl: 5    gabapentin (NEURONTIN) 300 MG capsule, take 1 capsule by mouth three times a day, Disp: 90 capsule, Rfl: 2    cyclobenzaprine (FLEXERIL) 10 MG tablet, take 1/2 to 1 tablet by mouth three times a day if needed, Disp: 60 tablet, Rfl: 1    DULoxetine (CYMBALTA) 60 MG extended release capsule, take 1 capsule by mouth once daily, Disp: 90 capsule, Rfl: 2    spironolactone (ALDACTONE) 100 MG tablet, take 1 tablet by mouth every morning, Disp: 90 tablet, Rfl: 2    omeprazole (PRILOSEC) 40 MG delayed release capsule, Take 1 capsule by mouth daily, Disp: 30 capsule, Rfl: 5    atorvastatin (LIPITOR) 40 MG tablet, take 1 tablet by mouth nightly, Disp: 30 tablet, Rfl: 5    rifAXIMin (XIFAXAN) 550 MG tablet, Take 1 tablet by mouth 2 times daily, Disp: 60 tablet, Rfl: 6    nitroGLYCERIN (NITROSTAT) 0.4 MG SL tablet, up to max of 3 total doses. If no relief after 1 dose, call 911., Disp: 25 tablet, Rfl: 0    The patient has No Known Allergies. Past Medical History  Claire Cameron  has a past medical history of Anxiety, Arthritis, Chronic kidney disease, Cirrhosis ([facility] Diverticulitis, Diverticulosis, GERD (gastroesophageal reflux disease), Hypertension, Other disorders of kidney and ureter in diseases classified elsewhere, Psychiatric problem, Pure hypercholesterolemia, and Stroke of unknown cause ([facility] Subjective:      Review of Systems   Constitutional:  Positive for activity change, appetite change and fatigue. Negative for fever. HENT:  Negative for congestion, ear pain, rhinorrhea, sore throat and trouble swallowing. Eyes:  Negative for pain, discharge and redness. Respiratory:  Negative for cough, shortness of breath and wheezing. Cardiovascular: Negative. Gastrointestinal:  Negative for abdominal pain, constipation, diarrhea, nausea and vomiting. Endocrine: Negative. Genitourinary:  Negative for dysuria, frequency and urgency. Musculoskeletal:  Positive for arthralgias, back pain, myalgias and neck pain. Skin:  Negative for rash. Allergic/Immunologic: Negative. Neurological:  Positive for weakness. Negative for dizziness, tremors and headaches. Hematological: Negative. Psychiatric/Behavioral:  Negative for dysphoric mood and sleep disturbance. The patient is not nervous/anxious.       Objective:     /74   Pulse (!) 112   Temp 97.5 °F (36.4 °C) (Infrared)   Resp 22   Ht 5' 6\" (1.676 m)   Wt 204 lb 9.6 oz (92.8 kg)   SpO2 99%   BMI 33.02 kg/m²     Physical Exam  Constitutional:       General: He is not in acute distress. Appearance: He is well-developed. He is not diaphoretic. HENT:      Right Ear: External ear normal.      Left Ear: External ear normal.      Nose: Nose normal.      Mouth/Throat:      Mouth: Mucous membranes are moist.   Eyes:      General:         Right eye: No discharge. Left eye: No discharge. Conjunctiva/sclera: Conjunctivae normal.      Pupils: Pupils are equal, round, and reactive to light. Neck:      Vascular: No JVD. Cardiovascular:      Rate and Rhythm: Normal rate and regular rhythm. Heart sounds: Normal heart sounds. No murmur heard. Pulmonary:      Effort: Pulmonary effort is normal. No respiratory distress. Breath sounds: Normal breath sounds. Musculoskeletal:         General: No tenderness or deformity. Normal range of motion. Cervical back: Normal range of motion. Skin:     General: Skin is warm and dry. Capillary Refill: Capillary refill takes less than 2 seconds. Coloration: Skin is not pale. Findings: No erythema or rash. Neurological:      Mental Status: He is alert and oriented to person, place, and time. Coordination: Coordination normal.   Psychiatric:         Behavior: Behavior normal.         Thought Content: Thought content normal.         Judgment: Judgment normal.       Assessment/Plan:      Cherise Castaneda was seen today for follow-up.     Diagnoses and all orders for this visit:    Hospital discharge follow-up    Alcoholic cirrhosis, unspecified whether ascites present (HCC)  -     ALPRAZolam (XANAX) 1 MG tablet; take 1 tablet by mouth three times a day if needed    Anxiety  -     ALPRAZolam (XANAX) 1 MG tablet; take 1 tablet by mouth three times a day if needed    Flu vaccine need  -     Influenza, FLUCELVAX, (age 10 mo+), IM, Preservative Free, 0.5 mL    Spinal stenosis of lumbar region, unspecified whether neurogenic claudication present  -     oxyCODONE (Yi Morn) 5 MG immediate release tablet; Take 1 tablet by mouth every 6 hours as needed for Pain for up to 7 days. Intended supply: 7 days. Take lowest dose possible to manage pain    Hepatocellular carcinoma (HCC)  -     oxyCODONE (ROXICODONE) 5 MG immediate release tablet; Take 1 tablet by mouth every 6 hours as needed for Pain for up to 7 days. Intended supply: 7 days. Take lowest dose possible to manage pain    Patient is given samples of Ensure along with coupons and encouraged to continue aggressive intake of calories as he can. Trial of Norco for his pain    Return in about 4 weeks (around 11/16/2022). Patient instructions given jayyd.         Electronically signed by CHRISTIAN Verdin CNP on 10/19/2022 at 1:19 PM

## 2022-10-27 DIAGNOSIS — M54.50 CHRONIC MIDLINE LOW BACK PAIN, UNSPECIFIED WHETHER SCIATICA PRESENT: ICD-10-CM

## 2022-10-27 DIAGNOSIS — G89.29 CHRONIC MIDLINE LOW BACK PAIN, UNSPECIFIED WHETHER SCIATICA PRESENT: ICD-10-CM

## 2022-10-28 RX ORDER — TRAMADOL HYDROCHLORIDE 50 MG/1
50 TABLET ORAL EVERY 6 HOURS PRN
Qty: 120 TABLET | Refills: 0 | Status: SHIPPED | OUTPATIENT
Start: 2022-10-28 | End: 2022-11-27

## 2022-11-14 DIAGNOSIS — F41.9 ANXIETY: ICD-10-CM

## 2022-11-14 DIAGNOSIS — K70.30 ALCOHOLIC CIRRHOSIS, UNSPECIFIED WHETHER ASCITES PRESENT (HCC): ICD-10-CM

## 2022-11-14 RX ORDER — ALPRAZOLAM 1 MG/1
TABLET ORAL
Qty: 90 TABLET | Refills: 0 | Status: SHIPPED | OUTPATIENT
Start: 2022-11-14 | End: 2022-12-11

## 2022-11-21 RX ORDER — ATORVASTATIN CALCIUM 40 MG/1
40 TABLET, FILM COATED ORAL NIGHTLY
Qty: 30 TABLET | Refills: 5 | Status: SHIPPED | OUTPATIENT
Start: 2022-11-21

## 2022-12-12 RX ORDER — CYCLOBENZAPRINE HCL 10 MG
TABLET ORAL
Qty: 60 TABLET | Refills: 1 | Status: SHIPPED | OUTPATIENT
Start: 2022-12-12

## 2022-12-16 ENCOUNTER — OFFICE VISIT (OUTPATIENT)
Dept: FAMILY MEDICINE CLINIC | Age: 57
End: 2022-12-16
Payer: MEDICAID

## 2022-12-16 VITALS
HEART RATE: 96 BPM | HEIGHT: 65 IN | WEIGHT: 194 LBS | TEMPERATURE: 97.3 F | OXYGEN SATURATION: 92 % | RESPIRATION RATE: 18 BRPM | SYSTOLIC BLOOD PRESSURE: 94 MMHG | BODY MASS INDEX: 32.32 KG/M2 | DIASTOLIC BLOOD PRESSURE: 60 MMHG

## 2022-12-16 DIAGNOSIS — M54.50 CHRONIC MIDLINE LOW BACK PAIN, UNSPECIFIED WHETHER SCIATICA PRESENT: ICD-10-CM

## 2022-12-16 DIAGNOSIS — F41.8 SITUATIONAL ANXIETY: ICD-10-CM

## 2022-12-16 DIAGNOSIS — K70.30 ALCOHOLIC CIRRHOSIS, UNSPECIFIED WHETHER ASCITES PRESENT (HCC): ICD-10-CM

## 2022-12-16 DIAGNOSIS — G89.29 CHRONIC MIDLINE LOW BACK PAIN, UNSPECIFIED WHETHER SCIATICA PRESENT: ICD-10-CM

## 2022-12-16 DIAGNOSIS — M48.061 SPINAL STENOSIS OF LUMBAR REGION, UNSPECIFIED WHETHER NEUROGENIC CLAUDICATION PRESENT: ICD-10-CM

## 2022-12-16 DIAGNOSIS — F41.9 ANXIETY: ICD-10-CM

## 2022-12-16 DIAGNOSIS — I10 ESSENTIAL HYPERTENSION: ICD-10-CM

## 2022-12-16 DIAGNOSIS — C22.0 HEPATOCELLULAR CARCINOMA (HCC): Primary | ICD-10-CM

## 2022-12-16 PROCEDURE — 3017F COLORECTAL CA SCREEN DOC REV: CPT | Performed by: NURSE PRACTITIONER

## 2022-12-16 PROCEDURE — 3078F DIAST BP <80 MM HG: CPT | Performed by: NURSE PRACTITIONER

## 2022-12-16 PROCEDURE — G8417 CALC BMI ABV UP PARAM F/U: HCPCS | Performed by: NURSE PRACTITIONER

## 2022-12-16 PROCEDURE — 99214 OFFICE O/P EST MOD 30 MIN: CPT | Performed by: NURSE PRACTITIONER

## 2022-12-16 PROCEDURE — G8427 DOCREV CUR MEDS BY ELIG CLIN: HCPCS | Performed by: NURSE PRACTITIONER

## 2022-12-16 PROCEDURE — 90674 CCIIV4 VAC NO PRSV 0.5 ML IM: CPT | Performed by: NURSE PRACTITIONER

## 2022-12-16 PROCEDURE — 3074F SYST BP LT 130 MM HG: CPT | Performed by: NURSE PRACTITIONER

## 2022-12-16 PROCEDURE — 4004F PT TOBACCO SCREEN RCVD TLK: CPT | Performed by: NURSE PRACTITIONER

## 2022-12-16 PROCEDURE — G8482 FLU IMMUNIZE ORDER/ADMIN: HCPCS | Performed by: NURSE PRACTITIONER

## 2022-12-16 PROCEDURE — 90471 IMMUNIZATION ADMIN: CPT | Performed by: NURSE PRACTITIONER

## 2022-12-16 RX ORDER — ALPRAZOLAM 1 MG/1
1 TABLET ORAL 3 TIMES DAILY PRN
COMMUNITY

## 2022-12-16 RX ORDER — ALPRAZOLAM 1 MG/1
TABLET ORAL
Qty: 90 TABLET | Refills: 0 | Status: SHIPPED | OUTPATIENT
Start: 2022-12-16 | End: 2023-01-12

## 2022-12-16 NOTE — PROGRESS NOTES
Immunizations Administered       Name Date Dose Route    Influenza, FLUCELVAX, (age 10 mo+), MDCK, PF, 0.5mL 12/16/2022 0.5 mL Intramuscular    Site: Deltoid- Left    Lot: [id number]    NDC: 52944-826-45

## 2022-12-19 ASSESSMENT — ENCOUNTER SYMPTOMS
BACK PAIN: 0
SHORTNESS OF BREATH: 0
ABDOMINAL PAIN: 0
ABDOMINAL DISTENTION: 0
CHEST TIGHTNESS: 0
COUGH: 0
WHEEZING: 0

## 2022-12-19 NOTE — PROGRESS NOTES
[address] [facility] [address]  Dept: [phone]  Dept Fax: [phone]  Loc: [phone]  PROGRESS NOTE      VisitDate: 12/16/2022    Dary Urena is a 61 y.o. male who presents today for:     Chief Complaint   Patient presents with    Follow-up     Med Refills         Subjective:  Routine 3-month follow-up refill Xanax. Patient reports that his tramadol was discontinued per his oncologist at [facility]. History anxiety arthritis chronic kidney disease cirrhosis, chronic back pain /spinal stenosis,Diverticulosis/diverticulitis GERD hypertension hyperlipidemia and hepatocellular carcinoma. Take medications as prescribed. Patient reports that he feels as though he is doing well. Reports minimal abdominal pain. Denies abdominal distention. Denies fever syncope dizziness confusion chest pain shortness of breath nausea vomiting diarrhea. Reports regular bowel movements with no blood or melena. Reports urination within normal limits, normal color. Review of Systems   Constitutional:  Negative for activity change, appetite change, chills, fatigue and fever. Eyes:  Negative for visual disturbance. Respiratory:  Negative for cough, chest tightness, shortness of breath and wheezing. Cardiovascular:  Negative for chest pain, palpitations and leg swelling. Gastrointestinal:  Negative for abdominal distention and abdominal pain. Genitourinary:  Negative for dysuria. Musculoskeletal:  Negative for arthralgias, back pain and neck pain. Skin: Negative. Negative for rash. Neurological:  Negative for dizziness, light-headedness and headaches. Hematological:  Negative for adenopathy. Psychiatric/Behavioral:  Negative for decreased concentration and dysphoric mood. All other systems reviewed and are negative.   Past Medical History:   Diagnosis Date    Anxiety     Arthritis     Chronic kidney disease     Cirrhosis [facility])     Diverticulitis     Diverticulosis     GERD (gastroesophageal reflux disease)     Hypertension     Other disorders of kidney and ureter in diseases classified elsewhere     Psychiatric problem     Pure hypercholesterolemia 3/10/2022    Stroke of unknown cause ([address] 8/12/2021      Past Surgical History:   Procedure Laterality Date    ABDOMEN SURGERY      BACK SURGERY      cervical plate    CARDIAC CATHETERIZATION  2007?     CERVICAL DISC SURGERY      x 2 ---broken neck 7-2003    COLON SURGERY  2004    partial, due to diverticulitis    COLONOSCOPY      CT NEEDLE BIOPSY LIVER PERCUTANEOUS  6/10/2022    CT NEEDLE BIOPSY LIVER PERCUTANEOUS 6/10/2022 [facility] CT SCAN    DILATATION, ESOPHAGUS      ENDOSCOPY, COLON, DIAGNOSTIC      FRACTURE SURGERY      Broke neck in 2003    NERVE BLOCK Bilateral 10/02/2017    Cervical Facet MBB at C4-5, C5-6, C6-7     [place] INJ,PARAVERTEBRAL L/S,1 LEVEL Bilateral 10/2/2017    C-FACET MBB C4-5, C5-6, C6-7 BILATERAL performed by Chantal Scheuermann, MD at [address] [place] [address]      UPPER GASTROINTESTINAL ENDOSCOPY  2019    UPPER GASTROINTESTINAL ENDOSCOPY Left 9/28/2020    EGD BAND LIGATION performed by Elijah Ho MD at [facility]     Family History   Problem Relation Age of Onset    Hypertension Mother     Heart Disease Mother     Alzheimer's Disease Mother     Heart Failure Mother     Hypertension Father     Heart Disease Father     Cancer Father         lung    Diabetes Brother     High Blood Pressure Paternal Uncle     Heart Disease Paternal Uncle     Colon Cancer Neg Hx     Colon Polyps Neg Hx      Social History     Tobacco Use    Smoking status: Every Day     Packs/day: 1.00     Years: 25.00     Pack years: 25.00     Types: Cigarettes    Smokeless tobacco: Never    Tobacco comments:     9/21/22 declines cessation counseling   Substance Use Topics    Alcohol use: Not Currently     Comment: Quit 15 years ago      Current Outpatient Medications   Medication Sig Dispense Refill    ALPRAZolam (XANAX) 1 MG tablet Take 1 mg by mouth 3 times daily as needed for Sleep. ALPRAZolam (XANAX) 1 MG tablet take 1 tablet by mouth three times a day if needed 90 tablet 0    cyclobenzaprine (FLEXERIL) 10 MG tablet Take 1/2 to 1 tablet by mouth three times a day if needed 60 tablet 1    atorvastatin (LIPITOR) 40 MG tablet take 1 tablet by mouth nightly 30 tablet 5    gabapentin (NEURONTIN) 300 MG capsule take 1 capsule by mouth three times a day 90 capsule 2    DULoxetine (CYMBALTA) 60 MG extended release capsule take 1 capsule by mouth once daily 90 capsule 2    spironolactone (ALDACTONE) 100 MG tablet take 1 tablet by mouth every morning 90 tablet 2    omeprazole (PRILOSEC) 40 MG delayed release capsule Take 1 capsule by mouth daily 30 capsule 5    rifAXIMin (XIFAXAN) 550 MG tablet Take 1 tablet by mouth 2 times daily 60 tablet 6    nitroGLYCERIN (NITROSTAT) 0.4 MG SL tablet up to max of 3 total doses. If no relief after 1 dose, call 911. (Patient not taking: Reported on 12/16/2022) 25 tablet 0     No current facility-administered medications for this visit. No Known Allergies  Health Maintenance   Topic Date Due    COVID-19 Vaccine (1) Never done    Hepatitis A vaccine (1 of 2 - Risk 2-dose series) Never done    Hepatitis B vaccine (1 of 3 - Risk 3-dose series) Never done    Shingles vaccine (1 of 2) Never done    Low dose CT lung screening  Never done    Lipids  08/13/2022    Depression Screen  03/15/2023    Diabetes screen  08/13/2024    Colorectal Cancer Screen  03/01/2029    Pneumococcal 0-64 years Vaccine (3 - PPSV23 if available, else PCV20) 06/03/2029    DTaP/Tdap/Td vaccine (2 - Td or Tdap) 07/27/2030    Flu vaccine  Completed    Hepatitis C screen  Completed    HIV screen  Completed    Hib vaccine  Aged Out    Meningococcal (ACWY) vaccine  Aged Out         Objective:     Physical Exam  Vitals and nursing note reviewed. Constitutional:       Appearance: Normal appearance. He is well-developed and normal weight. He is not diaphoretic. HENT:      Head: Normocephalic and atraumatic. Not macrocephalic and not microcephalic. Right Ear: Hearing, tympanic membrane, ear canal and external ear normal. No drainage or tenderness. No middle ear effusion. No hemotympanum. Tympanic membrane is not injected, scarred, perforated or bulging. Left Ear: Hearing, tympanic membrane, ear canal and external ear normal. No drainage or tenderness. No middle ear effusion. No hemotympanum. Tympanic membrane is not injected, scarred, perforated or bulging. Nose: Nose normal. No nasal deformity, septal deviation, mucosal edema or rhinorrhea. Right Sinus: No maxillary sinus tenderness or frontal sinus tenderness. Left Sinus: No maxillary sinus tenderness or frontal sinus tenderness. Mouth/Throat:      Mouth: Mucous membranes are moist. No oral lesions. Dentition: Normal dentition. Does not have dentures. No dental caries or dental abscesses. Pharynx: Oropharynx is clear. No oropharyngeal exudate or posterior oropharyngeal erythema. Tonsils: No tonsillar abscesses. Eyes:      General: Lids are normal. No scleral icterus. Extraocular Movements:      Right eye: Normal extraocular motion. Left eye: Normal extraocular motion. Conjunctiva/sclera: Conjunctivae normal.      Right eye: Right conjunctiva is not injected. Left eye: Left conjunctiva is not injected. Pupils: Pupils are equal, round, and reactive to light. Neck:      Thyroid: No thyroid mass or thyromegaly. Vascular: No carotid bruit or JVD. Trachea: Trachea normal.   Cardiovascular:      Rate and Rhythm: Normal rate and regular rhythm. Pulses: Normal pulses. Heart sounds: Normal heart sounds, S1 normal and S2 normal. No murmur heard. No friction rub. No gallop.    Pulmonary:      Effort: Pulmonary effort is normal. No respiratory distress. Breath sounds: Normal breath sounds. No wheezing, rhonchi or rales. Chest:      Chest wall: No tenderness. Abdominal:      General: Bowel sounds are normal.      Palpations: Abdomen is soft. There is no hepatomegaly, splenomegaly or mass. Tenderness: There is no guarding or rebound. Hernia: No hernia is present. There is no hernia in the ventral area or left inguinal area. Musculoskeletal:         General: No tenderness. Normal range of motion. Cervical back: Normal range of motion and neck supple. No edema or erythema. Normal range of motion. Lymphadenopathy:      Head:      Right side of head: No submental, submandibular, tonsillar, preauricular, posterior auricular or occipital adenopathy. Left side of head: No submental, submandibular, tonsillar, preauricular, posterior auricular or occipital adenopathy. Cervical: No cervical adenopathy. Right cervical: No superficial, deep or posterior cervical adenopathy. Left cervical: No superficial, deep or posterior cervical adenopathy. Upper Body:      Right upper body: No supraclavicular or pectoral adenopathy. Left upper body: No supraclavicular or pectoral adenopathy. Skin:     General: Skin is warm and dry. Coloration: Skin is not pale. Findings: No bruising, ecchymosis, laceration, lesion or rash. Nails: There is no clubbing. Neurological:      General: No focal deficit present. Mental Status: He is alert and oriented to person, place, and time. Cranial Nerves: No cranial nerve deficit. Motor: No abnormal muscle tone. Coordination: Coordination normal.      Deep Tendon Reflexes: Reflexes normal.   Psychiatric:         Mood and Affect: Mood normal.         Speech: Speech normal.         Behavior: Behavior normal.         Thought Content:  Thought content normal.         Judgment: Judgment normal.     BP 94/60   Pulse 96   Temp 97.3 °F (36.3 °C) (Infrared)   Resp 18   Ht 5' 5\" (1.651 m)   Wt 194 lb (88 kg)   SpO2 92%   BMI 32.28 kg/m²   Labs reviewed through care everywhere with patient. Impression/Plan:  1. Hepatocellular carcinoma ([facility]    2. Alcoholic cirrhosis, unspecified whether ascites present ([facility]    3. Anxiety    4. Spinal stenosis of lumbar region, unspecified whether neurogenic claudication present    5. Chronic midline low back pain, unspecified whether sciatica present    6. Situational anxiety    7. Essential hypertension      Requested Prescriptions     Signed Prescriptions Disp Refills    ALPRAZolam (XANAX) 1 MG tablet 90 tablet 0     Sig: take 1 tablet by mouth three times a day if needed     Orders Placed This Encounter   Procedures    Influenza, FLUCELVAX, (age 10 mo+), IM, Preservative Free, 0.5 mL       Patient giveneducational materials - see patient instructions. Discussed use, benefit, and side effects of prescribed medications. All patient questions answered. Pt voiced understanding. Reviewed health maintenance. Patient agreedwith treatment plan. Follow up as directed. Flu vaccination updated in office today Xanax refilled 1 mg 3 times daily as needed #90. OARRS report reviewed. Continue routine consultation with oncology [facility]. Continue medications as prescribed.  Educational information on above diagnosis  provided per AVS.  30 min       Electronically signed by CHRISTIAN De Jesus CNP on 12/19/2022 at 8:25 AM

## 2022-12-28 ENCOUNTER — TELEPHONE (OUTPATIENT)
Dept: FAMILY MEDICINE CLINIC | Age: 57
End: 2022-12-28

## 2022-12-28 NOTE — TELEPHONE ENCOUNTER
Pt forgot to discuss pain medication at his last appointment, he wonders if you would ok for him to have oxi again like after his cancer surgery. Please advise. Pt aware you are out of the office and will await a call back after you return.

## 2023-01-01 ENCOUNTER — APPOINTMENT (OUTPATIENT)
Dept: GENERAL RADIOLOGY | Age: 58
DRG: 981 | End: 2023-01-01
Payer: COMMERCIAL

## 2023-01-01 ENCOUNTER — APPOINTMENT (OUTPATIENT)
Dept: MRI IMAGING | Age: 58
DRG: 981 | End: 2023-01-01
Payer: COMMERCIAL

## 2023-01-01 ENCOUNTER — APPOINTMENT (OUTPATIENT)
Dept: CT IMAGING | Age: 58
DRG: 981 | End: 2023-01-01
Payer: COMMERCIAL

## 2023-01-01 ENCOUNTER — HOSPITAL ENCOUNTER (INPATIENT)
Age: 58
LOS: 22 days | DRG: 981 | End: 2023-04-19
Attending: EMERGENCY MEDICINE | Admitting: INTERNAL MEDICINE
Payer: COMMERCIAL

## 2023-01-01 ENCOUNTER — APPOINTMENT (OUTPATIENT)
Dept: NUCLEAR MEDICINE | Age: 58
DRG: 981 | End: 2023-01-01
Payer: COMMERCIAL

## 2023-01-01 ENCOUNTER — APPOINTMENT (OUTPATIENT)
Dept: ULTRASOUND IMAGING | Age: 58
DRG: 981 | End: 2023-01-01
Payer: COMMERCIAL

## 2023-01-01 ENCOUNTER — HOSPITAL ENCOUNTER (OUTPATIENT)
Dept: INFUSION THERAPY | Age: 58
End: 2023-01-01

## 2023-01-01 VITALS
HEIGHT: 66 IN | RESPIRATION RATE: 24 BRPM | WEIGHT: 194.45 LBS | TEMPERATURE: 98.1 F | BODY MASS INDEX: 31.25 KG/M2 | HEART RATE: 80 BPM | OXYGEN SATURATION: 95 % | SYSTOLIC BLOOD PRESSURE: 75 MMHG | DIASTOLIC BLOOD PRESSURE: 36 MMHG

## 2023-01-01 DIAGNOSIS — R11.0 NAUSEA: ICD-10-CM

## 2023-01-01 DIAGNOSIS — D49.7 SPINAL CORD TUMOR: ICD-10-CM

## 2023-01-01 DIAGNOSIS — R10.11 RIGHT UPPER QUADRANT ABDOMINAL PAIN: Primary | ICD-10-CM

## 2023-01-01 DIAGNOSIS — R19.7 DIARRHEA, UNSPECIFIED TYPE: ICD-10-CM

## 2023-01-01 LAB
ABO: NORMAL
ALBUMIN SERPL BCG-MCNC: 2.2 G/DL (ref 3.5–5.1)
ALBUMIN SERPL BCG-MCNC: 2.3 G/DL (ref 3.5–5.1)
ALBUMIN SERPL BCG-MCNC: 2.4 G/DL (ref 3.5–5.1)
ALBUMIN SERPL BCG-MCNC: 2.4 G/DL (ref 3.5–5.1)
ALP SERPL-CCNC: 172 U/L (ref 38–126)
ALP SERPL-CCNC: 186 U/L (ref 38–126)
ALP SERPL-CCNC: 204 U/L (ref 38–126)
ALP SERPL-CCNC: 222 U/L (ref 38–126)
ALP SERPL-CCNC: 224 U/L (ref 38–126)
ALP SERPL-CCNC: 264 U/L (ref 38–126)
ALT SERPL W/O P-5'-P-CCNC: 113 U/L (ref 11–66)
ALT SERPL W/O P-5'-P-CCNC: 129 U/L (ref 11–66)
ALT SERPL W/O P-5'-P-CCNC: 132 U/L (ref 11–66)
ALT SERPL W/O P-5'-P-CCNC: 181 U/L (ref 11–66)
ALT SERPL W/O P-5'-P-CCNC: 32 U/L (ref 11–66)
ALT SERPL W/O P-5'-P-CCNC: 40 U/L (ref 11–66)
AMMONIA PLAS-MCNC: 20 UMOL/L (ref 11–60)
AMORPH SED URNS QL MICRO: ABNORMAL
ANION GAP SERPL CALC-SCNC: 10 MEQ/L (ref 8–16)
ANION GAP SERPL CALC-SCNC: 11 MEQ/L (ref 8–16)
ANION GAP SERPL CALC-SCNC: 11 MEQ/L (ref 8–16)
ANION GAP SERPL CALC-SCNC: 5 MEQ/L (ref 8–16)
ANION GAP SERPL CALC-SCNC: 6 MEQ/L (ref 8–16)
ANION GAP SERPL CALC-SCNC: 7 MEQ/L (ref 8–16)
ANION GAP SERPL CALC-SCNC: 8 MEQ/L (ref 8–16)
ANION GAP SERPL CALC-SCNC: 9 MEQ/L (ref 8–16)
ANISOCYTOSIS BLD QL SMEAR: PRESENT
ANTIBODY SCREEN: NORMAL
APTT PPP: 109.2 SECONDS (ref 22–38)
APTT PPP: 122.1 SECONDS (ref 22–38)
APTT PPP: 124.3 SECONDS (ref 22–38)
APTT PPP: 126.9 SECONDS (ref 22–38)
APTT PPP: 32 SECONDS (ref 22–38)
APTT PPP: 33.8 SECONDS (ref 22–38)
APTT PPP: 34.4 SECONDS (ref 22–38)
APTT PPP: 44.8 SECONDS (ref 22–38)
APTT PPP: 48 SECONDS (ref 22–38)
APTT PPP: 56.6 SECONDS (ref 22–38)
APTT PPP: 89.6 SECONDS (ref 22–38)
APTT PPP: 95.5 SECONDS (ref 22–38)
APTT PPP: > 200 SECONDS (ref 22–38)
APTT PPP: > 200 SECONDS (ref 22–38)
ARTERIAL PATENCY WRIST A: ABNORMAL
AST SERPL-CCNC: 185 U/L (ref 5–40)
AST SERPL-CCNC: 234 U/L (ref 5–40)
AST SERPL-CCNC: 267 U/L (ref 5–40)
AST SERPL-CCNC: 307 U/L (ref 5–40)
AST SERPL-CCNC: 66 U/L (ref 5–40)
AST SERPL-CCNC: 94 U/L (ref 5–40)
BACTERIA SPEC AEROBE CULT: NORMAL
BACTERIA UR CULT: ABNORMAL
BACTERIA URNS QL MICRO: ABNORMAL /HPF
BACTERIA URNS QL MICRO: ABNORMAL /HPF
BASE EXCESS BLDA CALC-SCNC: -4.2 MMOL/L (ref -2.5–2.5)
BASE EXCESS BLDA CALC-SCNC: -5.5 MMOL/L (ref -2.5–2.5)
BASE EXCESS BLDA CALC-SCNC: -6.2 MMOL/L (ref -2.5–2.5)
BASE EXCESS BLDA CALC-SCNC: -9.5 MMOL/L (ref -2.5–2.5)
BASOPHILS ABSOLUTE: 0 THOU/MM3 (ref 0–0.1)
BASOPHILS ABSOLUTE: 0.1 THOU/MM3 (ref 0–0.1)
BASOPHILS NFR BLD AUTO: 0 %
BASOPHILS NFR BLD AUTO: 0 %
BASOPHILS NFR BLD AUTO: 0.1 %
BASOPHILS NFR BLD AUTO: 0.1 %
BASOPHILS NFR BLD AUTO: 0.2 %
BASOPHILS NFR BLD AUTO: 0.2 %
BASOPHILS NFR BLD AUTO: 0.6 %
BASOPHILS NFR BLD AUTO: 0.7 %
BASOPHILS NFR BLD AUTO: 0.8 %
BASOPHILS NFR BLD AUTO: 1 %
BDY SITE: ABNORMAL
BDY SITE: ABNORMAL
BILIRUB CONJ SERPL-MCNC: 1 MG/DL (ref 0–0.3)
BILIRUB CONJ SERPL-MCNC: 1.1 MG/DL (ref 0–0.3)
BILIRUB SERPL-MCNC: 1.7 MG/DL (ref 0.3–1.2)
BILIRUB SERPL-MCNC: 1.8 MG/DL (ref 0.3–1.2)
BILIRUB SERPL-MCNC: 2.5 MG/DL (ref 0.3–1.2)
BILIRUB SERPL-MCNC: 2.7 MG/DL (ref 0.3–1.2)
BILIRUB SERPL-MCNC: 2.7 MG/DL (ref 0.3–1.2)
BILIRUB SERPL-MCNC: 4.5 MG/DL (ref 0.3–1.2)
BILIRUB UR QL STRIP.AUTO: ABNORMAL
BILIRUB UR QL STRIP.AUTO: ABNORMAL
BUN SERPL-MCNC: 10 MG/DL (ref 7–22)
BUN SERPL-MCNC: 11 MG/DL (ref 7–22)
BUN SERPL-MCNC: 11 MG/DL (ref 7–22)
BUN SERPL-MCNC: 12 MG/DL (ref 7–22)
BUN SERPL-MCNC: 13 MG/DL (ref 7–22)
BUN SERPL-MCNC: 16 MG/DL (ref 7–22)
BUN SERPL-MCNC: 18 MG/DL (ref 7–22)
BUN SERPL-MCNC: 21 MG/DL (ref 7–22)
BUN SERPL-MCNC: 24 MG/DL (ref 7–22)
BUN SERPL-MCNC: 41 MG/DL (ref 7–22)
BUN SERPL-MCNC: 49 MG/DL (ref 7–22)
BUN SERPL-MCNC: 6 MG/DL (ref 7–22)
BUN SERPL-MCNC: 7 MG/DL (ref 7–22)
BUN SERPL-MCNC: 8 MG/DL (ref 7–22)
BUN SERPL-MCNC: 8 MG/DL (ref 7–22)
BUN SERPL-MCNC: 9 MG/DL (ref 7–22)
CA-I BLD ISE-SCNC: 0.95 MMOL/L (ref 1.12–1.32)
CA-I BLD ISE-SCNC: 1.05 MMOL/L (ref 1.12–1.32)
CA-I BLD ISE-SCNC: 1.06 MMOL/L (ref 1.12–1.32)
CA-I BLD ISE-SCNC: 1.07 MMOL/L (ref 1.12–1.32)
CA-I BLD ISE-SCNC: 1.08 MMOL/L (ref 1.12–1.32)
CA-I BLD ISE-SCNC: 1.09 MMOL/L (ref 1.12–1.32)
CA-I BLD ISE-SCNC: 1.09 MMOL/L (ref 1.12–1.32)
CA-I BLD ISE-SCNC: 1.12 MMOL/L (ref 1.12–1.32)
CA-I BLD ISE-SCNC: 1.15 MMOL/L (ref 1.12–1.32)
CA-I BLD ISE-SCNC: 1.15 MMOL/L (ref 1.12–1.32)
CALCIUM SERPL-MCNC: 6.8 MG/DL (ref 8.5–10.5)
CALCIUM SERPL-MCNC: 7.3 MG/DL (ref 8.5–10.5)
CALCIUM SERPL-MCNC: 7.6 MG/DL (ref 8.5–10.5)
CALCIUM SERPL-MCNC: 7.8 MG/DL (ref 8.5–10.5)
CALCIUM SERPL-MCNC: 8 MG/DL (ref 8.5–10.5)
CALCIUM SERPL-MCNC: 8.1 MG/DL (ref 8.5–10.5)
CALCIUM SERPL-MCNC: 8.1 MG/DL (ref 8.5–10.5)
CALCIUM SERPL-MCNC: 8.2 MG/DL (ref 8.5–10.5)
CALCIUM SERPL-MCNC: 8.3 MG/DL (ref 8.5–10.5)
CALCIUM SERPL-MCNC: 8.4 MG/DL (ref 8.5–10.5)
CASTS #/AREA URNS LPF: ABNORMAL /LPF
CASTS #/AREA URNS LPF: ABNORMAL /LPF
CASTS 2: ABNORMAL /LPF
CASTS 2: ABNORMAL /LPF
CHARACTER UR: ABNORMAL
CHARACTER UR: CLEAR
CHLORIDE 24H UR-SRATE: < 20 MEQ/L
CHLORIDE SERPL-SCNC: 100 MEQ/L (ref 98–111)
CHLORIDE SERPL-SCNC: 101 MEQ/L (ref 98–111)
CHLORIDE SERPL-SCNC: 102 MEQ/L (ref 98–111)
CHLORIDE SERPL-SCNC: 102 MEQ/L (ref 98–111)
CHLORIDE SERPL-SCNC: 103 MEQ/L (ref 98–111)
CHLORIDE SERPL-SCNC: 104 MEQ/L (ref 98–111)
CHLORIDE SERPL-SCNC: 105 MEQ/L (ref 98–111)
CHLORIDE SERPL-SCNC: 106 MEQ/L (ref 98–111)
CHLORIDE SERPL-SCNC: 109 MEQ/L (ref 98–111)
CHLORIDE SERPL-SCNC: 110 MEQ/L (ref 98–111)
CO2 SERPL-SCNC: 19 MEQ/L (ref 23–33)
CO2 SERPL-SCNC: 20 MEQ/L (ref 23–33)
CO2 SERPL-SCNC: 21 MEQ/L (ref 23–33)
CO2 SERPL-SCNC: 22 MEQ/L (ref 23–33)
CO2 SERPL-SCNC: 22 MEQ/L (ref 23–33)
CO2 SERPL-SCNC: 23 MEQ/L (ref 23–33)
CO2 SERPL-SCNC: 25 MEQ/L (ref 23–33)
CO2 SERPL-SCNC: 26 MEQ/L (ref 23–33)
COLLECTED BY:: ABNORMAL
COLOR: ABNORMAL
COLOR: ABNORMAL
CREAT SERPL-MCNC: 0.5 MG/DL (ref 0.4–1.2)
CREAT SERPL-MCNC: 0.6 MG/DL (ref 0.4–1.2)
CREAT SERPL-MCNC: 0.7 MG/DL (ref 0.4–1.2)
CREAT SERPL-MCNC: 0.8 MG/DL (ref 0.4–1.2)
CREAT SERPL-MCNC: 0.8 MG/DL (ref 0.4–1.2)
CREAT SERPL-MCNC: 1 MG/DL (ref 0.4–1.2)
CREAT SERPL-MCNC: 1.1 MG/DL (ref 0.4–1.2)
CREAT UR-MCNC: 174.1 MG/DL
CRYSTALS URNS MICRO: ABNORMAL
DEPRECATED RDW RBC AUTO: 61.7 FL (ref 35–45)
DEPRECATED RDW RBC AUTO: 61.9 FL (ref 35–45)
DEPRECATED RDW RBC AUTO: 62.5 FL (ref 35–45)
DEPRECATED RDW RBC AUTO: 63.3 FL (ref 35–45)
DEPRECATED RDW RBC AUTO: 63.6 FL (ref 35–45)
DEPRECATED RDW RBC AUTO: 64 FL (ref 35–45)
DEPRECATED RDW RBC AUTO: 64.2 FL (ref 35–45)
DEPRECATED RDW RBC AUTO: 64.4 FL (ref 35–45)
DEPRECATED RDW RBC AUTO: 64.5 FL (ref 35–45)
DEPRECATED RDW RBC AUTO: 64.5 FL (ref 35–45)
DEPRECATED RDW RBC AUTO: 64.6 FL (ref 35–45)
DEPRECATED RDW RBC AUTO: 64.8 FL (ref 35–45)
DEPRECATED RDW RBC AUTO: 65 FL (ref 35–45)
DEPRECATED RDW RBC AUTO: 65.2 FL (ref 35–45)
DEPRECATED RDW RBC AUTO: 65.7 FL (ref 35–45)
DEPRECATED RDW RBC AUTO: 66.1 FL (ref 35–45)
DEPRECATED RDW RBC AUTO: 66.2 FL (ref 35–45)
DEPRECATED RDW RBC AUTO: 66.4 FL (ref 35–45)
DEPRECATED RDW RBC AUTO: 66.4 FL (ref 35–45)
DEPRECATED RDW RBC AUTO: 66.8 FL (ref 35–45)
DEPRECATED RDW RBC AUTO: 67.9 FL (ref 35–45)
DEVICE: ABNORMAL
DEVICE: ABNORMAL
EOSINOPHIL NFR BLD AUTO: 0 %
EOSINOPHIL NFR BLD AUTO: 0.2 %
EOSINOPHIL NFR BLD AUTO: 2.1 %
EOSINOPHIL NFR BLD AUTO: 2.7 %
EOSINOPHIL NFR BLD AUTO: 2.9 %
EOSINOPHIL NFR BLD AUTO: 3.2 %
EOSINOPHIL NFR BLD AUTO: 3.2 %
EOSINOPHIL NFR BLD AUTO: 3.4 %
EOSINOPHILS ABSOLUTE: 0 THOU/MM3 (ref 0–0.4)
EOSINOPHILS ABSOLUTE: 0.2 THOU/MM3 (ref 0–0.4)
EPITHELIAL CELLS, UA: ABNORMAL /HPF
EPITHELIAL CELLS, UA: ABNORMAL /HPF
ERYTHROCYTE [DISTWIDTH] IN BLOOD BY AUTOMATED COUNT: 17.5 % (ref 11.5–14.5)
ERYTHROCYTE [DISTWIDTH] IN BLOOD BY AUTOMATED COUNT: 17.8 % (ref 11.5–14.5)
ERYTHROCYTE [DISTWIDTH] IN BLOOD BY AUTOMATED COUNT: 17.9 % (ref 11.5–14.5)
ERYTHROCYTE [DISTWIDTH] IN BLOOD BY AUTOMATED COUNT: 18.2 % (ref 11.5–14.5)
ERYTHROCYTE [DISTWIDTH] IN BLOOD BY AUTOMATED COUNT: 18.4 % (ref 11.5–14.5)
ERYTHROCYTE [DISTWIDTH] IN BLOOD BY AUTOMATED COUNT: 18.6 % (ref 11.5–14.5)
ERYTHROCYTE [DISTWIDTH] IN BLOOD BY AUTOMATED COUNT: 18.6 % (ref 11.5–14.5)
ERYTHROCYTE [DISTWIDTH] IN BLOOD BY AUTOMATED COUNT: 18.7 % (ref 11.5–14.5)
ERYTHROCYTE [DISTWIDTH] IN BLOOD BY AUTOMATED COUNT: 18.8 % (ref 11.5–14.5)
ERYTHROCYTE [DISTWIDTH] IN BLOOD BY AUTOMATED COUNT: 19.3 % (ref 11.5–14.5)
ERYTHROCYTE [DISTWIDTH] IN BLOOD BY AUTOMATED COUNT: 19.3 % (ref 11.5–14.5)
ERYTHROCYTE [DISTWIDTH] IN BLOOD BY AUTOMATED COUNT: 19.6 % (ref 11.5–14.5)
ERYTHROCYTE [DISTWIDTH] IN BLOOD BY AUTOMATED COUNT: 19.6 % (ref 11.5–14.5)
ERYTHROCYTE [DISTWIDTH] IN BLOOD BY AUTOMATED COUNT: 19.9 % (ref 11.5–14.5)
ERYTHROCYTE [DISTWIDTH] IN BLOOD BY AUTOMATED COUNT: 19.9 % (ref 11.5–14.5)
ERYTHROCYTE [DISTWIDTH] IN BLOOD BY AUTOMATED COUNT: 20.3 % (ref 11.5–14.5)
FERRITIN SERPL IA-MCNC: 204 NG/ML (ref 22–322)
FIO2 ON VENT O2 ANALYZER: 25 %
FOLATE SERPL-MCNC: 10.7 NG/ML (ref 4.8–24.2)
GFR SERPL CREATININE-BSD FRML MDRD: > 60 ML/MIN/1.73M2
GLUCOSE BLD STRIP.AUTO-MCNC: 157 MG/DL (ref 70–108)
GLUCOSE BLD STRIP.AUTO-MCNC: 165 MG/DL (ref 70–108)
GLUCOSE BLD-MCNC: 114 MG/DL (ref 70–108)
GLUCOSE BLD-MCNC: 129 MG/DL (ref 70–108)
GLUCOSE SERPL-MCNC: 100 MG/DL (ref 70–108)
GLUCOSE SERPL-MCNC: 109 MG/DL (ref 70–108)
GLUCOSE SERPL-MCNC: 113 MG/DL (ref 70–108)
GLUCOSE SERPL-MCNC: 121 MG/DL (ref 70–108)
GLUCOSE SERPL-MCNC: 123 MG/DL (ref 70–108)
GLUCOSE SERPL-MCNC: 133 MG/DL (ref 70–108)
GLUCOSE SERPL-MCNC: 135 MG/DL (ref 70–108)
GLUCOSE SERPL-MCNC: 136 MG/DL (ref 70–108)
GLUCOSE SERPL-MCNC: 136 MG/DL (ref 70–108)
GLUCOSE SERPL-MCNC: 142 MG/DL (ref 70–108)
GLUCOSE SERPL-MCNC: 143 MG/DL (ref 70–108)
GLUCOSE SERPL-MCNC: 148 MG/DL (ref 70–108)
GLUCOSE SERPL-MCNC: 156 MG/DL (ref 70–108)
GLUCOSE SERPL-MCNC: 177 MG/DL (ref 70–108)
GLUCOSE SERPL-MCNC: 58 MG/DL (ref 70–108)
GLUCOSE SERPL-MCNC: 78 MG/DL (ref 70–108)
GLUCOSE SERPL-MCNC: 83 MG/DL (ref 70–108)
GLUCOSE SERPL-MCNC: 86 MG/DL (ref 70–108)
GLUCOSE SERPL-MCNC: 86 MG/DL (ref 70–108)
GLUCOSE SERPL-MCNC: 98 MG/DL (ref 70–108)
GLUCOSE UR QL STRIP.AUTO: NEGATIVE MG/DL
GLUCOSE UR QL STRIP.AUTO: NEGATIVE MG/DL
HCO3 BLDA-SCNC: 17 MMOL/L (ref 23–28)
HCO3 BLDA-SCNC: 19 MMOL/L (ref 23–28)
HCO3 BLDA-SCNC: 19 MMOL/L (ref 23–28)
HCO3 BLDA-SCNC: 21 MMOL/L (ref 23–28)
HCT VFR BLD AUTO: 26.8 % (ref 42–52)
HCT VFR BLD AUTO: 27.9 % (ref 42–52)
HCT VFR BLD AUTO: 28.4 % (ref 42–52)
HCT VFR BLD AUTO: 28.5 % (ref 42–52)
HCT VFR BLD AUTO: 28.7 % (ref 42–52)
HCT VFR BLD AUTO: 28.9 % (ref 42–52)
HCT VFR BLD AUTO: 29 % (ref 42–52)
HCT VFR BLD AUTO: 31.2 % (ref 42–52)
HCT VFR BLD AUTO: 32.6 % (ref 42–52)
HCT VFR BLD AUTO: 32.9 % (ref 42–52)
HCT VFR BLD AUTO: 33.4 % (ref 42–52)
HCT VFR BLD AUTO: 33.5 % (ref 42–52)
HCT VFR BLD AUTO: 34.1 % (ref 42–52)
HCT VFR BLD AUTO: 34.3 % (ref 42–52)
HCT VFR BLD AUTO: 34.4 % (ref 42–52)
HCT VFR BLD AUTO: 34.6 % (ref 42–52)
HCT VFR BLD AUTO: 34.8 % (ref 42–52)
HCT VFR BLD AUTO: 35 % (ref 42–52)
HCT VFR BLD AUTO: 36.5 % (ref 42–52)
HCT VFR BLD AUTO: 37.4 % (ref 42–52)
HCT VFR BLD AUTO: 37.4 % (ref 42–52)
HCT VFR BLD AUTO: 39.1 % (ref 42–52)
HCT VFR BLD AUTO: 40.3 % (ref 42–52)
HEMOCCULT STL QL: NEGATIVE
HEMOGLOBIN FINGERSTICK, POC: 7.8 G/DL (ref 14–18)
HEMOGLOBIN FINGERSTICK, POC: 8.7 G/DL (ref 14–18)
HEPARIN UNFRACTIONATED: 0.16 U/ML (ref 0.3–0.7)
HEPARIN UNFRACTIONATED: < 0.04 U/ML (ref 0.3–0.7)
HGB BLD-MCNC: 10.3 GM/DL (ref 14–18)
HGB BLD-MCNC: 10.3 GM/DL (ref 14–18)
HGB BLD-MCNC: 10.7 GM/DL (ref 14–18)
HGB BLD-MCNC: 10.8 GM/DL (ref 14–18)
HGB BLD-MCNC: 10.8 GM/DL (ref 14–18)
HGB BLD-MCNC: 10.9 GM/DL (ref 14–18)
HGB BLD-MCNC: 11 GM/DL (ref 14–18)
HGB BLD-MCNC: 11.2 GM/DL (ref 14–18)
HGB BLD-MCNC: 11.3 GM/DL (ref 14–18)
HGB BLD-MCNC: 11.6 GM/DL (ref 14–18)
HGB BLD-MCNC: 11.8 GM/DL (ref 14–18)
HGB BLD-MCNC: 11.9 GM/DL (ref 14–18)
HGB BLD-MCNC: 12 GM/DL (ref 14–18)
HGB BLD-MCNC: 12.2 GM/DL (ref 14–18)
HGB BLD-MCNC: 8.9 GM/DL (ref 14–18)
HGB BLD-MCNC: 9 GM/DL (ref 14–18)
HGB BLD-MCNC: 9.1 GM/DL (ref 14–18)
HGB BLD-MCNC: 9.2 GM/DL (ref 14–18)
HGB BLD-MCNC: 9.5 GM/DL (ref 14–18)
HGB BLD-MCNC: 9.6 GM/DL (ref 14–18)
HGB BLD-MCNC: 9.7 GM/DL (ref 14–18)
HGB UR QL STRIP.AUTO: ABNORMAL
HGB UR QL STRIP.AUTO: NEGATIVE
ICTOTEST: NEGATIVE
ICTOTEST: POSITIVE
IMM GRANULOCYTES # BLD AUTO: 0.01 THOU/MM3 (ref 0–0.07)
IMM GRANULOCYTES # BLD AUTO: 0.02 THOU/MM3 (ref 0–0.07)
IMM GRANULOCYTES # BLD AUTO: 0.02 THOU/MM3 (ref 0–0.07)
IMM GRANULOCYTES # BLD AUTO: 0.03 THOU/MM3 (ref 0–0.07)
IMM GRANULOCYTES # BLD AUTO: 0.04 THOU/MM3 (ref 0–0.07)
IMM GRANULOCYTES # BLD AUTO: 0.04 THOU/MM3 (ref 0–0.07)
IMM GRANULOCYTES # BLD AUTO: 0.05 THOU/MM3 (ref 0–0.07)
IMM GRANULOCYTES # BLD AUTO: 0.07 THOU/MM3 (ref 0–0.07)
IMM GRANULOCYTES # BLD AUTO: 0.14 THOU/MM3 (ref 0–0.07)
IMM GRANULOCYTES # BLD AUTO: 0.23 THOU/MM3 (ref 0–0.07)
IMM GRANULOCYTES NFR BLD AUTO: 0.2 %
IMM GRANULOCYTES NFR BLD AUTO: 0.3 %
IMM GRANULOCYTES NFR BLD AUTO: 0.4 %
IMM GRANULOCYTES NFR BLD AUTO: 0.5 %
IMM GRANULOCYTES NFR BLD AUTO: 0.6 %
IMM GRANULOCYTES NFR BLD AUTO: 0.6 %
IMM GRANULOCYTES NFR BLD AUTO: 0.7 %
IMM GRANULOCYTES NFR BLD AUTO: 1 %
INR PPP: 1.09 (ref 0.85–1.13)
INR PPP: 1.21 (ref 0.85–1.13)
INR PPP: 1.55 (ref 0.85–1.13)
IRON SERPL-MCNC: 42 UG/DL (ref 65–195)
KETONES UR QL STRIP.AUTO: ABNORMAL
KETONES UR QL STRIP.AUTO: NEGATIVE
LACTIC ACID, SEPSIS: 2 MMOL/L (ref 0.5–1.9)
LACTIC ACID, SEPSIS: 2.1 MMOL/L (ref 0.5–1.9)
LIPASE SERPL-CCNC: 19.6 U/L (ref 5.6–51.3)
LYMPHOCYTES ABSOLUTE: 0.4 THOU/MM3 (ref 1–4.8)
LYMPHOCYTES ABSOLUTE: 0.5 THOU/MM3 (ref 1–4.8)
LYMPHOCYTES ABSOLUTE: 0.6 THOU/MM3 (ref 1–4.8)
LYMPHOCYTES ABSOLUTE: 0.7 THOU/MM3 (ref 1–4.8)
LYMPHOCYTES ABSOLUTE: 0.8 THOU/MM3 (ref 1–4.8)
LYMPHOCYTES ABSOLUTE: 0.9 THOU/MM3 (ref 1–4.8)
LYMPHOCYTES ABSOLUTE: 1 THOU/MM3 (ref 1–4.8)
LYMPHOCYTES ABSOLUTE: 1.2 THOU/MM3 (ref 1–4.8)
LYMPHOCYTES NFR BLD AUTO: 1.6 %
LYMPHOCYTES NFR BLD AUTO: 12.4 %
LYMPHOCYTES NFR BLD AUTO: 12.6 %
LYMPHOCYTES NFR BLD AUTO: 14.5 %
LYMPHOCYTES NFR BLD AUTO: 14.5 %
LYMPHOCYTES NFR BLD AUTO: 15.4 %
LYMPHOCYTES NFR BLD AUTO: 17.2 %
LYMPHOCYTES NFR BLD AUTO: 2.5 %
LYMPHOCYTES NFR BLD AUTO: 6.9 %
LYMPHOCYTES NFR BLD AUTO: 7 %
LYMPHOCYTES NFR BLD AUTO: 7.9 %
LYMPHOCYTES NFR BLD AUTO: 8.9 %
MAGNESIUM SERPL-MCNC: 1.8 MG/DL (ref 1.6–2.4)
MAGNESIUM SERPL-MCNC: 1.8 MG/DL (ref 1.6–2.4)
MAGNESIUM SERPL-MCNC: 2.1 MG/DL (ref 1.6–2.4)
MAGNESIUM SERPL-MCNC: 2.3 MG/DL (ref 1.6–2.4)
MAGNESIUM SERPL-MCNC: 2.3 MG/DL (ref 1.6–2.4)
MAGNESIUM SERPL-MCNC: 2.6 MG/DL (ref 1.6–2.4)
MCH RBC QN AUTO: 30.2 PG (ref 26–33)
MCH RBC QN AUTO: 30.3 PG (ref 26–33)
MCH RBC QN AUTO: 30.8 PG (ref 26–33)
MCH RBC QN AUTO: 30.9 PG (ref 26–33)
MCH RBC QN AUTO: 31 PG (ref 26–33)
MCH RBC QN AUTO: 31.1 PG (ref 26–33)
MCH RBC QN AUTO: 31.2 PG (ref 26–33)
MCH RBC QN AUTO: 31.3 PG (ref 26–33)
MCH RBC QN AUTO: 31.3 PG (ref 26–33)
MCH RBC QN AUTO: 31.5 PG (ref 26–33)
MCH RBC QN AUTO: 31.5 PG (ref 26–33)
MCH RBC QN AUTO: 31.6 PG (ref 26–33)
MCH RBC QN AUTO: 31.6 PG (ref 26–33)
MCH RBC QN AUTO: 31.7 PG (ref 26–33)
MCH RBC QN AUTO: 31.9 PG (ref 26–33)
MCH RBC QN AUTO: 32.1 PG (ref 26–33)
MCHC RBC AUTO-ENTMCNC: 30.4 GM/DL (ref 32.2–35.5)
MCHC RBC AUTO-ENTMCNC: 31 GM/DL (ref 32.2–35.5)
MCHC RBC AUTO-ENTMCNC: 31.4 GM/DL (ref 32.2–35.5)
MCHC RBC AUTO-ENTMCNC: 31.6 GM/DL (ref 32.2–35.5)
MCHC RBC AUTO-ENTMCNC: 31.6 GM/DL (ref 32.2–35.5)
MCHC RBC AUTO-ENTMCNC: 31.7 GM/DL (ref 32.2–35.5)
MCHC RBC AUTO-ENTMCNC: 31.9 GM/DL (ref 32.2–35.5)
MCHC RBC AUTO-ENTMCNC: 32.2 GM/DL (ref 32.2–35.5)
MCHC RBC AUTO-ENTMCNC: 32.3 GM/DL (ref 32.2–35.5)
MCHC RBC AUTO-ENTMCNC: 32.4 GM/DL (ref 32.2–35.5)
MCHC RBC AUTO-ENTMCNC: 32.4 GM/DL (ref 32.2–35.5)
MCHC RBC AUTO-ENTMCNC: 32.5 GM/DL (ref 32.2–35.5)
MCHC RBC AUTO-ENTMCNC: 32.6 GM/DL (ref 32.2–35.5)
MCHC RBC AUTO-ENTMCNC: 32.8 GM/DL (ref 32.2–35.5)
MCHC RBC AUTO-ENTMCNC: 32.9 GM/DL (ref 32.2–35.5)
MCHC RBC AUTO-ENTMCNC: 32.9 GM/DL (ref 32.2–35.5)
MCHC RBC AUTO-ENTMCNC: 33 GM/DL (ref 32.2–35.5)
MCHC RBC AUTO-ENTMCNC: 33.1 GM/DL (ref 32.2–35.5)
MCHC RBC AUTO-ENTMCNC: 33.3 GM/DL (ref 32.2–35.5)
MCHC RBC AUTO-ENTMCNC: 33.6 GM/DL (ref 32.2–35.5)
MCHC RBC AUTO-ENTMCNC: 34 GM/DL (ref 32.2–35.5)
MCV RBC AUTO: 100 FL (ref 80–94)
MCV RBC AUTO: 102.6 FL (ref 80–94)
MCV RBC AUTO: 91.8 FL (ref 80–94)
MCV RBC AUTO: 92.8 FL (ref 80–94)
MCV RBC AUTO: 93.1 FL (ref 80–94)
MCV RBC AUTO: 93.2 FL (ref 80–94)
MCV RBC AUTO: 93.2 FL (ref 80–94)
MCV RBC AUTO: 96.3 FL (ref 80–94)
MCV RBC AUTO: 96.3 FL (ref 80–94)
MCV RBC AUTO: 96.6 FL (ref 80–94)
MCV RBC AUTO: 96.6 FL (ref 80–94)
MCV RBC AUTO: 97.1 FL (ref 80–94)
MCV RBC AUTO: 97.2 FL (ref 80–94)
MCV RBC AUTO: 97.2 FL (ref 80–94)
MCV RBC AUTO: 97.3 FL (ref 80–94)
MCV RBC AUTO: 97.4 FL (ref 80–94)
MCV RBC AUTO: 97.5 FL (ref 80–94)
MCV RBC AUTO: 97.6 FL (ref 80–94)
MCV RBC AUTO: 98 FL (ref 80–94)
MCV RBC AUTO: 98.8 FL (ref 80–94)
MCV RBC AUTO: 99.7 FL (ref 80–94)
MISCELLANEOUS 2: ABNORMAL
MISCELLANEOUS 2: ABNORMAL
MISCELLANEOUS LAB TEST RESULT: ABNORMAL
MONOCYTES ABSOLUTE: 0.7 THOU/MM3 (ref 0.4–1.3)
MONOCYTES ABSOLUTE: 0.9 THOU/MM3 (ref 0.4–1.3)
MONOCYTES ABSOLUTE: 1 THOU/MM3 (ref 0.4–1.3)
MONOCYTES ABSOLUTE: 1.3 THOU/MM3 (ref 0.4–1.3)
MONOCYTES ABSOLUTE: 1.3 THOU/MM3 (ref 0.4–1.3)
MONOCYTES ABSOLUTE: 1.6 THOU/MM3 (ref 0.4–1.3)
MONOCYTES ABSOLUTE: 1.8 THOU/MM3 (ref 0.4–1.3)
MONOCYTES ABSOLUTE: 2 THOU/MM3 (ref 0.4–1.3)
MONOCYTES NFR BLD AUTO: 12.4 %
MONOCYTES NFR BLD AUTO: 13.6 %
MONOCYTES NFR BLD AUTO: 13.6 %
MONOCYTES NFR BLD AUTO: 13.8 %
MONOCYTES NFR BLD AUTO: 14.1 %
MONOCYTES NFR BLD AUTO: 14.2 %
MONOCYTES NFR BLD AUTO: 15.6 %
MONOCYTES NFR BLD AUTO: 16.4 %
MONOCYTES NFR BLD AUTO: 17.1 %
MONOCYTES NFR BLD AUTO: 6.6 %
MONOCYTES NFR BLD AUTO: 6.6 %
MONOCYTES NFR BLD AUTO: 7.9 %
MRSA DNA SPEC QL NAA+PROBE: NEGATIVE
MUCOUS THREADS URNS QL MICRO: ABNORMAL
NEUTROPHILS NFR BLD AUTO: 65.7 %
NEUTROPHILS NFR BLD AUTO: 66.6 %
NEUTROPHILS NFR BLD AUTO: 66.8 %
NEUTROPHILS NFR BLD AUTO: 66.9 %
NEUTROPHILS NFR BLD AUTO: 69.4 %
NEUTROPHILS NFR BLD AUTO: 69.8 %
NEUTROPHILS NFR BLD AUTO: 74.5 %
NEUTROPHILS NFR BLD AUTO: 76.3 %
NEUTROPHILS NFR BLD AUTO: 79.1 %
NEUTROPHILS NFR BLD AUTO: 83.8 %
NEUTROPHILS NFR BLD AUTO: 89.3 %
NEUTROPHILS NFR BLD AUTO: 89.8 %
NITRITE UR QL STRIP: NEGATIVE
NITRITE UR QL STRIP: NEGATIVE
NRBC BLD AUTO-RTO: 0 /100 WBC
ORGANISM: ABNORMAL
OSMOLALITY SERPL CALC.SUM OF ELEC: 264.8 MOSMOL/KG (ref 275–300)
OSMOLALITY SERPL: 293 MOSMOL/KG (ref 275–295)
OSMOLALITY UR: 514 MOSMOL/KG (ref 250–750)
PCO2 BLDA: 33 MMHG (ref 35–45)
PCO2 BLDA: 36 MMHG (ref 35–45)
PCO2 BLDA: 37 MMHG (ref 35–45)
PCO2 BLDA: 41 MMHG (ref 35–45)
PEEP SETTING VENT: 6 MMHG
PH BLDA: 7.26 [PH] (ref 7.35–7.45)
PH BLDA: 7.33 [PH] (ref 7.35–7.45)
PH BLDA: 7.34 [PH] (ref 7.35–7.45)
PH BLDA: 7.37 [PH] (ref 7.35–7.45)
PH UR STRIP.AUTO: 5.5 [PH] (ref 5–9)
PH UR STRIP.AUTO: 6 [PH] (ref 5–9)
PHOSPHATE SERPL-MCNC: 3.6 MG/DL (ref 2.4–4.7)
PLATELET # BLD AUTO: 101 THOU/MM3 (ref 130–400)
PLATELET # BLD AUTO: 116 THOU/MM3 (ref 130–400)
PLATELET # BLD AUTO: 120 THOU/MM3 (ref 130–400)
PLATELET # BLD AUTO: 137 THOU/MM3 (ref 130–400)
PLATELET # BLD AUTO: 144 THOU/MM3 (ref 130–400)
PLATELET # BLD AUTO: 145 THOU/MM3 (ref 130–400)
PLATELET # BLD AUTO: 146 THOU/MM3 (ref 130–400)
PLATELET # BLD AUTO: 146 THOU/MM3 (ref 130–400)
PLATELET # BLD AUTO: 149 THOU/MM3 (ref 130–400)
PLATELET # BLD AUTO: 152 THOU/MM3 (ref 130–400)
PLATELET # BLD AUTO: 155 THOU/MM3 (ref 130–400)
PLATELET # BLD AUTO: 156 THOU/MM3 (ref 130–400)
PLATELET # BLD AUTO: 160 THOU/MM3 (ref 130–400)
PLATELET # BLD AUTO: 167 THOU/MM3 (ref 130–400)
PLATELET # BLD AUTO: 60 THOU/MM3 (ref 130–400)
PLATELET # BLD AUTO: 81 THOU/MM3 (ref 130–400)
PLATELET # BLD AUTO: 87 THOU/MM3 (ref 130–400)
PLATELET # BLD AUTO: 90 THOU/MM3 (ref 130–400)
PLATELET # BLD AUTO: 98 THOU/MM3 (ref 130–400)
PLATELET BLD QL SMEAR: ABNORMAL
PMV BLD AUTO: 10.1 FL (ref 9.4–12.4)
PMV BLD AUTO: 10.4 FL (ref 9.4–12.4)
PMV BLD AUTO: 10.5 FL (ref 9.4–12.4)
PMV BLD AUTO: 10.6 FL (ref 9.4–12.4)
PMV BLD AUTO: 10.7 FL (ref 9.4–12.4)
PMV BLD AUTO: 10.8 FL (ref 9.4–12.4)
PMV BLD AUTO: 10.9 FL (ref 9.4–12.4)
PMV BLD AUTO: 11 FL (ref 9.4–12.4)
PMV BLD AUTO: 11.2 FL (ref 9.4–12.4)
PMV BLD AUTO: 11.3 FL (ref 9.4–12.4)
PMV BLD AUTO: 11.4 FL (ref 9.4–12.4)
PO2 BLDA: 106 MMHG (ref 71–104)
PO2 BLDA: 303 MMHG (ref 71–104)
PO2 BLDA: 309 MMHG (ref 71–104)
PO2 BLDA: 480 MMHG (ref 71–104)
POTASSIUM BLD-SCNC: 4.5 MEQ/L (ref 3.5–4.9)
POTASSIUM BLD-SCNC: 4.7 MEQ/L (ref 3.5–4.9)
POTASSIUM SERPL-SCNC: 3.7 MEQ/L (ref 3.5–5.2)
POTASSIUM SERPL-SCNC: 3.8 MEQ/L (ref 3.5–5.2)
POTASSIUM SERPL-SCNC: 3.8 MEQ/L (ref 3.5–5.2)
POTASSIUM SERPL-SCNC: 3.9 MEQ/L (ref 3.5–5.2)
POTASSIUM SERPL-SCNC: 4 MEQ/L (ref 3.5–5.2)
POTASSIUM SERPL-SCNC: 4.1 MEQ/L (ref 3.5–5.2)
POTASSIUM SERPL-SCNC: 4.3 MEQ/L (ref 3.5–5.2)
POTASSIUM SERPL-SCNC: 4.4 MEQ/L (ref 3.5–5.2)
POTASSIUM SERPL-SCNC: 4.7 MEQ/L (ref 3.5–5.2)
POTASSIUM SERPL-SCNC: 4.8 MEQ/L (ref 3.5–5.2)
POTASSIUM SERPL-SCNC: 5.1 MEQ/L (ref 3.5–5.2)
POTASSIUM SERPL-SCNC: 5.2 MEQ/L (ref 3.5–5.2)
POTASSIUM UR-SCNC: 53.4 MEQ/L
PRESSURE SUPPORT SETTING VENT: 8 CMH2O
PROT SERPL-MCNC: 5.2 G/DL (ref 6.1–8)
PROT SERPL-MCNC: 5.3 G/DL (ref 6.1–8)
PROT SERPL-MCNC: 5.6 G/DL (ref 6.1–8)
PROT SERPL-MCNC: 6 G/DL (ref 6.1–8)
PROT SERPL-MCNC: 6.5 G/DL (ref 6.1–8)
PROT SERPL-MCNC: 6.5 G/DL (ref 6.1–8)
PROT UR STRIP.AUTO-MCNC: 30 MG/DL
PROT UR STRIP.AUTO-MCNC: NEGATIVE MG/DL
RBC # BLD AUTO: 2.86 MILL/MM3 (ref 4.7–6.1)
RBC # BLD AUTO: 2.88 MILL/MM3 (ref 4.7–6.1)
RBC # BLD AUTO: 2.94 MILL/MM3 (ref 4.7–6.1)
RBC # BLD AUTO: 2.98 MILL/MM3 (ref 4.7–6.1)
RBC # BLD AUTO: 3.07 MILL/MM3 (ref 4.7–6.1)
RBC # BLD AUTO: 3.1 MILL/MM3 (ref 4.7–6.1)
RBC # BLD AUTO: 3.11 MILL/MM3 (ref 4.7–6.1)
RBC # BLD AUTO: 3.38 MILL/MM3 (ref 4.7–6.1)
RBC # BLD AUTO: 3.39 MILL/MM3 (ref 4.7–6.1)
RBC # BLD AUTO: 3.4 MILL/MM3 (ref 4.7–6.1)
RBC # BLD AUTO: 3.44 MILL/MM3 (ref 4.7–6.1)
RBC # BLD AUTO: 3.51 MILL/MM3 (ref 4.7–6.1)
RBC # BLD AUTO: 3.52 MILL/MM3 (ref 4.7–6.1)
RBC # BLD AUTO: 3.55 MILL/MM3 (ref 4.7–6.1)
RBC # BLD AUTO: 3.56 MILL/MM3 (ref 4.7–6.1)
RBC # BLD AUTO: 3.56 MILL/MM3 (ref 4.7–6.1)
RBC # BLD AUTO: 3.59 MILL/MM3 (ref 4.7–6.1)
RBC # BLD AUTO: 3.74 MILL/MM3 (ref 4.7–6.1)
RBC # BLD AUTO: 3.75 MILL/MM3 (ref 4.7–6.1)
RBC # BLD AUTO: 3.79 MILL/MM3 (ref 4.7–6.1)
RBC # BLD AUTO: 3.81 MILL/MM3 (ref 4.7–6.1)
RBC URINE: ABNORMAL /HPF
RBC URINE: ABNORMAL /HPF
REASON FOR REJECTION: NORMAL
REJECTED TEST: NORMAL
RENAL EPI CELLS #/AREA URNS HPF: ABNORMAL /[HPF]
RENAL EPI CELLS #/AREA URNS HPF: ABNORMAL /[HPF]
RH FACTOR: NORMAL
SAO2 % BLDA: 100 %
SAO2 % BLDA: 98 %
SCAN OF BLOOD SMEAR: NORMAL
SEGMENTED NEUTROPHILS ABSOLUTE COUNT: 17.5 THOU/MM3 (ref 1.8–7.7)
SEGMENTED NEUTROPHILS ABSOLUTE COUNT: 20.9 THOU/MM3 (ref 1.8–7.7)
SEGMENTED NEUTROPHILS ABSOLUTE COUNT: 4.1 THOU/MM3 (ref 1.8–7.7)
SEGMENTED NEUTROPHILS ABSOLUTE COUNT: 4.1 THOU/MM3 (ref 1.8–7.7)
SEGMENTED NEUTROPHILS ABSOLUTE COUNT: 4.5 THOU/MM3 (ref 1.8–7.7)
SEGMENTED NEUTROPHILS ABSOLUTE COUNT: 4.7 THOU/MM3 (ref 1.8–7.7)
SEGMENTED NEUTROPHILS ABSOLUTE COUNT: 4.8 THOU/MM3 (ref 1.8–7.7)
SEGMENTED NEUTROPHILS ABSOLUTE COUNT: 5.2 THOU/MM3 (ref 1.8–7.7)
SEGMENTED NEUTROPHILS ABSOLUTE COUNT: 7.3 THOU/MM3 (ref 1.8–7.7)
SEGMENTED NEUTROPHILS ABSOLUTE COUNT: 7.3 THOU/MM3 (ref 1.8–7.7)
SEGMENTED NEUTROPHILS ABSOLUTE COUNT: 8.7 THOU/MM3 (ref 1.8–7.7)
SEGMENTED NEUTROPHILS ABSOLUTE COUNT: 9.2 THOU/MM3 (ref 1.8–7.7)
SODIUM BLD-SCNC: 137 MEQ/L (ref 138–146)
SODIUM BLD-SCNC: 138 MEQ/L (ref 138–146)
SODIUM SERPL-SCNC: 131 MEQ/L (ref 135–145)
SODIUM SERPL-SCNC: 131 MEQ/L (ref 135–145)
SODIUM SERPL-SCNC: 132 MEQ/L (ref 135–145)
SODIUM SERPL-SCNC: 132 MEQ/L (ref 135–145)
SODIUM SERPL-SCNC: 133 MEQ/L (ref 135–145)
SODIUM SERPL-SCNC: 134 MEQ/L (ref 135–145)
SODIUM SERPL-SCNC: 135 MEQ/L (ref 135–145)
SODIUM SERPL-SCNC: 136 MEQ/L (ref 135–145)
SODIUM SERPL-SCNC: 136 MEQ/L (ref 135–145)
SODIUM SERPL-SCNC: 137 MEQ/L (ref 135–145)
SODIUM SERPL-SCNC: 137 MEQ/L (ref 135–145)
SODIUM SERPL-SCNC: 138 MEQ/L (ref 135–145)
SODIUM SERPL-SCNC: 138 MEQ/L (ref 135–145)
SODIUM SERPL-SCNC: 139 MEQ/L (ref 135–145)
SODIUM SERPL-SCNC: 139 MEQ/L (ref 135–145)
SODIUM UR-SCNC: < 20 MEQ/L
SP GR UR REFRACT.AUTO: 1.02 (ref 1–1.03)
SP GR UR REFRACT.AUTO: > 1.03 (ref 1–1.03)
TIBC SERPL-MCNC: 193 UG/DL (ref 171–450)
URATE 24H UR-MCNC: 41.4 MG/DL
UROBILINOGEN, URINE: 0.2 EU/DL (ref 0–1)
UROBILINOGEN, URINE: 1 EU/DL (ref 0–1)
UUN 24H UR-MCNC: 837 MG/DL
VANA ISLT/SPM QL: NEGATIVE
VENTILATION MODE VENT: ABNORMAL
VIT B12 SERPL-MCNC: 1309 PG/ML (ref 211–911)
WBC # BLD AUTO: 11 THOU/MM3 (ref 4.8–10.8)
WBC # BLD AUTO: 11.3 THOU/MM3 (ref 4.8–10.8)
WBC # BLD AUTO: 11.7 THOU/MM3 (ref 4.8–10.8)
WBC # BLD AUTO: 12.2 THOU/MM3 (ref 4.8–10.8)
WBC # BLD AUTO: 19.5 THOU/MM3 (ref 4.8–10.8)
WBC # BLD AUTO: 23.4 THOU/MM3 (ref 4.8–10.8)
WBC # BLD AUTO: 5.2 THOU/MM3 (ref 4.8–10.8)
WBC # BLD AUTO: 5.5 THOU/MM3 (ref 4.8–10.8)
WBC # BLD AUTO: 5.7 THOU/MM3 (ref 4.8–10.8)
WBC # BLD AUTO: 5.7 THOU/MM3 (ref 4.8–10.8)
WBC # BLD AUTO: 5.8 THOU/MM3 (ref 4.8–10.8)
WBC # BLD AUTO: 6.1 THOU/MM3 (ref 4.8–10.8)
WBC # BLD AUTO: 6.2 THOU/MM3 (ref 4.8–10.8)
WBC # BLD AUTO: 6.2 THOU/MM3 (ref 4.8–10.8)
WBC # BLD AUTO: 6.4 THOU/MM3 (ref 4.8–10.8)
WBC # BLD AUTO: 6.5 THOU/MM3 (ref 4.8–10.8)
WBC # BLD AUTO: 7.1 THOU/MM3 (ref 4.8–10.8)
WBC # BLD AUTO: 7.2 THOU/MM3 (ref 4.8–10.8)
WBC # BLD AUTO: 7.5 THOU/MM3 (ref 4.8–10.8)
WBC # BLD AUTO: 9.2 THOU/MM3 (ref 4.8–10.8)
WBC # BLD AUTO: 9.6 THOU/MM3 (ref 4.8–10.8)
WBC #/AREA URNS HPF: ABNORMAL /HPF
WBC #/AREA URNS HPF: ABNORMAL /HPF
WBC #/AREA URNS HPF: ABNORMAL /[HPF]
WBC #/AREA URNS HPF: NEGATIVE /[HPF]
YEAST LIKE FUNGI URNS QL MICRO: ABNORMAL
YEAST LIKE FUNGI URNS QL MICRO: ABNORMAL

## 2023-01-01 PROCEDURE — 6370000000 HC RX 637 (ALT 250 FOR IP): Performed by: PHYSICIAN ASSISTANT

## 2023-01-01 PROCEDURE — 97530 THERAPEUTIC ACTIVITIES: CPT

## 2023-01-01 PROCEDURE — 6370000000 HC RX 637 (ALT 250 FOR IP): Performed by: STUDENT IN AN ORGANIZED HEALTH CARE EDUCATION/TRAINING PROGRAM

## 2023-01-01 PROCEDURE — 85027 COMPLETE CBC AUTOMATED: CPT

## 2023-01-01 PROCEDURE — 88342 IMHCHEM/IMCYTCHM 1ST ANTB: CPT

## 2023-01-01 PROCEDURE — 85610 PROTHROMBIN TIME: CPT

## 2023-01-01 PROCEDURE — 6360000002 HC RX W HCPCS

## 2023-01-01 PROCEDURE — 2580000003 HC RX 258: Performed by: STUDENT IN AN ORGANIZED HEALTH CARE EDUCATION/TRAINING PROGRAM

## 2023-01-01 PROCEDURE — 83690 ASSAY OF LIPASE: CPT

## 2023-01-01 PROCEDURE — 6360000002 HC RX W HCPCS: Performed by: STUDENT IN AN ORGANIZED HEALTH CARE EDUCATION/TRAINING PROGRAM

## 2023-01-01 PROCEDURE — 97116 GAIT TRAINING THERAPY: CPT

## 2023-01-01 PROCEDURE — 2580000003 HC RX 258: Performed by: INTERNAL MEDICINE

## 2023-01-01 PROCEDURE — 6370000000 HC RX 637 (ALT 250 FOR IP): Performed by: NURSE PRACTITIONER

## 2023-01-01 PROCEDURE — 6360000004 HC RX CONTRAST MEDICATION: Performed by: PHYSICIAN ASSISTANT

## 2023-01-01 PROCEDURE — 82330 ASSAY OF CALCIUM: CPT

## 2023-01-01 PROCEDURE — 88305 TISSUE EXAM BY PATHOLOGIST: CPT

## 2023-01-01 PROCEDURE — 6370000000 HC RX 637 (ALT 250 FOR IP)

## 2023-01-01 PROCEDURE — 51798 US URINE CAPACITY MEASURE: CPT

## 2023-01-01 PROCEDURE — 84295 ASSAY OF SERUM SODIUM: CPT

## 2023-01-01 PROCEDURE — 3430000000 HC RX DIAGNOSTIC RADIOPHARMACEUTICAL: Performed by: NURSE PRACTITIONER

## 2023-01-01 PROCEDURE — 94760 N-INVAS EAR/PLS OXIMETRY 1: CPT

## 2023-01-01 PROCEDURE — 85730 THROMBOPLASTIN TIME PARTIAL: CPT

## 2023-01-01 PROCEDURE — 82728 ASSAY OF FERRITIN: CPT

## 2023-01-01 PROCEDURE — 83735 ASSAY OF MAGNESIUM: CPT

## 2023-01-01 PROCEDURE — 74177 CT ABD & PELVIS W/CONTRAST: CPT

## 2023-01-01 PROCEDURE — APPSS30 APP SPLIT SHARED TIME 16-30 MINUTES: Performed by: PHYSICIAN ASSISTANT

## 2023-01-01 PROCEDURE — 83550 IRON BINDING TEST: CPT

## 2023-01-01 PROCEDURE — 1200000000 HC SEMI PRIVATE

## 2023-01-01 PROCEDURE — 6370000000 HC RX 637 (ALT 250 FOR IP): Performed by: NEUROLOGICAL SURGERY

## 2023-01-01 PROCEDURE — 2580000003 HC RX 258: Performed by: PHYSICIAN ASSISTANT

## 2023-01-01 PROCEDURE — 0P543ZZ DESTRUCTION OF THORACIC VERTEBRA, PERCUTANEOUS APPROACH: ICD-10-PCS | Performed by: NEUROLOGICAL SURGERY

## 2023-01-01 PROCEDURE — A9579 GAD-BASE MR CONTRAST NOS,1ML: HCPCS | Performed by: PHYSICIAN ASSISTANT

## 2023-01-01 PROCEDURE — 85025 COMPLETE CBC W/AUTO DIFF WBC: CPT

## 2023-01-01 PROCEDURE — 84540 ASSAY OF URINE/UREA-N: CPT

## 2023-01-01 PROCEDURE — 63276 BX/EXC XDRL SPINE LESN THRC: CPT | Performed by: NEUROLOGICAL SURGERY

## 2023-01-01 PROCEDURE — 80048 BASIC METABOLIC PNL TOTAL CA: CPT

## 2023-01-01 PROCEDURE — 72197 MRI PELVIS W/O & W/DYE: CPT

## 2023-01-01 PROCEDURE — 82607 VITAMIN B-12: CPT

## 2023-01-01 PROCEDURE — 2100000000 HC CCU R&B

## 2023-01-01 PROCEDURE — 6370000000 HC RX 637 (ALT 250 FOR IP): Performed by: INTERNAL MEDICINE

## 2023-01-01 PROCEDURE — 81001 URINALYSIS AUTO W/SCOPE: CPT

## 2023-01-01 PROCEDURE — 7100000000 HC PACU RECOVERY - FIRST 15 MIN: Performed by: NEUROLOGICAL SURGERY

## 2023-01-01 PROCEDURE — 99232 SBSQ HOSP IP/OBS MODERATE 35: CPT | Performed by: INTERNAL MEDICINE

## 2023-01-01 PROCEDURE — 2060000000 HC ICU INTERMEDIATE R&B

## 2023-01-01 PROCEDURE — 86901 BLOOD TYPING SEROLOGIC RH(D): CPT

## 2023-01-01 PROCEDURE — 99232 SBSQ HOSP IP/OBS MODERATE 35: CPT | Performed by: NURSE PRACTITIONER

## 2023-01-01 PROCEDURE — 99233 SBSQ HOSP IP/OBS HIGH 50: CPT | Performed by: INTERNAL MEDICINE

## 2023-01-01 PROCEDURE — P9047 ALBUMIN (HUMAN), 25%, 50ML: HCPCS | Performed by: STUDENT IN AN ORGANIZED HEALTH CARE EDUCATION/TRAINING PROGRAM

## 2023-01-01 PROCEDURE — 22610 ARTHRD PST TQ 1NTRSPC THRC: CPT | Performed by: NEUROLOGICAL SURGERY

## 2023-01-01 PROCEDURE — 87641 MR-STAPH DNA AMP PROBE: CPT

## 2023-01-01 PROCEDURE — 99233 SBSQ HOSP IP/OBS HIGH 50: CPT | Performed by: PHYSICIAN ASSISTANT

## 2023-01-01 PROCEDURE — 80053 COMPREHEN METABOLIC PANEL: CPT

## 2023-01-01 PROCEDURE — 1200000003 HC TELEMETRY R&B

## 2023-01-01 PROCEDURE — 37799 UNLISTED PX VASCULAR SURGERY: CPT

## 2023-01-01 PROCEDURE — 92523 SPEECH SOUND LANG COMPREHEN: CPT

## 2023-01-01 PROCEDURE — 01N80ZZ RELEASE THORACIC NERVE, OPEN APPROACH: ICD-10-PCS | Performed by: NEUROLOGICAL SURGERY

## 2023-01-01 PROCEDURE — 99024 POSTOP FOLLOW-UP VISIT: CPT | Performed by: NEUROLOGICAL SURGERY

## 2023-01-01 PROCEDURE — 2709999900 HC NON-CHARGEABLE SUPPLY: Performed by: NEUROLOGICAL SURGERY

## 2023-01-01 PROCEDURE — 84133 ASSAY OF URINE POTASSIUM: CPT

## 2023-01-01 PROCEDURE — 71260 CT THORAX DX C+: CPT

## 2023-01-01 PROCEDURE — 0W9G3ZZ DRAINAGE OF PERITONEAL CAVITY, PERCUTANEOUS APPROACH: ICD-10-PCS | Performed by: RADIOLOGY

## 2023-01-01 PROCEDURE — 36415 COLL VENOUS BLD VENIPUNCTURE: CPT

## 2023-01-01 PROCEDURE — C9113 INJ PANTOPRAZOLE SODIUM, VIA: HCPCS | Performed by: STUDENT IN AN ORGANIZED HEALTH CARE EDUCATION/TRAINING PROGRAM

## 2023-01-01 PROCEDURE — APPSS60 APP SPLIT SHARED TIME 46-60 MINUTES: Performed by: PHYSICIAN ASSISTANT

## 2023-01-01 PROCEDURE — 84560 ASSAY OF URINE/URIC ACID: CPT

## 2023-01-01 PROCEDURE — 99222 1ST HOSP IP/OBS MODERATE 55: CPT | Performed by: PHYSICAL MEDICINE & REHABILITATION

## 2023-01-01 PROCEDURE — 82040 ASSAY OF SERUM ALBUMIN: CPT

## 2023-01-01 PROCEDURE — 22614 ARTHRD PST TQ 1NTRSPC EA ADD: CPT | Performed by: PHYSICIAN ASSISTANT

## 2023-01-01 PROCEDURE — 94002 VENT MGMT INPAT INIT DAY: CPT

## 2023-01-01 PROCEDURE — 97110 THERAPEUTIC EXERCISES: CPT

## 2023-01-01 PROCEDURE — C1763 CONN TISS, NON-HUMAN: HCPCS | Performed by: NEUROLOGICAL SURGERY

## 2023-01-01 PROCEDURE — 83605 ASSAY OF LACTIC ACID: CPT

## 2023-01-01 PROCEDURE — 82570 ASSAY OF URINE CREATININE: CPT

## 2023-01-01 PROCEDURE — P9045 ALBUMIN (HUMAN), 5%, 250 ML: HCPCS

## 2023-01-01 PROCEDURE — 82803 BLOOD GASES ANY COMBINATION: CPT

## 2023-01-01 PROCEDURE — 71045 X-RAY EXAM CHEST 1 VIEW: CPT

## 2023-01-01 PROCEDURE — 97164 PT RE-EVAL EST PLAN CARE: CPT

## 2023-01-01 PROCEDURE — 86922 COMPATIBILITY TEST ANTIGLOB: CPT

## 2023-01-01 PROCEDURE — 72141 MRI NECK SPINE W/O DYE: CPT

## 2023-01-01 PROCEDURE — 22614 ARTHRD PST TQ 1NTRSPC EA ADD: CPT | Performed by: NEUROLOGICAL SURGERY

## 2023-01-01 PROCEDURE — 82248 BILIRUBIN DIRECT: CPT

## 2023-01-01 PROCEDURE — A9503 TC99M MEDRONATE: HCPCS | Performed by: NURSE PRACTITIONER

## 2023-01-01 PROCEDURE — 99291 CRITICAL CARE FIRST HOUR: CPT | Performed by: INTERNAL MEDICINE

## 2023-01-01 PROCEDURE — C1713 ANCHOR/SCREW BN/BN,TIS/BN: HCPCS | Performed by: NEUROLOGICAL SURGERY

## 2023-01-01 PROCEDURE — 80076 HEPATIC FUNCTION PANEL: CPT

## 2023-01-01 PROCEDURE — 82436 ASSAY OF URINE CHLORIDE: CPT

## 2023-01-01 PROCEDURE — 86900 BLOOD TYPING SEROLOGIC ABO: CPT

## 2023-01-01 PROCEDURE — 0PS43ZZ REPOSITION THORACIC VERTEBRA, PERCUTANEOUS APPROACH: ICD-10-PCS | Performed by: NEUROLOGICAL SURGERY

## 2023-01-01 PROCEDURE — 49083 ABD PARACENTESIS W/IMAGING: CPT

## 2023-01-01 PROCEDURE — 97168 OT RE-EVAL EST PLAN CARE: CPT

## 2023-01-01 PROCEDURE — 87070 CULTURE OTHR SPECIMN AEROBIC: CPT

## 2023-01-01 PROCEDURE — 22842 INSERT SPINE FIXATION DEVICE: CPT | Performed by: NEUROLOGICAL SURGERY

## 2023-01-01 PROCEDURE — 82947 ASSAY GLUCOSE BLOOD QUANT: CPT

## 2023-01-01 PROCEDURE — 97129 THER IVNTJ 1ST 15 MIN: CPT

## 2023-01-01 PROCEDURE — 99223 1ST HOSP IP/OBS HIGH 75: CPT | Performed by: STUDENT IN AN ORGANIZED HEALTH CARE EDUCATION/TRAINING PROGRAM

## 2023-01-01 PROCEDURE — 70450 CT HEAD/BRAIN W/O DYE: CPT

## 2023-01-01 PROCEDURE — C1894 INTRO/SHEATH, NON-LASER: HCPCS | Performed by: NEUROLOGICAL SURGERY

## 2023-01-01 PROCEDURE — 99223 1ST HOSP IP/OBS HIGH 75: CPT | Performed by: PHYSICIAN ASSISTANT

## 2023-01-01 PROCEDURE — 70553 MRI BRAIN STEM W/O & W/DYE: CPT

## 2023-01-01 PROCEDURE — 99233 SBSQ HOSP IP/OBS HIGH 50: CPT | Performed by: NEUROLOGICAL SURGERY

## 2023-01-01 PROCEDURE — P9016 RBC LEUKOCYTES REDUCED: HCPCS

## 2023-01-01 PROCEDURE — 22610 ARTHRD PST TQ 1NTRSPC THRC: CPT | Performed by: PHYSICIAN ASSISTANT

## 2023-01-01 PROCEDURE — 97535 SELF CARE MNGMENT TRAINING: CPT

## 2023-01-01 PROCEDURE — 3700000001 HC ADD 15 MINUTES (ANESTHESIA): Performed by: NEUROLOGICAL SURGERY

## 2023-01-01 PROCEDURE — 6360000002 HC RX W HCPCS: Performed by: PHYSICIAN ASSISTANT

## 2023-01-01 PROCEDURE — 0PU43JZ SUPPLEMENT THORACIC VERTEBRA WITH SYNTHETIC SUBSTITUTE, PERCUTANEOUS APPROACH: ICD-10-PCS | Performed by: NEUROLOGICAL SURGERY

## 2023-01-01 PROCEDURE — 72146 MRI CHEST SPINE W/O DYE: CPT

## 2023-01-01 PROCEDURE — 2700000000 HC OXYGEN THERAPY PER DAY

## 2023-01-01 PROCEDURE — 92610 EVALUATE SWALLOWING FUNCTION: CPT

## 2023-01-01 PROCEDURE — 63276 BX/EXC XDRL SPINE LESN THRC: CPT | Performed by: PHYSICIAN ASSISTANT

## 2023-01-01 PROCEDURE — 97166 OT EVAL MOD COMPLEX 45 MIN: CPT

## 2023-01-01 PROCEDURE — 61783 SCAN PROC SPINAL: CPT | Performed by: NEUROLOGICAL SURGERY

## 2023-01-01 PROCEDURE — 85018 HEMOGLOBIN: CPT

## 2023-01-01 PROCEDURE — 82746 ASSAY OF FOLIC ACID SERUM: CPT

## 2023-01-01 PROCEDURE — 94761 N-INVAS EAR/PLS OXIMETRY MLT: CPT

## 2023-01-01 PROCEDURE — 88331 PATH CONSLTJ SURG 1 BLK 1SPC: CPT

## 2023-01-01 PROCEDURE — 97161 PT EVAL LOW COMPLEX 20 MIN: CPT

## 2023-01-01 PROCEDURE — 2500000003 HC RX 250 WO HCPCS: Performed by: ANESTHESIOLOGY

## 2023-01-01 PROCEDURE — 82948 REAGENT STRIP/BLOOD GLUCOSE: CPT

## 2023-01-01 PROCEDURE — 72158 MRI LUMBAR SPINE W/O & W/DYE: CPT

## 2023-01-01 PROCEDURE — 85520 HEPARIN ASSAY: CPT

## 2023-01-01 PROCEDURE — 3600000014 HC SURGERY LEVEL 4 ADDTL 15MIN: Performed by: NEUROLOGICAL SURGERY

## 2023-01-01 PROCEDURE — 1230000000 HC PEDS SEMI PRIVATE R&B

## 2023-01-01 PROCEDURE — 83930 ASSAY OF BLOOD OSMOLALITY: CPT

## 2023-01-01 PROCEDURE — 99223 1ST HOSP IP/OBS HIGH 75: CPT | Performed by: NURSE PRACTITIONER

## 2023-01-01 PROCEDURE — 99223 1ST HOSP IP/OBS HIGH 75: CPT | Performed by: NEUROLOGICAL SURGERY

## 2023-01-01 PROCEDURE — 00BX0ZZ EXCISION OF THORACIC SPINAL CORD, OPEN APPROACH: ICD-10-PCS | Performed by: NEUROLOGICAL SURGERY

## 2023-01-01 PROCEDURE — 8E0WXBZ COMPUTER ASSISTED PROCEDURE OF TRUNK REGION: ICD-10-PCS | Performed by: NEUROLOGICAL SURGERY

## 2023-01-01 PROCEDURE — 83935 ASSAY OF URINE OSMOLALITY: CPT

## 2023-01-01 PROCEDURE — 84100 ASSAY OF PHOSPHORUS: CPT

## 2023-01-01 PROCEDURE — L0464 TLSO 4MOD SACRO-SCAP PRE: HCPCS

## 2023-01-01 PROCEDURE — 99285 EMERGENCY DEPT VISIT HI MDM: CPT

## 2023-01-01 PROCEDURE — 78306 BONE IMAGING WHOLE BODY: CPT | Performed by: NURSE PRACTITIONER

## 2023-01-01 PROCEDURE — 2720000010 HC SURG SUPPLY STERILE: Performed by: NEUROLOGICAL SURGERY

## 2023-01-01 PROCEDURE — 85014 HEMATOCRIT: CPT

## 2023-01-01 PROCEDURE — 99232 SBSQ HOSP IP/OBS MODERATE 35: CPT | Performed by: PHYSICIAN ASSISTANT

## 2023-01-01 PROCEDURE — 94003 VENT MGMT INPAT SUBQ DAY: CPT

## 2023-01-01 PROCEDURE — 96375 TX/PRO/DX INJ NEW DRUG ADDON: CPT

## 2023-01-01 PROCEDURE — 3600000004 HC SURGERY LEVEL 4 BASE: Performed by: NEUROLOGICAL SURGERY

## 2023-01-01 PROCEDURE — 36591 DRAW BLOOD OFF VENOUS DEVICE: CPT

## 2023-01-01 PROCEDURE — 82272 OCCULT BLD FECES 1-3 TESTS: CPT

## 2023-01-01 PROCEDURE — 7100000001 HC PACU RECOVERY - ADDTL 15 MIN: Performed by: NEUROLOGICAL SURGERY

## 2023-01-01 PROCEDURE — 20982 ABLATE BONE TUMOR(S) PERQ: CPT | Performed by: NEUROLOGICAL SURGERY

## 2023-01-01 PROCEDURE — 99222 1ST HOSP IP/OBS MODERATE 55: CPT | Performed by: INTERNAL MEDICINE

## 2023-01-01 PROCEDURE — 6360000002 HC RX W HCPCS: Performed by: NURSE PRACTITIONER

## 2023-01-01 PROCEDURE — 84300 ASSAY OF URINE SODIUM: CPT

## 2023-01-01 PROCEDURE — 36430 TRANSFUSION BLD/BLD COMPNT: CPT

## 2023-01-01 PROCEDURE — 86850 RBC ANTIBODY SCREEN: CPT

## 2023-01-01 PROCEDURE — 82140 ASSAY OF AMMONIA: CPT

## 2023-01-01 PROCEDURE — 87500 VANOMYCIN DNA AMP PROBE: CPT

## 2023-01-01 PROCEDURE — 83540 ASSAY OF IRON: CPT

## 2023-01-01 PROCEDURE — 84132 ASSAY OF SERUM POTASSIUM: CPT

## 2023-01-01 PROCEDURE — C1886 CATHETER, ABLATION: HCPCS | Performed by: NEUROLOGICAL SURGERY

## 2023-01-01 PROCEDURE — 88341 IMHCHEM/IMCYTCHM EA ADD ANTB: CPT

## 2023-01-01 PROCEDURE — 6360000004 HC RX CONTRAST MEDICATION: Performed by: NEUROLOGICAL SURGERY

## 2023-01-01 PROCEDURE — 5A1945Z RESPIRATORY VENTILATION, 24-96 CONSECUTIVE HOURS: ICD-10-PCS | Performed by: NEUROLOGICAL SURGERY

## 2023-01-01 PROCEDURE — 3209999900 FLUORO FOR SURGICAL PROCEDURES

## 2023-01-01 PROCEDURE — 74018 RADEX ABDOMEN 1 VIEW: CPT

## 2023-01-01 PROCEDURE — 87086 URINE CULTURE/COLONY COUNT: CPT

## 2023-01-01 PROCEDURE — 6360000004 HC RX CONTRAST MEDICATION: Performed by: EMERGENCY MEDICINE

## 2023-01-01 PROCEDURE — 22842 INSERT SPINE FIXATION DEVICE: CPT | Performed by: PHYSICIAN ASSISTANT

## 2023-01-01 PROCEDURE — 72072 X-RAY EXAM THORAC SPINE 3VWS: CPT

## 2023-01-01 PROCEDURE — 3700000000 HC ANESTHESIA ATTENDED CARE: Performed by: NEUROLOGICAL SURGERY

## 2023-01-01 PROCEDURE — 96374 THER/PROPH/DIAG INJ IV PUSH: CPT

## 2023-01-01 DEVICE — DURAGEN® SUTURABLE DURAL REGENERATION MATRIX, 3 IN X 3 IN (7.5 CM X 7.5 CM)
Type: IMPLANTABLE DEVICE | Site: SPINE THORACIC | Status: FUNCTIONAL
Brand: DURAGEN® SUTURABLE

## 2023-01-01 DEVICE — ROD 1553201090 5.5 TI CP4 NS CURV 90MM
Type: IMPLANTABLE DEVICE | Site: SPINE THORACIC | Status: FUNCTIONAL
Brand: CD HORIZON® SPINAL SYSTEM

## 2023-01-01 DEVICE — DBM T43105 5CC GRAFTON PUTTY
Type: IMPLANTABLE DEVICE | Site: SPINE THORACIC | Status: FUNCTIONAL
Brand: GRAFTON®AND GRAFTON PLUS®DEMINERALIZED BONE MATRIX (DBM)

## 2023-01-01 DEVICE — STIMULAN® RAPID CURE PROVIDED STERILE FOR SINGLE PATIENT USE. STIMULAN® RAPID CURE CONTAINS CALCIUM SULFATE POWDER AND MIXING SOLUTION IN PRE-MEASURED QUANTITIES SO THAT WHEN MIXED TOGETHER IN A STERILE MIXING BOWL, THE RESULTANT PASTE IS TO BE DIGITALLY PACKED INTO OPEN BONE VOID/GAP TO SET INSITU OR PLACED INTO THE MOULD PROVIDED, THE MIXTURE SETS TO FORM BEADS. THE BIODEGRADABLE, RADIOPAQUE BEADS ARE RESORBED IN APPROXIMATELY 30 – 60 DAYS WHEN USED IN ACCORDANCE WITH THE DEVICE LABELLING. STIMULAN® RAPID CURE IS MANUFACTURED FROM SYNTHETIC IMPLANT GRADE CALCIUM SULFATE DIHYDRATE(CASO4.2H2O) THAT RESORBS AND IS REPLACED WITH BONE DURING THE HEALING PROCESS. ALSO, AS THE BONE VOID FILLER BEADS ARE BIODEGRADABLE AND BIOCOMPATIBLE, THEY MAY BE USED AT AN INFECTED SITE.
Type: IMPLANTABLE DEVICE | Site: SPINE THORACIC | Status: FUNCTIONAL
Brand: STIMULAN® RAPID CURE

## 2023-01-01 RX ORDER — MORPHINE SULFATE 4 MG/ML
4 INJECTION, SOLUTION INTRAMUSCULAR; INTRAVENOUS
Status: DISCONTINUED | OUTPATIENT
Start: 2023-01-01 | End: 2023-01-01

## 2023-01-01 RX ORDER — SODIUM CHLORIDE 0.9 % (FLUSH) 0.9 %
5-40 SYRINGE (ML) INJECTION PRN
Status: DISCONTINUED | OUTPATIENT
Start: 2023-01-01 | End: 2023-01-01

## 2023-01-01 RX ORDER — LIDOCAINE 4 G/G
1 PATCH TOPICAL DAILY
Status: DISCONTINUED | OUTPATIENT
Start: 2023-01-01 | End: 2023-01-01

## 2023-01-01 RX ORDER — PANTOPRAZOLE SODIUM 40 MG/1
40 TABLET, DELAYED RELEASE ORAL
Status: DISCONTINUED | OUTPATIENT
Start: 2023-01-01 | End: 2023-01-01

## 2023-01-01 RX ORDER — POTASSIUM CHLORIDE 7.45 MG/ML
10 INJECTION INTRAVENOUS PRN
Status: DISCONTINUED | OUTPATIENT
Start: 2023-01-01 | End: 2023-01-01

## 2023-01-01 RX ORDER — BUSPIRONE HYDROCHLORIDE 10 MG/1
10 TABLET ORAL 3 TIMES DAILY
Status: DISCONTINUED | OUTPATIENT
Start: 2023-01-01 | End: 2023-01-01

## 2023-01-01 RX ORDER — FENTANYL CITRATE-0.9 % NACL/PF 20 MCG/2ML
25 SYRINGE (ML) INTRAVENOUS EVERY 30 MIN PRN
Status: DISCONTINUED | OUTPATIENT
Start: 2023-01-01 | End: 2023-01-01

## 2023-01-01 RX ORDER — LIDOCAINE 4 G/G
2 PATCH TOPICAL DAILY
Status: DISCONTINUED | OUTPATIENT
Start: 2023-01-01 | End: 2023-01-01

## 2023-01-01 RX ORDER — 0.9 % SODIUM CHLORIDE 0.9 %
500 INTRAVENOUS SOLUTION INTRAVENOUS ONCE
Status: DISCONTINUED | OUTPATIENT
Start: 2023-01-01 | End: 2023-01-01

## 2023-01-01 RX ORDER — DROPERIDOL 2.5 MG/ML
0.62 INJECTION, SOLUTION INTRAMUSCULAR; INTRAVENOUS
Status: DISCONTINUED | OUTPATIENT
Start: 2023-01-01 | End: 2023-01-01 | Stop reason: HOSPADM

## 2023-01-01 RX ORDER — SODIUM CHLORIDE 0.9 % (FLUSH) 0.9 %
5-40 SYRINGE (ML) INJECTION PRN
Status: DISCONTINUED | OUTPATIENT
Start: 2023-01-01 | End: 2023-01-01 | Stop reason: SDUPTHER

## 2023-01-01 RX ORDER — MEPERIDINE HYDROCHLORIDE 25 MG/ML
12.5 INJECTION INTRAMUSCULAR; INTRAVENOUS; SUBCUTANEOUS ONCE
Status: DISCONTINUED | OUTPATIENT
Start: 2023-01-01 | End: 2023-01-01 | Stop reason: HOSPADM

## 2023-01-01 RX ORDER — SODIUM CHLORIDE 0.9 % (FLUSH) 0.9 %
5-40 SYRINGE (ML) INJECTION EVERY 12 HOURS SCHEDULED
Status: DISCONTINUED | OUTPATIENT
Start: 2023-01-01 | End: 2023-01-01 | Stop reason: HOSPADM

## 2023-01-01 RX ORDER — TRAZODONE HYDROCHLORIDE 50 MG/1
50 TABLET ORAL NIGHTLY
Status: DISCONTINUED | OUTPATIENT
Start: 2023-01-01 | End: 2023-01-01

## 2023-01-01 RX ORDER — SODIUM CHLORIDE 9 MG/ML
INJECTION, SOLUTION INTRAVENOUS PRN
Status: DISCONTINUED | OUTPATIENT
Start: 2023-01-01 | End: 2023-01-01 | Stop reason: SDUPTHER

## 2023-01-01 RX ORDER — HEPARIN SODIUM 1000 [USP'U]/ML
80 INJECTION, SOLUTION INTRAVENOUS; SUBCUTANEOUS PRN
Status: DISCONTINUED | OUTPATIENT
Start: 2023-01-01 | End: 2023-01-01 | Stop reason: ALTCHOICE

## 2023-01-01 RX ORDER — TRAZODONE HYDROCHLORIDE 100 MG/1
100 TABLET ORAL NIGHTLY
Status: DISCONTINUED | OUTPATIENT
Start: 2023-01-01 | End: 2023-01-01

## 2023-01-01 RX ORDER — OXYCODONE HYDROCHLORIDE 5 MG/1
5 TABLET ORAL
Status: DISCONTINUED | OUTPATIENT
Start: 2023-01-01 | End: 2023-01-01 | Stop reason: HOSPADM

## 2023-01-01 RX ORDER — MIDODRINE HYDROCHLORIDE 5 MG/1
5 TABLET ORAL
Status: DISCONTINUED | OUTPATIENT
Start: 2023-01-01 | End: 2023-01-01

## 2023-01-01 RX ORDER — FENTANYL CITRATE-0.9 % NACL/PF 10 MCG/ML
25-200 PLASTIC BAG, INJECTION (ML) INTRAVENOUS CONTINUOUS
Status: DISCONTINUED | OUTPATIENT
Start: 2023-01-01 | End: 2023-01-01

## 2023-01-01 RX ORDER — DEXAMETHASONE 0.5 MG/1
1 TABLET ORAL DAILY
Status: DISCONTINUED | OUTPATIENT
Start: 2023-04-24 | End: 2023-01-01 | Stop reason: HOSPADM

## 2023-01-01 RX ORDER — OXYCODONE HYDROCHLORIDE 5 MG/1
5 TABLET ORAL EVERY 4 HOURS PRN
Status: DISCONTINUED | OUTPATIENT
Start: 2023-01-01 | End: 2023-01-01

## 2023-01-01 RX ORDER — SODIUM CHLORIDE 9 MG/ML
INJECTION, SOLUTION INTRAVENOUS PRN
Status: DISCONTINUED | OUTPATIENT
Start: 2023-01-01 | End: 2023-01-01

## 2023-01-01 RX ORDER — DEXAMETHASONE SODIUM PHOSPHATE 4 MG/ML
4 INJECTION, SOLUTION INTRA-ARTICULAR; INTRALESIONAL; INTRAMUSCULAR; INTRAVENOUS; SOFT TISSUE EVERY 6 HOURS
Status: DISCONTINUED | OUTPATIENT
Start: 2023-01-01 | End: 2023-01-01

## 2023-01-01 RX ORDER — ACETAMINOPHEN 650 MG/1
650 SUPPOSITORY RECTAL EVERY 6 HOURS PRN
Status: DISCONTINUED | OUTPATIENT
Start: 2023-01-01 | End: 2023-01-01

## 2023-01-01 RX ORDER — SODIUM CHLORIDE 0.9 % (FLUSH) 0.9 %
5-40 SYRINGE (ML) INJECTION PRN
Status: DISCONTINUED | OUTPATIENT
Start: 2023-01-01 | End: 2023-01-01 | Stop reason: HOSPADM

## 2023-01-01 RX ORDER — PROPOFOL 10 MG/ML
5-50 INJECTION, EMULSION INTRAVENOUS CONTINUOUS
Status: DISCONTINUED | OUTPATIENT
Start: 2023-01-01 | End: 2023-01-01

## 2023-01-01 RX ORDER — LORAZEPAM 2 MG/ML
1 INJECTION INTRAMUSCULAR
Status: DISCONTINUED | OUTPATIENT
Start: 2023-01-01 | End: 2023-01-01 | Stop reason: HOSPADM

## 2023-01-01 RX ORDER — LORAZEPAM 0.5 MG/1
0.5 TABLET ORAL EVERY 6 HOURS PRN
Status: DISCONTINUED | OUTPATIENT
Start: 2023-01-01 | End: 2023-01-01

## 2023-01-01 RX ORDER — SODIUM CHLORIDE 9 MG/ML
INJECTION, SOLUTION INTRAVENOUS CONTINUOUS
Status: DISCONTINUED | OUTPATIENT
Start: 2023-01-01 | End: 2023-01-01

## 2023-01-01 RX ORDER — MIDODRINE HYDROCHLORIDE 10 MG/1
10 TABLET ORAL
Status: DISCONTINUED | OUTPATIENT
Start: 2023-01-01 | End: 2023-01-01

## 2023-01-01 RX ORDER — HEPARIN SODIUM 10000 [USP'U]/100ML
5-30 INJECTION, SOLUTION INTRAVENOUS CONTINUOUS
Status: DISCONTINUED | OUTPATIENT
Start: 2023-01-01 | End: 2023-01-01 | Stop reason: ALTCHOICE

## 2023-01-01 RX ORDER — MAGNESIUM SULFATE IN WATER 40 MG/ML
2000 INJECTION, SOLUTION INTRAVENOUS PRN
Status: DISCONTINUED | OUTPATIENT
Start: 2023-01-01 | End: 2023-01-01

## 2023-01-01 RX ORDER — ONDANSETRON 4 MG/1
4 TABLET, ORALLY DISINTEGRATING ORAL EVERY 8 HOURS PRN
Status: DISCONTINUED | OUTPATIENT
Start: 2023-01-01 | End: 2023-01-01

## 2023-01-01 RX ORDER — PANTOPRAZOLE SODIUM 40 MG/10ML
40 INJECTION, POWDER, LYOPHILIZED, FOR SOLUTION INTRAVENOUS 2 TIMES DAILY
Status: DISCONTINUED | OUTPATIENT
Start: 2023-01-01 | End: 2023-01-01

## 2023-01-01 RX ORDER — MORPHINE SULFATE 2 MG/ML
2 INJECTION, SOLUTION INTRAMUSCULAR; INTRAVENOUS
Status: DISCONTINUED | OUTPATIENT
Start: 2023-01-01 | End: 2023-01-01 | Stop reason: HOSPADM

## 2023-01-01 RX ORDER — BUSPIRONE HYDROCHLORIDE 5 MG/1
5 TABLET ORAL 3 TIMES DAILY
Status: DISCONTINUED | OUTPATIENT
Start: 2023-01-01 | End: 2023-01-01

## 2023-01-01 RX ORDER — TC 99M MEDRONATE 20 MG/10ML
28.9 INJECTION, POWDER, LYOPHILIZED, FOR SOLUTION INTRAVENOUS
Status: COMPLETED | OUTPATIENT
Start: 2023-01-01 | End: 2023-01-01

## 2023-01-01 RX ORDER — FENTANYL CITRATE 50 UG/ML
50 INJECTION, SOLUTION INTRAMUSCULAR; INTRAVENOUS EVERY 5 MIN PRN
Status: DISCONTINUED | OUTPATIENT
Start: 2023-01-01 | End: 2023-01-01 | Stop reason: HOSPADM

## 2023-01-01 RX ORDER — DULOXETIN HYDROCHLORIDE 60 MG/1
60 CAPSULE, DELAYED RELEASE ORAL DAILY
Status: DISCONTINUED | OUTPATIENT
Start: 2023-01-01 | End: 2023-01-01

## 2023-01-01 RX ORDER — TAMSULOSIN HYDROCHLORIDE 0.4 MG/1
0.8 CAPSULE ORAL DAILY
Status: DISCONTINUED | OUTPATIENT
Start: 2023-01-01 | End: 2023-01-01

## 2023-01-01 RX ORDER — LABETALOL HYDROCHLORIDE 5 MG/ML
10 INJECTION, SOLUTION INTRAVENOUS
Status: DISCONTINUED | OUTPATIENT
Start: 2023-01-01 | End: 2023-01-01 | Stop reason: HOSPADM

## 2023-01-01 RX ORDER — LORAZEPAM 2 MG/ML
INJECTION INTRAMUSCULAR
Status: DISCONTINUED
Start: 2023-01-01 | End: 2023-01-01 | Stop reason: HOSPADM

## 2023-01-01 RX ORDER — OXYCODONE HYDROCHLORIDE 5 MG/1
5 TABLET ORAL EVERY 4 HOURS PRN
COMMUNITY

## 2023-01-01 RX ORDER — GINSENG 100 MG
CAPSULE ORAL PRN
Status: DISCONTINUED | OUTPATIENT
Start: 2023-01-01 | End: 2023-01-01 | Stop reason: ALTCHOICE

## 2023-01-01 RX ORDER — SPIRONOLACTONE 25 MG/1
100 TABLET ORAL EVERY MORNING
Status: DISCONTINUED | OUTPATIENT
Start: 2023-01-01 | End: 2023-01-01

## 2023-01-01 RX ORDER — LORAZEPAM 2 MG/ML
1 INJECTION INTRAMUSCULAR EVERY 6 HOURS PRN
Status: DISCONTINUED | OUTPATIENT
Start: 2023-01-01 | End: 2023-01-01

## 2023-01-01 RX ORDER — OXYCODONE HYDROCHLORIDE 5 MG/1
10 TABLET ORAL EVERY 4 HOURS PRN
Status: DISCONTINUED | OUTPATIENT
Start: 2023-01-01 | End: 2023-01-01

## 2023-01-01 RX ORDER — ONDANSETRON 2 MG/ML
4 INJECTION INTRAMUSCULAR; INTRAVENOUS
Status: DISCONTINUED | OUTPATIENT
Start: 2023-01-01 | End: 2023-01-01 | Stop reason: HOSPADM

## 2023-01-01 RX ORDER — CALCIUM GLUCONATE 20 MG/ML
2000 INJECTION, SOLUTION INTRAVENOUS ONCE
Status: COMPLETED | OUTPATIENT
Start: 2023-01-01 | End: 2023-01-01

## 2023-01-01 RX ORDER — MORPHINE SULFATE 2 MG/ML
2 INJECTION, SOLUTION INTRAMUSCULAR; INTRAVENOUS
Status: DISCONTINUED | OUTPATIENT
Start: 2023-01-01 | End: 2023-01-01

## 2023-01-01 RX ORDER — SODIUM CHLORIDE, SODIUM LACTATE, POTASSIUM CHLORIDE, AND CALCIUM CHLORIDE .6; .31; .03; .02 G/100ML; G/100ML; G/100ML; G/100ML
1000 INJECTION, SOLUTION INTRAVENOUS ONCE
Status: COMPLETED | OUTPATIENT
Start: 2023-01-01 | End: 2023-01-01

## 2023-01-01 RX ORDER — HEPARIN SODIUM 1000 [USP'U]/ML
40 INJECTION, SOLUTION INTRAVENOUS; SUBCUTANEOUS PRN
Status: DISCONTINUED | OUTPATIENT
Start: 2023-01-01 | End: 2023-01-01 | Stop reason: ALTCHOICE

## 2023-01-01 RX ORDER — HEPARIN SODIUM 1000 [USP'U]/ML
80 INJECTION, SOLUTION INTRAVENOUS; SUBCUTANEOUS ONCE
Status: DISCONTINUED | OUTPATIENT
Start: 2023-01-01 | End: 2023-01-01 | Stop reason: ALTCHOICE

## 2023-01-01 RX ORDER — ALBUMIN, HUMAN INJ 5% 5 %
25 SOLUTION INTRAVENOUS ONCE
Status: DISCONTINUED | OUTPATIENT
Start: 2023-01-01 | End: 2023-01-01

## 2023-01-01 RX ORDER — ACETAMINOPHEN 325 MG/1
650 TABLET ORAL ONCE
Status: DISCONTINUED | OUTPATIENT
Start: 2023-01-01 | End: 2023-01-01 | Stop reason: HOSPADM

## 2023-01-01 RX ORDER — SODIUM CHLORIDE 0.9 % (FLUSH) 0.9 %
5-40 SYRINGE (ML) INJECTION EVERY 12 HOURS SCHEDULED
Status: DISCONTINUED | OUTPATIENT
Start: 2023-01-01 | End: 2023-01-01 | Stop reason: SDUPTHER

## 2023-01-01 RX ORDER — OXYCODONE HCL 10 MG/1
10 TABLET, FILM COATED, EXTENDED RELEASE ORAL EVERY 12 HOURS
Status: DISCONTINUED | OUTPATIENT
Start: 2023-01-01 | End: 2023-01-01

## 2023-01-01 RX ORDER — HYDROXYZINE HYDROCHLORIDE 10 MG/1
10 TABLET, FILM COATED ORAL 3 TIMES DAILY PRN
Status: DISCONTINUED | OUTPATIENT
Start: 2023-01-01 | End: 2023-01-01

## 2023-01-01 RX ORDER — SODIUM CHLORIDE 9 MG/ML
INJECTION, SOLUTION INTRAVENOUS PRN
Status: DISCONTINUED | OUTPATIENT
Start: 2023-01-01 | End: 2023-01-01 | Stop reason: HOSPADM

## 2023-01-01 RX ORDER — LACTULOSE 10 G/15ML
20 SOLUTION ORAL 2 TIMES DAILY
Status: DISCONTINUED | OUTPATIENT
Start: 2023-01-01 | End: 2023-01-01

## 2023-01-01 RX ORDER — GLYCOPYRROLATE 0.2 MG/ML
0.2 INJECTION INTRAMUSCULAR; INTRAVENOUS EVERY 4 HOURS PRN
Status: DISCONTINUED | OUTPATIENT
Start: 2023-01-01 | End: 2023-01-01 | Stop reason: HOSPADM

## 2023-01-01 RX ORDER — TAMSULOSIN HYDROCHLORIDE 0.4 MG/1
0.4 CAPSULE ORAL DAILY
Status: DISCONTINUED | OUTPATIENT
Start: 2023-01-01 | End: 2023-01-01

## 2023-01-01 RX ORDER — NITROGLYCERIN 0.4 MG/1
0.4 TABLET SUBLINGUAL EVERY 5 MIN PRN
Status: DISCONTINUED | OUTPATIENT
Start: 2023-01-01 | End: 2023-01-01

## 2023-01-01 RX ORDER — ALBUMIN, HUMAN INJ 5% 5 %
SOLUTION INTRAVENOUS
Status: COMPLETED
Start: 2023-01-01 | End: 2023-01-01

## 2023-01-01 RX ORDER — ENOXAPARIN SODIUM 100 MG/ML
40 INJECTION SUBCUTANEOUS DAILY
Status: DISCONTINUED | OUTPATIENT
Start: 2023-01-01 | End: 2023-01-01

## 2023-01-01 RX ORDER — HYDRALAZINE HYDROCHLORIDE 20 MG/ML
10 INJECTION INTRAMUSCULAR; INTRAVENOUS
Status: DISCONTINUED | OUTPATIENT
Start: 2023-01-01 | End: 2023-01-01 | Stop reason: HOSPADM

## 2023-01-01 RX ORDER — LIDOCAINE 4 G/G
3 PATCH TOPICAL DAILY
Status: DISCONTINUED | OUTPATIENT
Start: 2023-01-01 | End: 2023-01-01

## 2023-01-01 RX ORDER — DOCUSATE SODIUM 100 MG/1
100 CAPSULE, LIQUID FILLED ORAL 2 TIMES DAILY
Status: DISCONTINUED | OUTPATIENT
Start: 2023-01-01 | End: 2023-01-01

## 2023-01-01 RX ORDER — SODIUM CHLORIDE 0.9 % (FLUSH) 0.9 %
5-40 SYRINGE (ML) INJECTION EVERY 12 HOURS SCHEDULED
Status: DISCONTINUED | OUTPATIENT
Start: 2023-01-01 | End: 2023-01-01

## 2023-01-01 RX ORDER — MIDODRINE HYDROCHLORIDE 5 MG/1
5 TABLET ORAL ONCE
Status: COMPLETED | OUTPATIENT
Start: 2023-01-01 | End: 2023-01-01

## 2023-01-01 RX ORDER — LANOLIN ALCOHOL/MO/W.PET/CERES
3 CREAM (GRAM) TOPICAL NIGHTLY PRN
Status: DISCONTINUED | OUTPATIENT
Start: 2023-01-01 | End: 2023-01-01

## 2023-01-01 RX ORDER — NICOTINE 21 MG/24HR
1 PATCH, TRANSDERMAL 24 HOURS TRANSDERMAL DAILY
Status: DISCONTINUED | OUTPATIENT
Start: 2023-01-01 | End: 2023-01-01

## 2023-01-01 RX ORDER — ONDANSETRON 2 MG/ML
4 INJECTION INTRAMUSCULAR; INTRAVENOUS EVERY 6 HOURS PRN
Status: DISCONTINUED | OUTPATIENT
Start: 2023-01-01 | End: 2023-01-01 | Stop reason: HOSPADM

## 2023-01-01 RX ORDER — ALBUMIN, HUMAN INJ 5% 5 %
12.5 SOLUTION INTRAVENOUS ONCE
Status: COMPLETED | OUTPATIENT
Start: 2023-01-01 | End: 2023-01-01

## 2023-01-01 RX ORDER — LOPERAMIDE HYDROCHLORIDE 2 MG/1
2 CAPSULE ORAL 4 TIMES DAILY PRN
Status: DISCONTINUED | OUTPATIENT
Start: 2023-01-01 | End: 2023-01-01

## 2023-01-01 RX ORDER — DEXAMETHASONE 4 MG/1
4 TABLET ORAL EVERY 12 HOURS SCHEDULED
Status: COMPLETED | OUTPATIENT
Start: 2023-01-01 | End: 2023-01-01

## 2023-01-01 RX ORDER — DEXAMETHASONE 4 MG/1
2 TABLET ORAL EVERY 12 HOURS SCHEDULED
Status: DISCONTINUED | OUTPATIENT
Start: 2023-01-01 | End: 2023-01-01 | Stop reason: HOSPADM

## 2023-01-01 RX ORDER — FERROUS SULFATE 325(65) MG
325 TABLET ORAL
Status: DISCONTINUED | OUTPATIENT
Start: 2023-01-01 | End: 2023-01-01

## 2023-01-01 RX ORDER — MORPHINE SULFATE 4 MG/ML
4 INJECTION, SOLUTION INTRAMUSCULAR; INTRAVENOUS
Status: DISCONTINUED | OUTPATIENT
Start: 2023-01-01 | End: 2023-01-01 | Stop reason: HOSPADM

## 2023-01-01 RX ORDER — POLYETHYLENE GLYCOL 3350 17 G/17G
17 POWDER, FOR SOLUTION ORAL DAILY PRN
Status: DISCONTINUED | OUTPATIENT
Start: 2023-01-01 | End: 2023-01-01

## 2023-01-01 RX ORDER — ALBUMIN (HUMAN) 12.5 G/50ML
25 SOLUTION INTRAVENOUS ONCE
Status: DISCONTINUED | OUTPATIENT
Start: 2023-01-01 | End: 2023-01-01

## 2023-01-01 RX ORDER — LORAZEPAM 2 MG/ML
1 INJECTION INTRAMUSCULAR EVERY 4 HOURS
Status: DISCONTINUED | OUTPATIENT
Start: 2023-01-01 | End: 2023-01-01

## 2023-01-01 RX ORDER — POTASSIUM CHLORIDE 20 MEQ/1
40 TABLET, EXTENDED RELEASE ORAL PRN
Status: DISCONTINUED | OUTPATIENT
Start: 2023-01-01 | End: 2023-01-01

## 2023-01-01 RX ORDER — DIPHENHYDRAMINE HYDROCHLORIDE 50 MG/ML
12.5 INJECTION INTRAMUSCULAR; INTRAVENOUS
Status: DISCONTINUED | OUTPATIENT
Start: 2023-01-01 | End: 2023-01-01 | Stop reason: HOSPADM

## 2023-01-01 RX ORDER — SPIRONOLACTONE 25 MG/1
25 TABLET ORAL EVERY MORNING
Status: DISCONTINUED | OUTPATIENT
Start: 2023-01-01 | End: 2023-01-01

## 2023-01-01 RX ORDER — ATORVASTATIN CALCIUM 40 MG/1
40 TABLET, FILM COATED ORAL NIGHTLY
Status: DISCONTINUED | OUTPATIENT
Start: 2023-01-01 | End: 2023-01-01

## 2023-01-01 RX ORDER — ALBUMIN (HUMAN) 12.5 G/50ML
25 SOLUTION INTRAVENOUS
Status: COMPLETED | OUTPATIENT
Start: 2023-01-01 | End: 2023-01-01

## 2023-01-01 RX ORDER — IPRATROPIUM BROMIDE AND ALBUTEROL SULFATE 2.5; .5 MG/3ML; MG/3ML
1 SOLUTION RESPIRATORY (INHALATION)
Status: DISCONTINUED | OUTPATIENT
Start: 2023-01-01 | End: 2023-01-01 | Stop reason: HOSPADM

## 2023-01-01 RX ORDER — ACETAMINOPHEN 325 MG/1
650 TABLET ORAL EVERY 6 HOURS PRN
Status: DISCONTINUED | OUTPATIENT
Start: 2023-01-01 | End: 2023-01-01

## 2023-01-01 RX ORDER — ONDANSETRON 2 MG/ML
4 INJECTION INTRAMUSCULAR; INTRAVENOUS ONCE
Status: COMPLETED | OUTPATIENT
Start: 2023-01-01 | End: 2023-01-01

## 2023-01-01 RX ORDER — 0.9 % SODIUM CHLORIDE 0.9 %
500 INTRAVENOUS SOLUTION INTRAVENOUS ONCE
Status: COMPLETED | OUTPATIENT
Start: 2023-01-01 | End: 2023-01-01

## 2023-01-01 RX ORDER — CALCIUM GLUCONATE 20 MG/ML
2000 INJECTION, SOLUTION INTRAVENOUS
Status: COMPLETED | OUTPATIENT
Start: 2023-01-01 | End: 2023-01-01

## 2023-01-01 RX ORDER — DEXTROSE MONOHYDRATE 100 MG/ML
INJECTION, SOLUTION INTRAVENOUS CONTINUOUS PRN
Status: DISCONTINUED | OUTPATIENT
Start: 2023-01-01 | End: 2023-01-01 | Stop reason: HOSPADM

## 2023-01-01 RX ADMIN — FERROUS SULFATE TAB 325 MG (65 MG ELEMENTAL FE) 325 MG: 325 (65 FE) TAB at 09:58

## 2023-01-01 RX ADMIN — OXYCODONE HYDROCHLORIDE 10 MG: 10 TABLET, FILM COATED, EXTENDED RELEASE ORAL at 16:05

## 2023-01-01 RX ADMIN — PANTOPRAZOLE SODIUM 40 MG: 40 TABLET, DELAYED RELEASE ORAL at 06:14

## 2023-01-01 RX ADMIN — TAMSULOSIN HYDROCHLORIDE 0.4 MG: 0.4 CAPSULE ORAL at 11:01

## 2023-01-01 RX ADMIN — OXYCODONE HYDROCHLORIDE 10 MG: 5 TABLET ORAL at 17:00

## 2023-01-01 RX ADMIN — APIXABAN 5 MG: 5 TABLET, FILM COATED ORAL at 07:40

## 2023-01-01 RX ADMIN — RIFAXIMIN 550 MG: 550 TABLET ORAL at 08:41

## 2023-01-01 RX ADMIN — DOCUSATE SODIUM 100 MG: 100 CAPSULE, LIQUID FILLED ORAL at 08:49

## 2023-01-01 RX ADMIN — MIDODRINE HYDROCHLORIDE 5 MG: 5 TABLET ORAL at 16:21

## 2023-01-01 RX ADMIN — FERROUS SULFATE TAB 325 MG (65 MG ELEMENTAL FE) 325 MG: 325 (65 FE) TAB at 07:59

## 2023-01-01 RX ADMIN — TRAZODONE HYDROCHLORIDE 50 MG: 50 TABLET ORAL at 19:37

## 2023-01-01 RX ADMIN — DOCUSATE SODIUM 100 MG: 100 CAPSULE, LIQUID FILLED ORAL at 21:00

## 2023-01-01 RX ADMIN — ONDANSETRON 4 MG: 2 INJECTION INTRAMUSCULAR; INTRAVENOUS at 21:52

## 2023-01-01 RX ADMIN — LORAZEPAM 0.5 MG: 0.5 TABLET ORAL at 11:59

## 2023-01-01 RX ADMIN — OXYCODONE HYDROCHLORIDE 10 MG: 10 TABLET, FILM COATED, EXTENDED RELEASE ORAL at 16:26

## 2023-01-01 RX ADMIN — OXYCODONE HYDROCHLORIDE 10 MG: 5 TABLET ORAL at 06:14

## 2023-01-01 RX ADMIN — SODIUM CHLORIDE, PRESERVATIVE FREE 10 ML: 5 INJECTION INTRAVENOUS at 22:12

## 2023-01-01 RX ADMIN — ATORVASTATIN CALCIUM 40 MG: 80 TABLET, FILM COATED ORAL at 19:42

## 2023-01-01 RX ADMIN — SODIUM CHLORIDE, PRESERVATIVE FREE 10 ML: 5 INJECTION INTRAVENOUS at 19:30

## 2023-01-01 RX ADMIN — RIFAXIMIN 550 MG: 550 TABLET ORAL at 09:34

## 2023-01-01 RX ADMIN — TRAZODONE HYDROCHLORIDE 50 MG: 50 TABLET ORAL at 20:29

## 2023-01-01 RX ADMIN — DEXAMETHASONE 4 MG: 4 TABLET ORAL at 08:25

## 2023-01-01 RX ADMIN — FERROUS SULFATE TAB 325 MG (65 MG ELEMENTAL FE) 325 MG: 325 (65 FE) TAB at 08:41

## 2023-01-01 RX ADMIN — RIFAXIMIN 550 MG: 550 TABLET ORAL at 20:40

## 2023-01-01 RX ADMIN — LACTULOSE 20 G: 10 SOLUTION ORAL at 07:41

## 2023-01-01 RX ADMIN — ATORVASTATIN CALCIUM 40 MG: 80 TABLET, FILM COATED ORAL at 19:41

## 2023-01-01 RX ADMIN — OXYCODONE HYDROCHLORIDE 10 MG: 10 TABLET, FILM COATED, EXTENDED RELEASE ORAL at 04:32

## 2023-01-01 RX ADMIN — HYDROMORPHONE HYDROCHLORIDE 0.5 MG: 1 INJECTION, SOLUTION INTRAMUSCULAR; INTRAVENOUS; SUBCUTANEOUS at 13:16

## 2023-01-01 RX ADMIN — PANTOPRAZOLE SODIUM 40 MG: 40 INJECTION, POWDER, LYOPHILIZED, FOR SOLUTION INTRAVENOUS at 19:52

## 2023-01-01 RX ADMIN — RIFAXIMIN 550 MG: 550 TABLET ORAL at 08:45

## 2023-01-01 RX ADMIN — HYDROMORPHONE HYDROCHLORIDE 0.5 MG: 1 INJECTION, SOLUTION INTRAMUSCULAR; INTRAVENOUS; SUBCUTANEOUS at 18:55

## 2023-01-01 RX ADMIN — DULOXETINE 60 MG: 60 CAPSULE, DELAYED RELEASE ORAL at 08:20

## 2023-01-01 RX ADMIN — DEXAMETHASONE 4 MG: 4 TABLET ORAL at 20:31

## 2023-01-01 RX ADMIN — RIFAXIMIN 550 MG: 550 TABLET ORAL at 20:49

## 2023-01-01 RX ADMIN — SODIUM CHLORIDE, PRESERVATIVE FREE 10 ML: 5 INJECTION INTRAVENOUS at 20:31

## 2023-01-01 RX ADMIN — OXYCODONE HYDROCHLORIDE 5 MG: 5 TABLET ORAL at 21:14

## 2023-01-01 RX ADMIN — ALBUMIN, HUMAN INJ 5% 12.5 G: 5 SOLUTION at 17:04

## 2023-01-01 RX ADMIN — DEXAMETHASONE SODIUM PHOSPHATE 4 MG: 4 INJECTION, SOLUTION INTRA-ARTICULAR; INTRALESIONAL; INTRAMUSCULAR; INTRAVENOUS; SOFT TISSUE at 16:15

## 2023-01-01 RX ADMIN — ALBUMIN (HUMAN) 25 G: 0.25 INJECTION, SOLUTION INTRAVENOUS at 15:50

## 2023-01-01 RX ADMIN — RIFAXIMIN 550 MG: 550 TABLET ORAL at 08:08

## 2023-01-01 RX ADMIN — SODIUM CHLORIDE, PRESERVATIVE FREE 10 ML: 5 INJECTION INTRAVENOUS at 08:09

## 2023-01-01 RX ADMIN — HYDROMORPHONE HYDROCHLORIDE 0.5 MG: 1 INJECTION, SOLUTION INTRAMUSCULAR; INTRAVENOUS; SUBCUTANEOUS at 21:52

## 2023-01-01 RX ADMIN — OXYCODONE HYDROCHLORIDE 10 MG: 5 TABLET ORAL at 03:56

## 2023-01-01 RX ADMIN — HYDROMORPHONE HYDROCHLORIDE 0.5 MG: 1 INJECTION, SOLUTION INTRAMUSCULAR; INTRAVENOUS; SUBCUTANEOUS at 00:55

## 2023-01-01 RX ADMIN — OXYCODONE HYDROCHLORIDE 10 MG: 10 TABLET, FILM COATED, EXTENDED RELEASE ORAL at 04:15

## 2023-01-01 RX ADMIN — LORAZEPAM 0.5 MG: 0.5 TABLET ORAL at 06:39

## 2023-01-01 RX ADMIN — DOCUSATE SODIUM 100 MG: 100 CAPSULE, LIQUID FILLED ORAL at 07:59

## 2023-01-01 RX ADMIN — SODIUM CHLORIDE, PRESERVATIVE FREE 10 ML: 5 INJECTION INTRAVENOUS at 19:31

## 2023-01-01 RX ADMIN — SODIUM CHLORIDE, PRESERVATIVE FREE 10 ML: 5 INJECTION INTRAVENOUS at 09:30

## 2023-01-01 RX ADMIN — SODIUM CHLORIDE, PRESERVATIVE FREE 10 ML: 5 INJECTION INTRAVENOUS at 08:24

## 2023-01-01 RX ADMIN — DOCUSATE SODIUM 100 MG: 100 CAPSULE, LIQUID FILLED ORAL at 20:29

## 2023-01-01 RX ADMIN — Medication 3 MG: at 23:11

## 2023-01-01 RX ADMIN — TC 99M MEDRONATE 28.9 MILLICURIE: 20 INJECTION, POWDER, LYOPHILIZED, FOR SOLUTION INTRAVENOUS at 07:00

## 2023-01-01 RX ADMIN — MIDODRINE HYDROCHLORIDE 10 MG: 10 TABLET ORAL at 17:38

## 2023-01-01 RX ADMIN — DEXAMETHASONE 4 MG: 4 TABLET ORAL at 08:32

## 2023-01-01 RX ADMIN — DEXAMETHASONE 4 MG: 4 TABLET ORAL at 20:50

## 2023-01-01 RX ADMIN — HYDROMORPHONE HYDROCHLORIDE 0.5 MG: 1 INJECTION, SOLUTION INTRAMUSCULAR; INTRAVENOUS; SUBCUTANEOUS at 05:21

## 2023-01-01 RX ADMIN — DEXAMETHASONE SODIUM PHOSPHATE 4 MG: 4 INJECTION, SOLUTION INTRA-ARTICULAR; INTRALESIONAL; INTRAMUSCULAR; INTRAVENOUS; SOFT TISSUE at 02:08

## 2023-01-01 RX ADMIN — RIFAXIMIN 550 MG: 550 TABLET ORAL at 07:40

## 2023-01-01 RX ADMIN — OXYCODONE HYDROCHLORIDE 10 MG: 5 TABLET ORAL at 19:42

## 2023-01-01 RX ADMIN — TRAZODONE HYDROCHLORIDE 50 MG: 50 TABLET ORAL at 19:42

## 2023-01-01 RX ADMIN — APIXABAN 5 MG: 5 TABLET, FILM COATED ORAL at 08:41

## 2023-01-01 RX ADMIN — ATORVASTATIN CALCIUM 40 MG: 80 TABLET, FILM COATED ORAL at 22:04

## 2023-01-01 RX ADMIN — OXYCODONE 5 MG: 5 TABLET ORAL at 07:33

## 2023-01-01 RX ADMIN — OXYCODONE HYDROCHLORIDE 10 MG: 5 TABLET ORAL at 08:11

## 2023-01-01 RX ADMIN — OXYCODONE HYDROCHLORIDE 10 MG: 5 TABLET ORAL at 01:00

## 2023-01-01 RX ADMIN — RIFAXIMIN 550 MG: 550 TABLET ORAL at 20:30

## 2023-01-01 RX ADMIN — RIFAXIMIN 550 MG: 550 TABLET ORAL at 21:14

## 2023-01-01 RX ADMIN — TRAZODONE HYDROCHLORIDE 50 MG: 50 TABLET ORAL at 21:00

## 2023-01-01 RX ADMIN — RIFAXIMIN 550 MG: 550 TABLET ORAL at 19:55

## 2023-01-01 RX ADMIN — PANTOPRAZOLE SODIUM 40 MG: 40 TABLET, DELAYED RELEASE ORAL at 06:35

## 2023-01-01 RX ADMIN — SPIRONOLACTONE 100 MG: 25 TABLET ORAL at 09:22

## 2023-01-01 RX ADMIN — OXYCODONE HYDROCHLORIDE 10 MG: 5 TABLET ORAL at 08:02

## 2023-01-01 RX ADMIN — SPIRONOLACTONE 100 MG: 25 TABLET ORAL at 08:36

## 2023-01-01 RX ADMIN — TAMSULOSIN HYDROCHLORIDE 0.4 MG: 0.4 CAPSULE ORAL at 09:22

## 2023-01-01 RX ADMIN — OXYCODONE HYDROCHLORIDE 10 MG: 10 TABLET, FILM COATED, EXTENDED RELEASE ORAL at 16:57

## 2023-01-01 RX ADMIN — RIFAXIMIN 550 MG: 550 TABLET ORAL at 08:02

## 2023-01-01 RX ADMIN — SODIUM CHLORIDE, PRESERVATIVE FREE 10 ML: 5 INJECTION INTRAVENOUS at 20:40

## 2023-01-01 RX ADMIN — OXYCODONE HYDROCHLORIDE 5 MG: 5 TABLET ORAL at 23:55

## 2023-01-01 RX ADMIN — OXYCODONE HYDROCHLORIDE 10 MG: 10 TABLET, FILM COATED, EXTENDED RELEASE ORAL at 16:03

## 2023-01-01 RX ADMIN — OXYCODONE HYDROCHLORIDE 10 MG: 10 TABLET, FILM COATED, EXTENDED RELEASE ORAL at 16:09

## 2023-01-01 RX ADMIN — OXYCODONE HYDROCHLORIDE 10 MG: 5 TABLET ORAL at 22:03

## 2023-01-01 RX ADMIN — DULOXETINE 60 MG: 60 CAPSULE, DELAYED RELEASE ORAL at 08:09

## 2023-01-01 RX ADMIN — ATORVASTATIN CALCIUM 40 MG: 80 TABLET, FILM COATED ORAL at 21:14

## 2023-01-01 RX ADMIN — SODIUM CHLORIDE, PRESERVATIVE FREE 10 ML: 5 INJECTION INTRAVENOUS at 19:41

## 2023-01-01 RX ADMIN — PANTOPRAZOLE SODIUM 40 MG: 40 TABLET, DELAYED RELEASE ORAL at 04:32

## 2023-01-01 RX ADMIN — MORPHINE SULFATE 2 MG: 2 INJECTION, SOLUTION INTRAMUSCULAR; INTRAVENOUS at 23:09

## 2023-01-01 RX ADMIN — OXYCODONE HYDROCHLORIDE 10 MG: 10 TABLET, FILM COATED, EXTENDED RELEASE ORAL at 18:16

## 2023-01-01 RX ADMIN — SPIRONOLACTONE 100 MG: 25 TABLET ORAL at 08:41

## 2023-01-01 RX ADMIN — SODIUM CHLORIDE, PRESERVATIVE FREE 10 ML: 5 INJECTION INTRAVENOUS at 20:24

## 2023-01-01 RX ADMIN — DOCUSATE SODIUM 100 MG: 100 CAPSULE, LIQUID FILLED ORAL at 08:11

## 2023-01-01 RX ADMIN — DULOXETINE 60 MG: 60 CAPSULE, DELAYED RELEASE ORAL at 07:41

## 2023-01-01 RX ADMIN — SODIUM CHLORIDE, PRESERVATIVE FREE 10 ML: 5 INJECTION INTRAVENOUS at 08:37

## 2023-01-01 RX ADMIN — SODIUM CHLORIDE: 9 INJECTION, SOLUTION INTRAVENOUS at 04:01

## 2023-01-01 RX ADMIN — HYDROMORPHONE HYDROCHLORIDE 0.5 MG: 1 INJECTION, SOLUTION INTRAMUSCULAR; INTRAVENOUS; SUBCUTANEOUS at 23:05

## 2023-01-01 RX ADMIN — DEXAMETHASONE 4 MG: 4 TABLET ORAL at 07:39

## 2023-01-01 RX ADMIN — SODIUM CHLORIDE, PRESERVATIVE FREE 10 ML: 5 INJECTION INTRAVENOUS at 19:53

## 2023-01-01 RX ADMIN — OXYCODONE HYDROCHLORIDE 10 MG: 10 TABLET, FILM COATED, EXTENDED RELEASE ORAL at 03:56

## 2023-01-01 RX ADMIN — RIFAXIMIN 550 MG: 550 TABLET ORAL at 21:18

## 2023-01-01 RX ADMIN — APIXABAN 5 MG: 5 TABLET, FILM COATED ORAL at 22:11

## 2023-01-01 RX ADMIN — OXYCODONE HYDROCHLORIDE 10 MG: 5 TABLET ORAL at 18:36

## 2023-01-01 RX ADMIN — OXYCODONE HYDROCHLORIDE 10 MG: 5 TABLET ORAL at 11:04

## 2023-01-01 RX ADMIN — SODIUM CHLORIDE, PRESERVATIVE FREE 10 ML: 5 INJECTION INTRAVENOUS at 09:54

## 2023-01-01 RX ADMIN — DEXAMETHASONE 2 MG: 4 TABLET ORAL at 08:42

## 2023-01-01 RX ADMIN — BUSPIRONE HYDROCHLORIDE 5 MG: 5 TABLET ORAL at 09:23

## 2023-01-01 RX ADMIN — MORPHINE SULFATE 4 MG: 4 INJECTION, SOLUTION INTRAMUSCULAR; INTRAVENOUS at 11:03

## 2023-01-01 RX ADMIN — BUSPIRONE HYDROCHLORIDE 5 MG: 5 TABLET ORAL at 23:38

## 2023-01-01 RX ADMIN — HYDROXYZINE HYDROCHLORIDE 10 MG: 10 TABLET ORAL at 11:41

## 2023-01-01 RX ADMIN — PANTOPRAZOLE SODIUM 40 MG: 40 INJECTION, POWDER, LYOPHILIZED, FOR SOLUTION INTRAVENOUS at 14:51

## 2023-01-01 RX ADMIN — DEXAMETHASONE SODIUM PHOSPHATE 4 MG: 4 INJECTION, SOLUTION INTRA-ARTICULAR; INTRALESIONAL; INTRAMUSCULAR; INTRAVENOUS; SOFT TISSUE at 19:52

## 2023-01-01 RX ADMIN — SODIUM CHLORIDE, PRESERVATIVE FREE 10 ML: 5 INJECTION INTRAVENOUS at 08:22

## 2023-01-01 RX ADMIN — OXYCODONE HYDROCHLORIDE 10 MG: 5 TABLET ORAL at 13:01

## 2023-01-01 RX ADMIN — HYDROXYZINE HYDROCHLORIDE 10 MG: 10 TABLET ORAL at 08:25

## 2023-01-01 RX ADMIN — SODIUM CHLORIDE, POTASSIUM CHLORIDE, SODIUM LACTATE AND CALCIUM CHLORIDE 1000 ML: 600; 310; 30; 20 INJECTION, SOLUTION INTRAVENOUS at 21:57

## 2023-01-01 RX ADMIN — DEXAMETHASONE 2 MG: 4 TABLET ORAL at 22:15

## 2023-01-01 RX ADMIN — PANTOPRAZOLE SODIUM 40 MG: 40 INJECTION, POWDER, LYOPHILIZED, FOR SOLUTION INTRAVENOUS at 09:53

## 2023-01-01 RX ADMIN — DULOXETINE 60 MG: 60 CAPSULE, DELAYED RELEASE ORAL at 09:35

## 2023-01-01 RX ADMIN — TRAZODONE HYDROCHLORIDE 100 MG: 100 TABLET ORAL at 20:59

## 2023-01-01 RX ADMIN — ONDANSETRON 4 MG: 2 INJECTION INTRAMUSCULAR; INTRAVENOUS at 08:30

## 2023-01-01 RX ADMIN — OXYCODONE HYDROCHLORIDE 10 MG: 5 TABLET ORAL at 08:51

## 2023-01-01 RX ADMIN — RIFAXIMIN 550 MG: 550 TABLET ORAL at 08:19

## 2023-01-01 RX ADMIN — FERROUS SULFATE TAB 325 MG (65 MG ELEMENTAL FE) 325 MG: 325 (65 FE) TAB at 09:34

## 2023-01-01 RX ADMIN — TRAZODONE HYDROCHLORIDE 50 MG: 50 TABLET ORAL at 20:49

## 2023-01-01 RX ADMIN — GADOTERIDOL 15 ML: 279.3 INJECTION, SOLUTION INTRAVENOUS at 18:51

## 2023-01-01 RX ADMIN — SODIUM CHLORIDE: 9 INJECTION, SOLUTION INTRAVENOUS at 13:00

## 2023-01-01 RX ADMIN — OXYCODONE HYDROCHLORIDE 5 MG: 5 TABLET ORAL at 02:22

## 2023-01-01 RX ADMIN — RIFAXIMIN 550 MG: 550 TABLET ORAL at 22:08

## 2023-01-01 RX ADMIN — DULOXETINE 60 MG: 60 CAPSULE, DELAYED RELEASE ORAL at 10:09

## 2023-01-01 RX ADMIN — HYDROMORPHONE HYDROCHLORIDE 0.5 MG: 1 INJECTION, SOLUTION INTRAMUSCULAR; INTRAVENOUS; SUBCUTANEOUS at 17:57

## 2023-01-01 RX ADMIN — MORPHINE SULFATE 4 MG: 4 INJECTION, SOLUTION INTRAMUSCULAR; INTRAVENOUS at 18:32

## 2023-01-01 RX ADMIN — DOCUSATE SODIUM 100 MG: 100 CAPSULE, LIQUID FILLED ORAL at 07:39

## 2023-01-01 RX ADMIN — HYDROXYZINE HYDROCHLORIDE 10 MG: 10 TABLET ORAL at 08:06

## 2023-01-01 RX ADMIN — TAMSULOSIN HYDROCHLORIDE 0.8 MG: 0.4 CAPSULE ORAL at 09:23

## 2023-01-01 RX ADMIN — DULOXETINE 60 MG: 60 CAPSULE, DELAYED RELEASE ORAL at 08:18

## 2023-01-01 RX ADMIN — OXYCODONE HYDROCHLORIDE 10 MG: 10 TABLET, FILM COATED, EXTENDED RELEASE ORAL at 15:15

## 2023-01-01 RX ADMIN — SODIUM CHLORIDE, PRESERVATIVE FREE 10 ML: 5 INJECTION INTRAVENOUS at 08:29

## 2023-01-01 RX ADMIN — APIXABAN 5 MG: 5 TABLET, FILM COATED ORAL at 08:09

## 2023-01-01 RX ADMIN — SODIUM CHLORIDE, PRESERVATIVE FREE 10 ML: 5 INJECTION INTRAVENOUS at 20:50

## 2023-01-01 RX ADMIN — ATORVASTATIN CALCIUM 40 MG: 80 TABLET, FILM COATED ORAL at 19:53

## 2023-01-01 RX ADMIN — OXYCODONE HYDROCHLORIDE 10 MG: 5 TABLET ORAL at 06:07

## 2023-01-01 RX ADMIN — SPIRONOLACTONE 100 MG: 25 TABLET ORAL at 08:34

## 2023-01-01 RX ADMIN — FERROUS SULFATE TAB 325 MG (65 MG ELEMENTAL FE) 325 MG: 325 (65 FE) TAB at 08:11

## 2023-01-01 RX ADMIN — TAMSULOSIN HYDROCHLORIDE 0.4 MG: 0.4 CAPSULE ORAL at 08:11

## 2023-01-01 RX ADMIN — OXYCODONE HYDROCHLORIDE 10 MG: 5 TABLET ORAL at 12:11

## 2023-01-01 RX ADMIN — FERROUS SULFATE TAB 325 MG (65 MG ELEMENTAL FE) 325 MG: 325 (65 FE) TAB at 08:34

## 2023-01-01 RX ADMIN — Medication 3 MG: at 20:39

## 2023-01-01 RX ADMIN — ATORVASTATIN CALCIUM 40 MG: 80 TABLET, FILM COATED ORAL at 22:15

## 2023-01-01 RX ADMIN — SODIUM CHLORIDE, PRESERVATIVE FREE 10 ML: 5 INJECTION INTRAVENOUS at 07:43

## 2023-01-01 RX ADMIN — SODIUM CHLORIDE, PRESERVATIVE FREE 10 ML: 5 INJECTION INTRAVENOUS at 08:35

## 2023-01-01 RX ADMIN — OXYCODONE HYDROCHLORIDE 10 MG: 5 TABLET ORAL at 12:20

## 2023-01-01 RX ADMIN — OXYCODONE HYDROCHLORIDE 10 MG: 5 TABLET ORAL at 13:55

## 2023-01-01 RX ADMIN — BUSPIRONE HYDROCHLORIDE 5 MG: 5 TABLET ORAL at 22:15

## 2023-01-01 RX ADMIN — PANTOPRAZOLE SODIUM 40 MG: 40 TABLET, DELAYED RELEASE ORAL at 05:31

## 2023-01-01 RX ADMIN — RIFAXIMIN 550 MG: 550 TABLET ORAL at 10:09

## 2023-01-01 RX ADMIN — TAMSULOSIN HYDROCHLORIDE 0.4 MG: 0.4 CAPSULE ORAL at 08:35

## 2023-01-01 RX ADMIN — SODIUM CHLORIDE, PRESERVATIVE FREE 10 ML: 5 INJECTION INTRAVENOUS at 20:01

## 2023-01-01 RX ADMIN — LACTULOSE 20 G: 10 SOLUTION ORAL at 20:58

## 2023-01-01 RX ADMIN — OXYCODONE HYDROCHLORIDE 5 MG: 5 TABLET ORAL at 06:45

## 2023-01-01 RX ADMIN — PANTOPRAZOLE SODIUM 40 MG: 40 TABLET, DELAYED RELEASE ORAL at 06:45

## 2023-01-01 RX ADMIN — DOCUSATE SODIUM 100 MG: 100 CAPSULE, LIQUID FILLED ORAL at 09:33

## 2023-01-01 RX ADMIN — ATORVASTATIN CALCIUM 40 MG: 80 TABLET, FILM COATED ORAL at 20:59

## 2023-01-01 RX ADMIN — OXYCODONE HYDROCHLORIDE 10 MG: 5 TABLET ORAL at 22:26

## 2023-01-01 RX ADMIN — RIFAXIMIN 550 MG: 550 TABLET ORAL at 20:00

## 2023-01-01 RX ADMIN — PANTOPRAZOLE SODIUM 40 MG: 40 TABLET, DELAYED RELEASE ORAL at 06:53

## 2023-01-01 RX ADMIN — RIFAXIMIN 550 MG: 550 TABLET ORAL at 08:25

## 2023-01-01 RX ADMIN — DOCUSATE SODIUM 100 MG: 100 CAPSULE, LIQUID FILLED ORAL at 09:58

## 2023-01-01 RX ADMIN — LORAZEPAM 0.5 MG: 0.5 TABLET ORAL at 01:41

## 2023-01-01 RX ADMIN — DULOXETINE 60 MG: 60 CAPSULE, DELAYED RELEASE ORAL at 08:34

## 2023-01-01 RX ADMIN — HYDROMORPHONE HYDROCHLORIDE 0.5 MG: 1 INJECTION, SOLUTION INTRAMUSCULAR; INTRAVENOUS; SUBCUTANEOUS at 05:31

## 2023-01-01 RX ADMIN — LORAZEPAM 0.5 MG: 0.5 TABLET ORAL at 23:19

## 2023-01-01 RX ADMIN — RIFAXIMIN 550 MG: 550 TABLET ORAL at 08:35

## 2023-01-01 RX ADMIN — ATORVASTATIN CALCIUM 40 MG: 80 TABLET, FILM COATED ORAL at 21:30

## 2023-01-01 RX ADMIN — SPIRONOLACTONE 100 MG: 25 TABLET ORAL at 08:48

## 2023-01-01 RX ADMIN — SODIUM CHLORIDE, PRESERVATIVE FREE 10 ML: 5 INJECTION INTRAVENOUS at 19:43

## 2023-01-01 RX ADMIN — HEPARIN SODIUM 9 UNITS/KG/HR: 10000 INJECTION, SOLUTION INTRAVENOUS at 07:49

## 2023-01-01 RX ADMIN — PANTOPRAZOLE SODIUM 40 MG: 40 TABLET, DELAYED RELEASE ORAL at 05:23

## 2023-01-01 RX ADMIN — DEXAMETHASONE SODIUM PHOSPHATE 4 MG: 4 INJECTION, SOLUTION INTRA-ARTICULAR; INTRALESIONAL; INTRAMUSCULAR; INTRAVENOUS; SOFT TISSUE at 14:21

## 2023-01-01 RX ADMIN — RIFAXIMIN 550 MG: 550 TABLET ORAL at 09:48

## 2023-01-01 RX ADMIN — RIFAXIMIN 550 MG: 550 TABLET ORAL at 09:53

## 2023-01-01 RX ADMIN — ATORVASTATIN CALCIUM 40 MG: 80 TABLET, FILM COATED ORAL at 20:31

## 2023-01-01 RX ADMIN — OXYCODONE HYDROCHLORIDE 10 MG: 5 TABLET ORAL at 08:26

## 2023-01-01 RX ADMIN — SODIUM CHLORIDE, PRESERVATIVE FREE 5 ML: 5 INJECTION INTRAVENOUS at 22:08

## 2023-01-01 RX ADMIN — Medication 3 MG: at 20:49

## 2023-01-01 RX ADMIN — OXYCODONE HYDROCHLORIDE 10 MG: 10 TABLET, FILM COATED, EXTENDED RELEASE ORAL at 04:16

## 2023-01-01 RX ADMIN — SODIUM CHLORIDE: 9 INJECTION, SOLUTION INTRAVENOUS at 17:06

## 2023-01-01 RX ADMIN — LORAZEPAM 0.5 MG: 0.5 TABLET ORAL at 20:58

## 2023-01-01 RX ADMIN — DULOXETINE 60 MG: 60 CAPSULE, DELAYED RELEASE ORAL at 11:50

## 2023-01-01 RX ADMIN — PANTOPRAZOLE SODIUM 40 MG: 40 TABLET, DELAYED RELEASE ORAL at 05:30

## 2023-01-01 RX ADMIN — DEXAMETHASONE SODIUM PHOSPHATE 4 MG: 4 INJECTION, SOLUTION INTRA-ARTICULAR; INTRALESIONAL; INTRAMUSCULAR; INTRAVENOUS; SOFT TISSUE at 08:42

## 2023-01-01 RX ADMIN — OXYCODONE HYDROCHLORIDE 5 MG: 5 TABLET ORAL at 06:47

## 2023-01-01 RX ADMIN — OXYCODONE HYDROCHLORIDE 10 MG: 10 TABLET, FILM COATED, EXTENDED RELEASE ORAL at 17:34

## 2023-01-01 RX ADMIN — PANTOPRAZOLE SODIUM 40 MG: 40 TABLET, DELAYED RELEASE ORAL at 05:25

## 2023-01-01 RX ADMIN — RIFAXIMIN 550 MG: 550 TABLET ORAL at 20:24

## 2023-01-01 RX ADMIN — DOCUSATE SODIUM 100 MG: 100 CAPSULE, LIQUID FILLED ORAL at 08:41

## 2023-01-01 RX ADMIN — LORAZEPAM 0.5 MG: 0.5 TABLET ORAL at 17:59

## 2023-01-01 RX ADMIN — LACTULOSE 20 G: 10 SOLUTION ORAL at 20:50

## 2023-01-01 RX ADMIN — ALBUMIN, HUMAN INJ 5% 12.5 G: 5 SOLUTION at 17:34

## 2023-01-01 RX ADMIN — OXYCODONE HYDROCHLORIDE 10 MG: 5 TABLET ORAL at 08:41

## 2023-01-01 RX ADMIN — OXYCODONE HYDROCHLORIDE 10 MG: 10 TABLET, FILM COATED, EXTENDED RELEASE ORAL at 04:28

## 2023-01-01 RX ADMIN — DEXAMETHASONE 2 MG: 4 TABLET ORAL at 19:48

## 2023-01-01 RX ADMIN — OXYCODONE HYDROCHLORIDE 10 MG: 5 TABLET ORAL at 20:29

## 2023-01-01 RX ADMIN — DOCUSATE SODIUM 100 MG: 100 CAPSULE, LIQUID FILLED ORAL at 20:59

## 2023-01-01 RX ADMIN — DULOXETINE 60 MG: 60 CAPSULE, DELAYED RELEASE ORAL at 08:11

## 2023-01-01 RX ADMIN — RIFAXIMIN 550 MG: 550 TABLET ORAL at 21:30

## 2023-01-01 RX ADMIN — OXYCODONE HYDROCHLORIDE 10 MG: 10 TABLET, FILM COATED, EXTENDED RELEASE ORAL at 03:41

## 2023-01-01 RX ADMIN — OXYCODONE 5 MG: 5 TABLET ORAL at 19:18

## 2023-01-01 RX ADMIN — FERROUS SULFATE TAB 325 MG (65 MG ELEMENTAL FE) 325 MG: 325 (65 FE) TAB at 09:23

## 2023-01-01 RX ADMIN — ONDANSETRON 4 MG: 4 TABLET, ORALLY DISINTEGRATING ORAL at 11:40

## 2023-01-01 RX ADMIN — HYDROXYZINE HYDROCHLORIDE 10 MG: 10 TABLET ORAL at 17:48

## 2023-01-01 RX ADMIN — LORAZEPAM 1 MG: 2 INJECTION INTRAMUSCULAR; INTRAVENOUS at 00:10

## 2023-01-01 RX ADMIN — OXYCODONE HYDROCHLORIDE 10 MG: 10 TABLET, FILM COATED, EXTENDED RELEASE ORAL at 19:05

## 2023-01-01 RX ADMIN — RIFAXIMIN 550 MG: 550 TABLET ORAL at 20:57

## 2023-01-01 RX ADMIN — OXYCODONE HYDROCHLORIDE 10 MG: 10 TABLET, FILM COATED, EXTENDED RELEASE ORAL at 04:36

## 2023-01-01 RX ADMIN — FERROUS SULFATE TAB 325 MG (65 MG ELEMENTAL FE) 325 MG: 325 (65 FE) TAB at 07:40

## 2023-01-01 RX ADMIN — LORAZEPAM 1 MG: 2 INJECTION INTRAMUSCULAR; INTRAVENOUS at 04:44

## 2023-01-01 RX ADMIN — SPIRONOLACTONE 100 MG: 25 TABLET ORAL at 08:21

## 2023-01-01 RX ADMIN — LORAZEPAM 1 MG: 2 INJECTION INTRAMUSCULAR; INTRAVENOUS at 02:22

## 2023-01-01 RX ADMIN — OXYCODONE HYDROCHLORIDE 10 MG: 10 TABLET, FILM COATED, EXTENDED RELEASE ORAL at 04:33

## 2023-01-01 RX ADMIN — SODIUM CHLORIDE, PRESERVATIVE FREE 10 ML: 5 INJECTION INTRAVENOUS at 08:44

## 2023-01-01 RX ADMIN — PANTOPRAZOLE SODIUM 40 MG: 40 TABLET, DELAYED RELEASE ORAL at 06:00

## 2023-01-01 RX ADMIN — RIFAXIMIN 550 MG: 550 TABLET ORAL at 09:23

## 2023-01-01 RX ADMIN — ATORVASTATIN CALCIUM 40 MG: 80 TABLET, FILM COATED ORAL at 20:57

## 2023-01-01 RX ADMIN — MORPHINE SULFATE 2 MG: 2 INJECTION, SOLUTION INTRAMUSCULAR; INTRAVENOUS at 20:56

## 2023-01-01 RX ADMIN — SPIRONOLACTONE 100 MG: 25 TABLET ORAL at 09:58

## 2023-01-01 RX ADMIN — HYDROMORPHONE HYDROCHLORIDE 0.5 MG: 1 INJECTION, SOLUTION INTRAMUSCULAR; INTRAVENOUS; SUBCUTANEOUS at 01:04

## 2023-01-01 RX ADMIN — TRAZODONE HYDROCHLORIDE 50 MG: 50 TABLET ORAL at 20:07

## 2023-01-01 RX ADMIN — APIXABAN 5 MG: 5 TABLET, FILM COATED ORAL at 20:49

## 2023-01-01 RX ADMIN — Medication 3 MG: at 23:40

## 2023-01-01 RX ADMIN — IOPAMIDOL 80 ML: 755 INJECTION, SOLUTION INTRAVENOUS at 21:24

## 2023-01-01 RX ADMIN — PANTOPRAZOLE SODIUM 40 MG: 40 TABLET, DELAYED RELEASE ORAL at 06:31

## 2023-01-01 RX ADMIN — PANTOPRAZOLE SODIUM 40 MG: 40 TABLET, DELAYED RELEASE ORAL at 06:32

## 2023-01-01 RX ADMIN — LACTULOSE 20 G: 10 SOLUTION ORAL at 08:46

## 2023-01-01 RX ADMIN — PANTOPRAZOLE SODIUM 40 MG: 40 TABLET, DELAYED RELEASE ORAL at 06:03

## 2023-01-01 RX ADMIN — OXYCODONE HYDROCHLORIDE 10 MG: 5 TABLET ORAL at 07:54

## 2023-01-01 RX ADMIN — SODIUM CHLORIDE, PRESERVATIVE FREE 10 ML: 5 INJECTION INTRAVENOUS at 19:37

## 2023-01-01 RX ADMIN — DULOXETINE 60 MG: 60 CAPSULE, DELAYED RELEASE ORAL at 09:58

## 2023-01-01 RX ADMIN — ATORVASTATIN CALCIUM 40 MG: 80 TABLET, FILM COATED ORAL at 20:40

## 2023-01-01 RX ADMIN — OXYCODONE HYDROCHLORIDE 10 MG: 10 TABLET, FILM COATED, EXTENDED RELEASE ORAL at 03:28

## 2023-01-01 RX ADMIN — LACTULOSE 20 G: 10 SOLUTION ORAL at 21:10

## 2023-01-01 RX ADMIN — OXYCODONE HYDROCHLORIDE 10 MG: 5 TABLET ORAL at 20:59

## 2023-01-01 RX ADMIN — RIFAXIMIN 550 MG: 550 TABLET ORAL at 22:16

## 2023-01-01 RX ADMIN — DEXAMETHASONE 4 MG: 4 TABLET ORAL at 20:58

## 2023-01-01 RX ADMIN — IOPAMIDOL 80 ML: 755 INJECTION, SOLUTION INTRAVENOUS at 15:54

## 2023-01-01 RX ADMIN — TRAZODONE HYDROCHLORIDE 50 MG: 50 TABLET ORAL at 21:33

## 2023-01-01 RX ADMIN — RIFAXIMIN 550 MG: 550 TABLET ORAL at 08:34

## 2023-01-01 RX ADMIN — TRAZODONE HYDROCHLORIDE 50 MG: 50 TABLET ORAL at 22:11

## 2023-01-01 RX ADMIN — OXYCODONE HYDROCHLORIDE 10 MG: 5 TABLET ORAL at 02:05

## 2023-01-01 RX ADMIN — OXYCODONE HYDROCHLORIDE 10 MG: 5 TABLET ORAL at 15:25

## 2023-01-01 RX ADMIN — OXYCODONE HYDROCHLORIDE 10 MG: 10 TABLET, FILM COATED, EXTENDED RELEASE ORAL at 03:15

## 2023-01-01 RX ADMIN — APIXABAN 5 MG: 5 TABLET, FILM COATED ORAL at 09:06

## 2023-01-01 RX ADMIN — RIFAXIMIN 550 MG: 550 TABLET ORAL at 20:29

## 2023-01-01 RX ADMIN — OXYCODONE HYDROCHLORIDE 10 MG: 5 TABLET ORAL at 16:20

## 2023-01-01 RX ADMIN — OXYCODONE HYDROCHLORIDE 10 MG: 5 TABLET ORAL at 13:51

## 2023-01-01 RX ADMIN — RIFAXIMIN 550 MG: 550 TABLET ORAL at 08:47

## 2023-01-01 RX ADMIN — OXYCODONE HYDROCHLORIDE 10 MG: 5 TABLET ORAL at 09:23

## 2023-01-01 RX ADMIN — SODIUM BICARBONATE 100 MEQ: 84 INJECTION, SOLUTION INTRAVENOUS at 17:35

## 2023-01-01 RX ADMIN — TAMSULOSIN HYDROCHLORIDE 0.4 MG: 0.4 CAPSULE ORAL at 07:59

## 2023-01-01 RX ADMIN — OXYCODONE HYDROCHLORIDE 5 MG: 5 TABLET ORAL at 19:22

## 2023-01-01 RX ADMIN — TRAZODONE HYDROCHLORIDE 50 MG: 50 TABLET ORAL at 20:57

## 2023-01-01 RX ADMIN — OXYCODONE HYDROCHLORIDE 10 MG: 10 TABLET, FILM COATED, EXTENDED RELEASE ORAL at 16:04

## 2023-01-01 RX ADMIN — HYDROMORPHONE HYDROCHLORIDE 0.5 MG: 1 INJECTION, SOLUTION INTRAMUSCULAR; INTRAVENOUS; SUBCUTANEOUS at 14:42

## 2023-01-01 RX ADMIN — OXYCODONE HYDROCHLORIDE 10 MG: 10 TABLET, FILM COATED, EXTENDED RELEASE ORAL at 16:33

## 2023-01-01 RX ADMIN — LORAZEPAM 0.5 MG: 0.5 TABLET ORAL at 23:11

## 2023-01-01 RX ADMIN — TRAZODONE HYDROCHLORIDE 50 MG: 50 TABLET ORAL at 19:59

## 2023-01-01 RX ADMIN — SODIUM CHLORIDE, PRESERVATIVE FREE 5 ML: 5 INJECTION INTRAVENOUS at 22:09

## 2023-01-01 RX ADMIN — APIXABAN 5 MG: 5 TABLET, FILM COATED ORAL at 20:24

## 2023-01-01 RX ADMIN — OXYCODONE HYDROCHLORIDE 10 MG: 10 TABLET, FILM COATED, EXTENDED RELEASE ORAL at 17:57

## 2023-01-01 RX ADMIN — ATORVASTATIN CALCIUM 40 MG: 80 TABLET, FILM COATED ORAL at 19:37

## 2023-01-01 RX ADMIN — DOCUSATE SODIUM 100 MG: 100 CAPSULE, LIQUID FILLED ORAL at 08:33

## 2023-01-01 RX ADMIN — HYDROXYZINE HYDROCHLORIDE 10 MG: 10 TABLET ORAL at 12:50

## 2023-01-01 RX ADMIN — SODIUM CHLORIDE, PRESERVATIVE FREE 10 ML: 5 INJECTION INTRAVENOUS at 08:03

## 2023-01-01 RX ADMIN — SPIRONOLACTONE 100 MG: 25 TABLET ORAL at 09:05

## 2023-01-01 RX ADMIN — BUSPIRONE HYDROCHLORIDE 5 MG: 5 TABLET ORAL at 08:34

## 2023-01-01 RX ADMIN — SODIUM CHLORIDE, PRESERVATIVE FREE 10 ML: 5 INJECTION INTRAVENOUS at 09:53

## 2023-01-01 RX ADMIN — HYDROXYZINE HYDROCHLORIDE 10 MG: 10 TABLET ORAL at 11:05

## 2023-01-01 RX ADMIN — LACTULOSE 20 G: 10 SOLUTION ORAL at 08:09

## 2023-01-01 RX ADMIN — HEPARIN SODIUM 11 UNITS/KG/HR: 10000 INJECTION, SOLUTION INTRAVENOUS at 17:37

## 2023-01-01 RX ADMIN — ATORVASTATIN CALCIUM 40 MG: 80 TABLET, FILM COATED ORAL at 20:29

## 2023-01-01 RX ADMIN — HYDROMORPHONE HYDROCHLORIDE 0.5 MG: 1 INJECTION, SOLUTION INTRAMUSCULAR; INTRAVENOUS; SUBCUTANEOUS at 09:56

## 2023-01-01 RX ADMIN — OXYCODONE HYDROCHLORIDE 10 MG: 5 TABLET ORAL at 11:59

## 2023-01-01 RX ADMIN — MORPHINE SULFATE 4 MG: 4 INJECTION, SOLUTION INTRAMUSCULAR; INTRAVENOUS at 03:36

## 2023-01-01 RX ADMIN — OXYCODONE HYDROCHLORIDE 10 MG: 10 TABLET, FILM COATED, EXTENDED RELEASE ORAL at 03:36

## 2023-01-01 RX ADMIN — OXYCODONE HYDROCHLORIDE 10 MG: 10 TABLET, FILM COATED, EXTENDED RELEASE ORAL at 16:06

## 2023-01-01 RX ADMIN — TRAZODONE HYDROCHLORIDE 50 MG: 50 TABLET ORAL at 22:17

## 2023-01-01 RX ADMIN — LORAZEPAM 1 MG: 2 INJECTION INTRAMUSCULAR; INTRAVENOUS at 19:42

## 2023-01-01 RX ADMIN — SODIUM CHLORIDE, PRESERVATIVE FREE 10 ML: 5 INJECTION INTRAVENOUS at 10:14

## 2023-01-01 RX ADMIN — DULOXETINE 60 MG: 60 CAPSULE, DELAYED RELEASE ORAL at 08:32

## 2023-01-01 RX ADMIN — HYDROXYZINE HYDROCHLORIDE 10 MG: 10 TABLET ORAL at 08:23

## 2023-01-01 RX ADMIN — OXYCODONE HYDROCHLORIDE 10 MG: 10 TABLET, FILM COATED, EXTENDED RELEASE ORAL at 03:09

## 2023-01-01 RX ADMIN — CALCIUM GLUCONATE 2000 MG: 20 INJECTION, SOLUTION INTRAVENOUS at 02:33

## 2023-01-01 RX ADMIN — LORAZEPAM 0.5 MG: 0.5 TABLET ORAL at 19:41

## 2023-01-01 RX ADMIN — OXYCODONE HYDROCHLORIDE 10 MG: 5 TABLET ORAL at 22:13

## 2023-01-01 RX ADMIN — RIFAXIMIN 550 MG: 550 TABLET ORAL at 20:59

## 2023-01-01 RX ADMIN — ATORVASTATIN CALCIUM 40 MG: 80 TABLET, FILM COATED ORAL at 20:24

## 2023-01-01 RX ADMIN — RIFAXIMIN 550 MG: 550 TABLET ORAL at 19:54

## 2023-01-01 RX ADMIN — OXYCODONE HYDROCHLORIDE 10 MG: 10 TABLET, FILM COATED, EXTENDED RELEASE ORAL at 04:24

## 2023-01-01 RX ADMIN — SODIUM CHLORIDE, PRESERVATIVE FREE 10 ML: 5 INJECTION INTRAVENOUS at 09:57

## 2023-01-01 RX ADMIN — SODIUM CHLORIDE, PRESERVATIVE FREE 10 ML: 5 INJECTION INTRAVENOUS at 08:48

## 2023-01-01 RX ADMIN — SODIUM CHLORIDE, PRESERVATIVE FREE 10 ML: 5 INJECTION INTRAVENOUS at 21:31

## 2023-01-01 RX ADMIN — LACTULOSE 20 G: 10 SOLUTION ORAL at 19:41

## 2023-01-01 RX ADMIN — OXYCODONE HYDROCHLORIDE 10 MG: 5 TABLET ORAL at 08:46

## 2023-01-01 RX ADMIN — HYDROMORPHONE HYDROCHLORIDE 0.5 MG: 1 INJECTION, SOLUTION INTRAMUSCULAR; INTRAVENOUS; SUBCUTANEOUS at 21:53

## 2023-01-01 RX ADMIN — ATORVASTATIN CALCIUM 40 MG: 80 TABLET, FILM COATED ORAL at 21:00

## 2023-01-01 RX ADMIN — SPIRONOLACTONE 100 MG: 25 TABLET ORAL at 07:58

## 2023-01-01 RX ADMIN — OXYCODONE HYDROCHLORIDE 10 MG: 5 TABLET ORAL at 09:47

## 2023-01-01 RX ADMIN — OXYCODONE HYDROCHLORIDE 10 MG: 10 TABLET, FILM COATED, EXTENDED RELEASE ORAL at 15:39

## 2023-01-01 RX ADMIN — SODIUM CHLORIDE 500 ML: 9 INJECTION, SOLUTION INTRAVENOUS at 12:26

## 2023-01-01 RX ADMIN — SODIUM CHLORIDE, PRESERVATIVE FREE 10 ML: 5 INJECTION INTRAVENOUS at 20:49

## 2023-01-01 RX ADMIN — OXYCODONE HYDROCHLORIDE 5 MG: 5 TABLET ORAL at 17:35

## 2023-01-01 RX ADMIN — FERROUS SULFATE TAB 325 MG (65 MG ELEMENTAL FE) 325 MG: 325 (65 FE) TAB at 08:33

## 2023-01-01 RX ADMIN — FERROUS SULFATE TAB 325 MG (65 MG ELEMENTAL FE) 325 MG: 325 (65 FE) TAB at 08:09

## 2023-01-01 RX ADMIN — HYDROMORPHONE HYDROCHLORIDE 0.5 MG: 1 INJECTION, SOLUTION INTRAMUSCULAR; INTRAVENOUS; SUBCUTANEOUS at 12:34

## 2023-01-01 RX ADMIN — Medication 3 MG: at 19:37

## 2023-01-01 RX ADMIN — OXYCODONE HYDROCHLORIDE 10 MG: 5 TABLET ORAL at 11:21

## 2023-01-01 RX ADMIN — DEXAMETHASONE SODIUM PHOSPHATE 4 MG: 4 INJECTION, SOLUTION INTRA-ARTICULAR; INTRALESIONAL; INTRAMUSCULAR; INTRAVENOUS; SOFT TISSUE at 19:30

## 2023-01-01 RX ADMIN — RIFAXIMIN 550 MG: 550 TABLET ORAL at 19:47

## 2023-01-01 RX ADMIN — DEXAMETHASONE 4 MG: 4 TABLET ORAL at 19:37

## 2023-01-01 RX ADMIN — OXYCODONE HYDROCHLORIDE 10 MG: 10 TABLET, FILM COATED, EXTENDED RELEASE ORAL at 15:24

## 2023-01-01 RX ADMIN — DOCUSATE SODIUM 100 MG: 100 CAPSULE, LIQUID FILLED ORAL at 21:15

## 2023-01-01 RX ADMIN — LACTULOSE 20 G: 10 SOLUTION ORAL at 09:48

## 2023-01-01 RX ADMIN — HYDROMORPHONE HYDROCHLORIDE 0.5 MG: 1 INJECTION, SOLUTION INTRAMUSCULAR; INTRAVENOUS; SUBCUTANEOUS at 10:04

## 2023-01-01 RX ADMIN — LORAZEPAM 0.5 MG: 0.5 TABLET ORAL at 13:50

## 2023-01-01 RX ADMIN — DEXAMETHASONE SODIUM PHOSPHATE 4 MG: 4 INJECTION, SOLUTION INTRA-ARTICULAR; INTRALESIONAL; INTRAMUSCULAR; INTRAVENOUS; SOFT TISSUE at 09:48

## 2023-01-01 RX ADMIN — ATORVASTATIN CALCIUM 40 MG: 80 TABLET, FILM COATED ORAL at 19:59

## 2023-01-01 RX ADMIN — DOCUSATE SODIUM 100 MG: 100 CAPSULE, LIQUID FILLED ORAL at 08:44

## 2023-01-01 RX ADMIN — DOCUSATE SODIUM 100 MG: 100 CAPSULE, LIQUID FILLED ORAL at 19:42

## 2023-01-01 RX ADMIN — ALBUMIN (HUMAN) 12.5 G: 12.5 INJECTION, SOLUTION INTRAVENOUS at 17:04

## 2023-01-01 RX ADMIN — OXYCODONE HYDROCHLORIDE 10 MG: 5 TABLET ORAL at 04:35

## 2023-01-01 RX ADMIN — FERROUS SULFATE TAB 325 MG (65 MG ELEMENTAL FE) 325 MG: 325 (65 FE) TAB at 09:06

## 2023-01-01 RX ADMIN — OXYCODONE HYDROCHLORIDE 5 MG: 5 TABLET ORAL at 18:06

## 2023-01-01 RX ADMIN — SODIUM CHLORIDE, PRESERVATIVE FREE 10 ML: 5 INJECTION INTRAVENOUS at 21:15

## 2023-01-01 RX ADMIN — LORAZEPAM 0.5 MG: 0.5 TABLET ORAL at 11:21

## 2023-01-01 RX ADMIN — MORPHINE SULFATE 2 MG: 2 INJECTION, SOLUTION INTRAMUSCULAR; INTRAVENOUS at 01:05

## 2023-01-01 RX ADMIN — OXYCODONE HYDROCHLORIDE 10 MG: 5 TABLET ORAL at 03:16

## 2023-01-01 RX ADMIN — RIFAXIMIN 550 MG: 550 TABLET ORAL at 22:11

## 2023-01-01 RX ADMIN — CALCIUM GLUCONATE 2000 MG: 20 INJECTION, SOLUTION INTRAVENOUS at 00:32

## 2023-01-01 RX ADMIN — ATORVASTATIN CALCIUM 40 MG: 80 TABLET, FILM COATED ORAL at 20:49

## 2023-01-01 RX ADMIN — OXYCODONE HYDROCHLORIDE 10 MG: 10 TABLET, FILM COATED, EXTENDED RELEASE ORAL at 03:14

## 2023-01-01 RX ADMIN — SPIRONOLACTONE 100 MG: 25 TABLET ORAL at 08:18

## 2023-01-01 RX ADMIN — OXYCODONE HYDROCHLORIDE 10 MG: 5 TABLET ORAL at 18:24

## 2023-01-01 RX ADMIN — OXYCODONE HYDROCHLORIDE 10 MG: 5 TABLET ORAL at 11:56

## 2023-01-01 RX ADMIN — RIFAXIMIN 550 MG: 550 TABLET ORAL at 08:20

## 2023-01-01 RX ADMIN — TAMSULOSIN HYDROCHLORIDE 0.4 MG: 0.4 CAPSULE ORAL at 08:50

## 2023-01-01 RX ADMIN — ATORVASTATIN CALCIUM 40 MG: 80 TABLET, FILM COATED ORAL at 22:11

## 2023-01-01 RX ADMIN — MORPHINE SULFATE 4 MG: 4 INJECTION, SOLUTION INTRAMUSCULAR; INTRAVENOUS at 23:25

## 2023-01-01 RX ADMIN — TAMSULOSIN HYDROCHLORIDE 0.4 MG: 0.4 CAPSULE ORAL at 08:34

## 2023-01-01 RX ADMIN — OXYCODONE HYDROCHLORIDE 10 MG: 10 TABLET, FILM COATED, EXTENDED RELEASE ORAL at 04:07

## 2023-01-01 RX ADMIN — PROPOFOL 10 MCG/KG/MIN: 10 INJECTION, EMULSION INTRAVENOUS at 19:50

## 2023-01-01 RX ADMIN — OXYCODONE HYDROCHLORIDE 10 MG: 5 TABLET ORAL at 11:14

## 2023-01-01 RX ADMIN — LACTULOSE 20 G: 10 SOLUTION ORAL at 20:57

## 2023-01-01 RX ADMIN — MIDODRINE HYDROCHLORIDE 5 MG: 5 TABLET ORAL at 13:03

## 2023-01-01 RX ADMIN — RIFAXIMIN 550 MG: 550 TABLET ORAL at 08:11

## 2023-01-01 RX ADMIN — SPIRONOLACTONE 100 MG: 25 TABLET ORAL at 08:44

## 2023-01-01 RX ADMIN — HYDROMORPHONE HYDROCHLORIDE 0.5 MG: 1 INJECTION, SOLUTION INTRAMUSCULAR; INTRAVENOUS; SUBCUTANEOUS at 08:39

## 2023-01-01 RX ADMIN — OXYCODONE HYDROCHLORIDE 10 MG: 10 TABLET, FILM COATED, EXTENDED RELEASE ORAL at 16:56

## 2023-01-01 RX ADMIN — OXYCODONE HYDROCHLORIDE 10 MG: 5 TABLET ORAL at 10:48

## 2023-01-01 RX ADMIN — ONDANSETRON 4 MG: 4 TABLET, ORALLY DISINTEGRATING ORAL at 11:04

## 2023-01-01 RX ADMIN — DULOXETINE 60 MG: 60 CAPSULE, DELAYED RELEASE ORAL at 07:39

## 2023-01-01 RX ADMIN — RIFAXIMIN 550 MG: 550 TABLET ORAL at 19:41

## 2023-01-01 RX ADMIN — SPIRONOLACTONE 100 MG: 25 TABLET ORAL at 08:26

## 2023-01-01 RX ADMIN — OXYCODONE HYDROCHLORIDE 10 MG: 5 TABLET ORAL at 22:58

## 2023-01-01 RX ADMIN — ALBUMIN (HUMAN) 25 G: 0.25 INJECTION, SOLUTION INTRAVENOUS at 16:05

## 2023-01-01 RX ADMIN — OXYCODONE HYDROCHLORIDE 10 MG: 10 TABLET, FILM COATED, EXTENDED RELEASE ORAL at 04:17

## 2023-01-01 RX ADMIN — DULOXETINE 60 MG: 60 CAPSULE, DELAYED RELEASE ORAL at 08:45

## 2023-01-01 RX ADMIN — OXYCODONE HYDROCHLORIDE 10 MG: 5 TABLET ORAL at 21:30

## 2023-01-01 RX ADMIN — OXYCODONE HYDROCHLORIDE 10 MG: 5 TABLET ORAL at 13:50

## 2023-01-01 RX ADMIN — HEPARIN SODIUM 18 UNITS/KG/HR: 10000 INJECTION, SOLUTION INTRAVENOUS at 19:05

## 2023-01-01 RX ADMIN — DULOXETINE 60 MG: 60 CAPSULE, DELAYED RELEASE ORAL at 09:06

## 2023-01-01 RX ADMIN — PANTOPRAZOLE SODIUM 40 MG: 40 TABLET, DELAYED RELEASE ORAL at 03:57

## 2023-01-01 RX ADMIN — OXYCODONE HYDROCHLORIDE 5 MG: 5 TABLET ORAL at 20:40

## 2023-01-01 RX ADMIN — SODIUM CHLORIDE: 9 INJECTION, SOLUTION INTRAVENOUS at 10:09

## 2023-01-01 RX ADMIN — SODIUM CHLORIDE, PRESERVATIVE FREE 10 ML: 5 INJECTION INTRAVENOUS at 22:15

## 2023-01-01 RX ADMIN — DULOXETINE 60 MG: 60 CAPSULE, DELAYED RELEASE ORAL at 09:23

## 2023-01-01 RX ADMIN — FERROUS SULFATE TAB 325 MG (65 MG ELEMENTAL FE) 325 MG: 325 (65 FE) TAB at 08:21

## 2023-01-01 RX ADMIN — Medication 3 MG: at 22:02

## 2023-01-01 RX ADMIN — SPIRONOLACTONE 100 MG: 25 TABLET ORAL at 09:53

## 2023-01-01 RX ADMIN — DULOXETINE 60 MG: 60 CAPSULE, DELAYED RELEASE ORAL at 08:41

## 2023-01-01 RX ADMIN — CALCIUM GLUCONATE 2000 MG: 20 INJECTION, SOLUTION INTRAVENOUS at 04:41

## 2023-01-01 RX ADMIN — OXYCODONE HYDROCHLORIDE 10 MG: 5 TABLET ORAL at 22:10

## 2023-01-01 RX ADMIN — FERROUS SULFATE TAB 325 MG (65 MG ELEMENTAL FE) 325 MG: 325 (65 FE) TAB at 08:44

## 2023-01-01 RX ADMIN — DEXAMETHASONE 4 MG: 4 TABLET ORAL at 07:58

## 2023-01-01 RX ADMIN — TAMSULOSIN HYDROCHLORIDE 0.4 MG: 0.4 CAPSULE ORAL at 08:18

## 2023-01-01 RX ADMIN — HYDROMORPHONE HYDROCHLORIDE 0.5 MG: 1 INJECTION, SOLUTION INTRAMUSCULAR; INTRAVENOUS; SUBCUTANEOUS at 14:46

## 2023-01-01 RX ADMIN — SODIUM CHLORIDE: 9 INJECTION, SOLUTION INTRAVENOUS at 15:51

## 2023-01-01 RX ADMIN — SPIRONOLACTONE 100 MG: 25 TABLET ORAL at 09:49

## 2023-01-01 RX ADMIN — HYDROMORPHONE HYDROCHLORIDE 1 MG: 1 INJECTION, SOLUTION INTRAMUSCULAR; INTRAVENOUS; SUBCUTANEOUS at 11:06

## 2023-01-01 RX ADMIN — ONDANSETRON 4 MG: 2 INJECTION INTRAMUSCULAR; INTRAVENOUS at 23:46

## 2023-01-01 RX ADMIN — SODIUM CHLORIDE, PRESERVATIVE FREE 10 ML: 5 INJECTION INTRAVENOUS at 08:42

## 2023-01-01 RX ADMIN — OXYCODONE HYDROCHLORIDE 10 MG: 5 TABLET ORAL at 21:08

## 2023-01-01 RX ADMIN — RIFAXIMIN 550 MG: 550 TABLET ORAL at 09:22

## 2023-01-01 RX ADMIN — DEXAMETHASONE SODIUM PHOSPHATE 4 MG: 4 INJECTION, SOLUTION INTRA-ARTICULAR; INTRALESIONAL; INTRAMUSCULAR; INTRAVENOUS; SOFT TISSUE at 02:01

## 2023-01-01 RX ADMIN — DEXAMETHASONE 4 MG: 4 TABLET ORAL at 21:14

## 2023-01-01 RX ADMIN — SODIUM CHLORIDE, PRESERVATIVE FREE 10 ML: 5 INJECTION INTRAVENOUS at 08:49

## 2023-01-01 RX ADMIN — DEXAMETHASONE 4 MG: 4 TABLET ORAL at 20:39

## 2023-01-01 RX ADMIN — ALBUMIN (HUMAN) 12.5 G: 12.5 INJECTION, SOLUTION INTRAVENOUS at 17:34

## 2023-01-01 RX ADMIN — DULOXETINE 60 MG: 60 CAPSULE, DELAYED RELEASE ORAL at 08:02

## 2023-01-01 RX ADMIN — OXYCODONE HYDROCHLORIDE 10 MG: 5 TABLET ORAL at 20:07

## 2023-01-01 RX ADMIN — DEXAMETHASONE 4 MG: 4 TABLET ORAL at 08:46

## 2023-01-01 RX ADMIN — DULOXETINE 60 MG: 60 CAPSULE, DELAYED RELEASE ORAL at 09:48

## 2023-01-01 RX ADMIN — SPIRONOLACTONE 100 MG: 25 TABLET ORAL at 07:39

## 2023-01-01 RX ADMIN — RIFAXIMIN 550 MG: 550 TABLET ORAL at 07:59

## 2023-01-01 RX ADMIN — OXYCODONE HYDROCHLORIDE 10 MG: 5 TABLET ORAL at 14:02

## 2023-01-01 RX ADMIN — OXYCODONE HYDROCHLORIDE 10 MG: 5 TABLET ORAL at 23:19

## 2023-01-01 RX ADMIN — MORPHINE SULFATE 4 MG: 4 INJECTION, SOLUTION INTRAMUSCULAR; INTRAVENOUS at 01:25

## 2023-01-01 RX ADMIN — OXYCODONE 5 MG: 5 TABLET ORAL at 09:57

## 2023-01-01 RX ADMIN — DOCUSATE SODIUM 100 MG: 100 CAPSULE, LIQUID FILLED ORAL at 21:33

## 2023-01-01 RX ADMIN — FERROUS SULFATE TAB 325 MG (65 MG ELEMENTAL FE) 325 MG: 325 (65 FE) TAB at 08:02

## 2023-01-01 RX ADMIN — SPIRONOLACTONE 100 MG: 25 TABLET ORAL at 10:09

## 2023-01-01 RX ADMIN — PANTOPRAZOLE SODIUM 40 MG: 40 TABLET, DELAYED RELEASE ORAL at 09:47

## 2023-01-01 RX ADMIN — DEXAMETHASONE 4 MG: 4 TABLET ORAL at 08:10

## 2023-01-01 RX ADMIN — PANTOPRAZOLE SODIUM 40 MG: 40 TABLET, DELAYED RELEASE ORAL at 06:02

## 2023-01-01 RX ADMIN — TRAZODONE HYDROCHLORIDE 50 MG: 50 TABLET ORAL at 20:40

## 2023-01-01 RX ADMIN — Medication 3 MG: at 21:14

## 2023-01-01 RX ADMIN — Medication 3 MG: at 23:19

## 2023-01-01 RX ADMIN — RIFAXIMIN 550 MG: 550 TABLET ORAL at 22:26

## 2023-01-01 RX ADMIN — OXYCODONE HYDROCHLORIDE 10 MG: 10 TABLET, FILM COATED, EXTENDED RELEASE ORAL at 03:48

## 2023-01-01 RX ADMIN — OXYCODONE HYDROCHLORIDE 10 MG: 10 TABLET, FILM COATED, EXTENDED RELEASE ORAL at 16:12

## 2023-01-01 RX ADMIN — SODIUM CHLORIDE, PRESERVATIVE FREE 10 ML: 5 INJECTION INTRAVENOUS at 08:14

## 2023-01-01 RX ADMIN — OXYCODONE HYDROCHLORIDE 10 MG: 10 TABLET, FILM COATED, EXTENDED RELEASE ORAL at 05:01

## 2023-01-01 RX ADMIN — SODIUM CHLORIDE: 9 INJECTION, SOLUTION INTRAVENOUS at 19:36

## 2023-01-01 RX ADMIN — RIFAXIMIN 550 MG: 550 TABLET ORAL at 09:06

## 2023-01-01 RX ADMIN — FERROUS SULFATE TAB 325 MG (65 MG ELEMENTAL FE) 325 MG: 325 (65 FE) TAB at 09:22

## 2023-01-01 RX ADMIN — OXYCODONE HYDROCHLORIDE 5 MG: 5 TABLET ORAL at 23:16

## 2023-01-01 RX ADMIN — ATORVASTATIN CALCIUM 40 MG: 80 TABLET, FILM COATED ORAL at 22:26

## 2023-01-01 RX ADMIN — DEXAMETHASONE 4 MG: 4 TABLET ORAL at 08:18

## 2023-01-01 RX ADMIN — DULOXETINE 60 MG: 60 CAPSULE, DELAYED RELEASE ORAL at 09:53

## 2023-01-01 RX ADMIN — SPIRONOLACTONE 100 MG: 25 TABLET ORAL at 09:35

## 2023-01-01 RX ADMIN — OXYCODONE HYDROCHLORIDE 10 MG: 10 TABLET, FILM COATED, EXTENDED RELEASE ORAL at 05:30

## 2023-01-01 RX ADMIN — SPIRONOLACTONE 100 MG: 25 TABLET ORAL at 08:03

## 2023-01-01 RX ADMIN — OXYCODONE HYDROCHLORIDE 10 MG: 5 TABLET ORAL at 14:41

## 2023-01-01 RX ADMIN — ONDANSETRON 4 MG: 2 INJECTION INTRAMUSCULAR; INTRAVENOUS at 09:48

## 2023-01-01 RX ADMIN — ATORVASTATIN CALCIUM 40 MG: 80 TABLET, FILM COATED ORAL at 21:17

## 2023-01-01 RX ADMIN — HEPARIN SODIUM 5000 UNITS: 1000 INJECTION INTRAVENOUS; SUBCUTANEOUS at 07:49

## 2023-01-01 RX ADMIN — SODIUM CHLORIDE: 9 INJECTION, SOLUTION INTRAVENOUS at 04:30

## 2023-01-01 RX ADMIN — DEXAMETHASONE 4 MG: 4 TABLET ORAL at 19:59

## 2023-01-01 RX ADMIN — DOCUSATE SODIUM 100 MG: 100 CAPSULE, LIQUID FILLED ORAL at 20:00

## 2023-01-01 RX ADMIN — FERROUS SULFATE TAB 325 MG (65 MG ELEMENTAL FE) 325 MG: 325 (65 FE) TAB at 09:54

## 2023-01-01 RX ADMIN — RIFAXIMIN 550 MG: 550 TABLET ORAL at 21:00

## 2023-01-01 RX ADMIN — FERROUS SULFATE TAB 325 MG (65 MG ELEMENTAL FE) 325 MG: 325 (65 FE) TAB at 08:18

## 2023-01-01 RX ADMIN — TAMSULOSIN HYDROCHLORIDE 0.4 MG: 0.4 CAPSULE ORAL at 08:25

## 2023-01-01 RX ADMIN — SODIUM CHLORIDE 500 ML: 9 INJECTION, SOLUTION INTRAVENOUS at 15:15

## 2023-01-01 RX ADMIN — DEXAMETHASONE 2 MG: 4 TABLET ORAL at 09:23

## 2023-01-01 RX ADMIN — OXYCODONE HYDROCHLORIDE 5 MG: 5 TABLET ORAL at 09:47

## 2023-01-01 RX ADMIN — FERROUS SULFATE TAB 325 MG (65 MG ELEMENTAL FE) 325 MG: 325 (65 FE) TAB at 08:45

## 2023-01-01 RX ADMIN — GADOTERIDOL 15 ML: 279.3 INJECTION, SOLUTION INTRAVENOUS at 12:39

## 2023-01-01 RX ADMIN — PANTOPRAZOLE SODIUM 40 MG: 40 TABLET, DELAYED RELEASE ORAL at 06:47

## 2023-01-01 RX ADMIN — SPIRONOLACTONE 100 MG: 25 TABLET ORAL at 08:06

## 2023-01-01 RX ADMIN — DOCUSATE SODIUM 100 MG: 100 CAPSULE, LIQUID FILLED ORAL at 20:40

## 2023-01-01 RX ADMIN — DOCUSATE SODIUM 100 MG: 100 CAPSULE, LIQUID FILLED ORAL at 08:18

## 2023-01-01 RX ADMIN — BUSPIRONE HYDROCHLORIDE 5 MG: 5 TABLET ORAL at 16:46

## 2023-01-01 RX ADMIN — SODIUM CHLORIDE, PRESERVATIVE FREE 10 ML: 5 INJECTION INTRAVENOUS at 08:46

## 2023-01-01 RX ADMIN — SODIUM CHLORIDE, PRESERVATIVE FREE 10 ML: 5 INJECTION INTRAVENOUS at 21:01

## 2023-01-01 RX ADMIN — OXYCODONE HYDROCHLORIDE 10 MG: 10 TABLET, FILM COATED, EXTENDED RELEASE ORAL at 03:58

## 2023-01-01 RX ADMIN — OXYCODONE HYDROCHLORIDE 10 MG: 5 TABLET ORAL at 08:33

## 2023-01-01 RX ADMIN — APIXABAN 5 MG: 5 TABLET, FILM COATED ORAL at 19:41

## 2023-01-01 RX ADMIN — TRAZODONE HYDROCHLORIDE 100 MG: 100 TABLET ORAL at 21:30

## 2023-01-01 RX ADMIN — DULOXETINE 60 MG: 60 CAPSULE, DELAYED RELEASE ORAL at 07:58

## 2023-01-01 RX ADMIN — TRAZODONE HYDROCHLORIDE 50 MG: 50 TABLET ORAL at 21:14

## 2023-01-01 RX ADMIN — FERROUS SULFATE TAB 325 MG (65 MG ELEMENTAL FE) 325 MG: 325 (65 FE) TAB at 08:25

## 2023-01-01 RX ADMIN — DOCUSATE SODIUM 100 MG: 100 CAPSULE, LIQUID FILLED ORAL at 08:03

## 2023-01-01 RX ADMIN — RIFAXIMIN 550 MG: 550 TABLET ORAL at 09:58

## 2023-01-01 RX ADMIN — SPIRONOLACTONE 100 MG: 25 TABLET ORAL at 08:08

## 2023-01-01 ASSESSMENT — PAIN DESCRIPTION - ORIENTATION
ORIENTATION: MID
ORIENTATION: RIGHT;LOWER
ORIENTATION: RIGHT;LEFT
ORIENTATION: MID
ORIENTATION: RIGHT
ORIENTATION: RIGHT;LOWER
ORIENTATION: LOWER
ORIENTATION: ANTERIOR;POSTERIOR;LOWER
ORIENTATION: MID
ORIENTATION: RIGHT
ORIENTATION: RIGHT
ORIENTATION: UPPER;MID
ORIENTATION: RIGHT;MID
ORIENTATION: MID
ORIENTATION: RIGHT;LOWER
ORIENTATION: RIGHT
ORIENTATION: RIGHT
ORIENTATION: RIGHT;LEFT
ORIENTATION: RIGHT
ORIENTATION: RIGHT;LOWER
ORIENTATION: LEFT;RIGHT;LOWER
ORIENTATION: UPPER;MID
ORIENTATION: INNER;MID
ORIENTATION: UPPER;MID
ORIENTATION: RIGHT;LOWER
ORIENTATION: LOWER;MID
ORIENTATION: RIGHT
ORIENTATION: MID;UPPER
ORIENTATION: RIGHT
ORIENTATION: LEFT;RIGHT;ANTERIOR;LOWER
ORIENTATION: MID
ORIENTATION: MID
ORIENTATION: RIGHT;LOWER
ORIENTATION: RIGHT
ORIENTATION: LEFT;RIGHT;LOWER
ORIENTATION: RIGHT
ORIENTATION: MID
ORIENTATION: MID;RIGHT;LEFT
ORIENTATION: RIGHT
ORIENTATION: RIGHT;LEFT;LOWER
ORIENTATION: MID
ORIENTATION: LEFT
ORIENTATION: RIGHT;OUTER;LOWER
ORIENTATION: MID;LOWER
ORIENTATION: LOWER;MID
ORIENTATION: MID
ORIENTATION: RIGHT;LEFT
ORIENTATION: RIGHT
ORIENTATION: LOWER;MID
ORIENTATION: RIGHT
ORIENTATION: LEFT;RIGHT;LOWER
ORIENTATION: RIGHT
ORIENTATION: LOWER
ORIENTATION: MID
ORIENTATION: RIGHT
ORIENTATION: LOWER
ORIENTATION: RIGHT
ORIENTATION: RIGHT
ORIENTATION: MID
ORIENTATION: RIGHT;LEFT;MID;LOWER
ORIENTATION: RIGHT
ORIENTATION: LOWER
ORIENTATION: RIGHT;LEFT
ORIENTATION: LOWER
ORIENTATION: RIGHT
ORIENTATION: RIGHT
ORIENTATION: RIGHT;LOWER
ORIENTATION: RIGHT;MID;LOWER
ORIENTATION: RIGHT
ORIENTATION: RIGHT
ORIENTATION: MID
ORIENTATION: RIGHT;LEFT
ORIENTATION: RIGHT;LEFT;LOWER
ORIENTATION: RIGHT;LEFT
ORIENTATION: RIGHT
ORIENTATION: RIGHT;MID;LOWER
ORIENTATION: RIGHT
ORIENTATION: MID
ORIENTATION: RIGHT;LEFT
ORIENTATION: LOWER
ORIENTATION: RIGHT;LEFT;MID
ORIENTATION: LOWER;MID
ORIENTATION: RIGHT
ORIENTATION: MID
ORIENTATION: RIGHT
ORIENTATION: RIGHT
ORIENTATION: RIGHT;POSTERIOR
ORIENTATION: RIGHT;LOWER
ORIENTATION: RIGHT;LEFT
ORIENTATION: MID
ORIENTATION: MID
ORIENTATION: RIGHT;LEFT
ORIENTATION: RIGHT;LEFT;MID;LOWER
ORIENTATION: LOWER;MID
ORIENTATION: MID
ORIENTATION: RIGHT;LOWER

## 2023-01-01 ASSESSMENT — PAIN SCALES - GENERAL
PAINLEVEL_OUTOF10: 8
PAINLEVEL_OUTOF10: 6
PAINLEVEL_OUTOF10: 7
PAINLEVEL_OUTOF10: 9
PAINLEVEL_OUTOF10: 8
PAINLEVEL_OUTOF10: 6
PAINLEVEL_OUTOF10: 5
PAINLEVEL_OUTOF10: 7
PAINLEVEL_OUTOF10: 8
PAINLEVEL_OUTOF10: 8
PAINLEVEL_OUTOF10: 9
PAINLEVEL_OUTOF10: 8
PAINLEVEL_OUTOF10: 9
PAINLEVEL_OUTOF10: 9
PAINLEVEL_OUTOF10: 6
PAINLEVEL_OUTOF10: 8
PAINLEVEL_OUTOF10: 8
PAINLEVEL_OUTOF10: 6
PAINLEVEL_OUTOF10: 8
PAINLEVEL_OUTOF10: 7
PAINLEVEL_OUTOF10: 5
PAINLEVEL_OUTOF10: 10
PAINLEVEL_OUTOF10: 10
PAINLEVEL_OUTOF10: 6
PAINLEVEL_OUTOF10: 10
PAINLEVEL_OUTOF10: 9
PAINLEVEL_OUTOF10: 7
PAINLEVEL_OUTOF10: 7
PAINLEVEL_OUTOF10: 9
PAINLEVEL_OUTOF10: 10
PAINLEVEL_OUTOF10: 10
PAINLEVEL_OUTOF10: 9
PAINLEVEL_OUTOF10: 8
PAINLEVEL_OUTOF10: 9
PAINLEVEL_OUTOF10: 9
PAINLEVEL_OUTOF10: 7
PAINLEVEL_OUTOF10: 10
PAINLEVEL_OUTOF10: 6
PAINLEVEL_OUTOF10: 10
PAINLEVEL_OUTOF10: 8
PAINLEVEL_OUTOF10: 8
PAINLEVEL_OUTOF10: 10
PAINLEVEL_OUTOF10: 9
PAINLEVEL_OUTOF10: 10
PAINLEVEL_OUTOF10: 10
PAINLEVEL_OUTOF10: 8
PAINLEVEL_OUTOF10: 9
PAINLEVEL_OUTOF10: 6
PAINLEVEL_OUTOF10: 9
PAINLEVEL_OUTOF10: 6
PAINLEVEL_OUTOF10: 0
PAINLEVEL_OUTOF10: 8
PAINLEVEL_OUTOF10: 8
PAINLEVEL_OUTOF10: 7
PAINLEVEL_OUTOF10: 9
PAINLEVEL_OUTOF10: 8
PAINLEVEL_OUTOF10: 0
PAINLEVEL_OUTOF10: 9
PAINLEVEL_OUTOF10: 8
PAINLEVEL_OUTOF10: 9
PAINLEVEL_OUTOF10: 8
PAINLEVEL_OUTOF10: 10
PAINLEVEL_OUTOF10: 6
PAINLEVEL_OUTOF10: 8
PAINLEVEL_OUTOF10: 7
PAINLEVEL_OUTOF10: 10
PAINLEVEL_OUTOF10: 8
PAINLEVEL_OUTOF10: 8
PAINLEVEL_OUTOF10: 6
PAINLEVEL_OUTOF10: 10
PAINLEVEL_OUTOF10: 9
PAINLEVEL_OUTOF10: 2
PAINLEVEL_OUTOF10: 7
PAINLEVEL_OUTOF10: 9
PAINLEVEL_OUTOF10: 10
PAINLEVEL_OUTOF10: 8
PAINLEVEL_OUTOF10: 4
PAINLEVEL_OUTOF10: 8
PAINLEVEL_OUTOF10: 6
PAINLEVEL_OUTOF10: 5
PAINLEVEL_OUTOF10: 8
PAINLEVEL_OUTOF10: 10
PAINLEVEL_OUTOF10: 10
PAINLEVEL_OUTOF10: 9
PAINLEVEL_OUTOF10: 8
PAINLEVEL_OUTOF10: 10
PAINLEVEL_OUTOF10: 8
PAINLEVEL_OUTOF10: 7
PAINLEVEL_OUTOF10: 9
PAINLEVEL_OUTOF10: 9
PAINLEVEL_OUTOF10: 10
PAINLEVEL_OUTOF10: 8
PAINLEVEL_OUTOF10: 2
PAINLEVEL_OUTOF10: 8
PAINLEVEL_OUTOF10: 8
PAINLEVEL_OUTOF10: 7
PAINLEVEL_OUTOF10: 8
PAINLEVEL_OUTOF10: 9
PAINLEVEL_OUTOF10: 7
PAINLEVEL_OUTOF10: 8
PAINLEVEL_OUTOF10: 10
PAINLEVEL_OUTOF10: 8
PAINLEVEL_OUTOF10: 6
PAINLEVEL_OUTOF10: 9
PAINLEVEL_OUTOF10: 8
PAINLEVEL_OUTOF10: 10
PAINLEVEL_OUTOF10: 6
PAINLEVEL_OUTOF10: 9
PAINLEVEL_OUTOF10: 8
PAINLEVEL_OUTOF10: 8
PAINLEVEL_OUTOF10: 9
PAINLEVEL_OUTOF10: 6
PAINLEVEL_OUTOF10: 10
PAINLEVEL_OUTOF10: 8
PAINLEVEL_OUTOF10: 8
PAINLEVEL_OUTOF10: 5
PAINLEVEL_OUTOF10: 8
PAINLEVEL_OUTOF10: 6
PAINLEVEL_OUTOF10: 10
PAINLEVEL_OUTOF10: 8
PAINLEVEL_OUTOF10: 8
PAINLEVEL_OUTOF10: 7
PAINLEVEL_OUTOF10: 9
PAINLEVEL_OUTOF10: 8
PAINLEVEL_OUTOF10: 8
PAINLEVEL_OUTOF10: 9
PAINLEVEL_OUTOF10: 6
PAINLEVEL_OUTOF10: 9
PAINLEVEL_OUTOF10: 8
PAINLEVEL_OUTOF10: 10
PAINLEVEL_OUTOF10: 9
PAINLEVEL_OUTOF10: 8
PAINLEVEL_OUTOF10: 8
PAINLEVEL_OUTOF10: 10
PAINLEVEL_OUTOF10: 9
PAINLEVEL_OUTOF10: 0
PAINLEVEL_OUTOF10: 8
PAINLEVEL_OUTOF10: 8
PAINLEVEL_OUTOF10: 6
PAINLEVEL_OUTOF10: 8
PAINLEVEL_OUTOF10: 8
PAINLEVEL_OUTOF10: 4
PAINLEVEL_OUTOF10: 7
PAINLEVEL_OUTOF10: 4
PAINLEVEL_OUTOF10: 9
PAINLEVEL_OUTOF10: 9
PAINLEVEL_OUTOF10: 10
PAINLEVEL_OUTOF10: 9
PAINLEVEL_OUTOF10: 10
PAINLEVEL_OUTOF10: 8
PAINLEVEL_OUTOF10: 10
PAINLEVEL_OUTOF10: 7
PAINLEVEL_OUTOF10: 9
PAINLEVEL_OUTOF10: 8
PAINLEVEL_OUTOF10: 9
PAINLEVEL_OUTOF10: 0
PAINLEVEL_OUTOF10: 9
PAINLEVEL_OUTOF10: 5
PAINLEVEL_OUTOF10: 10
PAINLEVEL_OUTOF10: 9
PAINLEVEL_OUTOF10: 10
PAINLEVEL_OUTOF10: 7
PAINLEVEL_OUTOF10: 7
PAINLEVEL_OUTOF10: 3
PAINLEVEL_OUTOF10: 2
PAINLEVEL_OUTOF10: 8
PAINLEVEL_OUTOF10: 9
PAINLEVEL_OUTOF10: 8
PAINLEVEL_OUTOF10: 9
PAINLEVEL_OUTOF10: 9
PAINLEVEL_OUTOF10: 2
PAINLEVEL_OUTOF10: 8
PAINLEVEL_OUTOF10: 9

## 2023-01-01 ASSESSMENT — PAIN DESCRIPTION - ONSET
ONSET: ON-GOING

## 2023-01-01 ASSESSMENT — PAIN DESCRIPTION - LOCATION
LOCATION: GENERALIZED
LOCATION: BACK
LOCATION: ABDOMEN
LOCATION: BACK
LOCATION: GENERALIZED
LOCATION: ABDOMEN
LOCATION: BACK
LOCATION: BACK;ABDOMEN
LOCATION: ABDOMEN;BACK
LOCATION: ABDOMEN
LOCATION: ABDOMEN;BACK
LOCATION: ABDOMEN
LOCATION: ABDOMEN;BACK
LOCATION: ABDOMEN
LOCATION: BACK;ABDOMEN
LOCATION: BACK
LOCATION: ABDOMEN;BACK
LOCATION: ABDOMEN
LOCATION: ABDOMEN
LOCATION: BACK
LOCATION: BACK
LOCATION: ABDOMEN
LOCATION: BACK
LOCATION: GENERALIZED
LOCATION: GENERALIZED
LOCATION: BACK;ABDOMEN
LOCATION: GENERALIZED
LOCATION: BACK
LOCATION: ABDOMEN;BACK
LOCATION: BACK
LOCATION: GENERALIZED
LOCATION: ABDOMEN
LOCATION: GENERALIZED
LOCATION: ABDOMEN
LOCATION: GENERALIZED
LOCATION: BACK
LOCATION: ABDOMEN
LOCATION: GENERALIZED
LOCATION: ABDOMEN
LOCATION: GENERALIZED
LOCATION: BACK
LOCATION: ABDOMEN
LOCATION: BACK
LOCATION: ABDOMEN
LOCATION: ABDOMEN
LOCATION: BACK
LOCATION: BACK;GENERALIZED
LOCATION: ABDOMEN
LOCATION: ABDOMEN
LOCATION: GENERALIZED
LOCATION: ABDOMEN
LOCATION: ABDOMEN;BACK
LOCATION: BACK
LOCATION: ABDOMEN
LOCATION: ABDOMEN;BACK
LOCATION: BACK
LOCATION: ABDOMEN
LOCATION: ABDOMEN;BACK
LOCATION: ABDOMEN;BACK
LOCATION: GENERALIZED
LOCATION: ABDOMEN;BACK
LOCATION: ABDOMEN;BACK
LOCATION: BACK
LOCATION: ABDOMEN
LOCATION: BACK
LOCATION: ABDOMEN
LOCATION: BACK
LOCATION: ABDOMEN;BACK
LOCATION: BACK
LOCATION: ABDOMEN
LOCATION: ABDOMEN;BACK
LOCATION: ABDOMEN;BACK
LOCATION: BACK
LOCATION: ABDOMEN
LOCATION: BACK
LOCATION: ABDOMEN;BACK
LOCATION: BACK
LOCATION: ABDOMEN
LOCATION: ABDOMEN
LOCATION: BACK
LOCATION: ABDOMEN;BACK
LOCATION: ABDOMEN;BACK
LOCATION: BACK
LOCATION: ABDOMEN
LOCATION: BACK
LOCATION: BACK
LOCATION: ABDOMEN;BACK
LOCATION: ABDOMEN
LOCATION: BACK
LOCATION: ABDOMEN
LOCATION: ABDOMEN
LOCATION: BACK;ABDOMEN
LOCATION: BACK
LOCATION: ABDOMEN
LOCATION: ABDOMEN;BACK
LOCATION: FLANK;BACK
LOCATION: ABDOMEN
LOCATION: ABDOMEN
LOCATION: BACK
LOCATION: BACK
LOCATION: GENERALIZED
LOCATION: ABDOMEN;BACK
LOCATION: GENERALIZED
LOCATION: ABDOMEN
LOCATION: BACK
LOCATION: BACK
LOCATION: ABDOMEN
LOCATION: ABDOMEN
LOCATION: BACK
LOCATION: BACK
LOCATION: GENERALIZED
LOCATION: BACK
LOCATION: ABDOMEN

## 2023-01-01 ASSESSMENT — PAIN DESCRIPTION - DESCRIPTORS
DESCRIPTORS: BURNING;SHARP;STABBING
DESCRIPTORS: BURNING;SHARP;STABBING
DESCRIPTORS: ACHING;DULL
DESCRIPTORS: SHARP
DESCRIPTORS: ACHING
DESCRIPTORS: BURNING
DESCRIPTORS: ACHING
DESCRIPTORS: THROBBING
DESCRIPTORS: ACHING;SORE
DESCRIPTORS: SHARP
DESCRIPTORS: ACHING
DESCRIPTORS: DISCOMFORT
DESCRIPTORS: DISCOMFORT
DESCRIPTORS: ACHING
DESCRIPTORS: ACHING;BURNING
DESCRIPTORS: ACHING
DESCRIPTORS: BURNING
DESCRIPTORS: DISCOMFORT;ACHING
DESCRIPTORS: BURNING
DESCRIPTORS: SHARP
DESCRIPTORS: ACHING
DESCRIPTORS: DISCOMFORT
DESCRIPTORS: ACHING
DESCRIPTORS: ACHING;DISCOMFORT
DESCRIPTORS: ACHING
DESCRIPTORS: ACHING;DISCOMFORT
DESCRIPTORS: STABBING
DESCRIPTORS: ACHING
DESCRIPTORS: ACHING;DULL
DESCRIPTORS: DISCOMFORT;DULL
DESCRIPTORS: ACHING;BURNING
DESCRIPTORS: DULL;ACHING
DESCRIPTORS: BURNING
DESCRIPTORS: ACHING
DESCRIPTORS: STABBING;BURNING
DESCRIPTORS: ACHING;DISCOMFORT
DESCRIPTORS: OTHER (COMMENT)
DESCRIPTORS: ACHING
DESCRIPTORS: ACHING;SORE
DESCRIPTORS: ACHING
DESCRIPTORS: ACHING
DESCRIPTORS: ACHING;DISCOMFORT
DESCRIPTORS: ACHING
DESCRIPTORS: THROBBING
DESCRIPTORS: ACHING;DISCOMFORT
DESCRIPTORS: BURNING;STABBING
DESCRIPTORS: DISCOMFORT
DESCRIPTORS: ACHING
DESCRIPTORS: STABBING
DESCRIPTORS: ACHING;DISCOMFORT
DESCRIPTORS: ACHING
DESCRIPTORS: SORE
DESCRIPTORS: ACHING;DISCOMFORT
DESCRIPTORS: ACHING;STABBING
DESCRIPTORS: ACHING;SORE;PRESSURE;DISCOMFORT
DESCRIPTORS: ACHING
DESCRIPTORS: DISCOMFORT;SHARP;BURNING
DESCRIPTORS: DISCOMFORT
DESCRIPTORS: ACHING
DESCRIPTORS: ACHING;DULL;SORE
DESCRIPTORS: ACHING;DULL;DISCOMFORT
DESCRIPTORS: THROBBING
DESCRIPTORS: ACHING
DESCRIPTORS: SHARP
DESCRIPTORS: ACHING
DESCRIPTORS: BURNING;STABBING;SHARP
DESCRIPTORS: ACHING
DESCRIPTORS: STABBING
DESCRIPTORS: SHARP
DESCRIPTORS: SHARP
DESCRIPTORS: ACHING
DESCRIPTORS: ACHING;SHARP
DESCRIPTORS: ACHING;DISCOMFORT
DESCRIPTORS: ACHING
DESCRIPTORS: DULL
DESCRIPTORS: ACHING
DESCRIPTORS: SHARP
DESCRIPTORS: ACHING;DISCOMFORT
DESCRIPTORS: SHARP
DESCRIPTORS: ACHING
DESCRIPTORS: ACHING
DESCRIPTORS: GNAWING
DESCRIPTORS: ACHING;DISCOMFORT
DESCRIPTORS: DISCOMFORT
DESCRIPTORS: ACHING
DESCRIPTORS: ACHING
DESCRIPTORS: SHARP
DESCRIPTORS: ACHING
DESCRIPTORS: ACHING
DESCRIPTORS: ACHING;SORE
DESCRIPTORS: SHARP;STABBING;BURNING
DESCRIPTORS: ACHING
DESCRIPTORS: SHARP
DESCRIPTORS: DISCOMFORT;BURNING;SHARP
DESCRIPTORS: ACHING
DESCRIPTORS: SHARP
DESCRIPTORS: THROBBING
DESCRIPTORS: ACHING
DESCRIPTORS: CRAMPING;DULL;DISCOMFORT
DESCRIPTORS: ACHING;SORE
DESCRIPTORS: ACHING
DESCRIPTORS: ACHING;STABBING

## 2023-01-01 ASSESSMENT — PAIN - FUNCTIONAL ASSESSMENT
PAIN_FUNCTIONAL_ASSESSMENT: ACTIVITIES ARE NOT PREVENTED
PAIN_FUNCTIONAL_ASSESSMENT: PREVENTS OR INTERFERES SOME ACTIVE ACTIVITIES AND ADLS
PAIN_FUNCTIONAL_ASSESSMENT: ACTIVITIES ARE NOT PREVENTED
PAIN_FUNCTIONAL_ASSESSMENT: ACTIVITIES ARE NOT PREVENTED
PAIN_FUNCTIONAL_ASSESSMENT: PREVENTS OR INTERFERES SOME ACTIVE ACTIVITIES AND ADLS
PAIN_FUNCTIONAL_ASSESSMENT: 0-10
PAIN_FUNCTIONAL_ASSESSMENT: PREVENTS OR INTERFERES SOME ACTIVE ACTIVITIES AND ADLS
PAIN_FUNCTIONAL_ASSESSMENT: ACTIVITIES ARE NOT PREVENTED
PAIN_FUNCTIONAL_ASSESSMENT: ACTIVITIES ARE NOT PREVENTED
PAIN_FUNCTIONAL_ASSESSMENT: PREVENTS OR INTERFERES SOME ACTIVE ACTIVITIES AND ADLS
PAIN_FUNCTIONAL_ASSESSMENT: ACTIVITIES ARE NOT PREVENTED
PAIN_FUNCTIONAL_ASSESSMENT: PREVENTS OR INTERFERES SOME ACTIVE ACTIVITIES AND ADLS
PAIN_FUNCTIONAL_ASSESSMENT: ACTIVITIES ARE NOT PREVENTED
PAIN_FUNCTIONAL_ASSESSMENT: ACTIVITIES ARE NOT PREVENTED
PAIN_FUNCTIONAL_ASSESSMENT: PREVENTS OR INTERFERES WITH MANY ACTIVE NOT PASSIVE ACTIVITIES
PAIN_FUNCTIONAL_ASSESSMENT: ACTIVITIES ARE NOT PREVENTED
PAIN_FUNCTIONAL_ASSESSMENT: PREVENTS OR INTERFERES SOME ACTIVE ACTIVITIES AND ADLS
PAIN_FUNCTIONAL_ASSESSMENT: PREVENTS OR INTERFERES WITH MANY ACTIVE NOT PASSIVE ACTIVITIES
PAIN_FUNCTIONAL_ASSESSMENT: ACTIVITIES ARE NOT PREVENTED
PAIN_FUNCTIONAL_ASSESSMENT: ACTIVITIES ARE NOT PREVENTED
PAIN_FUNCTIONAL_ASSESSMENT: PREVENTS OR INTERFERES SOME ACTIVE ACTIVITIES AND ADLS
PAIN_FUNCTIONAL_ASSESSMENT: ACTIVITIES ARE NOT PREVENTED
PAIN_FUNCTIONAL_ASSESSMENT: ACTIVITIES ARE NOT PREVENTED
PAIN_FUNCTIONAL_ASSESSMENT: PREVENTS OR INTERFERES SOME ACTIVE ACTIVITIES AND ADLS
PAIN_FUNCTIONAL_ASSESSMENT: ACTIVITIES ARE NOT PREVENTED
PAIN_FUNCTIONAL_ASSESSMENT: ACTIVITIES ARE NOT PREVENTED
PAIN_FUNCTIONAL_ASSESSMENT: PREVENTS OR INTERFERES SOME ACTIVE ACTIVITIES AND ADLS
PAIN_FUNCTIONAL_ASSESSMENT: ACTIVITIES ARE NOT PREVENTED
PAIN_FUNCTIONAL_ASSESSMENT: ACTIVITIES ARE NOT PREVENTED
PAIN_FUNCTIONAL_ASSESSMENT: PREVENTS OR INTERFERES SOME ACTIVE ACTIVITIES AND ADLS
PAIN_FUNCTIONAL_ASSESSMENT: ACTIVITIES ARE NOT PREVENTED
PAIN_FUNCTIONAL_ASSESSMENT: PREVENTS OR INTERFERES SOME ACTIVE ACTIVITIES AND ADLS
PAIN_FUNCTIONAL_ASSESSMENT: ACTIVITIES ARE NOT PREVENTED
PAIN_FUNCTIONAL_ASSESSMENT: 0-10
PAIN_FUNCTIONAL_ASSESSMENT: PREVENTS OR INTERFERES SOME ACTIVE ACTIVITIES AND ADLS
PAIN_FUNCTIONAL_ASSESSMENT: ACTIVITIES ARE NOT PREVENTED
PAIN_FUNCTIONAL_ASSESSMENT: PREVENTS OR INTERFERES SOME ACTIVE ACTIVITIES AND ADLS
PAIN_FUNCTIONAL_ASSESSMENT: PREVENTS OR INTERFERES WITH MANY ACTIVE NOT PASSIVE ACTIVITIES
PAIN_FUNCTIONAL_ASSESSMENT: PREVENTS OR INTERFERES SOME ACTIVE ACTIVITIES AND ADLS
PAIN_FUNCTIONAL_ASSESSMENT: PREVENTS OR INTERFERES SOME ACTIVE ACTIVITIES AND ADLS
PAIN_FUNCTIONAL_ASSESSMENT: ACTIVITIES ARE NOT PREVENTED
PAIN_FUNCTIONAL_ASSESSMENT: PREVENTS OR INTERFERES SOME ACTIVE ACTIVITIES AND ADLS
PAIN_FUNCTIONAL_ASSESSMENT: PREVENTS OR INTERFERES WITH MANY ACTIVE NOT PASSIVE ACTIVITIES
PAIN_FUNCTIONAL_ASSESSMENT: PREVENTS OR INTERFERES SOME ACTIVE ACTIVITIES AND ADLS
PAIN_FUNCTIONAL_ASSESSMENT: PREVENTS OR INTERFERES WITH MANY ACTIVE NOT PASSIVE ACTIVITIES
PAIN_FUNCTIONAL_ASSESSMENT: ACTIVITIES ARE NOT PREVENTED
PAIN_FUNCTIONAL_ASSESSMENT: ACTIVITIES ARE NOT PREVENTED
PAIN_FUNCTIONAL_ASSESSMENT: PREVENTS OR INTERFERES SOME ACTIVE ACTIVITIES AND ADLS
PAIN_FUNCTIONAL_ASSESSMENT: PREVENTS OR INTERFERES SOME ACTIVE ACTIVITIES AND ADLS
PAIN_FUNCTIONAL_ASSESSMENT: ACTIVITIES ARE NOT PREVENTED
PAIN_FUNCTIONAL_ASSESSMENT: ACTIVITIES ARE NOT PREVENTED
PAIN_FUNCTIONAL_ASSESSMENT: PREVENTS OR INTERFERES SOME ACTIVE ACTIVITIES AND ADLS
PAIN_FUNCTIONAL_ASSESSMENT: PREVENTS OR INTERFERES SOME ACTIVE ACTIVITIES AND ADLS

## 2023-01-01 ASSESSMENT — PAIN DESCRIPTION - FREQUENCY
FREQUENCY: CONTINUOUS
FREQUENCY: INTERMITTENT
FREQUENCY: CONTINUOUS
FREQUENCY: INTERMITTENT
FREQUENCY: CONTINUOUS

## 2023-01-01 ASSESSMENT — PAIN DESCRIPTION - PAIN TYPE
TYPE: CHRONIC PAIN
TYPE: CHRONIC PAIN
TYPE: SURGICAL PAIN;CHRONIC PAIN
TYPE: CHRONIC PAIN
TYPE: CHRONIC PAIN
TYPE: ACUTE PAIN
TYPE: ACUTE PAIN
TYPE: CHRONIC PAIN
TYPE: ACUTE PAIN
TYPE: ACUTE PAIN;SURGICAL PAIN
TYPE: ACUTE PAIN;CHRONIC PAIN
TYPE: ACUTE PAIN
TYPE: ACUTE PAIN;SURGICAL PAIN
TYPE: CHRONIC PAIN
TYPE: CHRONIC PAIN
TYPE: ACUTE PAIN
TYPE: SURGICAL PAIN;CHRONIC PAIN
TYPE: ACUTE PAIN
TYPE: ACUTE PAIN;CHRONIC PAIN
TYPE: ACUTE PAIN
TYPE: ACUTE PAIN;CHRONIC PAIN
TYPE: ACUTE PAIN
TYPE: SURGICAL PAIN
TYPE: ACUTE PAIN
TYPE: ACUTE PAIN;SURGICAL PAIN
TYPE: ACUTE PAIN;CHRONIC PAIN
TYPE: ACUTE PAIN;SURGICAL PAIN
TYPE: ACUTE PAIN
TYPE: ACUTE PAIN;SURGICAL PAIN
TYPE: CHRONIC PAIN;ACUTE PAIN

## 2023-01-01 ASSESSMENT — ENCOUNTER SYMPTOMS
SHORTNESS OF BREATH: 0
TROUBLE SWALLOWING: 0
SHORTNESS OF BREATH: 0
VOMITING: 0
COUGH: 1
COUGH: 0
BACK PAIN: 1
DIARRHEA: 0
SORE THROAT: 0
APNEA: 0
EYE DISCHARGE: 0
EYE PAIN: 0
BACK PAIN: 1
ABDOMINAL DISTENTION: 1
ABDOMINAL PAIN: 1
NAUSEA: 0
CHEST TIGHTNESS: 0
RHINORRHEA: 0
BACK PAIN: 0
ABDOMINAL PAIN: 1
CONSTIPATION: 1
CONSTIPATION: 0
BACK PAIN: 1
ABDOMINAL PAIN: 1
WHEEZING: 0
CHEST TIGHTNESS: 0
ABDOMINAL DISTENTION: 1
CHEST TIGHTNESS: 0
SHORTNESS OF BREATH: 1

## 2023-01-01 ASSESSMENT — PULMONARY FUNCTION TESTS
PIF_VALUE: 14
PIF_VALUE: 19
PIF_VALUE: 17
PIF_VALUE: 14
PIF_VALUE: 17

## 2023-01-01 ASSESSMENT — PAIN SCALES - WONG BAKER
WONGBAKER_NUMERICALRESPONSE: 2
WONGBAKER_NUMERICALRESPONSE: 2
WONGBAKER_NUMERICALRESPONSE: 0
WONGBAKER_NUMERICALRESPONSE: 2

## 2023-01-13 DIAGNOSIS — K70.30 ALCOHOLIC CIRRHOSIS, UNSPECIFIED WHETHER ASCITES PRESENT (HCC): ICD-10-CM

## 2023-01-13 DIAGNOSIS — F41.9 ANXIETY: ICD-10-CM

## 2023-01-13 RX ORDER — ALPRAZOLAM 1 MG/1
TABLET ORAL
Qty: 90 TABLET | Refills: 0 | Status: SHIPPED | OUTPATIENT
Start: 2023-01-13 | End: 2023-02-09

## 2023-01-17 ENCOUNTER — TELEPHONE (OUTPATIENT)
Dept: FAMILY MEDICINE CLINIC | Age: 58
End: 2023-01-17

## 2023-01-17 NOTE — TELEPHONE ENCOUNTER
Since the tramadol was discontinued per oncology [facility] I recommend that patient attempt to contact them in regards to pain medication. I Isuspect that they do not want him on any narcotics at this time.

## 2023-01-17 NOTE — TELEPHONE ENCOUNTER
Called patient to clarify- he states he would like something for his neck and joint pain if he can. He states he no longer is on tramadol and was told to discontinue it. He states TM gave him a short term pain med in the past(oxycodone 5mg) 10/19/2022 that helped him before. Last ov 12/16/2022    Please advise.     [facility]

## 2023-01-17 NOTE — TELEPHONE ENCOUNTER
----- Message from Ysabel Caballero sent at 1/17/2023 11:08 AM EST -----  Subject: Message to Provider    QUESTIONS  Information for Provider? Spoke with patient, he is asking that his   provider prescribe another type of medication for pain. Please return his   call today to assist, thank you  ---------------------------------------------------------------------------  --------------  [id number] [employer] [place]  [phone]; OK to leave message on voicemail  ---------------------------------------------------------------------------  --------------  SCRIPT ANSWERS  Relationship to Patient?  Self

## 2023-01-23 RX ORDER — GABAPENTIN 300 MG/1
CAPSULE ORAL
Qty: 90 CAPSULE | Refills: 2 | Status: SHIPPED | OUTPATIENT
Start: 2023-01-23 | End: 2023-04-24

## 2023-01-24 ENCOUNTER — TELEPHONE (OUTPATIENT)
Dept: FAMILY MEDICINE CLINIC | Age: 58
End: 2023-01-24

## 2023-01-24 DIAGNOSIS — M54.50 CHRONIC MIDLINE LOW BACK PAIN, UNSPECIFIED WHETHER SCIATICA PRESENT: ICD-10-CM

## 2023-01-24 DIAGNOSIS — G89.29 CHRONIC MIDLINE LOW BACK PAIN, UNSPECIFIED WHETHER SCIATICA PRESENT: ICD-10-CM

## 2023-01-24 RX ORDER — TRAMADOL HYDROCHLORIDE 50 MG/1
50 TABLET ORAL EVERY 6 HOURS PRN
Qty: 120 TABLET | Refills: 0 | Status: SHIPPED | OUTPATIENT
Start: 2023-01-24 | End: 2023-02-23

## 2023-01-24 NOTE — TELEPHONE ENCOUNTER
Requesting refill of tramadol, says that no other physician stopped it so he doesn't know why he is not on it but feels like he needs something for pain. He cannot take anything with ASA in it.      LOV 12-16-22

## 2023-02-06 DIAGNOSIS — F41.9 ANXIETY: ICD-10-CM

## 2023-02-06 DIAGNOSIS — K70.30 ALCOHOLIC CIRRHOSIS, UNSPECIFIED WHETHER ASCITES PRESENT (HCC): ICD-10-CM

## 2023-02-06 RX ORDER — ALPRAZOLAM 1 MG/1
TABLET ORAL
Qty: 90 TABLET | Refills: 0 | Status: SHIPPED | OUTPATIENT
Start: 2023-02-06 | End: 2023-03-05

## 2023-02-06 NOTE — TELEPHONE ENCOUNTER
LOV 12-16-22  Last refill Xanax 1-6-23 #90 x 0  He knows he is a few days early and cannot fill it yet.

## 2023-02-14 ENCOUNTER — APPOINTMENT (OUTPATIENT)
Dept: CT IMAGING | Age: 58
End: 2023-02-14
Payer: COMMERCIAL

## 2023-02-14 ENCOUNTER — HOSPITAL ENCOUNTER (INPATIENT)
Age: 58
LOS: 2 days | Discharge: HOME | End: 2023-02-16
Attending: EMERGENCY MEDICINE | Admitting: HOSPITALIST
Payer: COMMERCIAL

## 2023-02-14 DIAGNOSIS — I81 PORTAL VEIN THROMBOSIS: Primary | ICD-10-CM

## 2023-02-14 DIAGNOSIS — R10.11 RUQ PAIN: ICD-10-CM

## 2023-02-14 LAB
ALBUMIN SERPL BCG-MCNC: 3.3 G/DL (ref 3.5–5.1)
ALP SERPL-CCNC: 170 U/L (ref 38–126)
ALT SERPL W/O P-5'-P-CCNC: 32 U/L (ref 11–66)
AMMONIA PLAS-MCNC: 85 UMOL/L (ref 11–60)
ANION GAP SERPL CALC-SCNC: 12 MEQ/L (ref 8–16)
APTT PPP: 34.8 SECONDS (ref 22–38)
APTT PPP: 37 SECONDS (ref 22–38)
AST SERPL-CCNC: 40 U/L (ref 5–40)
BASOPHILS ABSOLUTE: 0 THOU/MM3 (ref 0–0.1)
BASOPHILS NFR BLD AUTO: 0.5 %
BILIRUB SERPL-MCNC: 1.7 MG/DL (ref 0.3–1.2)
BUN SERPL-MCNC: 11 MG/DL (ref 7–22)
CALCIUM SERPL-MCNC: 9.1 MG/DL (ref 8.5–10.5)
CHLORIDE SERPL-SCNC: 102 MEQ/L (ref 98–111)
CO2 SERPL-SCNC: 23 MEQ/L (ref 23–33)
CREAT SERPL-MCNC: 0.7 MG/DL (ref 0.4–1.2)
DEPRECATED RDW RBC AUTO: 54.5 FL (ref 35–45)
DEPRECATED RDW RBC AUTO: 55.7 FL (ref 35–45)
EOSINOPHIL NFR BLD AUTO: 2.6 %
EOSINOPHILS ABSOLUTE: 0.2 THOU/MM3 (ref 0–0.4)
ERYTHROCYTE [DISTWIDTH] IN BLOOD BY AUTOMATED COUNT: 15.4 % (ref 11.5–14.5)
ERYTHROCYTE [DISTWIDTH] IN BLOOD BY AUTOMATED COUNT: 15.5 % (ref 11.5–14.5)
GFR SERPL CREATININE-BSD FRML MDRD: > 60 ML/MIN/1.73M2
GLUCOSE SERPL-MCNC: 117 MG/DL (ref 70–108)
HCT VFR BLD AUTO: 43 % (ref 42–52)
HCT VFR BLD AUTO: 43.9 % (ref 42–52)
HGB BLD-MCNC: 13.8 GM/DL (ref 14–18)
HGB BLD-MCNC: 14.1 GM/DL (ref 14–18)
IMM GRANULOCYTES # BLD AUTO: 0.03 THOU/MM3 (ref 0–0.07)
IMM GRANULOCYTES NFR BLD AUTO: 0.4 %
INR PPP: 1.13 (ref 0.85–1.13)
LACTATE SERPL-SCNC: 1.8 MMOL/L (ref 0.5–2)
LIPASE SERPL-CCNC: 19 U/L (ref 5.6–51.3)
LYMPHOCYTES ABSOLUTE: 1.2 THOU/MM3 (ref 1–4.8)
LYMPHOCYTES NFR BLD AUTO: 16.6 %
MAGNESIUM SERPL-MCNC: 2.2 MG/DL (ref 1.6–2.4)
MCH RBC QN AUTO: 31.2 PG (ref 26–33)
MCH RBC QN AUTO: 31.8 PG (ref 26–33)
MCHC RBC AUTO-ENTMCNC: 31.4 GM/DL (ref 32.2–35.5)
MCHC RBC AUTO-ENTMCNC: 32.8 GM/DL (ref 32.2–35.5)
MCV RBC AUTO: 97.1 FL (ref 80–94)
MCV RBC AUTO: 99.3 FL (ref 80–94)
MONOCYTES ABSOLUTE: 0.9 THOU/MM3 (ref 0.4–1.3)
MONOCYTES NFR BLD AUTO: 11.9 %
NEUTROPHILS NFR BLD AUTO: 68 %
NRBC BLD AUTO-RTO: 0 /100 WBC
OSMOLALITY SERPL CALC.SUM OF ELEC: 274.2 MOSMOL/KG (ref 275–300)
PLATELET # BLD AUTO: 137 THOU/MM3 (ref 130–400)
PLATELET # BLD AUTO: 177 THOU/MM3 (ref 130–400)
PMV BLD AUTO: 10.6 FL (ref 9.4–12.4)
PMV BLD AUTO: 11.1 FL (ref 9.4–12.4)
POTASSIUM SERPL-SCNC: 3.4 MEQ/L (ref 3.5–5.2)
PROCALCITONIN SERPL IA-MCNC: 0.07 NG/ML (ref 0.01–0.09)
PROT SERPL-MCNC: 8.4 G/DL (ref 6.1–8)
RBC # BLD AUTO: 4.42 MILL/MM3 (ref 4.7–6.1)
RBC # BLD AUTO: 4.43 MILL/MM3 (ref 4.7–6.1)
REASON FOR REJECTION: NORMAL
REJECTED TEST: NORMAL
SEGMENTED NEUTROPHILS ABSOLUTE COUNT: 5 THOU/MM3 (ref 1.8–7.7)
SODIUM SERPL-SCNC: 137 MEQ/L (ref 135–145)
TROPONIN T: < 0.01 NG/ML
WBC # BLD AUTO: 7.3 THOU/MM3 (ref 4.8–10.8)
WBC # BLD AUTO: 7.3 THOU/MM3 (ref 4.8–10.8)

## 2023-02-14 PROCEDURE — 99285 EMERGENCY DEPT VISIT HI MDM: CPT

## 2023-02-14 PROCEDURE — 6360000004 HC RX CONTRAST MEDICATION: Performed by: PHYSICIAN ASSISTANT

## 2023-02-14 PROCEDURE — 82140 ASSAY OF AMMONIA: CPT

## 2023-02-14 PROCEDURE — 85025 COMPLETE CBC W/AUTO DIFF WBC: CPT

## 2023-02-14 PROCEDURE — 2140000000 HC CCU INTERMEDIATE R&B

## 2023-02-14 PROCEDURE — 85610 PROTHROMBIN TIME: CPT

## 2023-02-14 PROCEDURE — 85027 COMPLETE CBC AUTOMATED: CPT

## 2023-02-14 PROCEDURE — 2580000003 HC RX 258: Performed by: STUDENT IN AN ORGANIZED HEALTH CARE EDUCATION/TRAINING PROGRAM

## 2023-02-14 PROCEDURE — 84145 PROCALCITONIN (PCT): CPT

## 2023-02-14 PROCEDURE — 84484 ASSAY OF TROPONIN QUANT: CPT

## 2023-02-14 PROCEDURE — 81003 URINALYSIS AUTO W/O SCOPE: CPT

## 2023-02-14 PROCEDURE — 83735 ASSAY OF MAGNESIUM: CPT

## 2023-02-14 PROCEDURE — 93005 ELECTROCARDIOGRAM TRACING: CPT | Performed by: PHYSICIAN ASSISTANT

## 2023-02-14 PROCEDURE — 74177 CT ABD & PELVIS W/CONTRAST: CPT

## 2023-02-14 PROCEDURE — 96374 THER/PROPH/DIAG INJ IV PUSH: CPT

## 2023-02-14 PROCEDURE — 80053 COMPREHEN METABOLIC PANEL: CPT

## 2023-02-14 PROCEDURE — 99223 1ST HOSP IP/OBS HIGH 75: CPT | Performed by: HOSPITALIST

## 2023-02-14 PROCEDURE — 93010 ELECTROCARDIOGRAM REPORT: CPT | Performed by: INTERNAL MEDICINE

## 2023-02-14 PROCEDURE — 6360000002 HC RX W HCPCS: Performed by: STUDENT IN AN ORGANIZED HEALTH CARE EDUCATION/TRAINING PROGRAM

## 2023-02-14 PROCEDURE — 6360000002 HC RX W HCPCS: Performed by: PHYSICIAN ASSISTANT

## 2023-02-14 PROCEDURE — 96376 TX/PRO/DX INJ SAME DRUG ADON: CPT

## 2023-02-14 PROCEDURE — 96375 TX/PRO/DX INJ NEW DRUG ADDON: CPT

## 2023-02-14 PROCEDURE — 85730 THROMBOPLASTIN TIME PARTIAL: CPT

## 2023-02-14 PROCEDURE — 83690 ASSAY OF LIPASE: CPT

## 2023-02-14 PROCEDURE — 83605 ASSAY OF LACTIC ACID: CPT

## 2023-02-14 PROCEDURE — 2580000003 HC RX 258: Performed by: PHYSICIAN ASSISTANT

## 2023-02-14 PROCEDURE — 36415 COLL VENOUS BLD VENIPUNCTURE: CPT

## 2023-02-14 RX ORDER — PANTOPRAZOLE SODIUM 40 MG/1
40 TABLET, DELAYED RELEASE ORAL
Status: DISCONTINUED | OUTPATIENT
Start: 2023-02-15 | End: 2023-02-16 | Stop reason: HOSPADM

## 2023-02-14 RX ORDER — POTASSIUM CHLORIDE 20 MEQ/1
40 TABLET, EXTENDED RELEASE ORAL ONCE
Status: DISCONTINUED | OUTPATIENT
Start: 2023-02-14 | End: 2023-02-16 | Stop reason: HOSPADM

## 2023-02-14 RX ORDER — DULOXETIN HYDROCHLORIDE 60 MG/1
60 CAPSULE, DELAYED RELEASE ORAL DAILY
Status: DISCONTINUED | OUTPATIENT
Start: 2023-02-15 | End: 2023-02-16 | Stop reason: HOSPADM

## 2023-02-14 RX ORDER — HEPARIN SODIUM 10000 [USP'U]/100ML
5-30 INJECTION, SOLUTION INTRAVENOUS CONTINUOUS
Status: DISCONTINUED | OUTPATIENT
Start: 2023-02-14 | End: 2023-02-16

## 2023-02-14 RX ORDER — ONDANSETRON 2 MG/ML
4 INJECTION INTRAMUSCULAR; INTRAVENOUS EVERY 6 HOURS PRN
Status: DISCONTINUED | OUTPATIENT
Start: 2023-02-14 | End: 2023-02-16 | Stop reason: HOSPADM

## 2023-02-14 RX ORDER — FENTANYL CITRATE 50 UG/ML
50 INJECTION, SOLUTION INTRAMUSCULAR; INTRAVENOUS ONCE
Status: COMPLETED | OUTPATIENT
Start: 2023-02-14 | End: 2023-02-14

## 2023-02-14 RX ORDER — ATORVASTATIN CALCIUM 40 MG/1
40 TABLET, FILM COATED ORAL NIGHTLY
Status: DISCONTINUED | OUTPATIENT
Start: 2023-02-15 | End: 2023-02-16 | Stop reason: HOSPADM

## 2023-02-14 RX ORDER — HEPARIN SODIUM 1000 [USP'U]/ML
40 INJECTION, SOLUTION INTRAVENOUS; SUBCUTANEOUS PRN
Status: DISCONTINUED | OUTPATIENT
Start: 2023-02-14 | End: 2023-02-16

## 2023-02-14 RX ORDER — CYCLOBENZAPRINE HCL 10 MG
5 TABLET ORAL 2 TIMES DAILY PRN
Status: DISCONTINUED | OUTPATIENT
Start: 2023-02-14 | End: 2023-02-16 | Stop reason: HOSPADM

## 2023-02-14 RX ORDER — SPIRONOLACTONE 25 MG/1
100 TABLET ORAL DAILY
Status: DISCONTINUED | OUTPATIENT
Start: 2023-02-15 | End: 2023-02-16 | Stop reason: HOSPADM

## 2023-02-14 RX ORDER — ACETAMINOPHEN 325 MG/1
650 TABLET ORAL EVERY 6 HOURS PRN
Status: DISCONTINUED | OUTPATIENT
Start: 2023-02-14 | End: 2023-02-16 | Stop reason: HOSPADM

## 2023-02-14 RX ORDER — SODIUM CHLORIDE 9 MG/ML
INJECTION, SOLUTION INTRAVENOUS CONTINUOUS
Status: ACTIVE | OUTPATIENT
Start: 2023-02-14 | End: 2023-02-15

## 2023-02-14 RX ORDER — ALPRAZOLAM 0.25 MG/1
0.25 TABLET ORAL 2 TIMES DAILY PRN
Status: DISCONTINUED | OUTPATIENT
Start: 2023-02-14 | End: 2023-02-16 | Stop reason: HOSPADM

## 2023-02-14 RX ORDER — LACTULOSE 10 G/15ML
20 SOLUTION ORAL 2 TIMES DAILY
COMMUNITY

## 2023-02-14 RX ORDER — FENTANYL CITRATE 50 UG/ML
25 INJECTION, SOLUTION INTRAMUSCULAR; INTRAVENOUS
Status: DISCONTINUED | OUTPATIENT
Start: 2023-02-14 | End: 2023-02-15

## 2023-02-14 RX ORDER — 0.9 % SODIUM CHLORIDE 0.9 %
500 INTRAVENOUS SOLUTION INTRAVENOUS ONCE
Status: COMPLETED | OUTPATIENT
Start: 2023-02-14 | End: 2023-02-14

## 2023-02-14 RX ORDER — SODIUM CHLORIDE 9 MG/ML
INJECTION, SOLUTION INTRAVENOUS PRN
Status: DISCONTINUED | OUTPATIENT
Start: 2023-02-14 | End: 2023-02-16 | Stop reason: HOSPADM

## 2023-02-14 RX ORDER — ACETAMINOPHEN 650 MG/1
650 SUPPOSITORY RECTAL EVERY 6 HOURS PRN
Status: DISCONTINUED | OUTPATIENT
Start: 2023-02-14 | End: 2023-02-16 | Stop reason: HOSPADM

## 2023-02-14 RX ORDER — POLYETHYLENE GLYCOL 3350 17 G/17G
17 POWDER, FOR SOLUTION ORAL DAILY PRN
Status: DISCONTINUED | OUTPATIENT
Start: 2023-02-14 | End: 2023-02-16 | Stop reason: HOSPADM

## 2023-02-14 RX ORDER — SODIUM CHLORIDE 9 MG/ML
INJECTION, SOLUTION INTRAVENOUS CONTINUOUS
Status: DISCONTINUED | OUTPATIENT
Start: 2023-02-14 | End: 2023-02-14

## 2023-02-14 RX ORDER — HEPARIN SODIUM 1000 [USP'U]/ML
80 INJECTION, SOLUTION INTRAVENOUS; SUBCUTANEOUS ONCE
Status: COMPLETED | OUTPATIENT
Start: 2023-02-14 | End: 2023-02-14

## 2023-02-14 RX ORDER — SODIUM CHLORIDE 0.9 % (FLUSH) 0.9 %
5-40 SYRINGE (ML) INJECTION EVERY 12 HOURS SCHEDULED
Status: DISCONTINUED | OUTPATIENT
Start: 2023-02-14 | End: 2023-02-16 | Stop reason: HOSPADM

## 2023-02-14 RX ORDER — SODIUM CHLORIDE 0.9 % (FLUSH) 0.9 %
5-40 SYRINGE (ML) INJECTION PRN
Status: DISCONTINUED | OUTPATIENT
Start: 2023-02-14 | End: 2023-02-16 | Stop reason: HOSPADM

## 2023-02-14 RX ORDER — LACTULOSE 10 G/15ML
20 SOLUTION ORAL 2 TIMES DAILY
Status: DISCONTINUED | OUTPATIENT
Start: 2023-02-14 | End: 2023-02-16 | Stop reason: HOSPADM

## 2023-02-14 RX ORDER — ONDANSETRON 4 MG/1
4 TABLET, ORALLY DISINTEGRATING ORAL EVERY 8 HOURS PRN
Status: DISCONTINUED | OUTPATIENT
Start: 2023-02-14 | End: 2023-02-16 | Stop reason: HOSPADM

## 2023-02-14 RX ORDER — HEPARIN SODIUM 1000 [USP'U]/ML
80 INJECTION, SOLUTION INTRAVENOUS; SUBCUTANEOUS PRN
Status: DISCONTINUED | OUTPATIENT
Start: 2023-02-14 | End: 2023-02-16

## 2023-02-14 RX ADMIN — HYDROMORPHONE HYDROCHLORIDE 0.5 MG: 1 INJECTION, SOLUTION INTRAMUSCULAR; INTRAVENOUS; SUBCUTANEOUS at 13:43

## 2023-02-14 RX ADMIN — FENTANYL CITRATE 50 MCG: 50 INJECTION, SOLUTION INTRAMUSCULAR; INTRAVENOUS at 17:10

## 2023-02-14 RX ADMIN — SODIUM CHLORIDE 500 ML: 9 INJECTION, SOLUTION INTRAVENOUS at 13:42

## 2023-02-14 RX ADMIN — FENTANYL CITRATE 25 MCG: 50 INJECTION, SOLUTION INTRAMUSCULAR; INTRAVENOUS at 22:40

## 2023-02-14 RX ADMIN — FENTANYL CITRATE 50 MCG: 50 INJECTION, SOLUTION INTRAMUSCULAR; INTRAVENOUS at 18:50

## 2023-02-14 RX ADMIN — HEPARIN SODIUM 7080 UNITS: 1000 INJECTION, SOLUTION INTRAVENOUS; SUBCUTANEOUS at 22:25

## 2023-02-14 RX ADMIN — SODIUM CHLORIDE: 9 INJECTION, SOLUTION INTRAVENOUS at 22:31

## 2023-02-14 RX ADMIN — IOPAMIDOL 80 ML: 755 INJECTION, SOLUTION INTRAVENOUS at 16:34

## 2023-02-14 RX ADMIN — HEPARIN SODIUM 18 UNITS/KG/HR: 10000 INJECTION, SOLUTION INTRAVENOUS at 22:25

## 2023-02-14 ASSESSMENT — PAIN SCALES - GENERAL
PAINLEVEL_OUTOF10: 2
PAINLEVEL_OUTOF10: 3
PAINLEVEL_OUTOF10: 9
PAINLEVEL_OUTOF10: 7
PAINLEVEL_OUTOF10: 9
PAINLEVEL_OUTOF10: 9
PAINLEVEL_OUTOF10: 10

## 2023-02-14 ASSESSMENT — PAIN - FUNCTIONAL ASSESSMENT
PAIN_FUNCTIONAL_ASSESSMENT: 0-10

## 2023-02-14 ASSESSMENT — PAIN DESCRIPTION - ORIENTATION: ORIENTATION: RIGHT

## 2023-02-14 ASSESSMENT — PAIN DESCRIPTION - LOCATION
LOCATION: ABDOMEN
LOCATION: BACK;GENERALIZED

## 2023-02-14 ASSESSMENT — PAIN DESCRIPTION - DESCRIPTORS: DESCRIPTORS: ACHING;JABBING

## 2023-02-14 NOTE — ED NOTES
Patient resting in bed. Respirations easy and unlabored. No distress noted. Call light within reach.      Chandni Bermeo RN  02/14/23 [id number]

## 2023-02-14 NOTE — ED PROVIDER NOTES
[address]      EMERGENCY MEDICINE     Pt Name: Keli Lr  MRN: [id number]  [place] 1965  Date of evaluation: 2/14/2023  Provider: Sandhya Lynn PA-C    CHIEF COMPLAINT       Chief Complaint   Patient presents with    Abdominal Pain     HISTORY OF PRESENT ILLNESS   Keli Lr is a pleasant 61 y.o. male who presents to the emergency department from from home, as a walk in to the ED lobby for evaluation of right abdominal pain. States this started gradually 3 days ago and is described as a burning pain. He states he has never had any pain this severe in his entire life and he has not been able to sleep for the past 3 nights due to the pain. It began gradually as a soreness without any known injury to the area. He does note history of liver cancer and cirrhosis, states he quit drinking 14 years ago. He has not had anything at home to try for pain as he states his Ultram was stopped by his outpatient provider. Pain worsens when he lays on his back or on his right side, temporarily improved yesterday when he laid on the floor in the fetal position with a pillow. States he has chronic chills and chronic shortness of breath which have not changed since baseline and no associated fevers, chest pain, constipation, change in skin color, urine color or stool color, abnormal bleeding or bruising changed from baseline and reports his urine has been clear-dark yellow. States he has had nausea yesterday evening and was spitting up mucus but no actual vomiting. Chronic loose stools secondary to lactulose. He also reports some chronic memory issues for which she saw neurology and was told he does not have any sign of dementia. Complains of early satiety and states he has been craving pickles, cottage cheese, fruit and coleslaw all recently. Spoke to his GI provider about his symptoms today and was encouraged to come to the ED for evaluation.     PASTMEDICAL HISTORY     Past Medical History: Diagnosis Date    Anxiety     Arthritis     Chronic kidney disease     Cirrhosis ([address]     Diverticulitis     Diverticulosis     GERD (gastroesophageal reflux disease)     Hypertension     Other disorders of kidney and ureter in diseases classified elsewhere     Psychiatric problem     Pure hypercholesterolemia 3/10/2022    Stroke of unknown cause ([address] 8/12/2021       Patient Active Problem List   Diagnosis Code    Diverticulosis K57.90    HTN (hypertension) I10    Ascites R18.8    Liver cirrhosis ([address] K74.60    Cystic kidney disease Q61.9    Leukocytosis D72.829    Hypotension I95.9    Alcohol abuse F10.10    Hyponatremia E87.1    Hypokalemia E87.6    Perianal ulcer ([address] L98.499    Hyperkalemia E87.5    Increased ammonia level R79.89    Confusion R41.0    Change in mental status R41.82    Acute renal failure ([address] K36.6    Metabolic encephalopathy W27.56    Altered mental status R41.82    Hepatic encephalopathy K76.82    Noncompliance with medications Z91.14    Ascites due to alcoholic cirrhosis (HCC) W45.00    Hypotension (arterial) C00.6    Alcoholic cirrhosis of liver with ascites (HCC) K70.31    Lactic acidosis E87.20    Generalized abdominal pain R10.84    Tobacco abuse Z72.0    Spondylosis of cervical region without myelopathy or radiculopathy M47.812    Chest pain R07.9    Elevated INR R79.1    Opiate abuse, episodic (HCC) F11.10    Marijuana abuse F12.10    Dyspnea on exertion R06.09    SOB (shortness of breath) on exertion R06.02    Chest pain, atypical- for yrs  R07.89    Stroke of unknown cause ([address] I63.9    Alteration in speech R47.89    Pure hypercholesterolemia L43.31    Acute metabolic encephalopathy R22.35    Acute delirium R41.0    Encephalopathy acute G93.40    AMS (altered mental status) R41.82    Portal vein thrombosis I81     SURGICAL HISTORY       Past Surgical History:   Procedure Laterality Date    ABDOMEN SURGERY      BACK SURGERY      cervical plate    CARDIAC CATHETERIZATION  2007? CERVICAL DISC SURGERY      x 2 ---broken neck 7-2003    COLON SURGERY  2004    partial, due to diverticulitis    COLONOSCOPY      CT NEEDLE BIOPSY LIVER PERCUTANEOUS  6/10/2022    CT NEEDLE BIOPSY LIVER PERCUTANEOUS 6/10/2022 [facility] CT SCAN    DILATATION, ESOPHAGUS      ENDOSCOPY, COLON, DIAGNOSTIC      FRACTURE SURGERY      Broke neck in 2003    NERVE BLOCK Bilateral 10/02/2017    Cervical Facet MBB at C4-5, C5-6, C6-7     DE INJ,PARAVERTEBRAL L/S,1 LEVEL Bilateral 10/2/2017    C-FACET MBB C4-5, C5-6, C6-7 BILATERAL performed by Ever Castaneda MD at [facility] ENDOSCOPY  2019    UPPER GASTROINTESTINAL ENDOSCOPY Left 9/28/2020    EGD BAND LIGATION performed by Brad Higgins MD at [facility]       [address]       Current Discharge Medication List        CONTINUE these medications which have NOT CHANGED    Details   lactulose (CHRONULAC) 10 GM/15ML solution Take 20 g by mouth 2 times daily      ALPRAZolam (XANAX) 1 MG tablet take 1 tablet by mouth three times a day if needed  Qty: 90 tablet, Refills: 0    Associated Diagnoses: Alcoholic cirrhosis, unspecified whether ascites present ([facility] [address] Anxiety      traMADol (ULTRAM) 50 MG tablet Take 1 tablet by mouth every 6 hours as needed for Pain for up to 30 days.   Qty: 120 tablet, Refills: 0    Comments: Reduce doses taken as pain becomes manageable  Associated Diagnoses: Chronic midline low back pain, unspecified whether sciatica present      gabapentin (NEURONTIN) 300 MG capsule take 1 capsule by mouth three times a day  Qty: 90 capsule, Refills: 2      cyclobenzaprine (FLEXERIL) 10 MG tablet Take 1/2 to 1 tablet by mouth three times a day if needed  Qty: 60 tablet, Refills: 1      atorvastatin (LIPITOR) 40 MG tablet take 1 tablet by mouth nightly  Qty: 30 tablet, Refills: 5      DULoxetine (CYMBALTA) 60 MG extended release capsule take 1 capsule by mouth once daily  Qty: 90 capsule, Refills: 2      spironolactone (ALDACTONE) 100 MG tablet take 1 tablet by mouth every morning  Qty: 90 tablet, Refills: 2      omeprazole (PRILOSEC) 40 MG delayed release capsule Take 1 capsule by mouth daily  Qty: 30 capsule, Refills: 5    Associated Diagnoses: Gastroesophageal reflux disease, unspecified whether esophagitis present      rifAXIMin (XIFAXAN) 550 MG tablet Take 1 tablet by mouth 2 times daily  Qty: 60 tablet, Refills: 6    Associated Diagnoses: Alcoholic cirrhosis of liver without ascites ([address] Increased ammonia level; Esophageal varices in alcoholic cirrhosis ([facility] [address] Hepatic encephalopathy      nitroGLYCERIN (NITROSTAT) 0.4 MG SL tablet up to max of 3 total doses. If no relief after 1 dose, call 911. Qty: 25 tablet, Refills: 0             ALLERGIES     has No Known Allergies. FAMILY HISTORY     He indicated that his mother is . He indicated that his father is . He indicated that his brother is alive. He indicated that his paternal uncle is . He indicated that the status of his neg hx is unknown. SOCIAL HISTORY       Social History     Tobacco Use    Smoking status: Every Day     Packs/day: 1.00     Years: 25.00     Pack years: 25.00     Types: Cigarettes    Smokeless tobacco: Never    Tobacco comments:     21 declines cessation counseling   Vaping Use    Vaping Use: Never used   Substance Use Topics    Alcohol use: Not Currently     Comment: Quit 15 years ago    Drug use: Not Currently       PHYSICAL EXAM       ED Triage Vitals [23 1240]   BP Temp Temp Source Heart Rate Resp SpO2 Height Weight   (!) 147/101 97.7 °F (36.5 °C) Oral 81 17 98 % 5' 5\" (1.651 m) 195 lb (88.5 kg)       Additional Vital Signs:  Vitals:    02/15/23 2015   BP: (!) 105/55   Pulse: 73   Resp: 15   Temp: 97.6 °F (36.4 °C)   SpO2: 100%     Physical Exam  Vitals and nursing note reviewed. HENT:      Head: Normocephalic and atraumatic.    Cardiovascular: Rate and Rhythm: Normal rate and regular rhythm. Pulmonary:      Effort: Pulmonary effort is normal. No respiratory distress. Abdominal:      Tenderness: There is abdominal tenderness (Mild tenderness noted to the left upper and lower abdomen, progressive increase in intensity with palpation toward the right abdomen with peritonitis symptoms along the right abdomen, notable pain with very light palpation). Skin:     General: Skin is warm and dry. Neurological:      Mental Status: He is alert. GCS: GCS eye subscore is 4. GCS verbal subscore is 5. GCS motor subscore is 6. Comments: Slightly disorganized/tangential but otherwise able to provide adequate history   Psychiatric:         Mood and Affect: Mood normal.       FORMAL DIAGNOSTIC RESULTS     RADIOLOGY: Interpretation per the Radiologist below, if available at the time of this note (none if blank):    US LIVER   Final Result   Impression:   1. Chronic cirrhosis. 2. Generally mild ascites. This document has been electronically signed by: Davian Romo MD on    02/15/2023 04:17 AM      CT ABDOMEN PELVIS W IV CONTRAST Additional Contrast? None   Final Result   1. The area of scarring/consolidation in the right lower lobe of the trace loculated right pleural effusion appear more prominent. 2. Diffuse heterogeneous low attenuation is seen throughout the right hepatic lobe with a more focal area of low attenuation centrally measuring 33 x 59 mm (series 301, image 24). Thrombus is noted within the portal vein at the SMV confluence (series    301, image 35) which likely results in part to the areas of geographic heterogeneous perfusion as well as the mesenteric edema and fat stranding in the right upper quadrant. A small amount of generalized ascites is present. No discrete fluid collection    is observed. 3. Colonic diverticulosis without diverticulitis. Normal appendix.  Large amount of retained fecal material seen throughout the colon suggesting fecal stasis. No bowel obstruction. 4. Bladder wall thickening in part artifactual related to underdistention. Correlate with urinalysis. The prostate gland does not appear enlarged. No obstructive uropathy is visualized. 5. Chronic findings are discussed. **This report has been created using voice recognition software. It may contain minor errors which are inherent in voice recognition technology. **      Final report electronically signed by Dr Lesa Guerin on 2/14/2023 5:02 PM          LABS: (none if blank)  Labs Reviewed   CBC WITH AUTO DIFFERENTIAL - Abnormal; Notable for the following components:       Result Value    RBC 4.43 (*)     MCV 97.1 (*)     RDW-CV 15.4 (*)     RDW-SD 54.5 (*)     All other components within normal limits   COMPREHENSIVE METABOLIC PANEL - Abnormal; Notable for the following components:    Glucose 117 (*)     Potassium 3.4 (*)     Alkaline Phosphatase 170 (*)     Total Protein 8.4 (*)     Albumin 3.3 (*)     Total Bilirubin 1.7 (*)     All other components within normal limits   AMMONIA - Abnormal; Notable for the following components:    Ammonia 85 (*)     All other components within normal limits   URINALYSIS WITH REFLEX TO CULTURE - Abnormal; Notable for the following components:    Bilirubin Urine SMALL (*)     Urobilinogen, Urine 4.0 (*)     All other components within normal limits   OSMOLALITY - Abnormal; Notable for the following components:    Osmolality Calc 274.2 (*)     All other components within normal limits   CBC - Abnormal; Notable for the following components:    RBC 4.42 (*)     Hemoglobin 13.8 (*)     MCV 99.3 (*)     MCHC 31.4 (*)     RDW-CV 15.5 (*)     RDW-SD 55.7 (*)     All other components within normal limits   ANTI-XA, UNFRACTIONATED HEPARIN - Abnormal; Notable for the following components:    Heparin Unfractionated 0.75 (*)     All other components within normal limits   BASIC METABOLIC PANEL - Abnormal; Notable for the following components:    CO2 21 (*)     Calcium 8.3 (*)     All other components within normal limits   HEPATIC FUNCTION PANEL - Abnormal; Notable for the following components:    Albumin 2.7 (*)     Bilirubin, Direct 0.4 (*)     Alkaline Phosphatase 146 (*)     All other components within normal limits   PROTIME-INR - Abnormal; Notable for the following components:    INR 1.24 (*)     All other components within normal limits       (Any cultures that may have been sent were not resulted at the time of this patient visit)    [address] / ED COURSE:     1) Number and Complexity of Problems            Problem List This Visit:         Chief Complaint   Patient presents with    Abdominal Pain    Patient presents for complaints of severe right abdominal pain, gradual in onset 3 days ago with minimal associated symptoms. No history of similar pain. No home treatment. Dilaudid here with minimal change in pain. Due to blood pressures in the low 100s, subsequent dosing was with Fentanyl and did provide reasonable comfort for the patient. He did not appear in pain outside of abdominal palpation. Laboratory studies show mildly elevated alk phos and Total bilirubin, ammonia 85 and normal coagulation studies. CT shows scarring/consolidation in the right lower lobe with the trace loculated right pleural effusion appearing more prominent. Patient denying change in his baseline SOB. CT also demonstrates thrombus in the portal vein at the SMV confluence with mesenteric edema and fat stranding in the RUQ with small amount of generalized ascites and no discrete fluid collection. Large constipation without bowel obstruction. Results discussed with the patient. They were discussed with local GI, hospitalist and [facility]/hepatology. Consensus is that patient is reasonable to keep locally for pain control but should be transferred if decompensation or concern for rupture. Patient agreeable with the above plan. Differential Diagnosis includes (but not limited to):  Progression of hepatocellular carcinoma, liver abscess, SBO, diverticulitis, pneumoperitoneum        Diagnoses Considered but I have low suspicion of:   Aortic dissection, pulmonary embolism             Pertinent Comorbid Conditions:    History of hepatocellular carcinoma with ascites    2)  Data Reviewed (none if left blank)          My Independent interpretations:     EKG:      NSR with sinus arrhythmia and low voltage QRS. No STEMI    Labs: Laboratory studies show mildly elevated alk phos and Total bilirubin, ammonia 85 and normal coagulation studies. Imaging:      CT shows scarring/consolidation in the right lower lobe with the trace loculated right pleural effusion appearing more prominent. Patient denying change in his baseline SOB. CT also demonstrates thrombus in the portal vein at the SMV confluence with mesenteric edema and fat stranding in the RUQ with small amount of generalized ascites and no discrete fluid collection. Large constipation without bowel obstruction. Decision Rules/Clinical Scores utilized:  None            External Documentation Reviewed:         Previous patient encounter documents & history available on EMR was reviewed              See Formal Diagnostic Results above for the lab and radiology tests and orders.     3)  Treatment and Disposition         ED Reassessment:  Stable         Case discussed with consulting clinician:  Dr. Kimmie Lara          Shared Decision-Making was performed and disposition discussed with the        Patient/Family and questions answered          Social determinants of health impacting treatment or disposition:  Hx of hepatocellular carcinoma         Code Status:  Full      Summary of Patient Presentation:      MDM  /   Sarita List Reviewed:    Vitals:    02/15/23 1139 02/15/23 1255 02/15/23 1511 02/15/23 2015   BP:   101/77 (!) 105/55 Pulse:   94 73   Resp: 18 18 16 15   Temp:   97.7 °F (36.5 °C) 97.6 °F (36.4 °C)   TempSrc:   Oral Oral   SpO2:   99% 100%   Weight:       Height:           The patient was seen and examined. Appropriate diagnostic testing was performed and results reviewed with the patient. The results of pertinent diagnostic studies and exam findings were discussed. The patients provisional diagnosis and plan of care were discussed with the patient and present family who expressed understanding. Any medications were reviewed and indications and risks of medications were discussed with the patient /family present. ED Medications administered this visit:  (None if blank)  Medications   HYDROmorphone (DILAUDID) injection 0.5 mg (0.5 mg IntraVENous Given 2/14/23 1343)   0.9 % sodium chloride bolus (0 mLs IntraVENous Stopped 2/14/23 1639)   fentaNYL (SUBLIMAZE) injection 50 mcg (50 mcg IntraVENous Given 2/14/23 1710)   iopamidol (ISOVUE-370) 76 % injection 80 mL (80 mLs IntraVENous Given 2/14/23 1634)   fentaNYL (SUBLIMAZE) injection 50 mcg (50 mcg IntraVENous Given 2/14/23 1850)         PROCEDURES: (None if blank)  Procedures:     CRITICAL CARE: (None if blank)      DISCHARGE PRESCRIPTIONS: (None if blank)  Current Discharge Medication List          FINAL IMPRESSION      1. Portal vein thrombosis          DISPOSITION/PLAN   DISPOSITION Admitted 02/14/2023 09:13:45 PM      OUTPATIENT FOLLOW UP THE PATIENT:  No follow-up provider specified.     STAR Munroe PA-C  02/15/23 2051       Garry Carrillo PA-C  02/15/23 2051

## 2023-02-14 NOTE — ED NOTES
Patient resting in bed. Respirations easy and unlabored. No distress noted. Call light within reach.      Chandni Bermeo RN  02/14/23 1640

## 2023-02-14 NOTE — ED TRIAGE NOTES
Presents to ER with complaints of right sided abdominal pian that has been ingoing for 3 days. Pt states he has a history of liver cancer and cirrhosis. States he was sent by his doctor for evaluation. Rates pain 9 out of 10 in severity.

## 2023-02-14 NOTE — ED NOTES
ED to inpatient nurses report    Chief Complaint   Patient presents with    Abdominal Pain      Present to ED from home  LOC: alert and orientated to name, place, date  Vital signs   Vitals:    02/14/23 1627 02/14/23 1640 02/14/23 1837 02/14/23 1853   BP: 99/85  117/69    Pulse: 68  65    Resp: 18 17 13 15   Temp:       TempSrc:       SpO2:  99% 97% 97%   Weight:       Height:          Oxygen Baseline room air    Current needs required none Bipap/Cpap No  LDAs:   Peripheral IV 02/14/23 Distal;Left Cephalic (Active)     Mobility: Requires assistance * 1  Pending ED orders: none  Present condition: stable  Preferred Language: [place]     Electronically signed by Durell Crigler) M Billing, RN on 2/14/2023 at 6:53 PM     Chandni Bermeo RN  02/14/23 7953

## 2023-02-15 ENCOUNTER — APPOINTMENT (OUTPATIENT)
Dept: ULTRASOUND IMAGING | Age: 58
End: 2023-02-15
Payer: COMMERCIAL

## 2023-02-15 PROBLEM — I85.00 ESOPHAGEAL VARICES WITHOUT BLEEDING (HCC): Status: ACTIVE | Noted: 2023-01-01

## 2023-02-15 PROBLEM — F41.9 ANXIETY DISORDER: Status: ACTIVE | Noted: 2023-01-01

## 2023-02-15 PROBLEM — D53.9 MACROCYTIC ANEMIA: Status: ACTIVE | Noted: 2023-01-01

## 2023-02-15 PROBLEM — E66.9 OBESITY (BMI 30-39.9): Status: ACTIVE | Noted: 2023-01-01

## 2023-02-15 PROBLEM — E80.6 HYPERBILIRUBINEMIA: Status: ACTIVE | Noted: 2023-02-15

## 2023-02-15 PROBLEM — F10.11 HISTORY OF ALCOHOL ABUSE: Status: ACTIVE | Noted: 2023-02-15

## 2023-02-15 PROBLEM — R10.11 RUQ PAIN: Status: ACTIVE | Noted: 2023-02-15

## 2023-02-15 PROBLEM — I25.10 CORONARY ARTERY DISEASE INVOLVING NATIVE CORONARY ARTERY OF NATIVE HEART WITHOUT ANGINA PECTORIS: Status: ACTIVE | Noted: 2023-02-15

## 2023-02-15 PROBLEM — C22.0 HEPATOCELLULAR CARCINOMA (HCC): Status: ACTIVE | Noted: 2023-01-01

## 2023-02-15 PROBLEM — E72.20 HYPERAMMONEMIA (HCC): Status: ACTIVE | Noted: 2023-01-01

## 2023-02-15 PROBLEM — Q28.2 AVM (ARTERIOVENOUS MALFORMATION) BRAIN: Status: ACTIVE | Noted: 2023-02-15

## 2023-02-15 PROBLEM — K59.00 CONSTIPATION: Status: ACTIVE | Noted: 2023-01-01

## 2023-02-15 LAB
ALBUMIN SERPL BCG-MCNC: 2.7 G/DL (ref 3.5–5.1)
ALP SERPL-CCNC: 146 U/L (ref 38–126)
ALT SERPL W/O P-5'-P-CCNC: 26 U/L (ref 11–66)
ANION GAP SERPL CALC-SCNC: 10 MEQ/L (ref 8–16)
AST SERPL-CCNC: 35 U/L (ref 5–40)
BILIRUB CONJ SERPL-MCNC: 0.4 MG/DL (ref 0–0.3)
BILIRUB SERPL-MCNC: 1.2 MG/DL (ref 0.3–1.2)
BILIRUB UR QL STRIP.AUTO: ABNORMAL
BUN SERPL-MCNC: 10 MG/DL (ref 7–22)
CALCIUM SERPL-MCNC: 8.3 MG/DL (ref 8.5–10.5)
CHARACTER UR: CLEAR
CHLORIDE SERPL-SCNC: 107 MEQ/L (ref 98–111)
CO2 SERPL-SCNC: 21 MEQ/L (ref 23–33)
COLOR: YELLOW
CREAT SERPL-MCNC: 0.5 MG/DL (ref 0.4–1.2)
GFR SERPL CREATININE-BSD FRML MDRD: > 60 ML/MIN/1.73M2
GLUCOSE SERPL-MCNC: 92 MG/DL (ref 70–108)
GLUCOSE UR QL STRIP.AUTO: NEGATIVE MG/DL
HEPARIN UNFRACTIONATED: 0.6 U/ML (ref 0.3–0.7)
HEPARIN UNFRACTIONATED: 0.67 U/ML (ref 0.3–0.7)
HEPARIN UNFRACTIONATED: 0.75 U/ML (ref 0.3–0.7)
HEPARIN UNFRACTIONATED: 0.92 U/ML (ref 0.3–0.7)
HGB UR QL STRIP.AUTO: NEGATIVE
ICTOTEST: NEGATIVE
INR PPP: 1.24 (ref 0.85–1.13)
KETONES UR QL STRIP.AUTO: NEGATIVE
NITRITE UR QL STRIP: NEGATIVE
OSMOLALITY SERPL CALC.SUM OF ELEC: 274.4 MOSMOL/KG (ref 275–300)
PH UR STRIP.AUTO: 5.5 [PH] (ref 5–9)
POTASSIUM SERPL-SCNC: 3.7 MEQ/L (ref 3.5–5.2)
PROT SERPL-MCNC: 6.5 G/DL (ref 6.1–8)
PROT UR STRIP.AUTO-MCNC: NEGATIVE MG/DL
SODIUM SERPL-SCNC: 138 MEQ/L (ref 135–145)
SP GR UR REFRACT.AUTO: 1.02 (ref 1–1.03)
UROBILINOGEN, URINE: 4 EU/DL (ref 0–1)
WBC #/AREA URNS HPF: NEGATIVE /[HPF]

## 2023-02-15 PROCEDURE — 6370000000 HC RX 637 (ALT 250 FOR IP)

## 2023-02-15 PROCEDURE — 80053 COMPREHEN METABOLIC PANEL: CPT

## 2023-02-15 PROCEDURE — 6360000002 HC RX W HCPCS: Performed by: STUDENT IN AN ORGANIZED HEALTH CARE EDUCATION/TRAINING PROGRAM

## 2023-02-15 PROCEDURE — 85520 HEPARIN ASSAY: CPT

## 2023-02-15 PROCEDURE — 51798 US URINE CAPACITY MEASURE: CPT

## 2023-02-15 PROCEDURE — 6370000000 HC RX 637 (ALT 250 FOR IP): Performed by: STUDENT IN AN ORGANIZED HEALTH CARE EDUCATION/TRAINING PROGRAM

## 2023-02-15 PROCEDURE — 85610 PROTHROMBIN TIME: CPT

## 2023-02-15 PROCEDURE — 82248 BILIRUBIN DIRECT: CPT

## 2023-02-15 PROCEDURE — 99233 SBSQ HOSP IP/OBS HIGH 50: CPT | Performed by: FAMILY MEDICINE

## 2023-02-15 PROCEDURE — 6360000002 HC RX W HCPCS: Performed by: HOSPITALIST

## 2023-02-15 PROCEDURE — 6370000000 HC RX 637 (ALT 250 FOR IP): Performed by: FAMILY MEDICINE

## 2023-02-15 PROCEDURE — 76705 ECHO EXAM OF ABDOMEN: CPT

## 2023-02-15 PROCEDURE — 2140000000 HC CCU INTERMEDIATE R&B

## 2023-02-15 PROCEDURE — 36415 COLL VENOUS BLD VENIPUNCTURE: CPT

## 2023-02-15 RX ORDER — HYDROCODONE BITARTRATE AND ACETAMINOPHEN 5; 325 MG/1; MG/1
1 TABLET ORAL EVERY 6 HOURS PRN
Status: DISCONTINUED | OUTPATIENT
Start: 2023-02-15 | End: 2023-02-15

## 2023-02-15 RX ORDER — SENNA PLUS 8.6 MG/1
1 TABLET ORAL NIGHTLY
Status: DISCONTINUED | OUTPATIENT
Start: 2023-02-15 | End: 2023-02-16 | Stop reason: HOSPADM

## 2023-02-15 RX ORDER — DOCUSATE SODIUM 100 MG/1
100 CAPSULE, LIQUID FILLED ORAL 2 TIMES DAILY
Status: DISCONTINUED | OUTPATIENT
Start: 2023-02-15 | End: 2023-02-16 | Stop reason: HOSPADM

## 2023-02-15 RX ORDER — OXYCODONE HYDROCHLORIDE 5 MG/1
5 TABLET ORAL EVERY 4 HOURS PRN
Status: DISCONTINUED | OUTPATIENT
Start: 2023-02-15 | End: 2023-02-16

## 2023-02-15 RX ADMIN — HYDROMORPHONE HYDROCHLORIDE 0.5 MG: 1 INJECTION, SOLUTION INTRAMUSCULAR; INTRAVENOUS; SUBCUTANEOUS at 11:09

## 2023-02-15 RX ADMIN — DOCUSATE SODIUM 100 MG: 100 CAPSULE, LIQUID FILLED ORAL at 23:31

## 2023-02-15 RX ADMIN — FENTANYL CITRATE 25 MCG: 50 INJECTION, SOLUTION INTRAMUSCULAR; INTRAVENOUS at 01:19

## 2023-02-15 RX ADMIN — HEPARIN SODIUM 15 UNITS/KG/HR: 10000 INJECTION, SOLUTION INTRAVENOUS at 15:19

## 2023-02-15 RX ADMIN — RIFAXIMIN 550 MG: 550 TABLET ORAL at 20:42

## 2023-02-15 RX ADMIN — LACTULOSE 20 G: 20 SOLUTION ORAL at 10:05

## 2023-02-15 RX ADMIN — HYDROCODONE BITARTRATE AND ACETAMINOPHEN 1 TABLET: 5; 325 TABLET ORAL at 12:25

## 2023-02-15 RX ADMIN — SPIRONOLACTONE 100 MG: 25 TABLET ORAL at 10:04

## 2023-02-15 RX ADMIN — DULOXETINE HYDROCHLORIDE 60 MG: 60 CAPSULE, DELAYED RELEASE ORAL at 10:04

## 2023-02-15 RX ADMIN — HYDROMORPHONE HYDROCHLORIDE 0.5 MG: 1 INJECTION, SOLUTION INTRAMUSCULAR; INTRAVENOUS; SUBCUTANEOUS at 03:30

## 2023-02-15 RX ADMIN — SENNOSIDES 8.6 MG: 8.6 TABLET, FILM COATED ORAL at 23:31

## 2023-02-15 RX ADMIN — RIFAXIMIN 550 MG: 550 TABLET ORAL at 10:04

## 2023-02-15 RX ADMIN — PANTOPRAZOLE SODIUM 40 MG: 40 TABLET, DELAYED RELEASE ORAL at 05:32

## 2023-02-15 RX ADMIN — OXYCODONE 5 MG: 5 TABLET ORAL at 23:31

## 2023-02-15 RX ADMIN — OXYCODONE 5 MG: 5 TABLET ORAL at 17:51

## 2023-02-15 RX ADMIN — HYDROCODONE BITARTRATE AND ACETAMINOPHEN 1 TABLET: 5; 325 TABLET ORAL at 05:30

## 2023-02-15 RX ADMIN — HYDROMORPHONE HYDROCHLORIDE 0.5 MG: 1 INJECTION, SOLUTION INTRAMUSCULAR; INTRAVENOUS; SUBCUTANEOUS at 07:20

## 2023-02-15 RX ADMIN — HYDROMORPHONE HYDROCHLORIDE 0.5 MG: 1 INJECTION, SOLUTION INTRAMUSCULAR; INTRAVENOUS; SUBCUTANEOUS at 20:42

## 2023-02-15 RX ADMIN — LACTULOSE 20 G: 20 SOLUTION ORAL at 20:42

## 2023-02-15 ASSESSMENT — PAIN SCALES - GENERAL
PAINLEVEL_OUTOF10: 10
PAINLEVEL_OUTOF10: 7
PAINLEVEL_OUTOF10: 9
PAINLEVEL_OUTOF10: 6
PAINLEVEL_OUTOF10: 9
PAINLEVEL_OUTOF10: 8
PAINLEVEL_OUTOF10: 9
PAINLEVEL_OUTOF10: 9
PAINLEVEL_OUTOF10: 8
PAINLEVEL_OUTOF10: 9
PAINLEVEL_OUTOF10: 7
PAINLEVEL_OUTOF10: 10

## 2023-02-15 ASSESSMENT — PAIN DESCRIPTION - ORIENTATION
ORIENTATION: RIGHT;LEFT
ORIENTATION: RIGHT;LEFT
ORIENTATION: MID
ORIENTATION: RIGHT;LEFT
ORIENTATION: RIGHT;LEFT
ORIENTATION: MID
ORIENTATION: RIGHT;LEFT
ORIENTATION: MID
ORIENTATION: MID

## 2023-02-15 ASSESSMENT — PAIN DESCRIPTION - DESCRIPTORS
DESCRIPTORS: ACHING
DESCRIPTORS: SHARP
DESCRIPTORS: ACHING
DESCRIPTORS: SHARP;SHOOTING;BURNING
DESCRIPTORS: ACHING
DESCRIPTORS: SHARP
DESCRIPTORS: SHARP
DESCRIPTORS: SHARP;SHOOTING
DESCRIPTORS: ACHING
DESCRIPTORS: ACHING

## 2023-02-15 ASSESSMENT — PAIN DESCRIPTION - LOCATION
LOCATION: GENERALIZED
LOCATION: GENERALIZED
LOCATION: ABDOMEN
LOCATION: GENERALIZED
LOCATION: ABDOMEN
LOCATION: GENERALIZED
LOCATION: ABDOMEN

## 2023-02-15 ASSESSMENT — PAIN - FUNCTIONAL ASSESSMENT: PAIN_FUNCTIONAL_ASSESSMENT: ACTIVITIES ARE NOT PREVENTED

## 2023-02-15 NOTE — ED NOTES
Spoke with AUGUSTO Jhaveri to approve pt transport to [address] [place] [address] in stable condition.      Clifford Barbour, ELVIRA  98/59/39 [id number]

## 2023-02-15 NOTE — PROGRESS NOTES
Patient resting in bed at this time. Patient still upset about diet change. No other concerns or questions voiced at this time.   SN Blanca

## 2023-02-15 NOTE — ED PROVIDER NOTES
ATTENDING NOTE:    I supervised and discussed the history, physical exam and the management of this patient with the PA or NP. I reviewed the PA's or NP's note and agree with the documented findings and plan of care.       Electronically verified by MD Gonzalez Syed MD  02/14/23 6406

## 2023-02-15 NOTE — ED NOTES
ED to inpatient nurses report    Chief Complaint   Patient presents with    Abdominal Pain      Present to ED from home  LOC: alert and orientated to name, place, date  Vital signs   Vitals:    02/14/23 1837 02/14/23 1853 02/14/23 2002 02/14/23 2116   BP: 117/69  111/71 102/71   Pulse: 65  66 82   Resp: 13 15 15 15   Temp:       TempSrc:       SpO2: 97% 97% 97% 95%   Weight:       Height:          Oxygen Baseline roomair    Current needs required none Bipap/Cpap No  LDAs:   Peripheral IV 02/14/23 Distal;Left Cephalic (Active)     Mobility: Requires assistance * 1  Pending ED orders: none  Present condition: stable    C-SSRS    Swallow Screening    Preferred Language: [place]     Electronically signed by Ivonne Amaral RN on 2/14/2023 at 9:20 PM     Ivonne Amaral RN  02/14/23 2121

## 2023-02-15 NOTE — PROCEDURES
Bladder scan was completed by Jonas Valente at 0773.  The residual amount of 624ml was resulted to [employer]

## 2023-02-15 NOTE — PROGRESS NOTES
Reassessment completed. No changes from previous assessment. Patient upset about new diet orders from regular diet to full liquid diet. No other concerns or questions voiced at this time.    SN Adeline

## 2023-02-15 NOTE — PROGRESS NOTES
SBARR given to Anamika Seaman RN. [employer] made aware of patients no void for the day.   Kiana Griffiths SN

## 2023-02-15 NOTE — PROGRESS NOTES
Patient A+Ox4. Speech clear and appropriate. PEERLA from 4mm to 2mm with consensual response bilaterally. Arm drift negative. No numbness or tingling noted in bilateral upper extremities. Sensation present in bilateral upper extremities. Skin is warm, dry, and intact in bilateral upper extremities. Hand grasps strong and equal bilaterally. Capillary refill time less than 3 seconds. Skin turgor less than 3 seconds. Heart rhythm regular and rate 67. Lung sounds clear bilaterally, with moderate rate and depth. Bowel sounds active in all 4 quadrants. Abdomen soft and tender. No numbness or tingling noted in bilateral lower extremities. Sensation present. Pedal push and pull strong and equal bilaterally. Pedal and dorsalis pedis pulses weak bilaterally. No skin breakdown observed.   LEIDY Kirby RSCODY, SN

## 2023-02-15 NOTE — ED NOTES
Patient resting in bed, breathing even and unlabored. Patient denies any needs at this time. Call light within reach.      Golden Ortiz RN  02/14/23 2003

## 2023-02-15 NOTE — CARE COORDINATION
Case Management Assessment  Initial Evaluation    Date/Time of Evaluation: 2/15/2023 1:50 PM  Assessment Completed by: River Cameron RN    If patient is discharged prior to next notation, then this note serves as note for discharge by case management. Patient Name: Martir Pinzon                   YOB: 1965  Diagnosis: Portal vein thrombosis [I81]                   Date / Time: 2/14/2023 12:34 PM  Location: [address] [place] [address]     Patient Admission Status: Inpatient   Readmission Risk Low 0-14, Mod 15-19), High > 20: Readmission Risk Score: 12.2    Current PCP: CHRISTIAN To CNP  PCP verified by CM? Yes    Chart Reviewed: Yes      History Provided by: Patient  Patient Orientation: Alert and Oriented    Patient Cognition: Alert    Hospitalization in the last 30 days (Readmission):  No    If yes, Readmission Assessment in CM Navigator will be completed. Advance Directives:      Code Status: Full Code   Patient's Primary Decision Maker is: Patient Declined (Legal Next of Kin Remains as Decision Maker)      Discharge Planning:    Patient lives with: Alone Type of Home: Apartment  Primary Care Giver: Self  Patient Support Systems include: Children, Friends/Neighbors   Current Financial resources: Other (Comment) (Commercial insurance [employer])  Current community resources: Other (Comment) (Follows with GI (Dorina's office) and at [facility] for liver/cancer)  Current services prior to admission: Durable Medical Equipment            Current DME: Si Enrike ([employer])            Type of Home Care services:  None    ADLS  Prior functional level: Independent in ADLs/IADLs  Current functional level: Independent in ADLs/IADLs    Family can provide assistance at DC: Yes  Would you like Case Management to discuss the discharge plan with any other family members/significant others, and if so, who?  No  Plans to Return to Present Housing: Yes  Other Identified Issues/Barriers to RETURNING to current housing: none  Potential Assistance needed at discharge: N/A            Potential DME:    Patient expects to discharge to: [facility] for transportation at discharge: Family    Financial    Payor: Ridge Bonnerir [phone] [place] / Plan: Miguel Steele / Product Type: *No Product type* /     Does insurance require precert for SNF: Yes    Potential assistance Purchasing Medications: No  Meds-to-Beds request: Yes      [facility] [address] [id number] [facility] [place] [facility] [address]  [address] [place] [id number]  Phone: [phone] Fax: [phone]      Notes:    Factors facilitating achievement of predicted outcomes: Family support, Motivated, Cooperative, Pleasant, Good insight into deficits, and Has needed Durable Medical Equipment at home    Barriers to discharge: Medical complications    Additional Case Management Notes: Admitted through ED with RUQ abdominal pain. Noted to have elevated liver enzymes. Ammonia level 85. Found to have portal vein thrombus. Heparin gtt, lactulose bid, Protonix daily. Rifaximin. Aldactone. NPO. Procedure:   2/14 CT abd/pelvis: The area of scarring/consolidation in the right lower lobe of the trace loculated right pleural effusion appear more prominent. Diffuse heterogeneous low attenuation is seen throughout the right hepatic lobe with a more focal area of low attenuation centrally measuring 33 x 59 mm. Thrombus is noted within the portal vein at the SMV confluence which likely results in part to the areas of geographic heterogeneous perfusion as well as the mesenteric edema and fat stranding in the right upper quadrant. A small amount of generalized ascites is present. No discrete fluid collection is observed. Colonic diverticulosis without diverticulitis. Normal appendix. Large amount of retained fecal material seen throughout the colon suggesting fecal stasis. No bowel obstruction. Bladder wall thickening in part artifactual related to underdistention.  Correlate with urinalysis. The prostate gland does not appear enlarged. No obstructive uropathy is visualized. Chronic findings  2/15 US Liver: Chronic cirrhosis. Generally mild ascites. The Plan for Transition of Care is related to the following treatment goals of Portal vein thrombosis [I81]    Patient Goals/Plan/Treatment Preferences: Spoke with Johnny Arnold. He lives alone and follows at [facility] and with Dr. Kb Wood for his cancer and liver issues. Denies any needs at discharge at this time. Transportation/Food Security/Housekeeping Addressed: No issues identified.      Prateek Turpin RN  Case Management Department

## 2023-02-15 NOTE — PROGRESS NOTES
Patient resting in bed at this time. Patient asked about being NPO at this time as he has not eaten since being admitted yesterday 2/14/23. Spoke with Pastor Babb RN about patients questions. No other concerns voiced at this time.   SN Kelsi

## 2023-02-15 NOTE — H&P
Hospitalist - History & Physical      Patient: Kaity Duran    Unit/Bed:DELBERT /DELBERT  YOB: 1965  MRN: [id number]   Acct: [de-identified]   PCP: CHRISTIAN Barbour CNP    Date of Service: Pt seen/examined on 02/14/23  and Admitted to Inpatient with expected LOS greater than two midnights due to medical therapy. Chief Complaint:  RUQ abdominal pain      History Of Present Illness:    60 yo M with alcoholic cirrhosis, diverticulitis, CKD, HTN, and recently diagnosed [address] s/p TACE presented to [facility] ED for complaints of RUQ abdominal pain. ED: afebrile and hemodynamically stable. Labs notable for mild hypokalemia, , ammonia 85, Hb 13.8, , INR 1.13. CT A/P with the scar of scarring/consolidation in the right lower lobe of the trace loculated right pleural effusion appear more prominent. Diffuse heterogeneous low attenuation is seen throughout the right hepatic lobe with a more focal area of low attenuation centrally measuring 33 x 59 mm (series 301, image 24). Thrombus is noted within the portal vein at the SMV confluence (series 01, image 35) which likely results in part to the areas of geographic heterogeneous perfusion as well as the mesenteric edema and fat stranding in the right upper quadrant. A small amount of generalized ascites is present. No discrete fluid collection. Colonic diverticulosis without diverticulitis. Normal appendix. Large amount of retained fecal material seen throughout the colon suggesting fecal stasis. No bowel obstruction. Bladder wall thickening in part artifactual related to underdistention. Correlate with urinalysis. The prostate gland does not appear enlarged. No obstructive uropathy is visualized. [facility] contacted who was ok with patient staying here. Admitted for further management. Eval: patient reports sharp 10/10 RUQ abdominal pain starting 3 days ago that has been persistent without much change which prompted his visit today.  Has had no other associated symptoms except mild nausea x 1 episode yesterday with scant amount of white vomitus. No subjective fever, chills, diarrhea (loose stool chronically). Past Medical History:        Diagnosis Date    Anxiety     Arthritis     Chronic kidney disease     Cirrhosis ([address]     Diverticulitis     Diverticulosis     GERD (gastroesophageal reflux disease)     Hypertension     Other disorders of kidney and ureter in diseases classified elsewhere     Psychiatric problem     Pure hypercholesterolemia 3/10/2022    Stroke of unknown cause ([facility] 8/12/2021       Past Surgical History:      Procedure Laterality Date    ABDOMEN SURGERY      BACK SURGERY      cervical plate    CARDIAC CATHETERIZATION  2007? CERVICAL DISC SURGERY      x 2 ---broken neck 7-2003    COLON SURGERY  2004    partial, due to diverticulitis    COLONOSCOPY      CT NEEDLE BIOPSY LIVER PERCUTANEOUS  6/10/2022    CT NEEDLE BIOPSY LIVER PERCUTANEOUS 6/10/2022 [facility] CT SCAN    DILATATION, ESOPHAGUS      ENDOSCOPY, COLON, DIAGNOSTIC      FRACTURE SURGERY      Broke neck in 2003    NERVE BLOCK Bilateral 10/02/2017    Cervical Facet MBB at C4-5, C5-6, C6-7     AZ INJ,PARAVERTEBRAL L/S,1 LEVEL Bilateral 10/2/2017    C-FACET MBB C4-5, C5-6, C6-7 BILATERAL performed by March Spatz, MD at [facility] ENDOSCOPY  2019    UPPER GASTROINTESTINAL ENDOSCOPY Left 9/28/2020    EGD BAND LIGATION performed by Jami Amin MD at [facility]       Home Medications:   No current facility-administered medications on file prior to encounter.      Current Outpatient Medications on File Prior to Encounter   Medication Sig Dispense Refill    lactulose (CHRONULAC) 10 GM/15ML solution Take 20 g by mouth 2 times daily      ALPRAZolam (XANAX) 1 MG tablet take 1 tablet by mouth three times a day if needed 90 tablet 0    traMADol (ULTRAM) 50 MG tablet Take 1 tablet by mouth every 6 hours as needed for Pain for up to 30 days. 120 tablet 0    gabapentin (NEURONTIN) 300 MG capsule take 1 capsule by mouth three times a day (Patient taking differently: Take 300 mg by mouth 3 times daily as needed.) 90 capsule 2    cyclobenzaprine (FLEXERIL) 10 MG tablet Take 1/2 to 1 tablet by mouth three times a day if needed 60 tablet 1    atorvastatin (LIPITOR) 40 MG tablet take 1 tablet by mouth nightly 30 tablet 5    DULoxetine (CYMBALTA) 60 MG extended release capsule take 1 capsule by mouth once daily 90 capsule 2    spironolactone (ALDACTONE) 100 MG tablet take 1 tablet by mouth every morning 90 tablet 2    omeprazole (PRILOSEC) 40 MG delayed release capsule Take 1 capsule by mouth daily (Patient taking differently: Take 40 mg by mouth daily as needed) 30 capsule 5    rifAXIMin (XIFAXAN) 550 MG tablet Take 1 tablet by mouth 2 times daily 60 tablet 6    nitroGLYCERIN (NITROSTAT) 0.4 MG SL tablet up to max of 3 total doses. If no relief after 1 dose, call 911. 25 tablet 0       Allergies:    Patient has no known allergies. Social History:    reports that he has been smoking cigarettes. He has a 25.00 pack-year smoking history. He has never used smokeless tobacco. He reports that he does not currently use alcohol. He reports that he does not currently use drugs. Family History:       Problem Relation Age of Onset    Hypertension Mother     Heart Disease Mother     Alzheimer's Disease Mother     Heart Failure Mother     Hypertension Father     Heart Disease Father     Cancer Father         lung    Diabetes Brother     High Blood Pressure Paternal Uncle     Heart Disease Paternal Uncle     Colon Cancer Neg Hx     Colon Polyps Neg Hx        Review of systems:   Pertinent positives as noted in the HPI. All other systems reviewed and negative.     EKG: sinus arrhythmia    PHYSICAL EXAM:  /71   Pulse 82   Temp 97.7 °F (36.5 °C) (Oral)   Resp 15   Ht 5' 5\" (1.651 m)   Wt 195 lb (88.5 kg)   SpO2 95% BMI 32.45 kg/m²   General appearance: No apparent distress, appears stated age and cooperative. Nontoxic appearing  HEENT: Normal cephalic, atraumatic without obvious deformity. Extra ocular muscles intact. Conjunctivae/corneas clear. Neck: Supple, with full range of motion. No jugular venous distention. Respiratory:  Normal respiratory effort. Clear to auscultation, bilaterally without Rales/Wheezes/Rhonchi. Cardiovascular: Regular rate and rhythm with normal S1/S2 without murmurs, rubs or gallops. Abdomen: Soft, RUQ tenderness, non-distended, protuberant with normal bowel sounds. No peritoneal signs  Musculoskeletal: No clubbing, cyanosis or edema bilaterally. Skin: Skin color, texture, turgor normal. No rashes or lesions. Neurologic: Neurovascularly intact without any focal sensory/motor deficits. Cranial nerves: II-XII intact, grossly non-focal.  Psychiatric: Alert and oriented, thought content appropriate, normal insight  Capillary Refill: Brisk,< 3 seconds   Peripheral Pulses: +2 palpable, equal bilaterally     Labs:   Recent Labs     02/14/23  1240   WBC 7.3   HGB 14.1   HCT 43.0        Recent Labs     02/14/23  1240      K 3.4*      CO2 23   BUN 11   CREATININE 0.7   CALCIUM 9.1     Recent Labs     02/14/23  1240   AST 40   ALT 32   BILITOT 1.7*   ALKPHOS 170*     Recent Labs     02/14/23  1240   INR 1.13     [facility] results [facility] in the last 72 hours. Urinalysis:    Lab Results   Component Value Date/Time    NITRU NEGATIVE 06/16/2022 05:10 AM    WBCUA 0-2 06/16/2022 05:10 AM    WBCUA 0-5 04/22/2022 03:40 PM    BACTERIA FEW 06/16/2022 05:10 AM    RBCUA 3-5 06/16/2022 05:10 AM    BLOODU SMALL 06/16/2022 05:10 AM    SPECGRAV 1.020 05/18/2020 02:13 PM    SPECGRAV <1.005 09/21/2017 08:29 PM    GLUCOSEU NEGATIVE 06/16/2022 05:10 AM       Radiology:   CT ABDOMEN PELVIS W IV CONTRAST Additional Contrast? None   Final Result   1.  The area of scarring/consolidation in the right lower lobe of the trace loculated right pleural effusion appear more prominent. 2. Diffuse heterogeneous low attenuation is seen throughout the right hepatic lobe with a more focal area of low attenuation centrally measuring 33 x 59 mm (series 301, image 24). Thrombus is noted within the portal vein at the SMV confluence (series    301, image 35) which likely results in part to the areas of geographic heterogeneous perfusion as well as the mesenteric edema and fat stranding in the right upper quadrant. A small amount of generalized ascites is present. No discrete fluid collection    is observed. 3. Colonic diverticulosis without diverticulitis. Normal appendix. Large amount of retained fecal material seen throughout the colon suggesting fecal stasis. No bowel obstruction. 4. Bladder wall thickening in part artifactual related to underdistention. Correlate with urinalysis. The prostate gland does not appear enlarged. No obstructive uropathy is visualized. 5. Chronic findings are discussed. **This report has been created using voice recognition software. It may contain minor errors which are inherent in voice recognition technology. **      Final report electronically signed by Dr Kati Molina on 2/14/2023 5:02 PM        CT ABDOMEN PELVIS W IV CONTRAST Additional Contrast? None    Result Date: 2/14/2023  PROCEDURE: CT ABDOMEN PELVIS W IV CONTRAST CLINICAL INFORMATION: Right-sided abdominal pain. COMPARISON: CT needle biopsy 6/10/2022. CT abdomen and pelvis 1/14/2022. TECHNIQUE: Axial 5 mm CT images were obtained through the abdomen and pelvis after the administration of 80  cc Isovue 370 intravenous contrast. Coronal and sagittal reconstructions were obtained. All CT scans at this facility use dose modulation, iterative reconstruction, and/or weight-based dosing when appropriate to reduce radiation dose to as low as reasonably achievable.  FINDINGS: Lung bases: Scarring/atelectasis at the right lung base appears more prominent. Liver/gallbladder/bilary tree: Extensive ill-defined low attenuation throughout the right hepatic lobe with a focal area of low-attenuation measures up to 33 x 59 mm (series 301, image 24). The liver is shrunken with a lobulated contour consistent with history of cirrhosis. Pancreas: Remains atrophic. Spleen : Stable. Adrenal glands: Stable. Kidneys/ ureters/ bladder: Numerous bilateral hypoattenuating simple renal cysts are unchanged. Punctate bilateral nonobstructing renal calculi are present. No hydronephrosis or hydroureter is observed. The urinary bladder is thickened in part related to  underdistention. Gastrointestinal:  The appendix is normal. Colonic diverticulosis without diverticulitis is visualized. A surgical anastomosis in the distal colon appears intact. A large amount of retained fecal material is seen throughout the colon. No bowel obstruction is observed. A small amount generalized intra-abdominal ascites is present. Retroperitoneum / lymph nodes: The aorta is not dilated. Clot in the portal vein proximally at the SMV confluence (series 301, image 36) is observed. Numerous prominent portacaval lymph nodes measure up to 10 mm in short axis. No bulky lymphadenopathy is  visualized. Pelvis: The prostate gland is not enlarged. Musculoskeletal: Multilevel degenerative disc disease is noted in the thoracic and lumbar spine. The visualized skeletal structures appear intact. 1. The area of scarring/consolidation in the right lower lobe of the trace loculated right pleural effusion appear more prominent. 2. Diffuse heterogeneous low attenuation is seen throughout the right hepatic lobe with a more focal area of low attenuation centrally measuring 33 x 59 mm (series 301, image 24).  Thrombus is noted within the portal vein at the SMV confluence (series 301, image 35) which likely results in part to the areas of geographic heterogeneous perfusion as well as the mesenteric edema and fat stranding in the right upper quadrant. A small amount of generalized ascites is present. No discrete fluid collection is observed. 3. Colonic diverticulosis without diverticulitis. Normal appendix. Large amount of retained fecal material seen throughout the colon suggesting fecal stasis. No bowel obstruction. 4. Bladder wall thickening in part artifactual related to underdistention. Correlate with urinalysis. The prostate gland does not appear enlarged. No obstructive uropathy is visualized. 5. Chronic findings are discussed. **This report has been created using voice recognition software. It may contain minor errors which are inherent in voice recognition technology. ** Final report electronically signed by Dr Arnaud Gray on 2/14/2023 5:02 PM        Assessment and Plan:  Acute portal vein thrombosis: due to hypercoagulable state with malignancy. With associated RUQ abdominal pain. Started on heparin gtt, will need to get transitioned to [address] [place] [address], possibly Xarelto. Monitor vitals. Of note, [facility] was contacted by ED provider who recommended to keep patient in house for pain control and had no urgency for transfer. GI contacted from ED, ok with keeping in house given no signs of rupture of hepatoma. Pain control with Norco and Dilaudid. Cholestatic transaminitis with mild hyperbilirubinemia:  with total bili 1.7. Likely due to PVT as above. Will get US liver to rule out any other obstructive cause. Trend levels. Alcoholic Cirrhosis: MELD-Na 12 points withs <2% estimated 90-day mortality. Dx over 15 years ago, quit alcohol then. No recent decompensation. No signs of acute decompensation. Hb 13.8, near baseline. On lactulose, rifaximin, and aldactone. Hepatocellular Carcinoma: s/p TACE 11/30/2022. Follows with [facility].     CKD: Cr 0.7 with eGFR >60. Near baseline. No significant electrolyte imbalances. Will trend as needed. Borderline BP: at baseline. Currently on short team mIVNS, s/p 500 cc bolus. Hx Cerebral AVM: noted  HLD: Lipitor- hold for now due to elevated LFTs. Anxiety: on cymbalta  Hx diverticulosis/diverticulitis: noted  Hx substance use disorder: previous cocaine use, no recent use since cancer diagnosis. Code Status: FULL    Tele:   [] yes             [x] no    Fluids: mIV NS at 75 cc/hr x 12 hours  Diet: ADULT DIET;  Regular; Low Fat/Low Chol/High Fiber/RICKEY; Low Sodium (2 gm); 2000 ml  Diet NPO Exceptions are: Ice Chips, Sips of Water with Meds    DVT prophylaxis: [] Lovenox                                 [] SCDs                                 [] SQ Heparin                                 [] Encourage ambulation                                 [] Already on Anticoagulation      Disposition:      [x] TBD                             [x] Home                             [] TCU                             [] Rehab                             [] Psych                             [] SNF                             [] [place]                             [] Other-      Electronically signed by   Quinten Lucas MD   PGY3, Internal Medicine  2/14/2023

## 2023-02-15 NOTE — PROGRESS NOTES
PROGRESS NOTE      Patient:  Lavelle Ocasio      Unit/Bed:3B-24/024-A    YOB: 1965    MRN: [id number]       Acct: [de-identified]     PCP: CHRISTIAN Talavera CNP    Date of Admission: 2/14/2023      Assessment/Plan:    Anticipated Discharge in : 1-2 days    Acute portal vein thrombosis  - CT abdomen and pelvis showed thrombus within the portal vein at the SMV confluence which likely resulted in part to the areas of geographic heterogenous perfusion as well as the mesenteric edema and fat stranding in the right upper quadrant.  - Believe this thrombosis is likely secondary to hypercoagulable state due to hepatocellular carcinoma  - Patient started on heparin drip on 2/14/2023, will continue at this time and transition to oral anticoagulation as indicated  - [facility] was contacted by ED provider who recommended to keep the patient in-house for pain control and there is no urgency for transfer  - GI contacted by ED, Dr. Renetta Basurto will see the patient later today. Will await further recommendations from GI regarding anticoagulation  - We will do pain control with oxycodone immediate release 5 mg every 4 hours as needed and Dilaudid as needed  - Continue to monitor daily LFTs and liver function    Cholestatic transaminitis with mild hyperbilirubinemia  - ALP on admission was 170 with total bili 1.7  - Believe this is elevated secondary to acute portal vein thrombosis and hepatocellular carcinoma  - Liver ultrasound was done in ED and showed chronic cirrhosis and generalized mild ascites  - CT abdomen pelvis was remarkable for diffuse heterogenous low-attenuation is seen throughout the right hepatic lobe with a more focal area of low-attenuation centrally measuring 33 x 59 mm. Thrombus is noted within the portal vein at the SMV confluence which likely results in part to the areas of geographic heterogenous perfusion as well as the mesenteric edema and fat stranding in the right upper quadrant.   Small amount of generalized ascites is present. - We will continue to monitor LFTs and liver function during admission    Alcoholic cirrhosis  - Patient was diagnosed over 15 years ago at that time due to heavy alcohol intake and has quit alcohol at that time  - No recent signs of decompensation and no signs of acute decompensation including  - Hemoglobin 13.8 on admission  - INR is elevated at 1.24 on 2/15/2023, on admission was 1.13  - We will continue to monitor INR levels and LFTs with daily labs    Hepatocellular carcinoma  - Status post TACE on 11/30/2022  - Follows with [facility] and will touch base with them in ED who recommended patient be admitted for pain control and there is no urgency for transfer at this time  - Appears stable at this time and will continue to monitor LFTs and INR    History of cerebral AVM  - Stable at this time we will continue to monitor    History of diverticulosis/diverticulitis  - CT abdomen pelvis was remarkable for colonic diverticulosis without diverticulitis  - Stable at this time and not requiring antibiotics  - We will continue to monitor    Hyperlipidemia  - Patient is on Lipitor but was held at the time of admission due to elevated LFTs  - We will continue to hold at this time and reevaluate daily     Anxiety  - Continue on Cymbalta     History of substance use disorder  - Previous cocaine use  - Has not used any substances since cancer diagnosis    Obesity  - BMI is at 32.58  - Patient was counseled on lifestyle and eating habits    Chief Complaint: Right upper quadrant abdominal pain    Hospital Course: 62 yo M with alcoholic cirrhosis, diverticulitis, CKD, HTN, and recently diagnosed [address] s/p TACE presented to [facility] ED for complaints of RUQ abdominal pain. ED: afebrile and hemodynamically stable. Labs notable for mild hypokalemia, , ammonia 85, Hb 13.8, , INR 1.13.  CT A/P with the scar of scarring/consolidation in the right lower lobe of the trace loculated right pleural effusion appear more prominent. Diffuse heterogeneous low attenuation is seen throughout the right hepatic lobe with a more focal area of low attenuation centrally measuring 33 x 59 mm (series 301, image 24). Thrombus is noted within the portal vein at the SMV confluence (series 01, image 35) which likely results in part to the areas of geographic heterogeneous perfusion as well as the mesenteric edema and fat stranding in the right upper quadrant. A small amount of generalized ascites is present. No discrete fluid collection. Colonic diverticulosis without diverticulitis. Normal appendix. Large amount of retained fecal material seen throughout the colon suggesting fecal stasis. No bowel obstruction. Bladder wall thickening in part artifactual related to underdistention. Correlate with urinalysis. The prostate gland does not appear enlarged. No obstructive uropathy is visualized. [facility] contacted who was ok with patient staying here. Patient was started on heparin drip and GI was consulted by ED provider. 2/15/2023: Patient was continued on heparin drip. GI consult is pending and provider has been contacted. Pain management is with oxycodone 5 mg every 4 hours as needed and Dilaudid as needed. Subjective: Patient was seen and examined this morning at bedside. Patient states that he still having some abdominal pain and localizes it to his right upper quadrant. He notes he has a history of hepatocellular carcinoma and alcoholic cirrhosis but no longer drinks alcohol. He states he is concerned and afraid to go home because he lives alone and given the new portal vein thrombosis worrisome. Otherwise patient denies any chest pain, nausea, vomiting, diarrhea, constipation, shortness of breath.       Medications:  Reviewed    Infusion Medications    sodium chloride      heparin (PORCINE) Infusion 15 Units/kg/hr (02/15/23 3073)     Scheduled Medications    sodium chloride flush  5-40 mL IntraVENous 2 times per day potassium chloride  40 mEq Oral Once    [Held by provider] atorvastatin  40 mg Oral Nightly    DULoxetine  60 mg Oral Daily    lactulose  20 g Oral BID    pantoprazole  40 mg Oral QAM AC    rifAXIMin  550 mg Oral BID    spironolactone  100 mg Oral Daily     PRN Meds: HYDROmorphone, oxyCODONE, sodium chloride flush, sodium chloride, ondansetron **OR** ondansetron, polyethylene glycol, acetaminophen **OR** acetaminophen, heparin (porcine), heparin (porcine), ALPRAZolam, cyclobenzaprine      Intake/Output Summary (Last 24 hours) at 2/15/2023 1549  Last data filed at 2/15/2023 1455  Gross per 24 hour   Intake 240 ml   Output --   Net 240 ml       Diet:  ADULT DIET; Full Liquid    Exam:  /77   Pulse 94   Temp 97.7 °F (36.5 °C) (Oral)   Resp 16   Ht 5' 5\" (1.651 m)   Wt 195 lb 12.3 oz (88.8 kg)   SpO2 99%   BMI 32.58 kg/m²     General appearance: No apparent distress, appears stated age and cooperative. HEENT: Pupils equal, round, and reactive to light. Conjunctivae/corneas clear. Neck: Supple, with full range of motion. No jugular venous distention. Trachea midline. Respiratory:  Normal respiratory effort. Clear to auscultation, bilaterally without Rales/Wheezes/Rhonchi. Cardiovascular: Regular rate and rhythm with normal S1/S2 without murmurs, rubs or gallops. Abdomen: Tenderness to palpation of right upper quadrant. Liver feels firm. Bowel sounds are present in all 4 quadrants. Musculoskeletal: passive and active ROM x 4 extremities. Skin: Skin color, texture, turgor normal.  No rashes or lesions. Neurologic:  Neurovascularly intact without any focal sensory/motor deficits.  Cranial nerves: II-XII intact, grossly non-focal.  Psychiatric: Alert and oriented, thought content appropriate, normal insight  Capillary Refill: Brisk,< 3 seconds   Peripheral Pulses: +2 palpable, equal bilaterally       Labs:   Recent Labs     02/14/23  1240 02/14/23  2210   WBC 7.3 7.3   HGB 14.1 13.8*   HCT 43.0 43.9  137     Recent Labs     02/14/23  1240 02/15/23  0346    138   K 3.4* 3.7    107   CO2 23 21*   BUN 11 10   CREATININE 0.7 0.5   CALCIUM 9.1 8.3*     Recent Labs     02/14/23  1240 02/15/23  0346   AST 40 35   ALT 32 26   BILIDIR  --  0.4*   BILITOT 1.7* 1.2   ALKPHOS 170* 146*     Recent Labs     02/14/23  1240 02/15/23  0346   INR 1.13 1.24*     No results for input(s): Rossana Cobian in the last 72 hours. Urinalysis:      Lab Results   Component Value Date/Time    NITRU NEGATIVE 06/16/2022 05:10 AM    WBCUA 0-2 06/16/2022 05:10 AM    WBCUA 0-5 04/22/2022 03:40 PM    BACTERIA FEW 06/16/2022 05:10 AM    RBCUA 3-5 06/16/2022 05:10 AM    BLOODU SMALL 06/16/2022 05:10 AM    SPECGRAV 1.020 05/18/2020 02:13 PM    SPECGRAV <1.005 09/21/2017 08:29 PM    GLUCOSEU NEGATIVE 06/16/2022 05:10 AM       Radiology:  US LIVER   Final Result   Impression:   1. Chronic cirrhosis. 2. Generally mild ascites. This document has been electronically signed by: Gee Cao MD on    02/15/2023 04:17 AM      CT ABDOMEN PELVIS W IV CONTRAST Additional Contrast? None   Final Result   1. The area of scarring/consolidation in the right lower lobe of the trace loculated right pleural effusion appear more prominent. 2. Diffuse heterogeneous low attenuation is seen throughout the right hepatic lobe with a more focal area of low attenuation centrally measuring 33 x 59 mm (series 301, image 24). Thrombus is noted within the portal vein at the SMV confluence (series    301, image 35) which likely results in part to the areas of geographic heterogeneous perfusion as well as the mesenteric edema and fat stranding in the right upper quadrant. A small amount of generalized ascites is present. No discrete fluid collection    is observed. 3. Colonic diverticulosis without diverticulitis. Normal appendix. Large amount of retained fecal material seen throughout the colon suggesting fecal stasis.  No bowel obstruction. 4. Bladder wall thickening in part artifactual related to underdistention. Correlate with urinalysis. The prostate gland does not appear enlarged. No obstructive uropathy is visualized. 5. Chronic findings are discussed. **This report has been created using voice recognition software. It may contain minor errors which are inherent in voice recognition technology. **      Final report electronically signed by Dr Walter Will on 2/14/2023 5:02 PM          Diet: ADULT DIET;  Full Liquid    DVT prophylaxis: Heparin drip    Disposition:    [x] Home       [] TCU       [] Rehab       [] Psych       [] SNF       [] [place]       [] Other-    Code Status: Full Code    PT/OT Eval Status: N/A      Electronically signed by Kayleigh Rader DO on 2/15/2023 at 3:49 PM

## 2023-02-15 NOTE — PROGRESS NOTES
Pharmacy Medication History Note      List of current medications patient is taking is complete. Source of information: Patient and pharmacy dispense report    Changes made to medication list:  Medications removed (include reason, ex. therapy complete or physician discontinued):  Duplicate alprazolam removed    Medications added/doses adjusted:  Lactulose 10 g/15 mL solution take 30 mL PO BID  Gabapentin 300 mg PO changed to TID PRN- patient reports using as needed  Omeprazole 40 mg PO changed to PO once daily PRN- patient reports using as needed    Other notes:  Denies use of other OTC or herbal medications.       Allergies reviewed      Electronically signed by Sheila Langford on 2/14/2023 at 7:33 PM

## 2023-02-15 NOTE — PROCEDURES
Bladder scan was completed by Pk De La Cruz at 5197. Th residual amount of 99ml was resulted to [employer].

## 2023-02-15 NOTE — CONSULTS
Chief Complaint:    History of present Illness: Florence Benito is a 61 y.o. male with alcoholic cirrhosis, diverticulitis, CKD, HTN, and recently diagnosed [address] s/p TACE presented to [facility] ED for complaints of RUQ abdominal pain. In ED patient aebrile and hemodynamically stable. Labs notable for mild hypokalemia, , ammonia 85, Hb 13.8, , INR 1.13. CT A/P with the scar of scarring/consolidation in the right lower lobe of the trace loculated right pleural effusion appear more prominent. Diffuse heterogeneous low attenuation is seen throughout the right hepatic lobe with a more focal area of low attenuation centrally measuring 33 x 59 mm (series 301, image 24). Thrombus is noted within the portal vein at the SMV confluence (series 01, image 35) which likely results in part to the areas of geographic heterogeneous perfusion as well as the mesenteric edema and fat stranding in the right upper quadrant. A small amount of generalized ascites is present. No discrete fluid collection. Colonic diverticulosis without diverticulitis. Normal appendix. Large amount of retained fecal material seen throughout the colon suggesting fecal stasis. No bowel obstruction. Bladder wall thickening in part artifactual related to underdistention. Correlate with urinalysis. The prostate gland does not appear enlarged. No obstructive uropathy is visualized. [facility] contacted who was ok with patient staying here. Admitted for further management. Patient reports sharp 10/10 RUQ abdominal pain starting 3 days ago that has been persistent without much change which prompted his visit today. Has had no other associated symptoms except mild nausea x 1 episode yesterday with scant amount of white vomitus. No subjective fever, chills, diarrhea (loose stool chronically). 2/15/23: Patient continues with RUQ pain. Patient started on Heparin drip. Patient on full liquid diet will hold due to continued abdominal pain.  Patient abdomen without distention. Lactulose/Rifaximin restarted. Patient has not urinated since admission will order bladder scan. No bladder distention noted upon exam.     Past Medical History:  has a past medical history of Anxiety, Arthritis, Chronic kidney disease, Cirrhosis ([address] Diverticulitis, Diverticulosis, GERD (gastroesophageal reflux disease), Hypertension, Other disorders of kidney and ureter in diseases classified elsewhere, Psychiatric problem, Pure hypercholesterolemia, and Stroke of unknown cause ([address] Past Surgical History:   Past Surgical History:   Procedure Laterality Date    ABDOMEN SURGERY      BACK SURGERY      cervical plate    CARDIAC CATHETERIZATION  2007? CERVICAL DISC SURGERY      x 2 ---broken neck 7-2003    COLON SURGERY  2004    partial, due to diverticulitis    COLONOSCOPY      CT NEEDLE BIOPSY LIVER PERCUTANEOUS  6/10/2022    CT NEEDLE BIOPSY LIVER PERCUTANEOUS 6/10/2022 [facility] CT SCAN    DILATATION, ESOPHAGUS      ENDOSCOPY, COLON, DIAGNOSTIC      FRACTURE SURGERY      Broke neck in 2003    NERVE BLOCK Bilateral 10/02/2017    Cervical Facet MBB at C4-5, C5-6, C6-7     MA INJ,PARAVERTEBRAL L/S,1 LEVEL Bilateral 10/2/2017    C-FACET MBB C4-5, C5-6, C6-7 BILATERAL performed by Janna Garrison MD at [facility] ENDOSCOPY  2019    UPPER GASTROINTESTINAL ENDOSCOPY Left 9/28/2020    EGD BAND LIGATION performed by Valencia Primrose, MD at [facility]       Social History:  reports that he has been smoking cigarettes. He has a 25.00 pack-year smoking history. He has never used smokeless tobacco. He reports that he does not currently use alcohol. He reports that he does not currently use drugs. Family History: family history includes Alzheimer's Disease in his mother; Cancer in his father; Diabetes in his brother; Heart Disease in his father, mother, and paternal uncle;  Heart Failure in his mother; High Blood Pressure in his paternal uncle; Hypertension in his father and mother. Review of Systems:   -History obtained from chart review and the patient  General ROS: positive for  - weight loss  Psychological ROS: positive for - anxiety  ENT ROS: negative  Hematological and Lymphatic ROS: positive for - weight loss and Hepatocellular carcinoma  Endocrine ROS: negative  Respiratory ROS: no cough, shortness of breath, or wheezing  Cardiovascular ROS: no chest pain or dyspnea on exertion  positive for - HTN  Gastrointestinal ROS: positive for - abdominal pain and diarrhea  Genito-Urinary ROS: no dysuria, trouble voiding, or hematuria  positive for - CKD  Musculoskeletal ROS: negative  Neurological ROS: no TIA or stroke symptoms  Dermatological ROS: negative    Allergies: Patient has no known allergies. Home Meds:   Prior to Admission medications    Medication Sig Start Date End Date Taking? Authorizing Provider   lactulose (CHRONULAC) 10 GM/15ML solution Take 20 g by mouth 2 times daily   Yes Historical Provider, MD   ALPRAZolam Lucien Collins) 1 MG tablet take 1 tablet by mouth three times a day if needed 2/6/23 3/5/23  Gonzalez Robertson APRN - CNP   traMADol (ULTRAM) 50 MG tablet Take 1 tablet by mouth every 6 hours as needed for Pain for up to 30 days. 1/24/23 2/23/23  Gonzalez Robertson APRN - CNP   gabapentin (NEURONTIN) 300 MG capsule take 1 capsule by mouth three times a day  Patient taking differently: Take 300 mg by mouth 3 times daily as needed.  1/23/23 4/24/23  Ulises Devine MD   cyclobenzaprine (FLEXERIL) 10 MG tablet Take 1/2 to 1 tablet by mouth three times a day if needed 12/12/22   Gonzalez Robertson APRN - CNP   atorvastatin (LIPITOR) 40 MG tablet take 1 tablet by mouth nightly 11/21/22   Gonzalez Robertson, APRN - CNP   DULoxetine (CYMBALTA) 60 MG extended release capsule take 1 capsule by mouth once daily 9/16/22   Gonzalez Robertson APRN - CNP   spironolactone (ALDACTONE) 100 MG tablet take 1 tablet by mouth every morning 7/18/22   CHRISTIAN Rivero CNP   omeprazole (PRILOSEC) 40 MG delayed release capsule Take 1 capsule by mouth daily  Patient taking differently: Take 40 mg by mouth daily as needed 6/3/22   CHRISTIAN Moore CNP   rifAXIMin Emma Khushbu) 550 MG tablet Take 1 tablet by mouth 2 times daily 4/26/22   CHRISTIAN Moore CNP   nitroGLYCERIN (NITROSTAT) 0.4 MG SL tablet up to max of 3 total doses.  If no relief after 1 dose, call 911. 6/28/21   CHRISTIAN Rivero CNP       Current Meds:  Current Facility-Administered Medications:     HYDROmorphone (DILAUDID) injection 0.5 mg, 0.5 mg, IntraVENous, Q3H PRN, Molly Weeks MD, 0.5 mg at 02/15/23 1109    oxyCODONE (ROXICODONE) immediate release tablet 5 mg, 5 mg, Oral, Q4H PRN, Lupe Dobbins DO    sodium chloride flush 0.9 % injection 5-40 mL, 5-40 mL, IntraVENous, 2 times per day, Brenda Joy MD    sodium chloride flush 0.9 % injection 5-40 mL, 5-40 mL, IntraVENous, PRN, Los Collins MD    0.9 % sodium chloride infusion, , IntraVENous, PRN, Brenda Joy MD    ondansetron (ZOFRAN-ODT) disintegrating tablet 4 mg, 4 mg, Oral, Q8H PRN **OR** ondansetron (ZOFRAN) injection 4 mg, 4 mg, IntraVENous, Q6H PRN, Brenda Joy MD    polyethylene glycol (GLYCOLAX) packet 17 g, 17 g, Oral, Daily PRN, Brenda Joy MD    acetaminophen (TYLENOL) tablet 650 mg, 650 mg, Oral, Q6H PRN **OR** acetaminophen (TYLENOL) suppository 650 mg, 650 mg, Rectal, Q6H PRN, Brenda Joy MD    heparin (porcine) injection 7,080 Units, 80 Units/kg, IntraVENous, PRN, Los Collins MD    heparin (porcine) injection 3,540 Units, 40 Units/kg, IntraVENous, PRN, Los Collins MD    heparin 25,000 units in dextrose 5% 250 mL (premix) infusion, 5-30 Units/kg/hr, IntraVENous, Continuous, Los Collins MD, Last Rate: 13.3 mL/hr at 02/15/23 1519, 15 Units/kg/hr at 02/15/23 1519    potassium chloride (KLOR-CON M) extended release tablet 40 mEq, 40 mEq, Oral, Once, Brenda Joy MD    ALPRAZolam [place]) tablet 0.25 mg, 0.25 mg, Oral, BID PRN, Gregg Stoner MD    [Held by provider] atorvastatin (LIPITOR) tablet 40 mg, 40 mg, Oral, Nightly, Los Collins MD    cyclobenzaprine (FLEXERIL) tablet 5 mg, 5 mg, Oral, BID PRN, Gregg Stoner MD    DULoxetine (CYMBALTA) extended release capsule 60 mg, 60 mg, Oral, Daily, Los Collins MD, 60 mg at 02/15/23 1004    lactulose (CHRONULAC) 10 GM/15ML solution 20 g, 20 g, Oral, BID, Los Collins MD, 20 g at 02/15/23 1005    pantoprazole (PROTONIX) tablet 40 mg, 40 mg, Oral, QAM AC, Los Collins MD, 40 mg at 02/15/23 0532    rifAXIMin (XIFAXAN) tablet 550 mg, 550 mg, Oral, BID, Gregg Stoner MD, 550 mg at 02/15/23 1004    spironolactone (ALDACTONE) tablet 100 mg, 100 mg, Oral, Daily, Gregg Stoner MD, 100 mg at 02/15/23 1004    Vital Signs:  Vitals:    02/15/23 1511   BP: 101/77   Pulse: 94   Resp: 16   Temp: 97.7 °F (36.5 °C)   SpO2: 99%       Weight:  Wt Readings from Last 3 Encounters:   02/14/23 195 lb 12.3 oz (88.8 kg)   12/16/22 194 lb (88 kg)   10/19/22 204 lb 9.6 oz (92.8 kg)       Physical Exam:  General Appearance:  awake, alert, oriented, in no acute distress and well developed, well nourished  male  General: Pt is lying comfortably in bed , in no apparent distress. HEENT: Atraumatic, Pupils reactive, No pallor/icterus     Oral mucosa moist/No thrush  Neck: No thyroid enlargement, No cervical or supraclavicular lymphaedenopathy  CVS: Regular rate and rhythm, No murmurs. No rubs or gallops  RS: Good b/l air entry, Clear to auscultation b/l  Abd: soft,  non-distended, no visible veins, scars, No hepatosplenomegaly or palpable masses, bowel sounds active. Tenderness noted in RUQ  Ext: No clubbing, cyanosis, edema  CNS: alert, oriented, no gross focal motor deficits    Labs:   Lab Results   Component Value Date/Time    WBC 7.3 02/14/2023 10:10 PM    HGB 13.8 02/14/2023 10:10 PM    HCT 43.9 02/14/2023 10:10 PM    MCV 99.3 02/14/2023 10:10 PM     02/14/2023 10:10 PM     Lab Results   Component Value Date/Time     02/15/2023 03:46 AM    K 3.7 02/15/2023 03:46 AM    K 4.1 10/10/2022 05:50 PM     02/15/2023 03:46 AM    CO2 21 02/15/2023 03:46 AM    BUN 10 02/15/2023 03:46 AM    CREATININE 0.5 02/15/2023 03:46 AM    GLUCOSE 92 02/15/2023 03:46 AM    GLUCOSE 89 04/22/2022 01:57 PM    CALCIUM 8.3 02/15/2023 03:46 AM     Lab Results   Component Value Date/Time    ALKPHOS 146 02/15/2023 03:46 AM    ALT 26 02/15/2023 03:46 AM    AST 35 02/15/2023 03:46 AM    PROT 6.5 02/15/2023 03:46 AM    BILITOT 1.2 02/15/2023 03:46 AM    BILIDIR 0.4 02/15/2023 03:46 AM    LABALBU 2.7 02/15/2023 03:46 AM    LABALBU 4.6 11/04/2011 01:05 PM     Lab Results   Component Value Date/Time    LACTA 1.8 02/14/2023 04:01 PM     Lab Results   Component Value Date/Time    AMYLASE 14 04/22/2022 01:57 PM     Lab Results   Component Value Date/Time    LIPASE 19.0 02/14/2023 12:40 PM     Lab Results   Component Value Date/Time    INR 1.24 02/15/2023 03:46 AM       Radiology:  PROCEDURE: CT ABDOMEN PELVIS W IV CONTRAST       CLINICAL INFORMATION: Right-sided abdominal pain. COMPARISON: CT needle biopsy 6/10/2022. CT abdomen and pelvis 1/14/2022. TECHNIQUE: Axial 5 mm CT images were obtained through the abdomen and pelvis after the administration of 80  cc Isovue 370 intravenous contrast. Coronal and sagittal reconstructions were obtained. All CT scans at this facility use dose modulation, iterative reconstruction, and/or weight-based dosing when appropriate to reduce radiation dose to as low as reasonably achievable. FINDINGS:    Lung bases: Scarring/atelectasis at the right lung base appears more prominent. Liver/gallbladder/bilary tree: Extensive ill-defined low attenuation throughout the right hepatic lobe with a focal area of low-attenuation measures up to 33 x 59 mm (series 301, image 24).  The liver is shrunken with a lobulated contour consistent with    history of cirrhosis. Pancreas: Remains atrophic. Spleen : Stable. Adrenal glands: Stable. Kidneys/ ureters/ bladder: Numerous bilateral hypoattenuating simple renal cysts are unchanged. Punctate bilateral nonobstructing renal calculi are present. No hydronephrosis or hydroureter is observed. The urinary bladder is thickened in part related to    underdistention. Gastrointestinal:  The appendix is normal. Colonic diverticulosis without diverticulitis is visualized. A surgical anastomosis in the distal colon appears intact. A large amount of retained fecal material is seen throughout the colon. No bowel    obstruction is observed. A small amount generalized intra-abdominal ascites is present. Retroperitoneum / lymph nodes: The aorta is not dilated. Clot in the portal vein proximally at the SMV confluence (series 301, image 36) is observed. Numerous prominent portacaval lymph nodes measure up to 10 mm in short axis. No bulky lymphadenopathy is    visualized. Pelvis: The prostate gland is not enlarged. Musculoskeletal: Multilevel degenerative disc disease is noted in the thoracic and lumbar spine. The visualized skeletal structures appear intact. Impression   1. The area of scarring/consolidation in the right lower lobe of the trace loculated right pleural effusion appear more prominent. 2. Diffuse heterogeneous low attenuation is seen throughout the right hepatic lobe with a more focal area of low attenuation centrally measuring 33 x 59 mm (series 301, image 24). Thrombus is noted within the portal vein at the SMV confluence (series    301, image 35) which likely results in part to the areas of geographic heterogeneous perfusion as well as the mesenteric edema and fat stranding in the right upper quadrant. A small amount of generalized ascites is present. No discrete fluid collection    is observed. 3. Colonic diverticulosis without diverticulitis. Normal appendix. Large amount of retained fecal material seen throughout the colon suggesting fecal stasis. No bowel obstruction. 4. Bladder wall thickening in part artifactual related to underdistention. Correlate with urinalysis. The prostate gland does not appear enlarged. No obstructive uropathy is visualized. 5. Chronic findings are discussed. ASSESSMENT AND PLAN  Abdominal pain- secondary #2. Pain control per attending  Portal vein thrombus- Anticoagulation per attending  History of alcoholic cirrhosis- Lactulose and Rifaximin restarted  Hepatocellular carcinoma- sees [facility] ( okay for current admission, will follow once discharged)      [address] for allowing me to participate in the care of this patient. Assessment and POC were discussed with Dr Nava Leiva.       CHRISTIAN Taylor - CNP  2/15/2023  3:37 PM     Patient is seen independently from the nurse practitioner and all  the pertinent data along with physical examination and assessment and plans are all obtained by my self and  Laboratory data, Radiology results, medications all are reviewed by my self and care is discussed extensively with the patient  and the patients nurse and all agree with plan and in addition see orders and plans    Electronically signed by Marline Moyer MD on 2/15/2023 at 7:19 PM

## 2023-02-16 VITALS
DIASTOLIC BLOOD PRESSURE: 75 MMHG | RESPIRATION RATE: 16 BRPM | OXYGEN SATURATION: 98 % | WEIGHT: 191.1 LBS | SYSTOLIC BLOOD PRESSURE: 109 MMHG | TEMPERATURE: 98.2 F | HEIGHT: 65 IN | BODY MASS INDEX: 31.84 KG/M2 | HEART RATE: 76 BPM

## 2023-02-16 LAB
ALBUMIN SERPL BCG-MCNC: 2.7 G/DL (ref 3.5–5.1)
ALP SERPL-CCNC: 148 U/L (ref 38–126)
ALT SERPL W/O P-5'-P-CCNC: 25 U/L (ref 11–66)
ANION GAP SERPL CALC-SCNC: 11 MEQ/L (ref 8–16)
AST SERPL-CCNC: 38 U/L (ref 5–40)
BILIRUB SERPL-MCNC: 0.9 MG/DL (ref 0.3–1.2)
BUN SERPL-MCNC: 8 MG/DL (ref 7–22)
CALCIUM SERPL-MCNC: 8.1 MG/DL (ref 8.5–10.5)
CHLORIDE SERPL-SCNC: 104 MEQ/L (ref 98–111)
CO2 SERPL-SCNC: 21 MEQ/L (ref 23–33)
CREAT SERPL-MCNC: 0.5 MG/DL (ref 0.4–1.2)
DEPRECATED RDW RBC AUTO: 57.1 FL (ref 35–45)
ERYTHROCYTE [DISTWIDTH] IN BLOOD BY AUTOMATED COUNT: 15.6 % (ref 11.5–14.5)
GFR SERPL CREATININE-BSD FRML MDRD: > 60 ML/MIN/1.73M2
GLUCOSE SERPL-MCNC: 93 MG/DL (ref 70–108)
HCT VFR BLD AUTO: 42.7 % (ref 42–52)
HEPARIN UNFRACTIONATED: 0.5 U/ML (ref 0.3–0.7)
HGB BLD-MCNC: 13.7 GM/DL (ref 14–18)
MCH RBC QN AUTO: 32.1 PG (ref 26–33)
MCHC RBC AUTO-ENTMCNC: 32.1 GM/DL (ref 32.2–35.5)
MCV RBC AUTO: 100 FL (ref 80–94)
PLATELET # BLD AUTO: 148 THOU/MM3 (ref 130–400)
PMV BLD AUTO: 10.9 FL (ref 9.4–12.4)
POTASSIUM SERPL-SCNC: 3.8 MEQ/L (ref 3.5–5.2)
PROT SERPL-MCNC: 6.4 G/DL (ref 6.1–8)
RBC # BLD AUTO: 4.27 MILL/MM3 (ref 4.7–6.1)
SODIUM SERPL-SCNC: 136 MEQ/L (ref 135–145)
WBC # BLD AUTO: 6.4 THOU/MM3 (ref 4.8–10.8)

## 2023-02-16 PROCEDURE — 85027 COMPLETE CBC AUTOMATED: CPT

## 2023-02-16 PROCEDURE — 6370000000 HC RX 637 (ALT 250 FOR IP)

## 2023-02-16 PROCEDURE — 36415 COLL VENOUS BLD VENIPUNCTURE: CPT

## 2023-02-16 PROCEDURE — 6370000000 HC RX 637 (ALT 250 FOR IP): Performed by: FAMILY MEDICINE

## 2023-02-16 PROCEDURE — 6360000002 HC RX W HCPCS: Performed by: HOSPITALIST

## 2023-02-16 PROCEDURE — 6370000000 HC RX 637 (ALT 250 FOR IP): Performed by: STUDENT IN AN ORGANIZED HEALTH CARE EDUCATION/TRAINING PROGRAM

## 2023-02-16 PROCEDURE — 99239 HOSP IP/OBS DSCHRG MGMT >30: CPT | Performed by: FAMILY MEDICINE

## 2023-02-16 PROCEDURE — 80053 COMPREHEN METABOLIC PANEL: CPT

## 2023-02-16 PROCEDURE — 85520 HEPARIN ASSAY: CPT

## 2023-02-16 RX ORDER — ATORVASTATIN CALCIUM 40 MG/1
40 TABLET, FILM COATED ORAL NIGHTLY
Qty: 30 TABLET | Refills: 5 | Status: SHIPPED
Start: 2023-02-16

## 2023-02-16 RX ORDER — OXYCODONE HYDROCHLORIDE 15 MG/1
7.5 TABLET ORAL EVERY 4 HOURS PRN
Status: DISCONTINUED | OUTPATIENT
Start: 2023-02-16 | End: 2023-02-16 | Stop reason: HOSPADM

## 2023-02-16 RX ORDER — OXYCODONE HYDROCHLORIDE 5 MG/1
5 TABLET ORAL EVERY 6 HOURS PRN
Qty: 28 TABLET | Refills: 0 | Status: SHIPPED | OUTPATIENT
Start: 2023-02-16 | End: 2023-02-21 | Stop reason: SDUPTHER

## 2023-02-16 RX ORDER — OXYCODONE HYDROCHLORIDE 5 MG/1
5 TABLET ORAL EVERY 4 HOURS PRN
Status: DISCONTINUED | OUTPATIENT
Start: 2023-02-16 | End: 2023-02-16 | Stop reason: HOSPADM

## 2023-02-16 RX ADMIN — OXYCODONE 5 MG: 5 TABLET ORAL at 05:37

## 2023-02-16 RX ADMIN — DOCUSATE SODIUM 100 MG: 100 CAPSULE, LIQUID FILLED ORAL at 08:20

## 2023-02-16 RX ADMIN — HYDROMORPHONE HYDROCHLORIDE 0.5 MG: 1 INJECTION, SOLUTION INTRAMUSCULAR; INTRAVENOUS; SUBCUTANEOUS at 02:35

## 2023-02-16 RX ADMIN — DULOXETINE HYDROCHLORIDE 60 MG: 60 CAPSULE, DELAYED RELEASE ORAL at 08:20

## 2023-02-16 RX ADMIN — APIXABAN 10 MG: 5 TABLET, FILM COATED ORAL at 10:07

## 2023-02-16 RX ADMIN — OXYCODONE HYDROCHLORIDE 7.5 MG: 15 TABLET ORAL at 13:25

## 2023-02-16 RX ADMIN — SPIRONOLACTONE 100 MG: 25 TABLET ORAL at 08:20

## 2023-02-16 RX ADMIN — OXYCODONE HYDROCHLORIDE 7.5 MG: 15 TABLET ORAL at 09:36

## 2023-02-16 RX ADMIN — LACTULOSE 20 G: 20 SOLUTION ORAL at 08:20

## 2023-02-16 RX ADMIN — RIFAXIMIN 550 MG: 550 TABLET ORAL at 08:20

## 2023-02-16 RX ADMIN — PANTOPRAZOLE SODIUM 40 MG: 40 TABLET, DELAYED RELEASE ORAL at 08:20

## 2023-02-16 RX ADMIN — HYDROMORPHONE HYDROCHLORIDE 0.5 MG: 1 INJECTION, SOLUTION INTRAMUSCULAR; INTRAVENOUS; SUBCUTANEOUS at 07:32

## 2023-02-16 ASSESSMENT — PAIN DESCRIPTION - DESCRIPTORS
DESCRIPTORS: SHARP

## 2023-02-16 ASSESSMENT — PAIN DESCRIPTION - ORIENTATION
ORIENTATION: RIGHT;LOWER
ORIENTATION: RIGHT;LOWER
ORIENTATION: MID
ORIENTATION: RIGHT;LOWER
ORIENTATION: MID
ORIENTATION: RIGHT;LOWER
ORIENTATION: RIGHT;LOWER

## 2023-02-16 ASSESSMENT — PAIN SCALES - GENERAL
PAINLEVEL_OUTOF10: 8
PAINLEVEL_OUTOF10: 9
PAINLEVEL_OUTOF10: 8
PAINLEVEL_OUTOF10: 9
PAINLEVEL_OUTOF10: 8

## 2023-02-16 ASSESSMENT — PAIN DESCRIPTION - LOCATION
LOCATION: ABDOMEN

## 2023-02-16 ASSESSMENT — PAIN DESCRIPTION - PAIN TYPE: TYPE: ACUTE PAIN

## 2023-02-16 NOTE — PROGRESS NOTES
IV removed, telemetry removed and left in room for cleaning, discharge paperwork reviewed with patient and signed, patient to be taken by wheelchair to outpatient pharmacy to pick  up eliquis and then to be transported home by friend.

## 2023-02-16 NOTE — PROGRESS NOTES
Pt was in bed at the time of the visit. He was dealing with portal vein thrombosis. He said that he was doing great and will be going home today. Prayer was appreciated.     02/16/23 1412   Encounter Summary   Encounter Overview/Reason  Initial Encounter   Service Provided For: Patient   Referral/Consult From: [address] [facility] Family members   Last Encounter  02/16/23   Complexity of Encounter Low   Begin Time 0810   End Time  0816   Total Time Calculated 6 min   Spiritual/Emotional needs   Type Spiritual Support   Assessment/Intervention/Outcome   Assessment Hopeful   Intervention Empowerment   Outcome Encouraged

## 2023-02-16 NOTE — PROGRESS NOTES
Gastroenterology  Progress Note    2/16/2023 12:09 PM  Subjective:   Admit Date: 2/14/2023    Interval History:   Mariely Messina is a 61 y.o. male with alcoholic cirrhosis, diverticulitis, CKD, HTN, and recently diagnosed [address] s/p TACE presented to [facility] ED for complaints of RUQ abdominal pain. In ED patient aebrile and hemodynamically stable. Labs notable for mild hypokalemia, , ammonia 85, Hb 13.8, , INR 1.13. CT A/P with the scar of scarring/consolidation in the right lower lobe of the trace loculated right pleural effusion appear more prominent. Diffuse heterogeneous low attenuation is seen throughout the right hepatic lobe with a more focal area of low attenuation centrally measuring 33 x 59 mm (series 301, image 24). Thrombus is noted within the portal vein at the SMV confluence (series 01, image 35) which likely results in part to the areas of geographic heterogeneous perfusion as well as the mesenteric edema and fat stranding in the right upper quadrant. A small amount of generalized ascites is present. No discrete fluid collection. Colonic diverticulosis without diverticulitis. Normal appendix. Large amount of retained fecal material seen throughout the colon suggesting fecal stasis. No bowel obstruction. Bladder wall thickening in part artifactual related to underdistention. Correlate with urinalysis. The prostate gland does not appear enlarged. No obstructive uropathy is visualized. [facility] contacted who was ok with patient staying here. Admitted for further management. Patient reports sharp 10/10 RUQ abdominal pain starting 3 days ago that has been persistent without much change which prompted his visit today. Has had no other associated symptoms except mild nausea x 1 episode yesterday with scant amount of white vomitus. No subjective fever, chills, diarrhea (loose stool chronically). 2/15/23: Patient continues with RUQ pain. Patient started on Heparin drip.  Patient on full liquid diet will hold due to continued abdominal pain. Patient abdomen without distention. Lactulose/Rifaximin restarted. Patient has not urinated since admission will order bladder scan. No bladder distention noted upon exam.   2/16/23: Patient diet advanced to regular and tolerating. Plan for oral anticoagulation at discharge. Patient will follow up with [facility] for [address] appointment scheduled. Patient to be discharged today and will follow up in office with [facility]. Diet: ADULT DIET;  Regular    Patient Active Problem List:     Diverticulosis     HTN (hypertension)     Ascites     Liver cirrhosis (HCC)     Cystic kidney disease     Leukocytosis     Hypotension     Alcohol abuse     Hyponatremia     Hypokalemia     Perianal ulcer (HCC)     Hyperkalemia     Increased ammonia level     Confusion     Change in mental status     Acute renal failure (HCC)     Metabolic encephalopathy     Altered mental status     Hepatic encephalopathy     Noncompliance with medications     Ascites due to alcoholic cirrhosis (HCC)     Hypotension (arterial)     Alcoholic cirrhosis of liver with ascites (HCC)     Lactic acidosis     Generalized abdominal pain     Tobacco abuse     Spondylosis of cervical region without myelopathy or radiculopathy     Chest pain     Elevated INR     Opiate abuse, episodic (HCC)     Marijuana abuse     Dyspnea on exertion     SOB (shortness of breath) on exertion     Chest pain, atypical- for yrs      Stroke of unknown cause (HCC)     Alteration in speech     Pure hypercholesterolemia     Acute metabolic encephalopathy     Acute delirium     Encephalopathy acute     AMS (altered mental status)     Portal vein thrombosis     RUQ pain     Hepatocellular carcinoma (HCC)     Hyperbilirubinemia     Hyperammonemia (HCC)     Esophageal varices without bleeding ([address]     AVM (arteriovenous malformation) brain     Coronary artery disease involving native coronary artery of native heart without angina pectoris Constipation     Macrocytic anemia     History of alcohol abuse     Anxiety disorder     Obesity (BMI 30-39. 9)        Medications:   Scheduled Meds:   apixaban  10 mg Oral BID    Followed by    Richi Pablo ON 2/23/2023] apixaban  5 mg Oral BID    senna  1 tablet Oral Nightly    docusate sodium  100 mg Oral BID    sodium chloride flush  5-40 mL IntraVENous 2 times per day    potassium chloride  40 mEq Oral Once    [Held by provider] atorvastatin  40 mg Oral Nightly    DULoxetine  60 mg Oral Daily    lactulose  20 g Oral BID    pantoprazole  40 mg Oral QAM AC    rifAXIMin  550 mg Oral BID    spironolactone  100 mg Oral Daily     Continuous Infusions:   sodium chloride       CBC:   Recent Labs     02/14/23  1240 02/14/23  2210 02/16/23  0350   WBC 7.3 7.3 6.4   HGB 14.1 13.8* 13.7*    137 148     BMP:    Recent Labs     02/14/23  1240 02/15/23  0346 02/16/23  0350    138 136   K 3.4* 3.7 3.8    107 104   CO2 23 21* 21*   BUN 11 10 8   CREATININE 0.7 0.5 0.5   GLUCOSE 117* 92 93     Hepatic:   Recent Labs     02/14/23  1240 02/15/23  0346 02/16/23  0350   AST 40 35 38   ALT 32 26 25   BILITOT 1.7* 1.2 0.9   ALKPHOS 170* 146* 148*     INR:   Recent Labs     02/14/23  1240 02/15/23  0346   INR 1.13 1.24*     Xray:   PROCEDURE: CT ABDOMEN PELVIS W IV CONTRAST       CLINICAL INFORMATION: Right-sided abdominal pain. COMPARISON: CT needle biopsy 6/10/2022. CT abdomen and pelvis 1/14/2022. TECHNIQUE: Axial 5 mm CT images were obtained through the abdomen and pelvis after the administration of 80  cc Isovue 370 intravenous contrast. Coronal and sagittal reconstructions were obtained. All CT scans at this facility use dose modulation, iterative reconstruction, and/or weight-based dosing when appropriate to reduce radiation dose to as low as reasonably achievable. FINDINGS:    Lung bases: Scarring/atelectasis at the right lung base appears more prominent.        Liver/gallbladder/bilary tree: Extensive ill-defined low attenuation throughout the right hepatic lobe with a focal area of low-attenuation measures up to 33 x 59 mm (series 301, image 24). The liver is shrunken with a lobulated contour consistent with    history of cirrhosis. Pancreas: Remains atrophic. Spleen : Stable. Adrenal glands: Stable. Kidneys/ ureters/ bladder: Numerous bilateral hypoattenuating simple renal cysts are unchanged. Punctate bilateral nonobstructing renal calculi are present. No hydronephrosis or hydroureter is observed. The urinary bladder is thickened in part related to    underdistention. Gastrointestinal:  The appendix is normal. Colonic diverticulosis without diverticulitis is visualized. A surgical anastomosis in the distal colon appears intact. A large amount of retained fecal material is seen throughout the colon. No bowel    obstruction is observed. A small amount generalized intra-abdominal ascites is present. Retroperitoneum / lymph nodes: The aorta is not dilated. Clot in the portal vein proximally at the SMV confluence (series 301, image 36) is observed. Numerous prominent portacaval lymph nodes measure up to 10 mm in short axis. No bulky lymphadenopathy is    visualized. Pelvis: The prostate gland is not enlarged. Musculoskeletal: Multilevel degenerative disc disease is noted in the thoracic and lumbar spine. The visualized skeletal structures appear intact. Impression   1. The area of scarring/consolidation in the right lower lobe of the trace loculated right pleural effusion appear more prominent. 2. Diffuse heterogeneous low attenuation is seen throughout the right hepatic lobe with a more focal area of low attenuation centrally measuring 33 x 59 mm (series 301, image 24).  Thrombus is noted within the portal vein at the SMV confluence (series    301, image 35) which likely results in part to the areas of geographic heterogeneous perfusion as well as the mesenteric edema and fat stranding in the right upper quadrant. A small amount of generalized ascites is present. No discrete fluid collection    is observed. 3. Colonic diverticulosis without diverticulitis. Normal appendix. Large amount of retained fecal material seen throughout the colon suggesting fecal stasis. No bowel obstruction. 4. Bladder wall thickening in part artifactual related to underdistention. Correlate with urinalysis. The prostate gland does not appear enlarged. No obstructive uropathy is visualized. 5. Chronic findings are discussed. Endoscopy Finding:  N/A    Objective:   Vitals: /75   Pulse 76   Temp 98.2 °F (36.8 °C) (Oral)   Resp 16   Ht 5' 5\" (1.651 m)   Wt 191 lb 1.6 oz (86.7 kg)   SpO2 98%   BMI 31.80 kg/m²     Intake/Output Summary (Last 24 hours) at 2/16/2023 1209  Last data filed at 2/16/2023 0900  Gross per 24 hour   Intake 742.69 ml   Output 1050 ml   Net -307.31 ml     Weight:  Wt Readings from Last 3 Encounters:   02/16/23 191 lb 1.6 oz (86.7 kg)   12/16/22 194 lb (88 kg)   10/19/22 204 lb 9.6 oz (92.8 kg)     General appearance: alert and cooperative with exam  Lungs: clear to auscultation bilaterally  Heart: regular rate and rhythm, S1, S2 normal, no murmur, click, rub or gallop  Abdomen: normal findings: soft, non-tender and abnormal findings:  distended  Extremities: extremities normal, atraumatic, no cyanosis or edema    Assessment and Plan:   Abdominal pain- secondary #2. Pain control per attending.  Patient diet advanced to regular and tolerating  Portal vein thrombus- Anticoagulation per attending  History of alcoholic cirrhosis- Lactulose and Rifaximin restarted  Hepatocellular carcinoma- sees [facility] ( okay for current admission, will follow once discharged)      Follow up in GI Clinic  with Jen Vilchis CNP after discharge in 2 week(s)        CHRISTIAN Guevara CNP  2/16/2023  12:09 PM

## 2023-02-16 NOTE — DISCHARGE SUMMARY
DISCHARGE SUMMARY      Patient Identification:   Rosemary Velásquez   : 1965  MRN: [id number]   Account: [de-identified]      Patient's PCP: CHRISTIAN Landaverde CNP    Admit Date: 2023     Discharge Date:       Admitting Physician: Kay Mathur MD     Discharge Physician: Colleen Zhong MD     Discharge Diagnoses: Active Hospital Problems    Diagnosis Date Noted    RUQ pain [R10.11] 02/15/2023     Priority: Medium    Hepatocellular carcinoma ([address] [C22.0] 02/15/2023     Priority: Medium    Hyperbilirubinemia [E80.6] 02/15/2023     Priority: Medium    Hyperammonemia ([address] [E72.20] 02/15/2023     Priority: Medium    Esophageal varices without bleeding ([address] [I85.00] 02/15/2023     Priority: Medium    AVM (arteriovenous malformation) brain [Q28.2] 02/15/2023     Priority: Medium    Coronary artery disease involving native coronary artery of native heart without angina pectoris [I25.10] 02/15/2023     Priority: Medium    Constipation [K59.00] 02/15/2023     Priority: Medium    Macrocytic anemia [D53.9] 02/15/2023     Priority: Medium    History of alcohol abuse [F10.11] 02/15/2023     Priority: Medium    Anxiety disorder [F41.9] 02/15/2023     Priority: Medium    Obesity (BMI 30-39. 9) [E66.9] 02/15/2023     Priority: Medium    Portal vein thrombosis [I81] 2023     Priority: Medium       Acute portal vein thrombosis  CT abdomen and pelvis showed thrombus within the portal vein at the SMV confluence which likely resulted in part to the areas of geographic heterogenous perfusion as well as the mesenteric edema and fat stranding in the right upper quadrant. Suspect hrombosis is 2/2  hypercoagulable state due to hepatocellular [address] Angulo was contacted by ED provider who recommended to keep the patient in-house for pain control and there is no urgency for transfer  Follow up with [facility] / Local GI provider OPT. Start Eliquis starter pack.    Pain control with Oxycodone 5 QID PRN.    Cholestatic transaminitis with mild hyperbilnrubinemia, Improved  ALP on admission was 170 with total bili 1.7, Believe this is elevated secondary to acute portal vein thrombosis and hepatocellular carcinoma  Liver ultrasound was done in ED and showed chronic cirrhosis and generalized mild ascites  CT AP as above. Alcoholic cirrhosis  Patient was diagnosed over 15 years ago at that time due to heavy alcohol intake and has quit alcohol at that time  No recent signs of decompensation and no signs of acute decompensation including  Continue home lactulose and rifaxmin, continue aldactone     Hepatocellular carcinoma  Status post TACE on 11/30/2022  Follow with [facility] OPT    Left frontal AVM  Esophageal varices s/p banding  Hx of, monitor on [facility]. Continue PPI for varicies. Hyperlipidemia  Hold Home statin  Follow up with PCP regarding restart. Anxiety  Continuehome Cymbalta      History of substance use disorder  Previous cocaine use  Has not used any substances since cancer diagnosis  Encourage sobriety. Obesity  BMI is at 32.58  Pt was counseled on lifestyle and eating habits    The patient was seen and examined on day of discharge and this discharge summary is in conjunction with any daily progress note from day of discharge    Hospital Course:   Hartford Number is a 61 y.o. male w/ PMH alcoholic cirrhosis, diverticulitis, CKD, HTN, and recently diagnosed [address] s/p TACE admitted to [address] [place] [address] on 2/14/2023 for acute onset RUQ pain found to be acute Portal vein thrombosis. [facility]ials[facility] at [facility] was contact in ED who did not recommend transfer. Pt was admitted for pain control and transition from heparin to eliquis. Pt pain was better controlled and had BM and voided, tolerated eliquis and was amicable to discharge w/ Fu with pcp and Amin[facility] L[facility] [address] speciality. .        Exam:     Vitals:  Vitals:    02/15/23 2315 02/16/23 0352 02/16/23 0812 02/16/23 1126   BP: 110/61 121/85 108/77 109/75   Pulse: 73 67 75 76   Resp: 16 18 16 16   Temp: 97.5 °F (36.4 °C) 97.8 °F (36.6 °C) 97.9 °F (36.6 °C) 98.2 °F (36.8 °C)   TempSrc: Oral Oral Oral Oral   SpO2: 97% 100% 99% 98%   Weight:  191 lb 1.6 oz (86.7 kg)     Height:         Weight: Weight: 191 lb 1.6 oz (86.7 kg)     24 hour intake/output:  Intake/Output Summary (Last 24 hours) at 2/16/2023 1346  Last data filed at 2/16/2023 0900  Gross per 24 hour   Intake 742.69 ml   Output 1050 ml   Net -307.31 ml       Physical Exam  Constitutional:       General: He is not in acute distress. Appearance: Normal appearance. He is obese. He is not ill-appearing or diaphoretic. HENT:      Head: Normocephalic and atraumatic. Nose: Nose normal. No congestion or rhinorrhea. Mouth/Throat:      Mouth: Mucous membranes are moist.      Pharynx: Oropharynx is clear. No oropharyngeal exudate or posterior oropharyngeal erythema. Cardiovascular:      Rate and Rhythm: Normal rate. Pulses: Normal pulses. Heart sounds: Normal heart sounds. No murmur heard. No friction rub. No gallop. Pulmonary:      Effort: Pulmonary effort is normal. No respiratory distress. Breath sounds: Normal breath sounds. No wheezing, rhonchi or rales. Chest:      Comments: Noted gynecomastia. Abdominal:      General: Abdomen is flat. Bowel sounds are normal. There is no distension. Palpations: Abdomen is soft. Tenderness: There is abdominal tenderness in the right upper quadrant and right lower quadrant. There is no guarding or rebound. Musculoskeletal:         General: No swelling, tenderness or signs of injury. Right lower leg: No edema. Left lower leg: No edema. Skin:     General: Skin is warm and dry. Capillary Refill: Capillary refill takes less than 2 seconds. Coloration: Skin is not jaundiced or pale. Findings: No erythema, lesion or rash. Neurological:      General: No focal deficit present.       Mental Status: He is alert and oriented to person, place, and time. Cranial Nerves: No cranial nerve deficit. Sensory: No sensory deficit. Motor: No weakness. Psychiatric:         Mood and Affect: Mood normal.         Behavior: Behavior normal.         Thought Content: Thought content normal.         Judgment: Judgment normal.         Labs: For convenience and continuity at follow-up the following most recent labs are provided:      CBC:    Lab Results   Component Value Date/Time    WBC 6.4 02/16/2023 03:50 AM    HGB 13.7 02/16/2023 03:50 AM    HCT 42.7 02/16/2023 03:50 AM     02/16/2023 03:50 AM       Renal:    Lab Results   Component Value Date/Time     02/16/2023 03:50 AM    K 3.8 02/16/2023 03:50 AM    K 4.1 10/10/2022 05:50 PM     02/16/2023 03:50 AM    CO2 21 02/16/2023 03:50 AM    BUN 8 02/16/2023 03:50 AM    CREATININE 0.5 02/16/2023 03:50 AM    CALCIUM 8.1 02/16/2023 03:50 AM    PHOS 2.9 10/14/2020 12:56 PM         Significant Diagnostic Studies    Radiology:   US LIVER   Final Result   Impression:   1. Chronic cirrhosis. 2. Generally mild ascites. This document has been electronically signed by: Melinda Florez MD on    02/15/2023 04:17 AM      CT ABDOMEN PELVIS W IV CONTRAST Additional Contrast? None   Final Result   1. The area of scarring/consolidation in the right lower lobe of the trace loculated right pleural effusion appear more prominent. 2. Diffuse heterogeneous low attenuation is seen throughout the right hepatic lobe with a more focal area of low attenuation centrally measuring 33 x 59 mm (series 301, image 24). Thrombus is noted within the portal vein at the SMV confluence (series    301, image 35) which likely results in part to the areas of geographic heterogeneous perfusion as well as the mesenteric edema and fat stranding in the right upper quadrant. A small amount of generalized ascites is present. No discrete fluid collection    is observed.       3. Colonic diverticulosis without diverticulitis. Normal appendix. Large amount of retained fecal material seen throughout the colon suggesting fecal stasis. No bowel obstruction. 4. Bladder wall thickening in part artifactual related to underdistention. Correlate with urinalysis. The prostate gland does not appear enlarged. No obstructive uropathy is visualized. 5. Chronic findings are discussed. **This report has been created using voice recognition software. It may contain minor errors which are inherent in voice recognition technology. **      Final report electronically signed by Dr Suzanne Lambert on 2/14/2023 5:02 PM             Consults:     STR ED TO IP CONSULT  IP CONSULT TO PHARMACY    Disposition:    [x] Home       [] TCU       [] Rehab       [] Psych       [] SNF       [] [place]       [] Other-    Condition at Discharge: Stable    Code Status:  Full Code     Patient Instructions:    Discharge lab work: None   Activity: activity as tolerated  Diet: ADULT DIET; Regular      Follow-up visits:   Ary Beaver, CHRISTIAN - CNP  [address]  [phone]    Schedule an appointment as soon as possible for a visit in 3 day(s)      [facility] gastroenterology    Schedule an appointment as soon as possible for a visit in 1 week(s)           Discharge Medications:        Medication List        START taking these medications      * apixaban 5 MG Tabs tablet  Commonly known as: ELIQUIS  Take 2 tablets by mouth 2 times daily for 13 doses     * apixaban 5 MG Tabs tablet  Commonly known as: ELIQUIS  Take 1 tablet by mouth 2 times daily  Start taking on: February 23, 2023     oxyCODONE 5 MG immediate release tablet  Commonly known as: Roxicodone  Take 1 tablet by mouth every 6 hours as needed for Pain for up to 7 days. Intended supply: 7 days.  Take lowest dose possible to manage pain Max Daily Amount: 20 mg           * This list has 2 medication(s) that are the same as other medications prescribed for you. Read the directions carefully, and ask your doctor or other care provider to review them with you. CHANGE how you take these medications      atorvastatin 40 MG tablet  Commonly known as: LIPITOR  Take 1 tablet by mouth nightly When okay to take by PCP  What changed: additional instructions     gabapentin 300 MG capsule  Commonly known as: NEURONTIN  take 1 capsule by mouth three times a day  What changed: See the new instructions. CONTINUE taking these medications      ALPRAZolam 1 MG tablet  Commonly known as: XANAX  take 1 tablet by mouth three times a day if needed     cyclobenzaprine 10 MG tablet  Commonly known as: FLEXERIL  Take 1/2 to 1 tablet by mouth three times a day if needed     DULoxetine 60 MG extended release capsule  Commonly known as: CYMBALTA  take 1 capsule by mouth once daily     lactulose 10 GM/15ML solution  Commonly known as: CHRONULAC     nitroGLYCERIN 0.4 MG SL tablet  Commonly known as: NITROSTAT  up to max of 3 total doses. If no relief after 1 dose, call 911. rifAXIMin 550 MG tablet  Commonly known as: XIFAXAN  Take 1 tablet by mouth 2 times daily     spironolactone 100 MG tablet  Commonly known as: ALDACTONE  take 1 tablet by mouth every morning     traMADol 50 MG tablet  Commonly known as: ULTRAM  Take 1 tablet by mouth every 6 hours as needed for Pain for up to 30 days.             ASK your doctor about these medications      omeprazole 40 MG delayed release capsule  Commonly known as: PRILOSEC  Take 1 capsule by mouth daily               Where to Get Your Medications        These medications were sent to [facility] [address], [address]  [address] [place] [address]  [facility], [address] [address]      Phone: [phone]   apixaban 5 MG Tabs tablet  apixaban 5 MG Tabs tablet       You can get these medications from any pharmacy    Bring a paper prescription for each of these medications  oxyCODONE 5 MG immediate release tablet       Information about where to get these medications is not yet available    Ask your nurse or doctor about these medications  atorvastatin 40 MG tablet         Time Spent on discharge is more than 30 minutes in the examination, evaluation, counseling and review of medications and discharge plan. Signed: Thank you CHRISTIAN Toney Asa, CNP for the opportunity to be involved in this patient's care.     Electronically signed by Shruthi Montgomery MD on 2/16/2023 at 1:46 PM

## 2023-02-16 NOTE — PROGRESS NOTES
Collaborative Care Note:    Eliquis best option for oral anticoagulation patient due to history. Cost through insurance is $25.00 per Jackie in Outpatient pharmacy. Patient would also qualify for free 30 day trial for first month.     Delfino Russ PharmD, BCPS  2/16/2023  8:07 AM

## 2023-02-16 NOTE — CARE COORDINATION
2/16/23, 2:05 PM EST    Patient goals/plan/ treatment preferences discussed by  and . Patient goals/plan/ treatment preferences reviewed with patient/ family. Patient/ family verbalize understanding of discharge plan and are in agreement with goal/plan/treatment preferences. Understanding was demonstrated using the teach back method. AVS provided by RN at time of discharge, which includes all necessary medical information pertaining to the patients current course of illness, treatment, post-discharge goals of care, and treatment preferences. Services At/After Discharge: None             Planning home today. No needs.

## 2023-02-16 NOTE — PLAN OF CARE
Problem: Discharge Planning  Goal: Discharge to home or other facility with appropriate resources  Outcome: Progressing  Flowsheets (Taken 2/16/2023 0443)  Discharge to home or other facility with appropriate resources:   Identify barriers to discharge with patient and caregiver   Identify discharge learning needs (meds, wound care, etc)     Problem: Pain  Goal: Verbalizes/displays adequate comfort level or baseline comfort level  Outcome: Progressing  Flowsheets (Taken 2/16/2023 0443)  Verbalizes/displays adequate comfort level or baseline comfort level:   Encourage patient to monitor pain and request assistance   Assess pain using appropriate pain scale   Administer analgesics based on type and severity of pain and evaluate response   Implement non-pharmacological measures as appropriate and evaluate response  Note: Ongoing assessment & interventions provided throughout shift. Reminded patient to report any pain, pressure, or shortness of breath to the nurse. Pain medications provided per physician's orders.       Problem: Neurosensory - Adult  Goal: Achieves maximal functionality and self care  Outcome: Progressing  Flowsheets (Taken 2/16/2023 0443)  Achieves maximal functionality and self care:   Monitor swallowing and airway patency with patient fatigue and changes in neurological status   Encourage and assist patient to increase activity and self care with guidance from physical therapy/occupational therapy     Problem: Skin/Tissue Integrity - Adult  Goal: Skin integrity remains intact  Outcome: Progressing  Flowsheets (Taken 2/16/2023 0443)  Skin Integrity Remains Intact: Monitor for areas of redness and/or skin breakdown     Problem: Musculoskeletal - Adult  Goal: Return mobility to safest level of function  Outcome: Progressing  Flowsheets (Taken 2/16/2023 0443)  Return Mobility to Safest Level of Function: Assess patient stability and activity tolerance for standing, transferring and ambulating with or without assistive devices     Problem: Gastrointestinal - Adult  Goal: Maintains adequate nutritional intake  Outcome: Progressing  Note: Continue on Full liquid diet as ordered. Monitor for any nausea and vomiting     Problem: Safety - Adult  Goal: Free from fall injury  Outcome: Progressing  Note: Bed locked & in low position, call light in reach, side-rails up x2, bed/chair alarm utilized, non-slip socks on when ambulating, reminded patient to use call light to call for assistance. Care plan reviewed with patient. Patient verbalizes understanding of the care plan and contributed to goal setting.

## 2023-02-17 ENCOUNTER — TELEPHONE (OUTPATIENT)
Dept: FAMILY MEDICINE CLINIC | Age: 58
End: 2023-02-17

## 2023-02-17 LAB
EKG ATRIAL RATE: 81 BPM
EKG P AXIS: 39 DEGREES
EKG P-R INTERVAL: 124 MS
EKG Q-T INTERVAL: 368 MS
EKG QRS DURATION: 90 MS
EKG QTC CALCULATION (BAZETT): 427 MS
EKG R AXIS: 44 DEGREES
EKG T AXIS: 34 DEGREES
EKG VENTRICULAR RATE: 81 BPM

## 2023-02-17 NOTE — TELEPHONE ENCOUNTER
Jose Martin [facility] Transitions Initial Follow Up Call    Call within 2 business days of discharge: Yes     Patient: Gómez Pillai Patient : 1965   MRN: [id number]  Reason for Admission: Portal vein thrombosis  Discharge Date: 23 RARS: Readmission Risk Score: 12.4       Spoke with: Patient     Discharge department/facility: [facility]     Non-face-to-face services provided:  Scheduled appointment with PCP-2023    Follow Up  Future Appointments   Date Time Provider Kaelyn Rojas   2023  2:40 PM CHRISTIAN Tirado - CNP [facility] [place]   2023  7:30 AM STR MRI RM1 STRZ MRI STR Radiolog   2023 10:40 AM CHRISTIAN Gloria - CNP N [facility] [place]   2023 11:30 AM STR MRI RM1 STRZ MRI STR Radiolog   2023  1:00 PM SCHEDULE, SRPX NEUROINTERVENTION [facility] [place]   3/9/2023  3:00 PM Isabel Gloria MD N SRPX Heart P - [address] [place]

## 2023-02-21 ENCOUNTER — OFFICE VISIT (OUTPATIENT)
Dept: FAMILY MEDICINE CLINIC | Age: 58
End: 2023-02-21

## 2023-02-21 VITALS
BODY MASS INDEX: 32.28 KG/M2 | SYSTOLIC BLOOD PRESSURE: 102 MMHG | HEART RATE: 91 BPM | WEIGHT: 194 LBS | TEMPERATURE: 97.6 F | RESPIRATION RATE: 16 BRPM | DIASTOLIC BLOOD PRESSURE: 76 MMHG | OXYGEN SATURATION: 96 %

## 2023-02-21 DIAGNOSIS — R10.11 RUQ PAIN: ICD-10-CM

## 2023-02-21 DIAGNOSIS — I10 ESSENTIAL HYPERTENSION: ICD-10-CM

## 2023-02-21 DIAGNOSIS — M48.061 SPINAL STENOSIS OF LUMBAR REGION, UNSPECIFIED WHETHER NEUROGENIC CLAUDICATION PRESENT: ICD-10-CM

## 2023-02-21 DIAGNOSIS — C22.0 HEPATOCELLULAR CARCINOMA (HCC): ICD-10-CM

## 2023-02-21 DIAGNOSIS — F41.8 SITUATIONAL ANXIETY: ICD-10-CM

## 2023-02-21 DIAGNOSIS — Z09 HOSPITAL DISCHARGE FOLLOW-UP: ICD-10-CM

## 2023-02-21 DIAGNOSIS — I81 PORTAL VEIN THROMBOSIS: Primary | ICD-10-CM

## 2023-02-21 DIAGNOSIS — F41.9 ANXIETY: ICD-10-CM

## 2023-02-21 DIAGNOSIS — I81 PORTAL VEIN THROMBOSIS: ICD-10-CM

## 2023-02-21 DIAGNOSIS — K70.30 ALCOHOLIC CIRRHOSIS, UNSPECIFIED WHETHER ASCITES PRESENT (HCC): ICD-10-CM

## 2023-02-21 RX ORDER — OXYCODONE HYDROCHLORIDE 5 MG/1
5 TABLET ORAL EVERY 6 HOURS PRN
Qty: 120 TABLET | Refills: 0 | Status: SHIPPED | OUTPATIENT
Start: 2023-02-21 | End: 2023-03-23

## 2023-02-21 SDOH — ECONOMIC STABILITY: INCOME INSECURITY: HOW HARD IS IT FOR YOU TO PAY FOR THE VERY BASICS LIKE FOOD, HOUSING, MEDICAL CARE, AND HEATING?: NOT HARD AT ALL

## 2023-02-21 SDOH — ECONOMIC STABILITY: FOOD INSECURITY: WITHIN THE PAST 12 MONTHS, THE FOOD YOU BOUGHT JUST DIDN'T LAST AND YOU DIDN'T HAVE MONEY TO GET MORE.: NEVER TRUE

## 2023-02-21 SDOH — ECONOMIC STABILITY: FOOD INSECURITY: WITHIN THE PAST 12 MONTHS, YOU WORRIED THAT YOUR FOOD WOULD RUN OUT BEFORE YOU GOT MONEY TO BUY MORE.: NEVER TRUE

## 2023-02-21 SDOH — ECONOMIC STABILITY: HOUSING INSECURITY
IN THE LAST 12 MONTHS, WAS THERE A TIME WHEN YOU DID NOT HAVE A STEADY PLACE TO SLEEP OR SLEPT IN A SHELTER (INCLUDING NOW)?: NO

## 2023-02-21 ASSESSMENT — PATIENT HEALTH QUESTIONNAIRE - PHQ9
SUM OF ALL RESPONSES TO PHQ9 QUESTIONS 1 & 2: 0
2. FEELING DOWN, DEPRESSED OR HOPELESS: 0
SUM OF ALL RESPONSES TO PHQ QUESTIONS 1-9: 0
SUM OF ALL RESPONSES TO PHQ QUESTIONS 1-9: 0
1. LITTLE INTEREST OR PLEASURE IN DOING THINGS: 0
SUM OF ALL RESPONSES TO PHQ QUESTIONS 1-9: 0
SUM OF ALL RESPONSES TO PHQ QUESTIONS 1-9: 0

## 2023-02-21 NOTE — PROGRESS NOTES
Post-Discharge Transitional Care Management Services or Hospital Follow Up      Supriya Messer   YOB: 1965    Date of Office Visit:  2/21/2023  Date of Hospital Admission: 2/14/23  Date of Hospital Discharge: 2/16/23  Readmission Risk Score(high >=14%.  Medium >=10%):Readmission Risk Score: 12.4      Care management risk score Rising risk (score 2-5) and Complex Care (Scores >=6): No Risk Score On File     Non faceto face  following discharge, date last encounter closed (first attempt may have been earlier): 02/17/2023 02/17/2023    Call initiated 2 business days ofdischarge: Yes     Patient Active Problem List   Diagnosis    Diverticulosis    HTN (hypertension)    Ascites    Liver cirrhosis ([address]    Cystic kidney disease    Leukocytosis    Hypotension    Alcohol abuse    Hyponatremia    Hypokalemia    Perianal ulcer (HCC)    Hyperkalemia    Increased ammonia level    Confusion    Change in mental status    Acute renal failure (HCC)    Metabolic encephalopathy    Altered mental status    Hepatic encephalopathy    Noncompliance with medications    Ascites due to alcoholic cirrhosis (HCC)    Hypotension (arterial)    Alcoholic cirrhosis of liver with ascites (HCC)    Lactic acidosis    Generalized abdominal pain    Tobacco abuse    Spondylosis of cervical region without myelopathy or radiculopathy    Chest pain    Elevated INR    Opiate abuse, episodic (HCC)    Marijuana abuse    Dyspnea on exertion    SOB (shortness of breath) on exertion    Chest pain, atypical- for yrs     Stroke of unknown cause (HCC)    Alteration in speech    Pure hypercholesterolemia    Acute metabolic encephalopathy    Acute delirium    Encephalopathy acute    AMS (altered mental status)    Portal vein thrombosis    RUQ pain    Hepatocellular carcinoma (HCC)    Hyperbilirubinemia    Hyperammonemia (HCC)    Esophageal varices without bleeding ([address]    AVM (arteriovenous malformation) brain    Coronary artery disease involving native coronary artery of native heart without angina pectoris    Constipation    Macrocytic anemia    History of alcohol abuse    Anxiety disorder    Obesity (BMI 30-39. 9)       No Known Allergies    Medications listed as ordered at the time of discharge from hospital     Medication List            Accurate as of February 21, 2023  3:02 PM. If you have any questions, ask your nurse or doctor. CHANGE how you take these medications      gabapentin 300 MG capsule  Commonly known as: NEURONTIN  take 1 capsule by mouth three times a day  What changed: See the new instructions. omeprazole 40 MG delayed release capsule  Commonly known as: PRILOSEC  Take 1 capsule by mouth daily  What changed:   when to take this  reasons to take this            CONTINUE taking these medications      ALPRAZolam 1 MG tablet  Commonly known as: XANAX  take 1 tablet by mouth three times a day if needed     apixaban 5 MG Tabs tablet  Commonly known as: ELIQUIS  Take 1 tablet by mouth 2 times daily  Start taking on: February 23, 2023     atorvastatin 40 MG tablet  Commonly known as: LIPITOR  Take 1 tablet by mouth nightly When okay to take by PCP     cyclobenzaprine 10 MG tablet  Commonly known as: FLEXERIL  Take 1/2 to 1 tablet by mouth three times a day if needed     DULoxetine 60 MG extended release capsule  Commonly known as: CYMBALTA  take 1 capsule by mouth once daily     lactulose 10 GM/15ML solution  Commonly known as: CHRONULAC     nitroGLYCERIN 0.4 MG SL tablet  Commonly known as: NITROSTAT  up to max of 3 total doses. If no relief after 1 dose, call 911. oxyCODONE 5 MG immediate release tablet  Commonly known as: Roxicodone  Take 1 tablet by mouth every 6 hours as needed for Pain for up to 7 days. Intended supply: 7 days.  Take lowest dose possible to manage pain Max Daily Amount: 20 mg     rifAXIMin 550 MG tablet  Commonly known as: XIFAXAN  Take 1 tablet by mouth 2 times daily     spironolactone 100 MG tablet  Commonly known as: ALDACTONE  take 1 tablet by mouth every morning     traMADol 50 MG tablet  Commonly known as: ULTRAM  Take 1 tablet by mouth every 6 hours as needed for Pain for up to 30 days. Medications marked \"taking\" at this time  Outpatient Medications Marked as Taking for the 2/21/23 encounter (Office Visit) with CHRISTIAN Kraus CNP   Medication Sig Dispense Refill    oxyCODONE (ROXICODONE) 5 MG immediate release tablet Take 1 tablet by mouth every 6 hours as needed for Pain for up to 7 days. Intended supply: 7 days. Take lowest dose possible to manage pain Max Daily Amount: 20 mg 28 tablet 0    [START ON 2/23/2023] apixaban (ELIQUIS) 5 MG TABS tablet Take 1 tablet by mouth 2 times daily 60 tablet 0    atorvastatin (LIPITOR) 40 MG tablet Take 1 tablet by mouth nightly When okay to take by PCP 30 tablet 5    lactulose (CHRONULAC) 10 GM/15ML solution Take 20 g by mouth 2 times daily      ALPRAZolam (XANAX) 1 MG tablet take 1 tablet by mouth three times a day if needed 90 tablet 0    traMADol (ULTRAM) 50 MG tablet Take 1 tablet by mouth every 6 hours as needed for Pain for up to 30 days.  120 tablet 0    gabapentin (NEURONTIN) 300 MG capsule take 1 capsule by mouth three times a day (Patient taking differently: Take 300 mg by mouth 3 times daily as needed.) 90 capsule 2    cyclobenzaprine (FLEXERIL) 10 MG tablet Take 1/2 to 1 tablet by mouth three times a day if needed 60 tablet 1    DULoxetine (CYMBALTA) 60 MG extended release capsule take 1 capsule by mouth once daily 90 capsule 2    spironolactone (ALDACTONE) 100 MG tablet take 1 tablet by mouth every morning 90 tablet 2    omeprazole (PRILOSEC) 40 MG delayed release capsule Take 1 capsule by mouth daily (Patient taking differently: Take 40 mg by mouth daily as needed) 30 capsule 5    rifAXIMin (XIFAXAN) 550 MG tablet Take 1 tablet by mouth 2 times daily 60 tablet 6    nitroGLYCERIN (NITROSTAT) 0.4 MG SL tablet up to max of 3 total doses. If no relief after 1 dose, call 404. 27 tablet 0        Medications patient taking asof now reconciled against medications ordered at time of hospital discharge: Yes    Chief Complaint   Patient presents with    [facility] from [facility] admit 02/14/2023 d/c 02/16/2023 Portal vein thrombosis        Transition of care follow-up visit regarding portal vein thrombosis. Patient complains of continued right upper quadrant pain. Requesting a refill of his oxycodone. History of alcoholic cirrhosis hepatocellular carcinoma. Inpatient course: Discharge summary reviewed- see chart. Interval history/Current status: Stable    Review of Systems   Constitutional:  Negative for activity change, appetite change, chills, fatigue and fever. Eyes:  Negative for visual disturbance. Respiratory:  Negative for cough, chest tightness, shortness of breath and wheezing. Cardiovascular:  Negative for chest pain, palpitations and leg swelling. Gastrointestinal:  Positive for abdominal pain. Negative for abdominal distention, constipation, diarrhea, nausea and vomiting. Genitourinary:  Negative for dysuria. Musculoskeletal:  Positive for arthralgias. Negative for back pain and neck pain. Skin: Negative. Negative for rash. Neurological:  Negative for dizziness, light-headedness and headaches. Hematological:  Negative for adenopathy. Psychiatric/Behavioral:  Positive for decreased concentration, dysphoric mood and sleep disturbance. The patient is nervous/anxious. All other systems reviewed and are negative. Vitals:    02/21/23 1430   BP: 102/76   Pulse: 91   Resp: 16   Temp: 97.6 °F (36.4 °C)   TempSrc: Oral   SpO2: 96%   Weight: 194 lb (88 kg)     Body mass index is 32.28 kg/m².    Wt Readings from Last 3 Encounters:   02/21/23 194 lb (88 kg)   02/16/23 191 lb 1.6 oz (86.7 kg)   12/16/22 194 lb (88 kg)     BP Readings from Last 3 Encounters:   02/21/23 102/76   02/16/23 109/75 12/16/22 94/60       Physical Exam  Vitals and nursing note reviewed. Constitutional:       Appearance: Normal appearance. He is well-developed. He is not diaphoretic. HENT:      Head: Normocephalic and atraumatic. Not macrocephalic and not microcephalic. Right Ear: Hearing, tympanic membrane, ear canal and external ear normal. No drainage or tenderness. No middle ear effusion. No hemotympanum. Tympanic membrane is not injected, scarred, perforated or bulging. Left Ear: Hearing, tympanic membrane, ear canal and external ear normal. No drainage or tenderness. No middle ear effusion. No hemotympanum. Tympanic membrane is not injected, scarred, perforated or bulging. Nose: Nose normal. No nasal deformity, septal deviation, mucosal edema or rhinorrhea. Right Sinus: No maxillary sinus tenderness or frontal sinus tenderness. Left Sinus: No maxillary sinus tenderness or frontal sinus tenderness. Mouth/Throat:      Mouth: No oral lesions. Dentition: Normal dentition. Does not have dentures. No dental caries or dental abscesses. Pharynx: Oropharynx is clear. No oropharyngeal exudate or posterior oropharyngeal erythema. Tonsils: No tonsillar abscesses. Eyes:      General: Lids are normal. No scleral icterus. Extraocular Movements:      Right eye: Normal extraocular motion. Left eye: Normal extraocular motion. Conjunctiva/sclera: Conjunctivae normal.      Right eye: Right conjunctiva is not injected. Left eye: Left conjunctiva is not injected. Pupils: Pupils are equal, round, and reactive to light. Neck:      Thyroid: No thyroid mass or thyromegaly. Vascular: No carotid bruit or JVD. Trachea: Trachea normal.   Cardiovascular:      Rate and Rhythm: Normal rate and regular rhythm. Pulses: Normal pulses. Heart sounds: Normal heart sounds, S1 normal and S2 normal. No murmur heard. No friction rub. No gallop.    Pulmonary: Effort: Pulmonary effort is normal. No respiratory distress. Breath sounds: Normal breath sounds. No wheezing, rhonchi or rales. Chest:      Chest wall: No tenderness. Abdominal:      General: Bowel sounds are normal.      Palpations: Abdomen is soft. There is no hepatomegaly, splenomegaly or mass. Tenderness: There is no guarding or rebound. Hernia: No hernia is present. There is no hernia in the ventral area or left inguinal area. Musculoskeletal:         General: No tenderness. Normal range of motion. Cervical back: Normal range of motion and neck supple. No edema or erythema. Normal range of motion. Lymphadenopathy:      Head:      Right side of head: No submental, submandibular, tonsillar, preauricular, posterior auricular or occipital adenopathy. Left side of head: No submental, submandibular, tonsillar, preauricular, posterior auricular or occipital adenopathy. Cervical: No cervical adenopathy. Right cervical: No superficial, deep or posterior cervical adenopathy. Left cervical: No superficial, deep or posterior cervical adenopathy. Upper Body:      Right upper body: No supraclavicular or pectoral adenopathy. Left upper body: No supraclavicular or pectoral adenopathy. Skin:     General: Skin is warm and dry. Coloration: Skin is not pale. Findings: No bruising, ecchymosis, laceration, lesion or rash. Nails: There is no clubbing. Neurological:      Mental Status: He is alert and oriented to person, place, and time. Cranial Nerves: No cranial nerve deficit. Motor: No abnormal muscle tone. Coordination: Coordination normal.      Deep Tendon Reflexes: Reflexes normal.   Psychiatric:         Speech: Speech normal.         Behavior: Behavior normal.         Thought Content: Thought content normal.         Judgment: Judgment normal.           Assessment/Plan:   Diagnosis Orders   1. Portal vein thrombosis        2.  Alcoholic cirrhosis, unspecified whether ascites present ([facility]        3. Hepatocellular carcinoma ([facility]        4. Situational anxiety        5. Spinal stenosis of lumbar region, unspecified whether neurogenic claudication present        6. Anxiety        7. Essential hypertension        8. RUQ pain            Medical Decision Making: moderate complexity  Oxycodone 5 mg every 6 as needed #120. OARRS report reviewed. Medication refilled per Dr. Geo Fam. Continue routine consultation with [facility] gastroenterology/ hepatology/oncology.

## 2023-02-22 ASSESSMENT — ENCOUNTER SYMPTOMS
ABDOMINAL PAIN: 1
COUGH: 0
DIARRHEA: 0
BACK PAIN: 0
VOMITING: 0
SHORTNESS OF BREATH: 0
CONSTIPATION: 0
ABDOMINAL DISTENTION: 0
WHEEZING: 0
CHEST TIGHTNESS: 0
NAUSEA: 0

## 2023-02-28 ENCOUNTER — HOSPITAL ENCOUNTER (EMERGENCY)
Age: 58
Discharge: HOME | End: 2023-02-28
Payer: COMMERCIAL

## 2023-02-28 ENCOUNTER — APPOINTMENT (OUTPATIENT)
Dept: MRI IMAGING | Age: 58
End: 2023-02-28
Payer: COMMERCIAL

## 2023-02-28 VITALS
OXYGEN SATURATION: 98 % | SYSTOLIC BLOOD PRESSURE: 124 MMHG | HEART RATE: 62 BPM | DIASTOLIC BLOOD PRESSURE: 69 MMHG | RESPIRATION RATE: 18 BRPM

## 2023-02-28 DIAGNOSIS — C22.0 LCC (LIVER CELL CARCINOMA) (HCC): ICD-10-CM

## 2023-02-28 DIAGNOSIS — R10.9 RIGHT SIDED ABDOMINAL PAIN: Primary | ICD-10-CM

## 2023-02-28 LAB
ALBUMIN SERPL BCG-MCNC: 3.2 G/DL (ref 3.5–5.1)
ALP SERPL-CCNC: 186 U/L (ref 38–126)
ALT SERPL W/O P-5'-P-CCNC: 30 U/L (ref 11–66)
AMMONIA PLAS-MCNC: 21 UMOL/L (ref 11–60)
ANION GAP SERPL CALC-SCNC: 14 MEQ/L (ref 8–16)
AST SERPL-CCNC: 45 U/L (ref 5–40)
BACTERIA: ABNORMAL
BASOPHILS ABSOLUTE: 0 THOU/MM3 (ref 0–0.1)
BASOPHILS NFR BLD AUTO: 0.2 %
BILIRUB CONJ SERPL-MCNC: 1 MG/DL (ref 0–0.3)
BILIRUB SERPL-MCNC: 2.5 MG/DL (ref 0.3–1.2)
BILIRUB UR QL STRIP: ABNORMAL
BUN SERPL-MCNC: 17 MG/DL (ref 7–22)
CALCIUM SERPL-MCNC: 9.7 MG/DL (ref 8.5–10.5)
CASTS #/AREA URNS LPF: ABNORMAL /LPF
CASTS #/AREA URNS LPF: ABNORMAL /LPF
CHARACTER UR: CLEAR
CHARCOAL URNS QL MICRO: ABNORMAL
CHLORIDE SERPL-SCNC: 101 MEQ/L (ref 98–111)
CO2 SERPL-SCNC: 21 MEQ/L (ref 23–33)
COLOR UR: ABNORMAL
CREAT SERPL-MCNC: 0.7 MG/DL (ref 0.4–1.2)
CRYSTALS URNS QL MICRO: ABNORMAL
DEPRECATED RDW RBC AUTO: 57.7 FL (ref 35–45)
EKG ATRIAL RATE: 58 BPM
EKG P AXIS: 14 DEGREES
EKG P-R INTERVAL: 122 MS
EKG Q-T INTERVAL: 418 MS
EKG QRS DURATION: 90 MS
EKG QTC CALCULATION (BAZETT): 410 MS
EKG R AXIS: 45 DEGREES
EKG T AXIS: 41 DEGREES
EKG VENTRICULAR RATE: 58 BPM
EOSINOPHIL NFR BLD AUTO: 0.1 %
EOSINOPHILS ABSOLUTE: 0 THOU/MM3 (ref 0–0.4)
EPITHELIAL CELLS, UA: ABNORMAL /HPF
ERYTHROCYTE [DISTWIDTH] IN BLOOD BY AUTOMATED COUNT: 16 % (ref 11.5–14.5)
GFR SERPL CREATININE-BSD FRML MDRD: > 60 ML/MIN/1.73M2
GLUCOSE SERPL-MCNC: 85 MG/DL (ref 70–108)
GLUCOSE UR QL STRIP.AUTO: NEGATIVE MG/DL
HCT VFR BLD AUTO: 44.1 % (ref 42–52)
HGB BLD-MCNC: 14.1 GM/DL (ref 14–18)
HGB UR QL STRIP.AUTO: NEGATIVE
ICTOTEST: NEGATIVE
IMM GRANULOCYTES # BLD AUTO: 0.04 THOU/MM3 (ref 0–0.07)
IMM GRANULOCYTES NFR BLD AUTO: 0.4 %
INR PPP: 1.19 (ref 0.85–1.13)
KETONES UR QL STRIP.AUTO: 15
LACTATE SERPL-SCNC: 3.2 MMOL/L (ref 0.5–2)
LEUKOCYTE ESTERASE UR QL STRIP.AUTO: NEGATIVE
LYMPHOCYTES ABSOLUTE: 1 THOU/MM3 (ref 1–4.8)
LYMPHOCYTES NFR BLD AUTO: 10.2 %
MCH RBC QN AUTO: 31.5 PG (ref 26–33)
MCHC RBC AUTO-ENTMCNC: 32 GM/DL (ref 32.2–35.5)
MCV RBC AUTO: 98.7 FL (ref 80–94)
MONOCYTES ABSOLUTE: 0.7 THOU/MM3 (ref 0.4–1.3)
MONOCYTES NFR BLD AUTO: 7.6 %
NEUTROPHILS NFR BLD AUTO: 81.5 %
NITRITE UR QL STRIP.AUTO: NEGATIVE
NRBC BLD AUTO-RTO: 0 /100 WBC
OSMOLALITY SERPL CALC.SUM OF ELEC: 272.8 MOSMOL/KG (ref 275–300)
PH UR STRIP.AUTO: 5.5 [PH] (ref 5–9)
PLATELET # BLD AUTO: 208 THOU/MM3 (ref 130–400)
PMV BLD AUTO: 10.1 FL (ref 9.4–12.4)
POTASSIUM SERPL-SCNC: 4.7 MEQ/L (ref 3.5–5.2)
PROT SERPL-MCNC: 8.5 G/DL (ref 6.1–8)
PROT UR STRIP.AUTO-MCNC: ABNORMAL MG/DL
RBC # BLD AUTO: 4.47 MILL/MM3 (ref 4.7–6.1)
RBC #/AREA URNS HPF: ABNORMAL /HPF
RENAL EPI CELLS #/AREA URNS HPF: ABNORMAL /[HPF]
SEGMENTED NEUTROPHILS ABSOLUTE COUNT: 8 THOU/MM3 (ref 1.8–7.7)
SODIUM SERPL-SCNC: 136 MEQ/L (ref 135–145)
SPECIFIC GRAVITY UA: > 1.03 (ref 1–1.03)
TROPONIN T: < 0.01 NG/ML
UROBILINOGEN, URINE: 1 EU/DL (ref 0–1)
WBC # BLD AUTO: 9.8 THOU/MM3 (ref 4.8–10.8)
WBC #/AREA URNS HPF: ABNORMAL /HPF
YEAST LIKE FUNGI URNS QL MICRO: ABNORMAL

## 2023-02-28 PROCEDURE — 2580000003 HC RX 258: Performed by: NURSE PRACTITIONER

## 2023-02-28 PROCEDURE — 83605 ASSAY OF LACTIC ACID: CPT

## 2023-02-28 PROCEDURE — 96375 TX/PRO/DX INJ NEW DRUG ADDON: CPT

## 2023-02-28 PROCEDURE — 6370000000 HC RX 637 (ALT 250 FOR IP): Performed by: NURSE PRACTITIONER

## 2023-02-28 PROCEDURE — 93010 ELECTROCARDIOGRAM REPORT: CPT | Performed by: INTERNAL MEDICINE

## 2023-02-28 PROCEDURE — 6360000002 HC RX W HCPCS: Performed by: NURSE PRACTITIONER

## 2023-02-28 PROCEDURE — 99285 EMERGENCY DEPT VISIT HI MDM: CPT

## 2023-02-28 PROCEDURE — 93005 ELECTROCARDIOGRAM TRACING: CPT | Performed by: NURSE PRACTITIONER

## 2023-02-28 PROCEDURE — 6360000004 HC RX CONTRAST MEDICATION: Performed by: NURSE PRACTITIONER

## 2023-02-28 PROCEDURE — 82140 ASSAY OF AMMONIA: CPT

## 2023-02-28 PROCEDURE — 80053 COMPREHEN METABOLIC PANEL: CPT

## 2023-02-28 PROCEDURE — 82248 BILIRUBIN DIRECT: CPT

## 2023-02-28 PROCEDURE — 84484 ASSAY OF TROPONIN QUANT: CPT

## 2023-02-28 PROCEDURE — 36415 COLL VENOUS BLD VENIPUNCTURE: CPT

## 2023-02-28 PROCEDURE — 85610 PROTHROMBIN TIME: CPT

## 2023-02-28 PROCEDURE — 74183 MRI ABD W/O CNTR FLWD CNTR: CPT

## 2023-02-28 PROCEDURE — 85025 COMPLETE CBC W/AUTO DIFF WBC: CPT

## 2023-02-28 PROCEDURE — 96376 TX/PRO/DX INJ SAME DRUG ADON: CPT

## 2023-02-28 PROCEDURE — 81001 URINALYSIS AUTO W/SCOPE: CPT

## 2023-02-28 PROCEDURE — A9579 GAD-BASE MR CONTRAST NOS,1ML: HCPCS | Performed by: NURSE PRACTITIONER

## 2023-02-28 PROCEDURE — 96374 THER/PROPH/DIAG INJ IV PUSH: CPT

## 2023-02-28 RX ORDER — 0.9 % SODIUM CHLORIDE 0.9 %
500 INTRAVENOUS SOLUTION INTRAVENOUS ONCE
Status: COMPLETED | OUTPATIENT
Start: 2023-02-28 | End: 2023-02-28

## 2023-02-28 RX ADMIN — GADOTERIDOL 15 ML: 279.3 INJECTION, SOLUTION INTRAVENOUS at 14:32

## 2023-02-28 RX ADMIN — HYDROMORPHONE HYDROCHLORIDE 0.5 MG: 1 INJECTION, SOLUTION INTRAMUSCULAR; INTRAVENOUS; SUBCUTANEOUS at 15:22

## 2023-02-28 RX ADMIN — SODIUM CHLORIDE 500 ML: 9 INJECTION, SOLUTION INTRAVENOUS at 12:58

## 2023-02-28 RX ADMIN — HYDROMORPHONE HYDROCHLORIDE 0.5 MG: 1 INJECTION, SOLUTION INTRAMUSCULAR; INTRAVENOUS; SUBCUTANEOUS at 12:55

## 2023-02-28 RX ADMIN — APIXABAN 10 MG: 5 TABLET, FILM COATED ORAL at 17:09

## 2023-02-28 ASSESSMENT — PAIN SCALES - GENERAL
PAINLEVEL_OUTOF10: 10
PAINLEVEL_OUTOF10: 9
PAINLEVEL_OUTOF10: 10

## 2023-02-28 ASSESSMENT — PAIN DESCRIPTION - LOCATION
LOCATION: ABDOMEN
LOCATION: ABDOMEN

## 2023-02-28 ASSESSMENT — PAIN - FUNCTIONAL ASSESSMENT
PAIN_FUNCTIONAL_ASSESSMENT: 0-10

## 2023-02-28 NOTE — ED PROVIDER NOTES
[facility] Emergency Department    CHIEF COMPLAINT       Chief Complaint   Patient presents with    Abdominal Pain       Nurses Notes reviewed and I agree except as noted in the HPI. HISTORY OF PRESENT ILLNESS    Scar Menon is a 61 y.o. male who presents to the ED for evaluation of abdominal pain. Patient notes right-sided abdominal pain that is been ongoing since he was discharged from the hospital 6 days ago. He notes that he was found to have a portal vein thrombosis at that time. He initially was treated with heparin, and transition to Eliquis. But he notes that he never filled the prescription for Eliquis. He notes the pain is in the right upper quadrant and radiates to the right chest.  He denies any shortness of breath. He notes chronic shortness of breath is unchanged. He notes some nausea and vomiting last night. He denies any change in bowel habits. Notes he has been taking his lactulose as prescribed and having bowel movements as expected. He notes yesterday he was having some confusion, states that he thought he was on the phone with his son, but then his son walked to the door and he was never talking to him on the phone. He has a history of liver cirrhosis with hepatic cell carcinoma. Currently getting treatment through the [facility].  He has been taking oxycodone 5 mg every 6 hours with some improvement in symptoms but today notes pain to 10 out of 10. Notes it comes in waves. Described as sharp. He denies fevers or chills. Denies any change in urination. He was supposed to get an MRI of the abdomen this morning at 1130 but missed the appointment to come to the ER. HPI was provided by the patient.        PAST MEDICAL HISTORY     Past Medical History:   Diagnosis Date    Anxiety     Arthritis     Chronic kidney disease     Cirrhosis ([facility] [address]     Coronary artery disease involving native coronary artery of native heart without angina pectoris 2/15/2023 Diverticulitis     Diverticulosis     GERD (gastroesophageal reflux disease)     Hepatocellular carcinoma ([facility] [address] 2/15/2023    Hypertension     Other disorders of kidney and ureter in diseases classified elsewhere     Psychiatric problem     Pure hypercholesterolemia 3/10/2022    Stroke of unknown cause ([facility] 8/12/2021       SURGICALHISTORY      has a past surgical history that includes Cervical disc surgery; Colon surgery (2004); Tympanostomy tube placement; Tonsillectomy; Abdomen surgery; Colonoscopy; Endoscopy, colon, diagnostic; fracture surgery; Dilatation, esophagus; Cardiac catheterization (2007?); Nerve Block (Bilateral, 10/02/2017); pr inj,paravertebral l/s,1 level (Bilateral, 10/2/2017); Upper gastrointestinal endoscopy (2019); back surgery; Upper gastrointestinal endoscopy (Left, 9/28/2020); and CT NEEDLE BIOPSY LIVER PERCUTANEOUS (6/10/2022). CURRENT MEDICATIONS       Discharge Medication List as of 2/28/2023  4:32 PM        CONTINUE these medications which have NOT CHANGED    Details   oxyCODONE (ROXICODONE) 5 MG immediate release tablet Take 1 tablet by mouth every 6 hours as needed for Pain for up to 30 days. Intended supply: 7 days.  Take lowest dose possible to manage pain Max Daily Amount: 20 mg, Disp-120 tablet, R-0Normal      apixaban (ELIQUIS) 5 MG TABS tablet Take 1 tablet by mouth 2 times daily, Disp-60 tablet, R-0Normal      atorvastatin (LIPITOR) 40 MG tablet Take 1 tablet by mouth nightly When okay to take by PCP, Disp-30 tablet, R-5Adjust Sig      lactulose (CHRONULAC) 10 GM/15ML solution Take 20 g by mouth 2 times dailyHistorical Med      ALPRAZolam (XANAX) 1 MG tablet take 1 tablet by mouth three times a day if needed, Disp-90 tablet, R-0Normal      gabapentin (NEURONTIN) 300 MG capsule take 1 capsule by mouth three times a day, Disp-90 capsule, R-2Normal      cyclobenzaprine (FLEXERIL) 10 MG tablet Take 1/2 to 1 tablet by mouth three times a day if needed, Disp-60 tablet, R-1Normal DULoxetine (CYMBALTA) 60 MG extended release capsule take 1 capsule by mouth once daily, Disp-90 capsule, R-2Normal      spironolactone (ALDACTONE) 100 MG tablet take 1 tablet by mouth every morning, Disp-90 tablet, R-2Normal      omeprazole (PRILOSEC) 40 MG delayed release capsule Take 1 capsule by mouth daily, Disp-30 capsule, R-5Normal      rifAXIMin (XIFAXAN) 550 MG tablet Take 1 tablet by mouth 2 times daily, Disp-60 tablet, R-6Normal      nitroGLYCERIN (NITROSTAT) 0.4 MG SL tablet up to max of 3 total doses. If no relief after 1 dose, call 911., Disp-25 tablet, R-0Normal             ALLERGIES     has No Known Allergies. FAMILY HISTORY     He indicated that his mother is . He indicated that his father is . He indicated that his brother is alive. He indicated that his paternal uncle is . He indicated that the status of his neg hx is unknown.   family history includes Alzheimer's Disease in his mother; Cancer in his father; Diabetes in his brother; Heart Disease in his father, mother, and paternal uncle; Heart Failure in his mother; High Blood Pressure in his paternal uncle; Hypertension in his father and mother.     SOCIAL HISTORY       Social History     Socioeconomic History    Marital status:      Spouse name: Not on file    Number of children: 2    Years of education: Not on file    Highest education level: Not on file   Occupational History    Not on file   Tobacco Use    Smoking status: Every Day     Packs/day: 1.00     Years: 25.00     Pack years: 25.00     Types: Cigarettes    Smokeless tobacco: Never    Tobacco comments:     21 declines cessation counseling   Vaping Use    Vaping Use: Never used   Substance and Sexual Activity    Alcohol use: Not Currently     Comment: Quit 15 years ago    Drug use: Not Currently    Sexual activity: Not Currently   Other Topics Concern    Not on file   Social History Narrative    Not on file     Social Determinants of Health Financial Resource Strain: Low Risk     Difficulty of Paying Living Expenses: Not hard at all   Food Insecurity: No Food Insecurity    Worried About Running Out of Food in the Last Year: Never true    Ran Out of Food in the Last Year: Never true   Transportation Needs: Unmet Transportation Needs    Lack of Transportation (Medical): Yes    Lack of Transportation (Non-Medical): No   Physical Activity: Not on file   Stress: Not on file   Social Connections: Socially Isolated    Frequency of Communication with Friends and Family: Three times a week    Frequency of Social Gatherings with Friends and Family: Three times a week    Attends [employer] Services: Never    Active Member of Clubs or Organizations: No    Attends Club or Organization Meetings: Never    Marital Status:    Intimate Partner Violence: Not on file   Housing Stability: Unknown    Unable to Pay for Housing in the Last Year: Not on file    Number of [place] in the Last Year: Not on file    Unstable Housing in the Last Year: No       PHYSICAL EXAM     INITIAL VITALS:  blood pressure is 124/69 and his pulse is 62. His respiration is 18 and oxygen saturation is 98%. Physical Exam  Vitals and nursing note reviewed. Constitutional:       Appearance: Normal appearance. He is well-developed. HENT:      Head: Normocephalic. Right Ear: External ear normal.      Left Ear: External ear normal.      Nose: Nose normal.      Mouth/Throat:      Pharynx: Uvula midline. Eyes:      Conjunctiva/sclera: Conjunctivae normal.   Cardiovascular:      Rate and Rhythm: Normal rate and regular rhythm. Heart sounds: Normal heart sounds, S1 normal and S2 normal.   Pulmonary:      Effort: Pulmonary effort is normal. No respiratory distress. Breath sounds: Normal breath sounds. Chest:      Chest wall: No tenderness. Abdominal:      General: Bowel sounds are normal. There is no distension. Palpations: Abdomen is soft.  There is no fluid wave or mass. Tenderness: There is abdominal tenderness in the right upper quadrant and right lower quadrant. There is right CVA tenderness. There is no left CVA tenderness. Musculoskeletal:         General: Normal range of motion. Cervical back: Normal range of motion and neck supple. Skin:     General: Skin is warm and dry. Capillary Refill: Capillary refill takes less than 2 seconds. Coloration: Skin is not pale. Findings: No erythema or rash. Neurological:      General: No focal deficit present. Mental Status: He is alert and oriented to person, place, and time. GCS: GCS eye subscore is 4. GCS verbal subscore is 5. GCS motor subscore is 6. Motor: Motor function is intact. Coordination: Coordination is intact. Psychiatric:         Attention and Perception: Attention normal.         Mood and Affect: Mood normal.         Speech: Speech normal.         Behavior: Behavior normal.         Thought Content: Thought content normal.         Judgment: Judgment normal.       DIFFERENTIAL DIAGNOSIS:   Hepatic cell carcinoma, portal thrombosis, mesenteric ischemia, PE, ACS, spontaneous bacterial peritonitis  DIAGNOSTIC RESULTS     EKG: All EKG's are interpreted by the Emergency Department Physician who eithersigns or Co-signs this chart in the absence of a cardiologist.    EKG read and interpreted by myself with comparison to February 14, 2023 gives impression of sinus bradycardia with heart rate of 58; interval 122; QRS 90;QTc 410; axis P-14, R-45, T-41. RADIOLOGY: non-plainfilm images(s) such as CT, Ultrasound and MRI are read by the radiologist.  Plain radiographic images are visualized and preliminarily interpreted by the emergency physician unless otherwise stated below. MRI ABDOMEN W WO CONTRAST   Final Result    1.  Significant interval deterioration since previous study dated 5/31/2022.   2. Large mass in the right lobe of the liver measuring 8.6 x 8.5 cm in size. Several smaller areas of abnormal signal intensity in the right and left lobes of the liver suspicious for hepatoma. 3. There is no flow in the portal vein. 4. There is ascites, new since previous study. 5. There is mild splenomegaly. 6. There is an atrophic pancreas. 7. There are bilateral renal cysts. There is a complicated cyst arising from the midpole of left kidney posteriorly measuring 2.5 x 2.2 cm in size. 8. Tiny right pleural effusion and abnormal signal intensity at the right lung base. **This report has been created using voice recognition software. It may contain minor errors which are inherent in voice recognition technology. **      Final report electronically signed by DR Steven Chairez on 2/28/2023 3:10 PM            LABS:   Labs Reviewed   CBC WITH AUTO DIFFERENTIAL - Abnormal; Notable for the following components:       Result Value    RBC 4.47 (*)     MCV 98.7 (*)     MCHC 32.0 (*)     RDW-CV 16.0 (*)     RDW-SD 57.7 (*)     Segs Absolute 8.0 (*)     All other components within normal limits   BASIC METABOLIC PANEL - Abnormal; Notable for the following components:    CO2 21 (*)     All other components within normal limits   HEPATIC FUNCTION PANEL - Abnormal; Notable for the following components:    Albumin 3.2 (*)     Total Bilirubin 2.5 (*)     Bilirubin, Direct 1.0 (*)     Alkaline Phosphatase 186 (*)     AST 45 (*)     Total Protein 8.5 (*)     All other components within normal limits   LACTIC ACID - Abnormal; Notable for the following components:    Lactic Acid 3.2 (*)     All other components within normal limits   URINALYSIS WITH MICROSCOPIC - Abnormal; Notable for the following components:    Bilirubin Urine MODERATE (*)     Ketones, Urine 15 (*)     Specific Gravity, UA >1.030 (*)     Protein, UA TRACE (*)     All other components within normal limits   OSMOLALITY - Abnormal; Notable for the following components:    Osmolality Calc 272.8 (*)     All other components within normal limits   PROTIME-INR - Abnormal; Notable for the following components:    INR 1.19 (*)     All other components within normal limits   AMMONIA   TROPONIN   ANION GAP   GLOMERULAR FILTRATION RATE, ESTIMATED   ICTOTEST, URINE       EMERGENCY DEPARTMENT COURSE:   Vitals:    Vitals:    02/28/23 1234 02/28/23 1324 02/28/23 1337 02/28/23 1503   BP: 101/70 135/73 130/64 124/69   Pulse: 64 69 65 62   Resp: 22 17 18 18   SpO2: 100% 99% 98% 98%     MDM    Patient was seen and evaluated in the emergency department, patient appeared to be in no acute distress, vital signs reviewed, no significant findings are noted. Physical exam is completed, he has right-sided abdominal tenderness, lungs are clear to auscultation, bowel sounds are active, he has some right CVA tenderness although its not severe. Labs are obtained, MRI is ordered as he missed his appointment and and this needs to be completed and patient continuously fails to follow-up with appointments. MRI completed, shows significant interval deterioration since previous study dated 5/31/2022, large mass in the right lobe of the liver, no flow through the portal vein, moderate ascites. I discussed the findings with the patient, he verbalized understanding. I discussed the case with the patient's gastroenterology nurse practitioner out of [place] [facility]. She notes that they will follow with the patient. I advised the patient to return to the ER with worsening pain. Difficulty with p.o. intake. Patient verbalized understanding of plan of care. While here in the ER the patient maintained stable course and appropriate for discharge.     Medications   0.9 % sodium chloride bolus (0 mLs IntraVENous Stopped 2/28/23 1702)   HYDROmorphone (DILAUDID) injection 0.5 mg (0.5 mg IntraVENous Given 2/28/23 1255)   gadoteridol (PROHANCE) injection 15 mL (15 mLs IntraVENous Given 2/28/23 1432)   HYDROmorphone (DILAUDID) injection 0.5 mg (0.5 mg IntraVENous Given 2/28/23 1522)   apixaban (ELIQUIS) tablet 10 mg (10 mg Oral Given 2/28/23 1709)       Patient was seenindependently by myself. The patient's final impression and disposition and plan was determined by myself. CRITICAL CARE:   None    CONSULTS:  None    PROCEDURES:  None    FINAL IMPRESSION     1. Right sided abdominal pain    2. LCC (liver cell carcinoma) [facility])          DISPOSITION/PLAN   Patient discharged in stable condition    PATIENT REFERREDTO:  CHRISTIAN Capellan CNP  [facility]     Call   For follow up and evaluation    DISCHARGE MEDICATIONS:  Discharge Medication List as of 2/28/2023  4:32 PM          (Please note that portions of this note were completed with a voice recognition program.  Efforts were made to edit the dictations but occasionally words are mis-transcribed.)      Provider:  I personally performed the services described in the documentation,reviewed and edited the documentation which was dictated to the scribe in my presence, and it accurately records my words and actions.     Milton Carbone CNP 02/28/23 5:43 PM    CHRISTIAN Elizondo - OLIVERIO         Roambi, CHRISTIAN - CNP  02/28/23 2847

## 2023-02-28 NOTE — ED NOTES
Abd pain 10/10. New onset confusion. Questioning if pt is taking his lactulose correctly. Established care with [id number] [facility].       Keenan Mcgarry RN  02/28/23 1937

## 2023-02-28 NOTE — DISCHARGE INSTRUCTIONS
Continue to take pain medication as prescribed, return to er with worsening symptoms. Follow up with [facility] oncology.

## 2023-03-03 ENCOUNTER — TELEPHONE (OUTPATIENT)
Dept: ONCOLOGY | Age: 58
End: 2023-03-03

## 2023-03-08 DIAGNOSIS — C22.0 HEPATOCELLULAR CARCINOMA (HCC): Primary | ICD-10-CM

## 2023-03-09 ENCOUNTER — OFFICE VISIT (OUTPATIENT)
Dept: ONCOLOGY | Age: 58
End: 2023-03-09
Payer: COMMERCIAL

## 2023-03-09 ENCOUNTER — HOSPITAL ENCOUNTER (OUTPATIENT)
Dept: INFUSION THERAPY | Age: 58
Discharge: HOME | End: 2023-03-09
Payer: COMMERCIAL

## 2023-03-09 VITALS
TEMPERATURE: 97.9 F | SYSTOLIC BLOOD PRESSURE: 100 MMHG | HEART RATE: 92 BPM | RESPIRATION RATE: 16 BRPM | DIASTOLIC BLOOD PRESSURE: 72 MMHG

## 2023-03-09 VITALS
HEART RATE: 92 BPM | DIASTOLIC BLOOD PRESSURE: 72 MMHG | BODY MASS INDEX: 30.32 KG/M2 | RESPIRATION RATE: 16 BRPM | HEIGHT: 65 IN | TEMPERATURE: 97.9 F | SYSTOLIC BLOOD PRESSURE: 100 MMHG | OXYGEN SATURATION: 95 % | WEIGHT: 182 LBS

## 2023-03-09 DIAGNOSIS — C22.0 HEPATOCELLULAR CARCINOMA (HCC): Primary | ICD-10-CM

## 2023-03-09 DIAGNOSIS — C22.0 HEPATOCELLULAR CARCINOMA (HCC): ICD-10-CM

## 2023-03-09 DIAGNOSIS — R10.11 RUQ PAIN: ICD-10-CM

## 2023-03-09 DIAGNOSIS — I81 PORTAL VEIN THROMBOSIS: ICD-10-CM

## 2023-03-09 LAB
ABSOLUTE IMMATURE GRANULOCYTE: 0.03 THOU/MM3 (ref 0–0.07)
ALBUMIN SERPL BCG-MCNC: 3.1 G/DL (ref 3.5–5.1)
ALP SERPL-CCNC: 196 U/L (ref 38–126)
ALT SERPL W/O P-5'-P-CCNC: 40 U/L (ref 11–66)
AMMONIA PLAS-MCNC: 16 UMOL/L (ref 11–60)
AST SERPL-CCNC: 51 U/L (ref 5–40)
BASOPHILS ABSOLUTE: 0 THOU/MM3 (ref 0–0.1)
BASOPHILS NFR BLD AUTO: 0 % (ref 0–3)
BILIRUB CONJ SERPL-MCNC: 1 MG/DL (ref 0–0.3)
BILIRUB SERPL-MCNC: 2.2 MG/DL (ref 0.3–1.2)
BUN BLDP-MCNC: 5 MG/DL (ref 8–26)
CHLORIDE BLD-SCNC: 101 MEQ/L (ref 98–109)
CREAT BLD-MCNC: 0.7 MG/DL (ref 0.5–1.2)
EOSINOPHIL NFR BLD AUTO: 1 % (ref 0–4)
EOSINOPHILS ABSOLUTE: 0.1 THOU/MM3 (ref 0–0.4)
ERYTHROCYTE [DISTWIDTH] IN BLOOD BY AUTOMATED COUNT: 16.6 % (ref 11.5–14.5)
GFR SERPL CREATININE-BSD FRML MDRD: > 60 ML/MIN/1.73M2
GLUCOSE BLD-MCNC: 96 MG/DL (ref 70–108)
HBV CORE IGM SERPL QL IA: NEGATIVE
HBV SURFACE AB SER QL IA: POSITIVE
HBV SURFACE AG SERPL QL IA: NEGATIVE
HCT VFR BLD AUTO: 41.9 % (ref 42–52)
HCV IGG SERPL QL IA: NEGATIVE
HGB BLD-MCNC: 13.9 GM/DL (ref 14–18)
IMMATURE GRANULOCYTES: 0 %
INR PPP: 1.11 (ref 0.85–1.13)
IONIZED CALCIUM, WHOLE BLOOD: 1.17 MMOL/L (ref 1.12–1.32)
LYMPHOCYTES ABSOLUTE: 0.8 THOU/MM3 (ref 1–4.8)
LYMPHOCYTES NFR BLD AUTO: 8 % (ref 15–47)
MCH RBC QN AUTO: 31.1 PG (ref 26–33)
MCHC RBC AUTO-ENTMCNC: 33.2 GM/DL (ref 32.2–35.5)
MCV RBC AUTO: 94 FL (ref 80–94)
MONOCYTES ABSOLUTE: 1.3 THOU/MM3 (ref 0.4–1.3)
MONOCYTES NFR BLD AUTO: 13 % (ref 0–12)
NEUTROPHILS NFR BLD AUTO: 78 % (ref 43–75)
PLATELET # BLD AUTO: 246 THOU/MM3 (ref 130–400)
PMV BLD AUTO: 9.9 FL (ref 9.4–12.4)
POTASSIUM BLD-SCNC: 4.2 MEQ/L (ref 3.5–4.9)
PROT SERPL-MCNC: 8.1 G/DL (ref 6.1–8)
RBC # BLD AUTO: 4.47 MILL/MM3 (ref 4.7–6.1)
SEGMENTED NEUTROPHILS ABSOLUTE COUNT: 7.7 THOU/MM3 (ref 1.8–7.7)
SODIUM BLD-SCNC: 139 MEQ/L (ref 138–146)
TOTAL CO2, WHOLE BLOOD: 30 MEQ/L (ref 23–33)
WBC # BLD AUTO: 9.8 THOU/MM3 (ref 4.8–10.8)

## 2023-03-09 PROCEDURE — 86705 HEP B CORE ANTIBODY IGM: CPT

## 2023-03-09 PROCEDURE — 80047 BASIC METABLC PNL IONIZED CA: CPT

## 2023-03-09 PROCEDURE — 1111F DSCHRG MED/CURRENT MED MERGE: CPT | Performed by: INTERNAL MEDICINE

## 2023-03-09 PROCEDURE — 99211 OFF/OP EST MAY X REQ PHY/QHP: CPT

## 2023-03-09 PROCEDURE — 86706 HEP B SURFACE ANTIBODY: CPT

## 2023-03-09 PROCEDURE — 3074F SYST BP LT 130 MM HG: CPT | Performed by: INTERNAL MEDICINE

## 2023-03-09 PROCEDURE — 4004F PT TOBACCO SCREEN RCVD TLK: CPT | Performed by: INTERNAL MEDICINE

## 2023-03-09 PROCEDURE — G8417 CALC BMI ABV UP PARAM F/U: HCPCS | Performed by: INTERNAL MEDICINE

## 2023-03-09 PROCEDURE — G8427 DOCREV CUR MEDS BY ELIG CLIN: HCPCS | Performed by: INTERNAL MEDICINE

## 2023-03-09 PROCEDURE — 80076 HEPATIC FUNCTION PANEL: CPT

## 2023-03-09 PROCEDURE — 99215 OFFICE O/P EST HI 40 MIN: CPT | Performed by: INTERNAL MEDICINE

## 2023-03-09 PROCEDURE — 85610 PROTHROMBIN TIME: CPT

## 2023-03-09 PROCEDURE — 85025 COMPLETE CBC W/AUTO DIFF WBC: CPT

## 2023-03-09 PROCEDURE — 86704 HEP B CORE ANTIBODY TOTAL: CPT

## 2023-03-09 PROCEDURE — 3078F DIAST BP <80 MM HG: CPT | Performed by: INTERNAL MEDICINE

## 2023-03-09 PROCEDURE — 36415 COLL VENOUS BLD VENIPUNCTURE: CPT

## 2023-03-09 PROCEDURE — 87340 HEPATITIS B SURFACE AG IA: CPT

## 2023-03-09 PROCEDURE — 3017F COLORECTAL CA SCREEN DOC REV: CPT | Performed by: INTERNAL MEDICINE

## 2023-03-09 PROCEDURE — G8482 FLU IMMUNIZE ORDER/ADMIN: HCPCS | Performed by: INTERNAL MEDICINE

## 2023-03-09 PROCEDURE — 86803 HEPATITIS C AB TEST: CPT

## 2023-03-09 PROCEDURE — 82140 ASSAY OF AMMONIA: CPT

## 2023-03-09 RX ORDER — ONDANSETRON 2 MG/ML
8 INJECTION INTRAMUSCULAR; INTRAVENOUS
OUTPATIENT
Start: 2023-03-22

## 2023-03-09 RX ORDER — MEPERIDINE HYDROCHLORIDE 50 MG/ML
12.5 INJECTION INTRAMUSCULAR; INTRAVENOUS; SUBCUTANEOUS PRN
OUTPATIENT
Start: 2023-03-22

## 2023-03-09 RX ORDER — SODIUM CHLORIDE 9 MG/ML
5-250 INJECTION, SOLUTION INTRAVENOUS PRN
OUTPATIENT
Start: 2023-03-22

## 2023-03-09 RX ORDER — ONDANSETRON 8 MG/1
8 TABLET, ORALLY DISINTEGRATING ORAL EVERY 8 HOURS PRN
Qty: 10 TABLET | Refills: 2 | Status: SHIPPED | OUTPATIENT
Start: 2023-03-09

## 2023-03-09 RX ORDER — OXYCODONE HYDROCHLORIDE 5 MG/1
5 TABLET ORAL EVERY 6 HOURS PRN
Qty: 120 TABLET | Refills: 0 | Status: CANCELLED | OUTPATIENT
Start: 2023-03-09 | End: 2023-04-08

## 2023-03-09 RX ORDER — HEPARIN SODIUM (PORCINE) LOCK FLUSH IV SOLN 100 UNIT/ML 100 UNIT/ML
500 SOLUTION INTRAVENOUS PRN
OUTPATIENT
Start: 2023-03-22

## 2023-03-09 RX ORDER — PROCHLORPERAZINE MALEATE 10 MG
10 TABLET ORAL EVERY 6 HOURS PRN
Qty: 30 TABLET | Refills: 1 | Status: SHIPPED | OUTPATIENT
Start: 2023-03-09

## 2023-03-09 RX ORDER — SODIUM CHLORIDE 9 MG/ML
INJECTION, SOLUTION INTRAVENOUS CONTINUOUS
OUTPATIENT
Start: 2023-03-22

## 2023-03-09 RX ORDER — ACETAMINOPHEN 325 MG/1
650 TABLET ORAL
OUTPATIENT
Start: 2023-03-22

## 2023-03-09 RX ORDER — EPINEPHRINE 1 MG/ML
0.3 INJECTION, SOLUTION, CONCENTRATE INTRAVENOUS PRN
OUTPATIENT
Start: 2023-03-22

## 2023-03-09 RX ORDER — ALBUTEROL SULFATE 90 UG/1
4 AEROSOL, METERED RESPIRATORY (INHALATION) PRN
OUTPATIENT
Start: 2023-03-22

## 2023-03-09 RX ORDER — DIPHENHYDRAMINE HYDROCHLORIDE 50 MG/ML
50 INJECTION INTRAMUSCULAR; INTRAVENOUS
OUTPATIENT
Start: 2023-03-22

## 2023-03-09 RX ORDER — SODIUM CHLORIDE 9 MG/ML
5-40 INJECTION INTRAVENOUS PRN
OUTPATIENT
Start: 2023-03-22

## 2023-03-09 RX ORDER — FAMOTIDINE 10 MG/ML
20 INJECTION, SOLUTION INTRAVENOUS
OUTPATIENT
Start: 2023-03-22

## 2023-03-10 ENCOUNTER — TELEPHONE (OUTPATIENT)
Dept: SURGERY | Age: 58
End: 2023-03-10

## 2023-03-10 LAB — HBV CORE IGG+IGM SERPL QL IA: NONREACTIVE

## 2023-03-10 NOTE — TELEPHONE ENCOUNTER
Asked patient if we could move his appointment on 03- with Dr. Miguel Ángel Bingham to 9:00am.  Ask patient to call back.

## 2023-03-13 ENCOUNTER — TELEPHONE (OUTPATIENT)
Dept: SURGERY | Age: 58
End: 2023-03-13

## 2023-03-13 ENCOUNTER — OFFICE VISIT (OUTPATIENT)
Dept: SURGERY | Age: 58
End: 2023-03-13
Payer: COMMERCIAL

## 2023-03-13 VITALS
HEIGHT: 65 IN | WEIGHT: 173 LBS | TEMPERATURE: 97 F | DIASTOLIC BLOOD PRESSURE: 68 MMHG | BODY MASS INDEX: 28.82 KG/M2 | SYSTOLIC BLOOD PRESSURE: 100 MMHG

## 2023-03-13 DIAGNOSIS — K70.30 ALCOHOLIC CIRRHOSIS, UNSPECIFIED WHETHER ASCITES PRESENT (HCC): ICD-10-CM

## 2023-03-13 DIAGNOSIS — I10 PRIMARY HYPERTENSION: Chronic | ICD-10-CM

## 2023-03-13 DIAGNOSIS — C22.0 HEPATOCELLULAR CARCINOMA (HCC): Primary | ICD-10-CM

## 2023-03-13 DIAGNOSIS — I85.10 SECONDARY ESOPHAGEAL VARICES WITHOUT BLEEDING (HCC): ICD-10-CM

## 2023-03-13 DIAGNOSIS — F41.9 ANXIETY: ICD-10-CM

## 2023-03-13 DIAGNOSIS — K70.31 ALCOHOLIC CIRRHOSIS OF LIVER WITH ASCITES (HCC): ICD-10-CM

## 2023-03-13 PROCEDURE — 3074F SYST BP LT 130 MM HG: CPT | Performed by: SURGERY

## 2023-03-13 PROCEDURE — 1111F DSCHRG MED/CURRENT MED MERGE: CPT | Performed by: SURGERY

## 2023-03-13 PROCEDURE — 3078F DIAST BP <80 MM HG: CPT | Performed by: SURGERY

## 2023-03-13 PROCEDURE — G8482 FLU IMMUNIZE ORDER/ADMIN: HCPCS | Performed by: SURGERY

## 2023-03-13 PROCEDURE — 4004F PT TOBACCO SCREEN RCVD TLK: CPT | Performed by: SURGERY

## 2023-03-13 PROCEDURE — 3017F COLORECTAL CA SCREEN DOC REV: CPT | Performed by: SURGERY

## 2023-03-13 PROCEDURE — 99204 OFFICE O/P NEW MOD 45 MIN: CPT | Performed by: SURGERY

## 2023-03-13 PROCEDURE — G8427 DOCREV CUR MEDS BY ELIG CLIN: HCPCS | Performed by: SURGERY

## 2023-03-13 PROCEDURE — G8417 CALC BMI ABV UP PARAM F/U: HCPCS | Performed by: SURGERY

## 2023-03-13 ASSESSMENT — ENCOUNTER SYMPTOMS
DIARRHEA: 0
SORE THROAT: 0
EYE PAIN: 0
VOICE CHANGE: 0
COUGH: 0
TROUBLE SWALLOWING: 0
COLOR CHANGE: 0
WHEEZING: 0
SINUS PAIN: 0
STRIDOR: 0
ABDOMINAL DISTENTION: 0
APNEA: 0
PHOTOPHOBIA: 0
RHINORRHEA: 0
RECTAL PAIN: 0
EYE ITCHING: 0
NAUSEA: 0
SINUS PRESSURE: 0
CHOKING: 0
SHORTNESS OF BREATH: 0
EYE DISCHARGE: 0
ANAL BLEEDING: 0
VOMITING: 0
FACIAL SWELLING: 0
BACK PAIN: 0
EYE REDNESS: 0
ABDOMINAL PAIN: 0
BLOOD IN STOOL: 0
CONSTIPATION: 0
CHEST TIGHTNESS: 0

## 2023-03-13 NOTE — TELEPHONE ENCOUNTER
Pt called requesting Xanax refill to [facility]    Last seen 2/21/23  Last Xanax Rx #[id number] 2/6/23  P

## 2023-03-13 NOTE — PROGRESS NOTES
Subjective:      Patient ID: Adalberto Mclaughlin is a 61 y.o. male. Chief Complaint   Patient presents with    Surgical Consult     NP ref by Dr. Lambert Wall - Hepatocellular carcinoma, Mediport placement       HPI    Review of Systems   Constitutional:  Negative for activity change, appetite change, chills, diaphoresis, fatigue, fever and unexpected weight change. HENT:  Negative for congestion, dental problem, drooling, ear discharge, ear pain, facial swelling, hearing loss, mouth sores, nosebleeds, postnasal drip, rhinorrhea, sinus pressure, sinus pain, sneezing, sore throat, tinnitus, trouble swallowing and voice change. Eyes:  Negative for photophobia, pain, discharge, redness, itching and visual disturbance. Respiratory:  Negative for apnea, cough, choking, chest tightness, shortness of breath, wheezing and stridor. Cardiovascular:  Negative for chest pain, palpitations and leg swelling. Gastrointestinal:  Negative for abdominal distention, abdominal pain, anal bleeding, blood in stool, constipation, diarrhea, nausea, rectal pain and vomiting. Endocrine: Negative for cold intolerance, heat intolerance, polydipsia, polyphagia and polyuria. Genitourinary:  Negative for decreased urine volume, difficulty urinating, dysuria, enuresis, flank pain, frequency, genital sores, hematuria, penile discharge, penile pain, penile swelling, scrotal swelling, testicular pain and urgency. Musculoskeletal:  Negative for arthralgias, back pain, gait problem, joint swelling, myalgias, neck pain and neck stiffness. Skin:  Negative for color change, pallor, rash and wound. Allergic/Immunologic: Negative for environmental allergies, food allergies and immunocompromised state. Neurological:  Negative for dizziness, tremors, seizures, syncope, facial asymmetry, speech difficulty, weakness, light-headedness, numbness and headaches. Hematological:  Negative for adenopathy. Does not bruise/bleed easily. Psychiatric/Behavioral:  Negative for agitation, behavioral problems, confusion, decreased concentration, dysphoric mood, hallucinations, self-injury, sleep disturbance and suicidal ideas. The patient is not nervous/anxious and is not hyperactive. All other systems reviewed and are negative.     Objective:   Physical Exam  /68 (Site: Right Upper Arm, Position: Sitting, Cuff Size: Medium Adult)   Temp 97 °F (36.1 °C)   Ht 5' 5\" (1.651 m)   Wt 173 lb (78.5 kg)   BMI 28.79 kg/m²     Assessment:            Plan:              Valerie Arboleda

## 2023-03-13 NOTE — TELEPHONE ENCOUNTER
[address]    Phone * [phone]   Surgical Scheduling Direct line Phone * [phone]  Fax * [phone]      Filomena Co      1965    male    [address] [facility] [address]   Marital Status:         Home Phone: [phone]   Cell Phone:   Telephone Information:   Mobile [phone]              Surgeon: Dr. Marina Carreno  Surgery Date:03-   Time: 7:30am     Procedure: Insertion left single lumen mediport  Outpatient     Diagnosis: Hepatocellular carcinoma    Important Medical History: In Epic    Special Inst/Equip:     CPT Codes: 99364    Latex Allergy:   no Cardiac Device:  no    Anesthesia Type: MAC    Case Location:  Main OR     Preadmission Testing: Phone Call      PAT Date and Time: ________________________________    PAT Confirmation #: _________________________________    Post Op Visit:  ______________________________________    Need Preop Cardiac Clearance:   yes,     Does patient have Cardiologist/physician?  Dr. Devi Bill #:  ______________________________________________    Jossue Brush: __________________________________ Date:____________________        Insurance Company Name:  [employer]

## 2023-03-13 NOTE — TELEPHONE ENCOUNTER
Patient scheduled for surgery with Dr. Ruba Honeycutt on 03- at 7:30am with an arrival of 6:00am.  Preop surgery instructions and antibacterial soap given to patient. Surgery consent signed.

## 2023-03-13 NOTE — LETTER
March 13, 2023      Katherin Davis, APRN - CNP  [address] [address]      Patient: Mike Finn   MR Number: [id number]   YOB: 1965   Date of Visit: 3/13/2023       Dear Katherin Davis:    Thank you for referring Sugey Morley to me for evaluation/treatment. Below are the relevant portions of my assessment and plan of care. Problem List Items Addressed This Visit       Esophageal varices without bleeding ([facility] [address]    Hepatocellular carcinoma (HCC) - Primary    Relevant Orders    INSERTION CENTRAL VENOUS ACCESS DEVICE W/ SUBCUTANEOUS PORT    FL VASCULAR ACCESS GUIDANCE    Alcoholic cirrhosis ([facility] [address]    HTN (hypertension) (Chronic)     There are no active hospital problems to display for this patient. Schedule Yosvany for Left  single [employer]	[place]  Status: Outpatient  Planned anesthesia: MAC  He will undergo pre-operative clearance per anesthesia guidelines with risk factors listed under the past medical history diagnosis & problem list.  Perioperative discontinuation of ASA, Plavix, warfarin, Brillinta, Effient, Pradaxa, Eliquis, and Xarelto. Continuation of 81 mg Aspirin is acceptable. Perioperative medical clearance is not required   Techniques for long term IV access were discussed. Risks, complications and benefits of each were reviewed including bleeding, infection, thrombosis and lung injury were reviewed. The patient was given the opportunity to ask questions. Once answered, informed consent was obtained and the patient scheduled for the procedure. Orders Placed This Encounter:  Orders Placed This Encounter   Procedures    INSERTION CENTRAL VENOUS ACCESS DEVICE W/ SUBCUTANEOUS PORT     Standing Status:   Future     Standing Expiration Date:   3/12/2024    [facility] VASCULAR ACCESS GUIDANCE     Standing Status:   Future     Standing Expiration Date:   3/13/2024           If you have questions, please do not hesitate to call me.  I look forward to following Gilbert Kennedy along with you.    Sincerely,        Irma Best MD

## 2023-03-13 NOTE — TELEPHONE ENCOUNTER
Patient scheduled for surgery with Dr. Krishna King on 03- for an insertion left single lumen mediport, under a MAC. Could you please ask Dr. Verónica Warner if the patient is okay to proceed with this scheduled surgery?   Thank you

## 2023-03-13 NOTE — LETTER
March 13, 2023      Maura Herring, [address] [facility] [address] [facility],   Scott Saldivar      Patient: Jory Peña   MR Number: [id number]   YOB: 1965   Date of Visit: 3/13/2023       Dear Dr. Cathie Pinon: Thank you for referring Luis Antonio Ledbetter to me for evaluation/treatment. Below are the relevant portions of my assessment and plan of care. Problem List Items Addressed This Visit       Esophageal varices without bleeding ([facility] [address]    Hepatocellular carcinoma (HCC) - Primary    Relevant Orders    INSERTION CENTRAL VENOUS ACCESS DEVICE W/ SUBCUTANEOUS PORT    FL VASCULAR ACCESS GUIDANCE    Alcoholic cirrhosis ([facility] [address]    HTN (hypertension) (Chronic)     There are no active hospital problems to display for this patient. Schedule Yosvany for Left  single [employer]	[facility]  Status: Outpatient  Planned anesthesia: MAC  He will undergo pre-operative clearance per anesthesia guidelines with risk factors listed under the past medical history diagnosis & problem list.  Perioperative discontinuation of ASA, Plavix, warfarin, Brillinta, Effient, Pradaxa, Eliquis, and Xarelto. Continuation of 81 mg Aspirin is acceptable. Perioperative medical clearance is not required   Techniques for long term IV access were discussed. Risks, complications and benefits of each were reviewed including bleeding, infection, thrombosis and lung injury were reviewed. The patient was given the opportunity to ask questions. Once answered, informed consent was obtained and the patient scheduled for the procedure. Orders Placed This Encounter:  Orders Placed This Encounter   Procedures    INSERTION CENTRAL VENOUS ACCESS DEVICE W/ SUBCUTANEOUS PORT     Standing Status:   Future     Standing Expiration Date:   3/12/2024    [facility] VASCULAR ACCESS GUIDANCE     Standing Status:   Future     Standing Expiration Date:   3/13/2024           If you have questions, please do not hesitate to call me.  I look forward to following Cherise Castaneda along with you.    Sincerely,        Mike Steiner MD    CC providers:  MD Sita Vargas [address]  [address] [address]  [place]

## 2023-03-13 NOTE — TELEPHONE ENCOUNTER
LM for pt to return call. Pt has not been seen in over a year.   Appt scheduled for 3-14-23 at 930AM.  Does this day/time work for pt?

## 2023-03-13 NOTE — PROGRESS NOTES
[address] [facility] MD [facility]  General Surgery  New Patient Evaluation in Office  Pt Name: Raoul Rod  Date of Birth 1965   Today's Date: 3/13/2023  Medical Record Number: [id number]  Referring Provider: Reese Cheatham MD  Primary Care Provider: CHRISTIAN Bautista - CNP  Chief Complaint   Patient presents with    Surgical Consult     NP ref by Dr. Addison Skinner - Hepatocellular carcinoma, Mediport placement     ASSESSMENT      Problem List Items Addressed This Visit       Esophageal varices without bleeding ([facility] [address]    Hepatocellular carcinoma ([facility] [address] - Primary    Relevant Orders    INSERTION CENTRAL VENOUS ACCESS DEVICE W/ SUBCUTANEOUS PORT    FL VASCULAR ACCESS GUIDANCE    Alcoholic cirrhosis ([facility] [address]    HTN (hypertension) (Chronic)     There are no active hospital problems to display for this patient. PLANS      Schedule Yosvany for Left  single Smart Port  Status: Outpatient  Planned anesthesia: MAC  He will undergo pre-operative clearance per anesthesia guidelines with risk factors listed under the past medical history diagnosis & problem list.  Perioperative discontinuation of ASA, Plavix, warfarin, Brillinta, Effient, Pradaxa, Eliquis, and Xarelto. Continuation of 81 mg Aspirin is acceptable. Perioperative medical clearance is not required   Techniques for long term IV access were discussed. Risks, complications and benefits of each were reviewed including bleeding, infection, thrombosis and lung injury were reviewed. The patient was given the opportunity to ask questions. Once answered, informed consent was obtained and the patient scheduled for the procedure.     Orders Placed This Encounter:  Orders Placed This Encounter   Procedures    INSERTION CENTRAL VENOUS ACCESS DEVICE W/ SUBCUTANEOUS PORT     Standing Status:   Future     Standing Expiration Date:   3/12/2024    [facility] VASCULAR ACCESS GUIDANCE     Standing Status:   Future     Standing Expiration Date:   3/13/2024 Verla Hammans is a 61 y.o. male seen in the office for evaluation for single lumen powerport placement. He has a diagnosis of   Problem List Items Addressed This Visit       Esophageal varices without bleeding ([address]    Hepatocellular carcinoma ([address] - Primary    Relevant Orders    INSERTION CENTRAL VENOUS ACCESS DEVICE W/ SUBCUTANEOUS PORT    FL VASCULAR ACCESS GUIDANCE    Alcoholic cirrhosis ([address]    HTN (hypertension) (Chronic)    and requires semi permanent IV access for planned therapy. He denies any previous history of vascular injury, procedures or medical problems involving the upper extremities. He denies history of clavicle fracture. He is right hand dominant. Pt denies any history of bleeding problems. Past Medical History  Past Medical History:   Diagnosis Date    Anxiety     Arthritis     Chronic kidney disease     Cirrhosis ([address]     Coronary artery disease involving native coronary artery of native heart without angina pectoris 2/15/2023    Diverticulitis     Diverticulosis     GERD (gastroesophageal reflux disease)     Hepatocellular carcinoma ([address] 2/15/2023    Hypertension     Other disorders of kidney and ureter in diseases classified elsewhere     Psychiatric problem     Pure hypercholesterolemia 3/10/2022    Stroke of unknown cause ([address] 8/12/2021     Past Surgical History  Past Surgical History:   Procedure Laterality Date    ABDOMEN SURGERY      BACK SURGERY      cervical plate    CARDIAC CATHETERIZATION  2007?     CERVICAL DISC SURGERY      x 2 ---broken neck 7-2003    COLON SURGERY  2004    partial, due to diverticulitis    COLONOSCOPY      CT NEEDLE BIOPSY LIVER PERCUTANEOUS  6/10/2022    CT NEEDLE BIOPSY LIVER PERCUTANEOUS 6/10/2022 [facility] CT SCAN    DILATATION, ESOPHAGUS      ENDOSCOPY, COLON, DIAGNOSTIC      FRACTURE SURGERY      Broke neck in 2003    NERVE BLOCK Bilateral 10/02/2017    Cervical Facet MBB at C4-5, C5-6, C6-7     AR INJ,PARAVERTEBRAL L/S,1 LEVEL Bilateral 10/2/2017    C-FACET MBB C4-5, C5-6, C6-7 BILATERAL performed by Marlene Beaver MD at [facility] [address] ENDOSCOPY  2019    UPPER GASTROINTESTINAL ENDOSCOPY Left 9/28/2020    EGD BAND LIGATION performed by Marcia Jasso MD at [facility]     Medications  Current Outpatient Medications on File Prior to Visit   Medication Sig Dispense Refill    prochlorperazine (COMPAZINE) 10 MG tablet Take 1 tablet by mouth every 6 hours as needed (nausea) 30 tablet 1    ondansetron (ZOFRAN-ODT) 8 MG TBDP disintegrating tablet Place 1 tablet under the tongue every 8 hours as needed for Nausea or Vomiting 10 tablet 2    oxyCODONE (ROXICODONE) 5 MG immediate release tablet Take 1 tablet by mouth every 6 hours as needed for Pain for up to 30 days. Intended supply: 7 days.  Take lowest dose possible to manage pain Max Daily Amount: 20 mg 120 tablet 0    apixaban (ELIQUIS) 5 MG TABS tablet Take 1 tablet by mouth 2 times daily 60 tablet 0    atorvastatin (LIPITOR) 40 MG tablet Take 1 tablet by mouth nightly When okay to take by PCP 30 tablet 5    lactulose (CHRONULAC) 10 GM/15ML solution Take 20 g by mouth 2 times daily      gabapentin (NEURONTIN) 300 MG capsule take 1 capsule by mouth three times a day (Patient taking differently: Take 300 mg by mouth 3 times daily as needed.) 90 capsule 2    cyclobenzaprine (FLEXERIL) 10 MG tablet Take 1/2 to 1 tablet by mouth three times a day if needed 60 tablet 1    DULoxetine (CYMBALTA) 60 MG extended release capsule take 1 capsule by mouth once daily 90 capsule 2    spironolactone (ALDACTONE) 100 MG tablet take 1 tablet by mouth every morning 90 tablet 2    omeprazole (PRILOSEC) 40 MG delayed release capsule Take 1 capsule by mouth daily (Patient taking differently: Take 40 mg by mouth daily as needed) 30 capsule 5    rifAXIMin (XIFAXAN) 550 MG tablet Take 1 tablet by mouth 2 times daily 60 tablet 6 nitroGLYCERIN (NITROSTAT) 0.4 MG SL tablet up to max of 3 total doses. If no relief after 1 dose, call 911. 25 tablet 0     No current facility-administered medications on file prior to visit. Allergies  No Known Allergies  Family History  Family History   Problem Relation Age of Onset    Hypertension Mother     Heart Disease Mother     Alzheimer's Disease Mother     Heart Failure Mother     Hypertension Father     Heart Disease Father     Cancer Father         lung    Diabetes Brother     High Blood Pressure Paternal Uncle     Heart Disease Paternal Uncle     Colon Cancer Neg Hx     Colon Polyps Neg Hx      Social History  Social History     Socioeconomic History    Marital status:      Spouse name: Not on file    Number of children: 2    Years of education: Not on file    Highest education level: Not on file   Occupational History    Not on file   Tobacco Use    Smoking status: Every Day     Packs/day: 0.50     Years: 25.00     Pack years: 12.50     Types: Cigarettes    Smokeless tobacco: Never    Tobacco comments:     9/21/22 declines cessation counseling-same 3/9/23   Vaping Use    Vaping Use: Never used   Substance and Sexual Activity    Alcohol use: Not Currently     Comment: Quit 15 years ago    Drug use: Not Currently    Sexual activity: Not Currently   Other Topics Concern    Not on file   Social History Narrative    Not on file     Social Determinants of Health     Financial Resource Strain: Low Risk     Difficulty of Paying Living Expenses: Not hard at all   Food Insecurity: No Food Insecurity    Worried About Running Out of Food in the Last Year: Never true    [address] in the Last Year: Never true   Transportation Needs: Unknown    Lack of Transportation (Medical): Not on file    Lack of Transportation (Non-Medical): No   Physical Activity: Not on file   Stress: Not on file   Social Connections: Socially Isolated    Frequency of Communication with Friends and Family:  Three times a week Frequency of Social Gatherings with Friends and Family: Three times a week    Attends [employer] Services: Never    Active Member of Clubs or Organizations: No    Attends Club or Organization Meetings: Never    Marital Status:    Intimate Partner Violence: Not on file   Housing Stability: Unknown    Unable to Pay for Housing in the Last Year: Not on file    Number of [place] in the Last Year: Not on file    Unstable Housing in the Last Year: No     [address] Maintenance   Topic Date Due    COVID-19 Vaccine (1) Never done    Hepatitis A vaccine (1 of 2 - Risk 2-dose series) Never done    Hepatitis B vaccine (1 of 3 - Risk 3-dose series) Never done    Shingles vaccine (1 of 2) Never done    Lipids  08/13/2022    Depression Screen  02/21/2024    Colorectal Cancer Screen  03/01/2029    Pneumococcal 0-64 years Vaccine (3 - PPSV23 if available, else PCV20) 06/03/2029    DTaP/Tdap/Td vaccine (2 - Td or Tdap) 07/27/2030    Flu vaccine  Completed    Hepatitis C screen  Completed    HIV screen  Completed    Hib vaccine  Aged Out    Meningococcal (ACWY) vaccine  Aged Out     Review of Systems  Constitutional:  Negative for activity change, appetite change, chills, diaphoresis, fatigue, fever and unexpected weight change. HENT:  Negative for congestion, dental problem, drooling, ear discharge, ear pain, facial swelling, hearing loss, mouth sores, nosebleeds, postnasal drip, rhinorrhea, sinus pressure, sinus pain, sneezing, sore throat, tinnitus, trouble swallowing and voice change. Eyes:  Negative for photophobia, pain, discharge, redness, itching and visual disturbance. Respiratory:  Negative for apnea, cough, choking, chest tightness, shortness of breath, wheezing and stridor. Cardiovascular:  Negative for chest pain, palpitations and leg swelling.    Gastrointestinal:  Negative for abdominal distention, abdominal pain, anal bleeding, blood in stool, constipation, diarrhea, nausea, rectal pain and vomiting. Endocrine: Negative for cold intolerance, heat intolerance, polydipsia, polyphagia and polyuria. Genitourinary:  Negative for decreased urine volume, difficulty urinating, dysuria, enuresis, flank pain, frequency, genital sores, hematuria, penile discharge, penile pain, penile swelling, scrotal swelling, testicular pain and urgency. Musculoskeletal:  Negative for arthralgias, back pain, gait problem, joint swelling, myalgias, neck pain and neck stiffness. Skin:  Negative for color change, pallor, rash and wound. Allergic/Immunologic: Negative for environmental allergies, food allergies and immunocompromised state. Neurological:  Negative for dizziness, tremors, seizures, syncope, facial asymmetry, speech difficulty, weakness, light-headedness, numbness and headaches. Hematological:  Negative for adenopathy. Does not bruise/bleed easily. Psychiatric/Behavioral:  Negative for agitation, behavioral problems, confusion, decreased concentration, dysphoric mood, hallucinations, self-injury, sleep disturbance and suicidal ideas. The patient is not nervous/anxious and is not hyperactive. All other systems reviewed and are negative     OBJECTIVE      VITALS:  height is 5' 5\" (1.651 m) and weight is 173 lb (78.5 kg). His temperature is 97 °F (36.1 °C). His blood pressure is 100/68. CONSTITUTIONAL: Alert and oriented times 3, no acute distress and cooperative to examination with proper mood and affect. SKIN: negatives: temperature normal, positives: erythema - face  LYMPH: no cervical nodes, no inguinal nodes  HEENT: Head is normocephalic, atraumatic. EOMI, PERRLA. NECK: Supple, symmetrical, trachea midline, no adenopathy, thyroid symmetric, not enlarged and no tenderness, skin normal.  CHEST/LUNGS: chest symmetric with normal A/P diameter, normal respiratory rate and rhythm, lungs clear to auscultation without wheezes, rales or rhonchi. No accessory muscle use.  Scars None CARDIOVASCULAR: Heart sounds are normal.  Regular rate and rhythm without murmur, gallop or rub. Normal S1 and S2. . Carotid and femoral pulses 2+/4 and equal bilaterally. ABDOMEN: distended lower midline scar(s) present. Normal bowel sounds. No bruits. Tanda Bun scars from previous incisions   hepatomegaly   \"soft, nontender, nondistended, no masses or organomegaly. Percussion: Abnormal percussion: shifting dullness noted Tenderness: absent. RECTAL: deferred, not clinically indicated  NEUROLOGIC: There are no focalizing motor or sensory deficits. CN II-XII are grossly intact. Tanda Bun EXTREMITIES: no cyanosis, no clubbing, and no edema.              Tristan Porter MD FACS  Electronically signed by Tristan Porter MD on 3/13/2023 at 9:13 AM

## 2023-03-14 ENCOUNTER — OFFICE VISIT (OUTPATIENT)
Dept: CARDIOLOGY CLINIC | Age: 58
End: 2023-03-14
Payer: COMMERCIAL

## 2023-03-14 ENCOUNTER — TELEPHONE (OUTPATIENT)
Dept: CARDIOLOGY CLINIC | Age: 58
End: 2023-03-14

## 2023-03-14 VITALS
HEIGHT: 65 IN | DIASTOLIC BLOOD PRESSURE: 65 MMHG | BODY MASS INDEX: 29.32 KG/M2 | SYSTOLIC BLOOD PRESSURE: 116 MMHG | HEART RATE: 97 BPM | WEIGHT: 176 LBS

## 2023-03-14 DIAGNOSIS — Z01.818 PRE-OP EVALUATION: ICD-10-CM

## 2023-03-14 DIAGNOSIS — Z98.890 S/P CARDIAC CATH: ICD-10-CM

## 2023-03-14 DIAGNOSIS — R06.02 SOB (SHORTNESS OF BREATH) ON EXERTION: ICD-10-CM

## 2023-03-14 DIAGNOSIS — E78.00 PURE HYPERCHOLESTEROLEMIA: Primary | ICD-10-CM

## 2023-03-14 DIAGNOSIS — K70.31 ALCOHOLIC CIRRHOSIS OF LIVER WITH ASCITES (HCC): ICD-10-CM

## 2023-03-14 DIAGNOSIS — I10 PRIMARY HYPERTENSION: Chronic | ICD-10-CM

## 2023-03-14 PROBLEM — I25.10 CORONARY ARTERY DISEASE INVOLVING NATIVE CORONARY ARTERY OF NATIVE HEART WITHOUT ANGINA PECTORIS: Status: RESOLVED | Noted: 2023-01-01 | Resolved: 2023-01-01

## 2023-03-14 PROBLEM — R07.9 CHEST PAIN: Status: RESOLVED | Noted: 2019-10-11 | Resolved: 2023-03-14

## 2023-03-14 PROCEDURE — 3078F DIAST BP <80 MM HG: CPT | Performed by: INTERNAL MEDICINE

## 2023-03-14 PROCEDURE — 3074F SYST BP LT 130 MM HG: CPT | Performed by: INTERNAL MEDICINE

## 2023-03-14 PROCEDURE — 3017F COLORECTAL CA SCREEN DOC REV: CPT | Performed by: INTERNAL MEDICINE

## 2023-03-14 PROCEDURE — 1111F DSCHRG MED/CURRENT MED MERGE: CPT | Performed by: INTERNAL MEDICINE

## 2023-03-14 PROCEDURE — G8417 CALC BMI ABV UP PARAM F/U: HCPCS | Performed by: INTERNAL MEDICINE

## 2023-03-14 PROCEDURE — G8427 DOCREV CUR MEDS BY ELIG CLIN: HCPCS | Performed by: INTERNAL MEDICINE

## 2023-03-14 PROCEDURE — 99214 OFFICE O/P EST MOD 30 MIN: CPT | Performed by: INTERNAL MEDICINE

## 2023-03-14 PROCEDURE — 4004F PT TOBACCO SCREEN RCVD TLK: CPT | Performed by: INTERNAL MEDICINE

## 2023-03-14 PROCEDURE — G8482 FLU IMMUNIZE ORDER/ADMIN: HCPCS | Performed by: INTERNAL MEDICINE

## 2023-03-14 RX ORDER — ALPRAZOLAM 1 MG/1
TABLET ORAL
Qty: 90 TABLET | Refills: 0 | Status: SHIPPED | OUTPATIENT
Start: 2023-03-14 | End: 2023-04-09

## 2023-03-14 NOTE — PROGRESS NOTES
Chief Complaint   Patient presents with    Check-Up    Cardiac Clearance    originally here  Abnormal echo  Chest pain atypical for many years          Pt needs clearance for mediport insertion on 3-16-23 with Dr. Bobby Valentino with -hepatocellular carcinoma June 2022 per biopsy  Need okay from cardiology prior to chemo initiation    EKG done 2-28-23    Sob on exertion- chronic unchanged    Denied chest pain, palpitation or dizziness    No leg edema    On aldactone for CLD cirrhosis    Smokes 1/2 ppd for 28 yrs    FHx  Mother had heart problem      Patient Active Problem List   Diagnosis    Diverticulosis    HTN (hypertension)    Ascites    Liver cirrhosis (HCC)    Cystic kidney disease    Leukocytosis    Alcohol abuse    Hyponatremia    Hypokalemia    Perianal ulcer (HCC)    Hyperkalemia    Increased ammonia level    Confusion    Change in mental status    Acute renal failure (HCC)    Metabolic encephalopathy    Altered mental status    Hepatic encephalopathy    Noncompliance with medications    Ascites due to alcoholic cirrhosis (HCC)    Alcoholic cirrhosis of liver with ascites (HCC)    Lactic acidosis    Generalized abdominal pain    Tobacco abuse    Spondylosis of cervical region without myelopathy or radiculopathy    Elevated INR    Opiate abuse, episodic (HCC)    Marijuana abuse    Dyspnea on exertion    SOB (shortness of breath) on exertion    Chest pain, atypical- for yrs     Stroke of unknown cause ([address]    Alteration in speech    Pure hypercholesterolemia    Acute metabolic encephalopathy    Acute delirium    Encephalopathy acute    AMS (altered mental status)    Portal vein thrombosis    RUQ pain    Hepatocellular carcinoma (HCC)    Hyperbilirubinemia    Hyperammonemia (HCC)    Esophageal varices without bleeding ([address]    AVM (arteriovenous malformation) brain    Constipation    Macrocytic anemia    History of alcohol abuse    Anxiety disorder    Obesity (BMI 30-39. 9)    Alcoholic cirrhosis ([address] S/P cardiac cath- 2019- nonobstructive- med RX    Pre-op evaluation mediport       Past Surgical History:   Procedure Laterality Date    ABDOMEN SURGERY      BACK SURGERY      cervical plate    CARDIAC CATHETERIZATION  2007?     CERVICAL DISC SURGERY      x 2 ---broken neck 7-2003    COLON SURGERY  2004    partial, due to diverticulitis    COLONOSCOPY      CT NEEDLE BIOPSY LIVER PERCUTANEOUS  6/10/2022    CT NEEDLE BIOPSY LIVER PERCUTANEOUS 6/10/2022 [facility] CT SCAN    DILATATION, ESOPHAGUS      ENDOSCOPY, COLON, DIAGNOSTIC      FRACTURE SURGERY      Broke neck in 2003    NERVE BLOCK Bilateral 10/02/2017    Cervical Facet MBB at C4-5, C5-6, C6-7     WI INJ,PARAVERTEBRAL L/S,1 LEVEL Bilateral 10/2/2017    C-FACET MBB C4-5, C5-6, C6-7 BILATERAL performed by Arely Valdez MD at [address] [facility]      UPPER GASTROINTESTINAL ENDOSCOPY  2019    UPPER GASTROINTESTINAL ENDOSCOPY Left 9/28/2020    EGD BAND LIGATION performed by Feng Jones MD at [address] [facility]       No Known Allergies     Family History   Problem Relation Age of Onset    Hypertension Mother     Heart Disease Mother     Alzheimer's Disease Mother     Heart Failure Mother     Hypertension Father     Heart Disease Father     Cancer Father         lung    Diabetes Brother     High Blood Pressure Paternal Uncle     Heart Disease Paternal Uncle     Colon Cancer Neg Hx     Colon Polyps Neg Hx         Social History     Socioeconomic History    Marital status:      Spouse name: Not on file    Number of children: 2    Years of education: Not on file    Highest education level: Not on file   Occupational History    Not on file   Tobacco Use    Smoking status: Every Day     Packs/day: 0.50     Years: 25.00     Pack years: 12.50     Types: Cigarettes    Smokeless tobacco: Never    Tobacco comments:     9/21/22 declines cessation counseling-same 3/9/23   Vaping Use    Vaping Use: Never used   Substance and Sexual Activity    Alcohol use: Not Currently     Comment: Quit 15 years ago    Drug use: Not Currently    Sexual activity: Not Currently   Other Topics Concern    Not on file   Social History Narrative    Not on file     Social Determinants of Health     Financial Resource Strain: Low Risk     Difficulty of Paying Living Expenses: Not hard at all   Food Insecurity: No Food Insecurity    Worried About [address] in the Last Year: Never true    [address] in the Last Year: Never true   Transportation Needs: Unknown    Lack of Transportation (Medical): Not on file    Lack of Transportation (Non-Medical): No   Physical Activity: Not on file   Stress: Not on file   Social Connections: Not on file   Intimate Partner Violence: Not on file   Housing Stability: Unknown    Unable to Pay for Housing in the Last Year: Not on file    Number of Places Lived in the Last Year: Not on file    Unstable Housing in the Last Year: No       Current Outpatient Medications   Medication Sig Dispense Refill    ALPRAZolam (XANAX) 1 MG tablet take 1 tablet by mouth three times a day if needed 90 tablet 0    prochlorperazine (COMPAZINE) 10 MG tablet Take 1 tablet by mouth every 6 hours as needed (nausea) 30 tablet 1    ondansetron (ZOFRAN-ODT) 8 MG TBDP disintegrating tablet Place 1 tablet under the tongue every 8 hours as needed for Nausea or Vomiting 10 tablet 2    oxyCODONE (ROXICODONE) 5 MG immediate release tablet Take 1 tablet by mouth every 6 hours as needed for Pain for up to 30 days. Intended supply: 7 days.  Take lowest dose possible to manage pain Max Daily Amount: 20 mg 120 tablet 0    apixaban (ELIQUIS) 5 MG TABS tablet Take 1 tablet by mouth 2 times daily 60 tablet 0    atorvastatin (LIPITOR) 40 MG tablet Take 1 tablet by mouth nightly When okay to take by PCP 30 tablet 5    lactulose (CHRONULAC) 10 GM/15ML solution Take 20 g by mouth 2 times daily      gabapentin (NEURONTIN) 300 MG capsule take 1 capsule by mouth three times a day (Patient taking differently: Take 300 mg by mouth 3 times daily as needed.) 90 capsule 2    cyclobenzaprine (FLEXERIL) 10 MG tablet Take 1/2 to 1 tablet by mouth three times a day if needed 60 tablet 1    DULoxetine (CYMBALTA) 60 MG extended release capsule take 1 capsule by mouth once daily 90 capsule 2    spironolactone (ALDACTONE) 100 MG tablet take 1 tablet by mouth every morning 90 tablet 2    omeprazole (PRILOSEC) 40 MG delayed release capsule Take 1 capsule by mouth daily (Patient taking differently: Take 40 mg by mouth daily as needed) 30 capsule 5    rifAXIMin (XIFAXAN) 550 MG tablet Take 1 tablet by mouth 2 times daily 60 tablet 6    nitroGLYCERIN (NITROSTAT) 0.4 MG SL tablet up to max of 3 total doses. If no relief after 1 dose, call 911. 25 tablet 0     No current facility-administered medications for this visit. Review of Systems -     General ROS: negative  Psychological ROS: negative  Hematological and Lymphatic ROS: No history of blood clots or bleeding disorder. Respiratory ROS: no cough,  or wheezing, the rest see HPI  Cardiovascular ROS: See HPI  Gastrointestinal ROS: negative  Genito-Urinary ROS: no dysuria, trouble voiding, or hematuria  Musculoskeletal ROS: negative  Neurological ROS: no TIA or stroke symptoms  Dermatological ROS: negative      Blood pressure 116/65, pulse 97, height 5' 5\" (1.651 m), weight 176 lb (79.8 kg).         Physical Examination:    General appearance - alert, well appearing, and in no distress  HEENT- Pink conjunctiva  , Non-icteri sclera,PERRLA  Mental status - alert, oriented to person, place, and time  Neck - supple, no significant adenopathy, no JVD, or carotid bruits  Chest - clear to auscultation, no wheezes, rales or rhonchi, symmetric air entry  Heart - normal rate, regular rhythm, normal S1, S2, no murmurs, rubs, clicks or gallops  Abdomen - soft, nontender, nondistended, no masses or organomegaly  KENYON- no CVA or flank tenderness, no suprapubic tenderness  Neurological - alert, oriented, normal speech, no focal findings or movement disorder noted  Musculoskeletal/limbs - no joint tenderness, deformity or swelling   - peripheral pulses normal, no pedal edema, no clubbing or cyanosis  Skin - normal coloration and turgor, no rashes, no suspicious skin lesions noted  Psych- appropriate mood and affect    Lab  No results for input(s): CKTOTAL, CKMB, CKMBINDEX, TROPONINI in the last 72 hours.   CBC:   Lab Results   Component Value Date/Time    WBC 9.8 03/09/2023 12:00 PM    RBC 4.47 03/09/2023 12:00 PM    RBC 4.07 02/22/2021 10:50 AM    HGB 13.9 03/09/2023 12:00 PM    HCT 41.9 03/09/2023 12:00 PM    MCV 94 03/09/2023 12:00 PM    MCH 31.1 03/09/2023 12:00 PM    MCHC 33.2 03/09/2023 12:00 PM    RDW 16.6 03/09/2023 12:00 PM     03/09/2023 12:00 PM    MPV 9.9 03/09/2023 12:00 PM     BMP:    Lab Results   Component Value Date/Time     03/09/2023 12:00 PM     02/28/2023 11:56 AM    K 4.2 03/09/2023 12:00 PM    K 4.7 02/28/2023 11:56 AM    K 4.1 10/10/2022 05:50 PM     02/28/2023 11:56 AM    CO2 21 02/28/2023 11:56 AM    BUN 17 02/28/2023 11:56 AM    LABALBU 3.1 03/09/2023 12:00 PM    LABALBU 4.6 11/04/2011 01:05 PM    CREATININE 0.7 03/09/2023 12:00 PM    CREATININE 0.7 02/28/2023 11:56 AM    CALCIUM 9.7 02/28/2023 11:56 AM    LABGLOM >60 03/09/2023 12:00 PM    GLUCOSE 85 02/28/2023 11:56 AM    GLUCOSE 89 04/22/2022 01:57 PM     Hepatic Function Panel:    Lab Results   Component Value Date/Time    ALKPHOS 196 03/09/2023 12:00 PM    ALT 40 03/09/2023 12:00 PM    AST 51 03/09/2023 12:00 PM    PROT 8.1 03/09/2023 12:00 PM    BILITOT 2.2 03/09/2023 12:00 PM    BILIDIR 1.0 03/09/2023 12:00 PM    LABALBU 3.1 03/09/2023 12:00 PM    LABALBU 4.6 11/04/2011 01:05 PM     Magnesium:    Lab Results   Component Value Date/Time    MG 2.2 02/14/2023 12:40 PM     Warfarin PT/INR:  No components found for: PTPATWAR, PTINRWAR  HgBA1c: Lab Results   Component Value Date/Time    LABA1C 5.0 08/13/2021 04:18 AM     FLP:    Lab Results   Component Value Date/Time    TRIG 50 08/13/2021 04:18 AM    HDL 41 08/13/2021 04:18 AM    LDLCALC 34 08/13/2021 04:18 AM    LABVLDL 9 02/22/2021 10:50 AM     TSH:    Lab Results   Component Value Date/Time    TSH 0.508 06/16/2022 02:00 AM      CONCLUSION:  Nonobstructive coronary artery disease. PLAN:  1. Medical therapy for nonobstructive coronary artery disease. 2.  Aggressive risk factor modification for CAD. 3.  Continue aspirin 81 mg p.o. daily. 4.  Start statin therapy, closely monitor liver function tests. 5.  Outpatient followup with Cardiology and primary care physician. The above findings and plan of care were discussed with the patient and  his family. Questions were answered. Melissa Mckeon MD     D: 10/14/2019 9:56:         Conclusions      Summary   Lexiscan EKG stress test is not suggestive for ischemia. The nuclear images is not suggestive for myocardial ischemia. Signatures      ----------------------------------------------------------------   Electronically signed by Rachid Sevilla MD (Interpreting   Cardiologist) on 02/23/2021 at 21:23     Conclusions      Summary   Ejection fraction is visually estimated at 55%. Overall left ventricular function is normal.   Aortic valve appears tricuspid. Aortic valve leaflets are somewhat thickened. Aortic valve leaflets are Mildly calcified. Signature      ----------------------------------------------------------------   Electronically signed by Sharyle Decant MD (Interpreting   physician) on 02/23/2021 at 07:54 PM   ----------------------------------------------------------------       Conclusions      Summary   Normal left ventricle size and systolic function. Ejection fraction was   estimated at 55 %.  There were no regional left ventricular wall motion   abnormalities and wall thickness was within normal limits. The Bubble study with agitated saline is of poor quality and show no   obvious right to left shunt      Signature      ----------------------------------------------------------------   Electronically signed by Sera Umana MD (Interpreting   physician) on 08/13/2021 at 09:36 PM   ----------------------------------------------------------------         ekg 3/11/21  Sinus  Rhythm   Low voltage in precordial leads. ABNORMAL    ekg 8/12/21   Normal sinus rhythm Normal ECG When compared with ECG of 14-JAN-2021 00:35, Previous ECG has undetermined rhythm, needs review    Ekg 2/28/23  Sinus bradycardia Low voltage QRS, consider pulmonary disease, pericardial effusion, or normal variant Borderline ECG When compared with ECG of 14-FEB-2023 12:36, No significant change was found Confirmed by Jessika Burgos MD, Franci Kingsport ([id number]) on 2/28/202      Assessment   Diagnosis Orders   1. Pure hypercholesterolemia  ECHO Complete 2D W Doppler W Color      2. Pre-op evaluation mediport  ECHO Complete 2D W Doppler W Color      3. Primary hypertension  ECHO Complete 2D W Doppler W Color      4. SOB (shortness of breath) on exertion  ECHO Complete 2D W Doppler W Color      5. Alcoholic cirrhosis of liver with ascites (HCC)  ECHO Complete 2D W Doppler W Color      6. S/P cardiac cath- 2019- nonobstructive- med RX          Dexed with -hepatocellular carcinoma June 2022 per biopsy      Plan     The  most current meds and labs reviewed    Continue the current treatment and with constant vigilance to changes in symptoms and also any potential side effects. Return for care or seek medical attention immediately if symptoms got worse and/or develop new symptoms. Hypertension, on medical treatment. Seems to be under good control. Patient is compliant with medical treatment. Hyperlipidemia: on statins, followed periodically. Patient need periodic lipid and liver profile.     Need okay for chemo  Get echo and if EF wnl may start chemo  Depending on the type of chem further f/u  wuill be done and needed  Request the type of chemo to be started    Okay for mediport    On aldactone for CLF+D and ascites      Hx of Chest pain atypical chronic noncardiac  Cath neg 10/2019 and nuc stress neg feb 2021    Echo no siginificant abn 2021    D/w the pat the plan of care in detail    Memory issue to see neurologist in [facility] from cardiac stand    Discussed use, benefit, and side effects of prescribed medications. All patient questions answered. Pt voiced understanding. Instructed to continue current medications, diet and exercise. Continue risk factor modification and medical management. Patient agreed with treatment plan. Follow up as directed.       RTC in 1 year      [facility]

## 2023-03-15 ENCOUNTER — HOSPITAL ENCOUNTER (OUTPATIENT)
Dept: NON INVASIVE DIAGNOSTICS | Age: 58
Discharge: HOME | End: 2023-03-15
Payer: COMMERCIAL

## 2023-03-15 ENCOUNTER — HOSPITAL ENCOUNTER (OUTPATIENT)
Dept: MRI IMAGING | Age: 58
Discharge: HOME | End: 2023-03-15
Payer: COMMERCIAL

## 2023-03-15 DIAGNOSIS — I10 PRIMARY HYPERTENSION: Chronic | ICD-10-CM

## 2023-03-15 DIAGNOSIS — K70.31 ALCOHOLIC CIRRHOSIS OF LIVER WITH ASCITES (HCC): ICD-10-CM

## 2023-03-15 DIAGNOSIS — Z01.818 PRE-OP EVALUATION: ICD-10-CM

## 2023-03-15 DIAGNOSIS — R06.02 SOB (SHORTNESS OF BREATH) ON EXERTION: ICD-10-CM

## 2023-03-15 DIAGNOSIS — E78.00 PURE HYPERCHOLESTEROLEMIA: ICD-10-CM

## 2023-03-15 DIAGNOSIS — C24.9 HEPATOBILIARY CANCER (HCC): ICD-10-CM

## 2023-03-15 LAB
LV EF: 53 %
LVEF MODALITY: NORMAL

## 2023-03-15 PROCEDURE — A9579 GAD-BASE MR CONTRAST NOS,1ML: HCPCS | Performed by: INTERNAL MEDICINE

## 2023-03-15 PROCEDURE — 6360000004 HC RX CONTRAST MEDICATION: Performed by: INTERNAL MEDICINE

## 2023-03-15 PROCEDURE — 74183 MRI ABD W/O CNTR FLWD CNTR: CPT

## 2023-03-15 PROCEDURE — 93306 TTE W/DOPPLER COMPLETE: CPT

## 2023-03-15 RX ADMIN — GADOTERIDOL 15 ML: 279.3 INJECTION, SOLUTION INTRAVENOUS at 08:36

## 2023-03-15 NOTE — H&P
Skylar Arreola MD [facility]  General Surgery  H and P for Surgery   Pt Name: Estela Lundborg  Date of Birth 1965   Today's Date: 3/15/2023  Medical Record Number: [id number]  Referring Provider: No ref. provider found  Primary Care Provider: CHRISTIAN Sood CNP  No chief complaint on file. ASSESSMENT      Problem List Items Addressed This Visit    None    Active Hospital Problems    Diagnosis Date Noted    Hepatocellular carcinoma ([facility] [address] [C22.0] 02/15/2023     Priority: Medium    Esophageal varices without bleeding ([facility] [address] [I85.00] 02/15/2023     Priority: Medium    Portal vein thrombosis [I81] 02/14/2023     Priority: Medium          PLANS      Schedule Claire Cameron for Left  single Smart Port  Status: Outpatient  Planned anesthesia: MAC  He will undergo pre-operative clearance per anesthesia guidelines with risk factors listed under the past medical history diagnosis & problem list.  Perioperative discontinuation of ASA, Plavix, warfarin, Brillinta, Effient, Pradaxa, Eliquis, and Xarelto. Continuation of 81 mg Aspirin is acceptable. Perioperative medical clearance is not required   Techniques for long term IV access were discussed. Risks, complications and benefits of each were reviewed including bleeding, infection, thrombosis and lung injury were reviewed. The patient was given the opportunity to ask questions. Once answered, informed consent was obtained and the patient scheduled for the procedure. Orders Placed This Encounter:  No orders of the defined types were placed in this encounter. Jp Pascual is a 61 y.o. male seen in the office for evaluation for single lumen powerport placement. He has a diagnosis of   Problem List Items Addressed This Visit    None   and requires semi permanent IV access for planned therapy. He denies any previous history of vascular injury, procedures or medical problems involving the upper extremities.  He denies history of clavicle fracture. He is right hand dominant. Pt denies any history of bleeding problems. Past Medical History  Past Medical History:   Diagnosis Date    Anxiety     Arthritis     Chronic kidney disease     Cirrhosis ([facility] [address]     Coronary artery disease involving native coronary artery of native heart without angina pectoris 2/15/2023    Diverticulitis     Diverticulosis     GERD (gastroesophageal reflux disease)     Hepatocellular carcinoma ([facility] [address] 2/15/2023    Hypertension     Other disorders of kidney and ureter in diseases classified elsewhere     Psychiatric problem     Pure hypercholesterolemia 3/10/2022    Stroke of unknown cause ([facility] [address] 8/12/2021     Past Surgical History  Past Surgical History:   Procedure Laterality Date    ABDOMEN SURGERY      BACK SURGERY      cervical plate    CARDIAC CATHETERIZATION  2007? CERVICAL DISC SURGERY      x 2 ---broken neck 7-2003    COLON SURGERY  2004    partial, due to diverticulitis    COLONOSCOPY      CT NEEDLE BIOPSY LIVER PERCUTANEOUS  6/10/2022    CT NEEDLE BIOPSY LIVER PERCUTANEOUS 6/10/2022 [facility] CT SCAN    DILATATION, ESOPHAGUS      ENDOSCOPY, COLON, DIAGNOSTIC      FRACTURE SURGERY      Broke neck in 2003    NERVE BLOCK Bilateral 10/02/2017    Cervical Facet MBB at C4-5, C5-6, C6-7     IL INJ,PARAVERTEBRAL L/S,1 LEVEL Bilateral 10/2/2017    C-FACET MBB C4-5, C5-6, C6-7 BILATERAL performed by Sofia Osborn MD at [place] [address] ENDOSCOPY  2019    UPPER GASTROINTESTINAL ENDOSCOPY Left 9/28/2020    EGD BAND LIGATION performed by Ivonne Vallejo MD at [facility]     Medications  No current facility-administered medications on file prior to encounter.      Current Outpatient Medications on File Prior to Encounter   Medication Sig Dispense Refill    prochlorperazine (COMPAZINE) 10 MG tablet Take 1 tablet by mouth every 6 hours as needed (nausea) 30 tablet 1    ondansetron (ZOFRAN-ODT) 8 MG TBDP disintegrating tablet Place 1 tablet under the tongue every 8 hours as needed for Nausea or Vomiting 10 tablet 2    oxyCODONE (ROXICODONE) 5 MG immediate release tablet Take 1 tablet by mouth every 6 hours as needed for Pain for up to 30 days. Intended supply: 7 days. Take lowest dose possible to manage pain Max Daily Amount: 20 mg 120 tablet 0    apixaban (ELIQUIS) 5 MG TABS tablet Take 1 tablet by mouth 2 times daily 60 tablet 0    atorvastatin (LIPITOR) 40 MG tablet Take 1 tablet by mouth nightly When okay to take by PCP 30 tablet 5    lactulose (CHRONULAC) 10 GM/15ML solution Take 20 g by mouth 2 times daily      gabapentin (NEURONTIN) 300 MG capsule take 1 capsule by mouth three times a day (Patient taking differently: Take 300 mg by mouth 3 times daily as needed.) 90 capsule 2    cyclobenzaprine (FLEXERIL) 10 MG tablet Take 1/2 to 1 tablet by mouth three times a day if needed 60 tablet 1    DULoxetine (CYMBALTA) 60 MG extended release capsule take 1 capsule by mouth once daily 90 capsule 2    spironolactone (ALDACTONE) 100 MG tablet take 1 tablet by mouth every morning 90 tablet 2    omeprazole (PRILOSEC) 40 MG delayed release capsule Take 1 capsule by mouth daily (Patient taking differently: Take 40 mg by mouth daily as needed) 30 capsule 5    rifAXIMin (XIFAXAN) 550 MG tablet Take 1 tablet by mouth 2 times daily 60 tablet 6    nitroGLYCERIN (NITROSTAT) 0.4 MG SL tablet up to max of 3 total doses.  If no relief after 1 dose, call 911. 25 tablet 0     Allergies  No Known Allergies  Family History  Family History   Problem Relation Age of Onset    Hypertension Mother     Heart Disease Mother     Alzheimer's Disease Mother     Heart Failure Mother     Hypertension Father     Heart Disease Father     Cancer Father         lung    Diabetes Brother     High Blood Pressure Paternal Uncle     Heart Disease Paternal Uncle     Colon Cancer Neg Hx     Colon Polyps Neg Hx      Social History  Social History     Socioeconomic History    Marital status:      Spouse name: Not on file    Number of children: 2    Years of education: Not on file    Highest education level: Not on file   Occupational History    Not on file   Tobacco Use    Smoking status: Every Day     Packs/day: 0.50     Years: 25.00     Pack years: 12.50     Types: Cigarettes    Smokeless tobacco: Never    Tobacco comments:     9/21/22 declines cessation counseling-same 3/9/23   Vaping Use    Vaping Use: Never used   Substance and Sexual Activity    Alcohol use: Not Currently     Comment: Quit 15 years ago    Drug use: Not Currently    Sexual activity: Not Currently   Other Topics Concern    Not on file   Social History Narrative    Not on file     Social Determinants of Health     Financial Resource Strain: Low Risk     Difficulty of Paying Living Expenses: Not hard at all   Food Insecurity: No Food Insecurity    Worried About [address] in the Last Year: Never true    [address] in the Last Year: Never true   Transportation Needs: Unknown    Lack of Transportation (Medical): Not on file    Lack of Transportation (Non-Medical):  No   Physical Activity: Not on file   Stress: Not on file   Social Connections: Not on file   Intimate Partner Violence: Not on file   Housing Stability: Unknown    Unable to Pay for Housing in the Last Year: Not on file    Number of Places Lived in the Last Year: Not on file    Unstable Housing in the Last Year: No     [address] Maintenance   Topic Date Due    COVID-19 Vaccine (1) Never done    Hepatitis A vaccine (1 of 2 - Risk 2-dose series) Never done    Hepatitis B vaccine (1 of 3 - Risk 3-dose series) Never done    Shingles vaccine (1 of 2) Never done    Lipids  08/13/2022    Depression Screen  02/21/2024    Colorectal Cancer Screen  03/01/2029    Pneumococcal 0-64 years Vaccine (3 - PPSV23 if available, else PCV20) 06/03/2029    DTaP/Tdap/Td vaccine (2 - Td or Tdap) 07/27/2030    Flu vaccine  Completed    Hepatitis C screen  Completed    HIV screen  Completed    Hib vaccine  Aged Out    Meningococcal (ACWY) vaccine  Aged Out     Review of Systems  Constitutional:  Negative for activity change, appetite change, chills, diaphoresis, fatigue, fever and unexpected weight change. HENT:  Negative for congestion, dental problem, drooling, ear discharge, ear pain, facial swelling, hearing loss, mouth sores, nosebleeds, postnasal drip, rhinorrhea, sinus pressure, sinus pain, sneezing, sore throat, tinnitus, trouble swallowing and voice change. Eyes:  Negative for photophobia, pain, discharge, redness, itching and visual disturbance. Respiratory:  Negative for apnea, cough, choking, chest tightness, shortness of breath, wheezing and stridor. Cardiovascular:  Negative for chest pain, palpitations and leg swelling. Gastrointestinal:  Negative for abdominal distention, abdominal pain, anal bleeding, blood in stool, constipation, diarrhea, nausea, rectal pain and vomiting. Endocrine: Negative for cold intolerance, heat intolerance, polydipsia, polyphagia and polyuria. Genitourinary:  Negative for decreased urine volume, difficulty urinating, dysuria, enuresis, flank pain, frequency, genital sores, hematuria, penile discharge, penile pain, penile swelling, scrotal swelling, testicular pain and urgency. Musculoskeletal:  Negative for arthralgias, back pain, gait problem, joint swelling, myalgias, neck pain and neck stiffness. Skin:  Negative for color change, pallor, rash and wound. Allergic/Immunologic: Negative for environmental allergies, food allergies and immunocompromised state. Neurological:  Negative for dizziness, tremors, seizures, syncope, facial asymmetry, speech difficulty, weakness, light-headedness, numbness and headaches. Hematological:  Negative for adenopathy. Does not bruise/bleed easily.    Psychiatric/Behavioral:  Negative for agitation, behavioral problems, confusion, decreased concentration, dysphoric mood, hallucinations, self-injury, sleep disturbance and suicidal ideas. The patient is not nervous/anxious and is not hyperactive. All other systems reviewed and are negative     OBJECTIVE      VITALS:  vitals were not taken for this visit. CONSTITUTIONAL: Alert and oriented times 3, no acute distress and cooperative to examination with proper mood and affect. SKIN: negatives: temperature normal, positives: erythema - face  LYMPH: no cervical nodes, no inguinal nodes  HEENT: Head is normocephalic, atraumatic. EOMI, PERRLA. NECK: Supple, symmetrical, trachea midline, no adenopathy, thyroid symmetric, not enlarged and no tenderness, skin normal.  CHEST/LUNGS: chest symmetric with normal A/P diameter, normal respiratory rate and rhythm, lungs clear to auscultation without wheezes, rales or rhonchi. No accessory muscle use. Scars None   CARDIOVASCULAR: Heart sounds are normal.  Regular rate and rhythm without murmur, gallop or rub. Normal S1 and S2. . Carotid and femoral pulses 2+/4 and equal bilaterally. ABDOMEN: distended lower midline scar(s) present. Normal bowel sounds. No bruits. [place] scars from previous incisions   hepatomegaly   \"soft, nontender, nondistended, no masses or organomegaly. Percussion: Abnormal percussion: shifting dullness noted Tenderness: absent. RECTAL: deferred, not clinically indicated  NEUROLOGIC: There are no focalizing motor or sensory deficits. CN II-XII are grossly intact. [facility] EXTREMITIES: no cyanosis, no clubbing, and no edema.              Benjamin Cavazos MD FACS  Electronically signed by Benjamin Cavazos MD on 3/15/2023 at 7:14 AM

## 2023-03-16 ENCOUNTER — ANESTHESIA EVENT (OUTPATIENT)
Dept: OPERATING ROOM | Age: 58
End: 2023-03-16
Payer: COMMERCIAL

## 2023-03-16 ENCOUNTER — ANESTHESIA (OUTPATIENT)
Dept: OPERATING ROOM | Age: 58
End: 2023-03-16
Payer: COMMERCIAL

## 2023-03-16 ENCOUNTER — APPOINTMENT (OUTPATIENT)
Dept: GENERAL RADIOLOGY | Age: 58
End: 2023-03-16
Attending: SURGERY
Payer: COMMERCIAL

## 2023-03-16 ENCOUNTER — HOSPITAL ENCOUNTER (OUTPATIENT)
Age: 58
Setting detail: OUTPATIENT SURGERY
Discharge: HOME | End: 2023-03-16
Attending: SURGERY | Admitting: SURGERY
Payer: COMMERCIAL

## 2023-03-16 ENCOUNTER — TELEPHONE (OUTPATIENT)
Dept: CARDIOLOGY CLINIC | Age: 58
End: 2023-03-16

## 2023-03-16 VITALS
BODY MASS INDEX: 29.46 KG/M2 | OXYGEN SATURATION: 97 % | HEART RATE: 88 BPM | HEIGHT: 65 IN | WEIGHT: 176.8 LBS | DIASTOLIC BLOOD PRESSURE: 65 MMHG | SYSTOLIC BLOOD PRESSURE: 113 MMHG | RESPIRATION RATE: 18 BRPM | TEMPERATURE: 97.8 F

## 2023-03-16 DIAGNOSIS — C22.0 HEPATOCELLULAR CARCINOMA (HCC): Primary | ICD-10-CM

## 2023-03-16 PROBLEM — Z45.2 ENCOUNTER FOR INSERTION OF VENOUS ACCESS PORT: Status: ACTIVE | Noted: 2023-03-16

## 2023-03-16 PROCEDURE — 6360000002 HC RX W HCPCS: Performed by: SURGERY

## 2023-03-16 PROCEDURE — 7100000010 HC PHASE II RECOVERY - FIRST 15 MIN: Performed by: SURGERY

## 2023-03-16 PROCEDURE — 2580000003 HC RX 258: Performed by: SURGERY

## 2023-03-16 PROCEDURE — 6360000002 HC RX W HCPCS: Performed by: NURSE ANESTHETIST, CERTIFIED REGISTERED

## 2023-03-16 PROCEDURE — 3600000002 HC SURGERY LEVEL 2 BASE: Performed by: SURGERY

## 2023-03-16 PROCEDURE — 71045 X-RAY EXAM CHEST 1 VIEW: CPT

## 2023-03-16 PROCEDURE — 2500000003 HC RX 250 WO HCPCS: Performed by: SURGERY

## 2023-03-16 PROCEDURE — 2709999900 HC NON-CHARGEABLE SUPPLY: Performed by: SURGERY

## 2023-03-16 PROCEDURE — 7100000011 HC PHASE II RECOVERY - ADDTL 15 MIN: Performed by: SURGERY

## 2023-03-16 PROCEDURE — 3600000012 HC SURGERY LEVEL 2 ADDTL 15MIN: Performed by: SURGERY

## 2023-03-16 PROCEDURE — 77001 FLUOROGUIDE FOR VEIN DEVICE: CPT

## 2023-03-16 PROCEDURE — C1788 PORT, INDWELLING, IMP: HCPCS | Performed by: SURGERY

## 2023-03-16 PROCEDURE — 6370000000 HC RX 637 (ALT 250 FOR IP): Performed by: SURGERY

## 2023-03-16 PROCEDURE — 3700000000 HC ANESTHESIA ATTENDED CARE: Performed by: SURGERY

## 2023-03-16 PROCEDURE — 3700000001 HC ADD 15 MINUTES (ANESTHESIA): Performed by: SURGERY

## 2023-03-16 DEVICE — PORT INFUS 6FR SLIM POLYUR ATTCH OPN END SGL LUMN VEN CATH: Type: IMPLANTABLE DEVICE | Site: CHEST | Status: FUNCTIONAL

## 2023-03-16 RX ORDER — HEPARIN SODIUM (PORCINE) LOCK FLUSH IV SOLN 100 UNIT/ML 100 UNIT/ML
SOLUTION INTRAVENOUS PRN
Status: DISCONTINUED | OUTPATIENT
Start: 2023-03-16 | End: 2023-03-16 | Stop reason: ALTCHOICE

## 2023-03-16 RX ORDER — SODIUM CHLORIDE 9 MG/ML
INJECTION, SOLUTION INTRAVENOUS CONTINUOUS
OUTPATIENT
Start: 2023-03-22

## 2023-03-16 RX ORDER — SODIUM CHLORIDE 0.9 % (FLUSH) 0.9 %
5-40 SYRINGE (ML) INJECTION PRN
Status: DISCONTINUED | OUTPATIENT
Start: 2023-03-16 | End: 2023-03-16 | Stop reason: HOSPADM

## 2023-03-16 RX ORDER — ACETAMINOPHEN 325 MG/1
650 TABLET ORAL
OUTPATIENT
Start: 2023-03-22

## 2023-03-16 RX ORDER — DIPHENHYDRAMINE HYDROCHLORIDE 50 MG/ML
50 INJECTION INTRAMUSCULAR; INTRAVENOUS
OUTPATIENT
Start: 2023-03-22

## 2023-03-16 RX ORDER — TRAMADOL HYDROCHLORIDE 50 MG/1
50 TABLET ORAL EVERY 6 HOURS PRN
COMMUNITY

## 2023-03-16 RX ORDER — SODIUM CHLORIDE 9 MG/ML
INJECTION, SOLUTION INTRAVENOUS PRN
Status: CANCELLED | OUTPATIENT
Start: 2023-03-16

## 2023-03-16 RX ORDER — SODIUM CHLORIDE 0.9 % (FLUSH) 0.9 %
5-40 SYRINGE (ML) INJECTION EVERY 12 HOURS SCHEDULED
Status: DISCONTINUED | OUTPATIENT
Start: 2023-03-16 | End: 2023-03-16 | Stop reason: HOSPADM

## 2023-03-16 RX ORDER — SODIUM CHLORIDE 9 MG/ML
5-40 INJECTION INTRAVENOUS PRN
OUTPATIENT
Start: 2023-03-22

## 2023-03-16 RX ORDER — ONDANSETRON 2 MG/ML
8 INJECTION INTRAMUSCULAR; INTRAVENOUS
OUTPATIENT
Start: 2023-03-22

## 2023-03-16 RX ORDER — EPINEPHRINE 1 MG/ML
0.3 INJECTION, SOLUTION, CONCENTRATE INTRAVENOUS PRN
OUTPATIENT
Start: 2023-03-22

## 2023-03-16 RX ORDER — SODIUM CHLORIDE 9 MG/ML
INJECTION, SOLUTION INTRAVENOUS PRN
Status: DISCONTINUED | OUTPATIENT
Start: 2023-03-16 | End: 2023-03-16 | Stop reason: HOSPADM

## 2023-03-16 RX ORDER — HEPARIN SODIUM (PORCINE) LOCK FLUSH IV SOLN 100 UNIT/ML 100 UNIT/ML
500 SOLUTION INTRAVENOUS PRN
OUTPATIENT
Start: 2023-03-22

## 2023-03-16 RX ORDER — MEPERIDINE HYDROCHLORIDE 50 MG/ML
12.5 INJECTION INTRAMUSCULAR; INTRAVENOUS; SUBCUTANEOUS PRN
OUTPATIENT
Start: 2023-03-22

## 2023-03-16 RX ORDER — PROPOFOL 10 MG/ML
INJECTION, EMULSION INTRAVENOUS PRN
Status: DISCONTINUED | OUTPATIENT
Start: 2023-03-16 | End: 2023-03-16 | Stop reason: SDUPTHER

## 2023-03-16 RX ORDER — SODIUM CHLORIDE 0.9 % (FLUSH) 0.9 %
5-40 SYRINGE (ML) INJECTION EVERY 12 HOURS SCHEDULED
Status: CANCELLED | OUTPATIENT
Start: 2023-03-16

## 2023-03-16 RX ORDER — SODIUM CHLORIDE 9 MG/ML
5-250 INJECTION, SOLUTION INTRAVENOUS PRN
OUTPATIENT
Start: 2023-03-22

## 2023-03-16 RX ORDER — PROPOFOL 10 MG/ML
INJECTION, EMULSION INTRAVENOUS CONTINUOUS PRN
Status: DISCONTINUED | OUTPATIENT
Start: 2023-03-16 | End: 2023-03-16 | Stop reason: SDUPTHER

## 2023-03-16 RX ORDER — FENTANYL CITRATE 50 UG/ML
INJECTION, SOLUTION INTRAMUSCULAR; INTRAVENOUS PRN
Status: DISCONTINUED | OUTPATIENT
Start: 2023-03-16 | End: 2023-03-16 | Stop reason: SDUPTHER

## 2023-03-16 RX ORDER — SODIUM CHLORIDE 9 MG/ML
INJECTION, SOLUTION INTRAVENOUS CONTINUOUS
Status: DISCONTINUED | OUTPATIENT
Start: 2023-03-16 | End: 2023-03-16 | Stop reason: HOSPADM

## 2023-03-16 RX ORDER — MIDAZOLAM HYDROCHLORIDE 1 MG/ML
INJECTION INTRAMUSCULAR; INTRAVENOUS PRN
Status: DISCONTINUED | OUTPATIENT
Start: 2023-03-16 | End: 2023-03-16 | Stop reason: SDUPTHER

## 2023-03-16 RX ORDER — ALBUTEROL SULFATE 90 UG/1
4 AEROSOL, METERED RESPIRATORY (INHALATION) PRN
OUTPATIENT
Start: 2023-03-22

## 2023-03-16 RX ORDER — SODIUM CHLORIDE 0.9 % (FLUSH) 0.9 %
5-40 SYRINGE (ML) INJECTION PRN
Status: CANCELLED | OUTPATIENT
Start: 2023-03-16

## 2023-03-16 RX ORDER — IBUPROFEN 800 MG/1
800 TABLET ORAL ONCE
Status: COMPLETED | OUTPATIENT
Start: 2023-03-16 | End: 2023-03-16

## 2023-03-16 RX ORDER — FAMOTIDINE 10 MG/ML
20 INJECTION, SOLUTION INTRAVENOUS
OUTPATIENT
Start: 2023-03-22

## 2023-03-16 RX ADMIN — FENTANYL CITRATE 100 MCG: 50 INJECTION, SOLUTION INTRAMUSCULAR; INTRAVENOUS at 07:15

## 2023-03-16 RX ADMIN — SODIUM CHLORIDE: 9 INJECTION, SOLUTION INTRAVENOUS at 06:50

## 2023-03-16 RX ADMIN — Medication 2000 MG: at 07:18

## 2023-03-16 RX ADMIN — MIDAZOLAM 2 MG: 1 INJECTION INTRAMUSCULAR; INTRAVENOUS at 07:15

## 2023-03-16 RX ADMIN — PROPOFOL 150 MCG/KG/MIN: 10 INJECTION, EMULSION INTRAVENOUS at 07:17

## 2023-03-16 RX ADMIN — PROPOFOL 50 MG: 10 INJECTION, EMULSION INTRAVENOUS at 07:20

## 2023-03-16 RX ADMIN — PROPOFOL 50 MG: 10 INJECTION, EMULSION INTRAVENOUS at 07:17

## 2023-03-16 RX ADMIN — IBUPROFEN 800 MG: 800 TABLET, FILM COATED ORAL at 06:28

## 2023-03-16 ASSESSMENT — PAIN SCALES - GENERAL
PAINLEVEL_OUTOF10: 0
PAINLEVEL_OUTOF10: 0
PAINLEVEL_OUTOF10: 1
PAINLEVEL_OUTOF10: 7

## 2023-03-16 ASSESSMENT — PAIN DESCRIPTION - DESCRIPTORS
DESCRIPTORS: ACHING
DESCRIPTORS: SORE

## 2023-03-16 ASSESSMENT — PAIN - FUNCTIONAL ASSESSMENT: PAIN_FUNCTIONAL_ASSESSMENT: 0-10

## 2023-03-16 ASSESSMENT — ENCOUNTER SYMPTOMS: SHORTNESS OF BREATH: 1

## 2023-03-16 NOTE — ANESTHESIA POSTPROCEDURE EVALUATION
Department of Anesthesiology  Postprocedure Note    Patient: Yosvany Domingo  MRN: [id number]  YOB: 1965  Date of evaluation: 3/16/2023      Procedure Summary     Date: 03/16/23 Room / Location: [facility] OR 03 / [facility] OR    Anesthesia Start: 0713 Anesthesia Stop: 0742    Procedure: INSERTION LEFT SINGLE LUMEN MEDIPORT (Left: Chest) Diagnosis:       Hepatocellular carcinoma (HCC)      (Hepatocellular carcinoma (HCC) [C22.0])    Surgeons: Rik oJnes MD Responsible Provider: Jon Gray DO    Anesthesia Type: MAC ASA Status: 3          Anesthesia Type: No value filed.    Renato Phase I: Renato Score: 10    Renato Phase II:        Anesthesia Post Evaluation    Patient location during evaluation: bedside  Patient participation: complete - patient participated  Level of consciousness: awake  Pain score: 0  Airway patency: patent  Nausea & Vomiting: no nausea and no vomiting  Complications: no  Cardiovascular status: hemodynamically stable  Respiratory status: acceptable  Hydration status: euvolemic

## 2023-03-16 NOTE — PROGRESS NOTES
DISCHARGE INSTRUCTIONS REVIEWED WITH PATIENT AND FAMILY. INCISION REMAINS CLEAN, DRY AND INTACT. DISCHARGED BY WHEELCHAIR TO CAR.

## 2023-03-16 NOTE — PROGRESS NOTES
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 Examination:   Sections demonstrate core needle biopsies of liver parenchyma which are   involved by a malignant neoplasm. The neoplastic cells resemble   hepatocytes with nuclear pleomorphism, prominent nucleoli and abundant   cytoplasm. The background liver appears cirrhotic. Bile is present. A   battery of immunoperoxidase stains* is performed with adequate   controls. The tumor cells are strongly positive for CAM 5.2.  CDX2,   pancytokeratin, cytokeratin 7, cytokeratin 20 and TTF-1 are negative. These findings are most consistent with hepatocellular carcinoma. GENETICS:  None    MOLECULAR:  None    ASSESSMENT/PLAN:    1: Diagnosis: 59-year-old gentleman with an incidental finding of a liver lesion with diagnosis showing hepatocellular carcinoma. Background of cirrhosis.   See above history. Received TACE therapy at [facility] 11/30/2022. Recent imaging including the March MRI shows progressive worsening including enlargement of the original tumor and multifocal liver disease. 2) Prognosis / Disease Status: Recurrent/progressive hepatocellular carcinoma as above. Discussed systemic treatment options. 3) Work-up:    Labs: CBC, CMP and alpha-fetoprotein , PT/INR, hepatitis B and C serologies. Serum ammonia level 3/9/2023               imaging: See above in particular the recent CT abdomen pelvis on 2/14 and more importantly the MRI of the abdomen on 2/28/2023   Procedures: Liver biopsy 6/10/2022   Consults: General surgery for port                              Nurse practitioner for chemotherapy teaching. Other: Assessment of patient's child/Domingo score  Encephalopathy grade =0  Ascites = slight on imaging therefore 2 points  Albumin =  PT INR=  Total bilirubin (per PBC only)=    4) Symptom Management: Reports some discomfort in his right abdomen. Adequate pain control taking oxycodone 5 mg 3 times daily. 5) Supportive care provided. Level of care is appropriate. Teaching done today. Reviewed his recurrent disease status and treatment options. Agrees to specific chemotherapy teaching which will be scheduled. 6) Treatment goal:      Treatment plan:     11/30/2022 TACE at [facility]. Failed. Now with multifocal disease in the liver. Discussed NCCN systemic treatment options:           (Preferred regimens)  -Atezolizumab with bevacizumab category 1 for patients with child-Domingo class A only. Treatment plan entered 3/9/2023 with final approval pending calculation of his child Domingo score. Tentative start date 3/22/2023. Needs port. -Tremelimumab-acti + durvalumab (category 1)    Other recommendations include sorafenib if child Domingo class A or lenvatinib, durvalumab    7) Medications reviewed.    Prescriptions today: None            No orders of the defined types were placed in this encounter. OARRS: Reviewed. Patient should have approximate i1-2 more weeks of his 5 mg oxycodone  Controlled Substance Monitoring:    Acute and Chronic Pain Monitoring:   RX Monitoring 2/22/2023   Attestation -   Periodic Controlled Substance Monitoring Possible medication side effects, risk of tolerance/dependence & alternative treatments discussed. 8) Research Options:      Not applicable      9) Other:       Patient evaluated by neurology 1/10/2023 at [facility] due to 1 to 2 years of minimally progressive cognitive decline. Mental status testing showed evidence of some cognitive impairment but did not meet criteria for dementia and 0.6 mostly to frontal subcortical regional impairments. Recommendations included no change in medications. Discussed physical activity and speech therapy. Check labs with B vitamins and repeat ammonia level. 10) Follow Up: Follow-up 3/22 to start Tecentriq with bevacizumab every 3 weeks. Follow-up with me April 12 for course #2. Santo Pagan MD                                        Child-Domingo Score  Parameters                                 Score/Points    1              2             3  Encephalopathy grade                                     none         1-2           3-4  Ascites                                                              absent      slight        moderate  Albumin gm/dl                                                   >3.5          2.8-3.5    <2.8  Prothrombin time      Seconds over control                                     <4             4-6           >6      INR                                                                 <1.7         4-10         > 10  Bilirubin mg/dl                                                     <2            2-3           >3    Class A = 5-6 points  Class B = 7-9  Class C = 10-15     Child Domingo score is 8 points ;child class B .     Addendum 3/16/2023  Per NCCN guidelines treatment atezolizumab and bevacizumab is for child Domingo class a only category 1. Since he is child class B I will have to change his therapy.   We will use nivolumab which is approved for child Domingo class B

## 2023-03-16 NOTE — H&P
[address] [facility] [address]  History and Physical Update    Pt Name: Dougie Paz  MRN: [id number]  YOB: 1965  Date of evaluation: 3/16/2023    [x] I have examined the patient and reviewed the H&P/Consult and there are no changes to the patient or plans.     [] I have examined the patient and reviewed the H&P/Consult and have noted the following changes:        Darnell Pruett MD  Electronically signed 3/16/2023 at 6:48 AM

## 2023-03-16 NOTE — ANESTHESIA PRE PROCEDURE
Department of Anesthesiology  Preprocedure Note       Name:  Emerson Cui   Age:  61 y.o.  :  1965                                          MRN:  [id number]         Date:  3/16/2023      Surgeon: Radha Benitez):  Nimco Rangel MD    Procedure: Procedure(s):  INSERTION LEFT SINGLE LUMEN MEDIPORT    Medications prior to admission:   Prior to Admission medications    Medication Sig Start Date End Date Taking? Authorizing Provider   traMADol (ULTRAM) 50 MG tablet Take 50 mg by mouth every 6 hours as needed for Pain. Yes Historical Provider, MD   ALPRAZolam [place]) 1 MG tablet take 1 tablet by mouth three times a day if needed 3/14/23 4/9/23  CHRISTIAN Jim CNP   prochlorperazine (COMPAZINE) 10 MG tablet Take 1 tablet by mouth every 6 hours as needed (nausea) 3/9/23   Michelle Bear MD   ondansetron (ZOFRAN-ODT) 8 MG TBDP disintegrating tablet Place 1 tablet under the tongue every 8 hours as needed for Nausea or Vomiting 3/9/23   Michelle Bear MD   oxyCODONE (ROXICODONE) 5 MG immediate release tablet Take 1 tablet by mouth every 6 hours as needed for Pain for up to 30 days. Intended supply: 7 days. Take lowest dose possible to manage pain Max Daily Amount: 20 mg 2/21/23 3/23/23  Lu Jiménez MD   apixaban (ELIQUIS) 5 MG TABS tablet Take 1 tablet by mouth 2 times daily  Patient not taking: Reported on 3/16/2023 2/23/23   Delfin Johnson MD   atorvastatin (LIPITOR) 40 MG tablet Take 1 tablet by mouth nightly When okay to take by PCP 23   Delfin Johnson MD   lactulose (CHRONULAC) 10 GM/15ML solution Take 20 g by mouth 2 times daily    Historical Provider, MD   gabapentin (NEURONTIN) 300 MG capsule take 1 capsule by mouth three times a day  Patient taking differently: Take 300 mg by mouth 3 times daily as needed.  23  Lu Jiménez MD   cyclobenzaprine (FLEXERIL) 10 MG tablet Take 1/2 to 1 tablet by mouth three times a day if needed 22   CHRISTIAN Jim - OLIVERIO DULoxetine (CYMBALTA) 60 MG extended release capsule take 1 capsule by mouth once daily 9/16/22   CHRISTIAN Kraus CNP   spironolactone (ALDACTONE) 100 MG tablet take 1 tablet by mouth every morning 7/18/22   CHRISTIAN Kraus CNP   omeprazole (PRILOSEC) 40 MG delayed release capsule Take 1 capsule by mouth daily  Patient taking differently: Take 40 mg by mouth daily as needed 6/3/22   CHRISTIAN Lewis CNP   rifAXIMin [facility]) 550 MG tablet Take 1 tablet by mouth 2 times daily 4/26/22   CHRISTIAN Lewis CNP   nitroGLYCERIN (NITROSTAT) 0.4 MG SL tablet up to max of 3 total doses.  If no relief after 1 dose, call 911. 6/28/21   CHRISTIAN Kraus CNP       Current medications:    Current Facility-Administered Medications   Medication Dose Route Frequency Provider Last Rate Last Admin    0.9 % sodium chloride infusion   IntraVENous Continuous Angelica Guillaume  mL/hr at 03/16/23 0713 NoRateChange at 03/16/23 0713    sodium chloride flush 0.9 % injection 5-40 mL  5-40 mL IntraVENous 2 times per day Angelica Guillaume MD        sodium chloride flush 0.9 % injection 5-40 mL  5-40 mL IntraVENous PRN Angelica Guillaume MD        0.9 % sodium chloride infusion   IntraVENous PRN Angelica Guillaume MD        bupivacaine (PF) (MARCAINE) 0.5 % 30 mL, lidocaine PF 1 % 30 mL, sodium bicarbonate 1 mL    PRN Angelica Guillaume MD   Given at 03/16/23 9224     Facility-Administered Medications Ordered in Other Encounters   Medication Dose Route Frequency Provider Last Rate Last Admin    midazolam (VERSED) injection   IntraVENous PRN CHRISTIAN Bernard CRNA   2 mg at 03/16/23 0715    fentaNYL (SUBLIMAZE) injection   IntraVENous PRN CHRISTIAN Bernard - CRNA   100 mcg at 03/16/23 0715    propofol injection   IntraVENous PRN Rosario Doran APRN - CRNA   50 mg at 03/16/23 0720    propofol injection   IntraVENous Continuous PRN CHRISTIAN Bernard - CRNA 72.18 mL/hr at 03/16/23 0717 150 mcg/kg/min at 03/16/23 0717       Allergies:  No Known Allergies    Problem List:    Patient Active Problem List   Diagnosis Code    Diverticulosis K57.90    HTN (hypertension) I10    Ascites R18.8    Liver cirrhosis (HCC) K74.60    Cystic kidney disease Q61.9    Leukocytosis D72.829    Alcohol abuse F10.10    Hyponatremia E87.1    Hypokalemia E87.6    Perianal ulcer ([facility] L98.499    Hyperkalemia E87.5    Increased ammonia level R79.89    Confusion R41.0    Change in mental status R41.82    Acute renal failure ([facility] Y18.3    Metabolic encephalopathy J89.95    Altered mental status R41.82    Hepatic encephalopathy K76.82    Noncompliance with medications Z91.14    Ascites due to alcoholic cirrhosis ([facility] U72.50    Alcoholic cirrhosis of liver with ascites (HCC) K70.31    Lactic acidosis E87.20    Generalized abdominal pain R10.84    Tobacco abuse Z72.0    Spondylosis of cervical region without myelopathy or radiculopathy M47.812    Elevated INR R79.1    Opiate abuse, episodic (HCC) F11.10    Marijuana abuse F12.10    Dyspnea on exertion R06.09    SOB (shortness of breath) on exertion R06.02    Chest pain, atypical- for yrs  R07.89    Stroke of unknown cause (HCC) I63.9    Alteration in speech R47.89    Pure hypercholesterolemia E78.00    Acute metabolic encephalopathy J91.98    Acute delirium R41.0    Encephalopathy acute G93.40    AMS (altered mental status) R41.82    Portal vein thrombosis I81    RUQ pain R10.11    Hepatocellular carcinoma (HCC) C22.0    Hyperbilirubinemia E80.6    Hyperammonemia (HCC) E72.20    Esophageal varices without bleeding (HCC) I85.00    AVM (arteriovenous malformation) brain Q28.2    Constipation K59.00    Macrocytic anemia D53.9    History of alcohol abuse F10.11    Anxiety disorder F41.9    Obesity (BMI 30-39. 9) N42.1    Alcoholic cirrhosis (HCC) X14.11    S/P cardiac cath- 2019- nonobstructive- med RX Z98.890    Pre-op evaluation [facility] [id number]       Past Medical History:        Diagnosis Date    Anxiety     Arthritis     Chronic kidney disease     Cirrhosis ([facility]     Coronary artery disease involving native coronary artery of native heart without angina pectoris 2/15/2023    Diverticulitis     Diverticulosis     GERD (gastroesophageal reflux disease)     Hepatocellular carcinoma ([facility] [facility] 2/15/2023    Hypertension     Other disorders of kidney and ureter in diseases classified elsewhere     Psychiatric problem     Pure hypercholesterolemia 3/10/2022    Stroke of unknown cause ([facility] 8/12/2021       Past Surgical History:        Procedure Laterality Date    ABDOMEN SURGERY      BACK SURGERY      cervical plate    CARDIAC CATHETERIZATION  2007?     CERVICAL DISC SURGERY      x 2 ---broken neck 7-2003    COLON SURGERY  2004    partial, due to diverticulitis    COLONOSCOPY      CT NEEDLE BIOPSY LIVER PERCUTANEOUS  6/10/2022    CT NEEDLE BIOPSY LIVER PERCUTANEOUS 6/10/2022 [facility] CT SCAN    DILATATION, ESOPHAGUS      ENDOSCOPY, COLON, DIAGNOSTIC      FRACTURE SURGERY      Broke neck in 2003    NERVE BLOCK Bilateral 10/02/2017    Cervical Facet MBB at C4-5, C5-6, C6-7     VT INJ,PARAVERTEBRAL L/S,1 LEVEL Bilateral 10/2/2017    C-FACET MBB C4-5, C5-6, C6-7 BILATERAL performed by Matilda Webster MD at [facility] ENDOSCOPY  2019    UPPER GASTROINTESTINAL ENDOSCOPY Left 9/28/2020    EGD BAND LIGATION performed by Rossana Hoffman MD at [address] [facility] Endoscopy       Social History:    Social History     Tobacco Use    Smoking status: Every Day     Packs/day: 1.00     Years: 25.00     Pack years: 25.00     Types: Cigarettes    Smokeless tobacco: Never    Tobacco comments:     9/21/22 declines cessation counseling-same 3/9/23   Substance Use Topics    Alcohol use: Not Currently     Comment: Quit 15 years ago Ready to quit: Not Answered  Counseling given: Not Answered  Tobacco comments: 9/21/22 declines cessation counseling-same 3/9/23      Vital Signs (Current):   Vitals:    03/16/23 0617   BP: 125/72   Pulse: 95   Resp: 18   Temp: (!) 96.5 °F (35.8 °C)   TempSrc: Temporal   SpO2: 98%   Weight: 176 lb 12.8 oz (80.2 kg)   Height: 5' 5\" (1.651 m)                                              BP Readings from Last 3 Encounters:   03/16/23 125/72   03/14/23 116/65   03/13/23 100/68       NPO Status: Time of last liquid consumption: 1900                        Time of last solid consumption: 1900                        Date of last liquid consumption: 03/15/23                        Date of last solid food consumption: 03/15/23    BMI:   Wt Readings from Last 3 Encounters:   03/16/23 176 lb 12.8 oz (80.2 kg)   03/14/23 176 lb (79.8 kg)   03/13/23 173 lb (78.5 kg)     Body mass index is 29.42 kg/m².     CBC:   Lab Results   Component Value Date/Time    WBC 9.8 03/09/2023 12:00 PM    RBC 4.47 03/09/2023 12:00 PM    RBC 4.07 02/22/2021 10:50 AM    HGB 13.9 03/09/2023 12:00 PM    HCT 41.9 03/09/2023 12:00 PM    MCV 94 03/09/2023 12:00 PM    RDW 16.6 03/09/2023 12:00 PM     03/09/2023 12:00 PM       CMP:   Lab Results   Component Value Date/Time     03/09/2023 12:00 PM     02/28/2023 11:56 AM    K 4.2 03/09/2023 12:00 PM    K 4.7 02/28/2023 11:56 AM    K 4.1 10/10/2022 05:50 PM     02/28/2023 11:56 AM    CO2 21 02/28/2023 11:56 AM    BUN 17 02/28/2023 11:56 AM    CREATININE 0.7 03/09/2023 12:00 PM    CREATININE 0.7 02/28/2023 11:56 AM    AGRATIO 0.8 04/22/2022 01:57 PM    LABGLOM >60 03/09/2023 12:00 PM    GLUCOSE 85 02/28/2023 11:56 AM    GLUCOSE 89 04/22/2022 01:57 PM    PROT 8.1 03/09/2023 12:00 PM    CALCIUM 9.7 02/28/2023 11:56 AM    BILITOT 2.2 03/09/2023 12:00 PM    ALKPHOS 196 03/09/2023 12:00 PM    AST 51 03/09/2023 12:00 PM    ALT 40 03/09/2023 12:00 PM       POC Tests: No results for input(s): POCGLU, POCNA, POCK, POCCL, POCBUN, POCHEMO, POCHCT in the last 72 hours. Coags:   Lab Results   Component Value Date/Time    PROTIME 14.5 04/22/2022 01:57 PM    INR 1.11 03/09/2023 12:00 PM    APTT 34.8 02/14/2023 10:10 PM       HCG (If Applicable): No results found for: PREGTESTUR, PREGSERUM, HCG, HCGQUANT     ABGs: No results found for: PHART, PO2ART, FFR0ACF, TMP0NGR, BEART, P6QYPVXY     Type & Screen (If Applicable):  Lab Results   Component Value Date    LABRH POS 10/14/2019       Drug/Infectious Status (If Applicable):  Lab Results   Component Value Date/Time    HEPCAB Negative 03/09/2023 12:41 PM       COVID-19 Screening (If Applicable):   Lab Results   Component Value Date/Time    COVID19 Not Detected 10/19/2021 12:22 PM           Anesthesia Evaluation  Patient summary reviewed  Airway: Mallampati: II  TM distance: >3 FB   Neck ROM: full  Mouth opening: > = 3 FB   Dental:    (+) edentulous      Pulmonary:   (+) shortness of breath:                             Cardiovascular:    (+) hypertension:, CAD:, RIVERA:,       ECG reviewed                        Neuro/Psych:   (+) CVA:, psychiatric history:            GI/Hepatic/Renal:   (+) GERD:, liver disease:,           Endo/Other:                     Abdominal:             Vascular: Other Findings:           Anesthesia Plan      MAC     ASA 3       Induction: intravenous. Anesthetic plan and risks discussed with patient and child/children. Plan discussed with XUAN. Bryon Orozco  [address] [facility]   3/16/2023

## 2023-03-16 NOTE — TELEPHONE ENCOUNTER
Upon review of chart, it appears patient is scheduled on 3-20-23 to receive chemotherapy with active therapy orders as listed:

## 2023-03-16 NOTE — DISCHARGE INSTRUCTIONS
No stitches to remove    Ok to get wet    Skin glue will stay on for up to 14 days and then peel off, ok to remove when edges start to peel off    No office follow up needed      Okay to start all of his meds

## 2023-03-19 NOTE — OP NOTE
[address] [address]  Operative Report    PATIENT NAME: Miah Jones  MEDICAL RECORD NO. [id number]  SURGEON: Re Cooley MD MD FACS  Primary Care Physician: Lynnwood Brunner, APRN - CNP  Date: 3/16/2023, 7:35 AM     PROCEDURE PERFORMED: left Single Lumen Smart Port Placement   PREOPERATIVE DIAGNOSIS: Active Problems:    Portal vein thrombosis    Hepatocellular carcinoma ([address]    Esophageal varices without bleeding ([address]    Encounter for insertion of venous access port  Resolved Problems:    * No resolved hospital problems. *    POSTOPERATIVE DIAGNOSIS: Same  SURGEON:  Re Cooley MD MD FACS  ANESTHESIA:  By anesthesia  ESTIMATED BLOOD LOSS:  1  ml  SPECIMEN:  None  COMPLICATIONS:  None; patient tolerated the procedure well. DISPOSITION: Outpatient Surgery  CONDITION: stable      BRIEF HISTORY: Miah Jones is a 61 y.o.  male  who was recently diagnosed with Active Problems:    Portal vein thrombosis    Hepatocellular carcinoma ([address]    Esophageal varices without bleeding ([address]    Encounter for insertion of venous access port  Resolved Problems:    * No resolved hospital problems. *  . He  will require a MediPort for long-term IV access. I discussed \"placement of MediPort\" with them including   the procedure , risks and alternatives, and they agreed. PROCEDURE IN DETAIL: The patient was taken to the operative suite and was placed on the table in a supine position . With the arms tucked at their side, and the head turned to the right. The neck, chest, and shoulders were clipped, prepped with Betadine and draped in a sterile fashion. The patient  was placed into Trendelenburg position and was left in Trendelenburg position during the entire procedure. The skin, subcutaneous tissue, and periosteum of the left clavicle were anesthetized with the local mixture. After this, a left subclavian venipuncture was performed and a guidewire was passed through the needle and into the superior vena cava.  Its position was confirmed using the fluoroscopy unit. The dilator/tear away sheath was placed over the guidewire under fluoroscopic guidance and the guidewire and dilator removed leaving the sheath in place. The catheter was passed through the introducer and into the superior vena cava, and it was positioned just above the level of the heart using the fluoroscopy unit. The tip was positioned in the SVC and the paul noted at the entrance site. Attention was turned to creation of the port pocket. The port pocket was marked in the upper medial left chest wall, and then the skin and subcutaneous tissue were anesthetized with additional local mixture. A transverse linear incision was made and was carried down through the subcutaneous tissue using the electrocautery. A combination of sharp dissection with the electrocautery and blunt finger dissection was used to create a pocket for the port, just large enough to accommodate a single-lumen Smart Port in the upper medial right chest wall. Once complete hemostasis was seen, the tract between the venipuncture site and the port site was anesthetized below using the local mixture. The supplied straight tunneler was used to tunnel the tubing from the venipuncture site to the port site on a gentle curve to prevent kinking of the tubing. Following this, the approximate length of tubing required to place the tip of the tubing at the junction between the superior vena cava and the right atrium was measured against the angle of Vlad externally. The excess catheter was then cut at the port incision and then the supplied connector was used to connect the tubing to the port itself. Once again, there was good blood return from the port and the port flushed easily with heparinized saline. The port was then placed into the port pocket and was sutured to the fascia using a 2 sutures of 2-0 prolene stitch to prevent rolling and flipping of the port. The patient was then placed flat.  The skin was closed with 4-0 Vicryl subcuticular sutures, and skin affix glue was applied. The patient tolerated the procedure well. Sponge, needle, and instrument counts were correct. The patient had a stat portable upright chest x-ray done as soon as Jacquelyn Russo  was transferred to the cart for transport to the Same Day Surgery Unit.  This revealed No pneumothorax, Central line in proper location    Jarrett Sagastume MD, MD FACS  Electronically signed 3/19/2023 at 8:35 AM

## 2023-03-20 ENCOUNTER — HOSPITAL ENCOUNTER (INPATIENT)
Age: 58
LOS: 2 days | Discharge: HOME HEALTH | DRG: 689 | End: 2023-03-23
Attending: EMERGENCY MEDICINE | Admitting: INTERNAL MEDICINE
Payer: COMMERCIAL

## 2023-03-20 ENCOUNTER — APPOINTMENT (OUTPATIENT)
Dept: GENERAL RADIOLOGY | Age: 58
DRG: 689 | End: 2023-03-20
Payer: COMMERCIAL

## 2023-03-20 ENCOUNTER — TELEPHONE (OUTPATIENT)
Dept: NEUROLOGY | Age: 58
End: 2023-03-20

## 2023-03-20 DIAGNOSIS — M79.605 LEFT LEG PAIN: ICD-10-CM

## 2023-03-20 DIAGNOSIS — N30.01 ACUTE CYSTITIS WITH HEMATURIA: Primary | ICD-10-CM

## 2023-03-20 LAB
ALBUMIN SERPL BCG-MCNC: 2.7 G/DL (ref 3.5–5.1)
ALP SERPL-CCNC: 268 U/L (ref 38–126)
ALT SERPL W/O P-5'-P-CCNC: 39 U/L (ref 11–66)
AMMONIA PLAS-MCNC: 21 UMOL/L (ref 11–60)
ANION GAP SERPL CALC-SCNC: 7 MEQ/L (ref 8–16)
AST SERPL-CCNC: 64 U/L (ref 5–40)
BASOPHILS ABSOLUTE: 0.1 THOU/MM3 (ref 0–0.1)
BASOPHILS NFR BLD AUTO: 0.6 %
BILIRUB SERPL-MCNC: 2.3 MG/DL (ref 0.3–1.2)
BUN SERPL-MCNC: 12 MG/DL (ref 7–22)
CALCIUM SERPL-MCNC: 9 MG/DL (ref 8.5–10.5)
CHLORIDE SERPL-SCNC: 103 MEQ/L (ref 98–111)
CO2 SERPL-SCNC: 28 MEQ/L (ref 23–33)
CREAT SERPL-MCNC: 0.6 MG/DL (ref 0.4–1.2)
DEPRECATED RDW RBC AUTO: 63.5 FL (ref 35–45)
EOSINOPHIL NFR BLD AUTO: 1.5 %
EOSINOPHILS ABSOLUTE: 0.1 THOU/MM3 (ref 0–0.4)
ERYTHROCYTE [DISTWIDTH] IN BLOOD BY AUTOMATED COUNT: 17.5 % (ref 11.5–14.5)
FLUAV RNA RESP QL NAA+PROBE: NOT DETECTED
FLUBV RNA RESP QL NAA+PROBE: NOT DETECTED
GFR SERPL CREATININE-BSD FRML MDRD: > 60 ML/MIN/1.73M2
GLUCOSE SERPL-MCNC: 87 MG/DL (ref 70–108)
HCT VFR BLD AUTO: 40.7 % (ref 42–52)
HGB BLD-MCNC: 13 GM/DL (ref 14–18)
IMM GRANULOCYTES # BLD AUTO: 0.03 THOU/MM3 (ref 0–0.07)
IMM GRANULOCYTES NFR BLD AUTO: 0.4 %
INR PPP: 1.1 (ref 0.85–1.13)
LIPASE SERPL-CCNC: 8.2 U/L (ref 5.6–51.3)
LYMPHOCYTES ABSOLUTE: 1.1 THOU/MM3 (ref 1–4.8)
LYMPHOCYTES NFR BLD AUTO: 12.7 %
MCH RBC QN AUTO: 32.1 PG (ref 26–33)
MCHC RBC AUTO-ENTMCNC: 31.9 GM/DL (ref 32.2–35.5)
MCV RBC AUTO: 100.5 FL (ref 80–94)
MONOCYTES ABSOLUTE: 1.5 THOU/MM3 (ref 0.4–1.3)
MONOCYTES NFR BLD AUTO: 17.5 %
NEUTROPHILS NFR BLD AUTO: 67.3 %
NRBC BLD AUTO-RTO: 0 /100 WBC
OSMOLALITY SERPL CALC.SUM OF ELEC: 274.8 MOSMOL/KG (ref 275–300)
PLATELET # BLD AUTO: 140 THOU/MM3 (ref 130–400)
PMV BLD AUTO: 10.6 FL (ref 9.4–12.4)
POTASSIUM SERPL-SCNC: 4 MEQ/L (ref 3.5–5.2)
PROT SERPL-MCNC: 7.8 G/DL (ref 6.1–8)
RBC # BLD AUTO: 4.05 MILL/MM3 (ref 4.7–6.1)
SARS-COV-2 RNA RESP QL NAA+PROBE: NOT DETECTED
SEGMENTED NEUTROPHILS ABSOLUTE COUNT: 5.7 THOU/MM3 (ref 1.8–7.7)
SODIUM SERPL-SCNC: 138 MEQ/L (ref 135–145)
WBC # BLD AUTO: 8.4 THOU/MM3 (ref 4.8–10.8)

## 2023-03-20 PROCEDURE — 99285 EMERGENCY DEPT VISIT HI MDM: CPT

## 2023-03-20 PROCEDURE — 93005 ELECTROCARDIOGRAM TRACING: CPT | Performed by: EMERGENCY MEDICINE

## 2023-03-20 PROCEDURE — 82140 ASSAY OF AMMONIA: CPT

## 2023-03-20 PROCEDURE — 71045 X-RAY EXAM CHEST 1 VIEW: CPT

## 2023-03-20 PROCEDURE — 83690 ASSAY OF LIPASE: CPT

## 2023-03-20 PROCEDURE — 73502 X-RAY EXAM HIP UNI 2-3 VIEWS: CPT

## 2023-03-20 PROCEDURE — 36415 COLL VENOUS BLD VENIPUNCTURE: CPT

## 2023-03-20 PROCEDURE — 85610 PROTHROMBIN TIME: CPT

## 2023-03-20 PROCEDURE — 85025 COMPLETE CBC W/AUTO DIFF WBC: CPT

## 2023-03-20 PROCEDURE — 80053 COMPREHEN METABOLIC PANEL: CPT

## 2023-03-20 PROCEDURE — 87636 SARSCOV2 & INF A&B AMP PRB: CPT

## 2023-03-20 ASSESSMENT — PAIN - FUNCTIONAL ASSESSMENT: PAIN_FUNCTIONAL_ASSESSMENT: 0-10

## 2023-03-20 ASSESSMENT — PAIN SCALES - GENERAL: PAINLEVEL_OUTOF10: 7

## 2023-03-20 ASSESSMENT — PAIN DESCRIPTION - LOCATION: LOCATION: LEG

## 2023-03-20 NOTE — PROGRESS NOTES
New chemotherapy validation note:    Diagnosis for chemotherapy: Hepatocellular carcinoma ([facility]    Regimen ordered: Opdivo       Reference or literature used for validation: NCCN      Date literature or guideline last updated 3/2023     Deviation from literature or guideline used: N/A     Summary of any verbal or telephone information obtained: N/A - Original plan for Tecentriq + Avastin, discussed Child-Domingo score (Class B) along with recommendation associated given class w/ provider and plan changed to opdivo every 4 weeks    Tessy Reddy, [address] [place] [address], PharmD, [facility]  3/20/2023 7:21 AM

## 2023-03-20 NOTE — TELEPHONE ENCOUNTER
LM for patient to cancel follow up due to MRI not completed. MRI scheduled for end of February and patient was no show.

## 2023-03-21 ENCOUNTER — APPOINTMENT (OUTPATIENT)
Dept: CT IMAGING | Age: 58
DRG: 689 | End: 2023-03-21
Payer: COMMERCIAL

## 2023-03-21 ENCOUNTER — APPOINTMENT (OUTPATIENT)
Dept: INTERVENTIONAL RADIOLOGY/VASCULAR | Age: 58
DRG: 689 | End: 2023-03-21
Payer: COMMERCIAL

## 2023-03-21 PROBLEM — R53.81 MALAISE AND FATIGUE: Status: ACTIVE | Noted: 2023-03-21

## 2023-03-21 PROBLEM — R53.83 MALAISE AND FATIGUE: Status: ACTIVE | Noted: 2023-01-01

## 2023-03-21 LAB
ALBUMIN SERPL BCG-MCNC: 2.3 G/DL (ref 3.5–5.1)
ALP SERPL-CCNC: 240 U/L (ref 38–126)
ALT SERPL W/O P-5'-P-CCNC: 34 U/L (ref 11–66)
ANION GAP SERPL CALC-SCNC: 8 MEQ/L (ref 8–16)
AST SERPL-CCNC: 59 U/L (ref 5–40)
BACTERIA URNS QL MICRO: ABNORMAL /HPF
BASOPHILS ABSOLUTE: 0 THOU/MM3 (ref 0–0.1)
BASOPHILS NFR BLD AUTO: 0.4 %
BILIRUB CONJ SERPL-MCNC: 1.2 MG/DL (ref 0–0.3)
BILIRUB SERPL-MCNC: 2 MG/DL (ref 0.3–1.2)
BILIRUB UR QL STRIP.AUTO: ABNORMAL
BUN SERPL-MCNC: 13 MG/DL (ref 7–22)
CALCIUM SERPL-MCNC: 8.5 MG/DL (ref 8.5–10.5)
CASTS #/AREA URNS LPF: ABNORMAL /LPF
CASTS 2: ABNORMAL /LPF
CHARACTER UR: CLEAR
CHLORIDE SERPL-SCNC: 106 MEQ/L (ref 98–111)
CO2 SERPL-SCNC: 23 MEQ/L (ref 23–33)
COLOR: ABNORMAL
CREAT SERPL-MCNC: 0.5 MG/DL (ref 0.4–1.2)
CRYSTALS URNS MICRO: ABNORMAL
DEPRECATED RDW RBC AUTO: 63 FL (ref 35–45)
EKG ATRIAL RATE: 83 BPM
EKG P-R INTERVAL: 144 MS
EKG Q-T INTERVAL: 372 MS
EKG QRS DURATION: 88 MS
EKG QTC CALCULATION (BAZETT): 437 MS
EKG R AXIS: 28 DEGREES
EKG T AXIS: 11 DEGREES
EKG VENTRICULAR RATE: 83 BPM
EOSINOPHIL NFR BLD AUTO: 0.9 %
EOSINOPHILS ABSOLUTE: 0.1 THOU/MM3 (ref 0–0.4)
EPITHELIAL CELLS, UA: ABNORMAL /HPF
ERYTHROCYTE [DISTWIDTH] IN BLOOD BY AUTOMATED COUNT: 17.3 % (ref 11.5–14.5)
GFR SERPL CREATININE-BSD FRML MDRD: > 60 ML/MIN/1.73M2
GLUCOSE SERPL-MCNC: 73 MG/DL (ref 70–108)
GLUCOSE UR QL STRIP.AUTO: NEGATIVE MG/DL
HCT VFR BLD AUTO: 36.9 % (ref 42–52)
HGB BLD-MCNC: 11.5 GM/DL (ref 14–18)
HGB UR QL STRIP.AUTO: ABNORMAL
ICTOTEST: POSITIVE
IMM GRANULOCYTES # BLD AUTO: 0.02 THOU/MM3 (ref 0–0.07)
IMM GRANULOCYTES NFR BLD AUTO: 0.2 %
KETONES UR QL STRIP.AUTO: ABNORMAL
LYMPHOCYTES ABSOLUTE: 0.9 THOU/MM3 (ref 1–4.8)
LYMPHOCYTES NFR BLD AUTO: 11.3 %
MCH RBC QN AUTO: 31.8 PG (ref 26–33)
MCHC RBC AUTO-ENTMCNC: 31.2 GM/DL (ref 32.2–35.5)
MCV RBC AUTO: 101.9 FL (ref 80–94)
MISCELLANEOUS 2: ABNORMAL
MONOCYTES ABSOLUTE: 1.3 THOU/MM3 (ref 0.4–1.3)
MONOCYTES NFR BLD AUTO: 15.8 %
NEUTROPHILS NFR BLD AUTO: 71.4 %
NITRITE UR QL STRIP: POSITIVE
NRBC BLD AUTO-RTO: 0 /100 WBC
PH UR STRIP.AUTO: 5.5 [PH] (ref 5–9)
PLATELET # BLD AUTO: 112 THOU/MM3 (ref 130–400)
PMV BLD AUTO: 10.5 FL (ref 9.4–12.4)
POTASSIUM SERPL-SCNC: 3.9 MEQ/L (ref 3.5–5.2)
PROT SERPL-MCNC: 6.5 G/DL (ref 6.1–8)
PROT UR STRIP.AUTO-MCNC: ABNORMAL MG/DL
RBC # BLD AUTO: 3.62 MILL/MM3 (ref 4.7–6.1)
RBC URINE: ABNORMAL /HPF
RENAL EPI CELLS #/AREA URNS HPF: ABNORMAL /[HPF]
SEGMENTED NEUTROPHILS ABSOLUTE COUNT: 5.8 THOU/MM3 (ref 1.8–7.7)
SODIUM SERPL-SCNC: 137 MEQ/L (ref 135–145)
SP GR UR REFRACT.AUTO: 1.02 (ref 1–1.03)
UROBILINOGEN, URINE: 2 EU/DL (ref 0–1)
WBC # BLD AUTO: 8.1 THOU/MM3 (ref 4.8–10.8)
WBC #/AREA URNS HPF: ABNORMAL /HPF
WBC #/AREA URNS HPF: ABNORMAL /[HPF]
YEAST LIKE FUNGI URNS QL MICRO: ABNORMAL

## 2023-03-21 PROCEDURE — 97116 GAIT TRAINING THERAPY: CPT

## 2023-03-21 PROCEDURE — 93971 EXTREMITY STUDY: CPT

## 2023-03-21 PROCEDURE — 2580000003 HC RX 258: Performed by: EMERGENCY MEDICINE

## 2023-03-21 PROCEDURE — 6370000000 HC RX 637 (ALT 250 FOR IP): Performed by: STUDENT IN AN ORGANIZED HEALTH CARE EDUCATION/TRAINING PROGRAM

## 2023-03-21 PROCEDURE — 99223 1ST HOSP IP/OBS HIGH 75: CPT | Performed by: INTERNAL MEDICINE

## 2023-03-21 PROCEDURE — 80048 BASIC METABOLIC PNL TOTAL CA: CPT

## 2023-03-21 PROCEDURE — 93970 EXTREMITY STUDY: CPT

## 2023-03-21 PROCEDURE — 2580000003 HC RX 258: Performed by: STUDENT IN AN ORGANIZED HEALTH CARE EDUCATION/TRAINING PROGRAM

## 2023-03-21 PROCEDURE — 36415 COLL VENOUS BLD VENIPUNCTURE: CPT

## 2023-03-21 PROCEDURE — 6360000002 HC RX W HCPCS: Performed by: EMERGENCY MEDICINE

## 2023-03-21 PROCEDURE — 85025 COMPLETE CBC W/AUTO DIFF WBC: CPT

## 2023-03-21 PROCEDURE — 87040 BLOOD CULTURE FOR BACTERIA: CPT

## 2023-03-21 PROCEDURE — 97162 PT EVAL MOD COMPLEX 30 MIN: CPT

## 2023-03-21 PROCEDURE — 93010 ELECTROCARDIOGRAM REPORT: CPT | Performed by: INTERNAL MEDICINE

## 2023-03-21 PROCEDURE — 71250 CT THORAX DX C-: CPT

## 2023-03-21 PROCEDURE — 1200000000 HC SEMI PRIVATE

## 2023-03-21 PROCEDURE — 80076 HEPATIC FUNCTION PANEL: CPT

## 2023-03-21 PROCEDURE — 70450 CT HEAD/BRAIN W/O DYE: CPT

## 2023-03-21 PROCEDURE — 81001 URINALYSIS AUTO W/SCOPE: CPT

## 2023-03-21 RX ORDER — ONDANSETRON 2 MG/ML
4 INJECTION INTRAMUSCULAR; INTRAVENOUS EVERY 6 HOURS PRN
Status: DISCONTINUED | OUTPATIENT
Start: 2023-03-21 | End: 2023-03-23 | Stop reason: HOSPADM

## 2023-03-21 RX ORDER — SPIRONOLACTONE 25 MG/1
100 TABLET ORAL EVERY MORNING
Status: DISCONTINUED | OUTPATIENT
Start: 2023-03-21 | End: 2023-03-23 | Stop reason: HOSPADM

## 2023-03-21 RX ORDER — GABAPENTIN 300 MG/1
300 CAPSULE ORAL 3 TIMES DAILY PRN
Status: DISCONTINUED | OUTPATIENT
Start: 2023-03-21 | End: 2023-03-23 | Stop reason: HOSPADM

## 2023-03-21 RX ORDER — TRAMADOL HYDROCHLORIDE 50 MG/1
50 TABLET ORAL EVERY 6 HOURS PRN
Status: DISCONTINUED | OUTPATIENT
Start: 2023-03-21 | End: 2023-03-23 | Stop reason: HOSPADM

## 2023-03-21 RX ORDER — SODIUM CHLORIDE 0.9 % (FLUSH) 0.9 %
5-40 SYRINGE (ML) INJECTION PRN
Status: DISCONTINUED | OUTPATIENT
Start: 2023-03-21 | End: 2023-03-23 | Stop reason: HOSPADM

## 2023-03-21 RX ORDER — ONDANSETRON 4 MG/1
4 TABLET, ORALLY DISINTEGRATING ORAL EVERY 8 HOURS PRN
Status: DISCONTINUED | OUTPATIENT
Start: 2023-03-21 | End: 2023-03-23 | Stop reason: HOSPADM

## 2023-03-21 RX ORDER — ENOXAPARIN SODIUM 100 MG/ML
40 INJECTION SUBCUTANEOUS DAILY
Status: DISCONTINUED | OUTPATIENT
Start: 2023-03-21 | End: 2023-03-21

## 2023-03-21 RX ORDER — ACETAMINOPHEN 325 MG/1
650 TABLET ORAL EVERY 6 HOURS PRN
Status: DISCONTINUED | OUTPATIENT
Start: 2023-03-21 | End: 2023-03-23 | Stop reason: HOSPADM

## 2023-03-21 RX ORDER — DULOXETIN HYDROCHLORIDE 60 MG/1
60 CAPSULE, DELAYED RELEASE ORAL DAILY
Status: DISCONTINUED | OUTPATIENT
Start: 2023-03-21 | End: 2023-03-23 | Stop reason: HOSPADM

## 2023-03-21 RX ORDER — SODIUM CHLORIDE 0.9 % (FLUSH) 0.9 %
5-40 SYRINGE (ML) INJECTION EVERY 12 HOURS SCHEDULED
Status: DISCONTINUED | OUTPATIENT
Start: 2023-03-21 | End: 2023-03-23 | Stop reason: HOSPADM

## 2023-03-21 RX ORDER — NITROGLYCERIN 0.4 MG/1
0.4 TABLET SUBLINGUAL EVERY 5 MIN PRN
Status: DISCONTINUED | OUTPATIENT
Start: 2023-03-21 | End: 2023-03-23 | Stop reason: HOSPADM

## 2023-03-21 RX ORDER — LACTULOSE 10 G/15ML
20 SOLUTION ORAL 2 TIMES DAILY
Status: DISCONTINUED | OUTPATIENT
Start: 2023-03-21 | End: 2023-03-23 | Stop reason: HOSPADM

## 2023-03-21 RX ORDER — POLYETHYLENE GLYCOL 3350 17 G/17G
17 POWDER, FOR SOLUTION ORAL DAILY PRN
Status: DISCONTINUED | OUTPATIENT
Start: 2023-03-21 | End: 2023-03-23 | Stop reason: HOSPADM

## 2023-03-21 RX ORDER — PANTOPRAZOLE SODIUM 40 MG/1
40 TABLET, DELAYED RELEASE ORAL
Status: DISCONTINUED | OUTPATIENT
Start: 2023-03-21 | End: 2023-03-23 | Stop reason: HOSPADM

## 2023-03-21 RX ORDER — ACETAMINOPHEN 650 MG/1
650 SUPPOSITORY RECTAL EVERY 6 HOURS PRN
Status: DISCONTINUED | OUTPATIENT
Start: 2023-03-21 | End: 2023-03-23 | Stop reason: HOSPADM

## 2023-03-21 RX ORDER — SODIUM CHLORIDE 9 MG/ML
INJECTION, SOLUTION INTRAVENOUS PRN
Status: DISCONTINUED | OUTPATIENT
Start: 2023-03-21 | End: 2023-03-23 | Stop reason: HOSPADM

## 2023-03-21 RX ADMIN — CEFTRIAXONE SODIUM 1000 MG: 1 INJECTION, POWDER, FOR SOLUTION INTRAMUSCULAR; INTRAVENOUS at 01:40

## 2023-03-21 RX ADMIN — LACTULOSE 20 G: 20 SOLUTION ORAL at 08:41

## 2023-03-21 RX ADMIN — SODIUM CHLORIDE, PRESERVATIVE FREE 10 ML: 5 INJECTION INTRAVENOUS at 21:34

## 2023-03-21 RX ADMIN — DULOXETINE 60 MG: 60 CAPSULE, DELAYED RELEASE ORAL at 08:41

## 2023-03-21 RX ADMIN — TRAMADOL HYDROCHLORIDE 50 MG: 50 TABLET ORAL at 10:54

## 2023-03-21 RX ADMIN — SPIRONOLACTONE 100 MG: 25 TABLET ORAL at 08:41

## 2023-03-21 RX ADMIN — PANTOPRAZOLE SODIUM 40 MG: 40 TABLET, DELAYED RELEASE ORAL at 06:59

## 2023-03-21 RX ADMIN — APIXABAN 5 MG: 5 TABLET, FILM COATED ORAL at 21:34

## 2023-03-21 RX ADMIN — SODIUM CHLORIDE, PRESERVATIVE FREE 10 ML: 5 INJECTION INTRAVENOUS at 08:46

## 2023-03-21 RX ADMIN — TRAMADOL HYDROCHLORIDE 50 MG: 50 TABLET ORAL at 04:16

## 2023-03-21 RX ADMIN — APIXABAN 5 MG: 5 TABLET, FILM COATED ORAL at 08:41

## 2023-03-21 RX ADMIN — LACTULOSE 20 G: 20 SOLUTION ORAL at 21:34

## 2023-03-21 ASSESSMENT — PAIN DESCRIPTION - DESCRIPTORS
DESCRIPTORS: SHARP
DESCRIPTORS: SHARP;STABBING
DESCRIPTORS: SHARP;STABBING
DESCRIPTORS: ACHING
DESCRIPTORS: SHARP
DESCRIPTORS: SHARP

## 2023-03-21 ASSESSMENT — PAIN SCALES - GENERAL
PAINLEVEL_OUTOF10: 8
PAINLEVEL_OUTOF10: 7
PAINLEVEL_OUTOF10: 3
PAINLEVEL_OUTOF10: 5
PAINLEVEL_OUTOF10: 7

## 2023-03-21 ASSESSMENT — PAIN DESCRIPTION - ORIENTATION
ORIENTATION: RIGHT

## 2023-03-21 ASSESSMENT — PAIN DESCRIPTION - LOCATION
LOCATION: ABDOMEN

## 2023-03-21 ASSESSMENT — PAIN - FUNCTIONAL ASSESSMENT: PAIN_FUNCTIONAL_ASSESSMENT: ACTIVITIES ARE NOT PREVENTED

## 2023-03-21 NOTE — ED PROVIDER NOTES
presents with    Hepatic Cancer    Leg Pain    Altered Mental Status            Differential Diagnosis includes (but not limited to): Hyperammonemia, UTI, pneumonia, COVID, hypoglycemia, CVA        Diagnoses Considered but I have low suspicion of:   CVA             Pertinent Comorbid Conditions:    History of liver cancer    2)  Data Reviewed (none if left blank)          My Independent interpretations:     EKG:          Imaging: Nonacute CTA chest/    Labs:      UTI                     Decision Rules/Clinical Scores utilized: SIRS criteria            External Documentation Reviewed:         Previous patient encounter documents & history available on EMR was reviewed              See Formal Diagnostic Results above for the lab and radiology tests and orders. 3)  Treatment and Disposition         ED Reassessment: Unchanged, stable         Case discussed with consulting clinician: Hospitalist         Shared Decision-Making was performed and disposition discussed with the        Patient/Family and questions answered          Social determinants of health impacting treatment or disposition:           Code Status:  full      Summary of Patient Presentation:      PANDA  /   Dennys Guerra Reviewed:    Vitals:    03/21/23 0730 03/21/23 1124 03/21/23 1145 03/21/23 1500   BP: 118/80  128/73 123/74   Pulse: 88  88 93   Resp: 17 16 16 16   Temp: 98.6 °F (37 °C)  97.4 °F (36.3 °C) 98.8 °F (37.1 °C)   TempSrc: Oral  Axillary Oral   SpO2: 98%  100% 97%   Weight:       Height:           The patient was seen and examined. Appropriate diagnostic testing was performed and results reviewed with the patient. The results of pertinent diagnostic studies and exam findings were discussed. The patients provisional diagnosis and plan of care were discussed with the patient and present family who expressed understanding. Any medications were reviewed and indications and risks of medications were discussed with the patient /family present.

## 2023-03-21 NOTE — ED TRIAGE NOTES
Pt presents to the ED from home with complaints of left leg pain and pt being confused over the last few days. Friend and son at bedside states he has been confused over the last few days and they are concerned pt's liver enzymes are elevated. Pt has liver cancer. They are also concerned pt has a blood clot in his leg. Pt rates pain a 7/10. Pt is alert and oriented x4 with respirations even and unlabored.

## 2023-03-21 NOTE — H&P
use alcohol. Family History:      Reviewed in detail and negative for DM, CAD, Cancer, CVA. Positive as follows:        Problem Relation Age of Onset    Hypertension Mother     Heart Disease Mother     Alzheimer's Disease Mother     Heart Failure Mother     Hypertension Father     Heart Disease Father     Cancer Father         lung    Diabetes Brother     High Blood Pressure Paternal Uncle     Heart Disease Paternal Uncle     Colon Cancer Neg Hx     Colon Polyps Neg Hx        Diet:  ADULT DIET; Regular; 4 carb choices (60 gm/meal); Low Sodium (2 gm)    REVIEW OF SYSTEMS:   Pertinent positives as noted in the HPI. All other systems reviewed and negative. PHYSICAL EXAM:    /85   Pulse 89   Temp 97.6 °F (36.4 °C) (Oral)   Resp 12   Ht 5' 5\" (1.651 m)   Wt 176 lb (79.8 kg)   SpO2 98%   BMI 29.29 kg/m²     General appearance:  No apparent distress, chronically ill-appearing middle-aged male, appears stated age and cooperative. HEENT:  Normal cephalic, atraumatic without obvious deformity. Pupils equal, round, and reactive to light. Extra ocular muscles intact. Conjunctivae/corneas clear. Neck: Supple, with full range of motion. No jugular venous distention. Trachea midline. Respiratory:  Normal respiratory effort. Clear to auscultation, bilaterally without Rales/Wheezes/Rhonchi. Cardiovascular:  Regular rate and rhythm with normal S1/S2 without murmurs, rubs or gallops. Abdomen: Soft, non-tender, distended with normal bowel sounds. Musculoskeletal:  No clubbing, cyanosis or edema bilaterally. Full range of motion without deformity. No tenderness to palpation on the left flank or left CVA. Skin: Skin color, texture, turgor normal.  No rashes or lesions. Neurologic:  Neurovascularly intact without any focal sensory/motor deficits.  Cranial nerves: II-XII intact, grossly non-focal.  Psychiatric:  Alert and oriented, is however mildly confused and is also very sleepy and request to be left

## 2023-03-21 NOTE — ED NOTES
ED nurse-to-nurse bedside report    Chief Complaint   Patient presents with    Hepatic Cancer    Leg Pain    Altered Mental Status      LOC: alert and orientated to name, place, date  Vital signs   Vitals:    03/20/23 2019   BP: (!) 149/82   Pulse: 89   Resp: 18   Temp: 97.8 °F (36.6 °C)   TempSrc: Oral   SpO2: 96%   Weight: 176 lb (79.8 kg)   Height: 5' 5\" (1.651 m)      Pain:    Pain Interventions: NA  Pain Goal: 3  Oxygen: No    Current needs required Room Air   Telemetry: Yes  LDAs:   Peripheral IV 03/20/23 Left Antecubital (Active)   Site Assessment Clean, dry & intact 03/20/23 2024   Line Status Blood return noted;Specimen collected; Flushed 03/20/23 2024   Dressing Status Clean, dry & intact 03/20/23 2024     Continuous Infusions:   Mobility: Requires assistance * 1  Forrest Fall Risk Score: Fall Risk 6/25/2021   2 or more falls in past year?  yes   Fall with injury in past year? no     Fall Interventions: Side rails x2, call light within reach, family at bedside  Report given to: Yakelin Schmidt, RAEGAN  03/20/23 9367

## 2023-03-21 NOTE — ED NOTES
ED to inpatient nurses report    Chief Complaint   Patient presents with    Hepatic Cancer    Leg Pain    Altered Mental Status      Present to ED from home  LOC: alert and orientated to name, place, date  Vital signs   Vitals:    03/21/23 0009 03/21/23 0019 03/21/23 0109 03/21/23 0119   BP: 123/67 99/64 93/81 88/66   Pulse: 85 89 96 97   Resp: 10 10     Temp:       TempSrc:       SpO2: 96% 97%     Weight:       Height:          Oxygen Baseline none    Current needs required none   LDAs:   Peripheral IV 03/20/23 Left Antecubital (Active)   Site Assessment Clean, dry & intact 03/20/23 2024   Line Status Blood return noted;Specimen collected; Flushed 03/20/23 2024   Dressing Status Clean, dry & intact 03/20/23 2024     Mobility: Requires assistance * 1  Pending ED orders: none  Present condition: stable  C-SSRS Risk of Suicide: No Risk  Swallow Screening    Preferred Language: English     Electronically signed by Prashant Watters RN on 3/21/2023 at 1:45 AM       Bobby Villegas RN  03/21/23 0145

## 2023-03-22 PROCEDURE — 51798 US URINE CAPACITY MEASURE: CPT

## 2023-03-22 PROCEDURE — 97116 GAIT TRAINING THERAPY: CPT

## 2023-03-22 PROCEDURE — 6370000000 HC RX 637 (ALT 250 FOR IP): Performed by: STUDENT IN AN ORGANIZED HEALTH CARE EDUCATION/TRAINING PROGRAM

## 2023-03-22 PROCEDURE — 99232 SBSQ HOSP IP/OBS MODERATE 35: CPT | Performed by: INTERNAL MEDICINE

## 2023-03-22 PROCEDURE — 2580000003 HC RX 258: Performed by: STUDENT IN AN ORGANIZED HEALTH CARE EDUCATION/TRAINING PROGRAM

## 2023-03-22 PROCEDURE — 6360000002 HC RX W HCPCS: Performed by: STUDENT IN AN ORGANIZED HEALTH CARE EDUCATION/TRAINING PROGRAM

## 2023-03-22 PROCEDURE — 97530 THERAPEUTIC ACTIVITIES: CPT

## 2023-03-22 PROCEDURE — 1200000000 HC SEMI PRIVATE

## 2023-03-22 PROCEDURE — 87086 URINE CULTURE/COLONY COUNT: CPT

## 2023-03-22 PROCEDURE — 2580000003 HC RX 258: Performed by: INTERNAL MEDICINE

## 2023-03-22 RX ORDER — SODIUM CHLORIDE 9 MG/ML
INJECTION, SOLUTION INTRAVENOUS CONTINUOUS
Status: ACTIVE | OUTPATIENT
Start: 2023-03-22 | End: 2023-03-23

## 2023-03-22 RX ADMIN — APIXABAN 5 MG: 5 TABLET, FILM COATED ORAL at 09:48

## 2023-03-22 RX ADMIN — LACTULOSE 20 G: 20 SOLUTION ORAL at 09:47

## 2023-03-22 RX ADMIN — SODIUM CHLORIDE: 9 INJECTION, SOLUTION INTRAVENOUS at 12:02

## 2023-03-22 RX ADMIN — SPIRONOLACTONE 100 MG: 25 TABLET ORAL at 09:49

## 2023-03-22 RX ADMIN — SODIUM CHLORIDE, PRESERVATIVE FREE 10 ML: 5 INJECTION INTRAVENOUS at 12:00

## 2023-03-22 RX ADMIN — DULOXETINE 60 MG: 60 CAPSULE, DELAYED RELEASE ORAL at 09:49

## 2023-03-22 RX ADMIN — APIXABAN 5 MG: 5 TABLET, FILM COATED ORAL at 20:04

## 2023-03-22 RX ADMIN — LACTULOSE 20 G: 20 SOLUTION ORAL at 20:04

## 2023-03-22 RX ADMIN — CEFTRIAXONE SODIUM 1000 MG: 1 INJECTION, POWDER, FOR SOLUTION INTRAMUSCULAR; INTRAVENOUS at 01:25

## 2023-03-22 RX ADMIN — PANTOPRAZOLE SODIUM 40 MG: 40 TABLET, DELAYED RELEASE ORAL at 06:03

## 2023-03-22 RX ADMIN — ACETAMINOPHEN 650 MG: 325 TABLET ORAL at 23:39

## 2023-03-22 ASSESSMENT — PAIN DESCRIPTION - LOCATION
LOCATION: ABDOMEN
LOCATION: BACK

## 2023-03-22 ASSESSMENT — PAIN DESCRIPTION - ORIENTATION: ORIENTATION: RIGHT

## 2023-03-22 ASSESSMENT — PAIN SCALES - GENERAL
PAINLEVEL_OUTOF10: 3
PAINLEVEL_OUTOF10: 6

## 2023-03-22 ASSESSMENT — PAIN DESCRIPTION - DESCRIPTORS: DESCRIPTORS: ACHING

## 2023-03-22 ASSESSMENT — PAIN - FUNCTIONAL ASSESSMENT: PAIN_FUNCTIONAL_ASSESSMENT: ACTIVITIES ARE NOT PREVENTED

## 2023-03-22 NOTE — PROCEDURES
Bladder scan completed and results given to Ninfa RN     216 ml of urine in bladder at this time.     Time of last void Patient at bedside trying to void now  Quantity of last void unknown

## 2023-03-23 VITALS
OXYGEN SATURATION: 98 % | SYSTOLIC BLOOD PRESSURE: 112 MMHG | DIASTOLIC BLOOD PRESSURE: 64 MMHG | BODY MASS INDEX: 29.32 KG/M2 | HEIGHT: 65 IN | TEMPERATURE: 97.9 F | HEART RATE: 94 BPM | RESPIRATION RATE: 16 BRPM | WEIGHT: 176 LBS

## 2023-03-23 LAB
ANION GAP SERPL CALC-SCNC: 7 MEQ/L (ref 8–16)
BUN SERPL-MCNC: 10 MG/DL (ref 7–22)
CALCIUM SERPL-MCNC: 8 MG/DL (ref 8.5–10.5)
CHLORIDE SERPL-SCNC: 106 MEQ/L (ref 98–111)
CO2 SERPL-SCNC: 23 MEQ/L (ref 23–33)
CREAT SERPL-MCNC: 0.6 MG/DL (ref 0.4–1.2)
DEPRECATED RDW RBC AUTO: 63.7 FL (ref 35–45)
ERYTHROCYTE [DISTWIDTH] IN BLOOD BY AUTOMATED COUNT: 17.9 % (ref 11.5–14.5)
GFR SERPL CREATININE-BSD FRML MDRD: > 60 ML/MIN/1.73M2
GLUCOSE SERPL-MCNC: 96 MG/DL (ref 70–108)
HCT VFR BLD AUTO: 39.2 % (ref 42–52)
HGB BLD-MCNC: 12.4 GM/DL (ref 14–18)
MCH RBC QN AUTO: 31.8 PG (ref 26–33)
MCHC RBC AUTO-ENTMCNC: 31.6 GM/DL (ref 32.2–35.5)
MCV RBC AUTO: 100.5 FL (ref 80–94)
PLATELET # BLD AUTO: 114 THOU/MM3 (ref 130–400)
PMV BLD AUTO: 10 FL (ref 9.4–12.4)
POTASSIUM SERPL-SCNC: 4.3 MEQ/L (ref 3.5–5.2)
RBC # BLD AUTO: 3.9 MILL/MM3 (ref 4.7–6.1)
SODIUM SERPL-SCNC: 136 MEQ/L (ref 135–145)
WBC # BLD AUTO: 6.2 THOU/MM3 (ref 4.8–10.8)

## 2023-03-23 PROCEDURE — 6360000002 HC RX W HCPCS: Performed by: STUDENT IN AN ORGANIZED HEALTH CARE EDUCATION/TRAINING PROGRAM

## 2023-03-23 PROCEDURE — 2580000003 HC RX 258: Performed by: STUDENT IN AN ORGANIZED HEALTH CARE EDUCATION/TRAINING PROGRAM

## 2023-03-23 PROCEDURE — 97166 OT EVAL MOD COMPLEX 45 MIN: CPT

## 2023-03-23 PROCEDURE — 6370000000 HC RX 637 (ALT 250 FOR IP): Performed by: STUDENT IN AN ORGANIZED HEALTH CARE EDUCATION/TRAINING PROGRAM

## 2023-03-23 PROCEDURE — 85027 COMPLETE CBC AUTOMATED: CPT

## 2023-03-23 PROCEDURE — 97535 SELF CARE MNGMENT TRAINING: CPT

## 2023-03-23 PROCEDURE — 97530 THERAPEUTIC ACTIVITIES: CPT

## 2023-03-23 PROCEDURE — 36415 COLL VENOUS BLD VENIPUNCTURE: CPT

## 2023-03-23 PROCEDURE — 80048 BASIC METABOLIC PNL TOTAL CA: CPT

## 2023-03-23 PROCEDURE — 99238 HOSP IP/OBS DSCHRG MGMT 30/<: CPT | Performed by: INTERNAL MEDICINE

## 2023-03-23 RX ORDER — GREEN TEA/HOODIA GORDONII 315-12.5MG
1 CAPSULE ORAL 2 TIMES DAILY
Qty: 60 TABLET | Refills: 0 | Status: SHIPPED | OUTPATIENT
Start: 2023-03-23 | End: 2023-04-22

## 2023-03-23 RX ORDER — AMOXICILLIN AND CLAVULANATE POTASSIUM 500; 125 MG/1; MG/1
1 TABLET, FILM COATED ORAL 3 TIMES DAILY
Qty: 15 TABLET | Refills: 0 | Status: SHIPPED | OUTPATIENT
Start: 2023-03-23 | End: 2023-03-28

## 2023-03-23 RX ADMIN — ACETAMINOPHEN 650 MG: 325 TABLET ORAL at 11:58

## 2023-03-23 RX ADMIN — PANTOPRAZOLE SODIUM 40 MG: 40 TABLET, DELAYED RELEASE ORAL at 05:16

## 2023-03-23 RX ADMIN — TRAMADOL HYDROCHLORIDE 50 MG: 50 TABLET ORAL at 08:17

## 2023-03-23 RX ADMIN — APIXABAN 5 MG: 5 TABLET, FILM COATED ORAL at 08:17

## 2023-03-23 RX ADMIN — CEFTRIAXONE SODIUM 1000 MG: 1 INJECTION, POWDER, FOR SOLUTION INTRAMUSCULAR; INTRAVENOUS at 01:46

## 2023-03-23 RX ADMIN — LACTULOSE 20 G: 20 SOLUTION ORAL at 08:17

## 2023-03-23 RX ADMIN — DULOXETINE 60 MG: 60 CAPSULE, DELAYED RELEASE ORAL at 08:17

## 2023-03-23 RX ADMIN — SPIRONOLACTONE 100 MG: 25 TABLET ORAL at 08:17

## 2023-03-23 ASSESSMENT — PAIN DESCRIPTION - ORIENTATION: ORIENTATION: RIGHT

## 2023-03-23 ASSESSMENT — PAIN SCALES - GENERAL
PAINLEVEL_OUTOF10: 7
PAINLEVEL_OUTOF10: 3

## 2023-03-23 ASSESSMENT — PAIN DESCRIPTION - LOCATION
LOCATION: BACK
LOCATION: ABDOMEN

## 2023-03-23 NOTE — CARE COORDINATION
3/22/23, 10:53 AM EDT    DISCHARGE PLANNING EVALUATION    SW consult for [facility]. SW did look online for home health agencies in network with pt's insurance. Call to [facility], [facility], [facility], Aparna Humphrey, some can take can take pt's insurance, however cannot provide all ordered services. Call to [facility], spoke with Fabienne Wing, they can take pt's insurance and provide services. SW did make referral. SW stopped by pt's room, pt states he just want to sleep a little longer asked to SW to come back later. DISCHARGE PLANNING EVALUATION  3/22/23, 2:35 PM EDT    Reason for Referral: HH   Mental Status: alert, answers most questions appropriately, presents as confused at times  Decision Making: self  Family/Social/Home Environment: patient lives alone in an apartment, reports he has two sons, pt tearful at times during SW visit. Current Services including food security, transportation and housekeeping: no current services, pt tells SW he does not eat at home, reports he can't remember the last time he ate, SW discussed meals of wheels, food pantries, pt interested in information on this  Current Equipment:walker, cane  Payment Source:[employer]  Concerns or Barriers to Discharge: none  Post-acute [facility]) provider list was provided to patient. Patient was informed of their freedom to choose [facility] provider. Discussed and offered to show the patient the relevant [facility] Providers quality and resource use measures on Medicare Compare web site via computer based on patient's goals of care and treatment preferences. Questions regarding selection process were answered. Teach Back Method used with Terrell Villanueva regarding care plan and needs  Patient and son Terese Letters verbalize understanding of the plan of care and contribute to goal setting. Patient goals, treatment preferences and discharge plan: Return home with [facility], [facility].  Pt okay with having home health coming in to see him, pt tearful when
3/23/23, 1:59 PM EDT    Patient goals/plan/ treatment preferences discussed by  and . Patient goals/plan/ treatment preferences reviewed with patient/ family. Patient/ family verbalize understanding of discharge plan and are in agreement with goal/plan/treatment preferences. Understanding was demonstrated using the teach back method. AVS provided by RN at time of discharge, which includes all necessary medical information pertaining to the patients current course of illness, treatment, post-discharge goals of care, and treatment preferences. Services At/After Discharge: [address]    Call to [facility], spoke with Mike Brandon, reports they can get orders off Epic. They will follow up with pt at home.
Care            Potential DME:    Patient expects to discharge to: [address] [facility] for transportation at discharge: Family    Financial    Payor: [employer] [address] [place] / Plan: Mirtha Carbajal / Product Type: *No Product type* /     Does insurance require precert for SNF: Yes    Potential assistance Purchasing Medications: No  Meds-to-Beds request: Yes      [facility] [address] [id number] - [place], [place] - [address]  [address] [place] [id number]  Phone: [phone] Fax: [phone]      Notes:    Factors facilitating achievement of predicted outcomes: Family support, Cooperative, and Pleasant    Barriers to discharge: Medical stability. Additional Case Management Notes: Patient presents with fatigue and confusion. Patient has a recent history of hepatocellular carcinoma (6/10/2022). He follows with Dr. Navneet Sánchez. 11/30/2022 chemoembolization of the right hepatic artery with 100 mg of doxorubicin followed by bland embolization access via the right femoral artery at [facility]. Subsequent MRI of the abdomen with without contrast done as an outpatient 2/28-disease progression significant interval worsening compared to May per chart. He is planned to start [email or web address]. Urine positive for nitrites. Rocephin, Eliquis, lactulose twice daily, prn Tylenol, Ultram and Zofran, dopplers of left lower extremity, carb choice diet, PT/OT, up with assistance. CT of chest:  1. Fibrotic changes and pleural thickening within the right hemithorax. This does not appear to be significantly changed. No new infiltrate. 2. Mild atelectatic change in the left upper lobe. Small area of    atelectasis versus infiltrate in the left lingula. 3. Moderate amount of upper abdominal ascites, portions of which are    suspected to be loculated. 4. Ill-defined lesion in the right hepatic lobe measures 2.7 cm, suspected    to represent neoplastic disease in this patient with a history of liver    cancer.

## 2023-03-23 NOTE — PROGRESS NOTES
[address] [facility] THERAPY MISSED TREATMENT NOTE  [facility]  5K-26/026-A      Date: 3/22/2023  Patient Name: Mayuri Hubbard        CSN: [id number]   : 1965  (61 y.o.)  Gender: male                REASON FOR MISSED TREATMENT: Patient Refused.  ; RN approved session, patient supine in bed upon OT arrival and easily roused. Patient refusing to participate in OT asking to rest longer and stay in bed even though he has been lying in bed all day. OT to check back another date.
Physician Progress Note      PATIENT:               Melinda Salazar  CSN #:                  [id number]  :                       1965  ADMIT DATE:       3/20/2023 8:14 PM  [address] [place] DATE:  RESPONDING  PROVIDER #:        Lila Huff MD          QUERY TEXT:    Pt admitted with UTI. Pt noted to have confusion. If possible, please document   in the progress notes and discharge summary if you are evaluating and / or   treating any of the following: The medical record reflects the following:  Risk Factors: Elderly,CA  Clinical Indicators: pt with UTI with confusion, documented pt is disoriented   to time and situation  Treatment: IV ATB's, lab monitoring, imaging, monitoring    Thank You! Jordi Edwards RN, CRCR  RN Clinical Documentation Integrity  (R) [phone] (L) [phone]  Options provided:  -- Metabolic encephalopathy  -- Toxic metabolic encephalopathy  -- Other - I will add my own diagnosis  -- Disagree - Not applicable / Not valid  -- Disagree - Clinically unable to determine / Unknown  -- Refer to Clinical Documentation Reviewer    PROVIDER RESPONSE TEXT:    This patient has metabolic encephalopathy.     Query created by: Joaquin Grullon on 3/21/2023 9:49 AM      Electronically signed by:  Lila Huff MD 3/21/2023 11:08 AM
Pt admitted to  5K26 via in a wheelchair from ED. Complaints: fatigue. INT left AC. IV site free of s/s of infection or infiltration. Vital signs obtained. Assessment and data collection initiated. Two nurse skin assessment performed by Ninfa RN and Lianna RN. Oriented to room. Policies and procedures for 5 explained. All questions answered with no further questions at this time. Fall prevention and safety brochure discussed with patient. Bed alarm on. Call light in reach. Oriented to room.    Mimi Benito, RN, RN 3/21/2023 2:40 AM
Pt discharged to home at this time. AVS reviewed, all questions answered.
Home: House  Home Layout: One level  Home Access: Stairs to enter without rails  Entrance Stairs - Number of Steps: 1  Home Equipment: Sable [employer]ima, 4 wheeled     Bathroom Shower/Tub: Tub/Shower unit       ADL Assistance: Independent  Homemaking Assistance: Independent  Ambulation Assistance: Independent (occasional use of 4WW)  Transfer Assistance: Independent    Active : No          OBJECTIVE:  Range of Motion:  Bilateral Lower Extremity: WFL    Strength:  Bilateral Lower Extremity: Impaired - deconditioned    Balance:  Static Sitting Balance:  Stand By Assistance  Static Standing Balance: Contact Guard Assistance    Bed Mobility:  Supine to Sit: Moderate Assistance, X 1, with head of bed raised  Sit to Supine: Contact Guard Assistance, X 1, with head of bed flat     Transfers:  Sit to Stand: Contact Guard Assistance  Stand to Sit:Contact Guard Assistance    Ambulation:  Contact Guard Assistance  Distance: 10'   Surface: Level Tile  Device:Rolling Walker  Gait Deviations:  Slow Massiel, Decreased Step Length Bilaterally, Decreased Arm Swing, Decreased Trunk Rotation, and Decreased Gait Speed    Functional Outcome Measures: Completed  AM-PAC Inpatient Mobility without Stair Climbing Raw Score : 15  AM-PAC Inpatient without Stair Climbing T-Scale Score : 43.03    ASSESSMENT:  Activity Tolerance:  Patient tolerance of  treatment: good. Treatment Initiated: Treatment and education initiated within context of evaluation. Evaluation time included review of current medical information, gathering information related to past medical, social and functional history, completion of standardized testing, formal and informal observation of tasks, assessment of data and development of plan of care and goals. Treatment time included skilled education and facilitation of tasks to increase safety and independence with functional mobility for improved independence and quality of life. Assessment:   Body Structures,
Independent  Homemaking Assistance: Independent  Homemaking Responsibilities: Yes  Ambulation Assistance: Independent  Transfer Assistance: Independent    Active : No  Patient's  Info: Sons, Uncle  Mode of Transportation: Car  Occupation: On disability       VISION:WFL    HEARING:  WFL    COGNITION: Slow Processing, Decreased Recall, Decreased Insight, Decreased Problem Solving, Decreased Safety Awareness, and Impulsive    RANGE OF MOTION:  Bilateral Upper Extremity:  WFL    STRENGTH:  Bilateral Upper Extremity:  WFL    SENSATION:   WFL    ADL:   Grooming: Contact Guard Assistance. Hand hygiene standing sinkside  Upper Extremity Dressing: Minimal Assistance. For threading BUE  Toileting: Contact Guard Assistance. Toilet Transfer: Contact Guard Assistance. Charlett Bigger BALANCE:  Sitting Balance:  Supervision. Standing Balance: Contact Guard Assistance. BED MOBILITY:  Supine to Sit: Supervision    Sit to Supine: Supervision    Scooting: Supervision      TRANSFERS:  Sit to Stand:  Contact Guard Assistance. Stand to Sit: Contact Guard Assistance. FUNCTIONAL MOBILITY:  Assistive Device: Rolling Walker  Assist Level:  Contact Guard Assistance. Distance: To and from bathroom and to/from door  Impulsive with decreased safety awareness. Activity Tolerance:  Patient tolerance of  treatment: Good treatment tolerance      Assessment:  Assessment: Pt presented with the listed deficits s/p admission with malaise & fatigue. Pt with decreased strength, endurane, and activity tolerance and currently requires increased A with ADLs and IADLs. Pt would benefit from continued OT to restore PLOF maximize pt's indep with ADLs and IADLs in prep for a safe return home at max level of indep.     Performance deficits / Impairments: Decreased functional mobility , Decreased safe awareness, Decreased balance, Decreased posture, Decreased ADL status, Decreased endurance, Decreased high-level IADLs, Decreased
Goals: by discharge  Short Term Goal 1: bed mobility with HOB flat, no rails, mod I for increased functional ind  Short Term Goal 2: sit<>stand from various surfaces with LRD mod I for safe transfers  Short Term Goal 3: ambulate 48'  with LRD mod I for safe household distances  Short Term Goal 4: navigate 1 step with LRD mod I for safe enter/exit of home  Long Term Goals  Time Frame for Long Term Goals : NA d/t short ELOS    Following session, patient left in safe position with all fall risk precautions in place.
sleepy and request to be left alone at this time  Capillary Refill: Brisk,< 3 seconds   Peripheral Pulses: +2 palpable, equal bilaterally       Labs:     Recent Labs     03/20/23 2032 03/21/23  0445   WBC 8.4 8.1   HGB 13.0* 11.5*   HCT 40.7* 36.9*    112*       Recent Labs     03/20/23 2032 03/21/23  0445    137   K 4.0 3.9    106   CO2 28 23   BUN 12 13   CREATININE 0.6 0.5   CALCIUM 9.0 8.5       Recent Labs     03/20/23 2032 03/21/23 0445   AST 64* 59*   ALT 39 34   BILIDIR  --  1.2*   BILITOT 2.3* 2.0*   ALKPHOS 268* 240*       Recent Labs     03/20/23 2032   INR 1.10       No results for input(s): Abimael Otero in the last 72 hours. Urinalysis:      Lab Results   Component Value Date/Time    NITRU POSITIVE 03/21/2023 12:50 AM    WBCUA 2-4 03/21/2023 12:50 AM    WBCUA 0-5 04/22/2022 03:40 PM    BACTERIA NONE SEEN 03/21/2023 12:50 AM    RBCUA 5-10 03/21/2023 12:50 AM    BLOODU TRACE 03/21/2023 12:50 AM    SPECGRAV >1.030 02/28/2023 12:30 PM    GLUCOSEU NEGATIVE 03/21/2023 12:50 AM       Intake & Output:  I/O last 3 completed shifts: In: 269.2 [P.O.:200;  I.V.:20; IV Piggyback:49.2]  Out: -   I/O this shift:  In: 100 [P.O.:100]  Out: -       Radiology:     CXR: I have reviewed the CXR with the following interpretation: Opacity in the right perihilar region compared to previous study with noted improved aeration in the right lung base from previous studies  CT of the head: I reviewed the CT with the following interpretation: No acute process of the brain  CT chest: I reviewed the CT with the following interpretation: Fibrotic changes and pleural thickening in the right hemithorax with no new infiltrate, mild atelectatic change in the left upper lobe, upper abdominal ascites, ill-defined hepatic right lobe lesion measuring 2.7 cm, left adrenal nodule 2.0 x 1.5 cm which is new since 10/2022  X-ray right hip: I reviewed the x-ray with the following interpretation: No acute disease or

## 2023-03-23 NOTE — DISCHARGE SUMMARY
oriented, is however mildly confused and is also very sleepy and request to be left alone at this time  Capillary Refill: Brisk,< 3 seconds   Peripheral Pulses: +2 palpable, equal bilaterally       Labs:     Recent Labs     03/20/23 2032 03/21/23 0445 03/23/23  0756   WBC 8.4 8.1 6.2   HGB 13.0* 11.5* 12.4*   HCT 40.7* 36.9* 39.2*    112* 114*       Recent Labs     03/20/23 2032 03/21/23  0445 03/23/23  0756    137 136   K 4.0 3.9 4.3    106 106   CO2 28 23 23   BUN 12 13 10   CREATININE 0.6 0.5 0.6   CALCIUM 9.0 8.5 8.0*       Recent Labs     03/20/23 2032 03/21/23 0445   AST 64* 59*   ALT 39 34   BILIDIR  --  1.2*   BILITOT 2.3* 2.0*   ALKPHOS 268* 240*       Recent Labs     03/20/23 2032   INR 1.10       No results for input(s): Tomaszantonio Jordan in the last 72 hours. Urinalysis:      Lab Results   Component Value Date/Time    NITRU POSITIVE 03/21/2023 12:50 AM    WBCUA 2-4 03/21/2023 12:50 AM    WBCUA 0-5 04/22/2022 03:40 PM    BACTERIA NONE SEEN 03/21/2023 12:50 AM    RBCUA 5-10 03/21/2023 12:50 AM    BLOODU TRACE 03/21/2023 12:50 AM    SPECGRAV >1.030 02/28/2023 12:30 PM    GLUCOSEU NEGATIVE 03/21/2023 12:50 AM       Intake & Output:  I/O last 3 completed shifts: In: 959.2 [P.O.:900; I.V.:10; IV Piggyback:49.2]  Out: 300 [Urine:300]  No intake/output data recorded.       Radiology:     CXR: I have reviewed the CXR with the following interpretation: Opacity in the right perihilar region compared to previous study with noted improved aeration in the right lung base from previous studies  CT of the head: I reviewed the CT with the following interpretation: No acute process of the brain  CT chest: I reviewed the CT with the following interpretation: Fibrotic changes and pleural thickening in the right hemithorax with no new infiltrate, mild atelectatic change in the left upper lobe, upper abdominal ascites, ill-defined hepatic right lobe lesion measuring 2.7 cm, left adrenal nodule 2.0

## 2023-03-23 NOTE — PLAN OF CARE
Problem: Discharge Planning  Goal: Discharge to home or other facility with appropriate resources  3/22/2023 1441 by KELY Duarte  Outcome: Progressing   SW consult received.  See SW note 3/22/23
Problem: Pain  Goal: Verbalizes/displays adequate comfort level or baseline comfort level  3/22/2023 2128 by Ama Leon RN  Outcome: Progressing  Flowsheets (Taken 3/22/2023 0845 by Shelbie Mccloud RN)  Verbalizes/displays adequate comfort level or baseline comfort level:   Encourage patient to monitor pain and request assistance   Assess pain using appropriate pain scale   Administer analgesics based on type and severity of pain and evaluate response   Implement non-pharmacological measures as appropriate and evaluate response  3/22/2023 1301 by Shelbie Mccloud RN  Outcome: Progressing  Flowsheets (Taken 3/22/2023 0845)  Verbalizes/displays adequate comfort level or baseline comfort level:   Encourage patient to monitor pain and request assistance   Assess pain using appropriate pain scale   Administer analgesics based on type and severity of pain and evaluate response   Implement non-pharmacological measures as appropriate and evaluate response     Problem: ABCDS Injury Assessment  Goal: Absence of physical injury  3/22/2023 2128 by Ama Leon RN  Outcome: Progressing  Flowsheets (Taken 3/21/2023 0260 by Paulino Wood RN)  Absence of Physical Injury: Implement safety measures based on patient assessment  3/22/2023 1301 by Shelbie Mccloud RN  Outcome: Progressing     Problem: Safety - Adult  Goal: Free from fall injury  3/22/2023 2128 by Ama Leon RN  Outcome: Progressing  Flowsheets (Taken 3/21/2023 3200 by Paulino Wood RN)  Free From Fall Injury: Instruct family/caregiver on patient safety  3/22/2023 1301 by Shelbie Mccloud RN  Outcome: Progressing     Problem: Nutrition Deficit:  Goal: Optimize nutritional status  3/22/2023 2128 by Ama Leon RN  Outcome: Progressing  Flowsheets (Taken 3/21/2023 5489 by Paulino Wood RN)  Nutrient intake appropriate for improving, restoring, or maintaining nutritional needs:   Assess nutritional status and recommend course of action   Monitor oral intake,
Problem: Pain  Goal: Verbalizes/displays adequate comfort level or baseline comfort level  3/23/2023 1438 by Nura Langley RN  Outcome: Completed  3/23/2023 1100 by Nura Langley RN  Outcome: Progressing  Flowsheets (Taken 3/23/2023 1100)  Verbalizes/displays adequate comfort level or baseline comfort level:   Administer analgesics based on type and severity of pain and evaluate response   Consider cultural and social influences on pain and pain management   Encourage patient to monitor pain and request assistance   Assess pain using appropriate pain scale   Implement non-pharmacological measures as appropriate and evaluate response     Problem: ABCDS Injury Assessment  Goal: Absence of physical injury  3/23/2023 1438 by Nura Langley RN  Outcome: Completed  3/23/2023 1100 by Nura Langley RN  Outcome: Progressing  Flowsheets (Taken 3/23/2023 1100)  Absence of Physical Injury: Implement safety measures based on patient assessment     Problem: Safety - Adult  Goal: Free from fall injury  3/23/2023 1438 by Nrua Langley RN  Outcome: Completed  3/23/2023 1100 by Nura Langley RN  Outcome: Progressing  Flowsheets (Taken 3/23/2023 1100)  Free From Fall Injury:   Instruct family/caregiver on patient safety   Based on caregiver fall risk screen, instruct family/caregiver to ask for assistance with transferring infant if caregiver noted to have fall risk factors     Problem: Nutrition Deficit:  Goal: Optimize nutritional status  3/23/2023 1438 by Nura Langley RN  Outcome: Completed  3/23/2023 1100 by Nura Langley RN  Outcome: Progressing  Flowsheets (Taken 3/23/2023 1100)  Nutrient intake appropriate for improving, restoring, or maintaining nutritional needs:   Assess nutritional status and recommend course of action   Monitor oral intake, labs, and treatment plans   Recommend appropriate diets, oral nutritional supplements, and vitamin/mineral supplements     Problem: Skin/Tissue Integrity  Goal: Absence of new skin
Absence of infection at discharge  Outcome: Progressing  Flowsheets (Taken 3/21/2023 2130)  Absence of infection at discharge:   Assess and monitor for signs and symptoms of infection   Monitor lab/diagnostic results   Monitor all insertion sites i.e., indwelling lines, tubes and drains   Administer medications as ordered  Goal: Absence of infection during hospitalization  Outcome: Progressing  Flowsheets (Taken 3/21/2023 2130)  Absence of infection during hospitalization:   Assess and monitor for signs and symptoms of infection   Monitor lab/diagnostic results   Monitor all insertion sites i.e., indwelling lines, tubes and drains   Administer medications as ordered   Identify and instruct in appropriate isolation precautions for identified infection/condition  Goal: Absence of fever/infection during anticipated neutropenic period  Outcome: Progressing  Flowsheets (Taken 3/21/2023 2130)  Absence of fever/infection during anticipated neutropenic period: Monitor white blood cell count     Problem: Chronic Conditions and Co-morbidities  Goal: Patient's chronic conditions and co-morbidity symptoms are monitored and maintained or improved  Outcome: Progressing  Flowsheets (Taken 3/21/2023 2130)  Care Plan - Patient's Chronic Conditions and Co-Morbidity Symptoms are Monitored and Maintained or Improved:   Monitor and assess patient's chronic conditions and comorbid symptoms for stability, deterioration, or improvement   Collaborate with multidisciplinary team to address chronic and comorbid conditions and prevent exacerbation or deterioration   Update acute care plan with appropriate goals if chronic or comorbid symptoms are exacerbated and prevent overall improvement and discharge     Problem: Skin/Tissue Integrity - Adult  Goal: Skin integrity remains intact  Outcome: Progressing  Flowsheets (Taken 3/21/2023 2130)  Skin Integrity Remains Intact: Monitor for areas of redness and/or skin breakdown  Goal: Oral mucous
assess nares and determine need for appliance change or resting period. 3/21/2023 7715 by Jessa Amor, RN  Outcome: Progressing  3/21/2023 4376 by Jessa Amor RN  Outcome: Progressing     Problem: Discharge Planning  Goal: Discharge to home or other facility with appropriate resources  Outcome: Progressing  Flowsheets (Taken 3/21/2023 7310)  Discharge to home or other facility with appropriate resources:   Identify barriers to discharge with patient and caregiver   Arrange for needed discharge resources and transportation as appropriate   Identify discharge learning needs (meds, wound care, etc)     Problem: Infection - Adult  Goal: Absence of infection at discharge  Outcome: Progressing  Flowsheets (Taken 3/21/2023 1120)  Absence of infection at discharge:   Assess and monitor for signs and symptoms of infection   Monitor lab/diagnostic results   Monitor all insertion sites i.e., indwelling lines, tubes and drains   Administer medications as ordered   Instruct and encourage patient and family to use good hand hygiene technique  Goal: Absence of infection during hospitalization  Outcome: Progressing  Flowsheets (Taken 3/21/2023 7896)  Absence of infection during hospitalization:   Assess and monitor for signs and symptoms of infection   Monitor lab/diagnostic results   Monitor all insertion sites i.e., indwelling lines, tubes and drains   Administer medications as ordered   Instruct and encourage patient and family to use good hand hygiene technique  Goal: Absence of fever/infection during anticipated neutropenic period  Outcome: Progressing  Flowsheets (Taken 3/21/2023 2617)  Absence of fever/infection during anticipated neutropenic period: Monitor white blood cell count     Care plan reviewed with patient. Patient verbalizes understanding of the plan of care and contributes to goal setting.
and Maintained or Improved:   Monitor and assess patient's chronic conditions and comorbid symptoms for stability, deterioration, or improvement   Collaborate with multidisciplinary team to address chronic and comorbid conditions and prevent exacerbation or deterioration   Update acute care plan with appropriate goals if chronic or comorbid symptoms are exacerbated and prevent overall improvement and discharge    Care plan reviewed with patient. Patient verbalizes understanding of the plan of care and contributes to goal setting.
Integrity Remains Intact: Monitor for areas of redness and/or skin breakdown  3/22/2023 2128 by Татьяна Alex RN  Outcome: Progressing  Flowsheets (Taken 3/22/2023 2127)  Skin Integrity Remains Intact: Monitor for areas of redness and/or skin breakdown  Goal: Oral mucous membranes remain intact  3/23/2023 1100 by Joi Sotelo RN  Outcome: Progressing  Flowsheets (Taken 3/23/2023 1100)  Oral Mucous Membranes Remain Intact: Assess oral mucosa and hygiene practices  3/22/2023 2128 by Татьяна Alex RN  Outcome: Progressing  Flowsheets (Taken 3/22/2023 0845 by Rufino Garcia RN)  Oral Mucous Membranes Remain Intact: Assess oral mucosa and hygiene practices

## 2023-03-24 ENCOUNTER — HOSPITAL ENCOUNTER (OUTPATIENT)
Dept: INFUSION THERAPY | Age: 58
Discharge: HOME | End: 2023-03-24
Payer: COMMERCIAL

## 2023-03-24 ENCOUNTER — TELEPHONE (OUTPATIENT)
Dept: FAMILY MEDICINE CLINIC | Age: 58
End: 2023-03-24

## 2023-03-24 LAB — BACTERIA UR CULT: NORMAL

## 2023-03-24 PROCEDURE — 99212 OFFICE O/P EST SF 10 MIN: CPT

## 2023-03-24 NOTE — PROGRESS NOTES
Chemotherapy/Immunotherapy Teaching Checklist    Treatment Plan: Opdivo  Frequency: monthly  Patient accompanied by son      Day of chemo instructions:  [x] Must have    [x] Eat light breakfast  [x] Bring pain medications/other routine medications scheduled during appointment time  [] Hold blood pressure medications morning of treatment    Treatment process:  [x] Flow of appointment  [x] IV access Peripheral start  or  PORT access process [x] EMLA cream  [x] Premedication/ Hydration  [x] Length of treatment  [x] Tour of clinic    Diet and hydration:  [x] Discuss [place] diet    [x] Eating Hints book provided  [x] Importance of hydration 48- 64 ounces daily   [x] Hydration handout provided     Side Effects:  [x] Chemotherapy and you book provided  [x] Side effects and management discussed   [x] Diarrhea[x]Nausea/Vomiting [x]home antiemetic [x]hair loss[x]Neuropathy[x] Constipation[x] Fatigue[x]Mouth sores[x] Dehydration[x] Skin/Nail Changes []Pneumonitis [x]Colitis [x] Hepatitis [x]Thyroid changes  [x]Fertility issues (birth control, sperm/egg banking issues)    Labs:  [x]BMP- renal function and electrolytes discussed  [x] CBC- RBC, WBC with ANC, platelet counts discussed  [x]Understanding your Blood hand out given   [x]Neutropenia handout/Reduce infection handout [x]Thrombocytopenia handout   [x][place] discussed    Complications that require immediate attention from physician:  [x]Fever 100.3 [x]signs of infection- chills, fever, burning with urination, worsening cough   [x]Uncontrolled vomiting [x]Uncontrolled diarrhea/constipation   [x]Unable to drink 48 ounces [x]Uncontrolled pain  [x] When to notify provider handout given  [x] After hours contact process discussed    Home instructions:  [x] Instruct to shut the lid and double flush toilet for 48 hours after chemotherapy  [x] Instruct family members to wear gloves when will be handling  body fluids    Support Services:  [x] Financial navigator   [x]RN

## 2023-03-24 NOTE — PLAN OF CARE
Problem: Chronic Conditions and Co-morbidities  Goal: Patient's chronic conditions and co-morbidity symptoms are monitored and maintained or improved  Outcome: Adequate for Discharge  Flowsheets (Taken 3/24/2023 1453)  Care Plan - Patient's Chronic Conditions and Co-Morbidity Symptoms are Monitored and Maintained or Improved: Collaborate with multidisciplinary team to address chronic and comorbid conditions and prevent exacerbation or deterioration     Problem: Discharge Planning  Goal: Discharge to home or other facility with appropriate resources  Outcome: Adequate for Discharge  Flowsheets (Taken 3/24/2023 1453)  Discharge to home or other facility with appropriate resources: Identify barriers to discharge with patient and caregiver     Problem: Safety - Adult  Goal: Free from fall injury  Outcome: Adequate for Discharge  Flowsheets (Taken 3/24/2023 1453)  Free From Fall Injury: Instruct family/caregiver on patient safety     Care plan reviewed with patient and son.   Patient and son verbalize understanding of the plan of care and contribute to goal setting

## 2023-03-24 NOTE — TELEPHONE ENCOUNTER
Jose Martin [facility] Transitions Initial Follow Up Call    Call within 2 business days of discharge: Yes     Patient: Kang Rader Patient : 1965   MRN: [id number]  Reason for Admission: Malaise and fatigue  Discharge Date: 3/23/23 RARS: Readmission Risk Score: 21       Spoke with: attempted to call patient- the number has calling restrictions that will not allow the completion of the call. Discharge department/facility: [facility]    Non-face-to-face services provided:       Follow Up  Future Appointments   Date Time Provider Kaelyn Rojas   3/24/2023  2:00 PM STR OUT PT ONC INJ RM 10 STRZ OP ONC Carter HOD   3/28/2023 11:20 AM CHRISTIAN Purcell - CNP SRPX OLIVER[facility]   3/29/2023  9:30 AM STR OUT PT ONC INJ RM 01 STRZ OP ONC Carter HOD   2023  8:40 AM Marixa Reyes MD N Oncology [facility]   3/12/2024 10:00 AM Veronica Manning MD N SRPX Heart [facility] [address] [place]

## 2023-03-26 LAB — BACTERIA BLD AEROBE CULT: NORMAL

## 2023-03-28 PROBLEM — R18.8 ASCITES OF LIVER: Status: ACTIVE | Noted: 2023-03-28

## 2023-03-28 PROBLEM — M89.9 LYTIC LESION OF BONE ON X-RAY: Status: ACTIVE | Noted: 2023-03-28

## 2023-03-28 PROBLEM — R10.11 RIGHT UPPER QUADRANT ABDOMINAL PAIN: Status: ACTIVE | Noted: 2023-01-01

## 2023-03-29 ENCOUNTER — HOSPITAL ENCOUNTER (OUTPATIENT)
Dept: INFUSION THERAPY | Age: 58
End: 2023-03-29

## 2023-03-30 PROBLEM — F12.90 MARIJUANA USE: Status: ACTIVE | Noted: 2023-01-01

## 2023-03-30 PROBLEM — F14.91 HISTORY OF COCAINE USE: Status: ACTIVE | Noted: 2023-03-30

## 2023-03-30 PROBLEM — G89.3 CANCER RELATED PAIN: Status: ACTIVE | Noted: 2023-01-01

## 2023-03-30 PROBLEM — G89.4 CHRONIC PAIN SYNDROME: Status: ACTIVE | Noted: 2023-03-30

## 2023-04-05 ENCOUNTER — ANESTHESIA EVENT (OUTPATIENT)
Dept: OPERATING ROOM | Age: 58
End: 2023-04-05
Payer: COMMERCIAL

## 2023-04-07 ENCOUNTER — ANESTHESIA (OUTPATIENT)
Dept: OPERATING ROOM | Age: 58
End: 2023-04-07
Payer: COMMERCIAL

## 2023-04-07 PROCEDURE — P9045 ALBUMIN (HUMAN), 5%, 250 ML: HCPCS | Performed by: NURSE ANESTHETIST, CERTIFIED REGISTERED

## 2023-04-07 PROCEDURE — 6360000002 HC RX W HCPCS: Performed by: NURSE ANESTHETIST, CERTIFIED REGISTERED

## 2023-04-07 PROCEDURE — 2500000003 HC RX 250 WO HCPCS: Performed by: NURSE ANESTHETIST, CERTIFIED REGISTERED

## 2023-04-07 PROCEDURE — 6360000002 HC RX W HCPCS: Performed by: PHYSICIAN ASSISTANT

## 2023-04-07 PROCEDURE — 2580000003 HC RX 258: Performed by: NURSE ANESTHETIST, CERTIFIED REGISTERED

## 2023-04-07 RX ORDER — ROCURONIUM BROMIDE 10 MG/ML
INJECTION, SOLUTION INTRAVENOUS PRN
Status: DISCONTINUED | OUTPATIENT
Start: 2023-04-07 | End: 2023-04-07 | Stop reason: SDUPTHER

## 2023-04-07 RX ORDER — VASOPRESSIN 20 U/ML
INJECTION PARENTERAL PRN
Status: DISCONTINUED | OUTPATIENT
Start: 2023-04-07 | End: 2023-04-07 | Stop reason: SDUPTHER

## 2023-04-07 RX ORDER — ONDANSETRON 2 MG/ML
INJECTION INTRAMUSCULAR; INTRAVENOUS PRN
Status: DISCONTINUED | OUTPATIENT
Start: 2023-04-07 | End: 2023-04-07 | Stop reason: SDUPTHER

## 2023-04-07 RX ORDER — TRANEXAMIC ACID 100 MG/ML
INJECTION, SOLUTION INTRAVENOUS PRN
Status: DISCONTINUED | OUTPATIENT
Start: 2023-04-07 | End: 2023-04-07 | Stop reason: SDUPTHER

## 2023-04-07 RX ORDER — MIDAZOLAM HYDROCHLORIDE 1 MG/ML
INJECTION INTRAMUSCULAR; INTRAVENOUS PRN
Status: DISCONTINUED | OUTPATIENT
Start: 2023-04-07 | End: 2023-04-07 | Stop reason: SDUPTHER

## 2023-04-07 RX ORDER — ALBUMIN (HUMAN) 12.5 G/50ML
SOLUTION INTRAVENOUS PRN
Status: DISCONTINUED | OUTPATIENT
Start: 2023-04-07 | End: 2023-04-07

## 2023-04-07 RX ORDER — SODIUM CHLORIDE 9 MG/ML
INJECTION, SOLUTION INTRAVENOUS CONTINUOUS PRN
Status: DISCONTINUED | OUTPATIENT
Start: 2023-04-07 | End: 2023-04-07 | Stop reason: SDUPTHER

## 2023-04-07 RX ORDER — SUCCINYLCHOLINE/SOD CL,ISO/PF 200MG/10ML
SYRINGE (ML) INTRAVENOUS PRN
Status: DISCONTINUED | OUTPATIENT
Start: 2023-04-07 | End: 2023-04-07 | Stop reason: SDUPTHER

## 2023-04-07 RX ORDER — PROPOFOL 10 MG/ML
INJECTION, EMULSION INTRAVENOUS CONTINUOUS PRN
Status: DISCONTINUED | OUTPATIENT
Start: 2023-04-07 | End: 2023-04-07 | Stop reason: SDUPTHER

## 2023-04-07 RX ORDER — PROPOFOL 10 MG/ML
INJECTION, EMULSION INTRAVENOUS PRN
Status: DISCONTINUED | OUTPATIENT
Start: 2023-04-07 | End: 2023-04-07 | Stop reason: SDUPTHER

## 2023-04-07 RX ORDER — DEXAMETHASONE SODIUM PHOSPHATE 10 MG/ML
INJECTION, EMULSION INTRAMUSCULAR; INTRAVENOUS PRN
Status: DISCONTINUED | OUTPATIENT
Start: 2023-04-07 | End: 2023-04-07 | Stop reason: SDUPTHER

## 2023-04-07 RX ORDER — ALBUMIN, HUMAN INJ 5% 5 %
SOLUTION INTRAVENOUS PRN
Status: DISCONTINUED | OUTPATIENT
Start: 2023-04-07 | End: 2023-04-07 | Stop reason: SDUPTHER

## 2023-04-07 RX ORDER — FENTANYL CITRATE 50 UG/ML
INJECTION, SOLUTION INTRAMUSCULAR; INTRAVENOUS PRN
Status: DISCONTINUED | OUTPATIENT
Start: 2023-04-07 | End: 2023-04-07 | Stop reason: SDUPTHER

## 2023-04-07 RX ORDER — MINERAL OIL, WHITE PETROLATUM .03; .94 G/G; G/G
OINTMENT OPHTHALMIC PRN
Status: DISCONTINUED | OUTPATIENT
Start: 2023-04-07 | End: 2023-04-07 | Stop reason: SDUPTHER

## 2023-04-07 RX ORDER — HYDROMORPHONE HCL 110MG/55ML
PATIENT CONTROLLED ANALGESIA SYRINGE INTRAVENOUS PRN
Status: DISCONTINUED | OUTPATIENT
Start: 2023-04-07 | End: 2023-04-07 | Stop reason: SDUPTHER

## 2023-04-07 RX ADMIN — Medication 100 MG: at 07:58

## 2023-04-07 RX ADMIN — MINERAL OIL, WHITE PETROLATUM 1 MCG: .03; .94 OINTMENT OPHTHALMIC at 07:58

## 2023-04-07 RX ADMIN — ALBUMIN (HUMAN) 250 ML: 12.5 INJECTION, SOLUTION INTRAVENOUS at 12:27

## 2023-04-07 RX ADMIN — FENTANYL CITRATE 100 MCG: 50 INJECTION, SOLUTION INTRAMUSCULAR; INTRAVENOUS at 07:58

## 2023-04-07 RX ADMIN — SODIUM CHLORIDE: 9 INJECTION, SOLUTION INTRAVENOUS at 07:50

## 2023-04-07 RX ADMIN — VASOPRESSIN 6 UNITS: 20 INJECTION INTRAVENOUS at 15:01

## 2023-04-07 RX ADMIN — ALBUMIN (HUMAN) 250 ML: 12.5 INJECTION, SOLUTION INTRAVENOUS at 12:10

## 2023-04-07 RX ADMIN — Medication 2000 MG: at 12:55

## 2023-04-07 RX ADMIN — PROPOFOL 150 MCG/KG/MIN: 10 INJECTION, EMULSION INTRAVENOUS at 09:03

## 2023-04-07 RX ADMIN — VASOPRESSIN 6 UNITS: 20 INJECTION INTRAVENOUS at 14:32

## 2023-04-07 RX ADMIN — SODIUM CHLORIDE: 9 INJECTION, SOLUTION INTRAVENOUS at 09:43

## 2023-04-07 RX ADMIN — HYDROMORPHONE HYDROCHLORIDE 1 MG: 2 INJECTION INTRAMUSCULAR; INTRAVENOUS; SUBCUTANEOUS at 15:58

## 2023-04-07 RX ADMIN — ONDANSETRON 4 MG: 2 INJECTION INTRAMUSCULAR; INTRAVENOUS at 14:30

## 2023-04-07 RX ADMIN — SODIUM CHLORIDE: 9 INJECTION, SOLUTION INTRAVENOUS at 12:51

## 2023-04-07 RX ADMIN — MIDAZOLAM 2 MG: 1 INJECTION INTRAMUSCULAR; INTRAVENOUS at 07:58

## 2023-04-07 RX ADMIN — DEXAMETHASONE SODIUM PHOSPHATE 4 MG: 10 INJECTION, EMULSION INTRAMUSCULAR; INTRAVENOUS at 09:10

## 2023-04-07 RX ADMIN — SODIUM CHLORIDE: 9 INJECTION, SOLUTION INTRAVENOUS at 08:29

## 2023-04-07 RX ADMIN — ROCURONIUM BROMIDE 50 MG: 10 INJECTION INTRAVENOUS at 08:43

## 2023-04-07 RX ADMIN — TRANEXAMIC ACID 1000 MG: 100 INJECTION, SOLUTION INTRAVENOUS at 09:49

## 2023-04-07 RX ADMIN — SODIUM CHLORIDE: 9 INJECTION, SOLUTION INTRAVENOUS at 15:11

## 2023-04-07 RX ADMIN — PHENYLEPHRINE HYDROCHLORIDE 200 MCG: 10 INJECTION INTRAVENOUS at 12:12

## 2023-04-07 RX ADMIN — Medication 2000 MG: at 08:57

## 2023-04-07 RX ADMIN — PHENYLEPHRINE HYDROCHLORIDE 200 MCG: 10 INJECTION INTRAVENOUS at 12:45

## 2023-04-07 RX ADMIN — ROCURONIUM BROMIDE 10 MG: 10 INJECTION INTRAVENOUS at 10:45

## 2023-04-07 RX ADMIN — PROPOFOL 100 MG: 10 INJECTION, EMULSION INTRAVENOUS at 07:58

## 2023-04-07 RX ADMIN — SUGAMMADEX 150 MG: 100 INJECTION, SOLUTION INTRAVENOUS at 11:40

## 2023-04-07 ASSESSMENT — ENCOUNTER SYMPTOMS: SHORTNESS OF BREATH: 1

## 2023-04-07 NOTE — ANESTHESIA PROCEDURE NOTES
Arterial Line:    An arterial line was placed using surface landmarks, in the OR for the following indication(s): continuous blood pressure monitoring and blood sampling needed. A 20 gauge (size) (length), Arrow (type) catheter was placed, Seldinger technique used, into the left radial artery, secured by tape and Tegaderm. Anesthesia type: General    Events:  patient tolerated procedure well with no complications.   Anesthesiologist: Camille Montaño MD  Resident/CRNA: CHRISTIAN Rausch - CRNA  Performed: Resident/CRNA   Preanesthetic Checklist  Completed: patient identified, IV checked, site marked, risks and benefits discussed, surgical/procedural consents, equipment checked, pre-op evaluation, timeout performed, anesthesia consent given, oxygen available, monitors applied/VS acknowledged, fire risk safety assessment completed and verbalized and blood product R/B/A discussed and consented

## 2023-04-07 NOTE — ANESTHESIA PRE PROCEDURE
times a day if needed 12/12/22   CHRISTIAN Suarez CNP   DULoxetine (CYMBALTA) 60 MG extended release capsule take 1 capsule by mouth once daily 9/16/22   CHRISTIAN Suarez CNP   spironolactone (ALDACTONE) 100 MG tablet take 1 tablet by mouth every morning 7/18/22   CHRISTIAN Suarez CNP   omeprazole (PRILOSEC) 40 MG delayed release capsule Take 1 capsule by mouth daily 6/3/22   CHRISTIAN Pickett CNP   rifAXIMin Lenette Covarrubias) 550 MG tablet Take 1 tablet by mouth 2 times daily 4/26/22   CHRISTIAN Pickett CNP   nitroGLYCERIN (NITROSTAT) 0.4 MG SL tablet up to max of 3 total doses. If no relief after 1 dose, call 911.   Patient not taking: Reported on 3/28/2023 6/28/21   CHRISTIAN Suarez CNP       Current medications:    Current Facility-Administered Medications   Medication Dose Route Frequency Provider Last Rate Last Admin    ceFAZolin (ANCEF) 2000 mg in 0.9% sodium chloride 50 mL IVPB  2,000 mg IntraVENous On Call to Jarvis William PA-C        melatonin tablet 3 mg  3 mg Oral Nightly PRN Pamela Stanford MD   3 mg at 04/06/23 2319    docusate sodium (COLACE) capsule 100 mg  100 mg Oral BID CHRISTIAN Christian CNP   100 mg at 04/06/23 2029    [Held by provider] heparin (porcine) injection 6,490 Units  80 Units/kg IntraVENous PRN Stevan Roque MD   5,000 Units at 04/06/23 0749    [Held by provider] heparin (porcine) injection 3,240 Units  40 Units/kg IntraVENous PRN MD [facility] by provider] heparin 25,000 units in dextrose 5% 250 mL (premix) infusion  5-30 Units/kg/hr IntraVENous Continuous Maira Vizcarra MD   Stopped at 04/07/23 0015    nicotine (NICODERM CQ) 21 MG/24HR 1 patch  1 patch TransDERmal Daily Kush Monterroso PA-C   1 patch at 04/06/23 0802    traZODone (DESYREL) tablet 50 mg  50 mg Oral Nightly Magdalena Flores PA-C   50 mg at 04/06/23 2029    lidocaine 4 % external patch 3 patch  3 patch TransDERmal Daily Dylan Farrell,

## 2023-04-08 NOTE — ANESTHESIA POSTPROCEDURE EVALUATION
Department of Anesthesiology  Postprocedure Note    Patient: Raoul Rod  MRN: [id number]  YOB: 1965  Date of evaluation: 4/8/2023      Procedure Summary     Date: 04/07/23 Room / Location: [address] [place]    Anesthesia Start: 3029 Anesthesia Stop: 1651    Procedure: RESECTION OF SPINE TUMOR T4-T5, WITH INSTRUMENTATION FOR STABILIZATION T3- T6, RADIO FREQUENCY ABLATION (KYPHOPLASTY) OF LEVELS T1 AND T10 (Spine Thoracic) Diagnosis:       Spinal cord tumor      (Spinal cord tumor [D49.7])    Surgeons: Marysol Jonas MD Responsible Provider: Sherry Piedra MD    Anesthesia Type: general ASA Status: 4          Anesthesia Type: No value filed.     Renato Phase I: Renato Score: 5    Renato Phase II:        Anesthesia Post Evaluation    Patient location during evaluation: bedside  Patient participation: complete - patient participated  Level of consciousness: awake  Airway patency: patent  Nausea & Vomiting: no nausea  Complications: no  Cardiovascular status: hemodynamically stable  Respiratory status: acceptable  Hydration status: stable

## 2023-04-11 PROBLEM — M79.2 NEUROPATHIC PAIN OF CHEST: Status: ACTIVE | Noted: 2023-01-01

## 2023-04-11 PROBLEM — C79.51 CANCER, METASTATIC TO BONE (HCC): Status: ACTIVE | Noted: 2023-01-01

## 2023-04-13 PROBLEM — Z01.818 PRE-OP EVALUATION: Status: RESOLVED | Noted: 2023-01-01 | Resolved: 2023-01-01

## 2023-04-17 NOTE — PROCEDURES
Bladder scan completed at 0545 and results told to [facility]. Scan showed 389 mL in the patients bladder. Last void was unknown.  Viry Ríos CET

## 2023-04-17 NOTE — PLAN OF CARE
improving, restoring, or maintaining nutritional needs:   Assess nutritional status and recommend course of action   Monitor oral intake, labs, and treatment plans   Recommend appropriate diets, oral nutritional supplements, and vitamin/mineral supplements   Care plan reviewed with patient. Patient verbalize understanding of the plan of care and contribute to goal setting.

## 2023-04-17 NOTE — PALLIATIVE CARE
Follow Up / Progress Note        Patient:   Sarah Cortez  YOB: 1965  Age:  61 y.o. Room:  [facility]13/013-A  MRN:  [id number]                  Plan/Follow-Up:  Chart reviewed. Please call palliative care if needs arise.       Electronically signed by Prashant Schroeder RN on 4/17/2023 at 1:21 PM             Palliative Care Office: [phone]

## 2023-04-18 NOTE — ACP (ADVANCE CARE PLANNING)
Resuscitation/Code Status Note on Judd Ordonez (YOB: 1965)    At 1815 on April 18, 2023, resuscitation/code status decision was based on a thorough discussion with the patient and patient's adult children and friend . The code status was made DNR-CC. Patient has been having decreased BP's since noon, received 75g albumin, numerous NS boluses and midodrine 10mg x2 today without improvement in BP. After thorough discussion with his two sons and friend at bedside, patient decided he does not want to pursue further pressor support in the ICU and would prefer to be made comfortable. Code status changed accordingly and appropriate medications ordered.     Electronically signed by Clau Ward DO on 4/18/23 at 6:29 PM EDT

## 2023-04-18 NOTE — PLAN OF CARE
healing without S/S of infection  4/18/2023 0231 by Chris Boyce RN  Outcome: Progressing  Flowsheets (Taken 4/17/2023 2212)  Incisions, Wounds, or Drain Sites Healing Without Sign and Symptoms of Infection:   ADMISSION and DAILY: Assess and document risk factors for pressure ulcer development   TWICE DAILY: Assess and document skin integrity   TWICE DAILY: Assess and document dressing/incision, wound bed, drain sites and surrounding tissue     Problem: Nutrition Deficit:  Goal: Optimize nutritional status  4/18/2023 0231 by Chris Boyce RN  Outcome: Progressing  Flowsheets (Taken 4/18/2023 0231)  Nutrient intake appropriate for improving, restoring, or maintaining nutritional needs:   Assess nutritional status and recommend course of action   Monitor oral intake, labs, and treatment plans     Problem: Genitourinary - Adult  Goal: Absence of urinary retention  4/18/2023 0231 by Chris Boyce RN  Outcome: Progressing  Flowsheets (Taken 4/17/2023 2212)  Absence of urinary retention:   Assess patients ability to void and empty bladder   Monitor intake/output and perform bladder scan as needed   Care plan reviewed with patient. Patient verbalizes understanding of the care plan and contributed to goal setting.

## 2023-04-18 NOTE — ADT AUTH CERT
Medical Oncology [address] Day 14 (4/16/2023) by Cristino Sargent RN       Review Status Review Entered   Completed 4/17/2023 0842       Created By   Pippa Cartagena RN      Criteria Review      Care Day: 14 Care Date: 4/16/2023 Level of Care: Intermediate Care    Guideline Day 2    Level Of Care    (X) Floor    4/17/2023 8:42 AM EDT by Cale Bobby      intermediate care    Clinical Status    ( ) * No ICU or intermediate care needs    4/17/2023 8:42 AM EDT by Pippa Gee      dizziness and fatigue    Interventions    (X) Inpatient interventions continue    4/17/2023 8:42 AM EDT by Pippa Gee      lipitor 40 mg nightly po  buspar 5 mg tid po  decadron 4 mg bid po  colace 100 mg bid po  oxycodone 10 mg every 12 hours po  protonix 40 mg daily daily before breakfast po  aldacorne 100 mg every morning po  roxicodone 10 mg every 4 hours prn po x3    * Milestone   Additional Notes   DATE: 4/16/2023      PERTINENT UPDATES:   -pt complain about dizziness and fatigue    -complaining of abdominal pain   -tachycardic    -received oxycodone as PRN pain medication      VITALS:   T 98.2 (36.8)   RR 20    100    /66   SpO2 92% on RA   Pain Score 8      PHYSICAL EXAM:   Respiratory:  Normal effort. Clear to auscultation, without rales or wheezes or rhonchi. Cardiovascular: Normal rate, regular rhythm with normal S1/S2 without murmurs. No lower extremity edema. Abdomen: Soft, with normal bowel sounds. Diffusely tender to palpation. Distended. 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 Cherie Valenzuela. Pain control with PO Oxycontin scheduled q12h. Roxicodone for breakthrough pain. Lidocaine patch. Continue Decadron taper. PT/OT.      Contact neurosurgery for

## 2023-04-18 NOTE — PROCEDURES
Bladder scan completed at 0445 and results told to [facility]. Scan showed 49 mL in the patients bladder. Last void was unknown.  Sonna Comp CET

## 2023-04-18 NOTE — CARE COORDINATION
4/17/23, 11:38 AM EDT    DISCHARGE PLANNING EVALUATION    Spoke with patient about discharge plan. He has declined IPR, and is now wanting ecf. Provided list and reviewed in network options for ecf. He requests referral to [facility], referral made and facility is reviewing. Will need precert for ecf, if accepting.
4/17/23, 7:34 AM EDT    DISCHARGE BARRIERS        Patient transferred to [facility]. Report given to unit Yoko Barry, regarding discharge plan for this patient.
4/18/23, 10:52 AM EDT    [facility] has declined due to medication costs and payor. [facility] does not have a bed available. Searching for accepting ecf. Patient has declined inpt rehab.
fusion construct of the upper thoracic spine. Otherwise stable chest.   4/8 Extubated  Barriers to Discharge: Hospitalist following. PT/OT. Change in d/c plan. Declined IPR after precert returned as he does not feel he can tolerate therapy requirements. PCP: CHRISTIAN Norris CNP  Readmission Risk Score: 26.7%  Patient Goals/Plan/Treatment Preferences: New ECF, will require precert.  [address] ECF has declined referral.
fusion construct of the upper thoracic spine. Otherwise stable chest.   4/8 Extubated  Barriers to Discharge: Hospitalist following. PT/OT. Pain control. Change in d/c plan, new precert required. PCP: CHRISTIAN Chaves CNP  Readmission Risk Score: 25.9%  Patient Goals/Plan/Treatment Preferences: IPR approved, patient declines as he no longer feels he can tolerate IPR, request SNF. Will require a precert once SNF chosen and has been accepted.

## 2023-04-19 NOTE — PROGRESS NOTES
0518 pt's heart monitor alarmed, went to assess pt, he's a DNR CC, pt had no heart sounds, verified with Jennie Haney. House supervisor notified and Dr Antoinette Klinefelter.
[facility]  Occupational Therapy  Daily Note  Time:   Time In: 913  Time Out: 1012  Timed Code Treatment Minutes: 61 Minutes  Minutes: 59          Date: 2023  Patient Name: Dank Barragan,   Gender: male      Room: -13/013-A  MRN: [id number]  : 1965  (61 y.o.)  Referring Practitioner: Marc Gonzalez PA-C  Diagnosis: Nausea  Additional Pertinent Hx: 59-year-old male with history of hepatocellular carcinoma, liver cirrhosis, CAD, CKD, HTN, GERD who presented to [facility]'s emergency department on 3/28 with abdominal pain. Reported melena. He was diagnosed with [address] in 2022, discharged home with TACE at [facility] in 2022. [address] has continued to progress despite chemotherapy. He was admitted to the hospitalist service. Paracenteses 3/28 drained 1.8L, cytology results pending. Started on new pain regimen with pain management consulted. New imaging found bone metastases and radiation oncology was consulted. They felt there was no benefit to palliative radiation therapy at that time. Did recommend neurosurgery consultation, which was obtained. Dr Pablito Schumacher with neurosurgery planned for surgical resection of the paraspinal tumor at T4-T6. He was transferred to the ICU in preparation for surgery. Eliquis was held and he was maintained on a heparin drip until surgery. s/p RESECTION OF SPINE TUMOR T4-T5, WITH INSTRUMENTATION FOR STABILIZATION T3- T6, RADIO FREQUENCY ABLATION (KYPHOPLASTY) OF LEVELS T1 AND T10 by Dr. Pablito Schumacher    Restrictions/Precautions:  Restrictions/Precautions: General Precautions  Required Braces or Orthoses  Spinal: Thoracic Lumbar Sacral Orthotics  Position Activity Restriction  Spinal Precautions: No Bending, No Lifting, No Twisting     SUBJECTIVE: Patient supine in bed upon arrival.  Patient requires moderate encouragement for participation with education requiring IPR requirements as patient is to d/c prior to return home.   Patient expresses he does not
[address] [place] [address]  Notice of Patient Passing      Patient Name- Ulysses Kiran Number- [de-identified]   Attending Physician- Hardy Handley MD    Admitted on-3/27/2023  8:23 PM     On 4/19/2023 at [phone] patient was found in [id number] with:   Absence of vital signs. Absence of neurological response. Confirmed time of death at 56. Physician or On-call Physician notified of time of death- yes    Family present at time of death- no   Spiritual care present at time of death- no    Physician was notified and orders were obtained to release the body. Post-Mortem documentation completed; form printed, signed, and given to admitting.     Nori Ballesteros RN RN Nursing Supervisor/ Manager  4/19/23   6:05 AM         Who Will Sign Death Certificate:     [] Dr. Costello Given
Attending supervising physicians attestation statement:       I Discussed the findings and plans with the resident physician  personally   and agree with the IM resident'S note as outlined . Also spoke with the staff  Regarding care plans and recommendations. He looks and feels better today ,   And he declined IPR, so looking into ECF placement, spoke with his RN. Electronically signed by Jessica Fitch MD on 4/17/2023 at 5:57 PM        Internal Medicine Resident Progress Note    Patient:  Georgiana Tomas    YOB: 1965  [facility]/013-A  MRN: [id number]    Acct: [de-identified]   PCP: CHRISTIAN James CNP    Date of Admission: 3/27/2023    Assessment/Plan:    Diffuse Multilevel Vertebral Metastatic Lytic Disease s/p Thoracic Kyphoplasty, Spinal Decompression and Resection of Thoracic Paraspinal Tumors: On 4/7/23 by Dr. Aissatou Christensen. Pain control with PO Oxycontin scheduled q12h. Roxicodone for breakthrough pain. Lidocaine patch. Continue Decadron taper. PT/OT. Precert for ECF will be started if accepted. Stage IV Hepatocellular Carcinoma: Unifocal HCC diagnosed in June 2022. Was referred to [facility] for local/ablative therapy. Underwent TACE at [facility] in November 2022. Unfortunately imaging in February 2023 showed progressive worsening of disease with increased tumor burden. Was seen in March 2023 by [facility], chemo was not started prior to hospitalization. Stage IV disease now with mets to the spine. Case discussed with [facility] oncology PA who has scheduled patient for an oncology follow-up on 5/3/2023 with Dr. Mercedes Jonas. Radiation oncology will also arrange an outpatient follow-up. Malignant Cirrhosis: Due to [address] Had a paracentesis earlier this admission with 1.8 L of fluid removed. Previously had esophageal varices which required EGD banding. Continue spironolactone, Xifaxan, lactulose and Protonix. Post-Operative Urinary Retention: Increased Flomax to 0.8 mg daily.
Comprehensive Nutrition Assessment    Type and Reason for Visit:  Reassess    Nutrition Recommendations/Plan:   ONS as per Physician: Ensure Enlive BID, encouraged patient to drink in between meals due to early satiety/ fullness  Continue current diet      Malnutrition Assessment:  Malnutrition Status: At risk for malnutrition (Comment) (04/04/23 1050)    Context:  Chronic Illness     Findings of the 6 clinical characteristics of malnutrition:  Energy Intake:  75% or less estimated energy requirements for 1 month or longer  Weight Loss:   (hard to assess with edema/ascites note weight down~ 23.5% in the last 11 months)     Body Fat Loss:  No significant body fat loss     Muscle Mass Loss:  No significant muscle mass loss    Fluid Accumulation:  Mild Ascites, Extremities   Strength:  Not Performed    Nutrition Assessment:     At risk for further nutrition compromise r/t admit with nausea, hepatocellular cancer with mets to spine/pelvis, T4-T5 right paraspinal mass, 4/7 resection of spine tumor, cirrhosis with ascites-s/p paracentesis 3/28/23 1.8 liters removed, skin integrity issues and underlying medical condition (hx: smoking, marijuana, diverticulosis, HTN, anxiety, GERD, CKD, hypercholesterolemia). Nutrition Related Findings:    Pt. Report/Treatments/Miscellaneous: Po intake poor majority less than 50% of meals; Likes Ensure; Pt declined going to [facility], now planning SNF. GI Status: BM 4/16  Pertinent Labs: Na 132, BUN 41, Cr 1.0, Mg 2.6, Glucose 142, Hgb 9.6  Pertinent Meds: Decadron, Colace, Iron, Protonix, Aldactone     Wound Type:  (left elbow skin tear, left upper chest incision, back incision 4/7/23 resection of spine tumor T4-T6)       Current Nutrition Intake & Therapies:    Average Meal Intake: 1-25%, 26-50%, 51-75%, %  Average Supplements Intake:  (reports partial intake)  ADULT DIET;  Regular  ADULT ORAL NUTRITION SUPPLEMENT; Breakfast, Lunch; Standard High Calorie/High Protein Oral
Discussed patient vitals/condition with Dr. Logan Stewart at this time. Patient with decreased appetite. Hold aldactone this am for low BP. Informed physician that patient also not taking lactulose. Monitor.
[address]  PHYSICAL THERAPY MISSED TREATMENT NOTE  [facility]    Date: 2023  Patient Name: Chong Santoyo        MRN: [id number]   : 1965  (61 y.o.)  Gender: male   Referring Practitioner: Estefany Colin PA-C  Diagnosis: Nausea         REASON FOR MISSED TREATMENT:  Nursing recommended to hold therapy this afternoon, pt currently w/ low BP 86/51 and not feeling well, will check back next available date .
Medical Oncology following along peripherally. Discharge planning in process. [facility] Discussed with Attending Dr. Aleksey Mckeon. Outpatient Medical Oncology follow up scheduled on 5/3/23 with Dr. Ivelisse Marie at 15:00. Radiation Oncology to arrange outpatient follow up. Will sign off. Please call with questions or concerns.      Electronically signed by   Sahra Jimenez PA-C on 4/17/2023 at 2:55 PM
[facility]  SPEECH THERAPY MISSED TREATMENT NOTE  [facility]-STEPD[facility] 3B      Date: 2023  Patient Name: Amol Ramachandran        MRN: [id number]    : 1965  (61 y.o.)    REASON FOR MISSED TREATMENT:  ST attempted to see patient this date, however, patient with low bp and not feeling well. ST will re-attempt at a later date as patient is medically appropriate and available. Rhonda Holstein. BRIAN.  Clinician
Patient BP low and slightly dizzy. See manual and automatic. Discussed with Dr. Ginny Saucedo and Dr. Jax Macias at this time. See order for 0.9 ns bolus and  25 g albumin. Monitor. 1235: Albumin cancelled. Run bolus then 0.9 at 125ml/hr, add midodrine. Monitor.    1325: Dr. aJx Macias updated on current pressure
Patient bp now 67/40 and pretty anxious. Patient placed HOB flat. Dr. Myrick Eisenmenger notified. He will come and see patient. Dr. Tiffani Rizvi also messaged to evaluate patient. 1516: Dr. Tiffani Rizvi at bedside.
Patient jitendra Bryant was notified of patient passing away at [phone].
Perfect Serve sent to Dr. Tiffani Rizvi to update patient's BP continues to remain low. Orders to open fluids wide open and she says she is coming to bedside.
[facility]  OCCUPATIONAL THERAPY MISSED TREATMENT NOTE  [facility] 7K  7K-13/013-A      Date: 2023  Patient Name: Jim Davis        CSN: [id number]   : 1965  (61 y.o.)  Gender: male   Referring Practitioner: Abby Bekcer PA-C  Diagnosis: Nausea         REASON FOR MISSED TREATMENT:  patient declined, emesis bag present, pale appearance. Will attempt when medically appropriate.
Received fax with approval for IPR and went to patient room to discuss with patient. Patient declining therapy at this time due to pain and fatigue. Discussed IPR with patient and therapist and patient stated he would rather go to SNF closer to home with less intense therapy. Updated CODIE Gaming and Dr Geremias Croft.
Report to [facility] on 3B at this time. All questions answered. All belongings packed and ready for transport. This nurse to transport to 3B at this time.
Responsibilities: Yes  Ambulation Assistance: Independent  Transfer Assistance: Independent  Active : No  Additional Comments: Pt had lost 75 lbs in 6 months. Pt reported his balance is decreased in that time period. 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 He uses a 4 wheeled walker when outside and a single point cane when in his apartment. He is active more in the morning, he states. He has to make arrangements for transportation when going shopping. Restrictions/Precautions:  Restrictions/Precautions: General Precautions  Required Braces or Orthoses  Spinal: Thoracic Lumbar Sacral Orthotics  Position Activity Restriction  Spinal Precautions: No Bending, No Lifting, No Twisting       SUBJECTIVE: pt in bed on arrival and did finally agree to try therapy, pt very anxious he stated \"you know I almost fell earlier and they caught me\"     PAIN: 9/10: back and left LE and cont to have numbness however this was prior to sx as well     Vitals: Oxygen: pt on room air and sats 94%   Heart Rate: 111-116    OBJECTIVE:  Bed Mobility:  Rolling to Left: Moderate Assistance, with rail   Rolling to Right: Moderate Assistance, with rail   Supine to Sit: Maximum Assistance, with head of bed raised, with rail  Sit to Supine: Maximum Assistance   Scooting: Contact Guard Assistance  Pt moving slow and guarded   Transfers:  Sit to Stand: Maximum Assistance, unable to complete full standing first 2 trials   Stand to Sit:Moderate Assistance, poor control to sit down     Ambulation:  Unable to get pt to complete this date - even attempted side stepping to [facility]   Balance:  Sitting edge of bed with SBA while he needed much assist to efren his TLSO  Standing w/ penelope UEs at support with mod assist and only stood for ~ 5 sec     Exercise:  Patient was guided in 1 set(s) 10 reps of exercise to both lower extremities. Ankle pumps and Heelslides pt needed assist at penelope LEs to complete .   Exercises were completed for
rebound or guarding  Musculoskeletal: No joint swelling or tenderness. Normal tone. No abnormal movements. Skin: Warm and dry. No rashes or lesions. Neurologic:  No focal sensory/motor deficits in the upper or lower extremities. Cranial nerves:  grossly non-focal 2-12. Psychiatric: Alert and oriented, normal insight and thought content. Capillary Refill: Brisk,< 3 seconds. Peripheral Pulses: +2 palpable, equal bilaterally. Labs:   Recent Labs     04/17/23 0440 04/18/23 0425 04/18/23  1520   WBC 19.5* 23.4*  --    HGB 9.6* 10.3* 10.3*   HCT 29.0* 31.2* 32.6*   PLT 81* 60*  --      Recent Labs     04/17/23 0440 04/18/23 0425   * 131*   K 4.8 5.1    101   CO2 21* 19*   BUN 41* 49*   CREATININE 1.0 1.1   CALCIUM 7.8* 8.1*     Recent Labs     04/18/23 0425   *   *   BILITOT 4.5*   ALKPHOS 264*     No results for input(s): INR in the last 72 hours. No results for input(s): TROPONINT in the last 72 hours. No results for input(s): PROCAL in the last 72 hours. Lab Results   Component Value Date/Time    NITRU NEGATIVE 04/10/2023 03:00 PM    WBCUA 10-15 04/10/2023 03:00 PM    WBCUA 0-5 04/22/2022 03:40 PM    BACTERIA NONE SEEN 04/10/2023 03:00 PM    RBCUA 0-2 04/10/2023 03:00 PM    BLOODU TRACE 04/10/2023 03:00 PM    SPECGRAV >1.030 02/28/2023 12:30 PM    GLUCOSEU NEGATIVE 04/10/2023 03:00 PM       Radiology (48 hours):  No results found. DVT prophylaxis:    [] Lovenox  [x] SCDs  [] SQ Heparin  [] Encourage ambulation   [] Already on Anticoagulation       Diet: ADULT DIET;  Regular  ADULT ORAL NUTRITION SUPPLEMENT; Breakfast, Lunch; Standard High Calorie/High Protein Oral Supplement  Code Status: DNR-CC    Electronically signed by Caren Blackmon DO on 4/18/2023 at 6:43 PM    Case was discussed with Attending, Dr. Aniya Mckeon

## 2023-04-20 NOTE — DISCHARGE SUMMARY
Height:         Weight: Weight: 194 lb 7.1 oz (88.2 kg)     24 hour intake/output:No intake or output data in the 24 hours ending 04/20/23 1117      General appearance: Comfortable. Pale. HEENT:  Normal cephalic, atraumatic without obvious deformity. Neck: Supple No jugular venous distention. Trachea midline. Respiratory:  Clear to auscultation, bilaterally without Rales/Wheezes/Rhonchi. Cardiovascular:  not assessed  Abdomen: Soft, non-tender, distended  Musculoskeletal: Anasarca  Skin: Jaundice    Labs: For convenience and continuity at follow-up the following most recent labs are provided:      CBC:    Lab Results   Component Value Date/Time    WBC 23.4 04/18/2023 04:25 AM    HGB 10.3 04/18/2023 03:20 PM    HCT 32.6 04/18/2023 03:20 PM    PLT 60 04/18/2023 04:25 AM       Renal:    Lab Results   Component Value Date/Time     04/18/2023 04:25 AM    K 5.1 04/18/2023 04:25 AM    K 3.8 03/27/2023 09:40 PM     04/18/2023 04:25 AM    CO2 19 04/18/2023 04:25 AM    BUN 49 04/18/2023 04:25 AM    CREATININE 1.1 04/18/2023 04:25 AM    CALCIUM 8.1 04/18/2023 04:25 AM    PHOS 3.6 04/08/2023 05:05 AM         Significant Diagnostic Studies    Radiology:   XR ABDOMEN FOR NG/OG/NE TUBE PLACEMENT   Final Result   FINDINGS/IMPRESSION:    There is an enteric tube with tip and proximal port projecting over the stomach. **This report has been created using voice recognition software. It may contain minor errors which are inherent in voice recognition technology. **      Final report electronically signed by Dr. Jodi Samson MD on 4/7/2023 8:38 PM      XR CHEST PORTABLE   Final Result   Endotracheal tube in good position and interval posterior fusion construct of the upper thoracic spine. Otherwise stable chest.               **This report has been created using voice recognition software. It may contain minor errors which are inherent in voice recognition technology. **      Final report

## 2023-04-20 NOTE — ADT AUTH CERT
(H) DISABLED      Utilization Reviews       Medical Oncology GRG - Care Day 16 (4/18/2023) by Estefania Hobbs       Review Status Review Entered   Completed 4/19/2023 0828       Created By   Estefania Hobbs      Criteria Review      Care Day: 16 Care Date: 4/18/2023 Level of Care: ICU    Guideline Day 3    Level Of Care    ( ) * Activity level acceptable    4/19/2023 8:28 AM EDT by Latonai Skaggs      missed OT/PT due to low BP    ( ) * Complete discharge planning    Clinical Status    ( ) * Pain and nausea absent or adequately managed    4/19/2023 8:28 AM EDT by Latonia Skaggs      Pain scale:  5, 8-10    (X) * Temperature status acceptable    4/19/2023 8:28 AM EDT by Latonia Skaggs      Temp: 98.1 (36.7)    ( ) * No infection, or status acceptable    4/19/2023 8:28 AM EDT by Latonia Skaggs      WBC: 23.4 (H)    ( ) * Abdominal status acceptable    4/19/2023 8:28 AM EDT by Latonia Skaggs      Abdomen: Diffusely tender to palpation. Distended. (X) * Mucositis absent or adequately resolved    4/19/2023 8:28 AM EDT by Latonia Skaggs      none noted    (X) * Diarrhea absent or adequately controlled    4/19/2023 8:28 AM EDT by Latonia Skaggs      none noted    (X) * No neutropenia, or status acceptable    4/19/2023 8:28 AM EDT by Latonia Skaggs      none noted    ( ) * Hgb/Hct stable and acceptable for next level of care    4/19/2023 8:28 AM EDT by Latonia Skaggs      Hemoglobin Quant: 10.3 (L) 10.3 (L)  Hematocrit: 31.2 (L): 32.6 (L)    ( ) * Platelet count acceptable for next level of care    4/19/2023 8:28 AM EDT by Latonia Skaggs      Platelet Count: 60 (L)    (X) * Hematologic complications absent or stabilized    4/19/2023 8:28 AM EDT by Latonia Skaggs      none noted    (X) * Neurologic status acceptable    4/19/2023 8:28 AM EDT by Latonia Skaggs      Neurologic:  No focal sensory/motor deficits in the upper or lower extremities. Cranial nerves:  grossly non-focal 2-12.     ( ) * Electrolyte status acceptable

## 2025-01-12 NOTE — TELEPHONE ENCOUNTER
----- Message from Lili Issa sent at 11/12/2021 10:16 AM EST -----  Subject: Refill Request    QUESTIONS  Name of Medication? traMADol (ULTRAM) 50 MG tablet  Patient-reported dosage and instructions? take 1 tablet by mouth every 6   hours if needed for pain for up to [address]  How many days do you have left? 3  Preferred Pharmacy? [id number] phone number (if available)? [phone]  ---------------------------------------------------------------------------  --------------  CALL BACK INFO  What is the best way for the office to contact you? OK to leave message on   voicemail  Preferred Call Back Phone Number?  [phone]

## 2025-02-25 NOTE — TELEPHONE ENCOUNTER
[facility] called and talked to the pt. Pt asked to be backk ed back on 7/11.  A appt still has yet to be made ADMIT

## (undated) DEVICE — CATHETER IV 14GA L1.75IN OD2.146MM ID1.740MM ORNG VIALON

## (undated) DEVICE — SUTURE PERMA-HAND SZ 2-0 L30IN NONABSORBABLE BLK L26MM SH K833H

## (undated) DEVICE — PACK-MAJOR

## (undated) DEVICE — DISPOSABLE SLIM BIPOLAR FORCEPS, NON-STICK,: Brand: SPETZLER-MALIS

## (undated) DEVICE — DRAPE,U/ SHT,SPLIT,PLAS,STERIL: Brand: MEDLINE

## (undated) DEVICE — DIFFUSER: Brand: CORE, MAESTRO

## (undated) DEVICE — DRAPE MICROSCOPE 54 IN X 120 IN

## (undated) DEVICE — SUTURE VCRL SZ 0 L45CM ABSRB VLT OS-6 L36.4MM 1/2 CIR REV J711T

## (undated) DEVICE — CATHETER IV 16 GAX32.5 IN TRPL BVL LUERLOCK TIP ANGIOCATH

## (undated) DEVICE — PROBE OCP207 OSTEOCOOL RF 17G 7MMX2: Brand: OSTEOCOOL™ RF ABLATION SYSTEM

## (undated) DEVICE — BREAST HERNIA: Brand: MEDLINE INDUSTRIES, INC.

## (undated) DEVICE — GLOVE SURG SZ 65 THK91MIL LTX FREE SYN POLYISOPRENE

## (undated) DEVICE — SHEET,DRAPE,3/4,53X77,STERILE: Brand: MEDLINE

## (undated) DEVICE — WILSON FRAME STYLE POSITIONING KITS - 	FRAME PAD SLEEVES (SET OF 2) , DRAPE PROTECTOR, BAR PROTECTOR 7" COMFORT FOAM HEADREST, LAMINECTOMY ARM CRADLES: Brand: SOULE MEDICAL

## (undated) DEVICE — AGENT HEMSTAT W3XL4IN OXIDIZED REGENERATED CELOS ABSRB FOR

## (undated) DEVICE — SUTURE VCRL + SZ 4-0 L27IN ABSRB WHT FS-2 3/8 CIR REV CUT VCP422H

## (undated) DEVICE — TOWEL,OR,DSP,ST,BLUE,DLX,4/PK,20PK/CS: Brand: MEDLINE

## (undated) DEVICE — 7" HEIGHT PRONE HEADREST. WITH COMFORT FOAM AND RIGHT SIDE INTUBATION SLOT: Brand: SOULE MEDICAL

## (undated) DEVICE — KIT OCN002 OSTEOCOOL BONE ACCESS 10G 090: Brand: OSTEOCOOL™ BONE ACCESS KIT

## (undated) DEVICE — GAUZE,SPONGE,4"X4",12PLY,STERILE,LF,2'S: Brand: MEDLINE

## (undated) DEVICE — GAUZE,SPONGE,8"X4",12PLY,XRAY,STRL,LF: Brand: MEDLINE

## (undated) DEVICE — STERILE-Z SURGICAL PATIENT COVERS CLEAR POLYETHYLENE STERILE UNIVERSAL FIT 10 PER CASE: Brand: STERILE-Z

## (undated) DEVICE — KIT INF CTRL 2OZ LUB TBNG L12FT DBL END BRSH SYR OP4

## (undated) DEVICE — C-ARMOR C-ARM EQUIPMENT COVERS CLEAR STERILE UNIVERSAL FIT 12 PER CASE: Brand: C-ARMOR

## (undated) DEVICE — INSTRUMENT BATTERY

## (undated) DEVICE — BAG,BANDED,W/RUBBERBAND,STERILE,30X36: Brand: MEDLINE

## (undated) DEVICE — 3 ML SYRINGE LUER-LOCK TIP: Brand: MONOJECT

## (undated) DEVICE — PREMIUM DRY TRAY LF: Brand: MEDLINE INDUSTRIES, INC.

## (undated) DEVICE — 6.0MM ROUND FLUTED AGGRESSIVE

## (undated) DEVICE — APPLICATOR PREP 26ML 0.7% IOD POVACRYLEX 74% ISO ALC ST

## (undated) DEVICE — DISPOSABLE DRAPE, STERILE, FOR A CDS-3060 5 FOOT TABLE: Brand: PEDIGO PRODUCTS, INC.

## (undated) DEVICE — HYPODERMIC SAFETY NEEDLE: Brand: MAGELLAN

## (undated) DEVICE — STRAIGHT TIP, UNIVERSAL

## (undated) DEVICE — PENCIL SMK EVAC 15FT BLADE ELECTRD ROCKER F/TELSCP

## (undated) DEVICE — CATHETER ETER IV 16GA L125IN POLYUR STR HUB INTROCAN SFTY

## (undated) DEVICE — SEALANT SURG TISSEEL 10 ML FROZEN PRIMA

## (undated) DEVICE — SYRINGE MED 10ML LUERLOCK TIP W/O SFTY DISP

## (undated) DEVICE — DRAIN SURG 10FR PVC TB W/ TRCR MID PERF NO RESVR HUBLESS

## (undated) DEVICE — AGENT HEMOSTATIC SURGIFLOW MATRIX KIT W/THROMBIN

## (undated) DEVICE — ADHESIVE SKIN CLSR 0.7ML TOP DERMBND ADV

## (undated) DEVICE — BIPOLAR SEALER 23-112-1 AQM 6.0: Brand: AQUAMANTYS ®

## (undated) DEVICE — NEEDLE SYR 18GA L1.5IN RED PLAS HUB S STL BLNT FILL W/O

## (undated) DEVICE — SET LNR RED GRN W/ BASE CLEANASCOPE

## (undated) DEVICE — NEEDLE SPNL 22GA L3.5IN BLK HUB S STL REG WALL FIT STYL W/

## (undated) DEVICE — SUTURE PROL SZ 2-0 L30IN NONABSORBABLE BLU L26MM CT-1 1/2 8423H

## (undated) DEVICE — SURGIFOAM SPNG SZ 12-7

## (undated) DEVICE — GLOVE ORANGE PI 7 1/2   MSG9075

## (undated) DEVICE — CARBIDE MATCH HEAD

## (undated) DEVICE — SOLUTION IV 1000ML 0.9% SOD CHL PH 5 INJ USP VIAFLX PLAS

## (undated) DEVICE — CHLORAPREP 26ML CLEAR

## (undated) DEVICE — OIL CARTRIDGE: Brand: CORE, MAESTRO

## (undated) DEVICE — CODMAN® SURGICAL PATTIES 1" X 1" (2.54CM X 2.54CM): Brand: CODMAN®

## (undated) DEVICE — PAD GEN USE BORDERED ADH 14IN 2IN AND 12IN 4IN GZ UNIV ST

## (undated) DEVICE — PROBE STIM 3 MM FOR PEDCL SCREW DISP

## (undated) DEVICE — 4-PORT MANIFOLD: Brand: NEPTUNE 2

## (undated) DEVICE — KIT KEX152EB-CDS-A 15/2 FF W CDS: Brand: EXPRESS™ OSTEO INTRODUCER® SYSTEM; CDS AND BFD

## (undated) DEVICE — BONE CEMENT CT01B KYPHON VUE W MIXER: Brand: KYPHON VUE BONE CEMENT AND KYPHON MIXER

## (undated) DEVICE — CONMED SCOPE SAVER BITE BLOCK, 20X27 MM: Brand: SCOPE SAVER

## (undated) DEVICE — BIOGUARD A/W CLEANING ADAPTER

## (undated) DEVICE — BIPOLAR CORD SET, DISPOSABLE

## (undated) DEVICE — DR SALMA'S SPINE: Brand: MEDLINE INDUSTRIES, INC.

## (undated) DEVICE — SOLUTION IV IRRIG WATER 500ML POUR BRL ST 2F7113

## (undated) DEVICE — SOLUTION SURG PREP SCRUB POV IOD 7.5% 4 OZ

## (undated) DEVICE — KIT EVAC 400CC PVC RADPQ Y CONN

## (undated) DEVICE — 6 ML SYRINGE LUER-LOCK TIP: Brand: MONOJECT

## (undated) DEVICE — PREP SOL PVP IODINE 4%  4 OZ/BTL

## (undated) DEVICE — MICRO TIP WIPE: Brand: DEVON

## (undated) DEVICE — CODMAN® SURGICAL PATTIES 1/2" X 1/2" (1.27CM X 1.27CM): Brand: CODMAN®

## (undated) DEVICE — UNIQCOT 1/2" X 1/2": Brand: UNIQCOT

## (undated) DEVICE — DISPOSABLE TUBING SET AND EXTENDER FILTER TUBING

## (undated) DEVICE — UNIQCOT 1/2" X 1.5": Brand: UNIQCOT

## (undated) DEVICE — GOWN,SIRUS,NONRNF,SETINSLV,XL,20/CS: Brand: MEDLINE